# Patient Record
Sex: FEMALE | Race: WHITE | NOT HISPANIC OR LATINO | Employment: OTHER | URBAN - METROPOLITAN AREA
[De-identification: names, ages, dates, MRNs, and addresses within clinical notes are randomized per-mention and may not be internally consistent; named-entity substitution may affect disease eponyms.]

---

## 2017-02-23 ENCOUNTER — APPOINTMENT (OUTPATIENT)
Dept: RADIOLOGY | Facility: MEDICAL CENTER | Age: 52
End: 2017-02-23
Attending: EMERGENCY MEDICINE
Payer: MEDICAID

## 2017-02-23 ENCOUNTER — HOSPITAL ENCOUNTER (EMERGENCY)
Facility: MEDICAL CENTER | Age: 52
End: 2017-02-23
Attending: EMERGENCY MEDICINE
Payer: MEDICAID

## 2017-02-23 VITALS
WEIGHT: 144.4 LBS | HEIGHT: 62 IN | OXYGEN SATURATION: 95 % | BODY MASS INDEX: 26.57 KG/M2 | SYSTOLIC BLOOD PRESSURE: 153 MMHG | TEMPERATURE: 97.4 F | HEART RATE: 94 BPM | RESPIRATION RATE: 18 BRPM | DIASTOLIC BLOOD PRESSURE: 100 MMHG

## 2017-02-23 DIAGNOSIS — S39.012A LUMBAR SPINE STRAIN, INITIAL ENCOUNTER: ICD-10-CM

## 2017-02-23 DIAGNOSIS — S09.90XA CHI (CLOSED HEAD INJURY), INITIAL ENCOUNTER: ICD-10-CM

## 2017-02-23 PROCEDURE — 99284 EMERGENCY DEPT VISIT MOD MDM: CPT

## 2017-02-23 PROCEDURE — 700111 HCHG RX REV CODE 636 W/ 250 OVERRIDE (IP): Performed by: EMERGENCY MEDICINE

## 2017-02-23 PROCEDURE — 72100 X-RAY EXAM L-S SPINE 2/3 VWS: CPT

## 2017-02-23 PROCEDURE — 96372 THER/PROPH/DIAG INJ SC/IM: CPT

## 2017-02-23 RX ORDER — KETOROLAC TROMETHAMINE 30 MG/ML
30 INJECTION, SOLUTION INTRAMUSCULAR; INTRAVENOUS ONCE
Status: DISCONTINUED | OUTPATIENT
Start: 2017-02-23 | End: 2017-02-23 | Stop reason: CLARIF

## 2017-02-23 RX ORDER — KETOROLAC TROMETHAMINE 30 MG/ML
30 INJECTION, SOLUTION INTRAMUSCULAR; INTRAVENOUS ONCE
Status: COMPLETED | OUTPATIENT
Start: 2017-02-23 | End: 2017-02-23

## 2017-02-23 RX ORDER — TRAMADOL HYDROCHLORIDE 50 MG/1
50 TABLET ORAL EVERY 4 HOURS PRN
Qty: 30 TAB | Refills: 0 | Status: ON HOLD | OUTPATIENT
Start: 2017-02-23 | End: 2017-06-16

## 2017-02-23 RX ADMIN — KETOROLAC TROMETHAMINE 30 MG: 30 INJECTION, SOLUTION INTRAMUSCULAR; INTRAVENOUS at 14:14

## 2017-02-23 ASSESSMENT — LIFESTYLE VARIABLES: DO YOU DRINK ALCOHOL: NO

## 2017-02-23 NOTE — ED NOTES
Discharge instructions given, pt verbalized understanding.  Prescription instructions given, pt verbalized understanding.  A&ox4.  VSS.  Ambulates out of ER.

## 2017-02-23 NOTE — ED AVS SNAPSHOT
2/23/2017          Isabelle Coyle  [address]  [facility] [address]    Dear Isabelle:    [facility] wants to ensure your discharge home is safe and you or your loved ones have had all your questions answered regarding your care after you leave the hospital.    You may receive a telephone call within two days of your discharge.  This call is to make certain you understand your discharge instructions as well as ensure we provided you with the best care possible during your stay with us.     The call will only last approximately 3-5 minutes and will be done by a nurse.    Once again, we want to ensure your discharge home is safe and that you have a clear understanding of any next steps in your care.  If you have any questions or concerns, please do not hesitate to contact us, we are here for you.  Thank you for choosing [facility] for your healthcare needs.    Sincerely,    Giovani Valenzuela    [facility]

## 2017-02-23 NOTE — ED AVS SNAPSHOT
Home Care Instructions                                                                                                                Isabelle Coyle   MRN: [id number]    Department:  [facility], Emergency Dept   Date of Visit:  2/23/2017            [facility], Emergency Dept    [address]    [place] [address]    Phone:  [phone]      You were seen by     Haider Shirley M.D.      Your Diagnosis Was     [facility]I (closed head injury), initial encounter     S09.90XA       These are the medications you received during your hospitalization from 02/23/2017 1321 to 02/23/2017 1511     Date/Time Order Dose Route Action    02/23/2017 1414 ketorolac (TORADOL) injection 30 mg 30 mg Intramuscular Given      Medication Information     Review all of your home medications and newly ordered medications with your primary doctor and/or pharmacist as soon as possible. Follow medication instructions as directed by your doctor and/or pharmacist.     Please keep your complete medication list with you and share with your physician. Update the information when medications are discontinued, doses are changed, or new medications (including over-the-counter products) are added; and carry medication information at all times in the event of emergency situations.               Medication List      START taking these medications        Instructions    tramadol 50 MG Tabs   Commonly known as:  ULTRAM    Take 1 Tab by mouth every four hours as needed.   Dose:  50 mg         ASK your doctor about these medications        Instructions    B COMPLEX 1 PO    Take  by mouth.       estradiol 1 MG Tabs   Commonly known as:  ESTRACE    Take 1 Tab by mouth every day.   Dose:  1 mg       fluoxetine 20 MG Caps   Commonly known as:  PROZAC    Take 1 Cap by mouth every day.   Dose:  20 mg       folic acid 1 MG Tabs   Commonly known as:  FOLVITE    Take 1 Tab by mouth every day.   Dose:  1 mg       hydrocodone-acetaminophen 5-325 MG Tabs per tablet   Commonly known as:  NORCO    Take 1-2 Tabs by mouth every four hours as needed.   Dose:  1-2 Tab       ibuprofen 800 MG Tabs   Commonly known as:  MOTRIN    Take 1 Tab by mouth every 8 hours as needed.   Dose:  800 mg       lisinopril 10 MG Tabs   Commonly known as:  PRINIVIL    Take 10 mg by mouth every day.   Dose:  10 mg       medroxyPROGESTERone 2.5 MG Tabs   Commonly known as:  PROVERA    Take 1 Tab by mouth every day.   Dose:  2.5 mg       oxycodone-acetaminophen 5-325 MG Tabs   Commonly known as:  PERCOCET    Take 1-2 Tabs by mouth every four hours as needed.   Dose:  1-2 Tab       Verapamil HCl  MG Cp24    Take 240 mg by mouth every day.   Dose:  240 mg               Procedures and tests performed during your visit     DX-LUMBAR SPINE-2 OR 3 VIEWS        Discharge Instructions       Lumbosacral Strain  Lumbosacral strain is a strain of any of the parts that make up your lumbosacral vertebrae. Your lumbosacral vertebrae are the bones that make up the lower third of your backbone. Your lumbosacral vertebrae are held together by muscles and tough, fibrous tissue (ligaments).   CAUSES   A sudden blow to your back can cause lumbosacral strain. Also, anything that causes an excessive stretch of the muscles in the low back can cause this strain. This is typically seen when people exert themselves strenuously, fall, lift heavy objects, bend, or crouch repeatedly.  RISK FACTORS  · Physically demanding work.  · Participation in pushing or pulling sports or sports that require a sudden twist of the back (tennis, golf, baseball).  · Weight lifting.  · Excessive lower back curvature.  · Forward-tilted pelvis.  · Weak back or abdominal muscles or both.  · Tight hamstrings.  SIGNS AND SYMPTOMS   Lumbosacral strain may cause pain in the area of your injury or pain that moves (radiates) down your leg.   DIAGNOSIS  Your health care provider can often diagnose  lumbosacral strain through a physical exam. In some cases, you may need tests such as X-ray exams.   TREATMENT   Treatment for your lower back injury depends on many factors that your clinician will have to evaluate. However, most treatment will include the use of anti-inflammatory medicines.  HOME CARE INSTRUCTIONS   · Avoid hard physical activities (tennis, racquetball, waterskiing) if you are not in proper physical condition for it. This may aggravate or create problems.  · If you have a back problem, avoid sports requiring sudden body movements. Swimming and walking are generally safer activities.  · Maintain good posture.  · Maintain a healthy weight.  · For acute conditions, you may put ice on the injured area.  · Put ice in a plastic bag.  · Place a towel between your skin and the bag.  · Leave the ice on for 20 minutes, 2-3 times a day.  · When the low back starts healing, stretching and strengthening exercises may be recommended.  SEEK MEDICAL CARE IF:  · Your back pain is getting worse.  · You experience severe back pain not relieved with medicines.  SEEK IMMEDIATE MEDICAL CARE IF:   · You have numbness, tingling, weakness, or problems with the use of your arms or legs.  · There is a change in bowel or bladder control.  · You have increasing pain in any area of the body, including your belly (abdomen).  · You notice shortness of breath, dizziness, or feel faint.  · You feel sick to your stomach (nauseous), are throwing up (vomiting), or become sweaty.  · You notice discoloration of your toes or legs, or your feet get very cold.  MAKE SURE YOU:   · Understand these instructions.  · Will watch your condition.  · Will get help right away if you are not doing well or get worse.     This information is not intended to replace advice given to you by your health care provider. Make sure you discuss any questions you have with your health care provider.     Document Released: 09/27/2006 Document Revised:  01/08/2016 Document Reviewed: 08/06/2014  [employer] Interactive Patient Education ©2016 [employer]    Head Injury, Adult  You have a head injury. Headaches and throwing up (vomiting) are common after a head injury. It should be easy to wake up from sleeping. Sometimes you must stay in the hospital. Most problems happen within the first 24 hours. Side effects may occur up to 7-10 days after the injury.   WHAT ARE THE TYPES OF HEAD INJURIES?  Head injuries can be as minor as a bump. Some head injuries can be more severe. More severe head injuries include:  · A jarring injury to the brain (concussion).  · A bruise of the brain (contusion). This mean there is bleeding in the brain that can cause swelling.  · A cracked skull (skull fracture).  · Bleeding in the brain that collects, clots, and forms a bump (hematoma).  WHEN SHOULD I GET HELP RIGHT AWAY?   · You are confused or sleepy.  · You cannot be woken up.  · You feel sick to your stomach (nauseous) or keep throwing up (vomiting).  · Your dizziness or unsteadiness is getting worse.  · You have very bad, lasting headaches that are not helped by medicine. Take medicines only as told by your doctor.  · You cannot use your arms or legs like normal.  · You cannot walk.  · You notice changes in the black spots in the center of the colored part of your eye (pupil).  · You have clear or bloody fluid coming from your nose or ears.  · You have trouble seeing.  During the next 24 hours after the injury, you must stay with someone who can watch you. This person should get help right away (call 911 in the [place] if you start to shake and are not able to control it (have seizures), you pass out, or you are unable to wake up.  HOW CAN I PREVENT A HEAD INJURY IN THE FUTURE?  · Wear seat belts.  · Wear a helmet while bike riding and playing sports like football.  · Stay away from dangerous activities around the house.  WHEN CAN I RETURN TO NORMAL ACTIVITIES AND ATHLETICS?  See your  doctor before doing these activities. You should not do normal activities or play contact sports until 1 week after the following symptoms have stopped:  · Headache that does not go away.  · Dizziness.  · Poor attention.  · Confusion.  · Memory problems.  · Sickness to your stomach or throwing up.  · Tiredness.  · Fussiness.  · Bothered by bright lights or loud noises.  · Anxiousness or depression.  · Restless sleep.  MAKE SURE YOU:   · Understand these instructions.  · Will watch your condition.  · Will get help right away if you are not doing well or get worse.     This information is not intended to replace advice given to you by your health care provider. Make sure you discuss any questions you have with your health care provider.     Document Released: 11/30/2009 Document Revised: 01/08/2016 Document Reviewed: 08/25/2014  [employer] Interactive Patient Education ©2016 [employer]            Patient Information     Patient Information    Following emergency treatment: all patient requiring follow-up care must return either to a private physician or a clinic if your condition worsens before you are able to obtain further medical attention, please return to the emergency room.     Billing Information    At [facility], we work to make the billing process streamlined for our patients.  Our Representatives are here to answer any questions you may have regarding your hospital bill.  If you have insurance coverage and have supplied your insurance information to us, we will submit a claim to your insurer on your behalf.  Should you have any questions regarding your bill, we can be reached online or by phone as follows:  Online: You are able pay your bills online or live chat with our representatives about any billing questions you may have. We are here to help Monday - Friday from 8:00am to 7:30pm and 9:00am - 12:00pm on Saturdays.  Please visit [email or web address]  for more  information.   Phone:  [phone] or [phone]    Please note that your emergency physician, surgeon, pathologist, radiologist, anesthesiologist, and other specialists are not employed by [facility] and will therefore bill separately for their services.  Please contact them directly for any questions concerning their bills at the numbers below:     Emergency Physician Services:  [phone]  [facility]:  [phone]  [facility]:  [phone]  [facility]:  [phone]    1. Your final bill may vary from the amount quoted upon discharge if all procedures are not complete at that time, or if your doctor has additional procedures of which we are not aware. You will receive an additional bill if you return to the Emergency Department at [facility] for suture removal regardless of the facility of which the sutures were placed.     2. Please arrange for settlement of this account at the emergency registration.    3. All self-pay accounts are due in full at the time of treatment.  If you are unable to meet this obligation then payment is expected within 4-5 days.     4. If you have had radiology studies (CT, X-ray, Ultrasound, MRI), you have received a preliminary result during your emergency department visit. Please contact the radiology department [phone] to receive a copy of your final result. Please discuss the Final result with your primary physician or with the follow up physician provided.     Crisis Hotline:  [place] Crisis Hotline:  9-539-HPDXXGE or 1-931.780.8012  [place] Crisis Hotline:    1-456.734.6964 or 418-951-5163         ED Discharge Follow Up Questions    1. In order to provide you with very good care, we would like to follow up with a phone call in the next few days.  May we have your permission to contact you?     YES /  NO    2. What is the best phone number to call you? (       )_____-__________    3. What is the best time to call  you?      Morning  /  Afternoon  /  Evening                   Patient Signature:  ____________________________________________________________    Date:  ____________________________________________________________

## 2017-02-23 NOTE — DISCHARGE INSTRUCTIONS
Lumbosacral Strain  Lumbosacral strain is a strain of any of the parts that make up your lumbosacral vertebrae. Your lumbosacral vertebrae are the bones that make up the lower third of your backbone. Your lumbosacral vertebrae are held together by muscles and tough, fibrous tissue (ligaments).   CAUSES   A sudden blow to your back can cause lumbosacral strain. Also, anything that causes an excessive stretch of the muscles in the low back can cause this strain. This is typically seen when people exert themselves strenuously, fall, lift heavy objects, bend, or crouch repeatedly.  RISK FACTORS  · Physically demanding work.  · Participation in pushing or pulling sports or sports that require a sudden twist of the back (tennis, golf, baseball).  · Weight lifting.  · Excessive lower back curvature.  · Forward-tilted pelvis.  · Weak back or abdominal muscles or both.  · Tight hamstrings.  SIGNS AND SYMPTOMS   Lumbosacral strain may cause pain in the area of your injury or pain that moves (radiates) down your leg.   DIAGNOSIS  Your health care provider can often diagnose lumbosacral strain through a physical exam. In some cases, you may need tests such as X-ray exams.   TREATMENT   Treatment for your lower back injury depends on many factors that your clinician will have to evaluate. However, most treatment will include the use of anti-inflammatory medicines.  HOME CARE INSTRUCTIONS   · Avoid hard physical activities (tennis, racquetball, waterskiing) if you are not in proper physical condition for it. This may aggravate or create problems.  · If you have a back problem, avoid sports requiring sudden body movements. Swimming and walking are generally safer activities.  · Maintain good posture.  · Maintain a healthy weight.  · For acute conditions, you may put ice on the injured area.  · Put ice in a plastic bag.  · Place a towel between your skin and the bag.  · Leave the ice on for 20 minutes, 2-3 times a day.  · When the  low back starts healing, stretching and strengthening exercises may be recommended.  SEEK MEDICAL CARE IF:  · Your back pain is getting worse.  · You experience severe back pain not relieved with medicines.  SEEK IMMEDIATE MEDICAL CARE IF:   · You have numbness, tingling, weakness, or problems with the use of your arms or legs.  · There is a change in bowel or bladder control.  · You have increasing pain in any area of the body, including your belly (abdomen).  · You notice shortness of breath, dizziness, or feel faint.  · You feel sick to your stomach (nauseous), are throwing up (vomiting), or become sweaty.  · You notice discoloration of your toes or legs, or your feet get very cold.  MAKE SURE YOU:   · Understand these instructions.  · Will watch your condition.  · Will get help right away if you are not doing well or get worse.     This information is not intended to replace advice given to you by your health care provider. Make sure you discuss any questions you have with your health care provider.     Document Released: 09/27/2006 Document Revised: 01/08/2016 Document Reviewed: 08/06/2014  [employer] Interactive Patient Education ©2016 [employer]    Head Injury, Adult  You have a head injury. Headaches and throwing up (vomiting) are common after a head injury. It should be easy to wake up from sleeping. Sometimes you must stay in the hospital. Most problems happen within the first 24 hours. Side effects may occur up to 7-10 days after the injury.   WHAT ARE THE TYPES OF HEAD INJURIES?  Head injuries can be as minor as a bump. Some head injuries can be more severe. More severe head injuries include:  · A jarring injury to the brain (concussion).  · A bruise of the brain (contusion). This mean there is bleeding in the brain that can cause swelling.  · A cracked skull (skull fracture).  · Bleeding in the brain that collects, clots, and forms a bump (hematoma).  WHEN SHOULD I GET HELP RIGHT AWAY?   · You are  confused or sleepy.  · You cannot be woken up.  · You feel sick to your stomach (nauseous) or keep throwing up (vomiting).  · Your dizziness or unsteadiness is getting worse.  · You have very bad, lasting headaches that are not helped by medicine. Take medicines only as told by your doctor.  · You cannot use your arms or legs like normal.  · You cannot walk.  · You notice changes in the black spots in the center of the colored part of your eye (pupil).  · You have clear or bloody fluid coming from your nose or ears.  · You have trouble seeing.  During the next 24 hours after the injury, you must stay with someone who can watch you. This person should get help right away (call 911 in the [place] if you start to shake and are not able to control it (have seizures), you pass out, or you are unable to wake up.  HOW CAN I PREVENT A HEAD INJURY IN THE FUTURE?  · Wear seat belts.  · Wear a helmet while bike riding and playing sports like football.  · Stay away from dangerous activities around the house.  WHEN CAN I RETURN TO NORMAL ACTIVITIES AND ATHLETICS?  See your doctor before doing these activities. You should not do normal activities or play contact sports until 1 week after the following symptoms have stopped:  · Headache that does not go away.  · Dizziness.  · Poor attention.  · Confusion.  · Memory problems.  · Sickness to your stomach or throwing up.  · Tiredness.  · Fussiness.  · Bothered by bright lights or loud noises.  · Anxiousness or depression.  · Restless sleep.  MAKE SURE YOU:   · Understand these instructions.  · Will watch your condition.  · Will get help right away if you are not doing well or get worse.     This information is not intended to replace advice given to you by your health care provider. Make sure you discuss any questions you have with your health care provider.     Document Released: 11/30/2009 Document Revised: 01/08/2016 Document Reviewed: 08/25/2014  [employer] Interactive Patient  Education ©2016 [employer]

## 2017-02-23 NOTE — ED PROVIDER NOTES
ED Provider Note    Scribed for Haider Shirley M.D. by Adán Benjamin. 2/23/2017  2:02 PM    Primary care provider: Radha Stinson M.D.  Means of arrival: Walk in   History obtained from: Patient  History limited by: None    CHIEF COMPLAINT  Chief Complaint   Patient presents with   • T-5000 GLF       HPI  Isabelle Coyle is a 51 y.o. female who presents to the Emergency Department for T-5000 ground level fall. The patient reports last night, she slipped and fell backwards onto her butt. She says she did hit her head and no LOC. She developed a bump to her posterior head and low back pain so she decided to come to the ED. Currently, she has moderate low back pain exacerbated with movement and a bump to her posterior head with head pain to the area. No bleeding. No vomiting. She denies being on any blood thinners. She has past medical history of hypertension.     REVIEW OF SYSTEMS  Pertinent negatives include no LOC, vomiting. As above, all other systems reviewed and are negative.   See HPI for further details.     PAST MEDICAL HISTORY   has a past medical history of Dysrhythmia; HTN (hypertension) (11/5/2009); Glomerulonephritis, chronic (11/5/2009); Anemia associated with chronic renal failure (11/5/2009); and SVT (supraventricular tachycardia) ([facility]).    SURGICAL HISTORY   has past surgical history that includes amputation, below the knee; capitation payment (insurance); femur orif (10/9/08); iliac bone graft (10/9/08); other; appendectomy laparoscopic (5/4/2009); and enlarge breast with implant.    SOCIAL HISTORY  Social History   Substance Use Topics   • Smoking status: Current Every Day Smoker     Last Attempt to Quit: 09/22/2012   • Smokeless tobacco: Never Used      Comment: 5 cigs/day   • Alcohol Use: Yes      Comment: pitcher of beer on saturdays      History   Drug Use No       FAMILY HISTORY  No family history noted    CURRENT MEDICATIONS  No current facility-administered medications for this  "encounter.    Current outpatient prescriptions:   •  estradiol (ESTRACE) 1 MG Tab, Take 1 Tab by mouth every day., Disp: 30 Tab, Rfl: 0  •  fluoxetine (PROZAC) 20 MG Cap, Take 1 Cap by mouth every day., Disp: 30 Cap, Rfl: 0  •  medroxyPROGESTERone (PROVERA) 2.5 MG Tab, Take 1 Tab by mouth every day., Disp: 30 Tab, Rfl: 0  •  oxycodone-acetaminophen (PERCOCET) 5-325 MG Tab, Take 1-2 Tabs by mouth every four hours as needed., Disp: 12 Tab, Rfl: 0  •  hydrocodone-acetaminophen (NORCO) 5-325 MG Tab per tablet, Take 1-2 Tabs by mouth every four hours as needed., Disp: 21 Tab, Rfl: 0  •  B Complex Vitamins (B COMPLEX 1 PO), Take  by mouth., Disp: , Rfl:   •  ibuprofen (MOTRIN) 800 MG TABS, Take 1 Tab by mouth every 8 hours as needed., Disp: 15 Tab, Rfl: 3  •  folic acid (FOLVITE) 1 MG TABS, Take 1 Tab by mouth every day., Disp: 100 Tab, Rfl: 0  •  Verapamil HCl  MG CP24, Take 240 mg by mouth every day., Disp: , Rfl:   •  lisinopril (PRINIVIL) 10 MG TABS, Take 10 mg by mouth every day., Disp: , Rfl:     ALLERGIES  Allergies   Allergen Reactions   • Sulfa Drugs Hives       PHYSICAL EXAM  VITAL SIGNS: /102 mmHg  Pulse 118  Temp(Src) 36.3 °C (97.4 °F)  Resp 18  Ht 1.575 m (5' 2.01\")  Wt 65.5 kg (144 lb 6.4 oz)  BMI 26.40 kg/m2  SpO2 100%  LMP 06/25/2007    Constitutional: Well developed, Well nourished, No acute distress, Non-toxic appearance.   HENT: Normocephalic, Atraumatic, Bilateral external ears normal, Oropharynx is clear mucous membranes are moist. No oral exudates or nasal discharge.   Eyes: Pupils are equal round and reactive, EOMI, Conjunctiva normal, No discharge.   Neck: Normal range of motion, No tenderness, Supple, No stridor. No meningismus.  Lymphatic: No lymphadenopathy noted.   Cardiovascular: Regular rate and rhythm without murmur rub or gallop.  Thorax & Lungs: Clear breath sounds bilaterally without wheezes, rhonchi or rales. There is no chest wall tenderness.   Abdomen: Soft " non-tender non-distended. There is no rebound or guarding. No organomegaly is appreciated. Bowel sounds are normal.  Skin: Normal without rash.   Back: No CVA or spinal tenderness.   Extremities: Intact distal pulses, No edema, No tenderness, No cyanosis, No clubbing. Capillary refill is less than 2 seconds. 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   Musculoskeletal: Good range of motion in all major joints. No tenderness to palpation or major deformities noted.   Neurologic: Alert & oriented x 3, Normal motor function, Normal sensory function, No focal deficits noted. Reflexes are normal.  Psychiatric: Affect normal, Judgment normal, Mood normal. There is no suicidal ideation or patient reported hallucinations.     DIAGNOSTIC STUDIES / PROCEDURES    RADIOLOGY  DX-LUMBAR SPINE-2 OR 3 VIEWS   Final Result      1.  Curvilinear deformity of the lower sacrum is likely related to remote trauma. Acute fracture is not excluded. Consider CT.   2.  Multilevel multifactorial degenerative changes   3.  Cholelithiasis        The radiologist's interpretation of all radiological studies have been reviewed by me.    COURSE & MEDICAL DECISION MAKING  Nursing notes, VS, PMSFHx reviewed in chart.    2:02 PM Patient seen and examined at bedside. Ordered for DX-lumbar to evaluate. Patient was treated with TORADOL 30mg IM for her symptoms.      3:10 PM - Reviewed patient's x-ray results which are noted above. Patient's symptoms are most likely consistent with lumbar spine strain. Appropriate discharge instructions will be give to patient. She will be prescribed ULTRAM for the patient and advised to follow up.     I have signed into and reviewed the patient's prescription monitoring program data prior to prescribing a scheduled drug. The patient will not drink alcohol nor drive with prescribed medications. The patient will return for new or worsening symptoms and is stable at the time of discharge.  Reviewed the patient's prescription history on [place]  Prescription Monitoring Program which showed a narcotic score of 60. .       DISPOSITION:  Patient will be discharged home in stable condition.    FINAL IMPRESSION  1. CHI (closed head injury), initial encounter    2. Lumbar spine strain, initial encounter          IAdán (Scribe), am scribing for, and in the presence of, Haider Shirley M.D..    Electronically signed by: Adán Benjamin (Scribe), 2/23/2017    IHaider M.D. personally performed the services described in this documentation, as scribed by Adán Benjamin in my presence, and it is both accurate and complete.    The note accurately reflects work and decisions made by me.  Haider Shirley  2/23/2017  3:08 PM

## 2017-02-23 NOTE — ED AVS SNAPSHOT
ClassLink Access Code: Activation code not generated  Current ClassLink Status: Active    [employer]  A secure, online tool to manage your health information     [employer]’s ClassLink® is a secure, online tool that connects you to your personalized health information from the privacy of your home -- day or night - making it very easy for you to manage your healthcare. Once the activation process is completed, you can even access your medical information using the ClassLink nehemiah, which is available for free in the Apple Nehemiah store or Google Play store.     ClassLink provides the following levels of access (as shown below):   My Chart Features   [facility] Primary Care Doctor [facility]  Specialists [facility]  Urgent  Care Non-[facility]  Primary Care  Doctor   Email your healthcare team securely and privately 24/7 X X X X   Manage appointments: schedule your next appointment; view details of past/upcoming appointments X      Request prescription refills. X      View recent personal medical records, including lab and immunizations X X X X   View health record, including health history, allergies, medications X X X X   Read reports about your outpatient visits, procedures, consult and ER notes X X X X   See your discharge summary, which is a recap of your hospital and/or ER visit that includes your diagnosis, lab results, and care plan. X X       How to register for ClassLink:  1. Go to  [email or web address].  2. Click on the Sign Up Now box, which takes you to the New Member Sign Up page. You will need to provide the following information:  a. Enter your ClassLink Access Code exactly as it appears at the top of this page. (You will not need to use this code after you’ve completed the sign-up process. If you do not sign up before the expiration date, you must request a new code.)   b. Enter your date of birth.   c. Enter your home email address.   d. Click Submit, and follow the next screen’s instructions.  3. Create a ClassLink ID. This will  be your [employer] login ID and cannot be changed, so think of one that is secure and easy to remember.  4. Create a [employer] Things password. You can change your password at any time.  5. Enter your Password Reset Question and Answer. This can be used at a later time if you forget your password.   6. Enter your e-mail address. This allows you to receive e-mail notifications when new information is available in [employer].  7. Click Sign Up. You can now view your health information.    For assistance activating your [employer] account, call [phone]

## 2017-02-23 NOTE — ED NOTES
Pt comes in complaining of slipping on the ice last night. Pt now complaining of lower back. Pt reporting she fell onto her butt. Pt also stating she hit the back of her head. Pt stating positive LOC. Pt denies blood thinners.

## 2017-06-15 ENCOUNTER — HOSPITAL ENCOUNTER (INPATIENT)
Facility: MEDICAL CENTER | Age: 52
LOS: 7 days | DRG: 673 | End: 2017-06-23
Attending: EMERGENCY MEDICINE | Admitting: HOSPITALIST
Payer: MEDICAID

## 2017-06-15 ENCOUNTER — APPOINTMENT (OUTPATIENT)
Dept: RADIOLOGY | Facility: MEDICAL CENTER | Age: 52
DRG: 673 | End: 2017-06-15
Attending: EMERGENCY MEDICINE
Payer: MEDICAID

## 2017-06-15 ENCOUNTER — RESOLUTE PROFESSIONAL BILLING HOSPITAL PROF FEE (OUTPATIENT)
Dept: HOSPITALIST | Facility: MEDICAL CENTER | Age: 52
End: 2017-06-15
Payer: MEDICAID

## 2017-06-15 DIAGNOSIS — I50.20 SYSTOLIC CONGESTIVE HEART FAILURE, UNSPECIFIED CONGESTIVE HEART FAILURE CHRONICITY: ICD-10-CM

## 2017-06-15 DIAGNOSIS — N19 RENAL FAILURE: ICD-10-CM

## 2017-06-15 LAB
ALBUMIN SERPL BCP-MCNC: 3.9 G/DL (ref 3.2–4.9)
ALBUMIN/GLOB SERPL: 1.1 G/DL
ALP SERPL-CCNC: 58 U/L (ref 30–99)
ALT SERPL-CCNC: 6 U/L (ref 2–50)
ANION GAP SERPL CALC-SCNC: 21 MMOL/L (ref 0–11.9)
AST SERPL-CCNC: 8 U/L (ref 12–45)
BASOPHILS # BLD AUTO: 0.5 % (ref 0–1.8)
BASOPHILS # BLD: 0.03 K/UL (ref 0–0.12)
BILIRUB SERPL-MCNC: 0.4 MG/DL (ref 0.1–1.5)
BNP SERPL-MCNC: 1721 PG/ML (ref 0–100)
BUN SERPL-MCNC: 125 MG/DL (ref 8–22)
CALCIUM SERPL-MCNC: 8.6 MG/DL (ref 8.5–10.5)
CHLORIDE SERPL-SCNC: 106 MMOL/L (ref 96–112)
CO2 SERPL-SCNC: 14 MMOL/L (ref 20–33)
CREAT SERPL-MCNC: 16.89 MG/DL (ref 0.5–1.4)
EKG IMPRESSION: NORMAL
EOSINOPHIL # BLD AUTO: 0.15 K/UL (ref 0–0.51)
EOSINOPHIL NFR BLD: 2.3 % (ref 0–6.9)
ERYTHROCYTE [DISTWIDTH] IN BLOOD BY AUTOMATED COUNT: 41.3 FL (ref 35.9–50)
GFR SERPL CREATININE-BSD FRML MDRD: 2 ML/MIN/1.73 M 2
GLOBULIN SER CALC-MCNC: 3.5 G/DL (ref 1.9–3.5)
GLUCOSE SERPL-MCNC: 91 MG/DL (ref 65–99)
HCT VFR BLD AUTO: 21.4 % (ref 37–47)
HGB BLD-MCNC: 6.9 G/DL (ref 12–16)
IMM GRANULOCYTES # BLD AUTO: 0.02 K/UL (ref 0–0.11)
IMM GRANULOCYTES NFR BLD AUTO: 0.3 % (ref 0–0.9)
LYMPHOCYTES # BLD AUTO: 1.36 K/UL (ref 1–4.8)
LYMPHOCYTES NFR BLD: 21.1 % (ref 22–41)
MCH RBC QN AUTO: 28.2 PG (ref 27–33)
MCHC RBC AUTO-ENTMCNC: 32.2 G/DL (ref 33.6–35)
MCV RBC AUTO: 87.3 FL (ref 81.4–97.8)
MONOCYTES # BLD AUTO: 0.36 K/UL (ref 0–0.85)
MONOCYTES NFR BLD AUTO: 5.6 % (ref 0–13.4)
NEUTROPHILS # BLD AUTO: 4.53 K/UL (ref 2–7.15)
NEUTROPHILS NFR BLD: 70.2 % (ref 44–72)
NRBC # BLD AUTO: 0 K/UL
NRBC BLD AUTO-RTO: 0 /100 WBC
PLATELET # BLD AUTO: 229 K/UL (ref 164–446)
PMV BLD AUTO: 9.4 FL (ref 9–12.9)
POTASSIUM SERPL-SCNC: 4 MMOL/L (ref 3.6–5.5)
PROT SERPL-MCNC: 7.4 G/DL (ref 6–8.2)
RBC # BLD AUTO: 2.45 M/UL (ref 4.2–5.4)
SODIUM SERPL-SCNC: 141 MMOL/L (ref 135–145)
TROPONIN I SERPL-MCNC: 0.12 NG/ML (ref 0–0.04)
WBC # BLD AUTO: 6.5 K/UL (ref 4.8–10.8)

## 2017-06-15 PROCEDURE — 85025 COMPLETE CBC W/AUTO DIFF WBC: CPT

## 2017-06-15 PROCEDURE — 80053 COMPREHEN METABOLIC PANEL: CPT

## 2017-06-15 PROCEDURE — 36415 COLL VENOUS BLD VENIPUNCTURE: CPT

## 2017-06-15 PROCEDURE — 83880 ASSAY OF NATRIURETIC PEPTIDE: CPT

## 2017-06-15 PROCEDURE — 99285 EMERGENCY DEPT VISIT HI MDM: CPT

## 2017-06-15 PROCEDURE — 87040 BLOOD CULTURE FOR BACTERIA: CPT

## 2017-06-15 PROCEDURE — 93005 ELECTROCARDIOGRAM TRACING: CPT

## 2017-06-15 PROCEDURE — 94760 N-INVAS EAR/PLS OXIMETRY 1: CPT

## 2017-06-15 PROCEDURE — 71010 DX-CHEST-LIMITED (1 VIEW): CPT

## 2017-06-15 PROCEDURE — 82728 ASSAY OF FERRITIN: CPT

## 2017-06-15 PROCEDURE — 84484 ASSAY OF TROPONIN QUANT: CPT

## 2017-06-15 RX ORDER — HYDRALAZINE HYDROCHLORIDE 20 MG/ML
10 INJECTION INTRAMUSCULAR; INTRAVENOUS ONCE
Status: DISCONTINUED | OUTPATIENT
Start: 2017-06-16 | End: 2017-06-16

## 2017-06-15 ASSESSMENT — ENCOUNTER SYMPTOMS
SHORTNESS OF BREATH: 1
CHILLS: 0
FEVER: 0
SPUTUM PRODUCTION: 1
VOMITING: 0
NAUSEA: 0
DIARRHEA: 0
COUGH: 1

## 2017-06-15 ASSESSMENT — PAIN SCALES - GENERAL: PAINLEVEL_OUTOF10: 0

## 2017-06-15 ASSESSMENT — LIFESTYLE VARIABLES: DO YOU DRINK ALCOHOL: NO

## 2017-06-15 NOTE — IP AVS SNAPSHOT
" Home Care Instructions                                                                                                                  Name:Isabelle Coyle  Medical Record Number:[id number]  CSN: [id number]    YOB: 1965   Age: 52 y.o.  Sex: female  HT:1.575 m (5' 2\") WT: 68.6 kg (151 lb 3.8 oz)          Admit Date: 6/15/2017     Discharge Date:   Today's Date: 6/23/2017  Attending Doctor:  FLAKITA Spain M.D.                  Allergies:  Sulfa drugs            Discharge Instructions       Discharge Instructions    Discharged to home by car with friend. Discharged via wheelchair, hospital escort: Yes.  Special equipment needed: Wheelchair    Be sure to schedule a follow-up appointment with your primary care doctor or any specialists as instructed.     Discharge Plan:   Diet Plan: Discussed (renal)  Activity Level: Discussed (as tolerated)  Smoking Cessation Offered: Patient Refused  Confirmed Follow up Appointment: Patient to Call and Schedule Appointment  Confirmed Symptoms Management: Discussed  Medication Reconciliation Updated: Yes  Influenza Vaccine Indication: Patient Refuses    I understand that a diet low in cholesterol, fat, and sodium is recommended for good health. Unless I have been given specific instructions below for another diet, I accept this instruction as my diet prescription.   Other diet: renal    Special Instructions: None    · Is patient discharged on Warfarin / Coumadin?   No     · Is patient Post Blood Transfusion?  No    Depression / Suicide Risk    As you are discharged from this [facility] facility, it is important to learn how to keep safe from harming yourself.    Recognize the warning signs:  · Abrupt changes in personality, positive or negative- including increase in energy   · Giving away possessions  · Change in eating patterns- significant weight changes-  positive or negative  · Change in sleeping patterns- unable to sleep or sleeping all the " time   · Unwillingness or inability to communicate  · Depression  · Unusual sadness, discouragement and loneliness  · Talk of wanting to die  · Neglect of personal appearance   · Rebelliousness- reckless behavior  · Withdrawal from people/activities they love  · Confusion- inability to concentrate     If you or a loved one observes any of these behaviors or has concerns about self-harm, here's what you can do:  · Talk about it- your feelings and reasons for harming yourself  · Remove any means that you might use to hurt yourself (examples: pills, rope, extension cords, firearm)  · Get professional help from the community (Mental Health, Substance Abuse, psychological counseling)  · Do not be alone:Call your Safe Contact- someone whom you trust who will be there for you.  · Call your local CRISIS HOTLINE 059-3621 or 059-421-2775  · Call your local Children's Mobile Crisis Response Team [place] (716) 029-9856 or www.[employer])  · Call the toll free National Suicide Prevention Hotlines   · National Suicide Prevention Lifeline 725-614-OUBT (3142)  · National Hope Line Network 800-SUICIDE (147-6353)    Dialysis Diet  Dialysis is a treatment that you may undergo if you have significant damage to your kidneys. Dialysis replaces some of the work that the kidneys do. One of the jobs that it takes over is removing wastes, salt, and extra water from your blood. This helps to keep the amount of potassium and other nutrients in your blood at healthy levels.  When you need dialysis, it is important to pay careful attention to your diet. Between dialysis sessions, certain nutrients can build up in your blood and cause you to get sick. Vitamins and minerals are an important part of a healthy diet and should not be avoided entirely. However, it is commonly recommended that you limit your intake of potassium, phosphorus, and sodium. It may also be necessary to restrict other nutrients, such as carbohydrates or fat, if you  have other health conditions. Your health care provider or dietitian can help you to determine the amount of these nutrients that is right for you.  WHAT IS MY PLAN?  Your dietitian will help you to design a meal plan that is specific to your needs. Generally, meal plans include:  · Grains, 6-11 servings per day. One serving is equal to 1 slice of bread or ½ cup of cooked rice or pasta.  · Low-potassium vegetables, 2-3 servings per day. One serving is equal to ½ cup.  · Low-potassium fruits, 2-3 servings per day. One serving is equal to ½ cup.  · Meats and other protein sources, 8-11 oz per day.  · Dairy, ½ cup per day.  Your dietitian will provide you with specific instructions about the amount of fluids you can have each day.  WHAT DO I NEED TO KNOW ABOUT A DIALYSIS DIET?  · Limit your intake of potassium. Potassium is found in milk, fruits, and vegetables.  · Limit your intake of phosphorus. Phosphorus is found in milk, cheese, beans, nuts, and carbonated beverages. Avoid whole-grain and high-fiber foods because they contain high amounts of phosphorus.  · Limit your intake of sodium. Foods that are high in sodium include processed and cured meats, ready-made frozen meals, canned vegetables, and salty snack foods. Do not use salt substitutes because they contain potassium.  · If you were instructed to restrict your fluid intake, follow your health care provider's specific instructions. You may be told to:  · Write down what you drink and any foods you eat that are made mostly from water, such as gelatin and soups.  · Drink from small cups to help control how much you drink.  · Ask your health care provider if you should regularly take an over-the-counter medicine that binds phosphorus, such as antacid products that contain calcium carbonate.  · Take vitamin and mineral supplements only as directed by your health care provider.  · Eat high-quality proteins, such as meat, poultry, fish, and eggs. Limit low-quality  plant-based proteins, such as nuts and beans.  · Cut potatoes into small pieces and boil them in unsalted water before you eat them. This can help to remove some potassium from the potato.  · Drain all fluid from cooked vegetables and canned fruits before eating them.  WHAT FOODS CAN I EAT?  Grains  White bread. White rice. Cooked cereal. Unsalted popcorn. Tortillas. Pasta.  Vegetables  Fresh or frozen broccoli, carrots, and green beans. Cabbage. Cauliflower. Celery. Cucumbers. Eggplant. Radishes. Zucchini.  Fruits  Apples. Fresh or frozen berries. Fresh or canned pears, peaches, and pineapple. Grapes. Plums.  Meats and Other Protein Sources  Fresh or frozen beef, pork, chicken, and fish. Eggs. Low-sodium canned tuna or salmon.  Dairy  Cream cheese. Heavy cream. Ricotta cheese.  Beverages  Apple cider. Cranberry juice. Grape juice. Lemonade. Black coffee.  Condiments  Herbs. Spices. Jam and jelly. Honey.  Sweets and Desserts  Sherbet. Cakes. Cookies.  Fats and Oils  Olive oil, canola oil, and safflower oil.  Other  Non-dairy creamer. Non-dairy whipped topping. Homemade broth without salt.  The items listed above may not be a complete list of recommended foods or beverages. Contact your dietitian for more options.   WHAT FOODS ARE NOT RECOMMENDED?  Grains  Whole-grain bread. Whole-grain pasta. High-fiber cereal.  Vegetables  Potatoes. Beets. Tomatoes. Winter squash and pumpkin. Asparagus. Spinach. Parsnips.  Fruits  Star fruit. Bananas. Oranges. Kiwi. Nectarines. Prunes. Melon. Dried fruit. Avocado.  Meats and Other Protein Sources  Canned, smoked, and cured meats. Packaged luncheon meat. Sardines. Nuts and seeds. Peanut butter. Beans and legumes.  Dairy  Milk. Buttermilk. Yogurt. Cheese and cottage cheese. Processed cheese spreads.  Beverages  Orange juice. Prune juice. Carbonated soft drinks.  Condiments  Salt. Salt substitutes. Soy sauce.  Sweets and Desserts  Ice cream. Chocolate. Candied nuts.  Fats and  Oils  Butter. Margarine.  Other  Ready-made frozen meals. Canned soups.  The items listed above may not be a complete list of foods and beverages to avoid. Contact your dietitian for more information.     This information is not intended to replace advice given to you by your health care provider. Make sure you discuss any questions you have with your health care provider.     Document Released: 09/14/2005 Document Revised: 01/08/2016 Document Reviewed: 07/21/2015  [employer] Interactive Patient Education ©2016 Elsevier Inc.    Dialysis  Dialysis is a procedure that replaces some of the work healthy kidneys do. It is done when you lose about 85-90% of your kidney function. It may also be done earlier if your symptoms may be improved by dialysis. During dialysis, wastes, salt, and extra water are removed from the blood, and the levels of certain chemicals in the blood (such as potassium) are maintained. Dialysis is done in sessions. Dialysis sessions are continued until the kidneys get better. If the kidneys cannot get better, such as in end-stage kidney disease, dialysis is continued for life or until you receive a new kidney (kidney transplant). There are two types of dialysis: hemodialysis and peritoneal dialysis.  WHAT IS HEMODIALYSIS?   Hemodialysis is a type of dialysis in which a machine called a dialyzer is used to filter the blood. Before beginning hemodialysis, you will have surgery to create a site where blood can be removed from the body and returned to the body (vascular access). There are three types of vascular accesses:  · Arteriovenous fistula. To create this type of access, an artery is connected to a vein (usually in the arm). A fistula takes 1-6 months to develop after surgery. If it develops properly, it usually lasts longer than the other types of vascular accesses. It is also less likely to become infected and cause blood clots.  · Arteriovenous graft. To create this type of access, an artery and  a vein in the arm are connected with a tube. A graft may be used within 2-3 weeks of surgery.  · A venous catheter. To create this type of access, a thin, flexible tube (catheter) is placed in a large vein in your neck, chest, or groin. A catheter may be used right away. It is usually used as a temporary access when dialysis needs to begin immediately.  During hemodialysis, blood leaves the body through your access. It travels through a tube to the dialyzer, where it is filtered. The blood then returns to your body through another tube.  Hemodialysis is usually performed by a health care provider at a hospital or dialysis center three times a week. Visits last about 3-4 hours. It may also be performed with the help of another person at home with training.   WHAT IS PERITONEAL DIALYSIS?  Peritoneal dialysis is a type of dialysis in which the thin lining of the abdomen (peritoneum) is used as a filter. Before beginning peritoneal dialysis, you will have surgery to place a catheter in your abdomen. The catheter will be used to transfer a fluid called dialysate to and from your abdomen. At the start of a session, your abdomen is filled with dialysate. During the session, wastes, salt, and extra water in the blood pass through the peritoneum and into the dialysate. The dialysate is drained from the body at the end of the session. The process of filling and draining the dialysate is called an exchange. Exchanges are repeated until you have used up all the dialysate for the day.  Peritoneal dialysis may be performed by you at home or at almost any other location. It is done every day. You may need up to five exchanges a day. The amount of time the dialysate is in your body between exchanges is called a dwell. The dwell depends on the number of exchanges needed and the characteristics of the peritoneum. It usually varies from 1.5-3 hours. You may go about your day normally between exchanges. Alternately, the exchanges may  be done at night while you sleep, using a machine called a cycler.  WHICH TYPE OF DIALYSIS SHOULD I CHOOSE?   Both hemodialysis and peritoneal dialysis have advantages and disadvantages. Talk to your health care provider about which type of dialysis would be best for you. Your lifestyle and preferences should be considered along with your medical condition. In some cases, only one type of dialysis may be an option.   Advantages of hemodialysis  · It is done less often than peritoneal dialysis.  · Someone else can do the dialysis for you.  · If you go to a dialysis center, your health care provider will be able to recognize any problems right away.  · If you go to a dialysis center, you can interact with others who are having dialysis. This can provide you with emotional support.  Disadvantages of hemodialysis  · Hemodialysis may cause cramps and low blood pressure. It may leave you feeling tired on the days you have the treatment.  · If you go to a dialysis center, you will need to make weekly appointments and work around the center's schedule.  · You will need to take extra care when traveling. If you go to a dialysis center, you will need to make special arrangements to visit a dialysis center near your destination. If you are having treatments at home, you will need to take the dialyzer with you to your destination.  · You will need to avoid more foods than you would need to avoid on peritoneal dialysis.  Advantages of peritoneal dialysis  · It is less likely than hemodialysis to cause cramps and low blood pressure.  · You may do exchanges on your own wherever you are, including when you travel.  · You do not need to avoid as many foods as you do on hemodialysis.  Disadvantages of peritoneal dialysis  · It is done more often than hemodialysis.  · Performing peritoneal dialysis requires you to have dexterity of the hands. You must also be able to lift bags.  · You will have to learn sterilization techniques. You  will need to practice them every day to reduce the risk of infection.   WHAT CHANGES WILL I NEED TO MAKE TO MY DIET DURING DIALYSIS?  Both hemodialysis and peritoneal dialysis require you to make some changes to your diet. For example, you will need to limit your intake of foods high in the minerals phosphorus and potassium. You will also need to limit your fluid intake. Your dietitian can help you plan meals. A good meal plan can improve your dialysis and your health.   WHAT SHOULD I EXPECT WHEN BEGINNING DIALYSIS?  Adjusting to the dialysis treatment, schedule, and diet can take some time. You may need to stop working and may not be able to do some of the things you normally do. You may feel anxious or depressed when beginning dialysis. Eventually, many people feel better overall because of dialysis. Some people are able to return to work after making some changes, such as reducing work intensity.  WHERE CAN I FIND MORE INFORMATION?   · National Kidney Foundation: www.kidney.org  · American Association of Kidney Patients: www.aakp.org  · American Kidney Fund: www.kidneyfund.org     This information is not intended to replace advice given to you by your health care provider. Make sure you discuss any questions you have with your health care provider.     Document Released: 03/09/2004 Document Revised: 01/08/2016 Document Reviewed: 02/11/2014  [employer] Interactive Patient Education ©2016 Elsevier Inc.          Your appointments     Jul 18, 2017  9:45 AM   New Patient with Juan Moreno M.D.   [facility] / [facility] - Internal Medicine (--)    [address] [place] [id number]   [phone]           Please bring Photo ID, Insurance Cards, All Medication Bottles and copies of any legal documents (such as Living Will, Power of ) If speaking a language besides English please bring an adult . Please arrive 30 minutes prior for check in and registration. You will be  receiving a confirmation call a few days before your appointment from our automated call confirmation system.                 Discharge Medication Instructions:    Below are the medications your physician expects you to take upon discharge:    Review all your home medications and newly ordered medications with your doctor and/or pharmacist. Follow medication instructions as directed by your doctor and/or pharmacist.    Please keep your medication list with you and share with your physician.               Medication List      START taking these medications        Instructions    Morning Afternoon Evening Bedtime    carvedilol 6.25 MG Tabs   Last time this was given:  6.25 mg on 6/23/2017  8:35 AM   Commonly known as:  COREG   Next Dose Due:  tonight        Take 2 Tabs by mouth 2 times a day, with meals.   Dose:  12.5 mg                        famotidine 20 MG Tabs   Last time this was given:  20 mg on 6/23/2017 11:38 AM   Commonly known as:  PEPCID   Next Dose Due:  Tonight          Take 1 Tab by mouth 2 Times a Day.   Dose:  20 mg                        terazosin 2 MG Caps   Start taking on:  6/24/2017   Commonly known as:  HYTRIN   Next Dose Due:  Tonight          Take 1 Cap by mouth every bedtime.   Dose:  2 mg                        verapamil 80 MG Tabs   Last time this was given:  80 mg on 6/23/2017  6:42 AM   Commonly known as:  ISOPTIN   Replaces:  Verapamil HCl  MG Cp24        Take 1 Tab by mouth every 8 hours.   Dose:  80 mg                        vitamin D (Ergocalciferol) 81655 UNITS Caps capsule   Last time this was given:  50,000 Units on 6/17/2017  8:45 AM   Commonly known as:  DRISDOL   Next Dose Due:  Tomorrow          Take 1 Cap by mouth every 7 days.   Dose:  95663 Units                          STOP taking these medications     omeprazole 20 MG delayed-release capsule   Commonly known as:  PRILOSEC               Verapamil HCl  MG Cp24   Replaced by:  verapamil 80 MG Tabs                     Where to Get Your Medications      These medications were sent to [id number] - [place], [place] - [address]  [address] [facility] [place] [address]     Phone:  [phone]    - carvedilol 6.25 MG Tabs  - famotidine 20 MG Tabs  - terazosin 2 MG Caps  - verapamil 80 MG Tabs  - vitamin D (Ergocalciferol) 25784 UNITS Caps capsule            Instructions           Diet / Nutrition:    Follow any diet instructions given to you by your doctor or the dietician, including how much salt (sodium) you are allowed each day.    If you are overweight, talk to your doctor about a weight reduction plan.    Activity:    Remain physically active following your doctor's instructions about exercise and activity.    Rest often.     Any time you become even a little tired or short of breath, SIT DOWN and rest.    Worsening Symptoms:    Report any of the following signs and symptoms to the doctor's office immediately:    *Pain of jaw, arm, or neck  *Chest pain not relieved by medication                               *Dizziness or loss of consciousness  *Difficulty breathing even when at rest   *More tired than usual                                       *Bleeding drainage or swelling of surgical site  *Swelling of feet, ankles, legs or stomach                 *Fever (>100ºF)  *Pink or blood tinged sputum  *Weight gain (3lbs/day or 5lbs /week)           *Shock from internal defibrillator (if applicable)  *Palpitations or irregular heartbeats                *Cool and/or numb extremities    Stroke Awareness    Common Risk Factors for Stroke include:    Age  Atrial Fibrillation  Carotid Artery Stenosis  Diabetes Mellitus  Excessive alcohol consumption  High blood pressure  Overweight   Physical inactivity  Smoking    Warning signs and symptoms of a stroke include:    *Sudden numbness or weakness of the face, arm or leg (especially on one side of the body).  *Sudden confusion, trouble speaking or understanding.  *Sudden  trouble seeing in one or both eyes.  *Sudden trouble walking, dizziness, loss of balance or coordination.Sudden severe headache with no known cause.    It is very important to get treatment quickly when a stroke occurs. If you experience any of the above warning signs, call 911 immediately.                   Disclaimer         Quit Smoking / Tobacco Use:    I understand the use of any tobacco products increases my chance of suffering from future heart disease or stroke and could cause other illnesses which may shorten my life. Quitting the use of tobacco products is the single most important thing I can do to improve my health. For further information on smoking / tobacco cessation call a Toll Free Quit Line at 1-129.458.2219 (*National Cancer [place]) or 1-757.713.8447 (American Lung Association) or you can access the web based program at www.[facility].org.    [place] Tobacco Users Help Line:  (168) 699-5924       Toll Free: 1-601.965.8107  Quit Tobacco Program [facility] (149)750-3388    Crisis Hotline:    [place] Crisis Hotline:  2-013-IWGVJVE or 1-358.870.9873    [place] Crisis Hotline:    1-414.243.6450 or 905-762-1965    Discharge Survey:   Thank you for choosing [facility]. We hope we did everything we could to make your hospital stay a pleasant one. You may be receiving a phone survey and we would appreciate your time and participation in answering the questions. Your input is very valuable to us in our efforts to improve our service to our patients and their families.        My signature on this form indicates that:    1. I have reviewed and understand the above information.  2. My questions regarding this information have been answered to my satisfaction.  3. I have formulated a plan with my discharge nurse to obtain my prescribed medications for home.                  Disclaimer         __________________________________                     __________       ________                        Patient Signature                                                 Date                    Time

## 2017-06-15 NOTE — IP AVS SNAPSHOT
6/23/2017    Isabelle Coyle  [address] [place] [address]  [place] [address]    Dear Isabelle:    [facility] wants to ensure your discharge home is safe and you or your loved ones have had all of your questions answered regarding your care after you leave the hospital.    Below is a list of resources and contact information should you have any questions regarding your hospital stay, follow-up instructions, or active medical symptoms.    Questions or Concerns Regarding… Contact   Medical Questions Related to Your Discharge  (7 days a week, 8am-5pm) Contact a Nurse Care Coordinator   [phone]   Medical Questions Not Related to Your Discharge  (24 hours a day / 7 days a week)  Contact the Nurse Health Line   [phone]    Medications or Discharge Instructions Refer to your discharge packet   or contact your [facility] Primary Care Provider   [phone]   Follow-up Appointment(s) Schedule your appointment via [employer]   or contact Scheduling [phone]   Billing Review your statement via [employer]  or contact Billing [phone]   Medical Records Review your records via [employer]   or contact Medical Records [phone]     You may receive a telephone call within two days of discharge. This call is to make certain you understand your discharge instructions and have the opportunity to have any questions answered. You can also easily access your medical information, test results and upcoming appointments via the [employer] free online health management tool. You can learn more and sign up at [employer]. For assistance setting up your [employer] account, please call [phone].    Once again, we want to ensure your discharge home is safe and that you have a clear understanding of any next steps in your care. If you have any questions or concerns, please do not hesitate to contact us, we are here for you. Thank you for choosing [facility] for your healthcare needs.    Sincerely,    Your [facility] Healthcare Team

## 2017-06-15 NOTE — IP AVS SNAPSHOT
[employer] Access Code: Activation code not generated  Current [employer] Status: Active    [employer] 2[employer]  A secure, online tool to manage your health information     [employer]’s [employer]® is a secure, online tool that connects you to your personalized health information from the privacy of your home -- day or night - making it very easy for you to manage your healthcare. Once the activation process is completed, you can even access your medical information using the [employer] nehemiah, which is available for free in the Apple Nehemiah store or Google Play store.     [employer] provides the following levels of access (as shown below):   My Chart Features   [facility] Primary Care Doctor [facility]  Specialists [facility]  Urgent  Care Non-[facility]  Primary Care  Doctor   Email your healthcare team securely and privately 24/7 X X X X   Manage appointments: schedule your next appointment; view details of past/upcoming appointments X      Request prescription refills. X      View recent personal medical records, including lab and immunizations X X X X   View health record, including health history, allergies, medications X X X X   Read reports about your outpatient visits, procedures, consult and ER notes X X X X   See your discharge summary, which is a recap of your hospital and/or ER visit that includes your diagnosis, lab results, and care plan. X X       How to register for [employer]:  1. Go to  [email or web address].  2. Click on the Sign Up Now box, which takes you to the New Member Sign Up page. You will need to provide the following information:  a. Enter your [employer] Access Code exactly as it appears at the top of this page. (You will not need to use this code after you’ve completed the sign-up process. If you do not sign up before the expiration date, you must request a new code.)   b. Enter your date of birth.   c. Enter your home email address.   d. Click Submit, and follow the next screen’s instructions.  3. Create a [employer] ID. This will  be your [employer] login ID and cannot be changed, so think of one that is secure and easy to remember.  4. Create a [employer] password. You can change your password at any time.  5. Enter your Password Reset Question and Answer. This can be used at a later time if you forget your password.   6. Enter your e-mail address. This allows you to receive e-mail notifications when new information is available in [employer].  7. Click Sign Up. You can now view your health information.    For assistance activating your [employer] account, call [phone]

## 2017-06-15 NOTE — IP AVS SNAPSHOT
" <p align=\"LEFT\"><IMG SRC=\"//[email or web address]\" alt=\"Image\" WIDTH=\"50%\" HEIGHT=\"200\" BORDER=\"\"></p>                   Name:Isabelle Coyle  Medical Record Number:[id number]  CSN: [id number]    YOB: 1965   Age: 52 y.o.  Sex: female  HT:1.575 m (5' 2\") WT: 68.6 kg (151 lb 3.8 oz)          Admit Date: 6/15/2017     Discharge Date:   Today's Date: 6/23/2017  Attending Doctor:  FLAKITA Spain M.D.                  Allergies:  Sulfa drugs          Your appointments     Jul 18, 2017  9:45 AM   New Patient with Juan Moreno M.D.   [facility] / [facility] Med - Internal Medicine (--)    [facility] [address] [place] [id number] [phone]           Please bring Photo ID, Insurance Cards, All Medication Bottles and copies of any legal documents (such as Living Will, Power of ) If speaking a language besides English please bring an adult . Please arrive 30 minutes prior for check in and registration. You will be receiving a confirmation call a few days before your appointment from our automated call confirmation system.                 Medication List      Take these Medications        Instructions    carvedilol 6.25 MG Tabs   Commonly known as:  COREG    Take 2 Tabs by mouth 2 times a day, with meals.   Dose:  12.5 mg       famotidine 20 MG Tabs   Commonly known as:  PEPCID    Take 1 Tab by mouth 2 Times a Day.   Dose:  20 mg       terazosin 2 MG Caps   Start taking on:  6/24/2017   Commonly known as:  HYTRIN    Take 1 Cap by mouth every bedtime.   Dose:  2 mg       verapamil 80 MG Tabs   Commonly known as:  ISOPTIN    Take 1 Tab by mouth every 8 hours.   Dose:  80 mg       vitamin D (Ergocalciferol) 67622 UNITS Caps capsule   Commonly known as:  DRISDOL    Take 1 Cap by mouth every 7 days.   Dose:  95389 Units         "

## 2017-06-16 ENCOUNTER — APPOINTMENT (OUTPATIENT)
Dept: RADIOLOGY | Facility: MEDICAL CENTER | Age: 52
DRG: 673 | End: 2017-06-16
Attending: INTERNAL MEDICINE
Payer: MEDICAID

## 2017-06-16 PROBLEM — N17.9 ACUTE KIDNEY INJURY SUPERIMPOSED ON CKD (HCC): Status: ACTIVE | Noted: 2017-06-16

## 2017-06-16 PROBLEM — N18.9 ACUTE KIDNEY INJURY SUPERIMPOSED ON CKD (HCC): Status: ACTIVE | Noted: 2017-06-16

## 2017-06-16 LAB
25(OH)D3 SERPL-MCNC: 10 NG/ML (ref 30–100)
ABO GROUP BLD: NORMAL
ALBUMIN SERPL BCP-MCNC: 3.4 G/DL (ref 3.2–4.9)
ALBUMIN/GLOB SERPL: 1.2 G/DL
ALP SERPL-CCNC: 49 U/L (ref 30–99)
ALT SERPL-CCNC: 6 U/L (ref 2–50)
ANION GAP SERPL CALC-SCNC: 21 MMOL/L (ref 0–11.9)
APPEARANCE UR: CLEAR
APTT PPP: 28 SEC (ref 24.7–36)
AST SERPL-CCNC: 8 U/L (ref 12–45)
BACTERIA #/AREA URNS HPF: ABNORMAL /HPF
BARCODED ABORH UBTYP: 9500
BARCODED ABORH UBTYP: 9500
BARCODED PRD CODE UBPRD: NORMAL
BARCODED PRD CODE UBPRD: NORMAL
BARCODED UNIT NUM UBUNT: NORMAL
BARCODED UNIT NUM UBUNT: NORMAL
BASE EXCESS BLDA CALC-SCNC: -10 MMOL/L (ref -4–3)
BASOPHILS # BLD AUTO: 0.4 % (ref 0–1.8)
BASOPHILS # BLD: 0.03 K/UL (ref 0–0.12)
BILIRUB SERPL-MCNC: 0.3 MG/DL (ref 0.1–1.5)
BILIRUB UR QL STRIP.AUTO: NEGATIVE
BLD GP AB INVEST PLASRBC-IMP: NORMAL
BLD GP AB SCN SERPL QL: NORMAL
BODY TEMPERATURE: ABNORMAL CENTIGRADE
BUN SERPL-MCNC: 128 MG/DL (ref 8–22)
CA-I SERPL-SCNC: 1 MMOL/L (ref 1.1–1.3)
CALCIUM SERPL-MCNC: 8 MG/DL (ref 8.5–10.5)
CHLORIDE SERPL-SCNC: 108 MMOL/L (ref 96–112)
CHLORIDE UR-SCNC: 87 MMOL/L
CO2 SERPL-SCNC: 14 MMOL/L (ref 20–33)
COLOR UR: COLORLESS
COMPONENT R 8504R: NORMAL
COMPONENT R 8504R: NORMAL
CREAT SERPL-MCNC: 17.6 MG/DL (ref 0.5–1.4)
CREAT UR-MCNC: 53.4 MG/DL
EKG IMPRESSION: NORMAL
EOSINOPHIL # BLD AUTO: 0.19 K/UL (ref 0–0.51)
EOSINOPHIL NFR BLD: 2.7 % (ref 0–6.9)
EPI CELLS #/AREA URNS HPF: ABNORMAL /HPF
ERYTHROCYTE [DISTWIDTH] IN BLOOD BY AUTOMATED COUNT: 41.8 FL (ref 35.9–50)
FERRITIN SERPL-MCNC: 57.8 NG/ML (ref 10–291)
GFR SERPL CREATININE-BSD FRML MDRD: 2 ML/MIN/1.73 M 2
GLOBULIN SER CALC-MCNC: 2.8 G/DL (ref 1.9–3.5)
GLUCOSE SERPL-MCNC: 114 MG/DL (ref 65–99)
GLUCOSE UR STRIP.AUTO-MCNC: NEGATIVE MG/DL
HBV SURFACE AB SERPL IA-ACNC: 8.49 MIU/ML (ref 0–10)
HBV SURFACE AG SER QL: NEGATIVE
HCO3 BLDA-SCNC: 14 MMOL/L (ref 17–25)
HCT VFR BLD AUTO: 22.8 % (ref 37–47)
HGB BLD-MCNC: 7.5 G/DL (ref 12–16)
HGB RETIC QN AUTO: 31.1 PG/CELL (ref 29–35)
HIV 1+2 AB+HIV1 P24 AG SERPL QL IA: NON REACTIVE
IMM GRANULOCYTES # BLD AUTO: 0.03 K/UL (ref 0–0.11)
IMM GRANULOCYTES NFR BLD AUTO: 0.4 % (ref 0–0.9)
IMM RETICS NFR: 16.6 % (ref 9.3–17.4)
INR PPP: 1.08 (ref 0.87–1.13)
IRON SATN MFR SERPL: 19 % (ref 15–55)
IRON SATN MFR SERPL: 89 % (ref 15–55)
IRON SERPL-MCNC: 228 UG/DL (ref 40–170)
IRON SERPL-MCNC: 49 UG/DL (ref 40–170)
KETONES UR STRIP.AUTO-MCNC: NEGATIVE MG/DL
LEUKOCYTE ESTERASE UR QL STRIP.AUTO: ABNORMAL
LYMPHOCYTES # BLD AUTO: 1.39 K/UL (ref 1–4.8)
LYMPHOCYTES NFR BLD: 20 % (ref 22–41)
MAGNESIUM SERPL-MCNC: 2 MG/DL (ref 1.5–2.5)
MCH RBC QN AUTO: 29.1 PG (ref 27–33)
MCHC RBC AUTO-ENTMCNC: 32.9 G/DL (ref 33.6–35)
MCV RBC AUTO: 88.4 FL (ref 81.4–97.8)
MICRO URNS: ABNORMAL
MONOCYTES # BLD AUTO: 0.64 K/UL (ref 0–0.85)
MONOCYTES NFR BLD AUTO: 9.2 % (ref 0–13.4)
NEUTROPHILS # BLD AUTO: 4.68 K/UL (ref 2–7.15)
NEUTROPHILS NFR BLD: 67.3 % (ref 44–72)
NITRITE UR QL STRIP.AUTO: NEGATIVE
NRBC # BLD AUTO: 0 K/UL
NRBC BLD AUTO-RTO: 0 /100 WBC
PCO2 BLDA: 23.1 MMHG (ref 26–37)
PH BLDA: 7.4 [PH] (ref 7.4–7.5)
PH UR STRIP.AUTO: 6.5 [PH]
PHOSPHATE SERPL-MCNC: 6.2 MG/DL (ref 2.5–4.5)
PLATELET # BLD AUTO: 204 K/UL (ref 164–446)
PMV BLD AUTO: 9.7 FL (ref 9–12.9)
PO2 BLDA: 132 MMHG (ref 64–87)
POTASSIUM SERPL-SCNC: 3.9 MMOL/L (ref 3.6–5.5)
POTASSIUM UR-SCNC: 14.9 MMOL/L
PRODUCT TYPE UPROD: NORMAL
PRODUCT TYPE UPROD: NORMAL
PROT SERPL-MCNC: 6.2 G/DL (ref 6–8.2)
PROT UR QL STRIP: 200 MG/DL
PROT UR-MCNC: 275.6 MG/DL (ref 0–15)
PROTHROMBIN TIME: 14.3 SEC (ref 12–14.6)
PTH-INTACT SERPL-MCNC: 364.5 PG/ML (ref 14–72)
RBC # BLD AUTO: 2.58 M/UL (ref 4.2–5.4)
RBC # URNS HPF: ABNORMAL /HPF
RBC UR QL AUTO: NEGATIVE
RETICS # AUTO: 0.05 M/UL (ref 0.04–0.06)
RETICS/RBC NFR: 2 % (ref 0.8–2.1)
RH BLD: NORMAL
SAO2 % BLDA: 97.8 % (ref 93–99)
SODIUM SERPL-SCNC: 143 MMOL/L (ref 135–145)
SODIUM UR-SCNC: 96 MMOL/L
SP GR UR STRIP.AUTO: 1.01
TIBC SERPL-MCNC: 256 UG/DL (ref 250–450)
TIBC SERPL-MCNC: 262 UG/DL (ref 250–450)
TROPONIN I SERPL-MCNC: 0.11 NG/ML (ref 0–0.04)
UNIT STATUS USTAT: NORMAL
UNIT STATUS USTAT: NORMAL
WBC # BLD AUTO: 7 K/UL (ref 4.8–10.8)
WBC #/AREA URNS HPF: ABNORMAL /HPF

## 2017-06-16 PROCEDURE — 93005 ELECTROCARDIOGRAM TRACING: CPT | Performed by: STUDENT IN AN ORGANIZED HEALTH CARE EDUCATION/TRAINING PROGRAM

## 2017-06-16 PROCEDURE — 85025 COMPLETE CBC W/AUTO DIFF WBC: CPT

## 2017-06-16 PROCEDURE — 700101 HCHG RX REV CODE 250

## 2017-06-16 PROCEDURE — 96374 THER/PROPH/DIAG INJ IV PUSH: CPT

## 2017-06-16 PROCEDURE — 85046 RETICYTE/HGB CONCENTRATE: CPT

## 2017-06-16 PROCEDURE — 700111 HCHG RX REV CODE 636 W/ 250 OVERRIDE (IP): Performed by: INTERNAL MEDICINE

## 2017-06-16 PROCEDURE — 99153 MOD SED SAME PHYS/QHP EA: CPT

## 2017-06-16 PROCEDURE — A9270 NON-COVERED ITEM OR SERVICE: HCPCS | Performed by: HOSPITALIST

## 2017-06-16 PROCEDURE — 770020 HCHG ROOM/CARE - TELE (206)

## 2017-06-16 PROCEDURE — 86901 BLOOD TYPING SEROLOGIC RH(D): CPT

## 2017-06-16 PROCEDURE — 02H633Z INSERTION OF INFUSION DEVICE INTO RIGHT ATRIUM, PERCUTANEOUS APPROACH: ICD-10-PCS | Performed by: RADIOLOGY

## 2017-06-16 PROCEDURE — 99223 1ST HOSP IP/OBS HIGH 75: CPT | Mod: GC | Performed by: HOSPITALIST

## 2017-06-16 PROCEDURE — 90935 HEMODIALYSIS ONE EVALUATION: CPT

## 2017-06-16 PROCEDURE — 82803 BLOOD GASES ANY COMBINATION: CPT

## 2017-06-16 PROCEDURE — A9270 NON-COVERED ITEM OR SERVICE: HCPCS | Performed by: STUDENT IN AN ORGANIZED HEALTH CARE EDUCATION/TRAINING PROGRAM

## 2017-06-16 PROCEDURE — 84156 ASSAY OF PROTEIN URINE: CPT

## 2017-06-16 PROCEDURE — 85730 THROMBOPLASTIN TIME PARTIAL: CPT

## 2017-06-16 PROCEDURE — 84300 ASSAY OF URINE SODIUM: CPT

## 2017-06-16 PROCEDURE — B2141ZZ FLUOROSCOPY OF RIGHT HEART USING LOW OSMOLAR CONTRAST: ICD-10-PCS | Performed by: RADIOLOGY

## 2017-06-16 PROCEDURE — 5A1D60Z PERFORMANCE OF URINARY FILTRATION, MULTIPLE: ICD-10-PCS | Performed by: RADIOLOGY

## 2017-06-16 PROCEDURE — 77001 FLUOROGUIDE FOR VEIN DEVICE: CPT

## 2017-06-16 PROCEDURE — 86850 RBC ANTIBODY SCREEN: CPT

## 2017-06-16 PROCEDURE — 83735 ASSAY OF MAGNESIUM: CPT

## 2017-06-16 PROCEDURE — 700111 HCHG RX REV CODE 636 W/ 250 OVERRIDE (IP): Performed by: HOSPITALIST

## 2017-06-16 PROCEDURE — 82436 ASSAY OF URINE CHLORIDE: CPT

## 2017-06-16 PROCEDURE — 700111 HCHG RX REV CODE 636 W/ 250 OVERRIDE (IP): Performed by: RADIOLOGY

## 2017-06-16 PROCEDURE — P9016 RBC LEUKOCYTES REDUCED: HCPCS

## 2017-06-16 PROCEDURE — 83970 ASSAY OF PARATHORMONE: CPT

## 2017-06-16 PROCEDURE — 700101 HCHG RX REV CODE 250: Performed by: STUDENT IN AN ORGANIZED HEALTH CARE EDUCATION/TRAINING PROGRAM

## 2017-06-16 PROCEDURE — 700111 HCHG RX REV CODE 636 W/ 250 OVERRIDE (IP)

## 2017-06-16 PROCEDURE — 76775 US EXAM ABDO BACK WALL LIM: CPT

## 2017-06-16 PROCEDURE — 36415 COLL VENOUS BLD VENIPUNCTURE: CPT

## 2017-06-16 PROCEDURE — 87389 HIV-1 AG W/HIV-1&-2 AB AG IA: CPT

## 2017-06-16 PROCEDURE — 83550 IRON BINDING TEST: CPT | Mod: 91

## 2017-06-16 PROCEDURE — 36430 TRANSFUSION BLD/BLD COMPNT: CPT

## 2017-06-16 PROCEDURE — 700102 HCHG RX REV CODE 250 W/ 637 OVERRIDE(OP): Performed by: STUDENT IN AN ORGANIZED HEALTH CARE EDUCATION/TRAINING PROGRAM

## 2017-06-16 PROCEDURE — 84100 ASSAY OF PHOSPHORUS: CPT

## 2017-06-16 PROCEDURE — 86870 RBC ANTIBODY IDENTIFICATION: CPT

## 2017-06-16 PROCEDURE — 700111 HCHG RX REV CODE 636 W/ 250 OVERRIDE (IP): Performed by: EMERGENCY MEDICINE

## 2017-06-16 PROCEDURE — 86922 COMPATIBILITY TEST ANTIGLOB: CPT

## 2017-06-16 PROCEDURE — 76937 US GUIDE VASCULAR ACCESS: CPT

## 2017-06-16 PROCEDURE — 83540 ASSAY OF IRON: CPT

## 2017-06-16 PROCEDURE — 84133 ASSAY OF URINE POTASSIUM: CPT

## 2017-06-16 PROCEDURE — 86480 TB TEST CELL IMMUN MEASURE: CPT

## 2017-06-16 PROCEDURE — 700102 HCHG RX REV CODE 250 W/ 637 OVERRIDE(OP): Performed by: HOSPITALIST

## 2017-06-16 PROCEDURE — 82306 VITAMIN D 25 HYDROXY: CPT

## 2017-06-16 PROCEDURE — 86902 BLOOD TYPE ANTIGEN DONOR EA: CPT | Mod: 91

## 2017-06-16 PROCEDURE — B244ZZZ ULTRASONOGRAPHY OF RIGHT HEART: ICD-10-PCS | Performed by: RADIOLOGY

## 2017-06-16 PROCEDURE — 82330 ASSAY OF CALCIUM: CPT

## 2017-06-16 PROCEDURE — 36561 INSERT TUNNELED CV CATH: CPT

## 2017-06-16 PROCEDURE — 86900 BLOOD TYPING SEROLOGIC ABO: CPT

## 2017-06-16 PROCEDURE — 85610 PROTHROMBIN TIME: CPT

## 2017-06-16 PROCEDURE — 700105 HCHG RX REV CODE 258

## 2017-06-16 PROCEDURE — 82570 ASSAY OF URINE CREATININE: CPT

## 2017-06-16 PROCEDURE — 93010 ELECTROCARDIOGRAM REPORT: CPT | Performed by: INTERNAL MEDICINE

## 2017-06-16 PROCEDURE — 87340 HEPATITIS B SURFACE AG IA: CPT

## 2017-06-16 PROCEDURE — 84484 ASSAY OF TROPONIN QUANT: CPT

## 2017-06-16 PROCEDURE — 81001 URINALYSIS AUTO W/SCOPE: CPT

## 2017-06-16 PROCEDURE — 86706 HEP B SURFACE ANTIBODY: CPT

## 2017-06-16 PROCEDURE — 80053 COMPREHEN METABOLIC PANEL: CPT

## 2017-06-16 RX ORDER — LABETALOL HYDROCHLORIDE 5 MG/ML
10 INJECTION, SOLUTION INTRAVENOUS EVERY 4 HOURS PRN
Status: DISCONTINUED | OUTPATIENT
Start: 2017-06-16 | End: 2017-06-16

## 2017-06-16 RX ORDER — POLYETHYLENE GLYCOL 3350 17 G/17G
1 POWDER, FOR SOLUTION ORAL
Status: DISCONTINUED | OUTPATIENT
Start: 2017-06-16 | End: 2017-06-23 | Stop reason: HOSPADM

## 2017-06-16 RX ORDER — ONDANSETRON 2 MG/ML
INJECTION INTRAMUSCULAR; INTRAVENOUS
Status: COMPLETED
Start: 2017-06-16 | End: 2017-06-16

## 2017-06-16 RX ORDER — HYDRALAZINE HYDROCHLORIDE 25 MG/1
25 TABLET, FILM COATED ORAL EVERY 8 HOURS
Status: DISCONTINUED | OUTPATIENT
Start: 2017-06-16 | End: 2017-06-17

## 2017-06-16 RX ORDER — SODIUM BICARBONATE 325 MG/1
325 TABLET ORAL 2 TIMES DAILY
Status: DISCONTINUED | OUTPATIENT
Start: 2017-06-16 | End: 2017-06-17

## 2017-06-16 RX ORDER — FAMOTIDINE 20 MG/1
20 TABLET, FILM COATED ORAL 2 TIMES DAILY
Status: DISCONTINUED | OUTPATIENT
Start: 2017-06-16 | End: 2017-06-23 | Stop reason: HOSPADM

## 2017-06-16 RX ORDER — HEPARIN SODIUM 1000 [USP'U]/ML
3700 INJECTION, SOLUTION INTRAVENOUS; SUBCUTANEOUS
Status: DISCONTINUED | OUTPATIENT
Start: 2017-06-16 | End: 2017-06-23 | Stop reason: HOSPADM

## 2017-06-16 RX ORDER — AMOXICILLIN 250 MG
2 CAPSULE ORAL 2 TIMES DAILY
Status: DISCONTINUED | OUTPATIENT
Start: 2017-06-16 | End: 2017-06-23 | Stop reason: HOSPADM

## 2017-06-16 RX ORDER — ONDANSETRON 2 MG/ML
4 INJECTION INTRAMUSCULAR; INTRAVENOUS PRN
Status: ACTIVE | OUTPATIENT
Start: 2017-06-16 | End: 2017-06-16

## 2017-06-16 RX ORDER — PROCHLORPERAZINE MALEATE 5 MG/1
5 TABLET ORAL EVERY 6 HOURS PRN
Status: DISCONTINUED | OUTPATIENT
Start: 2017-06-16 | End: 2017-06-23 | Stop reason: HOSPADM

## 2017-06-16 RX ORDER — ACETAMINOPHEN 325 MG/1
650 TABLET ORAL EVERY 6 HOURS PRN
Status: DISCONTINUED | OUTPATIENT
Start: 2017-06-16 | End: 2017-06-23 | Stop reason: HOSPADM

## 2017-06-16 RX ORDER — LABETALOL HYDROCHLORIDE 5 MG/ML
20 INJECTION, SOLUTION INTRAVENOUS EVERY 4 HOURS PRN
Status: DISCONTINUED | OUTPATIENT
Start: 2017-06-16 | End: 2017-06-17

## 2017-06-16 RX ORDER — LIDOCAINE HYDROCHLORIDE AND EPINEPHRINE BITARTRATE 20; .01 MG/ML; MG/ML
INJECTION, SOLUTION SUBCUTANEOUS
Status: COMPLETED
Start: 2017-06-16 | End: 2017-06-16

## 2017-06-16 RX ORDER — HYDRALAZINE HYDROCHLORIDE 10 MG/1
10 TABLET, FILM COATED ORAL EVERY 8 HOURS
Status: DISCONTINUED | OUTPATIENT
Start: 2017-06-16 | End: 2017-06-16

## 2017-06-16 RX ORDER — HYDRALAZINE HYDROCHLORIDE 20 MG/ML
20 INJECTION INTRAMUSCULAR; INTRAVENOUS EVERY 4 HOURS PRN
Status: DISCONTINUED | OUTPATIENT
Start: 2017-06-16 | End: 2017-06-16

## 2017-06-16 RX ORDER — OMEPRAZOLE 20 MG/1
20 CAPSULE, DELAYED RELEASE ORAL DAILY
Status: ON HOLD | COMMUNITY
End: 2017-06-23

## 2017-06-16 RX ORDER — BISACODYL 10 MG
10 SUPPOSITORY, RECTAL RECTAL
Status: DISCONTINUED | OUTPATIENT
Start: 2017-06-16 | End: 2017-06-23 | Stop reason: HOSPADM

## 2017-06-16 RX ORDER — CEFAZOLIN SODIUM 1 G/3ML
INJECTION, POWDER, FOR SOLUTION INTRAMUSCULAR; INTRAVENOUS
Status: COMPLETED
Start: 2017-06-16 | End: 2017-06-16

## 2017-06-16 RX ORDER — AMLODIPINE BESYLATE 5 MG/1
5 TABLET ORAL
Status: DISCONTINUED | OUTPATIENT
Start: 2017-06-16 | End: 2017-06-16

## 2017-06-16 RX ORDER — MIDAZOLAM HYDROCHLORIDE 1 MG/ML
.5-2 INJECTION INTRAMUSCULAR; INTRAVENOUS PRN
Status: ACTIVE | OUTPATIENT
Start: 2017-06-16 | End: 2017-06-16

## 2017-06-16 RX ORDER — HYDRALAZINE HYDROCHLORIDE 20 MG/ML
10 INJECTION INTRAMUSCULAR; INTRAVENOUS EVERY 4 HOURS PRN
Status: DISCONTINUED | OUTPATIENT
Start: 2017-06-16 | End: 2017-06-16

## 2017-06-16 RX ORDER — SODIUM CHLORIDE 9 MG/ML
500 INJECTION, SOLUTION INTRAVENOUS
Status: ACTIVE | OUTPATIENT
Start: 2017-06-16 | End: 2017-06-16

## 2017-06-16 RX ORDER — VERAPAMIL HYDROCHLORIDE 240 MG/1
240 TABLET, FILM COATED, EXTENDED RELEASE ORAL DAILY
Status: DISCONTINUED | OUTPATIENT
Start: 2017-06-16 | End: 2017-06-16

## 2017-06-16 RX ORDER — HYDRALAZINE HYDROCHLORIDE 25 MG/1
25 TABLET, FILM COATED ORAL EVERY 8 HOURS
Status: DISCONTINUED | OUTPATIENT
Start: 2017-06-16 | End: 2017-06-16

## 2017-06-16 RX ORDER — AMLODIPINE BESYLATE 5 MG/1
5 TABLET ORAL
Status: DISCONTINUED | OUTPATIENT
Start: 2017-06-16 | End: 2017-06-17

## 2017-06-16 RX ORDER — HYDRALAZINE HYDROCHLORIDE 20 MG/ML
10 INJECTION INTRAMUSCULAR; INTRAVENOUS EVERY 6 HOURS PRN
Status: DISCONTINUED | OUTPATIENT
Start: 2017-06-16 | End: 2017-06-16

## 2017-06-16 RX ORDER — HEPARIN SODIUM 1000 [USP'U]/ML
INJECTION, SOLUTION INTRAVENOUS; SUBCUTANEOUS
Status: DISPENSED
Start: 2017-06-16 | End: 2017-06-17

## 2017-06-16 RX ORDER — SODIUM CHLORIDE 9 MG/ML
INJECTION, SOLUTION INTRAVENOUS
Status: COMPLETED
Start: 2017-06-16 | End: 2017-06-16

## 2017-06-16 RX ADMIN — ONDANSETRON 4 MG: 2 INJECTION INTRAMUSCULAR; INTRAVENOUS at 11:14

## 2017-06-16 RX ADMIN — LABETALOL HYDROCHLORIDE 10 MG: 5 INJECTION, SOLUTION INTRAVENOUS at 03:56

## 2017-06-16 RX ADMIN — VERAPAMIL HYDROCHLORIDE 240 MG: 240 TABLET, FILM COATED, EXTENDED RELEASE ORAL at 08:43

## 2017-06-16 RX ADMIN — FAMOTIDINE 20 MG: 20 TABLET, FILM COATED ORAL at 20:26

## 2017-06-16 RX ADMIN — ACETAMINOPHEN 650 MG: 325 TABLET, FILM COATED ORAL at 20:26

## 2017-06-16 RX ADMIN — METOPROLOL TARTRATE 12.5 MG: 25 TABLET, FILM COATED ORAL at 13:02

## 2017-06-16 RX ADMIN — FAMOTIDINE 20 MG: 20 TABLET, FILM COATED ORAL at 13:02

## 2017-06-16 RX ADMIN — HYDRALAZINE HYDROCHLORIDE 10 MG: 10 TABLET, FILM COATED ORAL at 02:35

## 2017-06-16 RX ADMIN — CEFAZOLIN 1000 MG: 1 INJECTION, POWDER, FOR SOLUTION INTRAVENOUS at 10:41

## 2017-06-16 RX ADMIN — HEPARIN: 100 SYRINGE at 10:51

## 2017-06-16 RX ADMIN — PROCHLORPERAZINE EDISYLATE 10 MG: 5 INJECTION INTRAMUSCULAR; INTRAVENOUS at 14:11

## 2017-06-16 RX ADMIN — FENTANYL CITRATE 50 MCG: 50 INJECTION, SOLUTION INTRAMUSCULAR; INTRAVENOUS at 10:47

## 2017-06-16 RX ADMIN — FENTANYL CITRATE 50 MCG: 50 INJECTION, SOLUTION INTRAMUSCULAR; INTRAVENOUS at 10:38

## 2017-06-16 RX ADMIN — HEPARIN SODIUM 3700 UNITS: 1000 INJECTION, SOLUTION INTRAVENOUS; SUBCUTANEOUS at 19:10

## 2017-06-16 RX ADMIN — ACETAMINOPHEN 650 MG: 325 TABLET, FILM COATED ORAL at 14:16

## 2017-06-16 RX ADMIN — HYDRALAZINE HYDROCHLORIDE 10 MG: 10 TABLET, FILM COATED ORAL at 04:52

## 2017-06-16 RX ADMIN — SODIUM CHLORIDE 500 ML: 9 INJECTION, SOLUTION INTRAVENOUS at 04:06

## 2017-06-16 RX ADMIN — ACETAMINOPHEN 650 MG: 325 TABLET, FILM COATED ORAL at 04:24

## 2017-06-16 RX ADMIN — HYDRALAZINE HYDROCHLORIDE 25 MG: 25 TABLET ORAL at 20:26

## 2017-06-16 RX ADMIN — METOPROLOL TARTRATE 12.5 MG: 25 TABLET, FILM COATED ORAL at 20:26

## 2017-06-16 RX ADMIN — AMLODIPINE BESYLATE 5 MG: 5 TABLET ORAL at 13:02

## 2017-06-16 RX ADMIN — LABETALOL HYDROCHLORIDE 20 MG: 5 INJECTION, SOLUTION INTRAVENOUS at 17:43

## 2017-06-16 RX ADMIN — HYDRALAZINE HYDROCHLORIDE 10 MG: 20 INJECTION INTRAMUSCULAR; INTRAVENOUS at 00:47

## 2017-06-16 RX ADMIN — LIDOCAINE HYDROCHLORIDE AND EPINEPHRINE: 20; 10 INJECTION, SOLUTION INFILTRATION; PERINEURAL at 10:40

## 2017-06-16 RX ADMIN — SODIUM BICARBONATE TAB 325 MG 325 MG: 325 TAB at 14:06

## 2017-06-16 RX ADMIN — HYDRALAZINE HYDROCHLORIDE 25 MG: 25 TABLET ORAL at 14:06

## 2017-06-16 ASSESSMENT — ENCOUNTER SYMPTOMS
LOSS OF CONSCIOUSNESS: 0
SPUTUM PRODUCTION: 0
CHILLS: 0
SPEECH CHANGE: 0
PHOTOPHOBIA: 0
ORTHOPNEA: 0
FEVER: 0
DEPRESSION: 0
ABDOMINAL PAIN: 1
PND: 0
MUSCULOSKELETAL NEGATIVE: 1
BACK PAIN: 0
DIZZINESS: 0
PSYCHIATRIC NEGATIVE: 1
HEARTBURN: 0
CLAUDICATION: 0
SHORTNESS OF BREATH: 1
DOUBLE VISION: 0
SENSORY CHANGE: 0
BLOOD IN STOOL: 0
COUGH: 0
NECK PAIN: 0
HEADACHES: 0
HEMOPTYSIS: 0
MYALGIAS: 0
TREMORS: 0
SPUTUM PRODUCTION: 1
BLURRED VISION: 0
WEIGHT LOSS: 0
DIARRHEA: 0
ABDOMINAL PAIN: 0
ORTHOPNEA: 1
WHEEZING: 0
WEAKNESS: 1
DIAPHORESIS: 0
NAUSEA: 0
CONSTIPATION: 0
TINGLING: 0
VOMITING: 0
EYES NEGATIVE: 1
PALPITATIONS: 0
SEIZURES: 0
FOCAL WEAKNESS: 0

## 2017-06-16 ASSESSMENT — COGNITIVE AND FUNCTIONAL STATUS - GENERAL
SUGGESTED CMS G CODE MODIFIER MOBILITY: CH
DAILY ACTIVITIY SCORE: 24
SUGGESTED CMS G CODE MODIFIER DAILY ACTIVITY: CH
MOBILITY SCORE: 24

## 2017-06-16 ASSESSMENT — PAIN SCALES - GENERAL
PAINLEVEL_OUTOF10: 2
PAINLEVEL_OUTOF10: 0
PAINLEVEL_OUTOF10: 6
PAINLEVEL_OUTOF10: 0
PAINLEVEL_OUTOF10: 7
PAINLEVEL_OUTOF10: 0

## 2017-06-16 ASSESSMENT — LIFESTYLE VARIABLES
SUBSTANCE_ABUSE: 0
EVER_SMOKED: YES

## 2017-06-16 ASSESSMENT — COPD QUESTIONNAIRES
DO YOU EVER COUGH UP ANY MUCUS OR PHLEGM?: NO/ONLY WITH OCCASIONAL COLDS OR INFECTIONS
HAVE YOU SMOKED AT LEAST 100 CIGARETTES IN YOUR ENTIRE LIFE: YES
DURING THE PAST 4 WEEKS HOW MUCH DID YOU FEEL SHORT OF BREATH: SOME OF THE TIME
COPD SCREENING SCORE: 5

## 2017-06-16 NOTE — OR SURGEON
Immediate Post- Operative Note    PostOp Diagnosis: RENAL FAILURE      Procedure(s): RIGHT INTERNAL JUGULAR 14.5 [place] HEMOSPLIT TUNNELED HEMODIALYSIS CATHETER PLACEMENT WITH U/S AND FLUOROSCOPIC GUIDANCE      Estimated Blood Loss: <20CC (FLUSHES)        Complications: NONE          6/16/2017     12:10 PM     Renny Arroyo

## 2017-06-16 NOTE — PROGRESS NOTES
Report received from TOM Kilpatrick. Assumed care of patient. Updated patient on plan of care. Fall precautions in place, call light within reach.

## 2017-06-16 NOTE — PROGRESS NOTES
Telephone report and 12-hour chart check completed per policy and procedure. Pt arrived to floor at this time via gurney. Pt ambulated independently to bed while demonstrating proper safety precautions. Assuming care of pt at this time.

## 2017-06-16 NOTE — ED NOTES
Pt ambulates to triage  Chief Complaint   Patient presents with   • Sent from Urgent Care   • Shortness of Breath   • Leg Swelling     productive clear   • Cough   Pt sent for EKG  Pt asked to wait in lobby, pt updated on triage process and pt asked to inform RN of any changes.

## 2017-06-16 NOTE — DIETARY
Nutrition  Services - Poor PO PTA     Admit 6/15    51 YO F admitted r/t acute kidney injury superimposed on CKD    Past Medical History   Diagnosis Date   • Dysrhythmia    • HTN (hypertension) 11/5/2009   • Glomerulonephritis, chronic 11/5/2009   • Anemia associated with chronic renal failure 11/5/2009   • SVT (supraventricular tachycardia)      Per MD notes, ESRD requiring HD     Pertinent Labs: CO2 14, anion gap, glucose 114, , Cr 17.6, GFR 2, phosphorous 6.2    Pertinent Meds: norvasc, pepcid, hydralazine, lopressor, compazine, pericolace, sodium bicarbonate     Hemodialysis once daily     GI: last BM 6/15   WT: 64kg  R-BKA w/ trace edema, BMI not accurate   IBW w/ R-BKA 47.05kg   SKIN: no pressure areas documented at this time   Volume overload per MD notes (r/t ESRD)   No nephrology notes at this time     Diet: renal  PO intake: not yet documented, but patient states that her appetite has come back     PLAN/RECOMMEND     Encourage PO intake. Nutrition Rep to see patient daily for meal preferences. Please document PO intake as percentage of meals consumed.     RD following

## 2017-06-16 NOTE — PROGRESS NOTES
[facility] [place]    Name Isabelle Coyle     1965   Age/Sex 52 y.o. female   MRN [id number]   Code Status FULL     After 5PM or if no immediate response to page, please call for cross-coverage  Attending/Team: Dr Spain Call [phone] to page   1st Call - Day Intern (R1):   Dr Dias 2nd Call - Day Sr. Resident (R2/R3):   Dr Jones         Chief complaint/ reason for interval visit (Primary Diagnosis)   52 year old female with pmhx of HTN, CKD, R BKA, SVT admitted for worsening Renal failure, needing HD    Interval Problem Daily Status Update    Active Problems:    HTN (hypertension) POA: Yes    Anemia associated with chronic renal failure POA: Yes    Supraventricular tachycardia POA: Yes    Acute kidney injury superimposed on CKD POA: Unknown  Resolved Problems:    * No resolved hospital problems. *      Review of Systems   Constitutional: Positive for malaise/fatigue. Negative for fever, chills and diaphoresis.   HENT: Negative.  Negative for hearing loss.    Eyes: Negative.  Negative for blurred vision.   Respiratory: Positive for shortness of breath. Negative for cough, hemoptysis, sputum production and wheezing.    Cardiovascular: Positive for orthopnea and leg swelling. Negative for chest pain and palpitations.   Gastrointestinal: Negative for heartburn, nausea, abdominal pain, diarrhea and constipation.   Genitourinary: Negative.  Negative for dysuria and urgency.   Musculoskeletal: Negative.    Skin: Negative.  Negative for itching and rash.   Neurological: Positive for weakness. Negative for dizziness, tingling, focal weakness, seizures and headaches.   Psychiatric/Behavioral: Negative.        Consultants/Specialty  Nephrology  Vascular    Disposition  Inpatient    Quality Measures  EKG reviewed, Labs reviewed, Medications reviewed and Radiology images reviewed  Murdock catheter: No Murdock      DVT Prophylaxis: Contraindicated - Anemia requiring  blood transfusion  DVT prophylaxis - mechanical: SCDs                Physical Exam       Filed Vitals:    06/16/17 1050 06/16/17 1055 06/16/17 1100 06/16/17 1205   BP:    183/118   Pulse:    90   Temp:    36.1 °C (97 °F)   Resp: 16 16 16 16   Height:       Weight:       SpO2: 97% 93% 94% 92%     Body mass index is 25.8 kg/(m^2). Weight: 64 kg (141 lb 1.5 oz)  Oxygen Therapy:  Pulse Oximetry: 92 %, O2 (LPM): 0, O2 Delivery: None (Room Air)    Physical Exam   Constitutional: She is oriented to person, place, and time and well-developed, well-nourished, and in no distress. No distress.   HENT:   Head: Normocephalic and atraumatic.   Eyes: Pupils are equal, round, and reactive to light. Right eye exhibits no discharge. Left eye exhibits no discharge.   Neck: Normal range of motion. Neck supple.   Cardiovascular: Normal rate and regular rhythm.    Pulmonary/Chest: Effort normal. No respiratory distress. She has no wheezes.   Abdominal: Soft. Bowel sounds are normal. She exhibits no distension. There is no tenderness.   Musculoskeletal: She exhibits no edema.   R BKA  Trace Edema   Neurological: She is alert and oriented to person, place, and time.   Skin: She is not diaphoretic.         Lab Data Review:      6/16/2017  12:37 PM    Recent Labs      06/15/17   2001  06/16/17   0437   SODIUM  141  143   POTASSIUM  4.0  3.9   CHLORIDE  106  108   CO2  14*  14*   BUN  125*  128*   CREATININE  16.89*  17.60*   MAGNESIUM   --   2.0   PHOSPHORUS   --   6.2*   CALCIUM  8.6  8.0*       Recent Labs      06/15/17   2001  06/16/17   0437   ALTSGPT  6  6   ASTSGOT  8*  8*   ALKPHOSPHAT  58  49   TBILIRUBIN  0.4  0.3   GLUCOSE  91  114*       Recent Labs      06/15/17   2001  06/16/17   0437  06/16/17   0759  06/16/17   0942   RBC  2.45*   --   2.58*   --    HEMOGLOBIN  6.9*   --   7.5*   --    HEMATOCRIT  21.4*   --   22.8*   --    PLATELETCT  229   --   204   --    PROTHROMBTM   --    --    --   14.3   APTT   --    --    --   28.0    INR   --    --    --   1.08   IRON   --   49   --    --    FERRITIN  57.8   --    --    --    TOTIRONBC   --   262   --    --        Recent Labs      06/15/17   2001  06/16/17   0437 06/16/17   0759   WBC  6.5   --   7.0   NEUTSPOLYS  70.20   --   67.30   LYMPHOCYTES  21.10*   --   20.00*   MONOCYTES  5.60   --   9.20   EOSINOPHILS  2.30   --   2.70   BASOPHILS  0.50   --   0.40   ASTSGOT  8*  8*   --    ALTSGPT  6  6   --    ALKPHOSPHAT  58  49   --    TBILIRUBIN  0.4  0.3   --            Assessment/Plan     # Acute on chronic Kidney Disease  # ESRD, now requiring HD  - pt with history of CKD, previous baseline Cr 4, was seen by Nephrology in the past, was anticipated to start HD/have fistula placed, but was lost to follow up. Now admitted with Cr 16. S/Sx of Volume overload, Uremia  - Nephrology and Vascular surgery consulted  - IR guided HD cath placed 6/16  - HD per nephrology recs  - Vascular surgery consulted for fistula placement  - U/S renal: small b/k atrophic kidneys consistent with medical renal disease  - Renally dose medication.  - f/u consult recs    # Uncontrolled Hypertension  - consistently 190s systolic  - started on Hydralazine, Amlodipine and metoprolol  - adjust medications a needed.     # Anemia related to ESRD  - no s/sx of gross GI bleeding, query uremic gastropathy.   - empiric po pepcid.  - monitor H/H, transfuse prn.   - EPO per nephro recs.   - Iron studies ordered    # Hx of SVT  - is on verapamil at home  - will change to metoprolol since this will address SVT and allow us to add amlodipine which is a better Anti-Hypertensive medications  - monitor on Tele for any arrhyhtmia.  - will order Echo for further evaluation    # Rt BKA  - related to remote trauma    # Elevated troponin  -  0.12-> 0.11  - EKG w/o acute ST/T wave changes  - Likely related to ESRD  - Will order repeat and follow up accordingly

## 2017-06-16 NOTE — PROGRESS NOTES
2 RN skin check completed: pt shows no signs of pressure related skin sores/breakdown. Skin intact. Generalized bruising.

## 2017-06-16 NOTE — SENIOR ADMIT NOTE
Ms. Coyle is a 52 y.o. female with PMHx of CKD(baseline Cr 2 in 2015), HTN, SVT, anemia, Right BKA admitted for VERO on CKD  subjective fever, chills, SOB, fatigue, nausea, vomiting, epigastric abdominal discomfort for 3 weeks  She pees very little, tablespoon during each urination. No chest pain, PND, orthopnea, dysuria.   Hypertensive, systolic murmur, Clear lungs, abdomen benign, 1+ LE edema.     Hb 6.9, AG 14, bicarb 14, GFR 2, trop 0.12, BNP 1721  CXR:  1.  Hazy bibasilar densities, could represent subtle infiltrate.  2.  Trace bilateral pleural effusions.    Assessment and Plan:    VERO on CKD, chronic glomerulonephritis  - renal US, bladder scan, consider levy if indicated  - urine lytes  - monitor kidney function  - Avoid nephrotoxin. Renally adjust meds.  - I&O  - [facility]rology on board ([facility])  - Per nephro, possible hemodialysis on 6/16/17    Elevated troponin, elevated BNP, SOB: trend EKG and troponin  Normocytic Anemia: likely related to CKD. Hb 6.9, will transfuse 1 unit PRBC. Transfuse if Hb<7. Anemia workup. Type and screen  HTN: verapamil, start hydralazine. Hold lisinopril due to VERO  SOB: supportive. RT/O2 protocol. Monitor sign and symptoms of infection  GERD: hold omeprazole due to VERO, consider H2 blocker   Prolonged QTc: telemetry. Avoid QTc prolongation agent.  SVT: Verapamil. Stable. telemetry  Hx right BKA: stable    Full code  SCD for DVT ppx    For further details, please refer to Dr. Guillen's H&P.

## 2017-06-16 NOTE — DISCHARGE PLANNING
Care Transition Team Assessment    Information Source  Orientation : Oriented x 4  Information Given By: Patient  Informant's Name: Isabelle  Who is responsible for making decisions for patient? : Patient    Elopement Risk  Legal Hold: No  Ambulatory or Self Mobile in Wheelchair: No-Not an Elopement Risk  Elopement Risk: Not at Risk for Elopement    Interdisciplinary Discharge Planning  Does Admitting Nurse Feel This Could be a Complex Discharge?: No  Primary Care Physician: Pt stated NONE - Interested in New PCP prior to discharge  Lives with - Patient's Self Care Capacity: Unrelated Adult (Roomates)  Patient or legal guardian wants to designate a caregiver (see row info): No  Support Systems: Spouse / Significant Other  Housing / Facility: Mobile Home  Do You Take your Prescribed Medications Regularly: Yes  Able to Return to Previous ADL's: Yes  Mobility Issues: No  Prior Services: None  Patient Expects to be Discharged to:: Home  Assistance Needed: No  Durable Medical Equipment: Other - Specify (R prosthesis)    Discharge Preparedness  What is your plan after discharge?: Home with help  What are your discharge supports?: Partner  Prior Functional Level: Ambulatory, Drives Self, Independent with Activities of Daily Living, Independent with Medication Management  Difficulity with ADLs: None  Difficulity with IADLs: None    Functional Assesment  Prior Functional Level: Ambulatory, Drives Self, Independent with Activities of Daily Living, Independent with Medication Management    Finances  Financial Barriers to Discharge: No  Prescription Coverage: Yes ([employer] [address])    Vision / Hearing Impairment  Vision Impairment : Yes  Right Eye Vision: Impaired, Wears Glasses  Left Eye Vision: Impaired, Wears Glasses  Hearing Impairment : No    Values / Beliefs / Concerns  Values / Beliefs Concerns : No    Domestic Abuse  Have you ever been the victim of abuse or violence?: No  Physical Abuse or Sexual Abuse: No  Verbal Abuse or  Emotional Abuse: No  Possible Abuse Reported to:: Not Applicable    Psychological Assessment  History of Substance Abuse: None  History of Psychiatric Problems: No    Anticipated Discharge Information  Anticipated discharge disposition: Home  Discharge Address: [address] [place] [id number]  Discharge Contact Phone Number: [phone]

## 2017-06-16 NOTE — ED NOTES
First set of blood cultures and full rainbow sent down to lab per protocol. Pt informed and educated on triage process and wait times. Pt verbalizes understanding to inform either triage tech or RN to alert staff for any changes in condition. Pt placed back in lobby.

## 2017-06-16 NOTE — ED PROVIDER NOTES
"ED Provider Note    Scribed for Wong Lau D.O. by Juancho Maria. 6/15/2017  11:01 PM    Primary care provider: Radha Stinson M.D.  Means of arrival: walk-in  History obtained from: patient  History limited by: none    CHIEF COMPLAINT  Chief Complaint   Patient presents with   • Sent from Urgent Care   • Shortness of Breath   • Leg Swelling     productive clear   • Cough       HPI  Isabelle Coyle is a 52 y.o. female who presents to the Emergency Department for evaluation of shortness of breath. Per patient, her shortness of breath has been constant for a few days. She explains she just feels \"uneasy\" when she is breathing. The patient was seen by Urgent care today, who prompted her to come to the ED secondary to her symptoms. She is experiencing associated cough, bilateral leg swelling, and sputum production. Patient explains that she has only been coughing a small amount of sputum, and it is clear. The patient confirms a history of hypertension. Negative nausea, vomiting, diarrhea, chills, chest pain.    REVIEW OF SYSTEMS  Review of Systems   Constitutional: Negative for fever and chills.   Respiratory: Positive for cough, sputum production and shortness of breath.    Cardiovascular: Positive for leg swelling. Negative for chest pain.   Gastrointestinal: Negative for nausea, vomiting and diarrhea.   All other systems reviewed and are negative.      C.    PAST MEDICAL HISTORY   has a past medical history of Dysrhythmia; HTN (hypertension) (11/5/2009); Glomerulonephritis, chronic (11/5/2009); Anemia associated with chronic renal failure (11/5/2009); and SVT (supraventricular tachycardia) (CMS-[facility]).    SURGICAL HISTORY   has past surgical history that includes amputation, below the knee; capitation payment (insurance); femur orif (10/9/08); iliac bone graft (10/9/08); other; appendectomy laparoscopic (5/4/2009); and enlarge breast with implant.    SOCIAL HISTORY  Social History   Substance Use Topics " "  • Smoking status: Current Every Day Smoker     Last Attempt to Quit: 09/22/2012   • Smokeless tobacco: Never Used      Comment: 5 cigs/day   • Alcohol Use: Yes      Comment: pitcher of beer on saturdays      History   Drug Use No       FAMILY HISTORY  History reviewed. No pertinent family history.    CURRENT MEDICATIONS  No current facility-administered medications on file prior to encounter.     Current Outpatient Prescriptions on File Prior to Encounter   Medication Sig Dispense Refill   • tramadol (ULTRAM) 50 MG Tab Take 1 Tab by mouth every four hours as needed. 30 Tab 0   • estradiol (ESTRACE) 1 MG Tab Take 1 Tab by mouth every day. 30 Tab 0   • fluoxetine (PROZAC) 20 MG Cap Take 1 Cap by mouth every day. 30 Cap 0   • medroxyPROGESTERone (PROVERA) 2.5 MG Tab Take 1 Tab by mouth every day. 30 Tab 0   • oxycodone-acetaminophen (PERCOCET) 5-325 MG Tab Take 1-2 Tabs by mouth every four hours as needed. 12 Tab 0   • hydrocodone-acetaminophen (NORCO) 5-325 MG Tab per tablet Take 1-2 Tabs by mouth every four hours as needed. 21 Tab 0   • B Complex Vitamins (B COMPLEX 1 PO) Take  by mouth.     • ibuprofen (MOTRIN) 800 MG TABS Take 1 Tab by mouth every 8 hours as needed. 15 Tab 3   • folic acid (FOLVITE) 1 MG TABS Take 1 Tab by mouth every day. 100 Tab 0   • Verapamil HCl  MG CP24 Take 240 mg by mouth every day.     • lisinopril (PRINIVIL) 10 MG TABS Take 10 mg by mouth every day.        ALLERGIES  Allergies   Allergen Reactions   • Sulfa Drugs Hives       PHYSICAL EXAM  VITAL SIGNS: /95 mmHg  Pulse 96  Temp(Src) 37 °C (98.6 °F)  Resp 18  Ht 1.575 m (5' 2\")  Wt 64 kg (141 lb 1.5 oz)  BMI 25.80 kg/m2  SpO2 95%  LMP 06/25/2007    Constitutional: Well developed, well nourished. No acute distress.  HEENT: Normocephalic, atraumatic. Posterior pharynx clear and moist.  Eyes:  EOMI. Normal sclera.  Neck: Supple, Full range of motion, nontender.  Chest/Pulmonary: Diminished breath sounds. Symmetrical " expansion.   Cardio: Regular rate and rhythm with no murmur.   Abdomen: Soft, nontender. No peritoneal signs. No guarding. No palpable masses.  Back: No CVA tenderness, nontender midline, no step offs.  Musculoskeletal: No deformity, no edema, neurovascular intact.   Neuro: Clear speech, appropriate, cooperative, cranial nerves II-XII grossly intact.  Psych: Normal mood and affect    DIAGNOSTIC STUDIES / PROCEDURES    LABS  Results for orders placed or performed during the hospital encounter of 06/15/17   BTYPE NATRIURETIC PEPTIDE   Result Value Ref Range    B Natriuretic Peptide 1721 (H) 0 - 100 pg/mL   CBC WITH DIFFERENTIAL   Result Value Ref Range    WBC 6.5 4.8 - 10.8 K/uL    RBC 2.45 (L) 4.20 - 5.40 M/uL    Hemoglobin 6.9 (L) 12.0 - 16.0 g/dL    Hematocrit 21.4 (L) 37.0 - 47.0 %    MCV 87.3 81.4 - 97.8 fL    MCH 28.2 27.0 - 33.0 pg    MCHC 32.2 (L) 33.6 - 35.0 g/dL    RDW 41.3 35.9 - 50.0 fL    Platelet Count 229 164 - 446 K/uL    MPV 9.4 9.0 - 12.9 fL    Neutrophils-Polys 70.20 44.00 - 72.00 %    Lymphocytes 21.10 (L) 22.00 - 41.00 %    Monocytes 5.60 0.00 - 13.40 %    Eosinophils 2.30 0.00 - 6.90 %    Basophils 0.50 0.00 - 1.80 %    Immature Granulocytes 0.30 0.00 - 0.90 %    Nucleated RBC 0.00 /100 WBC    Neutrophils (Absolute) 4.53 2.00 - 7.15 K/uL    Lymphs (Absolute) 1.36 1.00 - 4.80 K/uL    Monos (Absolute) 0.36 0.00 - 0.85 K/uL    Eos (Absolute) 0.15 0.00 - 0.51 K/uL    Baso (Absolute) 0.03 0.00 - 0.12 K/uL    Immature Granulocytes (abs) 0.02 0.00 - 0.11 K/uL    NRBC (Absolute) 0.00 K/uL   COMP METABOLIC PANEL   Result Value Ref Range    Sodium 141 135 - 145 mmol/L    Potassium 4.0 3.6 - 5.5 mmol/L    Chloride 106 96 - 112 mmol/L    Co2 14 (L) 20 - 33 mmol/L    Anion Gap 21.0 (H) 0.0 - 11.9    Glucose 91 65 - 99 mg/dL    Bun 125 (HH) 8 - 22 mg/dL    Creatinine 16.89 (HH) 0.50 - 1.40 mg/dL    Calcium 8.6 8.5 - 10.5 mg/dL    AST(SGOT) 8 (L) 12 - 45 U/L    ALT(SGPT) 6 2 - 50 U/L    Alkaline Phosphatase 58  30 - 99 U/L    Total Bilirubin 0.4 0.1 - 1.5 mg/dL    Albumin 3.9 3.2 - 4.9 g/dL    Total Protein 7.4 6.0 - 8.2 g/dL    Globulin 3.5 1.9 - 3.5 g/dL    A-G Ratio 1.1 g/dL   ESTIMATED GFR   Result Value Ref Range    GFR If  3 (A) >60 mL/min/1.73 m 2    GFR If Non African American 2 (A) >60 mL/min/1.73 m 2   TROPONIN   Result Value Ref Range    Troponin I 0.12 (H) 0.00 - 0.04 ng/mL   URINALYSIS   Result Value Ref Range    Micro Urine Req Microscopic     Color Colorless     Character Clear     Specific Gravity 1.007 <1.035    Ph 6.5 5.0-8.0    Glucose Negative Negative mg/dL    Ketones Negative Negative mg/dL    Protein 200 (A) Negative mg/dL    Bilirubin Negative Negative    Nitrite Negative Negative    Leukocyte Esterase Trace (A) Negative    Occult Blood Negative Negative   URINE SODIUM RANDOM   Result Value Ref Range    Sodium, Urine -per volume 96 mmol/L   URINE POTASSIUM RANDOM   Result Value Ref Range    Potassium 14.9 mmol/L   URINE CHLORIDE RANDOM   Result Value Ref Range    Chloride, Urine-per volume 87 mmol/L   URINE CREATININE RANDOM   Result Value Ref Range    Creatinine, Random Urine 53.40 mg/dL   URINE PROTEIN RANDOM   Result Value Ref Range    Total Protein, Urine 275.6 (H) 0.0 - 15.0 mg/dL   COD (ADULT)   Result Value Ref Range    ABO Grouping Only O     Rh Grouping Only POS     Antibody Screen-Cod POS     Component R       RC                  Red Blood Cells     [id number]   issued       06/16/17   03:37      Product Type Red Blood Cells LR     Dispense Status Issued     Unit Number (Barcoded) [id number]     Product Code (Barcoded) K4941E34     Blood Type (Barcoded) 9500     Component R       R7                  Red Blood Cells7    [id number]   selected     06/16/17   02:37      Product Type Red Blood Cells LR Pheresis     Dispense Status Selected     Unit Number (Barcoded) [id number]     Product Code (Barcoded) X0398H93     Blood Type (Barcoded) 9500    URINE MICROSCOPIC  (W/UA)   Result Value Ref Range    WBC 5-10 (A) /hpf    RBC 2-5 (A) /hpf    Bacteria Few (A) None /hpf    Epithelial Cells Few /hpf   ANTIBODY IDENTIFICATION   Result Value Ref Range    Antibody Id POS, anti-K    EKG (ER)   Result Value Ref Range    Report       [facility] Emergency Dept.    Test Date:  2017-06-15  Pt Name:    MELISSA BARDALES                 Department: ER  MRN:        [id number]                      Room:  Gender:     F                            Technician: [id number]  :        [id number]                   Requested By:ER TRIAGE PROTOCOL  Order #:    [id number]                    Reading MD:    Measurements  Intervals                                Axis  Rate:       89                           P:          44  FL:         148                          QRS:        -9  QRSD:       90                           T:          24  QT:         432  QTc:        526    Interpretive Statements  SINUS RHYTHM  LEFT ATRIAL ABNORMALITY  LEFT VENTRICULAR HYPERTROPHY  PROLONGED QT INTERVAL  Compared to ECG 2015 13:33:48  Left ventricular hypertrophy now present  Prolonged QT interval now present        All labs reviewed by me.    EKG  Normal sinus rhythm at a rate of 89. No ST elevations or depressions. No ectopy noted. QTc of 526. Compared to EKG from 3/8/2015 which shows similar morphology. As interpreted by me.     RADIOLOGY  DX-CHEST-LIMITED (1 VIEW)    (Results Pending)     The radiologist's interpretation of all radiological studies have been reviewed by me.    COURSE & MEDICAL DECISION MAKING  Pertinent Labs & Imaging studies reviewed. (See chart for details)    11:01 PM - Patient seen and examined at bedside. Ordered chest x-ray, EKG,  estimated GFR, BNP, Blood culture x2, CBC with differential, CMP to evaluate her symptoms. I discussed the treatment plan as above with the patient. She understood and verbalized agreement. The differential diagnoses include but are not limited to: CHF,  pneumonia, COPD, PE.     11:28 PM - Paged R Internal Medicine.     DISPOSITION:  Patient will be admitted to [facility] in guarded condition.     FINAL IMPRESSION  1. Renal failure    2. Systolic congestive heart failure, unspecified congestive heart failure chronicity (CMS-HCC)          Juancho LANE (Scribe), am scribing for, and in the presence of, Wong Lau D.O..    Electronically signed by: Juancho Maria (Scribe), 6/15/2017    IWong D.O. personally performed the services described in this documentation, as scribed by Juancho Maria in my presence, and it is both accurate and complete.    The note accurately reflects work and decisions made by me.  Wong Lau  6/15/2017  11:30 PM

## 2017-06-16 NOTE — PROGRESS NOTES
[facility] Interval Note     Name Isabelle Coyle     1965   Age/Sex 52 y.o. female   MRN [id number]   Code Status Full Code     After 5PM or if no immediate response to page, please call for cross-coverage  Attending/Team: Dr. Spain/Alejandro Call [phone] to page   1st Call - Day Intern (R1):   Dr. Dias 2nd Call - Day Sr. Resident (R2/R3):   Dr. Jones     Hospital Summary  53 yo female presented to the ED for HTN in the 190s systolic with PMHx significant for HTN, CKD not on dialysis, R BKA, and remote history of SVT. Renal function was markedly decreased and pt was started on dialysis.     Interval History  -Dialysis this a.m. In setting of CKD creatinine 16 BUN 120s with anion gap acidosis   -started bicarb PO for non-anion gap acidosis  -started B-blocker and amlodipine for BP control, d/c verapimil   -will need AV fistula creation for long term dialysis during this admission and CV surgery on board  -daily dialysis PRN for fluid overload, uremia, and electrolyte abnormalities, nephro on board  -given 1U PRBCs for Hgb 6.9 likely chronic anemia (asymptomatic) in setting of kidney failure  -Iron studies WNL and MCV 88.4 not consistent with iron deficiency anemia  -BNP elevated, can consider ECHO in future though will be elevated in CKD and no other signs of CHF    Active Hospital Problems    Diagnosis   • Acute kidney injury superimposed on CKD [N17.9, N18.9]   • Supraventricular tachycardia [I47.1]   • HTN (hypertension) [I10]   • Anemia associated with chronic renal failure [D63.1]       Review of Systems  ROS  Constitutional: Positive for malaise/fatigue. Negative for fever, chills and diaphoresis.   HENT: Negative.  Negative for hearing loss.    Eyes: Negative. Positive for blurred vision this a.m. L eye.   Respiratory: Positive for shortness of breath. Negative for cough, hemoptysis, sputum production and wheezing.    Cardiovascular: Positive for orthopnea. Negative for chest pain  and palpitations.   Gastrointestinal: Negative for heartburn, nausea, abdominal pain, diarrhea and constipation.   Genitourinary: Negative.  Negative for dysuria and urgency.   Musculoskeletal: Negative.    Skin: Negative.  Negative for itching and rash.   Neurological: Positive for weakness. Negative for dizziness, tingling, focal weakness, seizures and headaches.   Psychiatric/Behavioral: Negative.      Quality Measures  Core Measures  EKG reviewed, Labs reviewed, Medications reviewed and Radiology images reviewed  Murdock catheter: No Murdock  DVT Prophylaxis: Contraindicated - Anemia requiring blood transfusion  DVT prophylaxis - mechanical: SCDs    Physical Exam  Filed Vitals:    06/16/17 1050 06/16/17 1055 06/16/17 1100 06/16/17 1205   BP:    183/118   Pulse:    90   Temp:    36.1 °C (97 °F)   Resp: 16 16 16 16   Height:       Weight:       SpO2: 97% 93% 94% 92%     Body mass index is 25.8 kg/(m^2).  Oxygen Therapy:  Pulse Oximetry: 92 %, O2 (LPM): 0, O2 Delivery: None (Room Air)  Physical Exam  Constitutional: No acute distress. AandO x4  HENT:    Head: Normocephalic and atraumatic.   Eyes: Pupils are equal, round, and reactive to light. Right eye exhibits no discharge. Left eye exhibits no discharge.   Neck:  Neck supple. No JVD.   Cardiovascular: Normal rate and regular rhythm.    Pulmonary/Chest: Effort normal. No respiratory distress. She has no wheezes.   Abdominal: Soft. Bowel sounds are normal. She exhibits no distension. There is no tenderness.   Musculoskeletal:  R BKA  Trace Edema   Neurological: She is alert and oriented to person, place, and time.   Skin: She is not diaphoretic.   Lab Data Review  Recent Results (from the past 24 hour(s))   EKG (ER)    Collection Time: 06/15/17  6:37 PM   Result Value Ref Range    Report       [facility] Emergency Dept.    Test Date:  2017-06-15  Pt Name:    MELISSA BARDALES                 Department: ER  MRN:        [id number]                       Room:  Gender:     F                            Technician: [id number]  :        1965                   Requested By:ER TRIAGE PROTOCOL  Order #:    [id number]                    Reading MD:    Measurements  Intervals                                Axis  Rate:       89                           P:          44  SC:         148                          QRS:        -9  QRSD:       90                           T:          24  QT:         432  QTc:        526    Interpretive Statements  SINUS RHYTHM  LEFT ATRIAL ABNORMALITY  LEFT VENTRICULAR HYPERTROPHY  PROLONGED QT INTERVAL  Compared to ECG 2015 13:33:48  Left ventricular hypertrophy now present  Prolonged QT interval now present     BTYPE NATRIURETIC PEPTIDE    Collection Time: 06/15/17  8:01 PM   Result Value Ref Range    B Natriuretic Peptide 1721 (H) 0 - 100 pg/mL   BLOOD CULTURE    Collection Time: 06/15/17  8:01 PM   Result Value Ref Range    Significant Indicator NEG     Source BLD     Site PERIPHERAL     Blood Culture       No Growth    Note: Blood cultures are incubated for 5 days and  are monitored continuously.Positive blood cultures  are called to the RN and reported as soon as  they are identified.     CBC WITH DIFFERENTIAL    Collection Time: 06/15/17  8:01 PM   Result Value Ref Range    WBC 6.5 4.8 - 10.8 K/uL    RBC 2.45 (L) 4.20 - 5.40 M/uL    Hemoglobin 6.9 (L) 12.0 - 16.0 g/dL    Hematocrit 21.4 (L) 37.0 - 47.0 %    MCV 87.3 81.4 - 97.8 fL    MCH 28.2 27.0 - 33.0 pg    MCHC 32.2 (L) 33.6 - 35.0 g/dL    RDW 41.3 35.9 - 50.0 fL    Platelet Count 229 164 - 446 K/uL    MPV 9.4 9.0 - 12.9 fL    Neutrophils-Polys 70.20 44.00 - 72.00 %    Lymphocytes 21.10 (L) 22.00 - 41.00 %    Monocytes 5.60 0.00 - 13.40 %    Eosinophils 2.30 0.00 - 6.90 %    Basophils 0.50 0.00 - 1.80 %    Immature Granulocytes 0.30 0.00 - 0.90 %    Nucleated RBC 0.00 /100 WBC    Neutrophils (Absolute) 4.53 2.00 - 7.15 K/uL    Lymphs (Absolute) 1.36 1.00 - 4.80 K/uL    Monos  (Absolute) 0.36 0.00 - 0.85 K/uL    Eos (Absolute) 0.15 0.00 - 0.51 K/uL    Baso (Absolute) 0.03 0.00 - 0.12 K/uL    Immature Granulocytes (abs) 0.02 0.00 - 0.11 K/uL    NRBC (Absolute) 0.00 K/uL   COMP METABOLIC PANEL    Collection Time: 06/15/17  8:01 PM   Result Value Ref Range    Sodium 141 135 - 145 mmol/L    Potassium 4.0 3.6 - 5.5 mmol/L    Chloride 106 96 - 112 mmol/L    Co2 14 (L) 20 - 33 mmol/L    Anion Gap 21.0 (H) 0.0 - 11.9    Glucose 91 65 - 99 mg/dL    Bun 125 (HH) 8 - 22 mg/dL    Creatinine 16.89 (HH) 0.50 - 1.40 mg/dL    Calcium 8.6 8.5 - 10.5 mg/dL    AST(SGOT) 8 (L) 12 - 45 U/L    ALT(SGPT) 6 2 - 50 U/L    Alkaline Phosphatase 58 30 - 99 U/L    Total Bilirubin 0.4 0.1 - 1.5 mg/dL    Albumin 3.9 3.2 - 4.9 g/dL    Total Protein 7.4 6.0 - 8.2 g/dL    Globulin 3.5 1.9 - 3.5 g/dL    A-G Ratio 1.1 g/dL   ESTIMATED GFR    Collection Time: 06/15/17  8:01 PM   Result Value Ref Range    GFR If  3 (A) >60 mL/min/1.73 m 2    GFR If Non African American 2 (A) >60 mL/min/1.73 m 2   TROPONIN    Collection Time: 06/15/17  8:01 PM   Result Value Ref Range    Troponin I 0.12 (H) 0.00 - 0.04 ng/mL   COD (ADULT)    Collection Time: 06/15/17  8:01 PM   Result Value Ref Range    ABO Grouping Only O     Rh Grouping Only POS     Antibody Screen-Cod POS     Component R       RC                  Red Blood Cells     [id number]   issued       06/16/17   03:37      Product Type Red Blood Cells LR     Dispense Status Issued     Unit Number (Barcoded) [id number]     Product Code (Barcoded) H6119Q35     Blood Type (Barcoded) 9500     Component R       R7                  Red Blood Cells7    [id number]   selected     06/16/17   02:37      Product Type Red Blood Cells LR Pheresis     Dispense Status Selected     Unit Number (Barcoded) [id number]     Product Code (Barcoded) C6198A32     Blood Type (Barcoded) 9500    FERRITIN    Collection Time: 06/15/17  8:01 PM   Result Value Ref Range    Ferritin 57.8  10.0 - 291.0 ng/mL   ANTIBODY IDENTIFICATION    Collection Time: 06/15/17  8:01 PM   Result Value Ref Range    Antibody Id POS, anti-K    URINALYSIS    Collection Time: 06/16/17  1:30 AM   Result Value Ref Range    Micro Urine Req Microscopic     Color Colorless     Character Clear     Specific Gravity 1.007 <1.035    Ph 6.5 5.0-8.0    Glucose Negative Negative mg/dL    Ketones Negative Negative mg/dL    Protein 200 (A) Negative mg/dL    Bilirubin Negative Negative    Nitrite Negative Negative    Leukocyte Esterase Trace (A) Negative    Occult Blood Negative Negative   URINE SODIUM RANDOM    Collection Time: 06/16/17  1:30 AM   Result Value Ref Range    Sodium, Urine -per volume 96 mmol/L   URINE POTASSIUM RANDOM    Collection Time: 06/16/17  1:30 AM   Result Value Ref Range    Potassium 14.9 mmol/L   URINE CHLORIDE RANDOM    Collection Time: 06/16/17  1:30 AM   Result Value Ref Range    Chloride, Urine-per volume 87 mmol/L   URINE CREATININE RANDOM    Collection Time: 06/16/17  1:30 AM   Result Value Ref Range    Creatinine, Random Urine 53.40 mg/dL   URINE PROTEIN RANDOM    Collection Time: 06/16/17  1:30 AM   Result Value Ref Range    Total Protein, Urine 275.6 (H) 0.0 - 15.0 mg/dL   URINE MICROSCOPIC (W/UA)    Collection Time: 06/16/17  1:30 AM   Result Value Ref Range    WBC 5-10 (A) /hpf    RBC 2-5 (A) /hpf    Bacteria Few (A) None /hpf    Epithelial Cells Few /hpf   IRON/TOTAL IRON BIND    Collection Time: 06/16/17  4:37 AM   Result Value Ref Range    Iron 49 40 - 170 ug/dL    Total Iron Binding 262 250 - 450 ug/dL    % Saturation 19 15 - 55 %   MAGNESIUM    Collection Time: 06/16/17  4:37 AM   Result Value Ref Range    Magnesium 2.0 1.5 - 2.5 mg/dL   PHOSPHORUS    Collection Time: 06/16/17  4:37 AM   Result Value Ref Range    Phosphorus 6.2 (H) 2.5 - 4.5 mg/dL   COMP METABOLIC PANEL    Collection Time: 06/16/17  4:37 AM   Result Value Ref Range    Sodium 143 135 - 145 mmol/L    Potassium 3.9 3.6 - 5.5 mmol/L     Chloride 108 96 - 112 mmol/L    Co2 14 (L) 20 - 33 mmol/L    Anion Gap 21.0 (H) 0.0 - 11.9    Glucose 114 (H) 65 - 99 mg/dL    Bun 128 (HH) 8 - 22 mg/dL    Creatinine 17.60 (HH) 0.50 - 1.40 mg/dL    Calcium 8.0 (L) 8.5 - 10.5 mg/dL    AST(SGOT) 8 (L) 12 - 45 U/L    ALT(SGPT) 6 2 - 50 U/L    Alkaline Phosphatase 49 30 - 99 U/L    Total Bilirubin 0.3 0.1 - 1.5 mg/dL    Albumin 3.4 3.2 - 4.9 g/dL    Total Protein 6.2 6.0 - 8.2 g/dL    Globulin 2.8 1.9 - 3.5 g/dL    A-G Ratio 1.2 g/dL   TROPONIN    Collection Time: 17  4:37 AM   Result Value Ref Range    Troponin I 0.11 (H) 0.00 - 0.04 ng/mL   ESTIMATED GFR    Collection Time: 17  4:37 AM   Result Value Ref Range    GFR If  3 (A) >60 mL/min/1.73 m 2    GFR If Non African American 2 (A) >60 mL/min/1.73 m 2   EKG    Collection Time: 17  6:36 AM   Result Value Ref Range    Report       [facility]    Test Date:  2017  Pt Name:    MELISSA BARDALES                 Department: ER  MRN:        [id number]                      Room:       [facility]  Gender:     F                            Technician: LING  :        1965                   Requested By:GEREMIAS GILLIS  Order #:    [id number]                    Reading MD: Zachery Tobin MD    Measurements  Intervals                                Axis  Rate:       90                           P:          72  IN:         164                          QRS:        67  QRSD:       98                           T:          59  QT:         440  QTc:        539    Interpretive Statements  SINUS RHYTHM  CONSIDER RIGHT ATRIAL ABNORMALITY  BORDERLINE R WAVE PROGRESSION, ANTERIOR LEADS  PROLONGED QT INTERVAL  Compared to ECG 06/15/2017 18:37:34  Left ventricular hypertrophy no longer present    Electronically Signed On 2017 8:11:35 PDT by Zachery Tobin MD     CBC with Differential    Collection Time: 17  7:59 AM   Result Value Ref Range    WBC 7.0 4.8 - 10.8 K/uL    RBC  2.58 (L) 4.20 - 5.40 M/uL    Hemoglobin 7.5 (L) 12.0 - 16.0 g/dL    Hematocrit 22.8 (L) 37.0 - 47.0 %    MCV 88.4 81.4 - 97.8 fL    MCH 29.1 27.0 - 33.0 pg    MCHC 32.9 (L) 33.6 - 35.0 g/dL    RDW 41.8 35.9 - 50.0 fL    Platelet Count 204 164 - 446 K/uL    MPV 9.7 9.0 - 12.9 fL    Neutrophils-Polys 67.30 44.00 - 72.00 %    Lymphocytes 20.00 (L) 22.00 - 41.00 %    Monocytes 9.20 0.00 - 13.40 %    Eosinophils 2.70 0.00 - 6.90 %    Basophils 0.40 0.00 - 1.80 %    Immature Granulocytes 0.40 0.00 - 0.90 %    Nucleated RBC 0.00 /100 WBC    Neutrophils (Absolute) 4.68 2.00 - 7.15 K/uL    Lymphs (Absolute) 1.39 1.00 - 4.80 K/uL    Monos (Absolute) 0.64 0.00 - 0.85 K/uL    Eos (Absolute) 0.19 0.00 - 0.51 K/uL    Baso (Absolute) 0.03 0.00 - 0.12 K/uL    Immature Granulocytes (abs) 0.03 0.00 - 0.11 K/uL    NRBC (Absolute) 0.00 K/uL   ABG - LAB    Collection Time: 06/16/17  7:59 AM   Result Value Ref Range    Ph 7.40 7.40 - 7.50    Pco2 23.1 (L) 26.0 - 37.0 mmHg    Po2 132.0 (H) 64.0 - 87.0 mmHg    O2 Saturation 97.8 93.0 - 99.0 %    Hco3 14 (L) 17 - 25 mmol/L    Base Excess -10 (L) -4 - 3 mmol/L    Body Temp see below Centigrade   RETICULOCYTES COUNT    Collection Time: 06/16/17  7:59 AM   Result Value Ref Range    Reticulocyte Count 2.0 0.8 - 2.1 %    Retic, Absolute 0.05 0.04 - 0.06 M/uL    Imm. Reticulocyte Fraction 16.6 9.3 - 17.4 %    Retic Hgb Equivalent 31.1 29.0 - 35.0 pg/cell   PROTHROMBIN TIME    Collection Time: 06/16/17  9:42 AM   Result Value Ref Range    PT 14.3 12.0 - 14.6 sec    INR 1.08 0.87 - 1.13   APTT    Collection Time: 06/16/17  9:42 AM   Result Value Ref Range    APTT 28.0 24.7 - 36.0 sec       Assessment/Plan  # Acute on chronic Kidney Disease  # ESRD, now requiring HD  - pt with history of CKD, previous baseline Cr 4, was seen by Nephrology in the past, was anticipated to start HD/have fistula placed, but was lost to follow up. Now admitted with Cr 16. S/Sx of Volume overload, Uremia  - Nephrology and  Vascular surgery consulted  - IR guided HD cath placed 6/16  - HD per nephrology recs  - Vascular surgery consulted for fistula placement  - U/S renal: small b/k atrophic kidneys consistent with medical renal disease  - Renally dose medication. PT REPORTS SULFA DRUG ALLERGRY  - f/u consult recs    # Uncontrolled Hypertension  - consistently 190s systolic, will likely improve with fluid removal in HD  - started on Hydralazine, Amlodipine and metoprolol  - d/c verapamil    # Anemia related to ESRD  - no s/sx of gross GI bleeding, query uremic gastropathy.    - empiric po pepcid.  - monitor H/H, transfuse prn.    - EPO per nephro recs.    - Iron studies WNL, anemia of CKD due to low EPO production? MCV 88.4    # Hx of SVT  - d/c verapamil home medication  - will change to metoprolol since this will address SVT and allow us to add amlodipine which is a better Anti-Hypertensive medications  - monitor on Tele for any arrhyhtmia.  - will order Echo for further evaluation    # Rt BKA  - related to remote trauma    # Elevated troponin  -  0.12-> 0.11  - EKG w/o acute ST/T wave changes  - Likely related to ESRD    Consultants/Specialty  Nephrology  Vascular    Disposition  Inpatient

## 2017-06-16 NOTE — PROGRESS NOTES
Pt underwent a tunneled hemodialysis catheter placement performed by Dr Arroyo. Pt remained hemodynamically stable throughout the procedure. No adverse reaction noted. Catheter sutured in place and procedure site sutured; bandage placed to procedure site; cdi . Report to TOM Kitchen. Pt taken back to room in stable condition.     HemoSplit Long-Term Hemodialysis Catheter - 14.5F x 23 cm, ref # [id number], lot # [id number]

## 2017-06-16 NOTE — CARE PLAN
Problem: Safety  Goal: Will remain free from falls  Outcome: PROGRESSING AS EXPECTED  Discussed with patient the importance to use call light when assistance is needed. Patient verbalized understanding. Patient has bed alarm on, call light within reach, treaded socks on, and hourly rounding in place.     Problem: Knowledge Deficit  Goal: Knowledge of disease process/condition, treatment plan, diagnostic tests, and medications will improve  Outcome: PROGRESSING AS EXPECTED  Patient educated regarding activity, diet, meds and plan of care. Patient verbalized understanding.
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
## 2017-06-17 PROBLEM — R79.89 ELEVATED TROPONIN: Status: ACTIVE | Noted: 2017-06-17

## 2017-06-17 PROBLEM — E55.9 VITAMIN D DEFICIENCY: Status: ACTIVE | Noted: 2017-06-17

## 2017-06-17 PROBLEM — E87.29 HIGH ANION GAP METABOLIC ACIDOSIS: Status: ACTIVE | Noted: 2017-06-17

## 2017-06-17 PROBLEM — N18.6 END-STAGE RENAL DISEASE (ESRD) (HCC): Status: ACTIVE | Noted: 2017-06-16

## 2017-06-17 PROBLEM — E83.39 HYPERPHOSPHATEMIA: Status: ACTIVE | Noted: 2017-06-17

## 2017-06-17 PROBLEM — E87.29 HIGH ANION GAP METABOLIC ACIDOSIS: Status: RESOLVED | Noted: 2017-06-17 | Resolved: 2017-06-17

## 2017-06-17 PROBLEM — E83.51 HYPOCALCEMIA: Status: ACTIVE | Noted: 2017-06-17

## 2017-06-17 PROBLEM — N25.81 SECONDARY HYPERPARATHYROIDISM OF RENAL ORIGIN (HCC): Status: ACTIVE | Noted: 2017-06-17

## 2017-06-17 LAB
ANION GAP SERPL CALC-SCNC: 11 MMOL/L (ref 0–11.9)
BUN SERPL-MCNC: 41 MG/DL (ref 8–22)
CALCIUM SERPL-MCNC: 8.4 MG/DL (ref 8.5–10.5)
CHLORIDE SERPL-SCNC: 102 MMOL/L (ref 96–112)
CO2 SERPL-SCNC: 27 MMOL/L (ref 20–33)
CREAT SERPL-MCNC: 7.64 MG/DL (ref 0.5–1.4)
EKG IMPRESSION: NORMAL
EKG IMPRESSION: NORMAL
ERYTHROCYTE [DISTWIDTH] IN BLOOD BY AUTOMATED COUNT: 39.7 FL (ref 35.9–50)
GFR SERPL CREATININE-BSD FRML MDRD: 6 ML/MIN/1.73 M 2
GLUCOSE SERPL-MCNC: 146 MG/DL (ref 65–99)
HCT VFR BLD AUTO: 25.5 % (ref 37–47)
HGB BLD-MCNC: 8.5 G/DL (ref 12–16)
LV EJECT FRACT  99904: 55
MAGNESIUM SERPL-MCNC: 1.8 MG/DL (ref 1.5–2.5)
MCH RBC QN AUTO: 28.4 PG (ref 27–33)
MCHC RBC AUTO-ENTMCNC: 33.3 G/DL (ref 33.6–35)
MCV RBC AUTO: 85.3 FL (ref 81.4–97.8)
PHOSPHATE SERPL-MCNC: 3.9 MG/DL (ref 2.5–4.5)
PLATELET # BLD AUTO: 225 K/UL (ref 164–446)
PMV BLD AUTO: 9.5 FL (ref 9–12.9)
POTASSIUM SERPL-SCNC: 4.1 MMOL/L (ref 3.6–5.5)
RBC # BLD AUTO: 2.99 M/UL (ref 4.2–5.4)
SODIUM SERPL-SCNC: 140 MMOL/L (ref 135–145)
WBC # BLD AUTO: 5.6 K/UL (ref 4.8–10.8)

## 2017-06-17 PROCEDURE — 700105 HCHG RX REV CODE 258

## 2017-06-17 PROCEDURE — A9270 NON-COVERED ITEM OR SERVICE: HCPCS | Performed by: HOSPITALIST

## 2017-06-17 PROCEDURE — 80048 BASIC METABOLIC PNL TOTAL CA: CPT

## 2017-06-17 PROCEDURE — 83735 ASSAY OF MAGNESIUM: CPT

## 2017-06-17 PROCEDURE — 93010 ELECTROCARDIOGRAM REPORT: CPT | Mod: 76 | Performed by: INTERNAL MEDICINE

## 2017-06-17 PROCEDURE — A9270 NON-COVERED ITEM OR SERVICE: HCPCS | Performed by: STUDENT IN AN ORGANIZED HEALTH CARE EDUCATION/TRAINING PROGRAM

## 2017-06-17 PROCEDURE — 90935 HEMODIALYSIS ONE EVALUATION: CPT

## 2017-06-17 PROCEDURE — 93005 ELECTROCARDIOGRAM TRACING: CPT | Performed by: HOSPITALIST

## 2017-06-17 PROCEDURE — 700102 HCHG RX REV CODE 250 W/ 637 OVERRIDE(OP): Performed by: HOSPITALIST

## 2017-06-17 PROCEDURE — 700102 HCHG RX REV CODE 250 W/ 637 OVERRIDE(OP): Performed by: STUDENT IN AN ORGANIZED HEALTH CARE EDUCATION/TRAINING PROGRAM

## 2017-06-17 PROCEDURE — 93306 TTE W/DOPPLER COMPLETE: CPT

## 2017-06-17 PROCEDURE — 85027 COMPLETE CBC AUTOMATED: CPT

## 2017-06-17 PROCEDURE — 770020 HCHG ROOM/CARE - TELE (206)

## 2017-06-17 PROCEDURE — 700111 HCHG RX REV CODE 636 W/ 250 OVERRIDE (IP): Performed by: STUDENT IN AN ORGANIZED HEALTH CARE EDUCATION/TRAINING PROGRAM

## 2017-06-17 PROCEDURE — 36415 COLL VENOUS BLD VENIPUNCTURE: CPT

## 2017-06-17 PROCEDURE — 93306 TTE W/DOPPLER COMPLETE: CPT | Mod: 26 | Performed by: INTERNAL MEDICINE

## 2017-06-17 PROCEDURE — 84100 ASSAY OF PHOSPHORUS: CPT

## 2017-06-17 PROCEDURE — 99233 SBSQ HOSP IP/OBS HIGH 50: CPT | Mod: GC | Performed by: HOSPITALIST

## 2017-06-17 PROCEDURE — 700111 HCHG RX REV CODE 636 W/ 250 OVERRIDE (IP)

## 2017-06-17 RX ORDER — ERGOCALCIFEROL 1.25 MG/1
50000 CAPSULE ORAL
Status: DISCONTINUED | OUTPATIENT
Start: 2017-06-17 | End: 2017-06-23 | Stop reason: HOSPADM

## 2017-06-17 RX ORDER — VERAPAMIL HYDROCHLORIDE 80 MG/1
80 TABLET ORAL EVERY 8 HOURS
Status: DISCONTINUED | OUTPATIENT
Start: 2017-06-17 | End: 2017-06-23 | Stop reason: HOSPADM

## 2017-06-17 RX ORDER — MAGNESIUM SULFATE HEPTAHYDRATE 40 MG/ML
2 INJECTION, SOLUTION INTRAVENOUS ONCE
Status: COMPLETED | OUTPATIENT
Start: 2017-06-17 | End: 2017-06-17

## 2017-06-17 RX ORDER — LABETALOL HYDROCHLORIDE 5 MG/ML
20 INJECTION, SOLUTION INTRAVENOUS EVERY 4 HOURS PRN
Status: DISCONTINUED | OUTPATIENT
Start: 2017-06-17 | End: 2017-06-22

## 2017-06-17 RX ORDER — VERAPAMIL HYDROCHLORIDE 240 MG/1
240 TABLET, FILM COATED, EXTENDED RELEASE ORAL
Status: DISCONTINUED | OUTPATIENT
Start: 2017-06-17 | End: 2017-06-17

## 2017-06-17 RX ORDER — AMLODIPINE BESYLATE 10 MG/1
10 TABLET ORAL
Status: DISCONTINUED | OUTPATIENT
Start: 2017-06-17 | End: 2017-06-17

## 2017-06-17 RX ORDER — LABETALOL HYDROCHLORIDE 5 MG/ML
20 INJECTION, SOLUTION INTRAVENOUS EVERY 4 HOURS PRN
Status: DISCONTINUED | OUTPATIENT
Start: 2017-06-17 | End: 2017-06-17

## 2017-06-17 RX ORDER — LABETALOL 100 MG/1
100 TABLET, FILM COATED ORAL EVERY 8 HOURS
Status: DISCONTINUED | OUTPATIENT
Start: 2017-06-17 | End: 2017-06-22

## 2017-06-17 RX ORDER — VERAPAMIL HYDROCHLORIDE 2.5 MG/ML
5 INJECTION, SOLUTION INTRAVENOUS
Status: DISCONTINUED | OUTPATIENT
Start: 2017-06-17 | End: 2017-06-23 | Stop reason: HOSPADM

## 2017-06-17 RX ORDER — SODIUM CHLORIDE 9 MG/ML
INJECTION, SOLUTION INTRAVENOUS
Status: COMPLETED
Start: 2017-06-17 | End: 2017-06-17

## 2017-06-17 RX ORDER — VERAPAMIL HYDROCHLORIDE 80 MG/1
80 TABLET ORAL EVERY 8 HOURS
Status: DISCONTINUED | OUTPATIENT
Start: 2017-06-17 | End: 2017-06-17

## 2017-06-17 RX ORDER — LISINOPRIL 20 MG/1
20 TABLET ORAL
Status: DISCONTINUED | OUTPATIENT
Start: 2017-06-17 | End: 2017-06-17

## 2017-06-17 RX ORDER — HEPARIN SODIUM 1000 [USP'U]/ML
INJECTION, SOLUTION INTRAVENOUS; SUBCUTANEOUS
Status: COMPLETED
Start: 2017-06-17 | End: 2017-06-17

## 2017-06-17 RX ORDER — DILTIAZEM HYDROCHLORIDE 5 MG/ML
10 INJECTION INTRAVENOUS ONCE
Status: COMPLETED | OUTPATIENT
Start: 2017-06-17 | End: 2017-06-17

## 2017-06-17 RX ADMIN — DILTIAZEM HYDROCHLORIDE 10 MG: 5 INJECTION INTRAVENOUS at 07:35

## 2017-06-17 RX ADMIN — VERAPAMIL HYDROCHLORIDE 80 MG: 80 TABLET, FILM COATED ORAL at 15:46

## 2017-06-17 RX ADMIN — ERGOCALCIFEROL 50000 UNITS: 1.25 CAPSULE ORAL at 08:45

## 2017-06-17 RX ADMIN — LABETALOL HYDROCHLORIDE 100 MG: 100 TABLET, FILM COATED ORAL at 15:45

## 2017-06-17 RX ADMIN — LABETALOL HYDROCHLORIDE 100 MG: 100 TABLET, FILM COATED ORAL at 10:22

## 2017-06-17 RX ADMIN — FAMOTIDINE 20 MG: 20 TABLET, FILM COATED ORAL at 21:17

## 2017-06-17 RX ADMIN — SODIUM CHLORIDE 500 ML: 9 INJECTION, SOLUTION INTRAVENOUS at 15:47

## 2017-06-17 RX ADMIN — LABETALOL HYDROCHLORIDE 100 MG: 100 TABLET, FILM COATED ORAL at 21:17

## 2017-06-17 RX ADMIN — MAGNESIUM SULFATE IN WATER 2 G: 40 INJECTION, SOLUTION INTRAVENOUS at 15:46

## 2017-06-17 RX ADMIN — HEPARIN SODIUM 3700 UNITS: 1000 INJECTION, SOLUTION INTRAVENOUS; SUBCUTANEOUS at 14:20

## 2017-06-17 RX ADMIN — SODIUM BICARBONATE TAB 325 MG 325 MG: 325 TAB at 01:38

## 2017-06-17 RX ADMIN — HYDRALAZINE HYDROCHLORIDE 25 MG: 25 TABLET ORAL at 05:14

## 2017-06-17 RX ADMIN — ACETAMINOPHEN 650 MG: 325 TABLET, FILM COATED ORAL at 08:44

## 2017-06-17 RX ADMIN — VERAPAMIL HYDROCHLORIDE 80 MG: 80 TABLET, FILM COATED ORAL at 21:17

## 2017-06-17 RX ADMIN — FAMOTIDINE 20 MG: 20 TABLET, FILM COATED ORAL at 08:45

## 2017-06-17 RX ADMIN — ERYTHROPOIETIN 10000 UNITS: 10000 INJECTION, SOLUTION INTRAVENOUS; SUBCUTANEOUS at 13:17

## 2017-06-17 RX ADMIN — VERAPAMIL HYDROCHLORIDE 80 MG: 80 TABLET, FILM COATED ORAL at 08:45

## 2017-06-17 ASSESSMENT — ENCOUNTER SYMPTOMS
CARDIOVASCULAR NEGATIVE: 1
COUGH: 0
SHORTNESS OF BREATH: 0
DIAPHORESIS: 0
BRUISES/BLEEDS EASILY: 0
BLURRED VISION: 0
SEIZURES: 0
FOCAL WEAKNESS: 0
NAUSEA: 0
HEADACHES: 0
RESPIRATORY NEGATIVE: 1
DIZZINESS: 0
PSYCHIATRIC NEGATIVE: 1
HEARTBURN: 0
CONSTIPATION: 0
EYES NEGATIVE: 1
MUSCULOSKELETAL NEGATIVE: 1
FEVER: 0
PALPITATIONS: 0
DIARRHEA: 0
TINGLING: 0
WEAKNESS: 1
ABDOMINAL PAIN: 0
NAUSEA: 1
LOSS OF CONSCIOUSNESS: 0
CHILLS: 0
VOMITING: 0

## 2017-06-17 ASSESSMENT — PAIN SCALES - GENERAL
PAINLEVEL_OUTOF10: 0
PAINLEVEL_OUTOF10: 4
PAINLEVEL_OUTOF10: 0

## 2017-06-17 NOTE — CARE PLAN
Problem: Safety  Goal: Will remain free from injury  Outcome: PROGRESSING AS EXPECTED  Discussed with patient the importance of calling for assistance prior to getting out of bed if the patient does not feel well enough to get out of bed on her own in order to help prevent falls. Patient very accepting of the information. All questions answered at this time.     Problem: Infection  Goal: Will remain free from infection  Outcome: PROGRESSING AS EXPECTED  Discussed with patient the importance of washing hands before and after eating or using the restroom in order to help prevent infection. Patient very accepting of the information. All questions answered at this time.
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
## 2017-06-17 NOTE — PROGRESS NOTES
[facility] Nephrology Progress Note               Author: Fanny Rodriguez M.D. Date & Time created: 6/17/2017  12:31 PM     Interval History:  The patient is a 52-year-old  woman with a known history of CKD.  Note, etiology of said CKD is unknown.  She has had chronic elevation of her creatinine for years.  She has been followed in our office by Dr. Martinez and Dr. Junior.  She does not recall the cause of her kidney failure, but reviewing her laboratory shows a creatinine greater than 2 for more than 10 years.  She has not been under the care of a physician though for approximately 2 years.  She presented to the emergency room complaining of nausea, vomiting, malaise, fatigue.  Laboratories done at that time showed a creatinine of 15, BUN of 125, potassium of 4, hemoglobin of 7.  Given these findings, she was admitted to the telemetry cook for initiation of hemodialysis.     As noted, the patient does not know or recall why she has been told she has kidney failure.  She has had long history of hypertension and as I noted above she has had a creatinine greater than 2 for more than 10 years.  She does admit to taking ibuprofen daily for her arthritis despite being told in the past not to.  She is also supposed to take lisinopril for her hypertension, but she has not taken it for more than a year.  She has had no recent contrast problems.  No history of renal stones.  There is no family history of renal disease.    DAILY NEPHROLOGY SUMMARY  6/17/17--Rapid response called overnight for SVT and this resolved with IV diltiazem push and PO verapamil resumed, tolerated HD yesterday without issues, BP stable this am, tolerated PO last night, seen on dialysis today and HR stable    Review of Systems:  Review of Systems   Constitutional: Positive for malaise/fatigue. Negative for fever and chills.   HENT: Negative.    Eyes: Negative.    Respiratory: Negative.    Cardiovascular: Negative.    Gastrointestinal:  Positive for nausea. Negative for vomiting, abdominal pain, diarrhea and constipation.   Genitourinary: Negative.    Musculoskeletal: Negative.    Skin: Negative.    Neurological: Positive for weakness. Negative for dizziness, focal weakness and loss of consciousness.   Endo/Heme/Allergies: Negative.    Psychiatric/Behavioral: Negative.        Physical Exam:  Physical Exam   Constitutional: She is oriented to person, place, and time. She appears well-developed and well-nourished. No distress.   HENT:   Head: Normocephalic and atraumatic.   Mouth/Throat: No oropharyngeal exudate.   Eyes: Conjunctivae and EOM are normal. Pupils are equal, round, and reactive to light. No scleral icterus.   Neck: Normal range of motion. Neck supple. No thyromegaly present.   Cardiovascular: Normal rate and regular rhythm.    No murmur heard.  Pulmonary/Chest: Effort normal and breath sounds normal. No respiratory distress. She has no wheezes.   +R Chest PC   Abdominal: Soft. Bowel sounds are normal. She exhibits no distension. There is no tenderness.   Musculoskeletal: Normal range of motion. She exhibits no edema or tenderness.   +R BKA   Neurological: She is alert and oriented to person, place, and time. She exhibits normal muscle tone.   Skin: Skin is warm and dry. No erythema.   Psychiatric: She has a normal mood and affect. Her behavior is normal.   Nursing note and vitals reviewed.      Labs:  Recent Labs      06/16/17   0759   DBXFR65S  7.40   [id number]  23.1*   [id number]  132.0*   [id number]  97.8   ARTHCO3  14*   ARTBE  -10*     Recent Labs      06/15/17   2001  06/16/17   0437   TROPONINI  0.12*  0.11*   BNPBTYPENAT  1721*   --      Recent Labs      06/15/17   2001  06/16/17   0437  06/17/17   0306   SODIUM  141  143  140   POTASSIUM  4.0  3.9  4.1   CHLORIDE  106  108  102   CO2  14*  14*  27   BUN  125*  128*  41*   CREATININE  16.89*  17.60*  7.64*   MAGNESIUM   --   2.0  1.8   PHOSPHORUS   --   6.2*  3.9   CALCIUM  8.6   8.0*  8.4*     Recent Labs      06/15/17   2001  06/16/17   0437  06/17/17   0306   ALTSGPT  6  6   --    ASTSGOT  8*  8*   --    ALKPHOSPHAT  58  49   --    TBILIRUBIN  0.4  0.3   --    GLUCOSE  91  114*  146*     Recent Labs      06/15/17   2001  06/16/17   0437  06/16/17   0759  06/16/17   0942  17   1408  17   0306   RBC  2.45*   --   2.58*   --    --   2.99*   HEMOGLOBIN  6.9*   --   7.5*   --    --   8.5*   HEMATOCRIT  21.4*   --   22.8*   --    --   25.5*   PLATELETCT  229   --   204   --    --   225   PROTHROMBTM   --    --    --   14.3   --    --    APTT   --    --    --   28.0   --    --    INR   --    --    --   1.08   --    --    IRON   --   49   --    --   228*   --    FERRITIN  57.8   --    --    --    --    --    TOTIRONBC   --   262   --    --   256   --      Recent Labs      06/15/17   2001  06/16/17   0437  06/16/17   0759  06/17/17   0306   WBC  6.5   --   7.0  5.6   NEUTSPOLYS  70.20   --   67.30   --    LYMPHOCYTES  21.10*   --   20.00*   --    MONOCYTES  5.60   --   9.20   --    EOSINOPHILS  2.30   --   2.70   --    BASOPHILS  0.50   --   0.40   --    ASTSGOT  8*  8*   --    --    ALTSGPT  6  6   --    --    ALKPHOSPHAT  58  49   --    --    TBILIRUBIN  0.4  0.3   --    --            Hemodynamics:  Temp (24hrs), Av.5 °C (97.7 °F), Min:36.1 °C (97 °F), Max:37.2 °C (99 °F)  Temperature: 37.2 °C (99 °F)  Pulse  Av.3  Min: 75  Max: 178   Blood Pressure: 117/69 mmHg     Respiratory:    Respiration: 18, Pulse Oximetry: 98 %        RUL Breath Sounds: Clear, RML Breath Sounds: Clear, RLL Breath Sounds: Diminished, PHILIP Breath Sounds: Clear, LLL Breath Sounds: Diminished  Fluids:    Intake/Output Summary (Last 24 hours) at 17 1231  Last data filed at 17 1902   Gross per 24 hour   Intake    500 ml   Output   1000 ml   Net   -500 ml     Weight: 65.9 kg (145 lb 4.5 oz)  GI/Nutrition:  Orders Placed This Encounter   Procedures   • Diet Order     Standing Status: Standing       Number of Occurrences: 1      Standing Expiration Date:      Order Specific Question:  Diet:     Answer:  Renal [8]   • DIET NPO     Standing Status: Standing      Number of Occurrences: 8      Standing Expiration Date:      Order Specific Question:  Restrict to:     Answer:  Sips with Medications [3]     Medical Decision Making, by Problem:  Active Hospital Problems    Diagnosis   • Vitamin D deficiency [E55.9]   • Secondary hyperparathyroidism of renal origin (CMS-HCC) [N25.81]   • Hyperphosphatemia [E83.39]   • End-stage renal disease (ESRD) (CMS-HCC) [N18.6]   • Supraventricular tachycardia [I47.1]   • HTN (hypertension) [I10]   • Anemia associated with chronic renal failure [D63.1]     Assessment/Plan:  1.  CKD Stage V--new ESRD, renal ultrasound confirms small essentially nonfunctional kidneys, she will need permanent dialysis  --Permcath placed in IR 6/16/17 and dialysis initiated  --HD again today  --to go to OR tomorrow for AVF creation  --Outpatient dialysis arrangements pending  --Dose adjust all meds for decreased GFR  2. HTN--BP better this am  --Meds changed back to verapamil for control of SVT  --Monitor  3. Anemia--secondary to CKD  --Epogen with HD  --Transfuse PRN  4. Renal Osteodystrophy--phos improved  --Monitor  5. Secondary Hyperparathyroidism--goal iPTH 150-300  --Monitor for now  6. Vitamin D Deficiency--continue ergocalciferol  7. Metabolic Acidosis--resolved with HD  8. H/O SVT--on verapamil at home  --Monitor  9. Mild Protein-Calorie Malnutrition  --No dietary protein restrictions      Medications reviewed, Labs reviewed and Radiology images reviewed

## 2017-06-17 NOTE — CARE PLAN
Problem: Knowledge Deficit  Goal: Knowledge of disease process/condition, treatment plan, diagnostic tests, and medications will improve  Outcome: PROGRESSING AS EXPECTED  Discussed plan of care for today and for surgery tomorrow w/ Dr. Davenport, he is A+O, he verbalizes understanding of his plan of care, no questions. Emotional support given. Will monitor for educational needs.         Problem: Pain Management  Goal: Pain level will decrease to patient’s comfort goal  Outcome: PROGRESSING AS EXPECTED  No c/o this afternoon, received tylenol po prn this am for HA. Assessing every four hrs for pain per protocol; see doc flowsheets

## 2017-06-17 NOTE — PROGRESS NOTES
Hemodialysis ordered by Dr. Rodriguez. Treatment started at 1146 and ended at 1416. Pt stable, vss, no c/o post tx. See flow sheets for details. Net UF 1.0 liter. Report to STARR Carrillo RN

## 2017-06-17 NOTE — CONSULTS
Vascular Surgery Consult Note  -------------------------------------------------------------------------------------------------  Date: 6/17/2017    Consulting Physician: Jimbo Davenport M.D. [facility]  -------------------------------------------------------------------------------------------------    Reason for consultation: AVF creation    HPI: This is a 52 y.o. female who is on HD in need of long-term HD access. No previous upper extremity operations, she has a right IJ permacath placed yesterday.  No current complaints. Right-handed    Past Medical History   Diagnosis Date   • Dysrhythmia    • HTN (hypertension) 11/5/2009   • Glomerulonephritis, chronic 11/5/2009   • Anemia associated with chronic renal failure 11/5/2009   • SVT (supraventricular tachycardia)        Past Surgical History   Procedure Laterality Date   • Amputation, below the knee     • Capitation payment (insurance)     • Femur orif  10/9/08     Performed by STELLA GATES at [facility]ERY [facility]   • Iliac bone graft  10/9/08     Performed by STELLA GATES at SUR[facility]ERY [facility]   • Other       BELOW KNEE AMPUTATION RIGHT   • Appendectomy laparoscopic  5/4/2009     Performed by BANDAR TIMMONS at SURGERY [facility]   • Pr enlarge breast with implant         Current Facility-Administered Medications   Medication Dose Route Frequency Provider Last Rate Last Dose   • vitamin D (Ergocalciferol) (DRISDOL) capsule 50,000 Units  50,000 Units Oral Q7 DAYS Htwe JASON Dias M.D.   50,000 Units at 06/17/17 0845   • verapamil (ISOPTIN) injection 5 mg  5 mg Intravenous Q5 MIN PRN Htwe JASON Dias M.D.       • labetalol (NORMODYNE,TRANDATE) injection 20 mg  20 mg Intravenous Q4HRS PRN FLAKITA Spain M.D.       • labetalol (NORMODYNE) tablet 100 mg  100 mg Oral Q8HRS FLAKITA Spain M.D.   100 mg at 06/17/17 1022   • verapamil (ISOPTIN) tablet 80 mg  80 mg Oral Q8HRS FLAKITA Spain M.D.       • senna-docusate (PERICOLACE or  SENOKOT S) 8.6-50 MG per tablet 2 Tab  2 Tab Oral BID Baldev Guillen M.D.   2 Tab at 06/16/17 2028    And   • polyethylene glycol/lytes (MIRALAX) PACKET 1 Packet  1 Packet Oral QDAY PRN Baldev Guillen M.D.        And   • magnesium hydroxide (MILK OF MAGNESIA) suspension 30 mL  30 mL Oral QDAY PRN Baldev Guillen M.D.        And   • bisacodyl (DULCOLAX) suppository 10 mg  10 mg Rectal QDAY PRN Baldev Guillen M.D.       • Respiratory Care per Protocol   Nebulization Continuous RT Baldev Guillen M.D.       • acetaminophen (TYLENOL) tablet 650 mg  650 mg Oral Q6HRS PRN Baldev Guillen M.D.   650 mg at 06/17/17 0844   • famotidine (PEPCID) tablet 20 mg  20 mg Oral BID Woodrow Jones M.D.   20 mg at 06/17/17 0845   • prochlorperazine (COMPAZINE) tablet 5 mg  5 mg Oral Q6HRS PRN Woodrow Jones M.D.       • heparin 1000 units/mL injection 3,700 Units  3,700 Units Intravenous DIALYSIS PRN Kvng Mcelroy M.D.   3,700 Units at 06/16/17 1910       Social History     Social History   • Marital Status:      Spouse Name: N/A   • Number of Children: N/A   • Years of Education: N/A     Occupational History   • Not on file.     Social History Main Topics   • Smoking status: Current Every Day Smoker -- 0.25 packs/day     Last Attempt to Quit: 09/22/2012   • Smokeless tobacco: Never Used      Comment: 5 cigs/day   • Alcohol Use: No   • Drug Use: No   • Sexual Activity: Not on file     Other Topics Concern   • Not on file     Social History Narrative       History reviewed. No pertinent family history.    Allergies:  Sulfa drugs    Review of Systems:  Constitutional: Negative for fever, chills, weight loss,   HENT:   Negative for hearing loss or tinnitus    Eyes:    Negative for blurred vision, double vision, or loss of vision  Respiratory:  Negative for cough, hemoptysis, or wheezing    Cardiac:  Negative for chest pain or palpitations or orthopnea  Vascular:  Negative for claudication or rest pain  "  Gastrointestinal: Negative for nausea, vomiting, or abdominal pain     Negative for hematochezia or melena   Genitourinary: Negative for dysuria, frequency, or hematuria   Musculoskeletal: Negative for myalgias, back pain, or joint pain  Skin:   Negative for itching or rash  Neurological:  Negative for dizziness, headaches, or tremors     Negative for speech disturbance     Negative for extremity weakness or paresthesias  Endo/Heme:  Negative for easy bruising or bleeding  Psychiatric:  Negative for depression, suicidal ideas, or hallucinations    Physical Exam:  Blood pressure 134/83, pulse 87, temperature 36.7 °C (98 °F), resp. rate 18, height 1.575 m (5' 2\"), weight 65.9 kg (145 lb 4.5 oz), last menstrual period 06/25/2007, SpO2 98 %, not currently breastfeeding.    Constitutional: Alert, oriented, no acute distress  HEENT:  Normocephalic and atraumatic, EOMI  Neck:   Supple, no JVD,   Cardiovascular: Regular rate and rhythm,   Pulmonary:  Good air entry bilaterally,    Abdominal:  Soft, non-tender, non-distended     Aortic impulse not widened  Musculoskeletal: No edema, no tenderness  Neurological:  CN II-XII grossly intact, no focal deficits  Skin:   Skin is warm and dry. No rash noted.  Psychiatric:  Normal mood and affect.  Vascular:  Palpable radial and ulnar pulses bilaterally     No visible superficial veins.    Labs:  Recent Labs      06/15/17   2001  06/16/17   0759  06/17/17   0306   WBC  6.5  7.0  5.6   RBC  2.45*  2.58*  2.99*   HEMOGLOBIN  6.9*  7.5*  8.5*   HEMATOCRIT  21.4*  22.8*  25.5*   MCV  87.3  88.4  85.3   MCH  28.2  29.1  28.4   MCHC  32.2*  32.9*  33.3*   RDW  41.3  41.8  39.7   PLATELETCT  229  204  225   MPV  9.4  9.7  9.5     Recent Labs      06/15/17   2001  06/16/17   0437  06/17/17   0306   SODIUM  141  143  140   POTASSIUM  4.0  3.9  4.1   CHLORIDE  106  108  102   CO2  14*  14*  27   GLUCOSE  91  114*  146*   BUN  125*  128*  41*   CREATININE  16.89*  17.60*  7.64*   CALCIUM  8.6 "  8.0*  8.4*     Recent Labs      06/16/17   0942   APTT  28.0   INR  1.08     Recent Labs      06/15/17   2001  06/16/17   0437  06/16/17   0942   ASTSGOT  8*  8*   --    ALTSGPT  6  6   --    TBILIRUBIN  0.4  0.3   --    ALKPHOSPHAT  58  49   --    GLOBULIN  3.5  2.8   --    INR   --    --   1.08       Radiology:  No vein map available    Assessment: This is a 52 y.o. Female in need of long-term HD access.    Plan:   OR tomorrow for AVF creation, possible graft    I explained the details of the operation, alternatives, and potential risks, including but not limited to bleeding, infection, injury to vessels or nerves, possible multiple incisions, use of xray and contrast exposure, and risks of anesthesia.  All questions were answered. Patient understands and agrees to proceed.      Thank you very much for this consultation.    Jimbo Davenport M.D.  [facility]  [phone]  Cell: [phone]

## 2017-06-17 NOTE — PROGRESS NOTES
[facility] [place] Internal Medicine Interval Note    Name Isabelle Coyle     1965   Age/Sex 52 y.o. female   MRN [id number]   Code Status FULL     After 5PM or if no immediate response to page, please call for cross-coverage  Attending/Team: Dr Spain Call [phone] to page   1st Call - Day Intern (R1):   Dr Dias 2nd Call - Day Sr. Resident (R2/R3):   Dr Jones       Chief complaint/ reason for interval visit (Primary Diagnosis)   52 year old female with pmhx of HTN, CKD, R BKA, SVT admitted for worsening Renal failure, needing HD.     Interval Problem Daily Status Update    - patient went into SVT with 's early in the morning - IV dilt 10 mg given once, SVT resolved, back to sinus rhythm.  - po meto, amlodipine, IV hydralazine - replaced with po verapamil, labetalol.     Active Problems:    HTN (hypertension) POA: Yes    Anemia associated with chronic renal failure POA: Yes    Supraventricular tachycardia POA: Yes    Hx of right BKA (CMS-HCC) POA: Yes    End-stage renal disease (ESRD) (CMS-HCC)    Vitamin D deficiency POA: Unknown    Secondary hyperparathyroidism of renal origin (CMS-HCC) POA: Unknown    Hyperphosphatemia POA: Unknown    Elevated troponin POA: Unknown    Hypocalcemia POA: Unknown  Resolved Problems:    High anion gap metabolic acidosis POA: Unknown      Review of Systems   Constitutional: Positive for malaise/fatigue. Negative for fever, chills and diaphoresis.   HENT: Negative for hearing loss.    Eyes: Negative.  Negative for blurred vision.   Respiratory: Negative for cough and shortness of breath.    Cardiovascular: Negative for chest pain and palpitations.   Gastrointestinal: Negative for heartburn, nausea, abdominal pain, diarrhea and constipation.   Genitourinary: Negative.  Negative for dysuria and urgency.   Musculoskeletal: Negative.    Skin: Negative for itching and rash.   Neurological: Negative for dizziness, tingling, focal weakness, seizures  and headaches.   Endo/Heme/Allergies: Does not bruise/bleed easily.   Psychiatric/Behavioral: Negative.        Consultants/Specialty  Nephrology  Vascular    Disposition  Inpatient    Quality Measures  EKG reviewed, Labs reviewed and Medications reviewed  Murdock catheter: No Murdock      DVT Prophylaxis: Contraindicated - Anemia requiring blood transfusion  DVT prophylaxis - mechanical: SCDs      Assessed for rehab: Patient returned to prior level of function, rehabilitation not indicated at this time          Physical Exam       Filed Vitals:    06/17/17 0729 06/17/17 0744 06/17/17 1020 06/17/17 1139   BP: 137/99 158/94 134/83 117/69   Pulse: 178 89 87 75   Temp: 36.7 °C (98 °F)   37.2 °C (99 °F)   Resp: 18   18   Height:       Weight:       SpO2: 98%   98%     Body mass index is 26.57 kg/(m^2). Weight: 65.9 kg (145 lb 4.5 oz)  Oxygen Therapy:  Pulse Oximetry: 98 %, O2 (LPM): 0, O2 Delivery: None (Room Air)    Physical Exam   Constitutional: She is oriented to person, place, and time and well-developed, well-nourished, and in no distress. No distress.   HENT:   Head: Normocephalic and atraumatic.   Eyes: Pupils are equal, round, and reactive to light. Right eye exhibits no discharge. Left eye exhibits no discharge.   Neck: Neck supple.   Cardiovascular: Regular rhythm.  Exam reveals no gallop and no friction rub.    No murmur heard.  Tachy 's.    Pulmonary/Chest: Effort normal and breath sounds normal. No respiratory distress. She has no wheezes.   Abdominal: Soft. Bowel sounds are normal. She exhibits no distension. There is no tenderness.   Musculoskeletal: She exhibits no edema.   R BKA   Neurological: She is alert and oriented to person, place, and time.   Skin: She is not diaphoretic.   Nursing note and vitals reviewed.        Lab Data Review:      Recent Labs      06/15/17   2001  06/16/17   0437  06/17/17   0306   SODIUM  141  143  140   POTASSIUM  4.0  3.9  4.1   CHLORIDE  106  108  102   CO2  14*  14*   27   BUN  125*  128*  41*   CREATININE  16.89*  17.60*  7.64*   MAGNESIUM   --   2.0  1.8   PHOSPHORUS   --   6.2*  3.9   CALCIUM  8.6  8.0*  8.4*       Recent Labs      06/15/17   2001  06/16/17   0437  06/17/17   0306   ALTSGPT  6  6   --    ASTSGOT  8*  8*   --    ALKPHOSPHAT  58  49   --    TBILIRUBIN  0.4  0.3   --    GLUCOSE  91  114*  146*       Recent Labs      06/15/17   2001  06/16/17   0437  06/16/17   0759  06/16/17   0942  06/16/17   1408  06/17/17   0306   RBC  2.45*   --   2.58*   --    --   2.99*   HEMOGLOBIN  6.9*   --   7.5*   --    --   8.5*   HEMATOCRIT  21.4*   --   22.8*   --    --   25.5*   PLATELETCT  229   --   204   --    --   225   PROTHROMBTM   --    --    --   14.3   --    --    APTT   --    --    --   28.0   --    --    INR   --    --    --   1.08   --    --    IRON   --   49   --    --   228*   --    FERRITIN  57.8   --    --    --    --    --    TOTIRONBC   --   262   --    --   256   --        Recent Labs      06/15/17   2001  06/16/17   0437  06/16/17   0759  06/17/17   0306   WBC  6.5   --   7.0  5.6   NEUTSPOLYS  70.20   --   67.30   --    LYMPHOCYTES  21.10*   --   20.00*   --    MONOCYTES  5.60   --   9.20   --    EOSINOPHILS  2.30   --   2.70   --    BASOPHILS  0.50   --   0.40   --    ASTSGOT  8*  8*   --    --    ALTSGPT  6  6   --    --    ALKPHOSPHAT  58  49   --    --    TBILIRUBIN  0.4  0.3   --    --            Assessment/Plan       # Supraventricular tachycardia   - episode of SVT with 's this morning, IV dilt 10 mg was given once, HR back to sinus rhythm.   - Mg 1.8, K 4.1.   - restart home meds po verapamil.  - po verapamil 80 mg tid, po labetalol 100 mg tid.  - prn IV verapamil for HR > 135 q 5 min prn, not to administer more than 3 times at once.   - will give IV magnesium sulfate 2 G.   - keep Mg > 2 and K > 4.   - pending echo.       # Newly diagnosed ESRD   - unclear etiology, likely chronic HTN + chronic NSAID intake.   - USG renal: small b/l atrophic  kidney consistent with medical renal disease.   - nephro on board.  - underwent HD yesterday.  - surgery consulted for AVF placement.   - continue HD as per nephro.  - avoid nephrotoxins.   - will need op dialysis set up - hep B neg, pending quantiferon test.       # Anemia related to ESRD  - (6/16/17) Fe 228 (H), TIBC 256, % sat 89 (H).  - likely AOCD secondary to ESRD.  - Hb 7.5 (6/16/17) -> 1 U PRBCs -> Hb 8.5.  - no active bleeding from any source noted.   - will check EPO level.   - EPO per nephro recs.   - CTM and transfuse if Hb < 7.       # Secondary hyperparathyroidism  - (6/16/17) .5 (H), likely secondary to ESRD.   - treat ESRD as mentioned above.       # Vitamin D deficiency   - (6/16/17) 25 OH vit D 10(L), likely secondary to renal disease.  - will start vit D 50,000 IU q weekly.       # Hypocalcemia   - (6/16/17):  Total corrected calcium 8.48, ionized calcium 1 (slightly low, nl is 1.1 - 1.3).   - likely secondary to vit D deficiency + chronic renal failure.   - slightly low ionized calcium, pt asymptomatic -  no need for oral calcium supplements at this point.   - start on vit D supplements as above.       # Hyperphosphatemia   - likely secondary to ESRD.  - s/p HD.  - PO4 6.2 -> 3.9.       # Hypertension  - 170's/110's the whole yesterday.  - changes made to anti-HTN this morning, BP currently in control.  - po verapamil 80 mg tid, po labetalol 100 mg tid.   - prn metoprolol for SBP > 190 and / or DBP > 110.       # Elevated troponin  - Pt asymptomatic - no chest pain.   - EKG w/o acute ST/T wave changes.  - elevated trop likely related to ESRD.  - trop trending down 0.12 -> 0.11.  - YAQUELIN score 1, HEART score 3 - low risk for MACE.   - CTM for now.       # High anion gap metabolic acidosis -- resolve  - likely due to acute on chronic CKD / ESRD.   - s/p HD.  - HCO3 14 -> 27.  - d/c oral sodium bicarbonate.       # Hx of Rt BKA  - related to remote accident / trauma.

## 2017-06-17 NOTE — PROGRESS NOTES
[facility] [place] [facility] Interval [place]     Name Isabelle Coyle     1965   Age/Sex 52 y.o. female   MRN [id number]   Code Status Full Code     After 5PM or if no immediate response to page, please call for cross-coverage  Attending/Team: Dr. Spain/Alejandro Call [phone] to page   1st Call - Day Intern (R1):   Dr. Dias 2nd Call - Day Sr. Resident (R2/R3):   Dr. Jones     Hospital Summary  53 yo female presented to the ED for HTN in the 190s systolic with PMHx significant for HTN, CKD not on dialysis, R BKA, and remote history of SVT. Renal function was markedly decreased and pt was started on dialysis. Pt had run of SVT sustained roughly 10 minutes on hospital day 2, resolved with 10 mg IV push diltiazem and concurrent valsalva.     Interval History  -SVT rapid response called this a.m.  -EKG at time showed SVT, BP remained stable 130s/80s during episode, and patient remained stable during entirety of rapid response  -SVT lasted roughly 10 minutes, resolved with 10 mg IV push diltiazem and concurrent valsava upon which pt returns to regular sinus rhythm.   -Switched amlodipine back to home medication of verapimil following SVT this a.m.  -repeating Mg2+ and phos today following dialysis yesterday  -Iron increased this a.m. 2/2 dialysis, along with improvement in H/H, decrease in uremia, and improvement of creatinine  -anion gap acidosis resolved, will reassess need for PO bicarb following dialysis this a.m.  -CV surgery will take patient tomorrow to create fistula for long term dialysis  -started labetalol 100 mg TID for BP control which will also likely improve with dialysis removing overload  -replete Mg2+ as needed and consider phosphate binder following reval post-dialysis      Active Hospital Problems    Diagnosis   • Vitamin D deficiency [E55.9]   • Secondary hyperparathyroidism of renal origin (CMS-[facility]) [N25.81]   • Hyperphosphatemia [E83.39]   • End-stage renal disease (ESRD) (CMS-[facility]) [N18.6]    • Supraventricular tachycardia [I47.1]   • HTN (hypertension) [I10]   • Anemia associated with chronic renal failure [D63.1]       Review of Systems  ROS  Constitutional: Positive for malaise/fatigue. Negative for fever, chills and diaphoresis.    HENT: Negative.  Negative for hearing loss.     Eyes: Negative. Positive for blurred vision this a.m. L eye much improved since yesterday    Respiratory: Positive for shortness of breath now improved. Negative for cough, hemoptysis, sputum production and wheezing.     Cardiovascular: Positive for orthopnea. Negative for chest pain and palpitations.    Gastrointestinal: Negative for heartburn, nausea, abdominal pain, diarrhea and constipation.    Genitourinary: Negative.  Negative for dysuria and urgency.    Musculoskeletal: Negative.     Skin: Negative.  Negative for itching and rash.    Neurological: Positive for weakness. Negative for dizziness, tingling, focal weakness, seizures and headaches.    Psychiatric/Behavioral: Negative.        Quality Measures  Core Measures  EKG reviewed, Labs reviewed, Medications reviewed and Radiology images reviewed  Murdock catheter: No Murdock  DVT Prophylaxis: Contraindicated - Anemia requiring blood transfusion  DVT prophylaxis - mechanical: SCDs  Physical Exam  Filed Vitals:    06/17/17 0729 06/17/17 0744 06/17/17 1020 06/17/17 1139   BP: 137/99 158/94 134/83 117/69   Pulse: 178 89 87 75   Temp: 36.7 °C (98 °F)   37.2 °C (99 °F)   Resp: 18   18   Height:       Weight:       SpO2: 98%   98%     Body mass index is 26.57 kg/(m^2).  Oxygen Therapy:  Pulse Oximetry: 98 %, O2 (LPM): 0, O2 Delivery: None (Room Air)  Physical Exam  Constitutional: No acute distress. AandO x4  HENT:    Head: Normocephalic and atraumatic.    Eyes: Pupils are equal, round, and reactive to light. Right eye exhibits no discharge. Left eye exhibits no discharge.    Neck:  Neck supple. No JVD.    Cardiovascular: Normal rate and regular rhythm.     Pulmonary/Chest:  Effort normal. No respiratory distress. She has no wheezes.    Abdominal: Soft. Bowel sounds are normal. She exhibits no distension. There is no tenderness.   Musculoskeletal:  R BKA  Trace Edema   Neurological: She is alert and oriented to person, place, and time.    Skin: She is not diaphoretic.   Lab Data Review  Recent Results (from the past 24 hour(s))   HEP B SURFACE ANTIGEN    Collection Time: 06/16/17  2:08 PM   Result Value Ref Range    Hepatitis B Surface Antigen Negative Negative   HEP B SURFACE AB    Collection Time: 06/16/17  2:08 PM   Result Value Ref Range    Hep B Surface Antibody Quant 8.49 0.00 - 10.00 mIU/mL   HIV ANTIBODIES    Collection Time: 06/16/17  2:08 PM   Result Value Ref Range    HIV Ag/Ab Combo Assay Non Reactive Non Reactive   IRON/TOTAL IRON BIND    Collection Time: 06/16/17  2:08 PM   Result Value Ref Range    Iron 228 (H) 40 - 170 ug/dL    Total Iron Binding 256 250 - 450 ug/dL    % Saturation 89 (H) 15 - 55 %   PTH WITH IONIZED CALCIUM    Collection Time: 06/16/17  2:08 PM   Result Value Ref Range    Ionized Calcium 1.0 (L) 1.1 - 1.3 mmol/L    Pth, Intact 364.5 (H) 14.0 - 72.0 pg/mL   VITAMIN D,25 HYDROXY    Collection Time: 06/16/17  2:08 PM   Result Value Ref Range    25-Hydroxy   Vitamin D 25 10 (L) 30 - 100 ng/mL   CBC WITHOUT DIFFERENTIAL    Collection Time: 06/17/17  3:06 AM   Result Value Ref Range    WBC 5.6 4.8 - 10.8 K/uL    RBC 2.99 (L) 4.20 - 5.40 M/uL    Hemoglobin 8.5 (L) 12.0 - 16.0 g/dL    Hematocrit 25.5 (L) 37.0 - 47.0 %    MCV 85.3 81.4 - 97.8 fL    MCH 28.4 27.0 - 33.0 pg    MCHC 33.3 (L) 33.6 - 35.0 g/dL    RDW 39.7 35.9 - 50.0 fL    Platelet Count 225 164 - 446 K/uL    MPV 9.5 9.0 - 12.9 fL   BASIC METABOLIC PANEL    Collection Time: 06/17/17  3:06 AM   Result Value Ref Range    Sodium 140 135 - 145 mmol/L    Potassium 4.1 3.6 - 5.5 mmol/L    Chloride 102 96 - 112 mmol/L    Co2 27 20 - 33 mmol/L    Glucose 146 (H) 65 - 99 mg/dL    Bun 41 (H) 8 - 22 mg/dL     Creatinine 7.64 (HH) 0.50 - 1.40 mg/dL    Calcium 8.4 (L) 8.5 - 10.5 mg/dL    Anion Gap 11.0 0.0 - 11.9   ESTIMATED GFR    Collection Time: 17  3:06 AM   Result Value Ref Range    GFR If  7 (A) >60 mL/min/1.73 m 2    GFR If Non African American 6 (A) >60 mL/min/1.73 m 2   MAGNESIUM    Collection Time: 17  3:06 AM   Result Value Ref Range    Magnesium 1.8 1.5 - 2.5 mg/dL   PHOSPHORUS    Collection Time: 17  3:06 AM   Result Value Ref Range    Phosphorus 3.9 2.5 - 4.5 mg/dL   EKG    Collection Time: 17  7:32 AM   Result Value Ref Range    Report       [facility]    Test Date:  2017  Pt Name:    MELISSA BARDALES                 Department: 183  MRN:        [id number]                      Room:       36  Gender:     F                            Technician: [facility]  :        1965                   Requested By:FLAKITA HOLDEN  Order #:    [id number]                    Reading MD: Pilo Lynn MD    Measurements  Intervals                                Axis  Rate:       165                          P:          0  UT:                                      QRS:        16  QRSD:       82                           T:          152  QT:         312  QTc:        517    Interpretive Statements  SUPRAVENTRICULAR TACHYCARDIA  ST SEGMENT DEPRESSION, LATERAL LEADS, CONSIDER ISCHEMIA  Electronically Signed On 2017 11:24:22 PDT by Pilo Lynn MD     EKG    Collection Time: 17  7:48 AM   Result Value Ref Range    Report       [facility]    Test Date:  2017  Pt Name:    MELISSA BALDERRAMATY                 Department: 183  MRN:        [id number]                      Room:       36  Gender:     F                            Technician: ALBERT  :        1965                   Requested By:FLAKITA HOLDEN  Order #:    [id number]                    Reading MD: Pilo Lynn MD    Measurements  Intervals                                Axis  Rate:        89                           P:          150  MO:         160                          QRS:        73  QRSD:       82                           T:          126  QT:         420  QTc:        512    Interpretive Statements  SINUS OR ECTOPIC ATRIAL RHYTHM  LOW VOLTAGE IN FRONTAL LEADS  NONSPECIFIC ST-T WAVE ABNORMALITIES, LATERAL LEADS  PROLONGED QT INTERVAL    Electronically Signed On 6- 11:25:28 PDT by Pilo Lynn MD         Assessment/Plan  #  SVT  -run stable SVT lasted roughly 10 minutes, converted as above diltiazem and valsalva  -known hx with breakthroughs at home every few months for which patient uses valsalva to convert  - restart verapamil home medication 240 mg/day  - will change to labetalol for BP control which should improve with dialysis and fluid offload  - monitor on Tele for any arrhyhtmia.  - will order Echo for further evaluation (pending)    # Acute on chronic Kidney Disease  # ESRD, now requiring HD  - pt with history of CKD, previous baseline Cr 4, was seen by Nephrology in the past, was anticipated to start HD/have fistula placed, but was lost to follow up. Now admitted with Cr 16. S/Sx of Volume overload, Uremia  - Nephrology and Vascular surgery consulted  - IR guided HD cath placed 6/16  - HD per nephrology recs  - Vascular surgery consulted for fistula placement will take back tomorrow per note  - U/S renal: small b/k atrophic kidneys consistent with medical renal disease  - Renally dose medication. PT REPORTS SULFA DRUG ALLERGRY  - f/u consult recs    # Uncontrolled Hypertension  - consistently 190s systolic, will likely improve with fluid removal in HD  - d/c hydralazine, and restart verapamil and labetalol      # Anemia related to ESRD  - no s/sx of gross GI bleeding, query uremic gastropathy.   -improved post dialysis and 1 U PRBCs yesterday  - empiric po pepcid.  - monitor H/H, transfuse prn.    - EPO per nephro recs.    - Iron studies WNL, anemia of CKD due to low EPO  production, nephro started EPOgen    # Rt BKA  - related to remote trauma    # Elevated troponin  -  0.12-> 0.11  - EKG w/o acute ST/T wave changes  - Likely related to ESRD    Consultants/Specialty  Nephrology  Vascular    Disposition  Inpatient

## 2017-06-17 NOTE — PROGRESS NOTES
Hemodialysis ordered by Dr. Mcelroy. Treatment started at 1602 and ended at 1902. Pt stable, no c/o post tx, hypertensive t/o tx. RN notified and aware. Medication given during treatment, still in 170's. Report to DERREK Powell RN. Net  ml.

## 2017-06-18 LAB
ANION GAP SERPL CALC-SCNC: 8 MMOL/L (ref 0–11.9)
BASOPHILS # BLD AUTO: 0.6 % (ref 0–1.8)
BASOPHILS # BLD: 0.03 K/UL (ref 0–0.12)
BUN SERPL-MCNC: 26 MG/DL (ref 8–22)
CALCIUM SERPL-MCNC: 8 MG/DL (ref 8.5–10.5)
CHLORIDE SERPL-SCNC: 103 MMOL/L (ref 96–112)
CO2 SERPL-SCNC: 25 MMOL/L (ref 20–33)
CREAT SERPL-MCNC: 5.55 MG/DL (ref 0.5–1.4)
EKG IMPRESSION: NORMAL
EOSINOPHIL # BLD AUTO: 0.07 K/UL (ref 0–0.51)
EOSINOPHIL NFR BLD: 1.3 % (ref 0–6.9)
ERYTHROCYTE [DISTWIDTH] IN BLOOD BY AUTOMATED COUNT: 43.5 FL (ref 35.9–50)
GFR SERPL CREATININE-BSD FRML MDRD: 8 ML/MIN/1.73 M 2
GLUCOSE SERPL-MCNC: 105 MG/DL (ref 65–99)
HCT VFR BLD AUTO: 21.4 % (ref 37–47)
HCT VFR BLD AUTO: 26.5 % (ref 37–47)
HGB BLD-MCNC: 6.9 G/DL (ref 12–16)
HGB BLD-MCNC: 8.7 G/DL (ref 12–16)
IMM GRANULOCYTES # BLD AUTO: 0.03 K/UL (ref 0–0.11)
IMM GRANULOCYTES NFR BLD AUTO: 0.6 % (ref 0–0.9)
LYMPHOCYTES # BLD AUTO: 1.17 K/UL (ref 1–4.8)
LYMPHOCYTES NFR BLD: 21.8 % (ref 22–41)
M TB TUBERC IFN-G BLD QL: NEGATIVE
M TB TUBERC IFN-G/MITOGEN IGNF BLD: 0
M TB TUBERC IGNF/MITOGEN IGNF CONTROL: 47.95 [IU]/ML
MCH RBC QN AUTO: 28.6 PG (ref 27–33)
MCHC RBC AUTO-ENTMCNC: 32.2 G/DL (ref 33.6–35)
MCV RBC AUTO: 88.8 FL (ref 81.4–97.8)
MITOGEN IGNF BCKGRD COR BLD-ACNC: 0.02 [IU]/ML
MONOCYTES # BLD AUTO: 0.93 K/UL (ref 0–0.85)
MONOCYTES NFR BLD AUTO: 17.4 % (ref 0–13.4)
NEUTROPHILS # BLD AUTO: 3.13 K/UL (ref 2–7.15)
NEUTROPHILS NFR BLD: 58.3 % (ref 44–72)
NRBC # BLD AUTO: 0 K/UL
NRBC BLD AUTO-RTO: 0 /100 WBC
PLATELET # BLD AUTO: 180 K/UL (ref 164–446)
PMV BLD AUTO: 9.9 FL (ref 9–12.9)
POTASSIUM SERPL-SCNC: 4.2 MMOL/L (ref 3.6–5.5)
RBC # BLD AUTO: 2.41 M/UL (ref 4.2–5.4)
SODIUM SERPL-SCNC: 136 MMOL/L (ref 135–145)
WBC # BLD AUTO: 5.4 K/UL (ref 4.8–10.8)

## 2017-06-18 PROCEDURE — A9270 NON-COVERED ITEM OR SERVICE: HCPCS | Performed by: STUDENT IN AN ORGANIZED HEALTH CARE EDUCATION/TRAINING PROGRAM

## 2017-06-18 PROCEDURE — 700102 HCHG RX REV CODE 250 W/ 637 OVERRIDE(OP): Performed by: HOSPITALIST

## 2017-06-18 PROCEDURE — P9016 RBC LEUKOCYTES REDUCED: HCPCS

## 2017-06-18 PROCEDURE — 99233 SBSQ HOSP IP/OBS HIGH 50: CPT | Mod: GC | Performed by: HOSPITALIST

## 2017-06-18 PROCEDURE — 770020 HCHG ROOM/CARE - TELE (206)

## 2017-06-18 PROCEDURE — 93010 ELECTROCARDIOGRAM REPORT: CPT | Performed by: INTERNAL MEDICINE

## 2017-06-18 PROCEDURE — 700102 HCHG RX REV CODE 250 W/ 637 OVERRIDE(OP): Performed by: STUDENT IN AN ORGANIZED HEALTH CARE EDUCATION/TRAINING PROGRAM

## 2017-06-18 PROCEDURE — 93005 ELECTROCARDIOGRAM TRACING: CPT | Performed by: STUDENT IN AN ORGANIZED HEALTH CARE EDUCATION/TRAINING PROGRAM

## 2017-06-18 PROCEDURE — A9270 NON-COVERED ITEM OR SERVICE: HCPCS | Performed by: HOSPITALIST

## 2017-06-18 PROCEDURE — 80048 BASIC METABOLIC PNL TOTAL CA: CPT

## 2017-06-18 PROCEDURE — 700105 HCHG RX REV CODE 258

## 2017-06-18 PROCEDURE — 85018 HEMOGLOBIN: CPT

## 2017-06-18 PROCEDURE — 36430 TRANSFUSION BLD/BLD COMPNT: CPT

## 2017-06-18 PROCEDURE — 85014 HEMATOCRIT: CPT

## 2017-06-18 PROCEDURE — 36415 COLL VENOUS BLD VENIPUNCTURE: CPT

## 2017-06-18 PROCEDURE — 82270 OCCULT BLOOD FECES: CPT

## 2017-06-18 PROCEDURE — 82668 ASSAY OF ERYTHROPOIETIN: CPT

## 2017-06-18 PROCEDURE — 85025 COMPLETE CBC W/AUTO DIFF WBC: CPT

## 2017-06-18 RX ORDER — SODIUM CHLORIDE 9 MG/ML
INJECTION, SOLUTION INTRAVENOUS
Status: COMPLETED
Start: 2017-06-18 | End: 2017-06-18

## 2017-06-18 RX ORDER — ZOLPIDEM TARTRATE 5 MG/1
5 TABLET ORAL ONCE
Status: COMPLETED | OUTPATIENT
Start: 2017-06-18 | End: 2017-06-18

## 2017-06-18 RX ORDER — ZOLPIDEM TARTRATE 5 MG/1
5 TABLET ORAL
Status: COMPLETED | OUTPATIENT
Start: 2017-06-18 | End: 2017-06-18

## 2017-06-18 RX ADMIN — LABETALOL HYDROCHLORIDE 100 MG: 100 TABLET, FILM COATED ORAL at 15:08

## 2017-06-18 RX ADMIN — VERAPAMIL HYDROCHLORIDE 80 MG: 80 TABLET, FILM COATED ORAL at 15:12

## 2017-06-18 RX ADMIN — ZOLPIDEM TARTRATE 5 MG: 5 TABLET, FILM COATED ORAL at 23:13

## 2017-06-18 RX ADMIN — VERAPAMIL HYDROCHLORIDE 80 MG: 80 TABLET, FILM COATED ORAL at 20:33

## 2017-06-18 RX ADMIN — SODIUM CHLORIDE: 9 INJECTION, SOLUTION INTRAVENOUS at 05:14

## 2017-06-18 RX ADMIN — SENNOSIDES AND DOCUSATE SODIUM 2 TABLET: 8.6; 5 TABLET ORAL at 20:33

## 2017-06-18 RX ADMIN — ZOLPIDEM TARTRATE 5 MG: 5 TABLET, FILM COATED ORAL at 00:23

## 2017-06-18 RX ADMIN — FAMOTIDINE 20 MG: 20 TABLET, FILM COATED ORAL at 08:27

## 2017-06-18 RX ADMIN — LABETALOL HYDROCHLORIDE 100 MG: 100 TABLET, FILM COATED ORAL at 05:33

## 2017-06-18 RX ADMIN — VERAPAMIL HYDROCHLORIDE 80 MG: 80 TABLET, FILM COATED ORAL at 05:33

## 2017-06-18 RX ADMIN — LABETALOL HYDROCHLORIDE 100 MG: 100 TABLET, FILM COATED ORAL at 20:33

## 2017-06-18 RX ADMIN — FAMOTIDINE 20 MG: 20 TABLET, FILM COATED ORAL at 20:33

## 2017-06-18 ASSESSMENT — ENCOUNTER SYMPTOMS
NAUSEA: 0
EYES NEGATIVE: 1
BRUISES/BLEEDS EASILY: 0
TINGLING: 0
COUGH: 0
LOSS OF CONSCIOUSNESS: 0
VOMITING: 0
SEIZURES: 0
WEAKNESS: 1
CHILLS: 0
FOCAL WEAKNESS: 0
RESPIRATORY NEGATIVE: 1
CONSTIPATION: 0
DIAPHORESIS: 0
HEADACHES: 0
NAUSEA: 1
DIZZINESS: 0
PSYCHIATRIC NEGATIVE: 1
CARDIOVASCULAR NEGATIVE: 1
HEARTBURN: 0
ABDOMINAL PAIN: 0
SHORTNESS OF BREATH: 0
PALPITATIONS: 0
BLURRED VISION: 0
DIARRHEA: 0
FEVER: 0
MUSCULOSKELETAL NEGATIVE: 1

## 2017-06-18 ASSESSMENT — PAIN SCALES - GENERAL
PAINLEVEL_OUTOF10: 0

## 2017-06-18 NOTE — PROGRESS NOTES
[facility] [place] [facility] Interval Note    Name Isabelle Coyle     1965   Age/Sex 52 y.o. female   MRN [id number]   Code Status FULL     After 5PM or if no immediate response to page, please call for cross-coverage  Attending/Team: Dr Spain Call [phone] to page   1st Call - Day Intern (R1):   Dr Dias 2nd Call - Day Sr. Resident (R2/R3):   Dr Jones       Chief complaint/ reason for interval visit (Primary Diagnosis)   52 year old female with pmhx of HTN, CKD, R BKA, SVT admitted for worsening Renal failure, needing HD.     Interval Problem Daily Status Update    - VSS, no acute event overnight.   - Hb this am 6.9 -> 1 U PRBCs given, stool occult blood ordered, no apparent bleeding noted clinically.   - AVF placement by vascular surgery today.         Active Problems:    HTN (hypertension) POA: Yes    Anemia associated with chronic renal failure POA: Yes    Supraventricular tachycardia POA: Yes    Hx of right BKA (CMS-H[facility]) POA: Yes    End-stage renal disease (ESRD) (CMS-HCC)    Vitamin D deficiency POA: Unknown    Secondary hyperparathyroidism of renal origin (CMS-HCC) POA: Unknown    Hyperphosphatemia POA: Unknown    Elevated troponin POA: Unknown    Hypocalcemia POA: Unknown  Resolved Problems:    High anion gap metabolic acidosis POA: Unknown      Review of Systems   Constitutional: Positive for malaise/fatigue. Negative for fever, chills and diaphoresis.   HENT: Negative for hearing loss.    Eyes: Negative.  Negative for blurred vision.   Respiratory: Negative for cough and shortness of breath.    Cardiovascular: Negative for chest pain and palpitations.   Gastrointestinal: Negative for heartburn, nausea, abdominal pain, diarrhea and constipation.   Genitourinary: Negative.  Negative for dysuria and urgency.   Musculoskeletal: Negative.    Skin: Negative for itching and rash.   Neurological: Negative for dizziness, tingling, focal weakness, seizures and headaches.    Endo/Heme/Allergies: Does not bruise/bleed easily.   Psychiatric/Behavioral: Negative.        Consultants/Specialty  Nephrology  Vascular    Disposition  Inpatient    Quality Measures  EKG reviewed, Labs reviewed and Medications reviewed  Murdock catheter: No Murdock      DVT Prophylaxis: Contraindicated - Anemia requiring blood transfusion  DVT prophylaxis - mechanical: SCDs      Assessed for rehab: Patient returned to prior level of function, rehabilitation not indicated at this time          Physical Exam       Filed Vitals:    06/18/17 0525 06/18/17 0542 06/18/17 0700 06/18/17 0839   BP: 154/92 147/95 147/95 161/89   Pulse: 83 87 83 70   Temp: 36.6 °C (97.8 °F) 36.2 °C (97.1 °F) 36.2 °C (97.1 °F) 36.8 °C (98.2 °F)   Resp: 16 16 16 16   Height:       Weight:       SpO2: 95% 93% 94% 95%     Body mass index is 26.53 kg/(m^2). Weight: 65.8 kg (145 lb 1 oz)  Oxygen Therapy:  Pulse Oximetry: 95 %, O2 (LPM): 0, O2 Delivery: None (Room Air)    Physical Exam   Constitutional: She is oriented to person, place, and time and well-developed, well-nourished, and in no distress. No distress.   HENT:   Head: Normocephalic and atraumatic.   Eyes: Pupils are equal, round, and reactive to light. Right eye exhibits no discharge. Left eye exhibits no discharge.   Neck: Neck supple.   Cardiovascular: Regular rhythm.  Exam reveals no gallop and no friction rub.    No murmur heard.  Tachy 's.    Pulmonary/Chest: Effort normal and breath sounds normal. No respiratory distress. She has no wheezes.   Abdominal: Soft. Bowel sounds are normal. She exhibits no distension. There is no tenderness.   Musculoskeletal: She exhibits no edema.   R BKA   Neurological: She is alert and oriented to person, place, and time.   Skin: She is not diaphoretic.   Nursing note and vitals reviewed.        Lab Data Review:      Recent Labs      06/16/17   0437  06/17/17   0306  06/18/17   0143   SODIUM  143  140  136   POTASSIUM  3.9  4.1  4.2   CHLORIDE   108  102  103   CO2  14*  27  25   BUN  128*  41*  26*   CREATININE  17.60*  7.64*  5.55*   MAGNESIUM  2.0  1.8   --    PHOSPHORUS  6.2*  3.9   --    CALCIUM  8.0*  8.4*  8.0*       Recent Labs      06/15/17   2001  06/16/17   0437  06/17/17   0306  06/18/17   0143   ALTSGPT  6  6   --    --    ASTSGOT  8*  8*   --    --    ALKPHOSPHAT  58  49   --    --    TBILIRUBIN  0.4  0.3   --    --    GLUCOSE  91  114*  146*  105*       Recent Labs      06/15/17   2001  06/16/17   0437  06/16/17   0759  06/16/17   0942  06/16/17   1408  06/17/17 0306 06/18/17 0143 06/18/17   0951   RBC  2.45*   --   2.58*   --    --   2.99*  2.41*   --    HEMOGLOBIN  6.9*   --   7.5*   --    --   8.5*  6.9*  8.7*   HEMATOCRIT  21.4*   --   22.8*   --    --   25.5*  21.4*  26.5*   PLATELETCT  229   --   204   --    --   225  180   --    PROTHROMBTM   --    --    --   14.3   --    --    --    --    APTT   --    --    --   28.0   --    --    --    --    INR   --    --    --   1.08   --    --    --    --    IRON   --   49   --    --   228*   --    --    --    FERRITIN  57.8   --    --    --    --    --    --    --    TOTIRONBC   --   262   --    --   256   --    --    --        Recent Labs      06/15/17   2001  06/16/17   0437  06/16/17   0759  06/17/17   0306  06/18/17   0143   WBC  6.5   --   7.0  5.6  5.4   NEUTSPOLYS  70.20   --   67.30   --   58.30   LYMPHOCYTES  21.10*   --   20.00*   --   21.80*   MONOCYTES  5.60   --   9.20   --   17.40*   EOSINOPHILS  2.30   --   2.70   --   1.30   BASOPHILS  0.50   --   0.40   --   0.60   ASTSGOT  8*  8*   --    --    --    ALTSGPT  6  6   --    --    --    ALKPHOSPHAT  58  49   --    --    --    TBILIRUBIN  0.4  0.3   --    --    --            Assessment/Plan       # Supraventricular tachycardia -- resolve   # History of SVT   - 6/17/17 : an episode of SVT with 's this morning, IV dilt 10 mg was given once, HR back to sinus rhythm.   - echo (6/17/17): EF 55%, mildly dilated LA, mild aortic  insufficiency, mild MR, mitral annular calcification, mild TR, RVSP 35 mmHg.   - continue po verapamil 80 mg tid.   - keep Mg > 2 and K > 4.       # Newly diagnosed ESRD   - unclear etiology, likely chronic HTN + chronic NSAID intake.   - USG renal: small b/l atrophic kidney consistent with medical renal disease.   - nephro on board.  - AVF placement by vascular surgery today.   - continue HD as per nephro.  - avoid nephrotoxins.   - will need op dialysis set up - hep B neg, pending quantiferon test.       # Anemia related to ESRD  - (6/16/17) Fe 228 (H), TIBC 256, % sat 89 (H).  - likely AOCD secondary to ESRD.  - Hb 6.9 (6/15/17) -> 1 U PRBCs -> Hb 7.5 -> 8.5  -> 6.9 (6/18/17) -> 1 U PRBC given -> 8.7.   - no active bleeding from any source noted.   - will check stool occult blood.   - pending EPO level.   - continue EPO per nephro recs and prn transfuse if Hb<7.       # Secondary hyperparathyroidism  - (6/16/17) .5 (H), likely secondary to ESRD.   - treat ESRD as mentioned above.       # Vitamin D deficiency   - (6/16/17) 25 OH vit D 10(L), likely secondary to renal disease.  - will start vit D 50,000 IU q weekly.       # Hypocalcemia   - (6/16/17):  Total corrected calcium 8.48, ionized calcium 1 (slightly low, nl is 1.1 - 1.3).   - likely secondary to vit D deficiency + chronic renal failure.   - slightly low ionized calcium, pt asymptomatic -  no need for oral calcium supplements at this point.   - start on vit D supplements as above.       # Hyperphosphatemia -- resolve   - likely secondary to ESRD.  - s/p HD.  - PO4 6.2 -> 3.9.       # Hypertension  - BP currently controlled.   - po verapamil 80 mg tid, po labetalol 100 mg tid.   - prn metoprolol for SBP > 190 and / or DBP > 110.       # Elevated troponin  - Pt asymptomatic - no chest pain.   - EKG w/o acute ST/T wave changes.  - elevated trop likely related to ESRD.  - trop trending down 0.12 -> 0.11.  - YAQUELIN score 1, HEART score 3 - low risk for  MACE.   - CTM for now.       # High anion gap metabolic acidosis -- resolve  - likely due to acute on chronic CKD / ESRD.   - s/p HD.  - d/c oral sodium bicarbonate.       # Hx of Rt BKA  - related to remote accident / trauma.

## 2017-06-18 NOTE — PROGRESS NOTES
Report received. Pt care assumed. Assessment performed. Pt AOx4. Pt laying supine in bed. Pt denies pain and no signs of distress. Bed in low, locked position. Pt up self, steady. Call light within reach. Treaded socks on pt.  Hourly rounding in place.

## 2017-06-18 NOTE — PROGRESS NOTES
Spoke with Elizabeth in pre-op. Per Dr. Davenport, her AV fistula placement is cancelled for today due to her current Hgb.

## 2017-06-18 NOTE — CARE PLAN
Problem: Safety  Goal: Will remain free from injury  Outcome: PROGRESSING AS EXPECTED  Ambulating independently. Call-light within reach. Instructed to call for assistance PRN.    Problem: Urinary Elimination:  Goal: Ability to achieve and maintain adequate renal perfusion and functioning will improve  Outcome: PROGRESSING AS EXPECTED  Patient voiding without difficulty

## 2017-06-18 NOTE — PROGRESS NOTES
Received report from night RN. Assumed care of patient. Patient is SR on the monitor. Patient declines any needs at this time. Bed locked and in the lowest position with call light within reach.

## 2017-06-18 NOTE — PROGRESS NOTES
[facility] Nephrology Progress Note               Author: Fanny Rodriguez M.D. Date & Time created: 6/18/2017  12:04 PM     Interval History:  The patient is a 52-year-old  woman with a known history of CKD.  Note, etiology of said CKD is unknown.  She has had chronic elevation of her creatinine for years.  She has been followed in our office by Dr. Martinez and Dr. Junior.  She does not recall the cause of her kidney failure, but reviewing her laboratory shows a creatinine greater than 2 for more than 10 years.  She has not been under the care of a physician though for approximately 2 years.  She presented to the emergency room complaining of nausea, vomiting, malaise, fatigue.  Laboratories done at that time showed a creatinine of 15, BUN of 125, potassium of 4, hemoglobin of 7.  Given these findings, she was admitted to the telemetry cook for initiation of hemodialysis.     As noted, the patient does not know or recall why she has been told she has kidney failure.  She has had long history of hypertension and as I noted above she has had a creatinine greater than 2 for more than 10 years.  She does admit to taking ibuprofen daily for her arthritis despite being told in the past not to.  She is also supposed to take lisinopril for her hypertension, but she has not taken it for more than a year.  She has had no recent contrast problems.  No history of renal stones.  There is no family history of renal disease.    DAILY NEPHROLOGY SUMMARY  6/17/17--Rapid response called overnight for SVT and this resolved with IV diltiazem push and PO verapamil resumed, tolerated HD yesterday without issues, BP stable this am, tolerated PO last night, seen on dialysis today and HR stable  6/18/17--No events, tolerated HD yesterday with 1L UF, Hgb dropped from 8.5 yesterday to 6.9 today, no obvious source of blood loss, AVF surgery cancelled, BP elevated this am but stable overnight, feels ok    Review of  Systems:  Review of Systems   Constitutional: Positive for malaise/fatigue. Negative for fever and chills.   HENT: Negative.    Eyes: Negative.    Respiratory: Negative.    Cardiovascular: Negative.    Gastrointestinal: Positive for nausea. Negative for vomiting, abdominal pain, diarrhea and constipation.   Genitourinary: Negative.    Musculoskeletal: Negative.    Skin: Negative.    Neurological: Positive for weakness. Negative for dizziness, focal weakness and loss of consciousness.   Endo/Heme/Allergies: Negative.    Psychiatric/Behavioral: Negative.        Physical Exam:  Physical Exam   Constitutional: She is oriented to person, place, and time. She appears well-developed and well-nourished. No distress.   HENT:   Head: Normocephalic and atraumatic.   Mouth/Throat: No oropharyngeal exudate.   Eyes: Conjunctivae and EOM are normal. Pupils are equal, round, and reactive to light. No scleral icterus.   Neck: Normal range of motion. Neck supple. No thyromegaly present.   Cardiovascular: Normal rate and regular rhythm.    No murmur heard.  Pulmonary/Chest: Effort normal and breath sounds normal. No respiratory distress. She has no wheezes.   +R Chest PC   Abdominal: Soft. Bowel sounds are normal. She exhibits no distension. There is no tenderness.   Musculoskeletal: Normal range of motion. She exhibits no edema or tenderness.   +R BKA   Neurological: She is alert and oriented to person, place, and time. She exhibits normal muscle tone.   Skin: Skin is warm and dry. No erythema.   Psychiatric: She has a normal mood and affect. Her behavior is normal.   Nursing note and vitals reviewed.      Labs:  Recent Labs      06/16/17   0759   [id number]  7.40   [id number]  23.1*   [id number]  132.0*   MOFH3LMF  97.8   ARTHCO3  14*   ARTBE  -10*     Recent Labs      06/15/17   2001  06/16/17   0437   TROPONINI  0.12*  0.11*   BNPBTYPENAT  1721*   --      Recent Labs      06/16/17   0437  06/17/17   0306  06/18/17   0143   SODIUM   143  140  136   POTASSIUM  3.9  4.1  4.2   CHLORIDE  108  102  103   CO2  14*  27  25   BUN  128*  41*  26*   CREATININE  17.60*  7.64*  5.55*   MAGNESIUM  2.0  1.8   --    PHOSPHORUS  6.2*  3.9   --    CALCIUM  8.0*  8.4*  8.0*     Recent Labs      06/15/17   2001  06/16/17   0437  06/17/17   0306  06/18/17   0143   ALTSGPT  6  6   --    --    ASTSGOT  8*  8*   --    --    ALKPHOSPHAT  58  49   --    --    TBILIRUBIN  0.4  0.3   --    --    GLUCOSE  91  114*  146*  105*     Recent Labs      06/15/17   2001  06/16/17   0437  06/16/17   0759  06/16/17   0942  06/16/17   1408  06/17/17   0306  06/18/17   0143  06/18/17   0951   RBC  2.45*   --   2.58*   --    --   2.99*  2.41*   --    HEMOGLOBIN  6.9*   --   7.5*   --    --   8.5*  6.9*  8.7*   HEMATOCRIT  21.4*   --   22.8*   --    --   25.5*  21.4*  26.5*   PLATELETCT  229   --   204   --    --   225  180   --    PROTHROMBTM   --    --    --   14.3   --    --    --    --    APTT   --    --    --   28.0   --    --    --    --    INR   --    --    --   1.08   --    --    --    --    IRON   --   49   --    --   228*   --    --    --    FERRITIN  57.8   --    --    --    --    --    --    --    TOTIRONBC   --   262   --    --   256   --    --    --      Recent Labs      06/15/17   2001  06/16/17   0437  06/16/17   0759  06/17/17   0306  06/18/17   0143   WBC  6.5   --   7.0  5.6  5.4   NEUTSPOLYS  70.20   --   67.30   --   58.30   LYMPHOCYTES  21.10*   --   20.00*   --   21.80*   MONOCYTES  5.60   --   9.20   --   17.40*   EOSINOPHILS  2.30   --   2.70   --   1.30   BASOPHILS  0.50   --   0.40   --   0.60   ASTSGOT  8*  8*   --    --    --    ALTSGPT  6  6   --    --    --    ALKPHOSPHAT  58  49   --    --    --    TBILIRUBIN  0.4  0.3   --    --    --            Hemodynamics:  Temp (24hrs), Av.4 °C (97.5 °F), Min:36.2 °C (97.1 °F), Max:36.9 °C (98.5 °F)  Temperature: 36.8 °C (98.2 °F)  Pulse  Av.1  Min: 70  Max: 178   Blood Pressure: (!) 161/89 mmHg      Respiratory:    Respiration: 16, Pulse Oximetry: 95 %        RUL Breath Sounds: Clear, RML Breath Sounds: Clear, RLL Breath Sounds: Diminished, PHILIP Breath Sounds: Clear, LLL Breath Sounds: Diminished  Fluids:    Intake/Output Summary (Last 24 hours) at 06/18/17 1204  Last data filed at 06/17/17 1800   Gross per 24 hour   Intake   1100 ml   Output   1500 ml   Net   -400 ml     Weight: 65.8 kg (145 lb 1 oz)  GI/Nutrition:  Orders Placed This Encounter   Procedures   • DIET ORDER     Standing Status: Standing      Number of Occurrences: 1      Standing Expiration Date:      Order Specific Question:  Diet:     Answer:  Renal [8]     Medical Decision Making, by Problem:  Active Hospital Problems    Diagnosis   • Vitamin D deficiency [E55.9]   • Secondary hyperparathyroidism of renal origin (CMS-HCC) [N25.81]   • Hyperphosphatemia [E83.39]   • End-stage renal disease (ESRD) (CMS-[facility]) [N18.6]   • Supraventricular tachycardia [I47.1]   • HTN (hypertension) [I10]   • Anemia associated with chronic renal failure [D63.1]     Assessment/Plan:  1.  CKD Stage V--new ESRD, renal ultrasound confirms small essentially nonfunctional kidneys, she will need permanent dialysis  --Permcath placed in IR 6/16/17 and dialysis initiated  --No HD today, will plan for HD tomorrow  --AVF creation surgery cancelled today due to low hgb, follow up with vascular surgery  --Outpatient dialysis arrangements pending  --Dose adjust all meds for decreased GFR  2. HTN--BP high this am but stable overnight  --Meds changed back to verapamil for control of SVT  --If BP remains high recommend starting either amlodipine or ACE-I/ARB  3. Anemia--secondary to CKD, acute drop in last 24hrs and no obvious source of bleeding  --Epogen with HD  --Transfused 1 unit PRBC's today  4. Renal Osteodystrophy--phos improved  --Monitor  5. Secondary Hyperparathyroidism--goal iPTH 150-300  --Monitor for now  6. Vitamin D Deficiency--continue ergocalciferol  7. Metabolic  Acidosis--resolved with HD  8. H/O SVT--on verapamil at home  --Monitor  9. Mild Protein-Calorie Malnutrition  --No dietary protein restrictions      Medications reviewed, Labs reviewed and Radiology images reviewed

## 2017-06-18 NOTE — PROGRESS NOTES
Report received at bedside and assumed care at this time. Patient AxOx4. Ambulated in hallway with spouse independently. Right BKA prothesis utilized. Denies pain/SOB. Right wrist IV infiltrated on initial assessment. New IV inserted Right FA, dressing CDI, +patent/blood return. IV magnesium reinitiated. Heart monitor intact, Sinus Rhythm. Right IJ permacath dressing CDI. Verified call-light within reach and wheels locked. Instructed patient to call for assistance PRN. Hourly rounding performed and will continue to monitor.

## 2017-06-18 NOTE — PROGRESS NOTES
Cross Coverage:  ----------------------------  Pt is for Fistula surgery this AM  However her Hb is  Dropping 6.9  Pt denies any chest pain, shortness of breath, Vitals stable    Filed Vitals:    06/17/17 1447 06/17/17 2039 06/17/17 2340 06/18/17 0429   BP: 117/81 137/86 125/80 135/85   Pulse: 86 87 82 86   Temp: 36.2 °C (97.1 °F) 36.2 °C (97.2 °F) 36.2 °C (97.2 °F) 36.3 °C (97.4 °F)   Resp: 18 18 16 18   Height:       Weight:  65.8 kg (145 lb 1 oz)     SpO2: 95% 97% 96% 93%     Adv: 1 Unit PRBC transfusion   H/H to monitor

## 2017-06-18 NOTE — PROGRESS NOTES
Received care of pt from NOC RN this am. Pt is A+O. SVT this am, HR up to to 170's today. 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 Diltiazem IV given. SVT resolves. Pt w/o c/o. Discussed pt's care with Residents.

## 2017-06-19 LAB
ABO GROUP BLD: NORMAL
ANION GAP SERPL CALC-SCNC: 9 MMOL/L (ref 0–11.9)
BARCODED ABORH UBTYP: 9500
BARCODED PRD CODE UBPRD: NORMAL
BARCODED UNIT NUM UBUNT: NORMAL
BASOPHILS # BLD AUTO: 0.5 % (ref 0–1.8)
BASOPHILS # BLD AUTO: 0.7 % (ref 0–1.8)
BASOPHILS # BLD: 0.03 K/UL (ref 0–0.12)
BASOPHILS # BLD: 0.04 K/UL (ref 0–0.12)
BLD GP AB INVEST PLASRBC-IMP: NORMAL
BLD GP AB SCN SERPL QL: NORMAL
BUN SERPL-MCNC: 35 MG/DL (ref 8–22)
CALCIUM SERPL-MCNC: 8.3 MG/DL (ref 8.5–10.5)
CHLORIDE SERPL-SCNC: 103 MMOL/L (ref 96–112)
CO2 SERPL-SCNC: 25 MMOL/L (ref 20–33)
COMPONENT R 8504R: NORMAL
CREAT SERPL-MCNC: 7.7 MG/DL (ref 0.5–1.4)
EOSINOPHIL # BLD AUTO: 0.13 K/UL (ref 0–0.51)
EOSINOPHIL # BLD AUTO: 0.16 K/UL (ref 0–0.51)
EOSINOPHIL NFR BLD: 2.3 % (ref 0–6.9)
EOSINOPHIL NFR BLD: 2.8 % (ref 0–6.9)
ERYTHROCYTE [DISTWIDTH] IN BLOOD BY AUTOMATED COUNT: 43.3 FL (ref 35.9–50)
ERYTHROCYTE [DISTWIDTH] IN BLOOD BY AUTOMATED COUNT: 47 FL (ref 35.9–50)
GFR SERPL CREATININE-BSD FRML MDRD: 5 ML/MIN/1.73 M 2
GLUCOSE SERPL-MCNC: 100 MG/DL (ref 65–99)
HAV IGM SERPL QL IA: NEGATIVE
HBV CORE IGM SER QL: NEGATIVE
HBV SURFACE AG SER QL: NEGATIVE
HCT VFR BLD AUTO: 25.9 % (ref 37–47)
HCT VFR BLD AUTO: 30.1 % (ref 37–47)
HCV AB SER QL: NEGATIVE
HEMOCCULT SP1 STL QL: NEGATIVE
HGB BLD-MCNC: 10.2 G/DL (ref 12–16)
HGB BLD-MCNC: 8 G/DL (ref 12–16)
IMM GRANULOCYTES # BLD AUTO: 0.03 K/UL (ref 0–0.11)
IMM GRANULOCYTES # BLD AUTO: 0.04 K/UL (ref 0–0.11)
IMM GRANULOCYTES NFR BLD AUTO: 0.5 % (ref 0–0.9)
IMM GRANULOCYTES NFR BLD AUTO: 0.7 % (ref 0–0.9)
IRON SATN MFR SERPL: 30 % (ref 15–55)
IRON SERPL-MCNC: 86 UG/DL (ref 40–170)
LYMPHOCYTES # BLD AUTO: 1.62 K/UL (ref 1–4.8)
LYMPHOCYTES # BLD AUTO: 1.71 K/UL (ref 1–4.8)
LYMPHOCYTES NFR BLD: 28.1 % (ref 22–41)
LYMPHOCYTES NFR BLD: 30.5 % (ref 22–41)
MCH RBC QN AUTO: 28.9 PG (ref 27–33)
MCH RBC QN AUTO: 29 PG (ref 27–33)
MCHC RBC AUTO-ENTMCNC: 30.9 G/DL (ref 33.6–35)
MCHC RBC AUTO-ENTMCNC: 33.9 G/DL (ref 33.6–35)
MCV RBC AUTO: 85.5 FL (ref 81.4–97.8)
MCV RBC AUTO: 93.5 FL (ref 81.4–97.8)
MONOCYTES # BLD AUTO: 0.62 K/UL (ref 0–0.85)
MONOCYTES # BLD AUTO: 0.65 K/UL (ref 0–0.85)
MONOCYTES NFR BLD AUTO: 11.1 % (ref 0–13.4)
MONOCYTES NFR BLD AUTO: 11.3 % (ref 0–13.4)
NEUTROPHILS # BLD AUTO: 3.07 K/UL (ref 2–7.15)
NEUTROPHILS # BLD AUTO: 3.27 K/UL (ref 2–7.15)
NEUTROPHILS NFR BLD: 54.9 % (ref 44–72)
NEUTROPHILS NFR BLD: 56.6 % (ref 44–72)
NRBC # BLD AUTO: 0 K/UL
NRBC # BLD AUTO: 0 K/UL
NRBC BLD AUTO-RTO: 0 /100 WBC
NRBC BLD AUTO-RTO: 0 /100 WBC
PLATELET # BLD AUTO: 148 K/UL (ref 164–446)
PLATELET # BLD AUTO: 172 K/UL (ref 164–446)
PMV BLD AUTO: 10.1 FL (ref 9–12.9)
PMV BLD AUTO: 10.3 FL (ref 9–12.9)
POTASSIUM SERPL-SCNC: 4.6 MMOL/L (ref 3.6–5.5)
PRODUCT TYPE UPROD: NORMAL
RBC # BLD AUTO: 2.77 M/UL (ref 4.2–5.4)
RBC # BLD AUTO: 3.52 M/UL (ref 4.2–5.4)
RH BLD: NORMAL
SODIUM SERPL-SCNC: 137 MMOL/L (ref 135–145)
TIBC SERPL-MCNC: 288 UG/DL (ref 250–450)
UNIT STATUS USTAT: NORMAL
VIT B12 SERPL-MCNC: 636 PG/ML (ref 211–911)
WBC # BLD AUTO: 5.6 K/UL (ref 4.8–10.8)
WBC # BLD AUTO: 5.8 K/UL (ref 4.8–10.8)

## 2017-06-19 PROCEDURE — A9270 NON-COVERED ITEM OR SERVICE: HCPCS | Performed by: HOSPITALIST

## 2017-06-19 PROCEDURE — 83540 ASSAY OF IRON: CPT

## 2017-06-19 PROCEDURE — 86922 COMPATIBILITY TEST ANTIGLOB: CPT | Mod: 91

## 2017-06-19 PROCEDURE — 85025 COMPLETE CBC W/AUTO DIFF WBC: CPT

## 2017-06-19 PROCEDURE — 80048 BASIC METABOLIC PNL TOTAL CA: CPT

## 2017-06-19 PROCEDURE — 36430 TRANSFUSION BLD/BLD COMPNT: CPT

## 2017-06-19 PROCEDURE — 770020 HCHG ROOM/CARE - TELE (206)

## 2017-06-19 PROCEDURE — 80074 ACUTE HEPATITIS PANEL: CPT

## 2017-06-19 PROCEDURE — 86900 BLOOD TYPING SEROLOGIC ABO: CPT

## 2017-06-19 PROCEDURE — 700111 HCHG RX REV CODE 636 W/ 250 OVERRIDE (IP)

## 2017-06-19 PROCEDURE — 86902 BLOOD TYPE ANTIGEN DONOR EA: CPT

## 2017-06-19 PROCEDURE — P9016 RBC LEUKOCYTES REDUCED: HCPCS

## 2017-06-19 PROCEDURE — 82607 VITAMIN B-12: CPT

## 2017-06-19 PROCEDURE — A9270 NON-COVERED ITEM OR SERVICE: HCPCS | Performed by: STUDENT IN AN ORGANIZED HEALTH CARE EDUCATION/TRAINING PROGRAM

## 2017-06-19 PROCEDURE — 86870 RBC ANTIBODY IDENTIFICATION: CPT

## 2017-06-19 PROCEDURE — 99233 SBSQ HOSP IP/OBS HIGH 50: CPT | Mod: GC | Performed by: HOSPITALIST

## 2017-06-19 PROCEDURE — 36415 COLL VENOUS BLD VENIPUNCTURE: CPT

## 2017-06-19 PROCEDURE — 700111 HCHG RX REV CODE 636 W/ 250 OVERRIDE (IP): Performed by: INTERNAL MEDICINE

## 2017-06-19 PROCEDURE — 90935 HEMODIALYSIS ONE EVALUATION: CPT

## 2017-06-19 PROCEDURE — 700102 HCHG RX REV CODE 250 W/ 637 OVERRIDE(OP): Performed by: STUDENT IN AN ORGANIZED HEALTH CARE EDUCATION/TRAINING PROGRAM

## 2017-06-19 PROCEDURE — 700102 HCHG RX REV CODE 250 W/ 637 OVERRIDE(OP): Performed by: HOSPITALIST

## 2017-06-19 PROCEDURE — 86850 RBC ANTIBODY SCREEN: CPT

## 2017-06-19 PROCEDURE — 83550 IRON BINDING TEST: CPT

## 2017-06-19 PROCEDURE — 86901 BLOOD TYPING SEROLOGIC RH(D): CPT

## 2017-06-19 RX ORDER — HEPARIN SODIUM 1000 [USP'U]/ML
2000 INJECTION, SOLUTION INTRAVENOUS; SUBCUTANEOUS
Status: DISCONTINUED | OUTPATIENT
Start: 2017-06-19 | End: 2017-06-23 | Stop reason: HOSPADM

## 2017-06-19 RX ORDER — HEPARIN SODIUM 1000 [USP'U]/ML
INJECTION, SOLUTION INTRAVENOUS; SUBCUTANEOUS
Status: COMPLETED
Start: 2017-06-19 | End: 2017-06-19

## 2017-06-19 RX ADMIN — SENNOSIDES AND DOCUSATE SODIUM 2 TABLET: 8.6; 5 TABLET ORAL at 20:53

## 2017-06-19 RX ADMIN — ERYTHROPOIETIN 10000 UNITS: 10000 INJECTION, SOLUTION INTRAVENOUS; SUBCUTANEOUS at 10:15

## 2017-06-19 RX ADMIN — FAMOTIDINE 20 MG: 20 TABLET, FILM COATED ORAL at 08:09

## 2017-06-19 RX ADMIN — LABETALOL HYDROCHLORIDE 100 MG: 100 TABLET, FILM COATED ORAL at 16:26

## 2017-06-19 RX ADMIN — LABETALOL HYDROCHLORIDE 100 MG: 100 TABLET, FILM COATED ORAL at 05:23

## 2017-06-19 RX ADMIN — VERAPAMIL HYDROCHLORIDE 80 MG: 80 TABLET, FILM COATED ORAL at 05:23

## 2017-06-19 RX ADMIN — HEPARIN SODIUM 3700 UNITS: 1000 INJECTION, SOLUTION INTRAVENOUS; SUBCUTANEOUS at 15:10

## 2017-06-19 RX ADMIN — VERAPAMIL HYDROCHLORIDE 80 MG: 80 TABLET, FILM COATED ORAL at 20:53

## 2017-06-19 RX ADMIN — SENNOSIDES AND DOCUSATE SODIUM 2 TABLET: 8.6; 5 TABLET ORAL at 08:09

## 2017-06-19 RX ADMIN — LABETALOL HYDROCHLORIDE 100 MG: 100 TABLET, FILM COATED ORAL at 20:53

## 2017-06-19 RX ADMIN — VERAPAMIL HYDROCHLORIDE 80 MG: 80 TABLET, FILM COATED ORAL at 16:26

## 2017-06-19 RX ADMIN — FAMOTIDINE 20 MG: 20 TABLET, FILM COATED ORAL at 20:53

## 2017-06-19 RX ADMIN — HEPARIN SODIUM 2000 UNITS: 1000 INJECTION, SOLUTION INTRAVENOUS; SUBCUTANEOUS at 12:00

## 2017-06-19 RX ADMIN — LABETALOL HYDROCHLORIDE 20 MG: 5 INJECTION, SOLUTION INTRAVENOUS at 14:43

## 2017-06-19 ASSESSMENT — ENCOUNTER SYMPTOMS
DIARRHEA: 0
PSYCHIATRIC NEGATIVE: 1
FOCAL WEAKNESS: 0
LOSS OF CONSCIOUSNESS: 0
CONSTIPATION: 0
PALPITATIONS: 0
BACK PAIN: 0
DEPRESSION: 0
SHORTNESS OF BREATH: 0
NAUSEA: 1
HEMOPTYSIS: 0
COUGH: 1
CARDIOVASCULAR NEGATIVE: 1
FEVER: 0
MUSCULOSKELETAL NEGATIVE: 1
EYES NEGATIVE: 1
CHILLS: 0
WEAKNESS: 1
ABDOMINAL PAIN: 0
RESPIRATORY NEGATIVE: 1
SPUTUM PRODUCTION: 0
VOMITING: 0
DIZZINESS: 0
NAUSEA: 0
BLURRED VISION: 0
HEADACHES: 0

## 2017-06-19 ASSESSMENT — PAIN SCALES - GENERAL
PAINLEVEL_OUTOF10: 0

## 2017-06-19 NOTE — PROGRESS NOTES
[facility] Nephrology Progress Note               Author: Kvng Mcelroy M.D. Date & Time created: 6/19/2017  9:20 AM     Interval History:  The patient is a 52-year-old  woman with a known history of CKD.  Note, etiology of said CKD is unknown.  She has had chronic elevation of her creatinine for years.  She has been followed in our office by Dr. Martinez and Dr. Junior.  She does not recall the cause of her kidney failure, but reviewing her laboratory shows a creatinine greater than 2 for more than 10 years.  She has not been under the care of a physician though for approximately 2 years.  She presented to the emergency room complaining of nausea, vomiting, malaise, fatigue.  Laboratories done at that time showed a creatinine of 15, BUN of 125, potassium of 4, hemoglobin of 7.  Given these findings, she was admitted to the telemetry cook for initiation of hemodialysis.     As noted, the patient does not know or recall why she has been told she has kidney failure.  She has had long history of hypertension and as I noted above she has had a creatinine greater than 2 for more than 10 years.  She does admit to taking ibuprofen daily for her arthritis despite being told in the past not to.  She is also supposed to take lisinopril for her hypertension, but she has not taken it for more than a year.  She has had no recent contrast problems.  No history of renal stones.  There is no family history of renal disease.    DAILY NEPHROLOGY SUMMARY  6/17/17--Rapid response called overnight for SVT and this resolved with IV diltiazem push and PO verapamil resumed, tolerated HD yesterday without issues, BP stable this am, tolerated PO last night, seen on dialysis today and HR stable  6/18/17--No events, tolerated HD yesterday with 1L UF, Hgb dropped from 8.5 yesterday to 6.9 today, no obvious source of blood loss, AVF surgery cancelled, BP elevated this am but stable overnight, feels ok  6/19/17 - HD today ordered.   Giving another unit PRBC's      Review of Systems   Constitutional: Positive for malaise/fatigue. Negative for fever and chills.   HENT: Negative.    Eyes: Negative.    Respiratory: Negative.    Cardiovascular: Negative.    Gastrointestinal: Positive for nausea. Negative for vomiting, abdominal pain, diarrhea and constipation.   Genitourinary: Negative.    Musculoskeletal: Negative.    Skin: Negative.    Neurological: Positive for weakness. Negative for dizziness, focal weakness and loss of consciousness.   Endo/Heme/Allergies: Negative.    Psychiatric/Behavioral: Negative.          Physical Exam   Constitutional: She is oriented to person, place, and time. She appears well-developed and well-nourished. No distress.   HENT:   Head: Normocephalic and atraumatic.   Mouth/Throat: No oropharyngeal exudate.   Eyes: Conjunctivae and EOM are normal. Pupils are equal, round, and reactive to light. No scleral icterus.   Neck: Normal range of motion. Neck supple. No thyromegaly present.   Cardiovascular: Normal rate and regular rhythm.    No murmur heard.  Pulmonary/Chest: Effort normal and breath sounds normal. No respiratory distress. She has no wheezes.   +R Chest PC   Abdominal: Soft. Bowel sounds are normal. She exhibits no distension. There is no tenderness.   Musculoskeletal: Normal range of motion. She exhibits no edema or tenderness.   +R BKA   Neurological: She is alert and oriented to person, place, and time. She exhibits normal muscle tone.   Skin: Skin is warm and dry. No erythema.   Psychiatric: She has a normal mood and affect. Her behavior is normal.   Nursing note and vitals reviewed.      Labs:        Invalid input(s): [id number]      Recent Labs      06/17/17   0306  06/18/17   0143  06/19/17   0419   SODIUM  140  136  137   POTASSIUM  4.1  4.2  4.6   CHLORIDE  102  103  103   CO2  27  25  25   BUN  41*  26*  35*   CREATININE  7.64*  5.55*  7.70*   MAGNESIUM  1.8   --    --    PHOSPHORUS  3.9   --    --     CALCIUM  8.4*  8.0*  8.3*     Recent Labs      17   0306  17   0143  17   0419   GLUCOSE  146*  105*  100*     Recent Labs      17   0942  17   1408   17   0306  17   0143  17   0951  17   0419   RBC   --    --    --   2.99*  2.41*   --   2.77*   HEMOGLOBIN   --    --    < >  8.5*  6.9*  8.7*  8.0*   HEMATOCRIT   --    --    < >  25.5*  21.4*  26.5*  25.9*   PLATELETCT   --    --    --   225  180   --   148*   PROTHROMBTM  14.3   --    --    --    --    --    --    APTT  28.0   --    --    --    --    --    --    INR  1.08   --    --    --    --    --    --    IRON   --   228*   --    --    --    --    --    TOTIRONBC   --   256   --    --    --    --    --     < > = values in this interval not displayed.     Recent Labs      17   0306  17   0143  17   0419   WBC  5.6  5.4  5.8   NEUTSPOLYS   --   58.30  56.60   LYMPHOCYTES   --   21.80*  28.10   MONOCYTES   --   17.40*  11.30   EOSINOPHILS   --   1.30  2.80   BASOPHILS   --   0.60  0.70           Hemodynamics:  Temp (24hrs), Av.4 °C (97.5 °F), Min:36.1 °C (96.9 °F), Max:36.7 °C (98 °F)  Temperature: 36.6 °C (97.8 °F)  Pulse  Av.3  Min: 70  Max: 178   Blood Pressure: 137/80 mmHg     Respiratory:    Respiration: 18, Pulse Oximetry: 92 %        RUL Breath Sounds: Clear, RML Breath Sounds: Clear, RLL Breath Sounds: Diminished, PHILIP Breath Sounds: Clear, LLL Breath Sounds: Diminished  Fluids:    Intake/Output Summary (Last 24 hours) at 17 0920  Last data filed at 06/18/17 1200   Gross per 24 hour   Intake    240 ml   Output      0 ml   Net    240 ml     Weight: 66.9 kg (147 lb 7.8 oz)  GI/Nutrition:  Orders Placed This Encounter   Procedures   • DIET ORDER     Standing Status: Standing      Number of Occurrences: 1      Standing Expiration Date:      Order Specific Question:  Diet:     Answer:  Renal [8]     Medical Decision Making, by Problem:  Active Hospital Problems    Diagnosis    • Vitamin D deficiency [E55.9]   • Secondary hyperparathyroidism of renal origin (CMS-HCC) [N25.81]   • Hyperphosphatemia [E83.39]   • End-stage renal disease (ESRD) (CMS-[facility]) [N18.6]   • Supraventricular tachycardia [I47.1]   • HTN (hypertension) [I10]   • Anemia associated with chronic renal failure [D63.1]     Assessment/Plan:  1.  CKD Stage V  --new ESRD, renal ultrasound confirms small essentially nonfunctional kidneys, she will need permanent dialysis  --Permcath placed in IR 6/16/17 and dialysis initiated  --HD today  --AVF creation surgery - planned for tomorrow  --Outpatient dialysis arrangements pending  --Dose adjust all meds for decreased GFR    2. HTN  --BP high this am but stable overnight  --Meds changed back to verapamil for control of SVT  --If BP remains high recommend starting either amlodipine or ACE-I/ARB    3. Anemia  --secondary to CKD, acute drop in last 24hrs and no obvious source of bleeding  --Epogen with HD  --Transfused 1 unit PRBC's today    4. Renal Osteodystrophy--phos improved  --Monitor    5. Secondary Hyperparathyroidism  --goal iPTH 150-300  --Monitor for now    6. Vitamin D Deficiency  --continue ergocalciferol    7. Metabolic Acidosis  --resolved with HD    8. H/O SVT  --on verapamil at home  --Monitor    9. Mild Protein-Calorie Malnutrition  --No dietary protein restrictions      Medications reviewed, Labs reviewed and Radiology images reviewed

## 2017-06-19 NOTE — PROGRESS NOTES
Pt dialyzed for 3 hrs today from 1205 to 1505. Hypertensive throughout tx ,primary nurse Melissa came down to medicate pt w/ Labetolol.  Remained hypertensive through end of tx.  Pt received  1 U red blood cells during tx  W/out probs..  See dialysis flow sheet for details. Report called to Melissa Fontaine RN.

## 2017-06-19 NOTE — PROGRESS NOTES
[facility] Interval Note     Name Isabelle Coyle     1965   Age/Sex 52 y.o. female   MRN [id number]   Code Status Full Code     After 5PM or if no immediate response to page, please call for cross-coverage  Attending/Team: Dr. Spain/Ben Call [phone] to page   1st Call - Day Intern (R1):   Dr. Giles 2nd Call - Day Sr. Resident (R2/R3):   Dr. Jones     Hospital Summary  51 yo female presented to the ED for HTN in the 190s systolic with PMHx significant for HTN, CKD not on dialysis, R BKA, and remote history of SVT. Renal function was markedly decreased and pt was started on dialysis. Pt had run of SVT sustained roughly 10 minutes on hospital day 2, resolved with 10 mg IV push diltiazem and concurrent valsalva.     Interval History  -AV fistula tomorrow per CV surgery if H/H stable  -required 1U PRBC yesterday for Hgb 6.9 and promptly yue to 8.7 then 8.0 this a.m.  -continue to monitor H/H q12 and transfuse PRN  -repeat Iron study  -ECHO showing EF of 55% and mild tricuspid and mitral regurg  -continue verapimil but switch to long acting and labetalol with improvement in BPs and no further runs of SVT   -dialysis PRN per nephro, lung exam much improved since admission with fluid offload  -HIT panel if platelets continue to drop with heparin injections for dialysis  -replete Mg2+ PRN, phos has normalized    Active Hospital Problems    Diagnosis   • Vitamin D deficiency [E55.9]   • Secondary hyperparathyroidism of renal origin (CMS-HCC) [N25.81]   • Hyperphosphatemia [E83.39]   • Elevated troponin [R74.8]   • Hypocalcemia [E83.51]   • End-stage renal disease (ESRD) (CMS-HCC) [N18.6]   • Supraventricular tachycardia, paroxysmal (CMS-HCC) [I47.1]   • Hx of right BKA (CMS-HCC) [Z89.511]   • HTN (hypertension) [I10]   • Anemia associated with chronic renal failure [D63.1]       Review of Systems  ROS  Constitutional: Positive for malaise/fatigue. Negative for fever, chills and  diaphoresis.    HENT: Negative.  Negative for hearing loss.     Eyes: Negative for blurred vision now  Respiratory: Positive for shortness of breath now improved. Negative for cough, hemoptysis, sputum production and wheezing.     Cardiovascular: Positive for orthopnea. Negative for chest pain and palpitations.    Gastrointestinal: Negative for heartburn, nausea, abdominal pain, diarrhea and constipation.    Genitourinary: Negative.  Negative for dysuria and urgency.    Musculoskeletal: Negative.     Skin: Negative.  Negative for itching and rash.    Neurological: Positive for weakness. Negative for dizziness, tingling, focal weakness, seizures and headaches.    Psychiatric/Behavioral: Negative.      Quality Measures  Core Measures  EKG reviewed, Labs reviewed, Medications reviewed and Radiology images reviewed  Murdock catheter: No Murdock  DVT Prophylaxis: Contraindicated - Anemia requiring blood transfusion  DVT prophylaxis - mechanical: SCDs  Physical Exam  Filed Vitals:    06/18/17 2014 06/19/17 0014 06/19/17 0422 06/19/17 0910   BP: 140/92 120/83 137/80 150/90   Pulse: 80 80 86 81   Temp: 36.2 °C (97.1 °F) 36.1 °C (96.9 °F) 36.6 °C (97.8 °F) 36.3 °C (97.4 °F)   Resp: 18 18 18 16   Height:       Weight: 66.9 kg (147 lb 7.8 oz)      SpO2: 94% 93% 92% 93%     Body mass index is 26.97 kg/(m^2).  Oxygen Therapy:  Pulse Oximetry: 93 %, O2 (LPM): 0, O2 Delivery: None (Room Air)  Physical Exam  Constitutional: No acute distress. AandO x4  HENT:    Head: Normocephalic and atraumatic.    Eyes: Pupils are equal, round, and reactive to light. Right eye exhibits no discharge. Left eye exhibits no discharge.    Neck:  Neck supple. No JVD.    Cardiovascular: Normal rate and regular rhythm. Systolic murmur II/VI noted has decreased since fluid offload   Pulmonary/Chest: Effort normal. No respiratory distress. She has no wheezes.    Abdominal: Soft. Bowel sounds are normal. She exhibits no distension. There is no tenderness.    Musculoskeletal:  R BKA  Trace Edema   Neurological: She is alert and oriented to person, place, and time.    Skin: She is not diaphoretic.   Lab Data Review  Recent Results (from the past 24 hour(s))   CBC WITH DIFFERENTIAL    Collection Time: 06/19/17  4:19 AM   Result Value Ref Range    WBC 5.8 4.8 - 10.8 K/uL    RBC 2.77 (L) 4.20 - 5.40 M/uL    Hemoglobin 8.0 (L) 12.0 - 16.0 g/dL    Hematocrit 25.9 (L) 37.0 - 47.0 %    MCV 93.5 81.4 - 97.8 fL    MCH 28.9 27.0 - 33.0 pg    MCHC 30.9 (L) 33.6 - 35.0 g/dL    RDW 47.0 35.9 - 50.0 fL    Platelet Count 148 (L) 164 - 446 K/uL    MPV 10.3 9.0 - 12.9 fL    Neutrophils-Polys 56.60 44.00 - 72.00 %    Lymphocytes 28.10 22.00 - 41.00 %    Monocytes 11.30 0.00 - 13.40 %    Eosinophils 2.80 0.00 - 6.90 %    Basophils 0.70 0.00 - 1.80 %    Immature Granulocytes 0.50 0.00 - 0.90 %    Nucleated RBC 0.00 /100 WBC    Neutrophils (Absolute) 3.27 2.00 - 7.15 K/uL    Lymphs (Absolute) 1.62 1.00 - 4.80 K/uL    Monos (Absolute) 0.65 0.00 - 0.85 K/uL    Eos (Absolute) 0.16 0.00 - 0.51 K/uL    Baso (Absolute) 0.04 0.00 - 0.12 K/uL    Immature Granulocytes (abs) 0.03 0.00 - 0.11 K/uL    NRBC (Absolute) 0.00 K/uL   BASIC METABOLIC PANEL    Collection Time: 06/19/17  4:19 AM   Result Value Ref Range    Sodium 137 135 - 145 mmol/L    Potassium 4.6 3.6 - 5.5 mmol/L    Chloride 103 96 - 112 mmol/L    Co2 25 20 - 33 mmol/L    Glucose 100 (H) 65 - 99 mg/dL    Bun 35 (H) 8 - 22 mg/dL    Creatinine 7.70 (HH) 0.50 - 1.40 mg/dL    Calcium 8.3 (L) 8.5 - 10.5 mg/dL    Anion Gap 9.0 0.0 - 11.9   ESTIMATED GFR    Collection Time: 06/19/17  4:19 AM   Result Value Ref Range    GFR If  7 (A) >60 mL/min/1.73 m 2    GFR If Non African American 5 (A) >60 mL/min/1.73 m 2   VITAMIN B12    Collection Time: 06/19/17  7:27 AM   Result Value Ref Range    Vitamin B12 -True Cobalamin 636 211 - 911 pg/mL   COD (ADULT)    Collection Time: 06/19/17 10:30 AM   Result Value Ref Range    ABO Grouping Only  O     Antibody Screen-Cod POS     Rh Grouping Only POS        Assessment/Plan  #  SVT  -known hx with breakthroughs at home every few months for which patient uses valsalva to convert  - continue verapamil home medication 240 mg/day and switch to long acting  - continue labetalol 100mg BID  - monitor on Tele for any arrhyhtmia.  - ECHO showing mild mitral and tricuspid regurg, EF 55%    # Acute on chronic Kidney Disease  # ESRD, now requiring HD  - pt with history of CKD, previous baseline Cr 4, was seen by Nephrology in the past, was anticipated to start HD/have fistula placed, but was lost to follow up. Now admitted with Cr 16. S/Sx of Volume overload, Uremia  - Nephrology and Vascular surgery consulted pending AV fistula  - IR guided HD cath placed 6/16  - HD per nephrology recs  - Vascular surgery consulted for fistula placement will take back tomorrow per note  - U/S renal: small b/k atrophic kidneys consistent with medical renal disease  - Renally dose medication. PT REPORTS SULFA DRUG ALLERGRY  - f/u consult recs    # Uncontrolled Hypertension  - improved to max 130s systolic overnight  -verapamil and labetalol continue      # Anemia related to ESRD  - no s/sx of gross GI bleeding, query uremic gastropathy.    -improved post dialysis and 1 U PRBCs yesterday  - empiric po pepcid.  - monitor H/H, transfuse prn.    - EPO per nephro recs.    - Iron studies WNL, anemia of CKD due to low EPO production, nephro started EPOgen  -repeat Iron studies as inconsistent between two prior     # Rt BKA  - related to remote trauma    # Elevated troponin  -  0.12-> 0.11  - EKG w/o acute ST/T wave changes  - Likely related to ESRD    Consultants/Specialty  Nephrology  Vascular    Disposition  Inpatient

## 2017-06-19 NOTE — CARE PLAN
Problem: Safety  Goal: Will remain free from injury  Outcome: PROGRESSING AS EXPECTED  Fall precautions in place. Bed in lowest position. Non-skid socks in place. Personal possessions within reach. Mobility sign on door. Call light within reach. Pt educated regarding fall prevention and states understanding.     Problem: Knowledge Deficit  Goal: Knowledge of disease process/condition, treatment plan, diagnostic tests, and medications will improve  Outcome: PROGRESSING AS EXPECTED  Pt educated regarding plan of care and medications. All questions answered.

## 2017-06-19 NOTE — PROGRESS NOTES
Vascular    Hgb 8.7 after transfusion, 8.0 today, no obvious source of bleeding.  Will tentatively schedule AVF creation for tomorrow (Tuesday 6/20) but if Hgb drops again overnight then will need to postpone again.    NPO except Meds after midnight.    Jimbo Davenport MD  General and Vascular Surgery  [facility]  Cell: [phone]    __________      Addendum 6/19/2017 4:02 PM:  Tentatively scheduled for surgery at 1400 tomorrow (Tuesday).  NPO after midnight    Jimbo Davenport MD  General and Vascular Surgery  [facility]  Cell: [phone]

## 2017-06-19 NOTE — CARE PLAN
Problem: Safety  Goal: Will remain free from injury  Outcome: PROGRESSING AS EXPECTED  Bed locked and in lowest position, call light in reach, r bka-prosthesis in place ambulating with steady gait    Problem: Knowledge Deficit  Goal: Knowledge of disease process/condition, treatment plan, diagnostic tests, and medications will improve  Outcome: PROGRESSING AS EXPECTED  Updated with plan of care, dialysis with blood transfusion today, npo mid for AVF creation

## 2017-06-19 NOTE — DISCHARGE PLANNING
Notified per nephrology that pt will require OP hemodialysis placement.  Will meet with pt to obtain clinic choice, provide dialysis education, and to answer any questions.  Pt cannot DC until dialysis chair has been confirmed.

## 2017-06-19 NOTE — PROGRESS NOTES
bp elevated upon arrival, pt asymptomatic.  Scheduled bp meds given and rechecked, see doc flowsheet.  Pt a&o x4, denies pain and no distress noted.  Will cont to monitor bp closely.

## 2017-06-19 NOTE — PROGRESS NOTES
[facility] [place]    Name Isabelle Coyle     1965   Age/Sex 52 y.o. female   MRN [id number]   Code Status full       After 5PM or if no immediate response to page, please call for cross-coverage  Attending/Team: Dr Spain  Call [phone] to page    1st Call - Day Intern (R1):    Dr Moreno 2nd Call - Day Sr. Resident (R2/R3):   Dr Jones        Chief complaint/ reason for interval visit (Primary Diagnosis)    52 year old female with pmhx of HTN, CKD, R BKA, SVT admitted for worsening Renal failure, needing HD.     Interval Problem Daily Status Update     No new complaints, HB 8.7--> 8.0. Denies GI bleeding. Pending AVF creation tomorrow morning.         Active Problems:    HTN (hypertension) POA: Yes    Anemia associated with chronic renal failure POA: Yes    Supraventricular tachycardia, paroxysmal (CMS-HCC) POA: Yes    Hx of right BKA (CMS-[facility]) POA: Yes    End-stage renal disease (ESRD) (CMS-[facility])    Vitamin D deficiency POA: Unknown    Secondary hyperparathyroidism of renal origin (CMS-[facility]) POA: Unknown    Hyperphosphatemia POA: Unknown    Elevated troponin POA: Unknown    Hypocalcemia POA: Unknown  Resolved Problems:    High anion gap metabolic acidosis POA: Unknown      Review of Systems   Constitutional: Negative for fever and chills.   Eyes: Negative for blurred vision.   Respiratory: Positive for cough. Negative for hemoptysis, sputum production and shortness of breath.    Cardiovascular: Negative for chest pain, palpitations and leg swelling.   Gastrointestinal: Negative for nausea, vomiting and abdominal pain.   Genitourinary: Negative for dysuria.   Musculoskeletal: Negative for back pain.   Neurological: Negative for dizziness and headaches.   Psychiatric/Behavioral: Negative for depression.       Consultants/Specialty  Nephrology -   Vascular    Disposition  inpatient    Quality Measures  EKG reviewed  Murdock catheter: No  Mathieu      DVT Prophylaxis: Heparin and Contraindicated - Anemia requiring blood transfusion    Ulcer prophylaxis: No              Physical Exam       Filed Vitals:    06/18/17 1600 06/18/17 2014 06/19/17 0014 06/19/17 0422   BP: 144/82 140/92 120/83 137/80   Pulse: 81 80 80 86   Temp: 36.7 °C (98 °F) 36.2 °C (97.1 °F) 36.1 °C (96.9 °F) 36.6 °C (97.8 °F)   Resp: 18 18 18 18   Height:       Weight:  66.9 kg (147 lb 7.8 oz)     SpO2: 95% 94% 93% 92%     Body mass index is 26.97 kg/(m^2). Weight: 66.9 kg (147 lb 7.8 oz)  Oxygen Therapy:  Pulse Oximetry: 92 %, O2 (LPM): 0, O2 Delivery: None (Room Air)    Physical Exam  Constitutional: She is oriented to person, place, and time and well-developed, well-nourished, and in no distress. No distress.   HENT:    Head: Normocephalic and atraumatic.   Eyes: Pupils are equal, round, and reactive to light. Right eye exhibits no discharge. Left eye exhibits no discharge.   Neck: Neck supple.   Cardiovascular: Regular rhythm.  Exam reveals no gallop and no friction rub.     No murmur heard.  Pulmonary/Chest: Effort normal and breath sounds normal. No respiratory distress. She has no wheezes.   Abdominal: Soft. Bowel sounds are normal. She exhibits no distension. There is no tenderness.   Musculoskeletal: She exhibits no edema.   R BKA, prosthesis in place.   Neurological: She is alert and oriented to person, place, and time.   Skin: She is not diaphoretic.       Lab Data Review:      6/19/2017  7:51 AM    Recent Labs      06/17/17   0306  06/18/17   0143  06/19/17   0419   SODIUM  140  136  137   POTASSIUM  4.1  4.2  4.6   CHLORIDE  102  103  103   CO2  27  25  25   BUN  41*  26*  35*   CREATININE  7.64*  5.55*  7.70*   MAGNESIUM  1.8   --    --    PHOSPHORUS  3.9   --    --    CALCIUM  8.4*  8.0*  8.3*       Recent Labs      06/17/17   0306  06/18/17   0143  06/19/17   0419   GLUCOSE  146*  105*  100*       Recent Labs      06/16/17   0942  06/16/17   1408  06/17/17   0306   06/18/17   0143  06/18/17   0951  06/19/17   0419   RBC   --    --   2.99*  2.41*   --   2.77*   HEMOGLOBIN   --    --   8.5*  6.9*  8.7*  8.0*   HEMATOCRIT   --    --   25.5*  21.4*  26.5*  25.9*   PLATELETCT   --    --   225  180   --   148*   PROTHROMBTM  14.3   --    --    --    --    --    APTT  28.0   --    --    --    --    --    INR  1.08   --    --    --    --    --    IRON   --   228*   --    --    --    --    TOTIRONBC   --   256   --    --    --    --        Recent Labs      06/16/17   0759  06/17/17   0306  06/18/17 0143 06/19/17   0419   WBC  7.0  5.6  5.4  5.8   NEUTSPOLYS  67.30   --   58.30  56.60   LYMPHOCYTES  20.00*   --   21.80*  28.10   MONOCYTES  9.20   --   17.40*  11.30   EOSINOPHILS  2.70   --   1.30  2.80   BASOPHILS  0.40   --   0.60  0.70           Assessment/Plan       # Newly diagnosed ESRD    likely secondary to chronic HTN + chronic NSAID intake.     USG renal: small b/l atrophic kidney consistent with medical renal disease.     nephro on board, on HD   AVF placement by vascular surgery tomorrow, NPO at midnight  avoid nephrotoxins.     will need outpatient dialysis set up - hep B, quantiferon test- negative      # Anemia related to ESRD   (6/16/17) Fe 228 (H), TIBC 256, % sat 89 (H), Fe 49 on the same day, likely lab error, will repeat Serum Iron and percentage saturation  Presented with Hb 6.9 received total of 2 PRBC, most recent Hb 8.0     no active bleeding from any source noted.    stool occult blood - pending   pending EPO level.     continue EPO per nephro recs and prn transfuse if Hb<7.    #Thrombocytopenia  Platelets 148  HIT score - 1, low probability at this time  Will continue to monitor      # Secondary hyperparathyroidism   (6/16/17) .5 (H), likely secondary to ESRD.     treat ESRD as mentioned above.       # Vitamin D deficiency     (6/16/17) 25 OH vit D 10(L), likely secondary to renal disease.  will start vit D 50,000 IU q weekly.       # Hypocalcemia    (6/16/17):  Total corrected calcium 8.48, ionized calcium 1 (slightly low, nl is 1.1 - 1.3).     likely secondary to vit D deficiency + chronic renal failure.     slightly low ionized calcium, pt asymptomatic -  no need for oral calcium supplements at this point.     start on vit D supplements    # Hyperphosphatemia -- resolved  - likely secondary to ESRD.  - s/p HD.  - PO4 6.2 -> 3.9.       # Supraventricular tachycardia - Resolved   6/17/17 : an episode of SVT with 's this morning, IV dilt 10 mg was given once, HR back to sinus rhythm.     echo (6/17/17): EF 55%, mildly dilated LA, mild aortic insufficiency, mild MR, mitral annular calcification, mild TR, RVSP 35 mmHg.     continue po verapamil 80 mg tid.     keep Mg > 2 and K > 4.         # Hypertension  - BP currently controlled.    - po verapamil 80 mg tid, po labetalol 100 mg tid.    - prn metoprolol for SBP > 190 and / or DBP > 110.       # Elevated troponin  Pt asymptomatic - no chest pain.     EKG w/o acute ST/T wave changes.   elevated trop likely related to ESRD.   trop trending down 0.12 -> 0.11.   YAQUELIN score 1, HEART score 3 - low risk for MACE.        # High anion gap metabolic acidosis -- resolved   likely due to acute on chronic CKD / ESRD.      # Hx of Rt BKA  related to remote accident / trauma.
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
## 2017-06-19 NOTE — PROGRESS NOTES
Pt up ambulating with steady gait, denies pain and no distress noted. Updated with plan of care, pt for dialysis today and blood transfusion with dialysis.  Pt also npo at mid for AVF creation tomorrow.

## 2017-06-20 LAB
ANION GAP SERPL CALC-SCNC: 10 MMOL/L (ref 0–11.9)
BACTERIA BLD CULT: NORMAL
BASOPHILS # BLD AUTO: 0.5 % (ref 0–1.8)
BASOPHILS # BLD: 0.03 K/UL (ref 0–0.12)
BILIRUB CONJ SERPL-MCNC: <0.1 MG/DL (ref 0.1–0.5)
BUN SERPL-MCNC: 26 MG/DL (ref 8–22)
CALCIUM SERPL-MCNC: 8 MG/DL (ref 8.5–10.5)
CHLORIDE SERPL-SCNC: 101 MMOL/L (ref 96–112)
CO2 SERPL-SCNC: 25 MMOL/L (ref 20–33)
CREAT SERPL-MCNC: 5.39 MG/DL (ref 0.5–1.4)
DAT C3D-SP REAG RBC QL: NORMAL
DAT IGG-SP REAG RBC QL: NORMAL
EOSINOPHIL # BLD AUTO: 0.11 K/UL (ref 0–0.51)
EOSINOPHIL NFR BLD: 1.7 % (ref 0–6.9)
EPO SERPL-ACNC: 813 MU/ML (ref 4–27)
ERYTHROCYTE [DISTWIDTH] IN BLOOD BY AUTOMATED COUNT: 46.2 FL (ref 35.9–50)
GFR SERPL CREATININE-BSD FRML MDRD: 8 ML/MIN/1.73 M 2
GLUCOSE SERPL-MCNC: 102 MG/DL (ref 65–99)
HCT VFR BLD AUTO: 29.2 % (ref 37–47)
HGB BLD-MCNC: 9.5 G/DL (ref 12–16)
IMM GRANULOCYTES # BLD AUTO: 0.02 K/UL (ref 0–0.11)
IMM GRANULOCYTES NFR BLD AUTO: 0.3 % (ref 0–0.9)
LDH SERPL-CCNC: 181 U/L (ref 107–266)
LYMPHOCYTES # BLD AUTO: 1.29 K/UL (ref 1–4.8)
LYMPHOCYTES NFR BLD: 19.6 % (ref 22–41)
MCH RBC QN AUTO: 28.5 PG (ref 27–33)
MCHC RBC AUTO-ENTMCNC: 32.5 G/DL (ref 33.6–35)
MCV RBC AUTO: 87.7 FL (ref 81.4–97.8)
MONOCYTES # BLD AUTO: 0.77 K/UL (ref 0–0.85)
MONOCYTES NFR BLD AUTO: 11.7 % (ref 0–13.4)
NEUTROPHILS # BLD AUTO: 4.35 K/UL (ref 2–7.15)
NEUTROPHILS NFR BLD: 66.2 % (ref 44–72)
NRBC # BLD AUTO: 0 K/UL
NRBC BLD AUTO-RTO: 0 /100 WBC
PLATELET # BLD AUTO: 157 K/UL (ref 164–446)
PMV BLD AUTO: 10.1 FL (ref 9–12.9)
POTASSIUM SERPL-SCNC: 4.2 MMOL/L (ref 3.6–5.5)
RBC # BLD AUTO: 3.33 M/UL (ref 4.2–5.4)
SIGNIFICANT IND 70042: NORMAL
SITE SITE: NORMAL
SODIUM SERPL-SCNC: 136 MMOL/L (ref 135–145)
SOURCE SOURCE: NORMAL
WBC # BLD AUTO: 6.6 K/UL (ref 4.8–10.8)

## 2017-06-20 PROCEDURE — 700102 HCHG RX REV CODE 250 W/ 637 OVERRIDE(OP): Performed by: HOSPITALIST

## 2017-06-20 PROCEDURE — 80048 BASIC METABOLIC PNL TOTAL CA: CPT

## 2017-06-20 PROCEDURE — 700111 HCHG RX REV CODE 636 W/ 250 OVERRIDE (IP)

## 2017-06-20 PROCEDURE — 501837 HCHG SUTURE CV: Performed by: SURGERY

## 2017-06-20 PROCEDURE — 160029 HCHG SURGERY MINUTES - 1ST 30 MINS LEVEL 4: Performed by: SURGERY

## 2017-06-20 PROCEDURE — 160036 HCHG PACU - EA ADDL 30 MINS PHASE I: Performed by: SURGERY

## 2017-06-20 PROCEDURE — 700102 HCHG RX REV CODE 250 W/ 637 OVERRIDE(OP)

## 2017-06-20 PROCEDURE — 502240 HCHG MISC OR SUPPLY RC 0272: Performed by: SURGERY

## 2017-06-20 PROCEDURE — 86880 COOMBS TEST DIRECT: CPT

## 2017-06-20 PROCEDURE — 501838 HCHG SUTURE GENERAL: Performed by: SURGERY

## 2017-06-20 PROCEDURE — A9270 NON-COVERED ITEM OR SERVICE: HCPCS

## 2017-06-20 PROCEDURE — 031A0AF BYPASS LEFT ULNAR ARTERY TO LOWER ARM VEIN WITH AUTOLOGOUS ARTERIAL TISSUE, OPEN APPROACH: ICD-10-PCS | Performed by: SURGERY

## 2017-06-20 PROCEDURE — 500043 HCHG BAG-A-JET: Performed by: SURGERY

## 2017-06-20 PROCEDURE — 160002 HCHG RECOVERY MINUTES (STAT): Performed by: SURGERY

## 2017-06-20 PROCEDURE — 82248 BILIRUBIN DIRECT: CPT

## 2017-06-20 PROCEDURE — 160035 HCHG PACU - 1ST 60 MINS PHASE I: Performed by: SURGERY

## 2017-06-20 PROCEDURE — 85025 COMPLETE CBC W/AUTO DIFF WBC: CPT

## 2017-06-20 PROCEDURE — A9270 NON-COVERED ITEM OR SERVICE: HCPCS | Performed by: HOSPITALIST

## 2017-06-20 PROCEDURE — 83615 LACTATE (LD) (LDH) ENZYME: CPT

## 2017-06-20 PROCEDURE — 160041 HCHG SURGERY MINUTES - EA ADDL 1 MIN LEVEL 4: Performed by: SURGERY

## 2017-06-20 PROCEDURE — 160009 HCHG ANES TIME/MIN: Performed by: SURGERY

## 2017-06-20 PROCEDURE — 501499 HCHG SURG-I-LOOP, MINI (BLUE): Performed by: SURGERY

## 2017-06-20 PROCEDURE — 160048 HCHG OR STATISTICAL LEVEL 1-5: Performed by: SURGERY

## 2017-06-20 PROCEDURE — 700101 HCHG RX REV CODE 250

## 2017-06-20 PROCEDURE — 500700 HCHG HEMOCLIP, SMALL (RED): Performed by: SURGERY

## 2017-06-20 PROCEDURE — 30233N1 TRANSFUSION OF NONAUTOLOGOUS RED BLOOD CELLS INTO PERIPHERAL VEIN, PERCUTANEOUS APPROACH: ICD-10-PCS | Performed by: RADIOLOGY

## 2017-06-20 PROCEDURE — 700102 HCHG RX REV CODE 250 W/ 637 OVERRIDE(OP): Performed by: STUDENT IN AN ORGANIZED HEALTH CARE EDUCATION/TRAINING PROGRAM

## 2017-06-20 PROCEDURE — 99233 SBSQ HOSP IP/OBS HIGH 50: CPT | Mod: GC | Performed by: HOSPITALIST

## 2017-06-20 PROCEDURE — 500698 HCHG HEMOCLIP, MEDIUM: Performed by: SURGERY

## 2017-06-20 PROCEDURE — A9270 NON-COVERED ITEM OR SERVICE: HCPCS | Performed by: STUDENT IN AN ORGANIZED HEALTH CARE EDUCATION/TRAINING PROGRAM

## 2017-06-20 PROCEDURE — 770020 HCHG ROOM/CARE - TELE (206)

## 2017-06-20 PROCEDURE — 36415 COLL VENOUS BLD VENIPUNCTURE: CPT

## 2017-06-20 PROCEDURE — 502626 HCHG SURGIFLO HEMOSTATIC MATRIX 6ML: Performed by: SURGERY

## 2017-06-20 PROCEDURE — 83010 ASSAY OF HAPTOGLOBIN QUANT: CPT

## 2017-06-20 RX ORDER — ALPRAZOLAM 0.25 MG/1
0.25 TABLET ORAL 2 TIMES DAILY PRN
Status: DISCONTINUED | OUTPATIENT
Start: 2017-06-20 | End: 2017-06-23 | Stop reason: HOSPADM

## 2017-06-20 RX ORDER — HEPARIN SODIUM,PORCINE 1000/ML
VIAL (ML) INJECTION
Status: DISCONTINUED | OUTPATIENT
Start: 2017-06-20 | End: 2017-06-20 | Stop reason: HOSPADM

## 2017-06-20 RX ORDER — MAGNESIUM HYDROXIDE 1200 MG/15ML
LIQUID ORAL
Status: DISCONTINUED | OUTPATIENT
Start: 2017-06-20 | End: 2017-06-20 | Stop reason: HOSPADM

## 2017-06-20 RX ORDER — BUPIVACAINE HYDROCHLORIDE 5 MG/ML
INJECTION, SOLUTION PERINEURAL
Status: DISCONTINUED | OUTPATIENT
Start: 2017-06-20 | End: 2017-06-20 | Stop reason: HOSPADM

## 2017-06-20 RX ADMIN — ALPRAZOLAM 0.25 MG: 0.25 TABLET ORAL at 23:22

## 2017-06-20 RX ADMIN — LABETALOL HYDROCHLORIDE 20 MG: 5 INJECTION, SOLUTION INTRAVENOUS at 04:39

## 2017-06-20 RX ADMIN — VERAPAMIL HYDROCHLORIDE 80 MG: 80 TABLET, FILM COATED ORAL at 20:15

## 2017-06-20 RX ADMIN — VERAPAMIL HYDROCHLORIDE 80 MG: 80 TABLET, FILM COATED ORAL at 05:55

## 2017-06-20 RX ADMIN — FENTANYL CITRATE 25 MCG: 50 INJECTION, SOLUTION INTRAMUSCULAR; INTRAVENOUS at 16:34

## 2017-06-20 RX ADMIN — FENTANYL CITRATE 25 MCG: 50 INJECTION, SOLUTION INTRAMUSCULAR; INTRAVENOUS at 16:15

## 2017-06-20 RX ADMIN — LABETALOL HYDROCHLORIDE 100 MG: 100 TABLET, FILM COATED ORAL at 05:55

## 2017-06-20 RX ADMIN — LABETALOL HYDROCHLORIDE 100 MG: 100 TABLET, FILM COATED ORAL at 20:15

## 2017-06-20 RX ADMIN — FAMOTIDINE 20 MG: 20 TABLET, FILM COATED ORAL at 08:24

## 2017-06-20 RX ADMIN — FAMOTIDINE 20 MG: 20 TABLET, FILM COATED ORAL at 20:15

## 2017-06-20 RX ADMIN — HYDROCODONE BITARTRATE AND ACETAMINOPHEN 15 ML: 2.5; 108 SOLUTION ORAL at 16:30

## 2017-06-20 RX ADMIN — LABETALOL HYDROCHLORIDE 20 MG: 5 INJECTION, SOLUTION INTRAVENOUS at 12:40

## 2017-06-20 RX ADMIN — ACETAMINOPHEN 650 MG: 325 TABLET, FILM COATED ORAL at 23:22

## 2017-06-20 ASSESSMENT — PAIN SCALES - GENERAL
PAINLEVEL_OUTOF10: 6
PAINLEVEL_OUTOF10: 0
PAINLEVEL_OUTOF10: 5
PAINLEVEL_OUTOF10: 6
PAINLEVEL_OUTOF10: 5
PAINLEVEL_OUTOF10: 5
PAINLEVEL_OUTOF10: 4
PAINLEVEL_OUTOF10: 4
PAINLEVEL_OUTOF10: 5
PAINLEVEL_OUTOF10: 0
PAINLEVEL_OUTOF10: 7

## 2017-06-20 ASSESSMENT — ENCOUNTER SYMPTOMS
DEPRESSION: 0
DIARRHEA: 0
WEAKNESS: 1
MUSCULOSKELETAL NEGATIVE: 1
EYES NEGATIVE: 1
RESPIRATORY NEGATIVE: 1
LOSS OF CONSCIOUSNESS: 0
NAUSEA: 1
HEMOPTYSIS: 0
PALPITATIONS: 0
BLURRED VISION: 0
HEADACHES: 0
DIZZINESS: 0
FOCAL WEAKNESS: 0
CHILLS: 0
COUGH: 1
NAUSEA: 0
SHORTNESS OF BREATH: 0
ABDOMINAL PAIN: 0
BACK PAIN: 0
SPUTUM PRODUCTION: 0
CARDIOVASCULAR NEGATIVE: 1
PSYCHIATRIC NEGATIVE: 1
CONSTIPATION: 0
FEVER: 0
VOMITING: 0

## 2017-06-20 NOTE — OR NURSING
Unable to feel a thrill or auscilltate a bruit in the left A-V fistula creation.Dr. Davenport notified, and he states not to worry.

## 2017-06-20 NOTE — CARE PLAN
Problem: Nutritional:  Goal: Achieve adequate nutritional intake  Patient will consume 50% of meals   Outcome: NOT MET  Pt NPO status at this time

## 2017-06-20 NOTE — PROGRESS NOTES
Pt a&ox4, denies pain and no distress noted. Updated pt with plan of care, npo at this time for AVF creation this afternoon.  Call light in reach and encourage to call for assistance.

## 2017-06-20 NOTE — PROGRESS NOTES
Vascular     OR today for AV fistula creation    Jimbo Davenport MD  General and Vascular Surgery  [facility]  Cell: [phone]
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
## 2017-06-20 NOTE — PROGRESS NOTES
[facility] Nephrology Progress Note               Author: Kvng Mcelroy M.D. Date & Time created: 6/20/2017  10:54 AM     Interval History:  The patient is a 52-year-old  woman with a known history of CKD.  Note, etiology of said CKD is unknown.  She has had chronic elevation of her creatinine for years.  She has been followed in our office by Dr. Martinez and Dr. Junior.  She does not recall the cause of her kidney failure, but reviewing her laboratory shows a creatinine greater than 2 for more than 10 years.  She has not been under the care of a physician though for approximately 2 years.  She presented to the emergency room complaining of nausea, vomiting, malaise, fatigue.  Laboratories done at that time showed a creatinine of 15, BUN of 125, potassium of 4, hemoglobin of 7.  Given these findings, she was admitted to the telemetry cook for initiation of hemodialysis.     As noted, the patient does not know or recall why she has been told she has kidney failure.  She has had long history of hypertension and as I noted above she has had a creatinine greater than 2 for more than 10 years.  She does admit to taking ibuprofen daily for her arthritis despite being told in the past not to.  She is also supposed to take lisinopril for her hypertension, but she has not taken it for more than a year.  She has had no recent contrast problems.  No history of renal stones.  There is no family history of renal disease.    DAILY NEPHROLOGY SUMMARY  6/17/17--Rapid response called overnight for SVT and this resolved with IV diltiazem push and PO verapamil resumed, tolerated HD yesterday without issues, BP stable this am, tolerated PO last night, seen on dialysis today and HR stable  6/18/17--No events, tolerated HD yesterday with 1L UF, Hgb dropped from 8.5 yesterday to 6.9 today, no obvious source of blood loss, AVF surgery cancelled, BP elevated this am but stable overnight, feels ok  6/19/17 - HD today ordered.   Giving another unit PRBC's  6/20/17 - s/p HD w blood yesterday.  AVF surgery.  Next HD tomorrow      Review of Systems   Constitutional: Positive for malaise/fatigue. Negative for fever and chills.   HENT: Negative.    Eyes: Negative.    Respiratory: Negative.    Cardiovascular: Negative.    Gastrointestinal: Positive for nausea. Negative for vomiting, abdominal pain, diarrhea and constipation.   Genitourinary: Negative.    Musculoskeletal: Negative.    Skin: Negative.    Neurological: Positive for weakness. Negative for dizziness, focal weakness and loss of consciousness.   Endo/Heme/Allergies: Negative.    Psychiatric/Behavioral: Negative.          Physical Exam   Constitutional: She is oriented to person, place, and time. She appears well-developed and well-nourished. No distress.   HENT:   Head: Normocephalic and atraumatic.   Mouth/Throat: No oropharyngeal exudate.   Eyes: Conjunctivae and EOM are normal. Pupils are equal, round, and reactive to light. No scleral icterus.   Neck: Normal range of motion. Neck supple. No thyromegaly present.   Cardiovascular: Normal rate and regular rhythm.    No murmur heard.  Pulmonary/Chest: Effort normal and breath sounds normal. No respiratory distress. She has no wheezes.   +R Chest PC   Abdominal: Soft. Bowel sounds are normal. She exhibits no distension. There is no tenderness.   Musculoskeletal: Normal range of motion. She exhibits no edema or tenderness.   +R BKA   Neurological: She is alert and oriented to person, place, and time. She exhibits normal muscle tone.   Skin: Skin is warm and dry. No erythema.   Psychiatric: She has a normal mood and affect. Her behavior is normal.   Nursing note and vitals reviewed.      Labs:        Invalid input(s): [id number]      Recent Labs      06/18/17   0143  06/19/17   0419  06/20/17   0356   SODIUM  136  137  136   POTASSIUM  4.2  4.6  4.2   CHLORIDE  103  103  101   CO2  25  25  25   BUN  26*  35*  26*   CREATININE  5.55*   7.70*  5.39*   CALCIUM  8.0*  8.3*  8.0*     Recent Labs      17   0143  17   0419  17   0356   GLUCOSE  105*  100*  102*     Recent Labs      17   0419  17   1622  17   0356   RBC  2.77*  3.52*  3.33*   HEMOGLOBIN  8.0*  10.2*  9.5*   HEMATOCRIT  25.9*  30.1*  29.2*   PLATELETCT  148*  172  157*   IRON   --   86   --    TOTIRONBC   --   288   --      Recent Labs      17   04117   16217   0356   WBC  5.8  5.6  6.6   NEUTSPOLYS  56.60  54.90  66.20   LYMPHOCYTES  28.10  30.50  19.60*   MONOCYTES  11.30  11.10  11.70   EOSINOPHILS  2.80  2.30  1.70   BASOPHILS  0.70  0.50  0.50           Hemodynamics:  Temp (24hrs), Av.7 °C (98 °F), Min:36.1 °C (96.9 °F), Max:37.4 °C (99.4 °F)  Temperature: 36.2 °C (97.2 °F)  Pulse  Av.6  Min: 70  Max: 178   Blood Pressure: 144/98 mmHg     Respiratory:    Respiration: 20, Pulse Oximetry: 92 %        RUL Breath Sounds: Clear, RML Breath Sounds: Clear, RLL Breath Sounds: Diminished, PHILIP Breath Sounds: Clear, LLL Breath Sounds: Diminished  Fluids:    Intake/Output Summary (Last 24 hours) at 17 1054  Last data filed at 17 1505   Gross per 24 hour   Intake    750 ml   Output   2500 ml   Net  -1750 ml     Weight: 65.9 kg (145 lb 4.5 oz)  GI/Nutrition:  Orders Placed This Encounter   Procedures   • DIET NPO     Standing Status: Standing      Number of Occurrences: 8      Standing Expiration Date:      Order Specific Question:  Restrict to:     Answer:  Sips with Medications [3]     Medical Decision Making, by Problem:  Active Hospital Problems    Diagnosis   • Vitamin D deficiency [E55.9]   • Secondary hyperparathyroidism of renal origin (CMS-HCC) [N25.81]   • Hyperphosphatemia [E83.39]   • End-stage renal disease (ESRD) (CMS-HCC) [N18.6]   • Supraventricular tachycardia [I47.1]   • HTN (hypertension) [I10]   • Anemia associated with chronic renal failure [D63.1]     Assessment/Plan:  1.  CKD Stage V  --new  ESRD, renal ultrasound confirms small essentially nonfunctional kidneys, she will need permanent dialysis  --Permcath placed in IR 6/16/17 and dialysis initiated  --HD tomorrow  --AVF creation surgery   --Outpatient dialysis arrangements pending  --Dose adjust all meds for decreased GFR    2. HTN  --BP high this am but stable overnight  --Meds changed back to verapamil for control of SVT  --If BP remains high recommend starting either amlodipine or ACE-I/ARB    3. Anemia  --secondary to CKD, acute drop in last 24hrs and no obvious source of bleeding  --Epogen with HD  --Transfused 1 unit PRBC's today    4. Renal Osteodystrophy--phos improved  --Monitor    5. Secondary Hyperparathyroidism  --goal iPTH 150-300  --Monitor for now    6. Vitamin D Deficiency  --continue ergocalciferol    7. Metabolic Acidosis  --resolved with HD    8. H/O SVT  --on verapamil at home  --Monitor    9. Mild Protein-Calorie Malnutrition  --No dietary protein restrictions      Medications reviewed, Labs reviewed and Radiology images reviewed

## 2017-06-20 NOTE — CARE PLAN
Problem: Safety  Goal: Will remain free from injury  Outcome: PROGRESSING AS EXPECTED  Bed locked and in lowest position, prosthesis in place, steady gait, call light in reach    Problem: Knowledge Deficit  Goal: Knowledge of disease process/condition, treatment plan, diagnostic tests, and medications will improve  Outcome: PROGRESSING AS EXPECTED  Pt npo for AVF creation today

## 2017-06-20 NOTE — PROGRESS NOTES
[facility] Internal Medicine Interval Note     Name Isabelle Coyle     1965   Age/Sex 52 y.o. female   MRN [id number]   Code Status Full Code     After 5PM or if no immediate response to page, please call for cross-coverage  Attending/Team: /Alejandro Call [phone] to page   1st Call - Day Intern (R1):   BERTA Allen 2nd Call - Day Sr. Resident (R2/R3):   Dr. Jones     Hospital Summary  51 yo female presented to the ED for HTN in the 190s systolic with PMHx significant for HTN, CKD not on dialysis, R BKA, and remote history of SVT. Renal function was markedly decreased and pt was started on dialysis. Pt had run of SVT sustained roughly 10 minutes on hospital day 2, resolved with 10 mg IV push diltiazem and concurrent valsalva.     Interval History  -transfused 1U PRBC yesterday with Hgb increasing to 10.2 then dropping to 9.5 overnight  -pending hemolysis labs including LDH, haptoglobin  -CTM Hgb with q12 H/H  -CV surgery creating AV fistula today for long term dialysis  -Received dialysis this a.m. Per nephro  -Platelets stable now in 150s  -continue labetalol, verapimil  -repeat iron studies WNL and increase in accordance with expected 3U PRBC  -start xanax PRN prior to dialysis given anxiety     Active Hospital Problems    Diagnosis   • Vitamin D deficiency [E55.9]   • Secondary hyperparathyroidism of renal origin (CMS-[facility]) [N25.81]   • Hyperphosphatemia [E83.39]   • Elevated troponin [R74.8]   • Hypocalcemia [E83.51]   • End-stage renal disease (ESRD) (CMS-[facility]) [N18.6]   • Supraventricular tachycardia, paroxysmal (CMS-[facility]) [I47.1]   • Hx of right BKA (CMS-[facility]) [Z89.511]   • HTN (hypertension) [I10]   • Anemia associated with chronic renal failure [D63.1]       Review of Systems  ROS  Constitutional: Positive for malaise/fatigue. Negative for fever, chills and diaphoresis.    HENT: Negative.  Negative for hearing loss.     Eyes: Negative for blurred vision now  Respiratory: Positive for shortness  of breath now improved. Negative for cough, hemoptysis, sputum production and wheezing.     Cardiovascular: Positive for orthopnea though improving. Negative for chest pain and palpitations.    Gastrointestinal: Negative for heartburn, nausea, abdominal pain, diarrhea and constipation.    Genitourinary: Negative.  Negative for dysuria and urgency.    Musculoskeletal: Negative.     Skin: Negative.  Negative for itching and rash.    Neurological: Positive for weakness. Negative for dizziness, tingling, focal weakness, seizures and headaches.    Psychiatric/Behavioral: Negative.      Quality Measures  Core Measures  EKG reviewed, Labs reviewed, Medications reviewed and Radiology images reviewed  Murdock catheter: No Murdock  DVT Prophylaxis: Contraindicated - Anemia requiring blood transfusion  DVT prophylaxis - mechanical: SCDs  Physical Exam  Filed Vitals:    06/20/17 0018 06/20/17 0425 06/20/17 0543 06/20/17 0752   BP: 155/99 194/106 181/105 144/98   Pulse: 82 80 80 75   Temp: 37.1 °C (98.7 °F) 37.1 °C (98.7 °F)  36.2 °C (97.2 °F)   Resp: 18 18  20   Height:       Weight:       SpO2: 97% 93%  92%     Body mass index is 26.57 kg/(m^2).  Oxygen Therapy:  Pulse Oximetry: 92 %, O2 (LPM): 0, O2 Delivery: None (Room Air)  Physical Exam  Constitutional: No acute distress. AandO x4  HENT:    Head: Normocephalic and atraumatic.    Eyes: Pupils are equal, round, and reactive to light. Right eye exhibits no discharge. Left eye exhibits no discharge.    Neck:  Neck supple. No JVD.    Cardiovascular: Normal rate and regular rhythm. Systolic murmur II/VI noted has decreased since fluid offload   Pulmonary/Chest: Effort normal. No respiratory distress. She has no wheezes.    Abdominal: Soft. Bowel sounds are normal. She exhibits no distension. There is no tenderness.   Musculoskeletal:  R BKA  Trace Edema   Neurological: She is alert and oriented to person, place, and time.    Skin: She is not diaphoretic.   Lab Data Review  Recent  Results (from the past 24 hour(s))   CBC WITH DIFFERENTIAL    Collection Time: 06/19/17  4:22 PM   Result Value Ref Range    WBC 5.6 4.8 - 10.8 K/uL    RBC 3.52 (L) 4.20 - 5.40 M/uL    Hemoglobin 10.2 (L) 12.0 - 16.0 g/dL    Hematocrit 30.1 (L) 37.0 - 47.0 %    MCV 85.5 81.4 - 97.8 fL    MCH 29.0 27.0 - 33.0 pg    MCHC 33.9 33.6 - 35.0 g/dL    RDW 43.3 35.9 - 50.0 fL    Platelet Count 172 164 - 446 K/uL    MPV 10.1 9.0 - 12.9 fL    Neutrophils-Polys 54.90 44.00 - 72.00 %    Lymphocytes 30.50 22.00 - 41.00 %    Monocytes 11.10 0.00 - 13.40 %    Eosinophils 2.30 0.00 - 6.90 %    Basophils 0.50 0.00 - 1.80 %    Immature Granulocytes 0.70 0.00 - 0.90 %    Nucleated RBC 0.00 /100 WBC    Neutrophils (Absolute) 3.07 2.00 - 7.15 K/uL    Lymphs (Absolute) 1.71 1.00 - 4.80 K/uL    Monos (Absolute) 0.62 0.00 - 0.85 K/uL    Eos (Absolute) 0.13 0.00 - 0.51 K/uL    Baso (Absolute) 0.03 0.00 - 0.12 K/uL    Immature Granulocytes (abs) 0.04 0.00 - 0.11 K/uL    NRBC (Absolute) 0.00 K/uL   IRON/TOTAL IRON BIND    Collection Time: 06/19/17  4:22 PM   Result Value Ref Range    Iron 86 40 - 170 ug/dL    Total Iron Binding 288 250 - 450 ug/dL    % Saturation 30 15 - 55 %   HEPATITIS PANEL ACUTE(4 COMPONENTS)    Collection Time: 06/19/17  5:35 PM   Result Value Ref Range    Hepatitis B Surface Antigen Negative Negative    Hepatitis C Antibody Negative Negative    Hepatitis B Cors Ab,IgM Negative Negative    Hepatitis A Virus Ab, IgM Negative Negative   CBC WITH DIFFERENTIAL    Collection Time: 06/20/17  3:56 AM   Result Value Ref Range    WBC 6.6 4.8 - 10.8 K/uL    RBC 3.33 (L) 4.20 - 5.40 M/uL    Hemoglobin 9.5 (L) 12.0 - 16.0 g/dL    Hematocrit 29.2 (L) 37.0 - 47.0 %    MCV 87.7 81.4 - 97.8 fL    MCH 28.5 27.0 - 33.0 pg    MCHC 32.5 (L) 33.6 - 35.0 g/dL    RDW 46.2 35.9 - 50.0 fL    Platelet Count 157 (L) 164 - 446 K/uL    MPV 10.1 9.0 - 12.9 fL    Neutrophils-Polys 66.20 44.00 - 72.00 %    Lymphocytes 19.60 (L) 22.00 - 41.00 %     Monocytes 11.70 0.00 - 13.40 %    Eosinophils 1.70 0.00 - 6.90 %    Basophils 0.50 0.00 - 1.80 %    Immature Granulocytes 0.30 0.00 - 0.90 %    Nucleated RBC 0.00 /100 WBC    Neutrophils (Absolute) 4.35 2.00 - 7.15 K/uL    Lymphs (Absolute) 1.29 1.00 - 4.80 K/uL    Monos (Absolute) 0.77 0.00 - 0.85 K/uL    Eos (Absolute) 0.11 0.00 - 0.51 K/uL    Baso (Absolute) 0.03 0.00 - 0.12 K/uL    Immature Granulocytes (abs) 0.02 0.00 - 0.11 K/uL    NRBC (Absolute) 0.00 K/uL   BASIC METABOLIC PANEL    Collection Time: 06/20/17  3:56 AM   Result Value Ref Range    Sodium 136 135 - 145 mmol/L    Potassium 4.2 3.6 - 5.5 mmol/L    Chloride 101 96 - 112 mmol/L    Co2 25 20 - 33 mmol/L    Glucose 102 (H) 65 - 99 mg/dL    Bun 26 (H) 8 - 22 mg/dL    Creatinine 5.39 (HH) 0.50 - 1.40 mg/dL    Calcium 8.0 (L) 8.5 - 10.5 mg/dL    Anion Gap 10.0 0.0 - 11.9   ESTIMATED GFR    Collection Time: 06/20/17  3:56 AM   Result Value Ref Range    GFR If African American 10 (A) >60 mL/min/1.73 m 2    GFR If Non  8 (A) >60 mL/min/1.73 m 2       Assessment/Plan  #  SVT  -known hx with breakthroughs at home every few months for which patient uses valsalva to convert  - continue verapamil home medication 240 mg/day and switch to long acting  - continue labetalol 100mg BID  - monitor on Tele for any arrhyhtmia.  - ECHO showing mild mitral and tricuspid regurg, EF 55%    # Acute on chronic Kidney Disease  # ESRD, now requiring HD  - pt with history of CKD, previous baseline Cr 4, was seen by Nephrology in the past, was anticipated to start HD/have fistula placed, but was lost to follow up. Now admitted with Cr 16. S/Sx of Volume overload, Uremia  - Nephrology and Vascular surgery consulted pending AV fistula  - IR guided HD cath placed 6/16  - HD per nephrology recs  - Vascular surgery consulted for fistula placement will take back today 1400h  - U/S renal: small b/k atrophic kidneys consistent with medical renal disease  - Renally dose  medication. PT REPORTS SULFA DRUG ALLERGRY  - f/u consult recs    # Uncontrolled Hypertension  - improved to max 130s systolic overnight  -verapamil and labetalol continue      # Anemia related to ESRD  - no s/sx of gross GI bleeding, query uremic gastropathy.    -improved post dialysis and 1 U PRBCs yesterday  - empiric po pepcid.  - monitor H/H, transfuse prn.    - EPO per nephro recs.    - Iron studies WNL, anemia of CKD due to low EPO production, nephro started EPOgen  -repeat Iron studies as inconsistent between two prior     # Rt BKA  - related to remote trauma    # Elevated troponin  -  0.12-> 0.11  - EKG w/o acute ST/T wave changes  - Likely related to ESRD    Consultants/Specialty  Nephrology  Vascular    Disposition  Inpatient

## 2017-06-21 LAB
ALBUMIN SERPL BCP-MCNC: 3.3 G/DL (ref 3.2–4.9)
ALBUMIN/GLOB SERPL: 1.2 G/DL
ALP SERPL-CCNC: 57 U/L (ref 30–99)
ALT SERPL-CCNC: <5 U/L (ref 2–50)
ANION GAP SERPL CALC-SCNC: 11 MMOL/L (ref 0–11.9)
AST SERPL-CCNC: 9 U/L (ref 12–45)
BASOPHILS # BLD AUTO: 0.1 % (ref 0–1.8)
BASOPHILS # BLD: 0.01 K/UL (ref 0–0.12)
BILIRUB SERPL-MCNC: 0.3 MG/DL (ref 0.1–1.5)
BUN SERPL-MCNC: 37 MG/DL (ref 8–22)
CALCIUM SERPL-MCNC: 8.1 MG/DL (ref 8.5–10.5)
CHLORIDE SERPL-SCNC: 100 MMOL/L (ref 96–112)
CO2 SERPL-SCNC: 24 MMOL/L (ref 20–33)
CREAT SERPL-MCNC: 7.84 MG/DL (ref 0.5–1.4)
EOSINOPHIL # BLD AUTO: 0.01 K/UL (ref 0–0.51)
EOSINOPHIL NFR BLD: 0.1 % (ref 0–6.9)
ERYTHROCYTE [DISTWIDTH] IN BLOOD BY AUTOMATED COUNT: 46.2 FL (ref 35.9–50)
GFR SERPL CREATININE-BSD FRML MDRD: 5 ML/MIN/1.73 M 2
GLOBULIN SER CALC-MCNC: 2.8 G/DL (ref 1.9–3.5)
GLUCOSE SERPL-MCNC: 185 MG/DL (ref 65–99)
HAPTOGLOB SERPL-MCNC: 171 MG/DL (ref 30–200)
HCT VFR BLD AUTO: 30.1 % (ref 37–47)
HGB BLD-MCNC: 9.7 G/DL (ref 12–16)
IMM GRANULOCYTES # BLD AUTO: 0.03 K/UL (ref 0–0.11)
IMM GRANULOCYTES NFR BLD AUTO: 0.4 % (ref 0–0.9)
LYMPHOCYTES # BLD AUTO: 0.3 K/UL (ref 1–4.8)
LYMPHOCYTES NFR BLD: 4.5 % (ref 22–41)
MCH RBC QN AUTO: 29.2 PG (ref 27–33)
MCHC RBC AUTO-ENTMCNC: 32.2 G/DL (ref 33.6–35)
MCV RBC AUTO: 90.7 FL (ref 81.4–97.8)
MONOCYTES # BLD AUTO: 0.14 K/UL (ref 0–0.85)
MONOCYTES NFR BLD AUTO: 2.1 % (ref 0–13.4)
NEUTROPHILS # BLD AUTO: 6.25 K/UL (ref 2–7.15)
NEUTROPHILS NFR BLD: 92.8 % (ref 44–72)
NRBC # BLD AUTO: 0 K/UL
NRBC BLD AUTO-RTO: 0 /100 WBC
PLATELET # BLD AUTO: 183 K/UL (ref 164–446)
PMV BLD AUTO: 10.1 FL (ref 9–12.9)
POTASSIUM SERPL-SCNC: 4.6 MMOL/L (ref 3.6–5.5)
PROT SERPL-MCNC: 6.1 G/DL (ref 6–8.2)
RBC # BLD AUTO: 3.32 M/UL (ref 4.2–5.4)
SODIUM SERPL-SCNC: 135 MMOL/L (ref 135–145)
WBC # BLD AUTO: 6.7 K/UL (ref 4.8–10.8)

## 2017-06-21 PROCEDURE — A9270 NON-COVERED ITEM OR SERVICE: HCPCS | Performed by: HOSPITALIST

## 2017-06-21 PROCEDURE — 80053 COMPREHEN METABOLIC PANEL: CPT

## 2017-06-21 PROCEDURE — 700102 HCHG RX REV CODE 250 W/ 637 OVERRIDE(OP): Performed by: STUDENT IN AN ORGANIZED HEALTH CARE EDUCATION/TRAINING PROGRAM

## 2017-06-21 PROCEDURE — A9270 NON-COVERED ITEM OR SERVICE: HCPCS | Performed by: STUDENT IN AN ORGANIZED HEALTH CARE EDUCATION/TRAINING PROGRAM

## 2017-06-21 PROCEDURE — 700102 HCHG RX REV CODE 250 W/ 637 OVERRIDE(OP): Performed by: HOSPITALIST

## 2017-06-21 PROCEDURE — 700111 HCHG RX REV CODE 636 W/ 250 OVERRIDE (IP): Performed by: INTERNAL MEDICINE

## 2017-06-21 PROCEDURE — 85025 COMPLETE CBC W/AUTO DIFF WBC: CPT

## 2017-06-21 PROCEDURE — 99232 SBSQ HOSP IP/OBS MODERATE 35: CPT | Mod: GC | Performed by: HOSPITALIST

## 2017-06-21 PROCEDURE — 90935 HEMODIALYSIS ONE EVALUATION: CPT

## 2017-06-21 PROCEDURE — 770020 HCHG ROOM/CARE - TELE (206)

## 2017-06-21 PROCEDURE — 700111 HCHG RX REV CODE 636 W/ 250 OVERRIDE (IP)

## 2017-06-21 PROCEDURE — 36415 COLL VENOUS BLD VENIPUNCTURE: CPT

## 2017-06-21 RX ORDER — HEPARIN SODIUM 1000 [USP'U]/ML
INJECTION, SOLUTION INTRAVENOUS; SUBCUTANEOUS
Status: COMPLETED
Start: 2017-06-21 | End: 2017-06-21

## 2017-06-21 RX ADMIN — HEPARIN SODIUM 2000 UNITS: 1000 INJECTION, SOLUTION INTRAVENOUS; SUBCUTANEOUS at 08:15

## 2017-06-21 RX ADMIN — VERAPAMIL HYDROCHLORIDE 80 MG: 80 TABLET, FILM COATED ORAL at 20:17

## 2017-06-21 RX ADMIN — LABETALOL HYDROCHLORIDE 100 MG: 100 TABLET, FILM COATED ORAL at 05:54

## 2017-06-21 RX ADMIN — LABETALOL HYDROCHLORIDE 100 MG: 100 TABLET, FILM COATED ORAL at 20:17

## 2017-06-21 RX ADMIN — ACETAMINOPHEN 650 MG: 325 TABLET, FILM COATED ORAL at 20:20

## 2017-06-21 RX ADMIN — LABETALOL HYDROCHLORIDE 100 MG: 100 TABLET, FILM COATED ORAL at 14:59

## 2017-06-21 RX ADMIN — VERAPAMIL HYDROCHLORIDE 80 MG: 80 TABLET, FILM COATED ORAL at 05:54

## 2017-06-21 RX ADMIN — FAMOTIDINE 20 MG: 20 TABLET, FILM COATED ORAL at 20:17

## 2017-06-21 RX ADMIN — ERYTHROPOIETIN 10000 UNITS: 10000 INJECTION, SOLUTION INTRAVENOUS; SUBCUTANEOUS at 10:00

## 2017-06-21 RX ADMIN — SENNOSIDES AND DOCUSATE SODIUM 2 TABLET: 8.6; 5 TABLET ORAL at 20:17

## 2017-06-21 RX ADMIN — HEPARIN SODIUM 3700 UNITS: 1000 INJECTION, SOLUTION INTRAVENOUS; SUBCUTANEOUS at 11:57

## 2017-06-21 RX ADMIN — ACETAMINOPHEN 650 MG: 325 TABLET, FILM COATED ORAL at 06:29

## 2017-06-21 RX ADMIN — VERAPAMIL HYDROCHLORIDE 80 MG: 80 TABLET, FILM COATED ORAL at 14:59

## 2017-06-21 ASSESSMENT — ENCOUNTER SYMPTOMS
NAUSEA: 1
SPUTUM PRODUCTION: 0
CARDIOVASCULAR NEGATIVE: 1
DIARRHEA: 0
NAUSEA: 0
EYES NEGATIVE: 1
COUGH: 1
PALPITATIONS: 0
BACK PAIN: 0
HEMOPTYSIS: 0
DIZZINESS: 0
CHILLS: 0
DEPRESSION: 0
RESPIRATORY NEGATIVE: 1
MUSCULOSKELETAL NEGATIVE: 1
VOMITING: 0
ABDOMINAL PAIN: 0
FOCAL WEAKNESS: 0
WEAKNESS: 1
LOSS OF CONSCIOUSNESS: 0
CONSTIPATION: 0
BLURRED VISION: 0
HEADACHES: 0
SHORTNESS OF BREATH: 0
PSYCHIATRIC NEGATIVE: 1
FEVER: 0

## 2017-06-21 ASSESSMENT — PAIN SCALES - GENERAL
PAINLEVEL_OUTOF10: 0
PAINLEVEL_OUTOF10: 0
PAINLEVEL_OUTOF10: 7
PAINLEVEL_OUTOF10: 2
PAINLEVEL_OUTOF10: 0
PAINLEVEL_OUTOF10: 2
PAINLEVEL_OUTOF10: 4
PAINLEVEL_OUTOF10: 6

## 2017-06-21 NOTE — PROGRESS NOTES
[facility] Internal Medicine Interval Note     Name Isabelle Coyle     1965   Age/Sex 52 y.o. female   MRN [id number]   Code Status Full Code     After 5PM or if no immediate response to page, please call for cross-coverage  Attending/Team: Dr. Spain/Alejandro Call [phone] to page   1st Call - Day Intern (R1):   Dr. Moreno 2nd Call - Day Sr. Resident (R2/R3):   Dr. Jones     Hospital Summary  53 yo female presented to the ED for HTN in the 190s systolic with PMHx significant for HTN, CKD not on dialysis, R BKA, and remote history of SVT. Renal function was markedly decreased and pt was started on dialysis. Pt had run of SVT sustained roughly 10 minutes on hospital day 2, resolved with 10 mg IV push diltiazem and concurrent valsalva. Has since had AV fistula for long term dialysis created in [facility] by CV surgery.    Interval History  -AV fistula created yesterday by CV surgery  -incision is CDI this a.m.  -received dialysis again this a.m. By nephro  -can likely d/c to home tomorrow if remains stable following surgery and dialysis chair is set up by social work  -Plts and Hgb have both stabilized in 150s and 9.7 respectively  -Hemolysis panel negative so far, haptoglobin still pending      Active Hospital Problems    Diagnosis   • End-stage renal disease (ESRD) (CMS-[facility]) [N18.6]   • Supraventricular tachycardia, paroxysmal (CMS-HCC) [I47.1]   • HTN (hypertension) [I10]   • Anemia associated with chronic renal failure [D63.1]   • Vitamin D deficiency [E55.9]   • Hyperphosphatemia [E83.39]   • Hx of right BKA (CMS-HCC) [Z89.511]   • Secondary hyperparathyroidism of renal origin (CMS-[facility]) [N25.81]   • Elevated troponin [R74.8]   • Hypocalcemia [E83.51]       Review of Systems  ROS  Constitutional: Positive for malaise/fatigue. Negative for fever, chills and diaphoresis.    HENT: Negative.  Negative for hearing loss.     Eyes: Negative for blurred vision now  Respiratory: Positive for shortness of breath  now improved. Negative for cough, hemoptysis, sputum production and wheezing.     Cardiovascular: Positive for orthopnea now improved. Negative for chest pain and palpitations.    Gastrointestinal: Negative for heartburn, nausea, abdominal pain, diarrhea and constipation.    Genitourinary: Negative.  Negative for dysuria and urgency.    Musculoskeletal: Negative.     Skin: Negative.  Negative for itching and rash.    Neurological: Positive for weakness. Negative for dizziness, tingling, focal weakness, seizures and headaches.    Psychiatric/Behavioral: Negative.    Quality Measures  Core Measures  EKG reviewed, Labs reviewed, Medications reviewed and Radiology images reviewed  Murdock catheter: No Murdock  DVT Prophylaxis: Contraindicated - Anemia requiring blood transfusion  DVT prophylaxis - mechanical: SCDs  Physical Exam  Filed Vitals:    06/20/17 2200 06/21/17 0006 06/21/17 0430 06/21/17 1201   BP:  144/91 161/94 155/88   Pulse:  78 80 82   Temp:  36.9 °C (98.5 °F) 36.8 °C (98.3 °F) 36.9 °C (98.5 °F)   Resp:  18 16 16   Height:       Weight: 66.8 kg (147 lb 4.3 oz)      SpO2:  96% 95% 97%     Body mass index is 26.93 kg/(m^2).  Oxygen Therapy:  Pulse Oximetry: 97 %, O2 (LPM): 0, O2 Delivery: None (Room Air)  Physical Exam  Oxygen Therapy:  Pulse Oximetry: 92 %, O2 (LPM): 0, O2 Delivery: None (Room Air)  Physical Exam  Constitutional: No acute distress. AandO x4  HENT:    Head: Normocephalic and atraumatic.    Eyes: Pupils are equal, round, and reactive to light. Right eye exhibits no discharge. Left eye exhibits no discharge.    Neck:  Neck supple. No JVD.    Cardiovascular: Normal rate and regular rhythm. Systolic murmur II/VI noted has decreased since fluid offload   Pulmonary/Chest: Effort normal. No respiratory distress. She has no wheezes. Much improved since admission  Abdominal: Soft. Bowel sounds are normal. She exhibits no distension. There is no tenderness.   Musculoskeletal:  R BKA  Trace Edema    Neurological: She is alert and oriented to person, place, and time.    Skin: She is not diaphoretic    Lab Data Review  Recent Results (from the past 24 hour(s))   JAVON (DIRECT VIANCA)    Collection Time: 06/20/17  1:18 PM   Result Value Ref Range    Javon With Anti-IgG Reagent NEG     Javon With Anti-C3D Reagent NEG    LDH    Collection Time: 06/20/17  1:19 PM   Result Value Ref Range    LDH Total 181 107 - 266 U/L   HAPTOGLOBIN    Collection Time: 06/20/17  1:19 PM   Result Value Ref Range    Haptoglobin 171 30 - 200 mg/dL   BILIRUBIN DIRECT    Collection Time: 06/20/17  1:19 PM   Result Value Ref Range    Direct Bilirubin <0.1 0.1 - 0.5 mg/dL   CBC WITH DIFFERENTIAL    Collection Time: 06/21/17  4:51 AM   Result Value Ref Range    WBC 6.7 4.8 - 10.8 K/uL    RBC 3.32 (L) 4.20 - 5.40 M/uL    Hemoglobin 9.7 (L) 12.0 - 16.0 g/dL    Hematocrit 30.1 (L) 37.0 - 47.0 %    MCV 90.7 81.4 - 97.8 fL    MCH 29.2 27.0 - 33.0 pg    MCHC 32.2 (L) 33.6 - 35.0 g/dL    RDW 46.2 35.9 - 50.0 fL    Platelet Count 183 164 - 446 K/uL    MPV 10.1 9.0 - 12.9 fL    Neutrophils-Polys 92.80 (H) 44.00 - 72.00 %    Lymphocytes 4.50 (L) 22.00 - 41.00 %    Monocytes 2.10 0.00 - 13.40 %    Eosinophils 0.10 0.00 - 6.90 %    Basophils 0.10 0.00 - 1.80 %    Immature Granulocytes 0.40 0.00 - 0.90 %    Nucleated RBC 0.00 /100 WBC    Neutrophils (Absolute) 6.25 2.00 - 7.15 K/uL    Lymphs (Absolute) 0.30 (L) 1.00 - 4.80 K/uL    Monos (Absolute) 0.14 0.00 - 0.85 K/uL    Eos (Absolute) 0.01 0.00 - 0.51 K/uL    Baso (Absolute) 0.01 0.00 - 0.12 K/uL    Immature Granulocytes (abs) 0.03 0.00 - 0.11 K/uL    NRBC (Absolute) 0.00 K/uL   COMP METABOLIC PANEL    Collection Time: 06/21/17  4:51 AM   Result Value Ref Range    Sodium 135 135 - 145 mmol/L    Potassium 4.6 3.6 - 5.5 mmol/L    Chloride 100 96 - 112 mmol/L    Co2 24 20 - 33 mmol/L    Anion Gap 11.0 0.0 - 11.9    Glucose 185 (H) 65 - 99 mg/dL    Bun 37 (H) 8 - 22 mg/dL    Creatinine 7.84 (HH) 0.50 - 1.40  mg/dL    Calcium 8.1 (L) 8.5 - 10.5 mg/dL    AST(SGOT) 9 (L) 12 - 45 U/L    ALT(SGPT) <5 2 - 50 U/L    Alkaline Phosphatase 57 30 - 99 U/L    Total Bilirubin 0.3 0.1 - 1.5 mg/dL    Albumin 3.3 3.2 - 4.9 g/dL    Total Protein 6.1 6.0 - 8.2 g/dL    Globulin 2.8 1.9 - 3.5 g/dL    A-G Ratio 1.2 g/dL   ESTIMATED GFR    Collection Time: 06/21/17  4:51 AM   Result Value Ref Range    GFR If  6 (A) >60 mL/min/1.73 m 2    GFR If Non African American 5 (A) >60 mL/min/1.73 m 2       Assessment/Plan  #  SVT  -known hx with breakthroughs at home every few months for which patient uses valsalva to convert  - continue verapamil home medication 240 mg/day and switch to long acting  - continue labetalol 100mg BID  - monitor on Tele for any arrhyhtmia.  - ECHO showing mild mitral and tricuspid regurg, EF 55%    # Acute on chronic Kidney Disease  # ESRD, now requiring HD  - pt with history of CKD, previous baseline Cr 4, was seen by Nephrology in the past, was anticipated to start HD/have fistula placed, but was lost to follow up. Now admitted with Cr 16. S/Sx of Volume overload, Uremia  - Nephrology and Vascular surgery consulted AV fistula now created  - IR guided HD cath placed 6/16  - HD per nephrology recs  - U/S renal: small b/k atrophic kidneys consistent with medical renal disease  - Renally dose medication. PT REPORTS SULFA DRUG ALLERGRY  - f/u consult recs    # Uncontrolled Hypertension  -verapamil and labetalol continue  -xanax prior to dialysis treatments    # Anemia related to ESRD  - no s/sx of gross GI bleeding, query uremic gastropathy.    -improved and Hgb now stable at 9.7  - empiric po pepcid.  - monitor H/H, transfuse prn.    - EPO per nephro recs.    - Iron studies WNL, anemia of CKD due to low EPO production, nephro started EPOgen      # Rt BKA  - related to remote trauma    # Elevated troponin  -  0.12-> 0.11  - EKG w/o acute ST/T wave changes  - Likely related to  ESRD    Consultants/Specialty  Nephrology  Vascular    Disposition  Can likely d/c tomorrow to home

## 2017-06-21 NOTE — PROGRESS NOTES
[facility] Nephrology Progress Note               Author: Kvng Mcelroy M.D. Date & Time created: 6/21/2017  11:25 AM     Interval History:  The patient is a 52-year-old  woman with a known history of CKD.  Note, etiology of said CKD is unknown.  She has had chronic elevation of her creatinine for years.  She has been followed in our office by Dr. Martinez and Dr. Junior.  She does not recall the cause of her kidney failure, but reviewing her laboratory shows a creatinine greater than 2 for more than 10 years.  She has not been under the care of a physician though for approximately 2 years.  She presented to the emergency room complaining of nausea, vomiting, malaise, fatigue.  Laboratories done at that time showed a creatinine of 15, BUN of 125, potassium of 4, hemoglobin of 7.  Given these findings, she was admitted to the telemetry cook for initiation of hemodialysis.     As noted, the patient does not know or recall why she has been told she has kidney failure.  She has had long history of hypertension and as I noted above she has had a creatinine greater than 2 for more than 10 years.  She does admit to taking ibuprofen daily for her arthritis despite being told in the past not to.  She is also supposed to take lisinopril for her hypertension, but she has not taken it for more than a year.  She has had no recent contrast problems.  No history of renal stones.  There is no family history of renal disease.    DAILY NEPHROLOGY SUMMARY  6/17/17--Rapid response called overnight for SVT and this resolved with IV diltiazem push and PO verapamil resumed, tolerated HD yesterday without issues, BP stable this am, tolerated PO last night, seen on dialysis today and HR stable  6/18/17--No events, tolerated HD yesterday with 1L UF, Hgb dropped from 8.5 yesterday to 6.9 today, no obvious source of blood loss, AVF surgery cancelled, BP elevated this am but stable overnight, feels ok  6/19/17 - HD today ordered.   Giving another unit PRBC's  6/20/17 - s/p HD w blood yesterday.  AVF surgery.  Next HD tomorrow  6/21/17 - HD  Today - s/p AVF but I cant feel thrill.        Review of Systems   Constitutional: Positive for malaise/fatigue. Negative for fever and chills.   HENT: Negative.    Eyes: Negative.    Respiratory: Negative.    Cardiovascular: Negative.    Gastrointestinal: Positive for nausea. Negative for vomiting, abdominal pain, diarrhea and constipation.   Genitourinary: Negative.    Musculoskeletal: Negative.    Skin: Negative.    Neurological: Positive for weakness. Negative for dizziness, focal weakness and loss of consciousness.   Endo/Heme/Allergies: Negative.    Psychiatric/Behavioral: Negative.          Physical Exam   Constitutional: She is oriented to person, place, and time. She appears well-developed and well-nourished. No distress.   HENT:   Head: Normocephalic and atraumatic.   Mouth/Throat: No oropharyngeal exudate.   Eyes: Conjunctivae and EOM are normal. Pupils are equal, round, and reactive to light. No scleral icterus.   Neck: Normal range of motion. Neck supple. No thyromegaly present.   Cardiovascular: Normal rate and regular rhythm.    No murmur heard.  Pulmonary/Chest: Effort normal and breath sounds normal. No respiratory distress. She has no wheezes.   +R Chest PC   Abdominal: Soft. Bowel sounds are normal. She exhibits no distension. There is no tenderness.   Musculoskeletal: Normal range of motion. She exhibits no edema or tenderness.   +R BKA   Neurological: She is alert and oriented to person, place, and time. She exhibits normal muscle tone.   Skin: Skin is warm and dry. No erythema.   Psychiatric: She has a normal mood and affect. Her behavior is normal.   Nursing note and vitals reviewed.      Labs:        Invalid input(s): [id number]      Recent Labs      06/19/17   0419  06/20/17   0356  06/21/17   0451   SODIUM  137  136  135   POTASSIUM  4.6  4.2  4.6   CHLORIDE  103  101  100    CO2  25  25  24   BUN  35*  26*  37*   CREATININE  7.70*  5.39*  7.84*   CALCIUM  8.3*  8.0*  8.1*     Recent Labs      17   0419  17   0356  17   1319  17   0451   ALTSGPT   --    --    --   <5   ASTSGOT   --    --    --   9*   ALKPHOSPHAT   --    --    --   57   TBILIRUBIN   --    --    --   0.3   DBILIRUBIN   --    --   <0.1   --    GLUCOSE  100*  102*   --   185*     Recent Labs      17   1622  17   0356  17   0451   RBC  3.52*  3.33*  3.32*   HEMOGLOBIN  10.2*  9.5*  9.7*   HEMATOCRIT  30.1*  29.2*  30.1*   PLATELETCT  172  157*  183   IRON  86   --    --    TOTIRONBC  288   --    --      Recent Labs      17   1622  17   0356  17   0451   WBC  5.6  6.6  6.7   NEUTSPOLYS  54.90  66.20  92.80*   LYMPHOCYTES  30.50  19.60*  4.50*   MONOCYTES  11.10  11.70  2.10   EOSINOPHILS  2.30  1.70  0.10   BASOPHILS  0.50  0.50  0.10   ASTSGOT   --    --   9*   ALTSGPT   --    --   <5   ALKPHOSPHAT   --    --   57   TBILIRUBIN   --    --   0.3           Hemodynamics:  Temp (24hrs), Av.8 °C (98.2 °F), Min:36.4 °C (97.6 °F), Max:37 °C (98.6 °F)  Temperature:  (pt at dialysis)  Pulse  Av.8  Min: 70  Max: 178Heart Rate (Monitored): 79  Blood Pressure:  (pt at dialysis), NIBP: 153/85 mmHg     Respiratory:    Respiration:  (pt at dialysis), Pulse Oximetry:  (pt at dialysis)        RUL Breath Sounds: Clear, RML Breath Sounds: Clear, RLL Breath Sounds: Diminished, PHILIP Breath Sounds: Clear, LLL Breath Sounds: Diminished  Fluids:    Intake/Output Summary (Last 24 hours) at 17 1125  Last data filed at 17 2300   Gross per 24 hour   Intake    475 ml   Output     10 ml   Net    465 ml     Weight: 66.8 kg (147 lb 4.3 oz)  GI/Nutrition:  Orders Placed This Encounter   Procedures   • DIET ORDER     Standing Status: Standing      Number of Occurrences: 1      Standing Expiration Date:      Order Specific Question:  Diet:     Answer:  Renal [8]     Medical  Decision Making, by Problem:  Active Hospital Problems    Diagnosis   • Vitamin D deficiency [E55.9]   • Secondary hyperparathyroidism of renal origin (CMS-[facility]) [N25.81]   • Hyperphosphatemia [E83.39]   • End-stage renal disease (ESRD) (CMS-[facility]) [N18.6]   • Supraventricular tachycardia [I47.1]   • HTN (hypertension) [I10]   • Anemia associated with chronic renal failure [D63.1]     Assessment/Plan:  1.  CKD Stage V  --new ESRD, renal ultrasound confirms small essentially nonfunctional kidneys, she will need permanent dialysis  --Permcath placed in IR 6/16/17 and dialysis initiated  --HD today  --AVF creation surgery - ? Thrill  --Outpatient dialysis arrangements pending  --Dose adjust all meds for decreased GFR    2. HTN  --BP high this am but stable overnight  --Meds changed back to verapamil for control of SVT  --If BP remains high recommend starting either amlodipine or ACE-I/ARB    3. Anemia  --secondary to CKD, acute drop in last 24hrs and no obvious source of bleeding  --Epogen with HD  --Transfused 1 unit PRBC's today    4. Renal Osteodystrophy--phos improved  --Monitor    5. Secondary Hyperparathyroidism  --goal iPTH 150-300  --Monitor for now    6. Vitamin D Deficiency  --continue ergocalciferol    7. Metabolic Acidosis  --resolved with HD    8. H/O SVT  --on verapamil at home  --Monitor    9. Mild Protein-Calorie Malnutrition  --No dietary protein restrictions      Medications reviewed, Labs reviewed and Radiology images reviewed

## 2017-06-21 NOTE — PROGRESS NOTES
[facility] [place] [facility] [place]    Name Isabelle Coyle     1965   Age/Sex 52 y.o. female   MRN [id number]   Code Status full       After 5PM or if no immediate response to page, please call for cross-coverage  Attending/Team: Dr Spain  Call [phone] to page    1st Call - Day Intern (R1):    Dr Moreno 2nd Call - Day Sr. Resident (R2/R3):   Dr Jones        Chief complaint/ reason for interval visit (Primary Diagnosis)    52 year old female with pmhx of HTN, CKD, R BKA, SVT admitted for worsening Renal failure, needing HD.     Interval Problem Daily Status Update     No new complains, Underwent AV fistula creation yesterday, thrill is not palpable. Dr Davenport assessed the AVF with doppler after surgery. Spoke with SW, outpatient dialysis set up is still pending. Hb stable, work up negative for hemolysis.. Blood pressure somewhat controlled.         Review of Systems   Constitutional: Negative for fever and chills.   Eyes: Negative for blurred vision.   Respiratory: Positive for cough. Negative for hemoptysis, sputum production and shortness of breath.    Cardiovascular: Negative for chest pain, palpitations and leg swelling.   Gastrointestinal: Negative for nausea, vomiting and abdominal pain.   Genitourinary: Negative for dysuria.   Musculoskeletal: Negative for back pain.   Skin: Negative for itching.   Neurological: Negative for dizziness and headaches.   Psychiatric/Behavioral: Negative for depression.       Consultants/Specialty  Nephrology - Dr Ballard   Vascular surgery - Dr Davenport    Disposition  Inpatient -likely discharge tomorrow once outpatient dialysis established    Quality Measures  EKG reviewed  Murdock catheter: No Murdock      DVT Prophylaxis: Heparin and Contraindicated - Anemia requiring blood transfusion    Ulcer prophylaxis: No              Physical Exam       Filed Vitals:    17 2200 17 0006 17 0430 17 1201   BP:   144/91 161/94 155/88   Pulse:  78 80 82   Temp:  36.9 °C (98.5 °F) 36.8 °C (98.3 °F) 36.9 °C (98.5 °F)   Resp:  18 16 16   Height:       Weight: 66.8 kg (147 lb 4.3 oz)      SpO2:  96% 95% 97%     Body mass index is 26.93 kg/(m^2). Weight: 66.8 kg (147 lb 4.3 oz)  Oxygen Therapy:  Pulse Oximetry: 97 %, O2 (LPM): 0, O2 Delivery: None (Room Air)    Physical Exam  Constitutional: She is oriented to person, place, and time and well-developed, well-nourished, and in no distress. No distress.   HENT:    Head: Normocephalic and atraumatic.   Eyes: Pupils are equal, round, and reactive to light. Right eye exhibits no discharge. Left eye exhibits no discharge.   Neck: Neck supple.   Cardiovascular: Regular rhythm.  Exam reveals no gallop and no friction rub.     No murmur heard.  Pulmonary/Chest: Effort normal and breath sounds normal. No respiratory distress. She has no wheezes.   Abdominal: Soft. Bowel sounds are normal. She exhibits no distension. There is no tenderness.   Musculoskeletal: She exhibits no edema.   R BKA, prosthesis in place. Left forearm surgical wound, no active bleeding, tenderness, unable to palpate thrill, no bruit heard.   Neurological: She is alert and oriented to person, place, and time.   Skin: She is not diaphoretic.       Lab Data Review:      6/19/2017  7:51 AM    Recent Labs      06/19/17 0419 06/20/17   0356  06/21/17   0451   SODIUM  137  136  135   POTASSIUM  4.6  4.2  4.6   CHLORIDE  103  101  100   CO2  25  25  24   BUN  35*  26*  37*   CREATININE  7.70*  5.39*  7.84*   CALCIUM  8.3*  8.0*  8.1*       Recent Labs      06/19/17 0419 06/20/17   0356  06/20/17   1319  06/21/17   0451   ALTSGPT   --    --    --   <5   ASTSGOT   --    --    --   9*   ALKPHOSPHAT   --    --    --   57   TBILIRUBIN   --    --    --   0.3   DBILIRUBIN   --    --   <0.1   --    GLUCOSE  100*  102*   --   185*       Recent Labs      06/19/17   1622  06/20/17   0356  06/21/17   0451   RBC  3.52*  3.33*  3.32*    HEMOGLOBIN  10.2*  9.5*  9.7*   HEMATOCRIT  30.1*  29.2*  30.1*   PLATELETCT  172  157*  183   IRON  86   --    --    [facility]  288   --    --        Recent Labs      06/19/17   1622  06/20/17   0356  06/21/17   0451   WBC  5.6  6.6  6.7   NEUTSPOLYS  54.90  66.20  92.80*   LYMPHOCYTES  30.50  19.60*  4.50*   MONOCYTES  11.10  11.70  2.10   EOSINOPHILS  2.30  1.70  0.10   BASOPHILS  0.50  0.50  0.10   ASTSGOT   --    --   9*   ALTSGPT   --    --   <5   ALKPHOSPHAT   --    --   57   TBILIRUBIN   --    --   0.3           Assessment/Plan       # Newly diagnosed ESRD    likely secondary to chronic HTN + chronic NSAID intake.     USG renal: small b/l atrophic kidney consistent with medical renal disease.     nephro on board, on HD   AVF placement by vascular surgery - yesterday    Plan   HD per Nephrology recommendations while inpatient  Pending outpatient dialysis set up     # Anemia related to ESRD, vs ?Hemolysis  (6/16/17) Fe 228 (H), TIBC 256, % sat 89 (H), Fe 49 on the same day, likely lab error,  repeat Serum Iron and percentage saturation - most consistent with AOCD  Presented with Hb 6.9 received total of 2 PRBC, most recent Hb 9.7    no active bleeding from any source noted.    stool occult blood negative  No laboratory evidence of Hemolysis    Plan   continue EPO per nephro recs and prn transfuse if Hb<7.    # Thrombocytopenia - resolved   HIT score - 1, low probability at this time  Platelets 183      # Secondary hyperparathyroidism   (6/16/17) .5 (H), likely secondary to ESRD.     treat ESRD as mentioned above.       # Vitamin D deficiency     (6/16/17) 25 OH vit D 10(L), likely secondary to renal disease.  continue vit D 50,000 IU q weekly.       # Hypocalcemia   (6/16/17):  Total corrected calcium 8.48, ionized calcium 1 (slightly low, nl is 1.1 - 1.3).     likely secondary to vit D deficiency + chronic renal failure.     slightly low ionized calcium, pt asymptomatic -  no need for oral calcium  supplements at this point.     continue vit D supplements    # Hyperphosphatemia -- resolved   likely secondary to ESRD.   s/p HD.   PO4 6.2 -> 3.9.       # Supraventricular tachycardia - Resolved   6/17/17 : an episode of SVT with 's this morning, IV dilt 10 mg was given once, HR back to sinus rhythm.     echo (6/17/17): EF 55%, mildly dilated LA, mild aortic insufficiency, mild MR, mitral annular calcification, mild TR, RVSP 35 mmHg.     continue po verapamil 80 mg tid.     keep Mg > 2 and K > 4.       # Hypertension   BP currently controlled.     po verapamil 80 mg tid, po labetalol 100 mg tid.     prn metoprolol for SBP > 190 and / or DBP > 110.       # Elevated troponin  Pt asymptomatic - no chest pain.     EKG w/o acute ST/T wave changes.   elevated trop likely related to ESRD.   trop trending down 0.12 -> 0.11.   YAQUELIN score 1, HEART score 3 - low risk for MACE.        # High anion gap metabolic acidosis -- resolved   likely due to acute on chronic CKD / ESRD.      # Hx of Rt BKA  related to remote accident / trauma.

## 2017-06-21 NOTE — CARE PLAN
Problem: Knowledge Deficit  Goal: Knowledge of disease process/condition, treatment plan, diagnostic tests, and medications will improve  Outcome: PROGRESSING AS EXPECTED  Educated patient regarding plan of care at bedside, patient verbalized understanding.

## 2017-06-21 NOTE — CARE PLAN
Problem: Safety  Goal: Will remain free from injury  Outcome: PROGRESSING AS EXPECTED  Educated patient regarding fall preventions. Bed is locked and in lowest position,  call light within reach, encouraged to call for assistance.

## 2017-06-21 NOTE — DISCHARGE PLANNING
Per Audrey parada [place] DELICIA, records are pending review and should have a chair time confirmed tomorrow.  Per VIRGEN Serrato, pt likely to DC tomorrow if medically cleared.  Pt may need assistance from  with transportation; per VIRGEN Serrato, she will look into pt's MTM benefits.

## 2017-06-21 NOTE — PROGRESS NOTES
3hr HD started @ 0820 and completed @ 1144,dialyzer blood leak noted @ the start of tx,blood not returned,tx restarted with new set and completed w/o further problem.Net UF =1100ml,RIJ CVC dressing due for change today,primary RN notified.No bruit/thrill appreciated on LFAAVF,Dr Mcelroy notified,MD also notified of blood leak incident.VSS,tx well tolerated,report given to Brigitte Guy RN.

## 2017-06-21 NOTE — PROGRESS NOTES
Received report from day shift at bedside. Patient is A&O X 4 in no distress, bed is locked and at lowest position, call light within reach encouraged to call for assistance.

## 2017-06-22 LAB
ANION GAP SERPL CALC-SCNC: 13 MMOL/L (ref 0–11.9)
BASOPHILS # BLD AUTO: 0.4 % (ref 0–1.8)
BASOPHILS # BLD: 0.04 K/UL (ref 0–0.12)
BUN SERPL-MCNC: 27 MG/DL (ref 8–22)
CALCIUM SERPL-MCNC: 8.4 MG/DL (ref 8.5–10.5)
CHLORIDE SERPL-SCNC: 101 MMOL/L (ref 96–112)
CO2 SERPL-SCNC: 26 MMOL/L (ref 20–33)
CREAT SERPL-MCNC: 5.17 MG/DL (ref 0.5–1.4)
EOSINOPHIL # BLD AUTO: 0.06 K/UL (ref 0–0.51)
EOSINOPHIL NFR BLD: 0.7 % (ref 0–6.9)
ERYTHROCYTE [DISTWIDTH] IN BLOOD BY AUTOMATED COUNT: 47.4 FL (ref 35.9–50)
GFR SERPL CREATININE-BSD FRML MDRD: 9 ML/MIN/1.73 M 2
GLUCOSE SERPL-MCNC: 95 MG/DL (ref 65–99)
HCT VFR BLD AUTO: 29.9 % (ref 37–47)
HGB BLD-MCNC: 9.4 G/DL (ref 12–16)
IMM GRANULOCYTES # BLD AUTO: 0.03 K/UL (ref 0–0.11)
IMM GRANULOCYTES NFR BLD AUTO: 0.3 % (ref 0–0.9)
LYMPHOCYTES # BLD AUTO: 1.71 K/UL (ref 1–4.8)
LYMPHOCYTES NFR BLD: 18.7 % (ref 22–41)
MCH RBC QN AUTO: 28.7 PG (ref 27–33)
MCHC RBC AUTO-ENTMCNC: 31.4 G/DL (ref 33.6–35)
MCV RBC AUTO: 91.4 FL (ref 81.4–97.8)
MONOCYTES # BLD AUTO: 0.84 K/UL (ref 0–0.85)
MONOCYTES NFR BLD AUTO: 9.2 % (ref 0–13.4)
NEUTROPHILS # BLD AUTO: 6.46 K/UL (ref 2–7.15)
NEUTROPHILS NFR BLD: 70.7 % (ref 44–72)
NRBC # BLD AUTO: 0 K/UL
NRBC BLD AUTO-RTO: 0 /100 WBC
PLATELET # BLD AUTO: 190 K/UL (ref 164–446)
PMV BLD AUTO: 10.4 FL (ref 9–12.9)
POTASSIUM SERPL-SCNC: 4.3 MMOL/L (ref 3.6–5.5)
RBC # BLD AUTO: 3.27 M/UL (ref 4.2–5.4)
SODIUM SERPL-SCNC: 140 MMOL/L (ref 135–145)
WBC # BLD AUTO: 9.1 K/UL (ref 4.8–10.8)

## 2017-06-22 PROCEDURE — 36415 COLL VENOUS BLD VENIPUNCTURE: CPT

## 2017-06-22 PROCEDURE — 770020 HCHG ROOM/CARE - TELE (206)

## 2017-06-22 PROCEDURE — 99232 SBSQ HOSP IP/OBS MODERATE 35: CPT | Mod: GC | Performed by: HOSPITALIST

## 2017-06-22 PROCEDURE — 700102 HCHG RX REV CODE 250 W/ 637 OVERRIDE(OP): Performed by: HOSPITALIST

## 2017-06-22 PROCEDURE — A9270 NON-COVERED ITEM OR SERVICE: HCPCS | Performed by: HOSPITALIST

## 2017-06-22 PROCEDURE — A9270 NON-COVERED ITEM OR SERVICE: HCPCS | Performed by: STUDENT IN AN ORGANIZED HEALTH CARE EDUCATION/TRAINING PROGRAM

## 2017-06-22 PROCEDURE — 85025 COMPLETE CBC W/AUTO DIFF WBC: CPT

## 2017-06-22 PROCEDURE — 700102 HCHG RX REV CODE 250 W/ 637 OVERRIDE(OP): Performed by: INTERNAL MEDICINE

## 2017-06-22 PROCEDURE — 80048 BASIC METABOLIC PNL TOTAL CA: CPT

## 2017-06-22 PROCEDURE — 700101 HCHG RX REV CODE 250: Performed by: HOSPITALIST

## 2017-06-22 PROCEDURE — A9270 NON-COVERED ITEM OR SERVICE: HCPCS | Performed by: INTERNAL MEDICINE

## 2017-06-22 PROCEDURE — 700102 HCHG RX REV CODE 250 W/ 637 OVERRIDE(OP): Performed by: STUDENT IN AN ORGANIZED HEALTH CARE EDUCATION/TRAINING PROGRAM

## 2017-06-22 RX ORDER — CARVEDILOL 6.25 MG/1
6.25 TABLET ORAL 2 TIMES DAILY WITH MEALS
Status: DISCONTINUED | OUTPATIENT
Start: 2017-06-22 | End: 2017-06-23 | Stop reason: HOSPADM

## 2017-06-22 RX ORDER — CARVEDILOL 3.12 MG/1
3.12 TABLET ORAL ONCE
Status: COMPLETED | OUTPATIENT
Start: 2017-06-22 | End: 2017-06-22

## 2017-06-22 RX ORDER — HYDRALAZINE HYDROCHLORIDE 20 MG/ML
20 INJECTION INTRAMUSCULAR; INTRAVENOUS EVERY 4 HOURS PRN
Status: DISCONTINUED | OUTPATIENT
Start: 2017-06-22 | End: 2017-06-23 | Stop reason: HOSPADM

## 2017-06-22 RX ADMIN — LABETALOL HYDROCHLORIDE 100 MG: 100 TABLET, FILM COATED ORAL at 05:15

## 2017-06-22 RX ADMIN — FAMOTIDINE 20 MG: 20 TABLET, FILM COATED ORAL at 08:59

## 2017-06-22 RX ADMIN — CARVEDILOL 3.12 MG: 3.12 TABLET, FILM COATED ORAL at 15:16

## 2017-06-22 RX ADMIN — VERAPAMIL HYDROCHLORIDE 80 MG: 80 TABLET, FILM COATED ORAL at 21:07

## 2017-06-22 RX ADMIN — LABETALOL HYDROCHLORIDE 20 MG: 5 INJECTION, SOLUTION INTRAVENOUS at 04:09

## 2017-06-22 RX ADMIN — CARVEDILOL 6.25 MG: 6.25 TABLET, FILM COATED ORAL at 18:07

## 2017-06-22 RX ADMIN — VERAPAMIL HYDROCHLORIDE 80 MG: 80 TABLET, FILM COATED ORAL at 15:16

## 2017-06-22 RX ADMIN — VERAPAMIL HYDROCHLORIDE 80 MG: 80 TABLET, FILM COATED ORAL at 05:15

## 2017-06-22 RX ADMIN — FAMOTIDINE 20 MG: 20 TABLET, FILM COATED ORAL at 21:07

## 2017-06-22 RX ADMIN — SENNOSIDES AND DOCUSATE SODIUM 2 TABLET: 8.6; 5 TABLET ORAL at 08:59

## 2017-06-22 RX ADMIN — ALPRAZOLAM 0.25 MG: 0.25 TABLET ORAL at 02:45

## 2017-06-22 ASSESSMENT — ENCOUNTER SYMPTOMS
RESPIRATORY NEGATIVE: 1
SHORTNESS OF BREATH: 0
DEPRESSION: 0
HEADACHES: 0
DIZZINESS: 0
EYES NEGATIVE: 1
NAUSEA: 1
BLURRED VISION: 0
BACK PAIN: 0
LOSS OF CONSCIOUSNESS: 0
HEMOPTYSIS: 0
SPUTUM PRODUCTION: 0
ABDOMINAL PAIN: 0
WEAKNESS: 1
PSYCHIATRIC NEGATIVE: 1
PALPITATIONS: 0
CONSTIPATION: 0
FOCAL WEAKNESS: 0
VOMITING: 0
DIARRHEA: 0
NAUSEA: 0
FEVER: 0
COUGH: 1
CHILLS: 0
MUSCULOSKELETAL NEGATIVE: 1
CARDIOVASCULAR NEGATIVE: 1

## 2017-06-22 ASSESSMENT — PAIN SCALES - GENERAL
PAINLEVEL_OUTOF10: 0

## 2017-06-22 ASSESSMENT — LIFESTYLE VARIABLES
DO YOU DRINK ALCOHOL: NO
DO YOU DRINK ALCOHOL: NO

## 2017-06-22 NOTE — DISCHARGE PLANNING
TC to Neetu NESBITT to request update on OP dialysis referral.  Per Toby, records currently being reviewed.   is out of the office, but Bisi will be able to provide confirmation of chair time.  Await call back from Bisi.

## 2017-06-22 NOTE — CARE PLAN
Problem: Safety  Goal: Will remain free from injury  Outcome: PROGRESSING AS EXPECTED  Educated patient regarding fall preventions. Bed is locked and in lowest position, call light within reach, non-slip foot wear in place, encouraged patient to call for assistance.

## 2017-06-22 NOTE — ASSESSMENT & PLAN NOTE
(6/16/17) Fe 228 (H), TIBC 256, % sat 89 (H), Fe 49 on the same day, likely lab error,  repeat Serum Iron and percentage saturation - most consistent with AOCD  Presented with Hb 6.9 received total of 2 PRBC, most recent Hb 9.7     no active bleeding from any source noted.    stool occult blood negative  No laboratory evidence of Hemolysis    Plan    continue EPO per nephro recs and prn transfuse if Hb<7.

## 2017-06-22 NOTE — ASSESSMENT & PLAN NOTE
BP elevated, she also has anxiety, exaggerated with HD contributing to HTN     po verapamil 80 mg tid,    Labetalol PO switched to Carvedilol due to low cost 6.25 mg BID  Will monitor response and consider adding another agent  Xanax PRN BID for anxiety

## 2017-06-22 NOTE — CARE PLAN
Problem: Infection  Goal: Will remain free from infection  Outcome: PROGRESSING AS EXPECTED  Patient's dressing over her dialysis cathter had a gauze dressing. Dressing was changed with sterile field and Biopatch applied. Insertion site clean with chloroprep swab in kit, and patient did not tolerate well. She stated that it felt like it was burning and made her eyes water. Site was completely dry before applying the Tegaderm.

## 2017-06-22 NOTE — PROGRESS NOTES
Patient was sensitive to the chloroprep wand in the central line dressing kit. She stated that it was burring very bad.

## 2017-06-22 NOTE — PROGRESS NOTES
Received report from day shift nurse at bedside. Patient is A&O X 4, bed is locked and at lowest position, call light within reach.

## 2017-06-22 NOTE — CARE PLAN
Problem: Nutritional:  Goal: Achieve adequate nutritional intake  Patient will consume 50% of meals   Outcome: PROGRESSING AS EXPECTED  Intervention: Monitor PO intake, weights, and laboratory values  Pt was sleeping heavily upon visit. Pt consumed % of a snack, no other meals have been documented. Please document PO intake as a percentage of meals consumed.

## 2017-06-22 NOTE — PROGRESS NOTES
[facility] Internal Medicine Interval Note    Name Isabelle Coyle     1965   Age/Sex 52 y.o. female   MRN [id number]   Code Status full       After 5PM or if no immediate response to page, please call for cross-coverage  Attending/Team: Dr Spain  Call [phone] to page    1st Call - Day Intern (R1):    Dr Moreno 2nd Call - Day Sr. Resident (R2/R3):   Dr Jones        Chief complaint/ reason for interval visit (Primary Diagnosis)    52 year old female with pmhx of HTN, CKD, R BKA, SVT admitted for worsening Renal failure, needing HD.     Interval Problem Daily Status Update     No new complains. Blood pressure elevated early in the morning. Switched to carvedilol instead of lebetalol due to low cost.will monitor blood pressure. Outpatient dialysis arrangement still pending.        Review of Systems   Constitutional: Negative for fever and chills.   Eyes: Negative for blurred vision.   Respiratory: Positive for cough. Negative for hemoptysis, sputum production and shortness of breath.    Cardiovascular: Negative for chest pain, palpitations and leg swelling.   Gastrointestinal: Negative for nausea, vomiting and abdominal pain.   Genitourinary: Negative for dysuria.   Musculoskeletal: Negative for back pain.   Skin: Negative for itching.   Neurological: Negative for dizziness and headaches.   Psychiatric/Behavioral: Negative for depression.       Consultants/Specialty  Nephrology - Dr Ballard   Vascular surgery - Dr Davenport    Disposition  Inpatient -likely discharge tomorrow once outpatient dialysis established    Quality Measures  EKG reviewed  Murdock catheter: No Murdock      DVT Prophylaxis: Heparin and Contraindicated - Anemia requiring blood transfusion    Ulcer prophylaxis: No              Physical Exam       Filed Vitals:    17 0400 17 0500 17 0620 17 0821   BP: 172/96 188/90 136/76 164/80   Pulse: 82 80 80 73   Temp: 36.6 °C (97.9 °F)   36.3  °C (97.4 °F)   Resp: 18   18   Height:       Weight:       SpO2: 98%   96%     Body mass index is 27.41 kg/(m^2). Weight: 68 kg (149 lb 14.6 oz)  Oxygen Therapy:  Pulse Oximetry: 96 %, O2 (LPM): 0, O2 Delivery: None (Room Air)    Physical Exam  Constitutional: She is oriented to person, place, and time and well-developed, well-nourished, and in no distress. No distress.   HENT:    Head: Normocephalic and atraumatic.   Eyes: Pupils are equal, round, and reactive to light. Right eye exhibits no discharge. Left eye exhibits no discharge.   Neck: Neck supple.   Cardiovascular: Regular rhythm.  Exam reveals no gallop and no friction rub.     No murmur heard.  Pulmonary/Chest: Effort normal and breath sounds normal. No respiratory distress. She has no wheezes.   Abdominal: Soft. Bowel sounds are normal. She exhibits no distension. There is no tenderness.   Musculoskeletal: She exhibits no edema.   R BKA, prosthesis in place. Left forearm surgical wound, no active bleeding, tenderness, unable to palpate thrill, no bruit heard.   Neurological: She is alert and oriented to person, place, and time.   Skin: She is not diaphoretic.       Lab Data Review:      6/19/2017  7:51 AM    Recent Labs      06/20/17 0356 06/21/17 0451 06/22/17   0325   SODIUM  136  135  140   POTASSIUM  4.2  4.6  4.3   CHLORIDE  101  100  101   CO2  25  24  26   BUN  26*  37*  27*   CREATININE  5.39*  7.84*  5.17*   CALCIUM  8.0*  8.1*  8.4*       Recent Labs      06/20/17   0356  06/20/17   1319  06/21/17   0451 06/22/17   0325   ALTSGPT   --    --   <5   --    ASTSGOT   --    --   9*   --    ALKPHOSPHAT   --    --   57   --    TBILIRUBIN   --    --   0.3   --    DBILIRUBIN   --   <0.1   --    --    GLUCOSE  102*   --   185*  95       Recent Labs      06/19/17   1622  06/20/17   0356  06/21/17   0451  06/22/17   0325   RBC  3.52*  3.33*  3.32*  3.27*   HEMOGLOBIN  10.2*  9.5*  9.7*  9.4*   HEMATOCRIT  30.1*  29.2*  30.1*  29.9*   PLATELETCT  172   157*  183  190   IRON  86   --    --    --    TOTIRONBC  288   --    --    --        Recent Labs      06/20/17   0356  06/21/17   0451  06/22/17   0325   WBC  6.6  6.7  9.1   NEUTSPOLYS  66.20  92.80*  70.70   LYMPHOCYTES  19.60*  4.50*  18.70*   MONOCYTES  11.70  2.10  9.20   EOSINOPHILS  1.70  0.10  0.70   BASOPHILS  0.50  0.10  0.40   ASTSGOT   --   9*   --    ALTSGPT   --   <5   --    ALKPHOSPHAT   --   57   --    TBILIRUBIN   --   0.3   --            Assessment/Plan       # Newly diagnosed ESRD    likely secondary to chronic HTN + chronic NSAID intake.     USG renal: small b/l atrophic kidney consistent with medical renal disease.     nephro on board, on HD   AVF placement by vascular surgery - yesterday    Plan   HD per Nephrology recommendations while inpatient  Pending outpatient dialysis set up       # Hypertension   BP elevated, she also has anxiety, exaggerated with HD contributing to HTN    po verapamil 80 mg tid,   Labetalol PO switched to Carvedilol due to low cost 6.25 mg BID  Will monitor response and consider adding another agent  Xanax PRN BID for anxiety    # Anemia related to ESRD   (6/16/17) Fe 228 (H), TIBC 256, % sat 89 (H), Fe 49 on the same day, likely lab error,  repeat Serum Iron and percentage saturation - most consistent with AOCD  Presented with Hb 6.9 received total of 2 PRBC, most recent Hb 9.7    no active bleeding from any source noted.    stool occult blood negative  No laboratory evidence of Hemolysis    Plan   continue EPO per nephro recs and prn transfuse if Hb<7.    # Thrombocytopenia - resolved   HIT score - 1, low probability at this time  Platelets 183      # Secondary hyperparathyroidism   (6/16/17) .5 (H), likely secondary to ESRD.     treat ESRD as mentioned above.       # Vitamin D deficiency     (6/16/17) 25 OH vit D 10(L), likely secondary to renal disease.  continue vit D 50,000 IU q weekly.       # Hypocalcemia   (6/16/17):  Total corrected calcium 8.48,  ionized calcium 1 (slightly low, nl is 1.1 - 1.3).     likely secondary to vit D deficiency + chronic renal failure.     slightly low ionized calcium, pt asymptomatic -  no need for oral calcium supplements at this point.     continue vit D supplements    # Hyperphosphatemia -- resolved   likely secondary to ESRD.   s/p HD.   PO4 6.2 -> 3.9.       # Supraventricular tachycardia - Resolved   6/17/17 : an episode of SVT with 's this morning, IV dilt 10 mg was given once, HR back to sinus rhythm.     echo (6/17/17): EF 55%, mildly dilated LA, mild aortic insufficiency, mild MR, mitral annular calcification, mild TR, RVSP 35 mmHg.     continue po verapamil 80 mg tid.     keep Mg > 2 and K > 4.           # Elevated troponin  Pt asymptomatic - no chest pain.     EKG w/o acute ST/T wave changes.   elevated trop likely related to ESRD.   trop trending down 0.12 -> 0.11.    YAQUELIN score 1, HEART score 3       # High anion gap metabolic acidosis -- resolved   likely due to acute on chronic CKD / ESRD.      # Hx of Rt BKA  related to remote accident / trauma.

## 2017-06-22 NOTE — ASSESSMENT & PLAN NOTE
likely secondary to chronic HTN + chronic NSAID intake.     USG renal: small b/l atrophic kidney consistent with medical renal disease.     nephro on board, on HD   AVF placement by vascular surgery - yesterday    Plan    HD per Nephrology recommendations while inpatient  Pending outpatient dialysis set up

## 2017-06-22 NOTE — CARE PLAN
Problem: Knowledge Deficit  Goal: Knowledge of disease process/condition, treatment plan, diagnostic tests, and medications will improve  Outcome: PROGRESSING AS EXPECTED  Patient educated on the lifestyle changes that may have to be made in order to qualify for future organ transplant. Also, informed on her POC. Patient states that she wants to go home. POC discussed, patient agreeable with her POC.

## 2017-06-22 NOTE — ASSESSMENT & PLAN NOTE
6/17/17 : an episode of SVT with 's this morning, IV dilt 10 mg was given once, HR back to sinus rhythm.     echo (6/17/17): EF 55%, mildly dilated LA, mild aortic insufficiency, mild MR, mitral annular calcification, mild TR, RVSP 35 mmHg.     continue po verapamil 80 mg tid.     keep Mg > 2 and K > 4.

## 2017-06-22 NOTE — ASSESSMENT & PLAN NOTE
(6/16/17):  Total corrected calcium 8.48, ionized calcium 1 (slightly low, nl is 1.1 - 1.3).     likely secondary to vit D deficiency + chronic renal failure.     slightly low ionized calcium, pt asymptomatic -  no need for oral calcium supplements at this point.     continue vit D supplements

## 2017-06-22 NOTE — PROGRESS NOTES
[facility] Nephrology Progress Note               Author: Kvng Mcelroy M.D. Date & Time created: 6/22/2017  10:21 AM     Interval History:  The patient is a 52-year-old  woman with a known history of CKD.  Note, etiology of said CKD is unknown.  She has had chronic elevation of her creatinine for years.  She has been followed in our office by Dr. Martinez and Dr. Junior.  She does not recall the cause of her kidney failure, but reviewing her laboratory shows a creatinine greater than 2 for more than 10 years.  She has not been under the care of a physician though for approximately 2 years.  She presented to the emergency room complaining of nausea, vomiting, malaise, fatigue.  Laboratories done at that time showed a creatinine of 15, BUN of 125, potassium of 4, hemoglobin of 7.  Given these findings, she was admitted to the telemetry cook for initiation of hemodialysis.     As noted, the patient does not know or recall why she has been told she has kidney failure.  She has had long history of hypertension and as I noted above she has had a creatinine greater than 2 for more than 10 years.  She does admit to taking ibuprofen daily for her arthritis despite being told in the past not to.  She is also supposed to take lisinopril for her hypertension, but she has not taken it for more than a year.  She has had no recent contrast problems.  No history of renal stones.  There is no family history of renal disease.    DAILY NEPHROLOGY SUMMARY  6/17/17--Rapid response called overnight for SVT and this resolved with IV diltiazem push and PO verapamil resumed, tolerated HD yesterday without issues, BP stable this am, tolerated PO last night, seen on dialysis today and HR stable  6/18/17--No events, tolerated HD yesterday with 1L UF, Hgb dropped from 8.5 yesterday to 6.9 today, no obvious source of blood loss, AVF surgery cancelled, BP elevated this am but stable overnight, feels ok  6/19/17 - HD today ordered.   Giving another unit PRBC's  6/20/17 - s/p HD w blood yesterday.  AVF surgery.  Next HD tomorrow  6/21/17 - HD  Today - s/p AVF but I cant feel thrill.    6/22/17 - No new renal issues.  Awaiting outpatient dialysis chair.  Not CCPD candidate currently based on last 2 years non compliance.      Review of Systems   Constitutional: Positive for malaise/fatigue. Negative for fever and chills.   HENT: Negative.    Eyes: Negative.    Respiratory: Negative.    Cardiovascular: Negative.    Gastrointestinal: Positive for nausea. Negative for vomiting, abdominal pain, diarrhea and constipation.   Genitourinary: Negative.    Musculoskeletal: Negative.    Skin: Negative.    Neurological: Positive for weakness. Negative for dizziness, focal weakness and loss of consciousness.   Endo/Heme/Allergies: Negative.    Psychiatric/Behavioral: Negative.          Physical Exam   Constitutional: She is oriented to person, place, and time. She appears well-developed and well-nourished. No distress.   HENT:   Head: Normocephalic and atraumatic.   Mouth/Throat: No oropharyngeal exudate.   Eyes: Conjunctivae and EOM are normal. Pupils are equal, round, and reactive to light. No scleral icterus.   Neck: Normal range of motion. Neck supple. No thyromegaly present.   Cardiovascular: Normal rate and regular rhythm.    No murmur heard.  Pulmonary/Chest: Effort normal and breath sounds normal. No respiratory distress. She has no wheezes.   +R Chest PC   Abdominal: Soft. Bowel sounds are normal. She exhibits no distension. There is no tenderness.   Musculoskeletal: Normal range of motion. She exhibits no edema or tenderness.   +R BKA   Neurological: She is alert and oriented to person, place, and time. She exhibits normal muscle tone.   Skin: Skin is warm and dry. No erythema.   Psychiatric: She has a normal mood and affect. Her behavior is normal.   Nursing note and vitals reviewed.      Labs:        Invalid input(s): [id number]      Recent Labs       17   0356  17   0325   SODIUM  136  135  140   POTASSIUM  4.2  4.6  4.3   CHLORIDE  101  100  101   CO2  25  24  26   BUN  26*  37*  27*   CREATININE  5.39*  7.84*  5.17*   CALCIUM  8.0*  8.1*  8.4*     Recent Labs      17   0356  17   1319  17   0325   ALTSGPT   --    --   <5   --    ASTSGOT   --    --   9*   --    ALKPHOSPHAT   --    --   57   --    TBILIRUBIN   --    --   0.3   --    DBILIRUBIN   --   <0.1   --    --    GLUCOSE  102*   --   185*  95     Recent Labs      17   1622  17   RBC  3.52*  3.33*  3.32*  3.27*   HEMOGLOBIN  10.2*  9.5*  9.7*  9.4*   HEMATOCRIT  30.1*  29.2*  30.1*  29.9*   PLATELETCT  172  157*  183  190   IRON  86   --    --    --    TOTIRONBC  288   --    --    --      Recent Labs      17   WBC  6.6  6.7  9.1   NEUTSPOLYS  66.20  92.80*  70.70   LYMPHOCYTES  19.60*  4.50*  18.70*   MONOCYTES  11.70  2.10  9.20   EOSINOPHILS  1.70  0.10  0.70   BASOPHILS  0.50  0.10  0.40   ASTSGOT   --   9*   --    ALTSGPT   --   <5   --    ALKPHOSPHAT   --   57   --    TBILIRUBIN   --   0.3   --            Hemodynamics:  Temp (24hrs), Av.6 °C (97.9 °F), Min:36.1 °C (97 °F), Max:37 °C (98.6 °F)  Temperature: 36.3 °C (97.4 °F)  Pulse  Av.3  Min: 70  Max: 178   Blood Pressure: (!) 164/80 mmHg (RN notified)     Respiratory:    Respiration: 18, Pulse Oximetry: 96 %        RUL Breath Sounds: Clear, RML Breath Sounds: Clear, RLL Breath Sounds: Clear, PHILIP Breath Sounds: Clear, LLL Breath Sounds: Clear  Fluids:    Intake/Output Summary (Last 24 hours) at 17 1021  Last data filed at 17 0500   Gross per 24 hour   Intake    800 ml   Output   1600 ml   Net   -800 ml     Weight: 68 kg (149 lb 14.6 oz)  GI/Nutrition:  Orders Placed This Encounter   Procedures   • DIET ORDER     Standing Status: Standing      Number of Occurrences:  1      Standing Expiration Date:      Order Specific Question:  Diet:     Answer:  Renal [8]     Medical Decision Making, by Problem:  Active Hospital Problems    Diagnosis   • Vitamin D deficiency [E55.9]   • Secondary hyperparathyroidism of renal origin (CMS-HCC) [N25.81]   • Hyperphosphatemia [E83.39]   • End-stage renal disease (ESRD) (CMS-[facility]) [N18.6]   • Supraventricular tachycardia [I47.1]   • HTN (hypertension) [I10]   • Anemia associated with chronic renal failure [D63.1]     Assessment/Plan:  1.  CKD Stage V  --new ESRD, renal ultrasound confirms small essentially nonfunctional kidneys, she will need permanent dialysis  --Permcath placed in IR 6/16/17 and dialysis initiated  --HD tomorrow  --s/p AVF creation surgery - ? Thrill  --Outpatient dialysis arrangements pending  --Dose adjust all meds for decreased GFR    2. HTN  --BP high this am   --Meds changed back to verapamil for control of SVT  --If BP remains high recommend starting either amlodipine or ACE-I/ARB    3. Anemia  - stable  --secondary to CKD  --Epogen with HD    4. Renal Osteodystrophy  --phos improved w dialysis  --Monitor    5. Secondary Hyperparathyroidism  --goal iPTH 150-300  --Monitor for now    6. Vitamin D Deficiency  --continue ergocalciferol    7. Metabolic Acidosis  --resolved with HD    8. H/O SVT  --on verapamil at home  --Monitor    9. Mild Protein-Calorie Malnutrition  --No dietary protein restrictions      Medications reviewed, Labs reviewed and Radiology images reviewed

## 2017-06-23 VITALS
HEART RATE: 94 BPM | RESPIRATION RATE: 16 BRPM | TEMPERATURE: 98.2 F | OXYGEN SATURATION: 95 % | WEIGHT: 151.24 LBS | DIASTOLIC BLOOD PRESSURE: 89 MMHG | SYSTOLIC BLOOD PRESSURE: 168 MMHG | BODY MASS INDEX: 27.83 KG/M2 | HEIGHT: 62 IN

## 2017-06-23 LAB
ANION GAP SERPL CALC-SCNC: 12 MMOL/L (ref 0–11.9)
BUN SERPL-MCNC: 46 MG/DL (ref 8–22)
CALCIUM SERPL-MCNC: 7.9 MG/DL (ref 8.5–10.5)
CHLORIDE SERPL-SCNC: 102 MMOL/L (ref 96–112)
CO2 SERPL-SCNC: 24 MMOL/L (ref 20–33)
CREAT SERPL-MCNC: 7.2 MG/DL (ref 0.5–1.4)
GFR SERPL CREATININE-BSD FRML MDRD: 6 ML/MIN/1.73 M 2
GLUCOSE SERPL-MCNC: 92 MG/DL (ref 65–99)
POTASSIUM SERPL-SCNC: 4.4 MMOL/L (ref 3.6–5.5)
SODIUM SERPL-SCNC: 138 MMOL/L (ref 135–145)

## 2017-06-23 PROCEDURE — 700102 HCHG RX REV CODE 250 W/ 637 OVERRIDE(OP): Performed by: HOSPITALIST

## 2017-06-23 PROCEDURE — 36415 COLL VENOUS BLD VENIPUNCTURE: CPT

## 2017-06-23 PROCEDURE — 700111 HCHG RX REV CODE 636 W/ 250 OVERRIDE (IP)

## 2017-06-23 PROCEDURE — 700102 HCHG RX REV CODE 250 W/ 637 OVERRIDE(OP): Performed by: INTERNAL MEDICINE

## 2017-06-23 PROCEDURE — 80048 BASIC METABOLIC PNL TOTAL CA: CPT

## 2017-06-23 PROCEDURE — 700102 HCHG RX REV CODE 250 W/ 637 OVERRIDE(OP): Performed by: STUDENT IN AN ORGANIZED HEALTH CARE EDUCATION/TRAINING PROGRAM

## 2017-06-23 PROCEDURE — A9270 NON-COVERED ITEM OR SERVICE: HCPCS | Performed by: INTERNAL MEDICINE

## 2017-06-23 PROCEDURE — 90935 HEMODIALYSIS ONE EVALUATION: CPT

## 2017-06-23 PROCEDURE — 99239 HOSP IP/OBS DSCHRG MGMT >30: CPT | Mod: GC | Performed by: HOSPITALIST

## 2017-06-23 PROCEDURE — 700111 HCHG RX REV CODE 636 W/ 250 OVERRIDE (IP): Performed by: INTERNAL MEDICINE

## 2017-06-23 PROCEDURE — A9270 NON-COVERED ITEM OR SERVICE: HCPCS | Performed by: HOSPITALIST

## 2017-06-23 PROCEDURE — 700111 HCHG RX REV CODE 636 W/ 250 OVERRIDE (IP): Performed by: HOSPITALIST

## 2017-06-23 PROCEDURE — A9270 NON-COVERED ITEM OR SERVICE: HCPCS | Performed by: STUDENT IN AN ORGANIZED HEALTH CARE EDUCATION/TRAINING PROGRAM

## 2017-06-23 RX ORDER — FAMOTIDINE 20 MG/1
20 TABLET, FILM COATED ORAL 2 TIMES DAILY
Qty: 60 TAB | Refills: 0 | Status: SHIPPED | OUTPATIENT
Start: 2017-06-23 | End: 2017-07-27 | Stop reason: SDUPTHER

## 2017-06-23 RX ORDER — TERAZOSIN 2 MG/1
2 CAPSULE ORAL
Qty: 30 CAP | Refills: 0 | Status: SHIPPED | OUTPATIENT
Start: 2017-06-24 | End: 2017-07-27 | Stop reason: SDUPTHER

## 2017-06-23 RX ORDER — CARVEDILOL 6.25 MG/1
12.5 TABLET ORAL 2 TIMES DAILY WITH MEALS
Qty: 60 TAB | Refills: 2 | Status: SHIPPED | OUTPATIENT
Start: 2017-06-23 | End: 2017-12-27

## 2017-06-23 RX ORDER — HEPARIN SODIUM 1000 [USP'U]/ML
INJECTION, SOLUTION INTRAVENOUS; SUBCUTANEOUS
Status: COMPLETED
Start: 2017-06-23 | End: 2017-06-23

## 2017-06-23 RX ORDER — TERAZOSIN 2 MG/1
2 CAPSULE ORAL ONCE
Status: COMPLETED | OUTPATIENT
Start: 2017-06-23 | End: 2017-06-23

## 2017-06-23 RX ORDER — ERGOCALCIFEROL 1.25 MG/1
50000 CAPSULE ORAL
Qty: 8 CAP | Refills: 0 | Status: SHIPPED | OUTPATIENT
Start: 2017-06-23 | End: 2017-08-24

## 2017-06-23 RX ORDER — VERAPAMIL HYDROCHLORIDE 80 MG/1
80 TABLET ORAL EVERY 8 HOURS
Qty: 90 TAB | Refills: 3 | Status: SHIPPED | OUTPATIENT
Start: 2017-06-23 | End: 2017-07-18

## 2017-06-23 RX ADMIN — HEPARIN SODIUM 2000 UNITS: 1000 INJECTION, SOLUTION INTRAVENOUS; SUBCUTANEOUS at 07:39

## 2017-06-23 RX ADMIN — HYDRALAZINE HYDROCHLORIDE 20 MG: 20 INJECTION INTRAMUSCULAR; INTRAVENOUS at 12:54

## 2017-06-23 RX ADMIN — VERAPAMIL HYDROCHLORIDE 80 MG: 80 TABLET, FILM COATED ORAL at 15:51

## 2017-06-23 RX ADMIN — ACETAMINOPHEN 650 MG: 325 TABLET, FILM COATED ORAL at 08:40

## 2017-06-23 RX ADMIN — CARVEDILOL 6.25 MG: 6.25 TABLET, FILM COATED ORAL at 08:35

## 2017-06-23 RX ADMIN — HYDRALAZINE HYDROCHLORIDE 20 MG: 20 INJECTION INTRAMUSCULAR; INTRAVENOUS at 04:01

## 2017-06-23 RX ADMIN — VERAPAMIL HYDROCHLORIDE 80 MG: 80 TABLET, FILM COATED ORAL at 06:42

## 2017-06-23 RX ADMIN — TERAZOSIN HYDROCHLORIDE ANHYDROUS 2 MG: 2 CAPSULE ORAL at 15:48

## 2017-06-23 RX ADMIN — HEPARIN SODIUM 3700 UNITS: 1000 INJECTION, SOLUTION INTRAVENOUS; SUBCUTANEOUS at 10:52

## 2017-06-23 RX ADMIN — CARVEDILOL 6.25 MG: 6.25 TABLET, FILM COATED ORAL at 15:48

## 2017-06-23 RX ADMIN — ERYTHROPOIETIN 10000 UNITS: 10000 INJECTION, SOLUTION INTRAVENOUS; SUBCUTANEOUS at 10:12

## 2017-06-23 RX ADMIN — ALPRAZOLAM 0.25 MG: 0.25 TABLET ORAL at 04:28

## 2017-06-23 RX ADMIN — FAMOTIDINE 20 MG: 20 TABLET, FILM COATED ORAL at 11:38

## 2017-06-23 ASSESSMENT — ENCOUNTER SYMPTOMS
NAUSEA: 1
LOSS OF CONSCIOUSNESS: 0
ABDOMINAL PAIN: 0
CHILLS: 0
DIZZINESS: 0
CARDIOVASCULAR NEGATIVE: 1
CONSTIPATION: 0
PSYCHIATRIC NEGATIVE: 1
FOCAL WEAKNESS: 0
MUSCULOSKELETAL NEGATIVE: 1
RESPIRATORY NEGATIVE: 1
WEAKNESS: 1
EYES NEGATIVE: 1
DIARRHEA: 0
FEVER: 0
VOMITING: 0

## 2017-06-23 ASSESSMENT — PAIN SCALES - GENERAL
PAINLEVEL_OUTOF10: 8
PAINLEVEL_OUTOF10: 0

## 2017-06-23 ASSESSMENT — COPD QUESTIONNAIRES
COPD SCREENING SCORE: 5
DURING THE PAST 4 WEEKS HOW MUCH DID YOU FEEL SHORT OF BREATH: SOME OF THE TIME
DO YOU EVER COUGH UP ANY MUCUS OR PHLEGM?: NO/ONLY WITH OCCASIONAL COLDS OR INFECTIONS
HAVE YOU SMOKED AT LEAST 100 CIGARETTES IN YOUR ENTIRE LIFE: YES

## 2017-06-23 ASSESSMENT — LIFESTYLE VARIABLES: DO YOU DRINK ALCOHOL: NO

## 2017-06-23 NOTE — CARE PLAN
Problem: Safety  Goal: Will remain free from falls  Outcome: PROGRESSING AS EXPECTED  Intervention: Implement fall precautions    06/19/17 2000 06/22/17 0900 06/22/17 2000   OTHER   Environmental Precautions --  --  Treaded Slipper Socks on Patient   IV Pole on Same Side of Bed as Bathroom (SL) --  --    Bedrails --  --  Bedrails Closest to Bathroom Down   Chair/Bed Strip Alarm --  --  Patient Educated Regarding Fall Risk and Need for Bed Alarm, Understands and Continues to Refuse   Bed Alarm (Built in - for ICU ONLY) --  Patient Educated Regarding Fall Risk and Need for Bed Alarm, Understands and Continues to Refuse --

## 2017-06-23 NOTE — DISCHARGE PLANNING
Per [facility] [place], [facility] has received financial clearance and chair time will be MWF 1600.  First OP tx will be Monday, 6/26/17 with a check-in time of 1530.  Met with pt to provide written dialysis schedule, clinic information, and all questions are answered at this time.  Spoke to SW to notify.

## 2017-06-23 NOTE — PROGRESS NOTES
[facility] Nephrology Progress Note               Author: Kvng Mcelroy M.D. Date & Time created: 6/23/2017  10:57 AM     Interval History:  The patient is a 52-year-old  woman with a known history of CKD.  Note, etiology of said CKD is unknown.  She has had chronic elevation of her creatinine for years.  She has been followed in our office by Dr. Martinez and Dr. Junior.  She does not recall the cause of her kidney failure, but reviewing her laboratory shows a creatinine greater than 2 for more than 10 years.  She has not been under the care of a physician though for approximately 2 years.  She presented to the emergency room complaining of nausea, vomiting, malaise, fatigue.  Laboratories done at that time showed a creatinine of 15, BUN of 125, potassium of 4, hemoglobin of 7.  Given these findings, she was admitted to the telemetry cook for initiation of hemodialysis.     As noted, the patient does not know or recall why she has been told she has kidney failure.  She has had long history of hypertension and as I noted above she has had a creatinine greater than 2 for more than 10 years.  She does admit to taking ibuprofen daily for her arthritis despite being told in the past not to.  She is also supposed to take lisinopril for her hypertension, but she has not taken it for more than a year.  She has had no recent contrast problems.  No history of renal stones.  There is no family history of renal disease.    DAILY NEPHROLOGY SUMMARY  6/17/17--Rapid response called overnight for SVT and this resolved with IV diltiazem push and PO verapamil resumed, tolerated HD yesterday without issues, BP stable this am, tolerated PO last night, seen on dialysis today and HR stable  6/18/17--No events, tolerated HD yesterday with 1L UF, Hgb dropped from 8.5 yesterday to 6.9 today, no obvious source of blood loss, AVF surgery cancelled, BP elevated this am but stable overnight, feels ok  6/19/17 - HD today ordered.   Giving another unit PRBC's  6/20/17 - s/p HD w blood yesterday.  AVF surgery.  Next HD tomorrow  6/21/17 - HD  Today - s/p AVF but I cant feel thrill.    6/22/17 - No new renal issues.  Awaiting outpatient dialysis chair.  Not CCPD candidate currently based on last 2 years non compliance.  6/23/17 - HD - running today.  Can be discharged any time after dialysis chair arranged.      Review of Systems   Constitutional: Positive for malaise/fatigue. Negative for fever and chills.   HENT: Negative.    Eyes: Negative.    Respiratory: Negative.    Cardiovascular: Negative.    Gastrointestinal: Positive for nausea. Negative for vomiting, abdominal pain, diarrhea and constipation.   Genitourinary: Negative.    Musculoskeletal: Negative.    Skin: Negative.    Neurological: Positive for weakness. Negative for dizziness, focal weakness and loss of consciousness.   Endo/Heme/Allergies: Negative.    Psychiatric/Behavioral: Negative.          Physical Exam   Constitutional: She is oriented to person, place, and time. She appears well-developed and well-nourished. No distress.   HENT:   Head: Normocephalic and atraumatic.   Mouth/Throat: No oropharyngeal exudate.   Eyes: Conjunctivae and EOM are normal. Pupils are equal, round, and reactive to light. No scleral icterus.   Neck: Normal range of motion. Neck supple. No thyromegaly present.   Cardiovascular: Normal rate and regular rhythm.    No murmur heard.  Pulmonary/Chest: Effort normal and breath sounds normal. No respiratory distress. She has no wheezes.   +R Chest PC   Abdominal: Soft. Bowel sounds are normal. She exhibits no distension. There is no tenderness.   Musculoskeletal: Normal range of motion. She exhibits no edema or tenderness.   +R BKA   Neurological: She is alert and oriented to person, place, and time. She exhibits normal muscle tone.   Skin: Skin is warm and dry. No erythema.   Psychiatric: She has a normal mood and affect. Her behavior is normal.   Nursing  note and vitals reviewed.      Labs:        Invalid input(s): [id number]      Recent Labs      17   [id number]  17   0308   SODIUM  135  140  138   POTASSIUM  4.6  4.3  4.4   CHLORIDE  100  101  102   CO2  24  26  24   BUN  37*  27*  46*   CREATININE  7.84*  5.17*  7.20*   CALCIUM  8.1*  8.4*  7.9*     Recent Labs      17   1319  17   0325  17   0308   ALTSGPT   --   <5   --    --    ASTSGOT   --   9*   --    --    ALKPHOSPHAT   --   57   --    --    TBILIRUBIN   --   0.3   --    --    DBILIRUBIN  <0.1   --    --    --    GLUCOSE   --   185*  95  92     Recent Labs      17   RBC  3.32*  3.27*   HEMOGLOBIN  9.7*  9.4*   HEMATOCRIT  30.1*  29.9*   PLATELETCT  183  190     Recent Labs      17   WBC  6.7  9.1   NEUTSPOLYS  92.80*  70.70   LYMPHOCYTES  4.50*  18.70*   MONOCYTES  2.10  9.20   EOSINOPHILS  0.10  0.70   BASOPHILS  0.10  0.40   ASTSGOT  9*   --    ALTSGPT  <5   --    ALKPHOSPHAT  57   --    TBILIRUBIN  0.3   --            Hemodynamics:  Temp (24hrs), Av.8 °C (98.2 °F), Min:36.3 °C (97.4 °F), Max:37.4 °C (99.4 °F)  Temperature: 37.2 °C (98.9 °F)  Pulse  Av.2  Min: 70  Max: 178   Blood Pressure: 107/69 mmHg     Respiratory:    Respiration: 17, Pulse Oximetry: 95 %        RUL Breath Sounds: Clear, RML Breath Sounds: Clear, RLL Breath Sounds: Clear, PHILIP Breath Sounds: Clear, LLL Breath Sounds: Clear  Fluids:    Intake/Output Summary (Last 24 hours) at 17 1057  Last data filed at 17   Gross per 24 hour   Intake    200 ml   Output      0 ml   Net    200 ml     Weight: 68.6 kg (151 lb 3.8 oz)  GI/Nutrition:  Orders Placed This Encounter   Procedures   • DIET ORDER     Standing Status: Standing      Number of Occurrences: 1      Standing Expiration Date:      Order Specific Question:  Diet:     Answer:  Renal [8]     Medical Decision Making, by Problem:  Active Hospital  Problems    Diagnosis   • Vitamin D deficiency [E55.9]   • Secondary hyperparathyroidism of renal origin (CMS-HCC) [N25.81]   • Hyperphosphatemia [E83.39]   • End-stage renal disease (ESRD) (CMS-[facility]) [N18.6]   • Supraventricular tachycardia [I47.1]   • HTN (hypertension) [I10]   • Anemia associated with chronic renal failure [D63.1]     Assessment/Plan:  1.  CKD Stage V  --new ESRD, renal ultrasound confirms small essentially nonfunctional kidneys, she will need permanent dialysis  --Permcath placed in IR 6/16/17 and dialysis initiated  --HD today  --s/p AVF creation surgery w thrill  --Outpatient dialysis arrangements - when complete, she can be DCd with outpatient follow up on dialysis  --Dose adjust all meds for decreased GFR    2. HTN  --Meds changed back to verapamil for control of SVT  --If BP remains high recommend starting either amlodipine or ACE-I/ARB    3. Anemia  - stable  --secondary to CKD  --Epogen with HD    4. Renal Osteodystrophy  --phos improved w dialysis  --Monitor    5. Secondary Hyperparathyroidism  --goal iPTH 150-300  --Monitor for now    6. Vitamin D Deficiency  --continue ergocalciferol    7. Metabolic Acidosis  --resolved with HD    8. H/O SVT  --on verapamil at home  --Monitor    9. Mild Protein-Calorie Malnutrition  --No dietary protein restrictions      Medications reviewed, Labs reviewed and Radiology images reviewed

## 2017-06-23 NOTE — PROGRESS NOTES
3hr HD started @ 0744 and completed @ 1046,30minutes into tx bp went up to 178/92 and c/o headache 8/10,charge nurse came and medicated patient with relief,no further complaints noted,VSS post HD. Net UF = 1000ml.RIJ CVC dressing CDI,report given to Brigitte uGy RN.

## 2017-06-23 NOTE — CARE PLAN
Problem: Communication  Goal: The ability to communicate needs accurately and effectively will improve  Intervention: [place] patient and significant other/support system to call light to alert staff of needs    06/23/17 0112   OTHER   Oriented to: All of the Following : Location of Bathroom, Visiting Policy, Unit Routine, Call Light and Bedside Controls, Bedside Rail Policy, Smoking Policy, Rights and Responsibilities, Bedside Report, and Patient Education Notebook

## 2017-06-23 NOTE — DISCHARGE PLANNING
TC to [facility] at [facility] to follow up on dialysis chair time; Per [facility] Ad[facility], they are awaiting financial clearance and should have that by 1400 MST.  Once financial clearance is obtained, [facility] will be able to provide confirmed chair time.  Await call back from [facility].

## 2017-06-23 NOTE — PROGRESS NOTES
Patient discharged with all belongings and prescriptions. RN reviewed discharge summary with pt and discussed medications and hospital stay, all questions answered. Patient's PIV removed. Patient left with Friend, via personal vehical.

## 2017-06-23 NOTE — DISCHARGE INSTRUCTIONS
Discharge Instructions    Discharged to home by car with friend. Discharged via wheelchair, hospital escort: Yes.  Special equipment needed: Wheelchair    Be sure to schedule a follow-up appointment with your primary care doctor or any specialists as instructed.     Discharge Plan:   Diet Plan: Discussed (renal)  Activity Level: Discussed (as tolerated)  Smoking Cessation Offered: Patient Refused  Confirmed Follow up Appointment: Patient to Call and Schedule Appointment  Confirmed Symptoms Management: Discussed  Medication Reconciliation Updated: Yes  Influenza Vaccine Indication: Patient Refuses    I understand that a diet low in cholesterol, fat, and sodium is recommended for good health. Unless I have been given specific instructions below for another diet, I accept this instruction as my diet prescription.   Other diet: renal    Special Instructions: None    · Is patient discharged on Warfarin / Coumadin?   No     · Is patient Post Blood Transfusion?  No    Depression / Suicide Risk    As you are discharged from this [facility] facility, it is important to learn how to keep safe from harming yourself.    Recognize the warning signs:  · Abrupt changes in personality, positive or negative- including increase in energy   · Giving away possessions  · Change in eating patterns- significant weight changes-  positive or negative  · Change in sleeping patterns- unable to sleep or sleeping all the time   · Unwillingness or inability to communicate  · Depression  · Unusual sadness, discouragement and loneliness  · Talk of wanting to die  · Neglect of personal appearance   · Rebelliousness- reckless behavior  · Withdrawal from people/activities they love  · Confusion- inability to concentrate     If you or a loved one observes any of these behaviors or has concerns about self-harm, here's what you can do:  · Talk about it- your feelings and reasons for harming yourself  · Remove any means that you might use to hurt  yourself (examples: pills, rope, extension cords, firearm)  · Get professional help from the community (Mental Health, Substance Abuse, psychological counseling)  · Do not be alone:Call your Safe Contact- someone whom you trust who will be there for you.  · Call your local CRISIS HOTLINE 643-2464 or 805-085-9850  · Call your local Children's Mobile Crisis Response Team [place] (091) 767-6098 or www.FABPulous  · Call the toll free National Suicide Prevention Hotlines   · National Suicide Prevention Lifeline 200-271-UFXA (1756)  · National Hope Line Network 800-SUICIDE (763-3919)    Dialysis Diet  Dialysis is a treatment that you may undergo if you have significant damage to your kidneys. Dialysis replaces some of the work that the kidneys do. One of the jobs that it takes over is removing wastes, salt, and extra water from your blood. This helps to keep the amount of potassium and other nutrients in your blood at healthy levels.  When you need dialysis, it is important to pay careful attention to your diet. Between dialysis sessions, certain nutrients can build up in your blood and cause you to get sick. Vitamins and minerals are an important part of a healthy diet and should not be avoided entirely. However, it is commonly recommended that you limit your intake of potassium, phosphorus, and sodium. It may also be necessary to restrict other nutrients, such as carbohydrates or fat, if you have other health conditions. Your health care provider or dietitian can help you to determine the amount of these nutrients that is right for you.  WHAT IS MY PLAN?  Your dietitian will help you to design a meal plan that is specific to your needs. Generally, meal plans include:  · Grains, 6-11 servings per day. One serving is equal to 1 slice of bread or ½ cup of cooked rice or pasta.  · Low-potassium vegetables, 2-3 servings per day. One serving is equal to ½ cup.  · Low-potassium fruits, 2-3 servings per day. One  serving is equal to ½ cup.  · Meats and other protein sources, 8-11 oz per day.  · Dairy, ½ cup per day.  Your dietitian will provide you with specific instructions about the amount of fluids you can have each day.  WHAT DO I NEED TO KNOW ABOUT A DIALYSIS DIET?  · Limit your intake of potassium. Potassium is found in milk, fruits, and vegetables.  · Limit your intake of phosphorus. Phosphorus is found in milk, cheese, beans, nuts, and carbonated beverages. Avoid whole-grain and high-fiber foods because they contain high amounts of phosphorus.  · Limit your intake of sodium. Foods that are high in sodium include processed and cured meats, ready-made frozen meals, canned vegetables, and salty snack foods. Do not use salt substitutes because they contain potassium.  · If you were instructed to restrict your fluid intake, follow your health care provider's specific instructions. You may be told to:  · Write down what you drink and any foods you eat that are made mostly from water, such as gelatin and soups.  · Drink from small cups to help control how much you drink.  · Ask your health care provider if you should regularly take an over-the-counter medicine that binds phosphorus, such as antacid products that contain calcium carbonate.  · Take vitamin and mineral supplements only as directed by your health care provider.  · Eat high-quality proteins, such as meat, poultry, fish, and eggs. Limit low-quality plant-based proteins, such as nuts and beans.  · Cut potatoes into small pieces and boil them in unsalted water before you eat them. This can help to remove some potassium from the potato.  · Drain all fluid from cooked vegetables and canned fruits before eating them.  WHAT FOODS CAN I EAT?  Grains  White bread. White rice. Cooked cereal. Unsalted popcorn. Tortillas. Pasta.  Vegetables  Fresh or frozen broccoli, carrots, and green beans. Cabbage. Cauliflower. Celery. Cucumbers. Eggplant. Radishes.  Zucchini.  Fruits  Apples. Fresh or frozen berries. Fresh or canned pears, peaches, and pineapple. Grapes. Plums.  Meats and Other Protein Sources  Fresh or frozen beef, pork, chicken, and fish. Eggs. Low-sodium canned tuna or salmon.  Dairy  Cream cheese. Heavy cream. Ricotta cheese.  Beverages  Apple cider. Cranberry juice. Grape juice. Lemonade. Black coffee.  Condiments  Herbs. Spices. Jam and jelly. Honey.  Sweets and Desserts  Sherbet. Cakes. Cookies.  Fats and Oils  Olive oil, canola oil, and safflower oil.  Other  Non-dairy creamer. Non-dairy whipped topping. Homemade broth without salt.  The items listed above may not be a complete list of recommended foods or beverages. Contact your dietitian for more options.   WHAT FOODS ARE NOT RECOMMENDED?  Grains  Whole-grain bread. Whole-grain pasta. High-fiber cereal.  Vegetables  Potatoes. Beets. Tomatoes. Winter squash and pumpkin. Asparagus. Spinach. Parsnips.  Fruits  Star fruit. Bananas. Oranges. Kiwi. Nectarines. Prunes. Melon. Dried fruit. Avocado.  Meats and Other Protein Sources  Canned, smoked, and cured meats. Packaged luncheon meat. Sardines. Nuts and seeds. Peanut butter. Beans and legumes.  Dairy  Milk. Buttermilk. Yogurt. Cheese and cottage cheese. Processed cheese spreads.  Beverages  Orange juice. Prune juice. Carbonated soft drinks.  Condiments  Salt. Salt substitutes. Soy sauce.  Sweets and Desserts  Ice cream. Chocolate. Candied nuts.  Fats and Oils  Butter. Margarine.  Other  Ready-made frozen meals. Canned soups.  The items listed above may not be a complete list of foods and beverages to avoid. Contact your dietitian for more information.     This information is not intended to replace advice given to you by your health care provider. Make sure you discuss any questions you have with your health care provider.     Document Released: 09/14/2005 Document Revised: 01/08/2016 Document Reviewed: 07/21/2015  [employer] Patient Education  ©2016 Elsevier Inc.    Dialysis  Dialysis is a procedure that replaces some of the work healthy kidneys do. It is done when you lose about 85-90% of your kidney function. It may also be done earlier if your symptoms may be improved by dialysis. During dialysis, wastes, salt, and extra water are removed from the blood, and the levels of certain chemicals in the blood (such as potassium) are maintained. Dialysis is done in sessions. Dialysis sessions are continued until the kidneys get better. If the kidneys cannot get better, such as in end-stage kidney disease, dialysis is continued for life or until you receive a new kidney (kidney transplant). There are two types of dialysis: hemodialysis and peritoneal dialysis.  WHAT IS HEMODIALYSIS?   Hemodialysis is a type of dialysis in which a machine called a dialyzer is used to filter the blood. Before beginning hemodialysis, you will have surgery to create a site where blood can be removed from the body and returned to the body (vascular access). There are three types of vascular accesses:  · Arteriovenous fistula. To create this type of access, an artery is connected to a vein (usually in the arm). A fistula takes 1-6 months to develop after surgery. If it develops properly, it usually lasts longer than the other types of vascular accesses. It is also less likely to become infected and cause blood clots.  · Arteriovenous graft. To create this type of access, an artery and a vein in the arm are connected with a tube. A graft may be used within 2-3 weeks of surgery.  · A venous catheter. To create this type of access, a thin, flexible tube (catheter) is placed in a large vein in your neck, chest, or groin. A catheter may be used right away. It is usually used as a temporary access when dialysis needs to begin immediately.  During hemodialysis, blood leaves the body through your access. It travels through a tube to the dialyzer, where it is filtered. The blood then returns  to your body through another tube.  Hemodialysis is usually performed by a health care provider at a hospital or dialysis center three times a week. Visits last about 3-4 hours. It may also be performed with the help of another person at home with training.   WHAT IS PERITONEAL DIALYSIS?  Peritoneal dialysis is a type of dialysis in which the thin lining of the abdomen (peritoneum) is used as a filter. Before beginning peritoneal dialysis, you will have surgery to place a catheter in your abdomen. The catheter will be used to transfer a fluid called dialysate to and from your abdomen. At the start of a session, your abdomen is filled with dialysate. During the session, wastes, salt, and extra water in the blood pass through the peritoneum and into the dialysate. The dialysate is drained from the body at the end of the session. The process of filling and draining the dialysate is called an exchange. Exchanges are repeated until you have used up all the dialysate for the day.  Peritoneal dialysis may be performed by you at home or at almost any other location. It is done every day. You may need up to five exchanges a day. The amount of time the dialysate is in your body between exchanges is called a dwell. The dwell depends on the number of exchanges needed and the characteristics of the peritoneum. It usually varies from 1.5-3 hours. You may go about your day normally between exchanges. Alternately, the exchanges may be done at night while you sleep, using a machine called a cycler.  WHICH TYPE OF DIALYSIS SHOULD I CHOOSE?   Both hemodialysis and peritoneal dialysis have advantages and disadvantages. Talk to your health care provider about which type of dialysis would be best for you. Your lifestyle and preferences should be considered along with your medical condition. In some cases, only one type of dialysis may be an option.   Advantages of hemodialysis  · It is done less often than peritoneal dialysis.  · Someone  else can do the dialysis for you.  · If you go to a dialysis center, your health care provider will be able to recognize any problems right away.  · If you go to a dialysis center, you can interact with others who are having dialysis. This can provide you with emotional support.  Disadvantages of hemodialysis  · Hemodialysis may cause cramps and low blood pressure. It may leave you feeling tired on the days you have the treatment.  · If you go to a dialysis center, you will need to make weekly appointments and work around the center's schedule.  · You will need to take extra care when traveling. If you go to a dialysis center, you will need to make special arrangements to visit a dialysis center near your destination. If you are having treatments at home, you will need to take the dialyzer with you to your destination.  · You will need to avoid more foods than you would need to avoid on peritoneal dialysis.  Advantages of peritoneal dialysis  · It is less likely than hemodialysis to cause cramps and low blood pressure.  · You may do exchanges on your own wherever you are, including when you travel.  · You do not need to avoid as many foods as you do on hemodialysis.  Disadvantages of peritoneal dialysis  · It is done more often than hemodialysis.  · Performing peritoneal dialysis requires you to have dexterity of the hands. You must also be able to lift bags.  · You will have to learn sterilization techniques. You will need to practice them every day to reduce the risk of infection.   WHAT CHANGES WILL I NEED TO MAKE TO MY DIET DURING DIALYSIS?  Both hemodialysis and peritoneal dialysis require you to make some changes to your diet. For example, you will need to limit your intake of foods high in the minerals phosphorus and potassium. You will also need to limit your fluid intake. Your dietitian can help you plan meals. A good meal plan can improve your dialysis and your health.   WHAT SHOULD I EXPECT WHEN BEGINNING  DIALYSIS?  Adjusting to the dialysis treatment, schedule, and diet can take some time. You may need to stop working and may not be able to do some of the things you normally do. You may feel anxious or depressed when beginning dialysis. Eventually, many people feel better overall because of dialysis. Some people are able to return to work after making some changes, such as reducing work intensity.  WHERE CAN I FIND MORE INFORMATION?   · National Kidney Foundation: www.kidney.org  · American Association of Kidney Patients: www.aakp.org  · American Kidney Fund: www.kidneyfund.org     This information is not intended to replace advice given to you by your health care provider. Make sure you discuss any questions you have with your health care provider.     Document Released: 03/09/2004 Document Revised: 01/08/2016 Document Reviewed: 02/11/2014  Elsevier Interactive Patient Education ©2016 Elsevier Inc.

## 2017-06-23 NOTE — PROGRESS NOTES
Vascular    Patient doing well after surgery  AVF patent    Can be discharged anytime from my standpoint.  Asked patient to follow-up with me 1-2 weeks after discharge and provided my clinic information.    Jimbo Davenport MD  General and Vascular Surgery  [facility]  Cell: [phone]

## 2017-06-28 NOTE — OP REPORT
DATE OF SERVICE:  6/20/2017    PREOPERATIVE DIAGNOSES:  - ESRD on dialysis  - HTN    POSTOPERATIVE DIAGNOSES:  Same    PROCEDURES PERFORMED:  Creation of a left wrist ulnar-brachial Shaneka AV   fistula.    SURGEON:  Jimbo Davenport MD    ASSISTANT:  None.    ANESTHESIA:  General.    BLOOD LOSS:  5 mL.    DISPOSITION:  Patient was extubated and sent to recovery in stable condition.    DESCRIPTION OF PROCEDURE:  Following informed consent, patient was placed   supine on the operating table and general anesthesia was administered.    Patient's left arm was prepped and draped in a sterile using ChloraPrep.    Surgical time-out was called to identify the correct patient, procedure and   equipment; everyone was in agreement.  Patient received preoperative   prophylactic antibiotics.    I injected local anesthetic in the skin overlying the distal ulnar artery and the distal brachial vein.  I then made a transverse incision overlying the vein and artery. The vein was dissected out circumferentially for a length of approximately 3 cm proximally and distally.  I then dissected further to identify the ulnar artery, this   was dissected out for a length of approximately 2 cm and it was encircled with   vessel loops proximally and distally.  The artery was found to be patent with brisk antegrade flow and this was determined by Doppler assessment of the ulnar artery while the radial artery was compressed and occluded.  The patient was then systemically heparinized.  The vein was transected.  A longitudinal arteriotomy was made in the ulnar artery.  The artery was antegrade flushed and then backbled and it was found to have brisk bleeding in both directions indicating adequate perfusion of the hand by the radial artery.  The vein itself was dilated with vessel dilators and a 3 mm vessel dilator was found to pass easily up through the vein all the way to the antecubital fossa.  At this point, a dela cruz was created on the vein and  it was anastomosed to the artery using a running 6-0 Prolene suture line.  The artery was backbled and forward flushed prior to completing the anastomosis and the vein was also backbled as well.  The lumen was irrigated with heparinized saline.  The anastomosis was completed and the vein was allowed to perfuse and was found to have a good thrill.  The distal artery was then opened and it was found to have a good Doppler signal.  Topical hemostatic was applied and the patient's heparin was reversed with protamine.    At this point, hemostasis was achieved.  The subcutaneous tissues were reapproximated with interrupted 3-0 Vicryl sutures.  The skin was reapproximated with a running subcuticular 4-0 Stratafix suture.  Skin glue was then applied to complete the closure.  Patient tolerated the procedure well.  All counts were correct.  Patient was extubated and sent to recovery in stable condition.    Jibmo Davenport MD  General and Vascular Surgery  [facility]  [phone]

## 2017-07-18 ENCOUNTER — OFFICE VISIT (OUTPATIENT)
Dept: INTERNAL MEDICINE | Facility: MEDICAL CENTER | Age: 52
End: 2017-07-18
Payer: MEDICAID

## 2017-07-18 VITALS
DIASTOLIC BLOOD PRESSURE: 82 MMHG | TEMPERATURE: 97.4 F | HEIGHT: 62 IN | WEIGHT: 137.2 LBS | HEART RATE: 76 BPM | OXYGEN SATURATION: 98 % | BODY MASS INDEX: 25.25 KG/M2 | SYSTOLIC BLOOD PRESSURE: 138 MMHG

## 2017-07-18 DIAGNOSIS — Z12.11 ENCOUNTER FOR SCREENING COLONOSCOPY: ICD-10-CM

## 2017-07-18 DIAGNOSIS — N18.6 END-STAGE RENAL DISEASE (ESRD) (HCC): ICD-10-CM

## 2017-07-18 DIAGNOSIS — D63.1 ANEMIA ASSOCIATED WITH CHRONIC RENAL FAILURE: ICD-10-CM

## 2017-07-18 DIAGNOSIS — E55.9 VITAMIN D DEFICIENCY: ICD-10-CM

## 2017-07-18 DIAGNOSIS — I47.10 SUPRAVENTRICULAR TACHYCARDIA, PAROXYSMAL: ICD-10-CM

## 2017-07-18 DIAGNOSIS — I10 ESSENTIAL HYPERTENSION: ICD-10-CM

## 2017-07-18 DIAGNOSIS — Z00.00 HEALTH CARE MAINTENANCE: ICD-10-CM

## 2017-07-18 DIAGNOSIS — N18.9 ANEMIA ASSOCIATED WITH CHRONIC RENAL FAILURE: ICD-10-CM

## 2017-07-18 DIAGNOSIS — Z72.0 TOBACCO USE: ICD-10-CM

## 2017-07-18 PROCEDURE — 99204 OFFICE O/P NEW MOD 45 MIN: CPT | Mod: GC | Performed by: INTERNAL MEDICINE

## 2017-07-18 RX ORDER — LISINOPRIL 20 MG/1
40 TABLET ORAL 2 TIMES DAILY
COMMUNITY
End: 2017-08-25

## 2017-07-18 RX ORDER — VERAPAMIL HYDROCHLORIDE 240 MG/1
240 TABLET, FILM COATED, EXTENDED RELEASE ORAL 3 TIMES DAILY
COMMUNITY
Start: 2017-06-10 | End: 2017-08-24

## 2017-07-18 ASSESSMENT — PATIENT HEALTH QUESTIONNAIRE - PHQ9
CLINICAL INTERPRETATION OF PHQ2 SCORE: 1
SUM OF ALL RESPONSES TO PHQ QUESTIONS 1-9: 6
5. POOR APPETITE OR OVEREATING: 0 - NOT AT ALL

## 2017-07-18 NOTE — PROGRESS NOTES
New Patient to Establish    Reason to establish: Hospital follow-up    CC: Follow up after hospital discharge, HTN     HPI: This is a 51 yo pleasant female known to me during the hospital admission presents to day for follow up and establish as a new patient. She has a PMH of SVT and HTN not compliant with her medication and not seen a physician for past few years admitted to [facility] in 06/2017 found to have End stage renal disease needing dialysis. She underwent AV fistula creation, perm cath placement during the hospital stay and discharged with outpatient hemodialysis on Monday, Wednesday and Friday. She has been feeling fine, no new complaints. She is compliant with her medications, started taking lisinopril 20 mg once daily per nephrology recommendations.   She lives with 2 roommates, no family around.  She continues to smoke 5-6 cigarrates a day, she started smoking when she was 14, episodes of quitting for few years, but altogether she has smoked for about 30 years, she is willing to quit. Occasional alcohol use, no drug use.     Patient Active Problem List    Diagnosis Date Noted   • End-stage renal disease (ESRD) (CMS-HCC) 06/16/2017     Priority: High   • Supraventricular tachycardia, paroxysmal (CMS-HCC) 01/25/2015     Priority: High   • HTN (hypertension) 11/05/2009     Priority: Medium   • Anemia associated with chronic renal failure 11/05/2009     Priority: Medium   • Vitamin D deficiency 06/17/2017     Priority: Low   • Hyperphosphatemia 06/17/2017     Priority: Low   • Hx of right BKA (CMS-HCC) 01/25/2015     Priority: Low   • Health care maintenance 07/18/2017   • Encounter for screening colonoscopy 07/18/2017   • Tobacco use 07/18/2017   • Secondary hyperparathyroidism of renal origin (CMS-HCC) 06/17/2017   • Elevated troponin 06/17/2017   • Hypocalcemia 06/17/2017   • CKD3 01/25/2015   • Alcohol abuse 01/25/2015   • Chest pain 01/24/2015   • Menopausal syndrome 02/08/2011   • Hyperlipidemia  09/10/2010   • Chronic kidney disease 11/05/2009   • Glomerulonephritis, chronic 11/05/2009       Past Medical History   Diagnosis Date   • Dysrhythmia    • HTN (hypertension) 11/5/2009   • SVT (supraventricular tachycardia)    • Glomerulonephritis, chronic 11/5/2009   • Anemia associated with chronic renal failure 11/5/2009       Current Outpatient Prescriptions   Medication Sig Dispense Refill   • terazosin (HYTRIN) 2 MG Cap Take 1 Cap by mouth every bedtime. 30 Cap 0   • carvedilol (COREG) 6.25 MG Tab Take 2 Tabs by mouth 2 times a day, with meals. 60 Tab 2   • famotidine (PEPCID) 20 MG Tab Take 1 Tab by mouth 2 Times a Day. 60 Tab 0   • vitamin D, Ergocalciferol, (DRISDOL) 63948 UNITS Cap capsule Take 1 Cap by mouth every 7 days. 8 Cap 0   • verapamil ER (CALAN-SR) 240 MG Tab CR Take 240 mg by mouth every day.     • lisinopril (PRINIVIL) 20 MG Tab Take 20 mg by mouth every day.       No current facility-administered medications for this visit.       Allergies as of 07/18/2017 - Elliot as Reviewed 07/18/2017   Allergen Reaction Noted   • Sulfa drugs Hives 07/13/2007       Social History     Social History   • Marital Status:      Spouse Name: N/A   • Number of Children: N/A   • Years of Education: N/A     Occupational History   • Not on file.     Social History Main Topics   • Smoking status: Current Every Day Smoker -- 0.25 packs/day   • Smokeless tobacco: Never Used      Comment: 5 cigs/day   • Alcohol Use: No   • Drug Use: No   • Sexual Activity: Not on file     Other Topics Concern   • Not on file     Social History Narrative       Family History   Problem Relation Age of Onset   • Heart Disease         Past Surgical History   Procedure Laterality Date   • Amputation, below the knee     • Capitation payment (insurance)     • Femur orif  10/9/08     Performed by STELLA GATES at SURGERY [facility]   • Iliac bone graft  10/9/08     Performed by STELLA GATES at SUR[facility]ERY [facility]   • Other    "    BELOW KNEE AMPUTATION RIGHT   • Appendectomy laparoscopic  5/4/2009     Performed by BANDAR TIMMONS at SURGERY [facility]   • Pr enlarge breast with implant     • Av fistula creation Left 6/20/2017     Procedure: AV FISTULA CREATION- LEFT;  Surgeon: Jimbo Davenport M.D.;  Location: SURGERY [facility];  Service:        ROS: As per HPI. Additional pertinent symptoms as noted below.        /82 mmHg  Pulse 76  Temp(Src) 36.3 °C (97.4 °F)  Ht 1.575 m (5' 2\")  Wt 62.234 kg (137 lb 3.2 oz)  BMI 25.09 kg/m2  SpO2 98%  LMP 06/25/2007    Physical Exam  General:  Alert and oriented, No apparent distress.    Eyes: Pupils equal and reactive. No scleral icterus.    Throat: Clear no erythema or exudates noted.    Neck: Supple. No lymphadenopathy noted. Thyroid not enlarged.    Lungs: Clear to auscultation and percussion bilaterally.    Cardiovascular: Regular rate and rhythm. No murmurs, rubs or gallops.    Abdomen:  Benign. No rebound or guarding noted.    Extremities: No clubbing, cyanosis, edema. Prosthetic leg on right side.     Skin: Clear. No rash or suspicious skin lesions noted.  AV fistula on left hand, no thrill palpated, no bruit heard          Assessment and Plan    1. End-stage renal disease (ESRD) (CMS-formerly [facility])  Continue dialysis Monday, Wednesday Friday  Receiving erythropoietin during dialysis  AV fistula seems to be not functioning, she has an appointment for mapping of fistula next week   Compliant with dietary restrictions    2. Essential hypertension  BP today 138/82 mmhg  Taking lisinopril 20 mg QD, terazosin 2 mg QD     3. Supraventricular tachycardia, paroxysmal (CMS-HCC)  On verapamil 240 mg SR QD, carvedilol 6.25 mg BID     4. Anemia associated with chronic renal failure  Hb 9.4 in 06/2017 received blood transfusion in the hospital   Receiving EPO during dialysis     5. Vitamin D deficiency  On vitamin D 50,000 QD.   Will repeat vit D levels in 12 weeks (around 09/2017 ) " and de-escalate if appropriate     6. Health care maintenance  Preventive care    TDaP - 2015   Pneumococcal - 2015  Prevnar - No   Colonoscopy - referral sent today   Zostavax-during next visit     Women only  Pap - during next visit   Mammogram - 2016, will repeat in 2 years   Dexa - NA    7. Encounter for screening colonoscopy  Never had a  Colonoscopy before   Referral placed today     8. Tobacco use  She is determined to quit in one month before next visit  Will use nicotine gums  Educational material provided on tips on quitting smoking      Followup: Return in about 5 weeks (around 8/22/2017).    Signed by: Juan Moreno M.D.     Please note that this dictation was created using voice recognition software. I have made every reasonable attempt to correct obvious errors, but I expect that there are errors of grammar and possibly content that I did not discover before finalizing the note.

## 2017-07-18 NOTE — MR AVS SNAPSHOT
"        Isabelle Coyle   2017 9:45 AM   Office Visit   MRN: [id number]    Department:  [facility]   Dept Phone:  [phone]    Description:  Female : 1965   Provider:  Juan Moreno M.D.           Reason for Visit     New Patient Hosp f/u renal failure       Allergies as of 2017     Allergen Noted Reactions    Sulfa Drugs 2007   Hives      You were diagnosed with     End-stage renal disease (ESRD) (CMS-HCC)   [092216]       Essential hypertension   [8843118]       Supraventricular tachycardia, paroxysmal (CMS-HCC)   [040939]       Anemia associated with chronic renal failure   [663878]       Vitamin D deficiency   [4812679]       Health care maintenance   [118699]       Encounter for screening colonoscopy   [053319]       Tobacco use   [334592]         Vital Signs     Blood Pressure Pulse Temperature Height Weight Body Mass Index    138/82 mmHg 76 36.3 °C (97.4 °F) 1.575 m (5' 2\") 62.234 kg (137 lb 3.2 oz) 25.09 kg/m2    Oxygen Saturation Last Menstrual Period Smoking Status             98% 2007 Current Every Day Smoker         Basic Information     Date Of Birth Sex Race Ethnicity Preferred Language    1965 Female White Non- English      Your appointments     Aug 24, 2017  2:15 PM   Established Patient with Juan Moreno M.D.   [facility] - Internal Medicine (--)    [address] [place] [address] [id number] [phone]           You will be receiving a confirmation call a few days before your appointment from our automated call confirmation system.              Problem List              ICD-10-CM Priority Class Noted - Resolved    Chronic kidney disease N18.9   2009 - Present    HTN (hypertension) I10 Medium  2009 - Present    Glomerulonephritis, chronic N03.9   2009 - Present    Anemia associated with chronic renal failure D63.1 Medium  2009 - Present    Hyperlipidemia E78.5   9/10/2010 - " Present    Menopausal syndrome N95.1   2/8/2011 - Present    Chest pain R07.9   1/24/2015 - Present    Supraventricular tachycardia, paroxysmal (CMS-HCC) I47.1 High  1/25/2015 - Present    CKD3 N18.3   1/25/2015 - Present    Hx of right BKA (CMS-HCC) Z89.511 Low  1/25/2015 - Present    Alcohol abuse F10.10   1/25/2015 - Present    End-stage renal disease (ESRD) (CMS-HCC) N18.6 High  6/16/2017 - Present    Vitamin D deficiency E55.9 Low  6/17/2017 - Present    Secondary hyperparathyroidism of renal origin (CMS-HCC) N25.81   6/17/2017 - Present    Hyperphosphatemia E83.39 Low  6/17/2017 - Present    Elevated troponin R74.8   6/17/2017 - Present    Hypocalcemia E83.51   6/17/2017 - Present    Health care maintenance Z00.00   7/18/2017 - Present    Encounter for screening colonoscopy Z12.11   7/18/2017 - Present    Tobacco use Z72.0   7/18/2017 - Present      Health Maintenance        Date Due Completion Dates    PAP SMEAR 5/28/1984 ---    COLONOSCOPY 5/28/2013 ---    IMM PNEUMOCOCCAL 19-64 (ADULT) HIGHEST RISK SERIES (2 of 3 - PCV13) 1/25/2016 1/25/2015    MAMMOGRAM 4/30/2016 4/30/2015, 12/17/2013, 9/20/2010    IMM INFLUENZA (1) 9/1/2017 1/25/2015    IMM DTaP/Tdap/Td Vaccine (2 - Td) 3/7/2025 3/7/2015            Current Immunizations     Influenza Vaccine Quad Inj (Pf) 1/25/2015  1:10 AM    Pneumococcal polysaccharide vaccine (PPSV-23) 1/25/2015  1:14 AM    Tdap Vaccine 3/7/2015 11:17 PM      Below and/or attached are the medications your provider expects you to take. Review all of your home medications and newly ordered medications with your provider and/or pharmacist. Follow medication instructions as directed by your provider and/or pharmacist. Please keep your medication list with you and share with your provider. Update the information when medications are discontinued, doses are changed, or new medications (including over-the-counter products) are added; and carry medication information at all times in the event  of emergency situations     Allergies:  SULFA DRUGS - Hives               Medications  Valid as of: July 18, 2017 - 10:24 AM    Generic Name Brand Name Tablet Size Instructions for use    Carvedilol (Tab) COREG 6.25 MG Take 2 Tabs by mouth 2 times a day, with meals.        Ergocalciferol (Cap) DRISDOL 09561 UNITS Take 1 Cap by mouth every 7 days.        Famotidine (Tab) PEPCID 20 MG Take 1 Tab by mouth 2 Times a Day.        Lisinopril (Tab) PRINIVIL 20 MG Take 20 mg by mouth every day.        Terazosin HCl (Cap) HYTRIN 2 MG Take 1 Cap by mouth every bedtime.        Verapamil HCl (Tab CR) CALAN- MG Take 240 mg by mouth every day.        .                 Medicines prescribed today were sent to:     [facility] [address] [place] [address] [place] - [address] [place] [address]    Phone: [phone] Fax: [phone]    Open 24 Hours?: No      Medication refill instructions:       If your prescription bottle indicates you have medication refills left, it is not necessary to call your provider’s office. Please contact your pharmacy and they will refill your medication.    If your prescription bottle indicates you do not have any refills left, you may request refills at any time through one of the following ways: The online [employer] system (except Urgent Care), by calling your provider’s office, or by asking your pharmacy to contact your provider’s office with a refill request. Medication refills are processed only during regular business hours and may not be available until the next business day. Your provider may request additional information or to have a follow-up visit with you prior to refilling your medication.   *Please Note: Medication refills are assigned a new Rx number when refilled electronically. Your pharmacy may indicate that no refills were authorized even though a new prescription for the same medication is available at the pharmacy. Please request the medicine by name with  "the pharmacy before contacting your provider for a refill.        Referral     A referral request has been sent to our patient care coordination department. Please allow 3-5 business days for us to process this request and contact you either by phone or mail. If you do not hear from us by the 5th business day, please call us at [phone].        Instructions    Smoking Cessation, Tips for Success  If you are ready to quit smoking, congratulations! You have chosen to help yourself be healthier. Cigarettes bring nicotine, tar, carbon monoxide, and other irritants into your body. Your lungs, heart, and blood vessels will be able to work better without these poisons. There are many different ways to quit smoking. Nicotine gum, nicotine patches, a nicotine inhaler, or nicotine nasal spray can help with physical craving. Hypnosis, support groups, and medicines help break the habit of smoking.  WHAT THINGS CAN I DO TO MAKE QUITTING EASIER?   Here are some tips to help you quit for good:  · Pick a date when you will quit smoking completely. Tell all of your friends and family about your plan to quit on that date.  · Do not try to slowly cut down on the number of cigarettes you are smoking. Pick a quit date and quit smoking completely starting on that day.  · Throw away all cigarettes.    · Clean and remove all ashtrays from your home, work, and car.  · On a card, write down your reasons for quitting. Carry the card with you and read it when you get the urge to smoke.  · Cleanse your body of nicotine. Drink enough water and fluids to keep your urine clear or pale yellow. Do this after quitting to flush the nicotine from your body.  · Learn to predict your moods. Do not let a bad situation be your excuse to have a cigarette. Some situations in your life might tempt you into wanting a cigarette.  · Never have \"just one\" cigarette. It leads to wanting another and another. Remind yourself of your decision to quit.  · Change " "habits associated with smoking. If you smoked while driving or when feeling stressed, try other activities to replace smoking. Stand up when drinking your coffee. Brush your teeth after eating. Sit in a different chair when you read the paper. Avoid alcohol while trying to quit, and try to drink fewer caffeinated beverages. Alcohol and caffeine may urge you to smoke.  · Avoid foods and drinks that can trigger a desire to smoke, such as sugary or spicy foods and alcohol.  · Ask people who smoke not to smoke around you.  · Have something planned to do right after eating or having a cup of coffee. For example, plan to take a walk or exercise.  · Try a relaxation exercise to calm you down and decrease your stress. Remember, you may be tense and nervous for the first 2 weeks after you quit, but this will pass.  · Find new activities to keep your hands busy. Play with a pen, coin, or rubber band. Doodle or draw things on paper.  · Brush your teeth right after eating. This will help cut down on the craving for the taste of tobacco after meals. You can also try mouthwash.    · Use oral substitutes in place of cigarettes. Try using lemon drops, carrots, cinnamon sticks, or chewing gum. Keep them handy so they are available when you have the urge to smoke.  · When you have the urge to smoke, try deep breathing.  · Designate your home as a nonsmoking area.  · If you are a heavy smoker, ask your health care provider about a prescription for nicotine chewing gum. It can ease your withdrawal from nicotine.  · Reward yourself. Set aside the cigarette money you save and buy yourself something nice.  · Look for support from others. Join a support group or smoking cessation program. Ask someone at home or at work to help you with your plan to quit smoking.  · Always ask yourself, \"Do I need this cigarette or is this just a reflex?\" Tell yourself, \"Today, I choose not to smoke,\" or \"I do not want to smoke.\" You are reminding yourself " of your decision to quit.  · Do not replace cigarette smoking with electronic cigarettes (commonly called e-cigarettes). The safety of e-cigarettes is unknown, and some may contain harmful chemicals.  · If you relapse, do not give up! Plan ahead and think about what you will do the next time you get the urge to smoke.  HOW WILL I FEEL WHEN I QUIT SMOKING?  You may have symptoms of withdrawal because your body is used to nicotine (the addictive substance in cigarettes). You may crave cigarettes, be irritable, feel very hungry, cough often, get headaches, or have difficulty concentrating. The withdrawal symptoms are only temporary. They are strongest when you first quit but will go away within 10-14 days. When withdrawal symptoms occur, stay in control. Think about your reasons for quitting. Remind yourself that these are signs that your body is healing and getting used to being without cigarettes. Remember that withdrawal symptoms are easier to treat than the major diseases that smoking can cause.   Even after the withdrawal is over, expect periodic urges to smoke. However, these cravings are generally short lived and will go away whether you smoke or not. Do not smoke!  WHAT RESOURCES ARE AVAILABLE TO HELP ME QUIT SMOKING?  Your health care provider can direct you to community resources or hospitals for support, which may include:  · Group support.  · Education.  · Hypnosis.  · Therapy.     This information is not intended to replace advice given to you by your health care provider. Make sure you discuss any questions you have with your health care provider.     Document Released: 09/15/2005 Document Revised: 01/08/2016 Document Reviewed: 06/05/2014  [employer] Interactive Patient Education ©2016 [employer]      Follow up in 6 weeks   Please keep a log of blood pressure and bring it to next visit  Will do pap smear during next visit          Opez Access Code: Activation code not generated  Current Opez  Status: Active          Quit Tobacco Information     Do you want to quit using tobacco?    Quitting tobacco decreases risks of cancer, heart and lung disease, increases life expectancy, improves sense of taste and smell, and increases spending money, among other benefits.    If you are thinking about quitting, we can help.  • [facility] Quit Tobacco Program: 463.169.1166  o Program occurs weekly for four weeks and includes pharmacist consultation on products to support quitting smoking or chewing tobacco. A provider referral is needed for pharmacist consultation.  • Tobacco Users Help Hotline: 2-874-QUIT-NOW (089-6278) or https://[place].quitlogix.org/  o Free, confidential telephone and online coaching for [place] residents. Sessions are designed on a schedule that is convenient for you. Eligible clients receive free nicotine replacement therapy.  • Nationally: www.smokefree.gov  o Information and professional assistance to support both immediate and long-term needs as you become, and remain, a non-smoker. Smokefree.gov allows you to choose the help that best fits your needs.

## 2017-07-18 NOTE — PATIENT INSTRUCTIONS
"Smoking Cessation, Tips for Success  If you are ready to quit smoking, congratulations! You have chosen to help yourself be healthier. Cigarettes bring nicotine, tar, carbon monoxide, and other irritants into your body. Your lungs, heart, and blood vessels will be able to work better without these poisons. There are many different ways to quit smoking. Nicotine gum, nicotine patches, a nicotine inhaler, or nicotine nasal spray can help with physical craving. Hypnosis, support groups, and medicines help break the habit of smoking.  WHAT THINGS CAN I DO TO MAKE QUITTING EASIER?   Here are some tips to help you quit for good:  · Pick a date when you will quit smoking completely. Tell all of your friends and family about your plan to quit on that date.  · Do not try to slowly cut down on the number of cigarettes you are smoking. Pick a quit date and quit smoking completely starting on that day.  · Throw away all cigarettes.    · Clean and remove all ashtrays from your home, work, and car.  · On a card, write down your reasons for quitting. Carry the card with you and read it when you get the urge to smoke.  · Cleanse your body of nicotine. Drink enough water and fluids to keep your urine clear or pale yellow. Do this after quitting to flush the nicotine from your body.  · Learn to predict your moods. Do not let a bad situation be your excuse to have a cigarette. Some situations in your life might tempt you into wanting a cigarette.  · Never have \"just one\" cigarette. It leads to wanting another and another. Remind yourself of your decision to quit.  · Change habits associated with smoking. If you smoked while driving or when feeling stressed, try other activities to replace smoking. Stand up when drinking your coffee. Brush your teeth after eating. Sit in a different chair when you read the paper. Avoid alcohol while trying to quit, and try to drink fewer caffeinated beverages. Alcohol and caffeine may urge you to " "smoke.  · Avoid foods and drinks that can trigger a desire to smoke, such as sugary or spicy foods and alcohol.  · Ask people who smoke not to smoke around you.  · Have something planned to do right after eating or having a cup of coffee. For example, plan to take a walk or exercise.  · Try a relaxation exercise to calm you down and decrease your stress. Remember, you may be tense and nervous for the first 2 weeks after you quit, but this will pass.  · Find new activities to keep your hands busy. Play with a pen, coin, or rubber band. Doodle or draw things on paper.  · Brush your teeth right after eating. This will help cut down on the craving for the taste of tobacco after meals. You can also try mouthwash.    · Use oral substitutes in place of cigarettes. Try using lemon drops, carrots, cinnamon sticks, or chewing gum. Keep them handy so they are available when you have the urge to smoke.  · When you have the urge to smoke, try deep breathing.  · Designate your home as a nonsmoking area.  · If you are a heavy smoker, ask your health care provider about a prescription for nicotine chewing gum. It can ease your withdrawal from nicotine.  · Reward yourself. Set aside the cigarette money you save and buy yourself something nice.  · Look for support from others. Join a support group or smoking cessation program. Ask someone at home or at work to help you with your plan to quit smoking.  · Always ask yourself, \"Do I need this cigarette or is this just a reflex?\" Tell yourself, \"Today, I choose not to smoke,\" or \"I do not want to smoke.\" You are reminding yourself of your decision to quit.  · Do not replace cigarette smoking with electronic cigarettes (commonly called e-cigarettes). The safety of e-cigarettes is unknown, and some may contain harmful chemicals.  · If you relapse, do not give up! Plan ahead and think about what you will do the next time you get the urge to smoke.  HOW WILL I FEEL WHEN I QUIT SMOKING?  You " may have symptoms of withdrawal because your body is used to nicotine (the addictive substance in cigarettes). You may crave cigarettes, be irritable, feel very hungry, cough often, get headaches, or have difficulty concentrating. The withdrawal symptoms are only temporary. They are strongest when you first quit but will go away within 10-14 days. When withdrawal symptoms occur, stay in control. Think about your reasons for quitting. Remind yourself that these are signs that your body is healing and getting used to being without cigarettes. Remember that withdrawal symptoms are easier to treat than the major diseases that smoking can cause.   Even after the withdrawal is over, expect periodic urges to smoke. However, these cravings are generally short lived and will go away whether you smoke or not. Do not smoke!  WHAT RESOURCES ARE AVAILABLE TO HELP ME QUIT SMOKING?  Your health care provider can direct you to community resources or hospitals for support, which may include:  · Group support.  · Education.  · Hypnosis.  · Therapy.     This information is not intended to replace advice given to you by your health care provider. Make sure you discuss any questions you have with your health care provider.     Document Released: 09/15/2005 Document Revised: 01/08/2016 Document Reviewed: 06/05/2014  [employer] Interactive Patient Education ©2016 [employer]      Follow up in 6 weeks   Please keep a log of blood pressure and bring it to next visit  Will do pap smear during next visit

## 2017-07-27 NOTE — TELEPHONE ENCOUNTER
Was the patient seen in the last year in this department? Yes Last seen 7/18/17 Dr Moreno next appt 8/24/17 Dr Moreno.    Does patient have an active prescription for medications requested? No     Received Request Via: Pharmacy

## 2017-07-28 RX ORDER — FAMOTIDINE 20 MG/1
TABLET, FILM COATED ORAL
Qty: 60 TAB | Refills: 0 | Status: SHIPPED | OUTPATIENT
Start: 2017-07-28 | End: 2017-10-03 | Stop reason: SDUPTHER

## 2017-07-28 RX ORDER — TERAZOSIN 2 MG/1
CAPSULE ORAL
Qty: 30 CAP | Refills: 0 | Status: ON HOLD | OUTPATIENT
Start: 2017-07-28 | End: 2017-10-09

## 2017-08-24 ENCOUNTER — OFFICE VISIT (OUTPATIENT)
Dept: INTERNAL MEDICINE | Facility: MEDICAL CENTER | Age: 52
End: 2017-08-24
Payer: MEDICAID

## 2017-08-24 VITALS
DIASTOLIC BLOOD PRESSURE: 96 MMHG | BODY MASS INDEX: 25.17 KG/M2 | TEMPERATURE: 98.6 F | OXYGEN SATURATION: 98 % | HEART RATE: 70 BPM | HEIGHT: 62 IN | WEIGHT: 136.8 LBS | SYSTOLIC BLOOD PRESSURE: 144 MMHG

## 2017-08-24 DIAGNOSIS — Z12.11 ENCOUNTER FOR SCREENING COLONOSCOPY: ICD-10-CM

## 2017-08-24 DIAGNOSIS — N18.6 END-STAGE RENAL DISEASE (ESRD) (HCC): ICD-10-CM

## 2017-08-24 DIAGNOSIS — I10 ESSENTIAL HYPERTENSION: ICD-10-CM

## 2017-08-24 DIAGNOSIS — E55.9 VITAMIN D DEFICIENCY: ICD-10-CM

## 2017-08-24 PROBLEM — R79.89 ELEVATED TROPONIN: Status: RESOLVED | Noted: 2017-06-17 | Resolved: 2017-08-24

## 2017-08-24 PROCEDURE — 99214 OFFICE O/P EST MOD 30 MIN: CPT | Mod: GC | Performed by: INTERNAL MEDICINE

## 2017-08-24 RX ORDER — VERAPAMIL HYDROCHLORIDE 80 MG/1
80 TABLET ORAL 3 TIMES DAILY
COMMUNITY
End: 2017-08-25

## 2017-08-24 RX ORDER — CHOLECALCIFEROL (VITAMIN D3) 125 MCG
1 CAPSULE ORAL DAILY
Qty: 30 TAB | Refills: 3 | Status: SHIPPED | OUTPATIENT
Start: 2017-08-24 | End: 2017-12-27

## 2017-08-24 NOTE — MR AVS SNAPSHOT
"        Isabelle Coyle   2017 2:15 PM   Office Visit   MRN: [id number]    Department:  [facility] Med - Internal Med   Dept Phone:  [phone]    Description:  Female : 1965   Provider:  Juan Moreno M.D.           Reason for Visit     Chronic Kidney Disease F/u       Allergies as of 2017     Allergen Noted Reactions    Sulfa Drugs 2007   Hives      You were diagnosed with     End-stage renal disease (ESRD) (CMS-HCC)   [408695]       Essential hypertension   [7627693]       Vitamin D deficiency   [9973881]       Encounter for screening colonoscopy   [735285]         Vital Signs     Blood Pressure Pulse Temperature Height Weight Body Mass Index    144/96 mmHg 70 37 °C (98.6 °F) 1.575 m (5' 2\") 62.052 kg (136 lb 12.8 oz) 25.01 kg/m2    Oxygen Saturation Last Menstrual Period Smoking Status             98% 2007 Current Every Day Smoker         Basic Information     Date Of Birth Sex Race Ethnicity Preferred Language    1965 Female White Non- English      Your appointments     Aug 25, 2017  1:45 PM   Scheduled Pre Admission with PREAD[facility] 2   [facility] [place] [place]    [address] [facility] [id number] [phone]            Sep 18, 2017  3:00 PM   Established Patient with Ary Magana M.D.   [facility] - Internal Medicine (--)    [address] [place] [address]  [address]  [facility] [address] [phone]           You will be receiving a confirmation call a few days before your appointment from our automated call confirmation system.            Dec 07, 2017  1:15 PM   Established Patient with Juan Moreno M.D.   [facility] - Internal Medicine (--)    [address]  [address]  [facility] [address] [phone]           You will be receiving a confirmation call a few days before your appointment from our automated call confirmation system.              Problem List              ICD-10-CM Priority Class Noted - Resolved "    HTN (hypertension) I10 Medium  11/5/2009 - Present    Glomerulonephritis, chronic N03.9   11/5/2009 - Present    Anemia associated with chronic renal failure D63.1 Medium  11/5/2009 - Present    Hyperlipidemia E78.5   9/10/2010 - Present    Menopausal syndrome N95.1   2/8/2011 - Present    Chest pain R07.9   1/24/2015 - Present    Supraventricular tachycardia, paroxysmal (CMS-HCC) I47.1 High  1/25/2015 - Present    Hx of right BKA (CMS-HCC) Z89.511 Low  1/25/2015 - Present    Alcohol abuse F10.10   1/25/2015 - Present    End-stage renal disease (ESRD) (CMS-HCC) N18.6 High  6/16/2017 - Present    Vitamin D deficiency E55.9 Low  6/17/2017 - Present    Secondary hyperparathyroidism of renal origin (CMS-HCC) N25.81   6/17/2017 - Present    Hyperphosphatemia E83.39 Low  6/17/2017 - Present    Hypocalcemia E83.51   6/17/2017 - Present    Health care maintenance Z00.00   7/18/2017 - Present    Tobacco use Z72.0   7/18/2017 - Present      Health Maintenance        Date Due Completion Dates    PAP SMEAR 5/28/1984 ---    COLONOSCOPY 5/28/2013 ---    IMM PNEUMOCOCCAL 19-64 (ADULT) HIGHEST RISK SERIES (2 of 3 - PCV13) 1/25/2016 1/25/2015    MAMMOGRAM 4/30/2016 4/30/2015, 12/17/2013, 9/20/2010    IMM INFLUENZA (1) 9/1/2017 1/25/2015    IMM DTaP/Tdap/Td Vaccine (2 - Td) 3/7/2025 3/7/2015            Current Immunizations     Influenza Vaccine Quad Inj (Pf) 1/25/2015  1:10 AM    Pneumococcal polysaccharide vaccine (PPSV-23) 1/25/2015  1:14 AM    Tdap Vaccine 3/7/2015 11:17 PM      Below and/or attached are the medications your provider expects you to take. Review all of your home medications and newly ordered medications with your provider and/or pharmacist. Follow medication instructions as directed by your provider and/or pharmacist. Please keep your medication list with you and share with your provider. Update the information when medications are discontinued, doses are changed, or new medications (including over-the-counter  products) are added; and carry medication information at all times in the event of emergency situations     Allergies:  SULFA DRUGS - Hives               Medications  Valid as of: August 24, 2017 -  2:56 PM    Generic Name Brand Name Tablet Size Instructions for use    Carvedilol (Tab) COREG 6.25 MG Take 2 Tabs by mouth 2 times a day, with meals.        Cholecalciferol (Tab) Vitamin D3 2000 units Take 1 Tab by mouth every day.        Famotidine (Tab) PEPCID 20 MG TAKE ONE TABLET BY MOUTH TWICE DAILY        Lisinopril (Tab) PRINIVIL 20 MG Take 40 mg by mouth 2 times a day. 2 tablets bid        Nicotine (PATCH 24 HR) NICODERM 7 MG/24HR Apply 1 Patch to skin as directed every 24 hours.        Terazosin HCl (Cap) HYTRIN 2 MG TAKE ONE CAPSULE BY MOUTH ONCE DAILY AT BEDTIME        Verapamil HCl (Tab) ISOPTIN 80 MG Take 80 mg by mouth 3 times a day.        .                 Medicines prescribed today were sent to:     [facility] [address] [place] [address] - [address] [place] [address]    Phone: [phone] Fax: [phone]    Open 24 Hours?: No      Medication refill instructions:       If your prescription bottle indicates you have medication refills left, it is not necessary to call your provider’s office. Please contact your pharmacy and they will refill your medication.    If your prescription bottle indicates you do not have any refills left, you may request refills at any time through one of the following ways: The online [employer] system (except Urgent Care), by calling your provider’s office, or by asking your pharmacy to contact your provider’s office with a refill request. Medication refills are processed only during regular business hours and may not be available until the next business day. Your provider may request additional information or to have a follow-up visit with you prior to refilling your medication.   *Please Note: Medication refills are assigned a new Rx number when  refilled electronically. Your pharmacy may indicate that no refills were authorized even though a new prescription for the same medication is available at the pharmacy. Please request the medicine by name with the pharmacy before contacting your provider for a refill.        Your To Do List     Future Labs/Procedures Complete By Expires    VITAMIN D,25 HYDROXY  As directed 8/24/2018      Referral     A referral request has been sent to our patient care coordination department. Please allow 3-5 business days for us to process this request and contact you either by phone or mail. If you do not hear from us by the 5th business day, please call us at [phone].        Instructions    Please get the vitamin D levels done and start taking vitamin D3.   Please schedule pap smear on Monday afternoon [facility]            News [employer] Access Code: Activation code not generated  Current News [employer] Status: Active          Quit Tobacco Information     Do you want to quit using tobacco?    Quitting tobacco decreases risks of cancer, heart and lung disease, increases life expectancy, improves sense of taste and smell, and increases spending money, among other benefits.    If you are thinking about quitting, we can help.  • [facility] Quit Tobacco Program: [phone]  o Program occurs weekly for four weeks and includes pharmacist consultation on products to support quitting smoking or chewing tobacco. A provider referral is needed for pharmacist consultation.  • Tobacco Users Help Hotline: 5-714-QUIT-NOW (526-8095) or https://[place].quitlogix.org/  o Free, confidential telephone and online coaching for [place] residents. Sessions are designed on a schedule that is convenient for you. Eligible clients receive free nicotine replacement therapy.  • Nationally: www.smokefree.gov  o Information and professional assistance to support both immediate and long-term needs as you become, and remain, a non-smoker. Smokefree.gov allows you  to choose the help that best fits your needs.

## 2017-08-24 NOTE — PATIENT INSTRUCTIONS
Please get the vitamin D levels done and start taking vitamin D3.   Please schedule [facility]

## 2017-08-24 NOTE — PROGRESS NOTES
Established Patient    Isabelle presents today with the following:    CC: follow up     HPI: Ms Coyle is a 51 yo female presents today for follow up. No active complaints.   ESRD - she is on dialysis M,W,F. Previous AV fistula is not functioning, awaiting repeat AVF creation next week. Compliant with medications. Her blood pressure has been high, medications changed by her nephrologist.   Still continues to smoke 3-4 cigarrates a day. Not able to quit completely.     Patient Active Problem List    Diagnosis Date Noted   • End-stage renal disease (ESRD) (CMS-HCC) 06/16/2017     Priority: High   • Supraventricular tachycardia, paroxysmal (CMS-HCC) 01/25/2015     Priority: High   • HTN (hypertension) 11/05/2009     Priority: Medium   • Anemia associated with chronic renal failure 11/05/2009     Priority: Medium   • Vitamin D deficiency 06/17/2017     Priority: Low   • Hyperphosphatemia 06/17/2017     Priority: Low   • Hx of right BKA (CMS-HCC) 01/25/2015     Priority: Low   • Health care maintenance 07/18/2017   • Tobacco use 07/18/2017   • Secondary hyperparathyroidism of renal origin (CMS-HCC) 06/17/2017   • Hypocalcemia 06/17/2017   • Alcohol abuse 01/25/2015   • Chest pain 01/24/2015   • Menopausal syndrome 02/08/2011   • Hyperlipidemia 09/10/2010   • Glomerulonephritis, chronic 11/05/2009       Current Outpatient Prescriptions   Medication Sig Dispense Refill   • verapamil (ISOPTIN) 80 MG Tab Take 80 mg by mouth 3 times a day.     • Cholecalciferol (VITAMIN D3) 2000 units Tab Take 1 Tab by mouth every day. 30 Tab 3   • nicotine (NICODERM) 7 MG/24HR PATCH 24 HR Apply 1 Patch to skin as directed every 24 hours. 30 Patch 0   • famotidine (PEPCID) 20 MG Tab TAKE ONE TABLET BY MOUTH TWICE DAILY 60 Tab 0   • terazosin (HYTRIN) 2 MG Cap TAKE ONE CAPSULE BY MOUTH ONCE DAILY AT BEDTIME 30 Cap 0   • lisinopril (PRINIVIL) 20 MG Tab Take 40 mg by mouth 2 times a day. 2 tablets bid     • carvedilol (COREG) 6.25 MG Tab Take  "2 Tabs by mouth 2 times a day, with meals. 60 Tab 2     No current facility-administered medications for this visit.       ROS: As per HPI. Additional pertinent symptoms as noted below.        /96 mmHg  Pulse 70  Temp(Src) 37 °C (98.6 °F)  Ht 1.575 m (5' 2\")  Wt 62.052 kg (136 lb 12.8 oz)  BMI 25.01 kg/m2  SpO2 98%  LMP 06/25/2007    Physical Exam   Constitutional:  oriented to person, place, and time. No distress.   Eyes: Pupils are equal, round, and reactive to light. No scleral icterus.  Neck: Neck supple. No thyromegaly present.   Cardiovascular: Normal rate, regular rhythm and normal heart sounds.  Exam reveals no gallop and no friction rub.  No murmur heard.  Pulmonary/Chest: Breath sounds normal. Chest wall is not dull to percussion. HD line in place.   Musculoskeletal:   no edema.   Lymphadenopathy: no cervical adenopathy  Neurological: alert and oriented to person, place, and time.   Skin: No cyanosis. Nails show no clubbing, multiple tattoos  Right leg prosthesis         Note: I have reviewed all pertinent labs and diagnostic tests associated with this visit with specific comments listed under the assessment and plan below    Assessment and Plan    1. End-stage renal disease (ESRD) (CMS-HCC)  Stable. Continue HD    2. Essential hypertension  Reviewed records from nephrologist office, no information on medication. Requested medication list.   Per patient she is taking lisinopril 40 mg BID.   Will defer management of antihypertensive to the nephrologist.     3. Vitamin D deficiency  Reviewed labs done by nephrology on 08/09/2017, vit D  Levels not in the list.    Repeat vitamin D.   Reduce the vit D dose to 2000 U QD    4. Encounter for screening colonoscopy  [facility], they no longer take her insurance  Referral placed again to different provider.    5. Health care maintenance  Advised to schedule a pap smear visit    6. Tobacco use  Counseled regarding benefits " of quitting.   Patient is willing to try nicotine patches  Ordered nicotine patches    Followup: Return in about 3 months (around 11/24/2017) for Long.      Signed by: Juan Moreno M.D.

## 2017-08-25 DIAGNOSIS — Z01.812 PRE-PROCEDURAL LABORATORY EXAMINATION: ICD-10-CM

## 2017-08-25 DIAGNOSIS — Z12.12 ENCOUNTER FOR COLORECTAL CANCER SCREENING: ICD-10-CM

## 2017-08-25 DIAGNOSIS — Z12.11 ENCOUNTER FOR COLORECTAL CANCER SCREENING: ICD-10-CM

## 2017-08-25 LAB
ANION GAP SERPL CALC-SCNC: 14 MMOL/L (ref 0–11.9)
BUN SERPL-MCNC: 39 MG/DL (ref 8–22)
CALCIUM SERPL-MCNC: 9.2 MG/DL (ref 8.5–10.5)
CHLORIDE SERPL-SCNC: 100 MMOL/L (ref 96–112)
CO2 SERPL-SCNC: 23 MMOL/L (ref 20–33)
CREAT SERPL-MCNC: 7.28 MG/DL (ref 0.5–1.4)
ERYTHROCYTE [DISTWIDTH] IN BLOOD BY AUTOMATED COUNT: 52.6 FL (ref 35.9–50)
GFR SERPL CREATININE-BSD FRML MDRD: 6 ML/MIN/1.73 M 2
GLUCOSE SERPL-MCNC: 85 MG/DL (ref 65–99)
HCT VFR BLD AUTO: 30.1 % (ref 37–47)
HGB BLD-MCNC: 9.9 G/DL (ref 12–16)
MCH RBC QN AUTO: 29.9 PG (ref 27–33)
MCHC RBC AUTO-ENTMCNC: 32.9 G/DL (ref 33.6–35)
MCV RBC AUTO: 90.9 FL (ref 81.4–97.8)
PLATELET # BLD AUTO: 208 K/UL (ref 164–446)
PMV BLD AUTO: 9.3 FL (ref 9–12.9)
POTASSIUM SERPL-SCNC: 4 MMOL/L (ref 3.6–5.5)
RBC # BLD AUTO: 3.31 M/UL (ref 4.2–5.4)
SODIUM SERPL-SCNC: 137 MMOL/L (ref 135–145)
WBC # BLD AUTO: 5 K/UL (ref 4.8–10.8)

## 2017-08-25 PROCEDURE — 85027 COMPLETE CBC AUTOMATED: CPT

## 2017-08-25 PROCEDURE — 36415 COLL VENOUS BLD VENIPUNCTURE: CPT

## 2017-08-25 PROCEDURE — 80048 BASIC METABOLIC PNL TOTAL CA: CPT

## 2017-08-25 RX ORDER — LISINOPRIL 40 MG/1
80 TABLET ORAL 2 TIMES DAILY
Status: ON HOLD | COMMUNITY
End: 2017-10-09

## 2017-08-25 RX ORDER — VERAPAMIL HYDROCHLORIDE 80 MG/1
80 TABLET ORAL DAILY
Status: ON HOLD | COMMUNITY
End: 2017-12-30

## 2017-08-28 ENCOUNTER — HOSPITAL ENCOUNTER (OUTPATIENT)
Facility: MEDICAL CENTER | Age: 52
End: 2017-08-28
Attending: SURGERY | Admitting: SURGERY
Payer: MEDICAID

## 2017-08-28 VITALS
HEIGHT: 62 IN | OXYGEN SATURATION: 99 % | RESPIRATION RATE: 16 BRPM | TEMPERATURE: 97.7 F | WEIGHT: 136.69 LBS | BODY MASS INDEX: 25.15 KG/M2 | HEART RATE: 63 BPM

## 2017-08-28 PROBLEM — N18.6 END STAGE RENAL DISEASE (HCC): Status: ACTIVE | Noted: 2017-08-28

## 2017-08-28 LAB — POTASSIUM BLD-SCNC: 4.7 MMOL/L (ref 3.6–5.5)

## 2017-08-28 PROCEDURE — 160002 HCHG RECOVERY MINUTES (STAT): Performed by: SURGERY

## 2017-08-28 PROCEDURE — A6402 STERILE GAUZE <= 16 SQ IN: HCPCS | Performed by: SURGERY

## 2017-08-28 PROCEDURE — 160046 HCHG PACU - 1ST 60 MINS PHASE II: Performed by: SURGERY

## 2017-08-28 PROCEDURE — 700101 HCHG RX REV CODE 250

## 2017-08-28 PROCEDURE — 500700 HCHG HEMOCLIP, SMALL (RED): Performed by: SURGERY

## 2017-08-28 PROCEDURE — 501838 HCHG SUTURE GENERAL: Performed by: SURGERY

## 2017-08-28 PROCEDURE — 160047 HCHG PACU  - EA ADDL 30 MINS PHASE II: Performed by: SURGERY

## 2017-08-28 PROCEDURE — 500043 HCHG BAG-A-JET: Performed by: SURGERY

## 2017-08-28 PROCEDURE — 160029 HCHG SURGERY MINUTES - 1ST 30 MINS LEVEL 4: Performed by: SURGERY

## 2017-08-28 PROCEDURE — 160009 HCHG ANES TIME/MIN: Performed by: SURGERY

## 2017-08-28 PROCEDURE — 84132 ASSAY OF SERUM POTASSIUM: CPT

## 2017-08-28 PROCEDURE — 500698 HCHG HEMOCLIP, MEDIUM: Performed by: SURGERY

## 2017-08-28 PROCEDURE — 700111 HCHG RX REV CODE 636 W/ 250 OVERRIDE (IP)

## 2017-08-28 PROCEDURE — 501499 HCHG SURG-I-LOOP, MINI (BLUE): Performed by: SURGERY

## 2017-08-28 PROCEDURE — A9270 NON-COVERED ITEM OR SERVICE: HCPCS

## 2017-08-28 PROCEDURE — 501837 HCHG SUTURE CV: Performed by: SURGERY

## 2017-08-28 PROCEDURE — 160048 HCHG OR STATISTICAL LEVEL 1-5: Performed by: SURGERY

## 2017-08-28 PROCEDURE — 160041 HCHG SURGERY MINUTES - EA ADDL 1 MIN LEVEL 4: Performed by: SURGERY

## 2017-08-28 PROCEDURE — 160025 RECOVERY II MINUTES (STATS): Performed by: SURGERY

## 2017-08-28 PROCEDURE — 700102 HCHG RX REV CODE 250 W/ 637 OVERRIDE(OP)

## 2017-08-28 PROCEDURE — 160035 HCHG PACU - 1ST 60 MINS PHASE I: Performed by: SURGERY

## 2017-08-28 PROCEDURE — 500053 HCHG BANDAGE, ELASTIC 4: Performed by: SURGERY

## 2017-08-28 RX ORDER — HYDROCODONE BITARTRATE AND ACETAMINOPHEN 5; 325 MG/1; MG/1
1-2 TABLET ORAL EVERY 4 HOURS PRN
Qty: 30 TAB | Refills: 0 | Status: SHIPPED | OUTPATIENT
Start: 2017-08-28 | End: 2017-10-06

## 2017-08-28 RX ORDER — OXYCODONE HCL 5 MG/5 ML
SOLUTION, ORAL ORAL
Status: COMPLETED
Start: 2017-08-28 | End: 2017-08-28

## 2017-08-28 RX ORDER — LIDOCAINE HYDROCHLORIDE 10 MG/ML
INJECTION, SOLUTION INFILTRATION; PERINEURAL
Status: COMPLETED
Start: 2017-08-28 | End: 2017-08-28

## 2017-08-28 RX ORDER — HEPARIN SODIUM,PORCINE 1000/ML
VIAL (ML) INJECTION
Status: DISCONTINUED | OUTPATIENT
Start: 2017-08-28 | End: 2017-08-28 | Stop reason: HOSPADM

## 2017-08-28 RX ORDER — SODIUM CHLORIDE 9 MG/ML
INJECTION, SOLUTION INTRAVENOUS CONTINUOUS
Status: DISCONTINUED | OUTPATIENT
Start: 2017-08-28 | End: 2017-08-28 | Stop reason: HOSPADM

## 2017-08-28 RX ORDER — LIDOCAINE HYDROCHLORIDE 10 MG/ML
0.5 INJECTION, SOLUTION INFILTRATION; PERINEURAL
Status: COMPLETED | OUTPATIENT
Start: 2017-08-28 | End: 2017-08-28

## 2017-08-28 RX ORDER — LIDOCAINE AND PRILOCAINE 25; 25 MG/G; MG/G
1 CREAM TOPICAL
Status: COMPLETED | OUTPATIENT
Start: 2017-08-28 | End: 2017-08-28

## 2017-08-28 RX ADMIN — FENTANYL CITRATE 25 MCG: 50 INJECTION, SOLUTION INTRAMUSCULAR; INTRAVENOUS at 12:05

## 2017-08-28 RX ADMIN — SODIUM CHLORIDE: 9 INJECTION, SOLUTION INTRAVENOUS at 09:55

## 2017-08-28 RX ADMIN — LIDOCAINE HYDROCHLORIDE 0.5 ML: 10 INJECTION, SOLUTION INFILTRATION; PERINEURAL at 09:55

## 2017-08-28 RX ADMIN — OXYCODONE HYDROCHLORIDE 10 MG: 5 SOLUTION ORAL at 11:45

## 2017-08-28 RX ADMIN — FENTANYL CITRATE 50 MCG: 50 INJECTION, SOLUTION INTRAMUSCULAR; INTRAVENOUS at 11:40

## 2017-08-28 RX ADMIN — FENTANYL CITRATE 50 MCG: 50 INJECTION, SOLUTION INTRAMUSCULAR; INTRAVENOUS at 11:50

## 2017-08-28 ASSESSMENT — PAIN SCALES - GENERAL
PAINLEVEL_OUTOF10: 8
PAINLEVEL_OUTOF10: 5
PAINLEVEL_OUTOF10: 8
PAINLEVEL_OUTOF10: 7
PAINLEVEL_OUTOF10: 6
PAINLEVEL_OUTOF10: 0
PAINLEVEL_OUTOF10: 5

## 2017-08-28 NOTE — DISCHARGE INSTRUCTIONS
ACTIVITY: Rest and take it easy for the first 24 hours.  A responsible adult is recommended to remain with you during that time.  It is normal to feel sleepy.  We encourage you to not do anything that requires balance, judgment or coordination.    MILD FLU-LIKE SYMPTOMS ARE NORMAL. YOU MAY EXPERIENCE GENERALIZED MUSCLE ACHES, THROAT IRRITATION, HEADACHE AND/OR SOME NAUSEA.    FOR 24 HOURS DO NOT:  Drive, operate machinery or run household appliances.  Drink beer or alcoholic beverages.   Make important decisions or sign legal documents.    SPECIAL INSTRUCTIONS: AV Fistula D/C instructions:     * Remove ace wrap 2 hours post-procedure if present.     1. DIET: Upon discharge from the hospital you may resume your normal preoperative diet. Depending on how you are feeling and whether you have nausea or not, you may wish to stay with a bland diet for the first few days. However, you can advance this as quickly as you feel ready.     2. ACTIVITIES: After discharge from the hospital, you may resume full routine activities. However, there should be no heavy lifting (greater than 15 pounds) and no strenuous activities until after your follow-up visit. Otherwise, routine activities of daily living are acceptable.     3. DRIVING: You may drive whenever you are off pain medications and are able to perform the activities needed to drive, i.e. turning, bending, twisting, etc.     4. BATHING: You may get the wound wet at any time after leaving the hospital. You may shower, but do not submerge in a bath for at least a week. Dressings may come off after 48 hours. Leave steri strips on until they fall off.  It may be necessary to trim the edges.     5. BOWEL FUNCTION: Constipation is common after an operation, especially with pain medications. The combination of pain medication and decreased activity level can cause constipation in otherwise normal patients. If you feel this is occurring, take a laxative (Milk of Magnesia, Ex-Lax,  "Senokot, etc.) until the problem has resolved.     6. PAIN MEDICATION: You will be given a prescription for pain medication at discharge. Please take these as directed. It is important to remember not to take medications on an empty stomach as this may cause nausea.     7.CALL IF YOU HAVE: (1) Fevers to more than 101.0 F, (2) Unusual chest or leg pain, (3) Drainage or fluid from incision that may be foul smelling, increased tenderness or soreness at the wound or the wound edges are no longer together, redness or swelling at the incision site. Please do not hesitate to call with any other questions.     8. APPOINTMENT: Follow up [facility] as instructed.      AV Fistula, Care After  Refer to this sheet in the next few weeks. These instructions provide you with information on caring for yourself after your procedure. Your caregiver may also give you more specific instructions. Your treatment has been planned according to current medical practices, but problems sometimes occur. Call your caregiver if you have any problems or questions after your procedure.  HOME CARE INSTRUCTIONS   · Do not drive a car or take public transportation alone.  · Do not drink alcohol.  · Only take medicine that has been prescribed by your caregiver.  · Do not sign important papers or make important decisions.  · Have a responsible person with you.  · Ask your caregiver to show you how to check your access at home for a vibration (called a \"thrill\") or for a sound (called a \"bruit\" pronounced brew-ee).  · Your vein will need time to enlarge and mature so needles can be inserted for dialysis. Follow your caregiver's instructions about what you need to do to make this happen.  · Keep dressings clean and dry.  · Keep the arm elevated above your heart. Use a pillow.  · Rest.  · Use the arm as usual for all activities.  · Have the stitches or tape closures removed in 10 to 14 days, or as directed by your caregiver.  · Do not " sleep or lie on the area of the fistula or that arm. This may decrease or stop the blood flow through your fistula.  · Do not allow blood pressures to be taken on this arm.  · Do not allow blood drawing to be done from the graft.  · Do not wear tight clothing around the access site or on the arm.  · Avoid lifting heavy objects with the arm that has the fistula.  · Do not use creams or lotions over the access site.  SEEK MEDICAL CARE IF:   · You have a fever.  · You have swelling around the fistula that gets worse, or you have new pain.  · You have unusual bleeding at the fistula site or from any other area.  · You have pus or other drainage at the fistula site.  · You have skin redness or red streaking on the skin around, above, or below the fistula site.  · Your access site feels warm.  · You have any flu-like symptoms.  SEEK IMMEDIATE MEDICAL CARE IF:   · You have pain, numbness, or an unusual pale skin on the hand or on the side of your fistula.  · You have dizziness or weakness that you have not had before.  · You have shortness of breath.  · You have chest pain.  · Your fistula disconnects or breaks, and there is bleeding that cannot be easily controlled.  Call for local emergency medical help. Do not try to drive yourself to the hospital.  MAKE SURE YOU  · Understand these instructions.  · Will watch your condition.  · Will get help right away if you are not doing well or get worse.     This information is not intended to replace advice given to you by your health care provider. Make sure you discuss any questions you have with your health care provider.      DIET: To avoid nausea, slowly advance diet as tolerated, avoiding spicy or greasy foods for the first day.  Add more substantial food to your diet according to your physician's instructions. INCREASE FLUIDS AND FIBER TO AVOID CONSTIPATION.    SURGICAL DRESSING/BATHING: see above    FOLLOW-UP APPOINTMENT:  A follow-up appointment should be arranged with  your doctor; call to schedule.    You should CALL YOUR PHYSICIAN if you develop:  Fever greater than 101 degrees F.  Pain not relieved by medication, or persistent nausea or vomiting.  Excessive bleeding (blood soaking through dressing) or unexpected drainage from the wound.  Extreme redness or swelling around the incision site, drainage of pus or foul smelling drainage.  Inability to urinate or empty your bladder within 8 hours.  Problems with breathing or chest pain.    You should call 911 if you develop problems with breathing or chest pain.  If you are unable to contact your doctor or surgical center, you should go to the nearest emergency room or urgent care center.  Physician's telephone #: Dr. Rodriguez [phone]    If any questions arise, call your doctor.  If your doctor is not available, please feel free to call the Surgical Center at [phone].  The Center is open Monday through Friday from 7AM to 7PM.  You can also call the [employer] HOTLINE open 24 hours/day, 7 days/week and speak to a nurse at [phone], or toll free at [phone].    A registered nurse may call you a few days after your surgery to see how you are doing after your procedure.    MEDICATIONS: Resume taking daily medication.  Take prescribed pain medication with food.  If no medication is prescribed, you may take non-aspirin pain medication if needed.  PAIN MEDICATION CAN BE VERY CONSTIPATING.  Take a stool softener or laxative such as senokot, pericolace, or milk of magnesia if needed.    Prescription given for Norco.  Last pain medication given at 11:45AM.    If your physician has prescribed pain medication that includes Acetaminophen (Tylenol), do not take additional Acetaminophen (Tylenol) while taking the prescribed medication.    Depression / Suicide Risk    As you are discharged from this [facility] facility, it is important to learn how to keep safe from harming yourself.    Recognize the warning signs:  · Abrupt  changes in personality, positive or negative- including increase in energy   · Giving away possessions  · Change in eating patterns- significant weight changes-  positive or negative  · Change in sleeping patterns- unable to sleep or sleeping all the time   · Unwillingness or inability to communicate  · Depression  · Unusual sadness, discouragement and loneliness  · Talk of wanting to die  · Neglect of personal appearance   · Rebelliousness- reckless behavior  · Withdrawal from people/activities they love  · Confusion- inability to concentrate     If you or a loved one observes any of these behaviors or has concerns about self-harm, here's what you can do:  · Talk about it- your feelings and reasons for harming yourself  · Remove any means that you might use to hurt yourself (examples: pills, rope, extension cords, firearm)  · Get professional help from the community (Mental Health, Substance Abuse, psychological counseling)  · Do not be alone:Call your Safe Contact- someone whom you trust who will be there for you.  · Call your local CRISIS HOTLINE 128-5901 or 618-530-3412  · Call your local Children's Mobile Crisis Response Team [place] (095) 764-6415 or www.[employer]  · Call the toll free National Suicide Prevention Hotlines   · National Suicide Prevention Lifeline 644-084-LJWL (3671)  · National Hope Line Network 800-SUICIDE (907-6593)

## 2017-08-28 NOTE — OP REPORT
08/28/17      PRE-OPERATIVE DIAGNOSIS - renal failure    POST-OPERATIVE DIAGNOSIS -same    PROCEDURE PERFORMED -left upper extremity brachiobasilic AV fistula creation    SURGEON - Glen Rodriguez M.D.    ASSISTANT - KATIE Murrell    TYPE OF ANESTHESIA - General     ANESTHESIOLOGIST  -     DRAINS - None    INDICATIONS - 52 y.o.      PROCEDURE IN DETAIL -the patient was taken to the operating suite placed in the supine position and    The patient's left arm was circumferentially prepped and draped in sterile fashion. Incision was made in the medial aspect of the left arm. Incision was carried down through subcutaneous tissue and the basilic vein was localized from the surrounding tissue and isolated. The brachial artery at this location was also isolated from the surrounding tissue. 5000 units of heparin was administered and after appropriate period of time a longitudinal arteriotomy was made in the brachial artery. Basilic vein was transected distally probably spatulated. End-to-side anastomosis was completed between the basilic vein and the brachial artery using a 6-0 Prolene suture in running fashion. Just prior to completing the anastomosis all vessels worse for fresh and back flushed and the anastomosis was complete. Flow was established to the actual fistula good flow. Hemostasis was assured and protamine was administered. Vicryl subcutaneous sutures were placed followed by 40 Stratus 6. Benzoin Steri-Strips and sterile dressings were applied.      Glen Rodriguez M.D.

## 2017-08-29 ENCOUNTER — TELEPHONE (OUTPATIENT)
Dept: INTERNAL MEDICINE | Facility: MEDICAL CENTER | Age: 52
End: 2017-08-29

## 2017-08-29 NOTE — TELEPHONE ENCOUNTER
Called pharmacy pt picked up vitamin d yesterday and was told nicoderm should be available this afternoon

## 2017-08-29 NOTE — TELEPHONE ENCOUNTER
----- Message from Radha Ortiz sent at 8/28/2017  4:04 PM PDT -----  Regarding: Dr Pena/rx refill  Contact: [phone]  Patient states pharmacy never received her refill for Vitamin D and Nicoderm. Please call patient if you have any questions.

## 2017-08-31 ENCOUNTER — TELEPHONE (OUTPATIENT)
Dept: INTERNAL MEDICINE | Facility: MEDICAL CENTER | Age: 52
End: 2017-08-31

## 2017-08-31 NOTE — TELEPHONE ENCOUNTER
----- Message from Juan Moreno M.D. sent at 8/30/2017  3:14 PM PDT -----  Regarding: Antihypertensives  Thais Johnson,  I declined this patient's carvedilol refill as she is getting some other antihypertensives from her nephrologist and I prefer to let them decide antihypertensives. Can you please update her nephrologists office and request them to refill it.   Thanks

## 2017-09-14 NOTE — H&P
Date of Service:  8-28-17    PCP: Juan Moreno M.D.    CC:  Renal Failuire    HPI: This is a 52 y.o. female with renal failure who needs an AV fistula     ROS: As above. The remainder of a complete review of systems is negative in all systems except as noted.    PMHx:  Active Ambulatory Problems     Diagnosis Date Noted   • HTN (hypertension) 11/05/2009   • Glomerulonephritis, chronic 11/05/2009   • Anemia associated with chronic renal failure 11/05/2009   • Hyperlipidemia 09/10/2010   • Menopausal syndrome 02/08/2011   • Chest pain 01/24/2015   • Supraventricular tachycardia, paroxysmal (CMS-HCC) 01/25/2015   • Hx of right BKA (CMS-HCC) 01/25/2015   • Alcohol abuse 01/25/2015   • End-stage renal disease (ESRD) (CMS-HCC) 06/16/2017   • Vitamin D deficiency 06/17/2017   • Secondary hyperparathyroidism of renal origin (CMS-HCC) 06/17/2017   • Hyperphosphatemia 06/17/2017   • Hypocalcemia 06/17/2017   • Health care maintenance 07/18/2017   • Tobacco use 07/18/2017     Resolved Ambulatory Problems     Diagnosis Date Noted   • Elevated troponin 01/25/2015   • CKD3 01/25/2015   • N&V (nausea and vomiting) 01/25/2015   • Alcohol withdrawal (CMS-HCC) 01/25/2015   • Elevated troponin 06/17/2017   • High anion gap metabolic acidosis 06/17/2017   • Encounter for screening colonoscopy 07/18/2017     Past Medical History:   Diagnosis Date   • Anemia    • Anemia associated with chronic renal failure 11/5/2009   • Dental disorder 8-25-17   • Dialysis patient (CMS-HCC) 8-25-17   • Dysrhythmia    • ESRD (end stage renal disease) (CMS-HCC) 8-25-17   • Glomerulonephritis, chronic 11/5/2009   • Heart burn    • HTN (hypertension) 11/5/2009   • Port catheter in place 8-25-17   • SVT (supraventricular tachycardia)    • SVT (supraventricular tachycardia)        SHx:  Social History     Social History   • Marital status:      Spouse name: N/A   • Number of children: N/A   • Years of education: N/A     Occupational History    • Not on file.     Social History Main Topics   • Smoking status: Current Every Day Smoker     Packs/day: 0.25     Types: Cigarettes   • Smokeless tobacco: Never Used      Comment: 5 cigs/day   • Alcohol use No   • Drug use: No   • Sexual activity: Not on file     Other Topics Concern   • Not on file     Social History Narrative   • No narrative on file       FHx:  family history is not on file.    Allergies:  Allergies   Allergen Reactions   • Sulfa Drugs Hives     [id number]       Medications:  No current facility-administered medications on file prior to encounter.      Current Outpatient Prescriptions on File Prior to Encounter   Medication Sig Dispense Refill   • famotidine (PEPCID) 20 MG Tab TAKE ONE TABLET BY MOUTH TWICE DAILY 60 Tab 0   • terazosin (HYTRIN) 2 MG Cap TAKE ONE CAPSULE BY MOUTH ONCE DAILY AT BEDTIME 30 Cap 0   • carvedilol (COREG) 6.25 MG Tab Take 2 Tabs by mouth 2 times a day, with meals. 60 Tab 2       Objective Exam:  Vitals:    08/28/17 1200 08/28/17 1215 08/28/17 1230 08/28/17 1241   Pulse: 66 67 63    Resp: 19 16 (!) 10 16   Temp:       SpO2: 99% 93% 98% 99%   Weight:       Height:           General: awake   HEENT: normal  Chest: clear  CV: RRR  Abd: soft  Ext:   Neuro: intact  Skin: normal    Laboratory--reviewed personally and are as follows:  Lab Results   Component Value Date/Time    WBC 5.0 08/25/2017 02:38 PM    RBC 3.31 (L) 08/25/2017 02:38 PM    HEMOGLOBIN 9.9 (L) 08/25/2017 02:38 PM    HEMATOCRIT 30.1 (L) 08/25/2017 02:38 PM    MCV 90.9 08/25/2017 02:38 PM    MCH 29.9 08/25/2017 02:38 PM    MCHC 32.9 (L) 08/25/2017 02:38 PM    MPV 9.3 08/25/2017 02:38 PM    NEUTSPOLYS 70.70 06/22/2017 03:25 AM    LYMPHOCYTES 18.70 (L) 06/22/2017 03:25 AM    MONOCYTES 9.20 06/22/2017 03:25 AM    EOSINOPHILS 0.70 06/22/2017 03:25 AM    BASOPHILS 0.40 06/22/2017 03:25 AM    HYPOCHROMIA 1+ 10/04/2013 03:24 PM    ANISOCYTOSIS 3+ 07/14/2007 06:15 AM      Lab Results   Component Value Date/Time     SODIUM 137 08/25/2017 02:38 PM    POTASSIUM 4.0 08/25/2017 02:38 PM    CHLORIDE 100 08/25/2017 02:38 PM    CO2 23 08/25/2017 02:38 PM    GLUCOSE 85 08/25/2017 02:38 PM    BUN 39 (H) 08/25/2017 02:38 PM    CREATININE 7.28 (HH) 08/25/2017 02:38 PM    CREATININE 2.2 (H) 05/06/2009 03:10 AM      Lab Results   Component Value Date/Time    PROTHROMBTM 14.3 06/16/2017 09:42 AM    INR 1.08 06/16/2017 09:42 AM        Radiology      MDM:  52 y.o. female here with ESRD  For an AV fistula creation    Assessment/Plan:  Active Problems:    End stage renal disease (CMS-HCC) POA: Unknown  Resolved Problems:    * No resolved hospital problems. *

## 2017-10-06 DIAGNOSIS — Z01.812 PRE-OPERATIVE LABORATORY EXAMINATION: ICD-10-CM

## 2017-10-06 LAB
ANION GAP SERPL CALC-SCNC: 13 MMOL/L (ref 0–11.9)
BASOPHILS # BLD AUTO: 0.9 % (ref 0–1.8)
BASOPHILS # BLD: 0.05 K/UL (ref 0–0.12)
BUN SERPL-MCNC: 45 MG/DL (ref 8–22)
CALCIUM SERPL-MCNC: 10 MG/DL (ref 8.5–10.5)
CHLORIDE SERPL-SCNC: 96 MMOL/L (ref 96–112)
CO2 SERPL-SCNC: 25 MMOL/L (ref 20–33)
CREAT SERPL-MCNC: 7.76 MG/DL (ref 0.5–1.4)
EOSINOPHIL # BLD AUTO: 0.13 K/UL (ref 0–0.51)
EOSINOPHIL NFR BLD: 2.3 % (ref 0–6.9)
ERYTHROCYTE [DISTWIDTH] IN BLOOD BY AUTOMATED COUNT: 57.8 FL (ref 35.9–50)
GFR SERPL CREATININE-BSD FRML MDRD: 5 ML/MIN/1.73 M 2
GLUCOSE SERPL-MCNC: 85 MG/DL (ref 65–99)
HCT VFR BLD AUTO: 35.1 % (ref 37–47)
HGB BLD-MCNC: 11.2 G/DL (ref 12–16)
IMM GRANULOCYTES # BLD AUTO: 0.01 K/UL (ref 0–0.11)
IMM GRANULOCYTES NFR BLD AUTO: 0.2 % (ref 0–0.9)
LYMPHOCYTES # BLD AUTO: 2.12 K/UL (ref 1–4.8)
LYMPHOCYTES NFR BLD: 38.3 % (ref 22–41)
MCH RBC QN AUTO: 31 PG (ref 27–33)
MCHC RBC AUTO-ENTMCNC: 31.9 G/DL (ref 33.6–35)
MCV RBC AUTO: 97.2 FL (ref 81.4–97.8)
MONOCYTES # BLD AUTO: 0.49 K/UL (ref 0–0.85)
MONOCYTES NFR BLD AUTO: 8.8 % (ref 0–13.4)
NEUTROPHILS # BLD AUTO: 2.74 K/UL (ref 2–7.15)
NEUTROPHILS NFR BLD: 49.5 % (ref 44–72)
NRBC # BLD AUTO: 0 K/UL
NRBC BLD AUTO-RTO: 0 /100 WBC
PLATELET # BLD AUTO: 184 K/UL (ref 164–446)
PMV BLD AUTO: 9.6 FL (ref 9–12.9)
POTASSIUM SERPL-SCNC: 5.9 MMOL/L (ref 3.6–5.5)
RBC # BLD AUTO: 3.61 M/UL (ref 4.2–5.4)
SODIUM SERPL-SCNC: 134 MMOL/L (ref 135–145)
WBC # BLD AUTO: 5.5 K/UL (ref 4.8–10.8)

## 2017-10-06 PROCEDURE — 36415 COLL VENOUS BLD VENIPUNCTURE: CPT

## 2017-10-06 PROCEDURE — 80048 BASIC METABOLIC PNL TOTAL CA: CPT

## 2017-10-06 PROCEDURE — 85025 COMPLETE CBC W/AUTO DIFF WBC: CPT

## 2017-10-09 ENCOUNTER — HOSPITAL ENCOUNTER (OUTPATIENT)
Facility: MEDICAL CENTER | Age: 52
End: 2017-10-09
Attending: SURGERY | Admitting: SURGERY
Payer: MEDICAID

## 2017-10-09 VITALS
OXYGEN SATURATION: 100 % | HEIGHT: 62 IN | TEMPERATURE: 97.4 F | HEART RATE: 71 BPM | RESPIRATION RATE: 18 BRPM | SYSTOLIC BLOOD PRESSURE: 174 MMHG | DIASTOLIC BLOOD PRESSURE: 89 MMHG | BODY MASS INDEX: 24.91 KG/M2 | WEIGHT: 135.36 LBS

## 2017-10-09 LAB — POTASSIUM BLD-SCNC: 6.8 MMOL/L (ref 3.6–5.5)

## 2017-10-09 PROCEDURE — 84132 ASSAY OF SERUM POTASSIUM: CPT

## 2017-10-09 PROCEDURE — 700111 HCHG RX REV CODE 636 W/ 250 OVERRIDE (IP)

## 2017-10-09 RX ORDER — LISINOPRIL 20 MG/1
40 TABLET ORAL 2 TIMES DAILY PRN
COMMUNITY
End: 2017-12-27

## 2017-10-09 RX ORDER — TERAZOSIN 2 MG/1
2 CAPSULE ORAL NIGHTLY
COMMUNITY
End: 2018-06-15

## 2017-10-09 RX ORDER — HYDROCODONE BITARTRATE AND ACETAMINOPHEN 5; 325 MG/1; MG/1
1-2 TABLET ORAL EVERY 4 HOURS PRN
Qty: 30 TAB | Refills: 0 | Status: SHIPPED | OUTPATIENT
Start: 2017-10-09 | End: 2017-12-27

## 2017-10-09 RX ORDER — SODIUM CHLORIDE 9 MG/ML
INJECTION, SOLUTION INTRAVENOUS CONTINUOUS
Status: DISCONTINUED | OUTPATIENT
Start: 2017-10-09 | End: 2017-10-09 | Stop reason: HOSPADM

## 2017-10-09 RX ADMIN — SODIUM CHLORIDE: 9 INJECTION, SOLUTION INTRAVENOUS at 12:45

## 2017-10-09 ASSESSMENT — PAIN SCALES - GENERAL: PAINLEVEL_OUTOF10: 0

## 2017-10-09 NOTE — OR NURSING
istat potassium 6.8, lab draw called with potassium result of 7. Anesthesia notified. Received orders to cancel case for the day and reschedule.

## 2017-10-09 NOTE — H&P
Date of Service:  10/9/2017      Dialysis Access History and Physical     PCP: Juan Moreno M.D.    CC:  Need for transposition of left AVF.    HPI: This is a 52 y.o. female with chronic kidney disease who needs transposition of her left avf.    ROS: As above. The remainder of a complete review of systems is negative in all systems except as noted.    PMHx:  Active Ambulatory Problems     Diagnosis Date Noted   • HTN (hypertension) 11/05/2009   • Glomerulonephritis, chronic 11/05/2009   • Anemia associated with chronic renal failure 11/05/2009   • Hyperlipidemia 09/10/2010   • Menopausal syndrome 02/08/2011   • Chest pain 01/24/2015   • Supraventricular tachycardia, paroxysmal (CMS-HCC) 01/25/2015   • Hx of right BKA (CMS-HCC) 01/25/2015   • Alcohol abuse 01/25/2015   • End-stage renal disease (ESRD) (CMS-HCC) 06/16/2017   • Vitamin D deficiency 06/17/2017   • Secondary hyperparathyroidism of renal origin (CMS-HCC) 06/17/2017   • Hyperphosphatemia 06/17/2017   • Hypocalcemia 06/17/2017   • Health care maintenance 07/18/2017   • Tobacco use 07/18/2017   • End stage renal disease (CMS-HCC) 08/28/2017     Resolved Ambulatory Problems     Diagnosis Date Noted   • Elevated troponin 01/25/2015   • CKD3 01/25/2015   • N&V (nausea and vomiting) 01/25/2015   • Alcohol withdrawal (CMS-HCC) 01/25/2015   • Elevated troponin 06/17/2017   • High anion gap metabolic acidosis 06/17/2017   • Encounter for screening colonoscopy 07/18/2017     Past Medical History:   Diagnosis Date   • Anemia    • Anemia associated with chronic renal failure 11/5/2009   • Dental disorder 8-25-17   • Dialysis patient (CMS-HCC) 8-25-17   • Dysrhythmia    • ESRD (end stage renal disease) (CMS-HCC) 8-25-17   • Glomerulonephritis, chronic 11/5/2009   • Heart burn    • High cholesterol    • HTN (hypertension) 11/5/2009   • Port catheter in place 8-25-17   • SVT (supraventricular tachycardia) (CMS-HCC)    • SVT (supraventricular tachycardia)  "(CMS-HCC)        SHx:  Social History     Social History   • Marital status:      Spouse name: N/A   • Number of children: N/A   • Years of education: N/A     Occupational History   • Not on file.     Social History Main Topics   • Smoking status: Former Smoker     Packs/day: 0.25     Types: Cigarettes     Quit date: 7/6/2017   • Smokeless tobacco: Never Used      Comment: 5 cigs/day   • Alcohol use No      Comment: pt vapes   • Drug use: No   • Sexual activity: Not on file     Other Topics Concern   • Not on file     Social History Narrative   • No narrative on file       FHx:  family history is not on file.    Allergies:  Allergies   Allergen Reactions   • Sulfa Drugs Hives     [id number] rash swelling itching       Medications:  No current facility-administered medications on file prior to encounter.      Current Outpatient Prescriptions on File Prior to Encounter   Medication Sig Dispense Refill   • famotidine (PEPCID) 20 MG Tab TAKE ONE TABLET BY MOUTH TWICE DAILY 180 Tab 0   • verapamil (ISOPTIN) 80 MG Tab Take 80 mg by mouth 3 times a day.     • Cholecalciferol (VITAMIN D3) 2000 units Tab Take 1 Tab by mouth every day. 30 Tab 3   • nicotine (NICODERM) 7 MG/24HR PATCH 24 HR Apply 1 Patch to skin as directed every 24 hours. 30 Patch 0   • carvedilol (COREG) 6.25 MG Tab Take 2 Tabs by mouth 2 times a day, with meals. 60 Tab 2       Objective Exam:  Vitals:    10/06/17 1421 10/09/17 1157 10/09/17 1219   BP:   (!) 174/89   Pulse:   71   Resp:   18   Temp:   36.3 °C (97.4 °F)   SpO2:   100%   Weight: 61.2 kg (134 lb 14.7 oz) 61.4 kg (135 lb 5.8 oz)    Height: 1.575 m (5' 2\") 1.575 m (5' 2\")          General: Awake, alert  HEENT: Normocephalic, atraumatic  Chest: Clear  CV: RRR  Abd: Soft  Neuro: Intact  Skin: Intact  Ext:  Left AVF      Laboratory--reviewed personally and are as follows:  Lab Results   Component Value Date/Time    WBC 5.5 10/06/2017 02:47 PM    RBC 3.61 (L) 10/06/2017 02:47 PM    HEMOGLOBIN " 11.2 (L) 10/06/2017 02:47 PM    HEMATOCRIT 35.1 (L) 10/06/2017 02:47 PM    MCV 97.2 10/06/2017 02:47 PM    MCH 31.0 10/06/2017 02:47 PM    MCHC 31.9 (L) 10/06/2017 02:47 PM    MPV 9.6 10/06/2017 02:47 PM    NEUTSPOLYS 49.50 10/06/2017 02:47 PM    LYMPHOCYTES 38.30 10/06/2017 02:47 PM    MONOCYTES 8.80 10/06/2017 02:47 PM    EOSINOPHILS 2.30 10/06/2017 02:47 PM    BASOPHILS 0.90 10/06/2017 02:47 PM    HYPOCHROMIA 1+ 10/04/2013 03:24 PM    ANISOCYTOSIS 3+ 07/14/2007 06:15 AM      Lab Results   Component Value Date/Time    SODIUM 134 (L) 10/06/2017 02:47 PM    POTASSIUM 5.9 (H) 10/06/2017 02:47 PM    CHLORIDE 96 10/06/2017 02:47 PM    CO2 25 10/06/2017 02:47 PM    GLUCOSE 85 10/06/2017 02:47 PM    BUN 45 (H) 10/06/2017 02:47 PM    CREATININE 7.76 (HH) 10/06/2017 02:47 PM    CREATININE 2.2 (H) 05/06/2009 03:10 AM      Lab Results   Component Value Date/Time    PROTHROMBTM 14.3 06/16/2017 09:42 AM    INR 1.08 06/16/2017 09:42 AM            MDM:  52 y.o. female here for transposition of left brachiocephalic AVF    Assessment/Plan:  Active Problems:    End stage renal disease (CMS-HCC) POA: Unknown  Resolved Problems:    * No resolved hospital problems. *      Planned Procedure :  Revision/ transposition left brachiocephalic AVF.

## 2017-12-27 ENCOUNTER — APPOINTMENT (OUTPATIENT)
Dept: RADIOLOGY | Facility: MEDICAL CENTER | Age: 52
DRG: 308 | End: 2017-12-27
Attending: EMERGENCY MEDICINE
Payer: MEDICAID

## 2017-12-27 ENCOUNTER — HOSPITAL ENCOUNTER (INPATIENT)
Facility: MEDICAL CENTER | Age: 52
LOS: 3 days | DRG: 308 | End: 2017-12-30
Attending: EMERGENCY MEDICINE | Admitting: HOSPITALIST
Payer: MEDICAID

## 2017-12-27 ENCOUNTER — RESOLUTE PROFESSIONAL BILLING HOSPITAL PROF FEE (OUTPATIENT)
Dept: HOSPITALIST | Facility: MEDICAL CENTER | Age: 52
End: 2017-12-27
Payer: MEDICAID

## 2017-12-27 DIAGNOSIS — I47.10 SVT (SUPRAVENTRICULAR TACHYCARDIA): ICD-10-CM

## 2017-12-27 PROBLEM — I49.9 SUPRAVENTRICULAR ARRHYTHMIA: Status: ACTIVE | Noted: 2017-12-27

## 2017-12-27 LAB
ALBUMIN SERPL BCP-MCNC: 3.4 G/DL (ref 3.2–4.9)
ALBUMIN/GLOB SERPL: 1.4 G/DL
ALP SERPL-CCNC: 45 U/L (ref 30–99)
ALT SERPL-CCNC: <5 U/L (ref 2–50)
AMPHET UR QL SCN: NEGATIVE
ANION GAP SERPL CALC-SCNC: 13 MMOL/L (ref 0–11.9)
AST SERPL-CCNC: 17 U/L (ref 12–45)
BARBITURATES UR QL SCN: NEGATIVE
BASOPHILS # BLD AUTO: 0.5 % (ref 0–1.8)
BASOPHILS # BLD: 0.03 K/UL (ref 0–0.12)
BENZODIAZ UR QL SCN: NEGATIVE
BILIRUB SERPL-MCNC: 0.8 MG/DL (ref 0.1–1.5)
BUN SERPL-MCNC: 24 MG/DL (ref 8–22)
BZE UR QL SCN: NEGATIVE
CALCIUM SERPL-MCNC: 8.2 MG/DL (ref 8.5–10.5)
CANNABINOIDS UR QL SCN: NEGATIVE
CHLORIDE SERPL-SCNC: 101 MMOL/L (ref 96–112)
CO2 SERPL-SCNC: 24 MMOL/L (ref 20–33)
CREAT SERPL-MCNC: 5.23 MG/DL (ref 0.5–1.4)
EKG IMPRESSION: NORMAL
EKG IMPRESSION: NORMAL
EOSINOPHIL # BLD AUTO: 0.12 K/UL (ref 0–0.51)
EOSINOPHIL NFR BLD: 1.8 % (ref 0–6.9)
ERYTHROCYTE [DISTWIDTH] IN BLOOD BY AUTOMATED COUNT: 54.9 FL (ref 35.9–50)
GFR SERPL CREATININE-BSD FRML MDRD: 9 ML/MIN/1.73 M 2
GLOBULIN SER CALC-MCNC: 2.4 G/DL (ref 1.9–3.5)
GLUCOSE SERPL-MCNC: 73 MG/DL (ref 65–99)
HCT VFR BLD AUTO: 31.5 % (ref 37–47)
HGB BLD-MCNC: 9.8 G/DL (ref 12–16)
IMM GRANULOCYTES # BLD AUTO: 0.04 K/UL (ref 0–0.11)
IMM GRANULOCYTES NFR BLD AUTO: 0.6 % (ref 0–0.9)
LYMPHOCYTES # BLD AUTO: 1.3 K/UL (ref 1–4.8)
LYMPHOCYTES NFR BLD: 19.8 % (ref 22–41)
MAGNESIUM SERPL-MCNC: 1.7 MG/DL (ref 1.5–2.5)
MCH RBC QN AUTO: 31.7 PG (ref 27–33)
MCHC RBC AUTO-ENTMCNC: 31.1 G/DL (ref 33.6–35)
MCV RBC AUTO: 101.9 FL (ref 81.4–97.8)
METHADONE UR QL SCN: NEGATIVE
MONOCYTES # BLD AUTO: 0.37 K/UL (ref 0–0.85)
MONOCYTES NFR BLD AUTO: 5.6 % (ref 0–13.4)
NEUTROPHILS # BLD AUTO: 4.69 K/UL (ref 2–7.15)
NEUTROPHILS NFR BLD: 71.7 % (ref 44–72)
NRBC # BLD AUTO: 0 K/UL
NRBC BLD-RTO: 0 /100 WBC
OPIATES UR QL SCN: NEGATIVE
OXYCODONE UR QL SCN: NEGATIVE
PCP UR QL SCN: NEGATIVE
PHOSPHATE SERPL-MCNC: 1.6 MG/DL (ref 2.5–4.5)
PLATELET # BLD AUTO: 140 K/UL (ref 164–446)
PMV BLD AUTO: 10.3 FL (ref 9–12.9)
POTASSIUM SERPL-SCNC: 4.1 MMOL/L (ref 3.6–5.5)
PROPOXYPH UR QL SCN: NEGATIVE
PROT SERPL-MCNC: 5.8 G/DL (ref 6–8.2)
RBC # BLD AUTO: 3.09 M/UL (ref 4.2–5.4)
SODIUM SERPL-SCNC: 138 MMOL/L (ref 135–145)
TROPONIN I SERPL-MCNC: <0.01 NG/ML (ref 0–0.04)
TSH SERPL DL<=0.005 MIU/L-ACNC: 1.46 UIU/ML (ref 0.38–5.33)
WBC # BLD AUTO: 6.6 K/UL (ref 4.8–10.8)

## 2017-12-27 PROCEDURE — 92960 CARDIOVERSION ELECTRIC EXT: CPT

## 2017-12-27 PROCEDURE — 99285 EMERGENCY DEPT VISIT HI MDM: CPT

## 2017-12-27 PROCEDURE — 99152 MOD SED SAME PHYS/QHP 5/>YRS: CPT

## 2017-12-27 PROCEDURE — A9270 NON-COVERED ITEM OR SERVICE: HCPCS | Performed by: INTERNAL MEDICINE

## 2017-12-27 PROCEDURE — 84484 ASSAY OF TROPONIN QUANT: CPT

## 2017-12-27 PROCEDURE — 96374 THER/PROPH/DIAG INJ IV PUSH: CPT

## 2017-12-27 PROCEDURE — 84443 ASSAY THYROID STIM HORMONE: CPT

## 2017-12-27 PROCEDURE — 700111 HCHG RX REV CODE 636 W/ 250 OVERRIDE (IP)

## 2017-12-27 PROCEDURE — 96375 TX/PRO/DX INJ NEW DRUG ADDON: CPT

## 2017-12-27 PROCEDURE — 80053 COMPREHEN METABOLIC PANEL: CPT

## 2017-12-27 PROCEDURE — 84100 ASSAY OF PHOSPHORUS: CPT

## 2017-12-27 PROCEDURE — 700102 HCHG RX REV CODE 250 W/ 637 OVERRIDE(OP): Performed by: INTERNAL MEDICINE

## 2017-12-27 PROCEDURE — 770020 HCHG ROOM/CARE - TELE (206)

## 2017-12-27 PROCEDURE — 71020 DX-CHEST-2 VIEWS: CPT

## 2017-12-27 PROCEDURE — 85025 COMPLETE CBC W/AUTO DIFF WBC: CPT

## 2017-12-27 PROCEDURE — 700111 HCHG RX REV CODE 636 W/ 250 OVERRIDE (IP): Performed by: EMERGENCY MEDICINE

## 2017-12-27 PROCEDURE — 93005 ELECTROCARDIOGRAM TRACING: CPT | Performed by: EMERGENCY MEDICINE

## 2017-12-27 PROCEDURE — 36415 COLL VENOUS BLD VENIPUNCTURE: CPT

## 2017-12-27 PROCEDURE — 80307 DRUG TEST PRSMV CHEM ANLYZR: CPT

## 2017-12-27 PROCEDURE — 83735 ASSAY OF MAGNESIUM: CPT

## 2017-12-27 RX ORDER — ACETAMINOPHEN 325 MG/1
650 TABLET ORAL EVERY 6 HOURS PRN
Status: DISCONTINUED | OUTPATIENT
Start: 2017-12-27 | End: 2017-12-30 | Stop reason: HOSPADM

## 2017-12-27 RX ORDER — CARVEDILOL 6.25 MG/1
6.25 TABLET ORAL DAILY
Status: ON HOLD | COMMUNITY
End: 2017-12-30

## 2017-12-27 RX ORDER — DILTIAZEM HYDROCHLORIDE 5 MG/ML
INJECTION INTRAVENOUS
Status: COMPLETED
Start: 2017-12-27 | End: 2017-12-27

## 2017-12-27 RX ORDER — ADENOSINE 3 MG/ML
12 INJECTION, SOLUTION INTRAVENOUS ONCE
Status: COMPLETED | OUTPATIENT
Start: 2017-12-27 | End: 2017-12-27

## 2017-12-27 RX ORDER — VERAPAMIL HYDROCHLORIDE 80 MG/1
80 TABLET ORAL EVERY 8 HOURS
Status: DISCONTINUED | OUTPATIENT
Start: 2017-12-27 | End: 2017-12-30 | Stop reason: HOSPADM

## 2017-12-27 RX ORDER — ADENOSINE 3 MG/ML
6 INJECTION, SOLUTION INTRAVENOUS ONCE
Status: COMPLETED | OUTPATIENT
Start: 2017-12-27 | End: 2017-12-27

## 2017-12-27 RX ORDER — BISACODYL 10 MG
10 SUPPOSITORY, RECTAL RECTAL
Status: DISCONTINUED | OUTPATIENT
Start: 2017-12-27 | End: 2017-12-27

## 2017-12-27 RX ORDER — AMOXICILLIN 250 MG
2 CAPSULE ORAL 2 TIMES DAILY
Status: DISCONTINUED | OUTPATIENT
Start: 2017-12-27 | End: 2017-12-27

## 2017-12-27 RX ORDER — LISINOPRIL 20 MG/1
20 TABLET ORAL DAILY
Status: ON HOLD | COMMUNITY
End: 2018-01-09

## 2017-12-27 RX ORDER — POLYETHYLENE GLYCOL 3350 17 G/17G
1 POWDER, FOR SOLUTION ORAL
Status: DISCONTINUED | OUTPATIENT
Start: 2017-12-27 | End: 2017-12-27

## 2017-12-27 RX ADMIN — DILTIAZEM HYDROCHLORIDE 20 MG: 5 INJECTION INTRAVENOUS at 20:21

## 2017-12-27 RX ADMIN — METOPROLOL TARTRATE 25 MG: 25 TABLET, FILM COATED ORAL at 22:30

## 2017-12-27 RX ADMIN — ADENOSINE 12 MG: 3 INJECTION, SOLUTION INTRAVENOUS at 20:01

## 2017-12-27 RX ADMIN — PROPOFOL 80 MG: 10 INJECTION, EMULSION INTRAVENOUS at 20:10

## 2017-12-27 RX ADMIN — ADENOSINE 6 MG: 3 INJECTION, SOLUTION INTRAVENOUS at 20:00

## 2017-12-27 ASSESSMENT — COPD QUESTIONNAIRES
COPD SCREENING SCORE: 3
DURING THE PAST 4 WEEKS HOW MUCH DID YOU FEEL SHORT OF BREATH: NONE/LITTLE OF THE TIME
DO YOU EVER COUGH UP ANY MUCUS OR PHLEGM?: NO/ONLY WITH OCCASIONAL COLDS OR INFECTIONS
HAVE YOU SMOKED AT LEAST 100 CIGARETTES IN YOUR ENTIRE LIFE: YES

## 2017-12-27 ASSESSMENT — LIFESTYLE VARIABLES: EVER_SMOKED: YES

## 2017-12-28 PROBLEM — E87.5 HYPERKALEMIA: Status: ACTIVE | Noted: 2017-12-28

## 2017-12-28 PROBLEM — R73.09 LOW GLUCOSE LEVEL: Status: ACTIVE | Noted: 2017-12-28

## 2017-12-28 LAB
ALBUMIN SERPL BCP-MCNC: 3.9 G/DL (ref 3.2–4.9)
ALBUMIN/GLOB SERPL: 1.5 G/DL
ALP SERPL-CCNC: 47 U/L (ref 30–99)
ALT SERPL-CCNC: <5 U/L (ref 2–50)
ANION GAP SERPL CALC-SCNC: 8 MMOL/L (ref 0–11.9)
ANION GAP SERPL CALC-SCNC: 9 MMOL/L (ref 0–11.9)
ANION GAP SERPL CALC-SCNC: 9 MMOL/L (ref 0–11.9)
AST SERPL-CCNC: 10 U/L (ref 12–45)
BASOPHILS # BLD AUTO: 0.5 % (ref 0–1.8)
BASOPHILS # BLD: 0.03 K/UL (ref 0–0.12)
BILIRUB SERPL-MCNC: 0.9 MG/DL (ref 0.1–1.5)
BUN SERPL-MCNC: 33 MG/DL (ref 8–22)
BUN SERPL-MCNC: 37 MG/DL (ref 8–22)
BUN SERPL-MCNC: 40 MG/DL (ref 8–22)
CALCIUM SERPL-MCNC: 9 MG/DL (ref 8.5–10.5)
CALCIUM SERPL-MCNC: 9.2 MG/DL (ref 8.5–10.5)
CALCIUM SERPL-MCNC: 9.3 MG/DL (ref 8.5–10.5)
CHLORIDE SERPL-SCNC: 101 MMOL/L (ref 96–112)
CHLORIDE SERPL-SCNC: 99 MMOL/L (ref 96–112)
CHLORIDE SERPL-SCNC: 99 MMOL/L (ref 96–112)
CO2 SERPL-SCNC: 27 MMOL/L (ref 20–33)
CO2 SERPL-SCNC: 28 MMOL/L (ref 20–33)
CO2 SERPL-SCNC: 29 MMOL/L (ref 20–33)
CREAT SERPL-MCNC: 6.56 MG/DL (ref 0.5–1.4)
CREAT SERPL-MCNC: 7.56 MG/DL (ref 0.5–1.4)
CREAT SERPL-MCNC: 7.88 MG/DL (ref 0.5–1.4)
EOSINOPHIL # BLD AUTO: 0.07 K/UL (ref 0–0.51)
EOSINOPHIL NFR BLD: 1.1 % (ref 0–6.9)
ERYTHROCYTE [DISTWIDTH] IN BLOOD BY AUTOMATED COUNT: 55.6 FL (ref 35.9–50)
GFR SERPL CREATININE-BSD FRML MDRD: 5 ML/MIN/1.73 M 2
GFR SERPL CREATININE-BSD FRML MDRD: 6 ML/MIN/1.73 M 2
GFR SERPL CREATININE-BSD FRML MDRD: 7 ML/MIN/1.73 M 2
GLOBULIN SER CALC-MCNC: 2.6 G/DL (ref 1.9–3.5)
GLUCOSE BLD-MCNC: 99 MG/DL (ref 65–99)
GLUCOSE SERPL-MCNC: 52 MG/DL (ref 65–99)
GLUCOSE SERPL-MCNC: 90 MG/DL (ref 65–99)
GLUCOSE SERPL-MCNC: 94 MG/DL (ref 65–99)
HCT VFR BLD AUTO: 34.2 % (ref 37–47)
HGB BLD-MCNC: 10.7 G/DL (ref 12–16)
IMM GRANULOCYTES # BLD AUTO: 0.02 K/UL (ref 0–0.11)
IMM GRANULOCYTES NFR BLD AUTO: 0.3 % (ref 0–0.9)
LYMPHOCYTES # BLD AUTO: 1.26 K/UL (ref 1–4.8)
LYMPHOCYTES NFR BLD: 19.6 % (ref 22–41)
MAGNESIUM SERPL-MCNC: 2.1 MG/DL (ref 1.5–2.5)
MCH RBC QN AUTO: 31.8 PG (ref 27–33)
MCHC RBC AUTO-ENTMCNC: 31.3 G/DL (ref 33.6–35)
MCV RBC AUTO: 101.8 FL (ref 81.4–97.8)
MONOCYTES # BLD AUTO: 0.42 K/UL (ref 0–0.85)
MONOCYTES NFR BLD AUTO: 6.5 % (ref 0–13.4)
NEUTROPHILS # BLD AUTO: 4.62 K/UL (ref 2–7.15)
NEUTROPHILS NFR BLD: 72 % (ref 44–72)
NRBC # BLD AUTO: 0 K/UL
NRBC BLD-RTO: 0 /100 WBC
PLATELET # BLD AUTO: 172 K/UL (ref 164–446)
PMV BLD AUTO: 10 FL (ref 9–12.9)
POTASSIUM SERPL-SCNC: 5 MMOL/L (ref 3.6–5.5)
POTASSIUM SERPL-SCNC: 6.7 MMOL/L (ref 3.6–5.5)
POTASSIUM SERPL-SCNC: 6.7 MMOL/L (ref 3.6–5.5)
PROT SERPL-MCNC: 6.5 G/DL (ref 6–8.2)
RBC # BLD AUTO: 3.36 M/UL (ref 4.2–5.4)
SODIUM SERPL-SCNC: 136 MMOL/L (ref 135–145)
SODIUM SERPL-SCNC: 136 MMOL/L (ref 135–145)
SODIUM SERPL-SCNC: 137 MMOL/L (ref 135–145)
WBC # BLD AUTO: 6.4 K/UL (ref 4.8–10.8)

## 2017-12-28 PROCEDURE — 83735 ASSAY OF MAGNESIUM: CPT

## 2017-12-28 PROCEDURE — 82962 GLUCOSE BLOOD TEST: CPT

## 2017-12-28 PROCEDURE — 94760 N-INVAS EAR/PLS OXIMETRY 1: CPT

## 2017-12-28 PROCEDURE — 700102 HCHG RX REV CODE 250 W/ 637 OVERRIDE(OP): Performed by: STUDENT IN AN ORGANIZED HEALTH CARE EDUCATION/TRAINING PROGRAM

## 2017-12-28 PROCEDURE — 36415 COLL VENOUS BLD VENIPUNCTURE: CPT

## 2017-12-28 PROCEDURE — 700101 HCHG RX REV CODE 250: Performed by: STUDENT IN AN ORGANIZED HEALTH CARE EDUCATION/TRAINING PROGRAM

## 2017-12-28 PROCEDURE — 80048 BASIC METABOLIC PNL TOTAL CA: CPT

## 2017-12-28 PROCEDURE — 700102 HCHG RX REV CODE 250 W/ 637 OVERRIDE(OP): Performed by: INTERNAL MEDICINE

## 2017-12-28 PROCEDURE — 99223 1ST HOSP IP/OBS HIGH 75: CPT | Mod: GC | Performed by: HOSPITALIST

## 2017-12-28 PROCEDURE — A9270 NON-COVERED ITEM OR SERVICE: HCPCS | Performed by: INTERNAL MEDICINE

## 2017-12-28 PROCEDURE — A9270 NON-COVERED ITEM OR SERVICE: HCPCS | Performed by: STUDENT IN AN ORGANIZED HEALTH CARE EDUCATION/TRAINING PROGRAM

## 2017-12-28 PROCEDURE — 80053 COMPREHEN METABOLIC PANEL: CPT

## 2017-12-28 PROCEDURE — 94640 AIRWAY INHALATION TREATMENT: CPT

## 2017-12-28 PROCEDURE — 85025 COMPLETE CBC W/AUTO DIFF WBC: CPT

## 2017-12-28 PROCEDURE — 770020 HCHG ROOM/CARE - TELE (206)

## 2017-12-28 RX ORDER — DEXTROSE MONOHYDRATE 50 MG/ML
INJECTION, SOLUTION INTRAVENOUS CONTINUOUS
Status: DISCONTINUED | OUTPATIENT
Start: 2017-12-28 | End: 2017-12-28

## 2017-12-28 RX ORDER — LABETALOL HYDROCHLORIDE 5 MG/ML
10 INJECTION, SOLUTION INTRAVENOUS
Status: DISCONTINUED | OUTPATIENT
Start: 2017-12-28 | End: 2017-12-30 | Stop reason: HOSPADM

## 2017-12-28 RX ORDER — LISINOPRIL 20 MG/1
20 TABLET ORAL DAILY
Status: DISCONTINUED | OUTPATIENT
Start: 2017-12-28 | End: 2017-12-30 | Stop reason: HOSPADM

## 2017-12-28 RX ORDER — DEXTROSE MONOHYDRATE 25 G/50ML
25 INJECTION, SOLUTION INTRAVENOUS
Status: DISCONTINUED | OUTPATIENT
Start: 2017-12-28 | End: 2017-12-30 | Stop reason: HOSPADM

## 2017-12-28 RX ORDER — DEXTROSE MONOHYDRATE 25 G/50ML
50 INJECTION, SOLUTION INTRAVENOUS ONCE
Status: COMPLETED | OUTPATIENT
Start: 2017-12-28 | End: 2017-12-28

## 2017-12-28 RX ORDER — LABETALOL HYDROCHLORIDE 5 MG/ML
10 INJECTION, SOLUTION INTRAVENOUS EVERY 4 HOURS PRN
Status: DISCONTINUED | OUTPATIENT
Start: 2017-12-28 | End: 2017-12-28

## 2017-12-28 RX ADMIN — LISINOPRIL 20 MG: 20 TABLET ORAL at 08:48

## 2017-12-28 RX ADMIN — METOPROLOL TARTRATE 25 MG: 25 TABLET, FILM COATED ORAL at 21:59

## 2017-12-28 RX ADMIN — INSULIN HUMAN 10 UNITS: 100 INJECTION, SOLUTION PARENTERAL at 07:56

## 2017-12-28 RX ADMIN — ALBUTEROL SULFATE 2.5 MG: 2.5 SOLUTION RESPIRATORY (INHALATION) at 07:00

## 2017-12-28 RX ADMIN — METOPROLOL TARTRATE 25 MG: 25 TABLET, FILM COATED ORAL at 06:22

## 2017-12-28 RX ADMIN — VERAPAMIL HYDROCHLORIDE 80 MG: 80 TABLET, FILM COATED ORAL at 21:59

## 2017-12-28 RX ADMIN — LABETALOL HYDROCHLORIDE 10 MG: 5 INJECTION, SOLUTION INTRAVENOUS at 08:48

## 2017-12-28 RX ADMIN — DEXTROSE MONOHYDRATE 50 ML: 25 INJECTION, SOLUTION INTRAVENOUS at 07:56

## 2017-12-28 RX ADMIN — VERAPAMIL HYDROCHLORIDE 80 MG: 80 TABLET, FILM COATED ORAL at 07:55

## 2017-12-28 RX ADMIN — METOPROLOL TARTRATE 25 MG: 25 TABLET, FILM COATED ORAL at 13:20

## 2017-12-28 RX ADMIN — VERAPAMIL HYDROCHLORIDE 80 MG: 80 TABLET, FILM COATED ORAL at 13:20

## 2017-12-28 ASSESSMENT — LIFESTYLE VARIABLES
TOTAL SCORE: 0
HAVE PEOPLE ANNOYED YOU BY CRITICIZING YOUR DRINKING: NO
ON A TYPICAL DAY WHEN YOU DRINK ALCOHOL HOW MANY DRINKS DO YOU HAVE: 4
TOTAL SCORE: 0
AVERAGE NUMBER OF DAYS PER WEEK YOU HAVE A DRINK CONTAINING ALCOHOL: 2
ALCOHOL_USE: YES
TOTAL SCORE: 0
HAVE YOU EVER FELT YOU SHOULD CUT DOWN ON YOUR DRINKING: NO
CONSUMPTION TOTAL: INCOMPLETE
EVER FELT BAD OR GUILTY ABOUT YOUR DRINKING: NO
EVER HAD A DRINK FIRST THING IN THE MORNING TO STEADY YOUR NERVES TO GET RID OF A HANGOVER: NO

## 2017-12-28 ASSESSMENT — ENCOUNTER SYMPTOMS
DOUBLE VISION: 0
HEADACHES: 0
BRUISES/BLEEDS EASILY: 0
FEVER: 0
ABDOMINAL PAIN: 0
CONSTIPATION: 0
BLURRED VISION: 0
BLOOD IN STOOL: 0
DEPRESSION: 0
DIZZINESS: 0
SHORTNESS OF BREATH: 1
SPUTUM PRODUCTION: 0
WEAKNESS: 0
DIARRHEA: 1
VOMITING: 0
SHORTNESS OF BREATH: 0
PALPITATIONS: 0
MYALGIAS: 1
CHILLS: 0
PALPITATIONS: 1
COUGH: 0
MYALGIAS: 0
NAUSEA: 0
DIZZINESS: 1

## 2017-12-28 ASSESSMENT — PAIN SCALES - GENERAL
PAINLEVEL_OUTOF10: 0

## 2017-12-28 ASSESSMENT — PATIENT HEALTH QUESTIONNAIRE - PHQ9
1. LITTLE INTEREST OR PLEASURE IN DOING THINGS: NOT AT ALL
SUM OF ALL RESPONSES TO PHQ9 QUESTIONS 1 AND 2: 0
2. FEELING DOWN, DEPRESSED, IRRITABLE, OR HOPELESS: NOT AT ALL
SUM OF ALL RESPONSES TO PHQ QUESTIONS 1-9: 0

## 2017-12-28 NOTE — ASSESSMENT & PLAN NOTE
- K 12/29/17 --> 6.7  - Treated with insulin and D50W, calcium chloride and HD  - K this am 5.4  - NO insulin and d5w when hyperkalemic as pt gets hypoglycemic  - Can try calcium gluconate if required  - kayexalate one dose given today  - discussed with Dr. Collins regarding OP Kayexalate for discharge in view of K and Lisinopril --> advised no OP kayexalate for now, they'll follow-up on patient during HD sessions

## 2017-12-28 NOTE — ASSESSMENT & PLAN NOTE
- uncontrolled HTN in the past  - On carvedilol and lisinopril at home  - BP was 160-170 yesterday, between 130-150 this am  - asymptomatic  Plan:  - Continue home meds and Verapamil, metoprolol TID instead of Coreg BID  - Nephro will FU while on HD sessions (d'ed with Dr. Collins 12/30/17)

## 2017-12-28 NOTE — H&P
Internal Medicine Admitting History and Physical    Note Author: Sarah Schumacher M.D.       Name Isabelle Coyle     1965   Age/Sex 52 y.o. female   MRN [id number]   Code Status Full code      After 5PM or if no immediate response to page, please call for cross-coverage  Attending/Team: Dr Spain/ stacy  See Patient List for primary contact information  Call [phone] to page    1st Call - Day Intern (R1):   Dr Powers  2nd Call - Day Sr. Resident (R2/R3):   Dr Dias        Chief Complaint:  Heart palpitations     HPI:  Ms Coyle is a 51y/o female patient with history of SVT and ESRD on HD ( MWF) came to ED after developing palpitations at the end of her dialysis today. Patient reported that during dialysis at [facility] she started having palpitations, chest pain, dizziness and SOB. Denies nausea, vomiting or LOC. Patient has history of supraventricular tachycardia. She reported that her previous episodes had resolved spontaneously without any intervention. Last episode was back in October.   Off note patient reported that she was able to finish her dialysis before coming to the ED.     In the ED Chemical conversion was attempted with adenosine without success. Patient was given 2 shocks at 120 and 200 jules without success. 20 mg of diltiazem were given and patient converted. Cardiology (Dr Carrasco) evaluated patient and recommended continue verapamil and change carvedilol to metoprolol. Also EP consultation tomorrow for likely ablation.     Review of Systems   Constitutional: Negative for chills and fever.   HENT: Negative for congestion.    Eyes: Negative for blurred vision and double vision.   Respiratory: Positive for shortness of breath. Negative for cough and sputum production.    Cardiovascular: Positive for chest pain and palpitations. Negative for leg swelling.   Gastrointestinal: Positive for diarrhea. Negative for abdominal pain, blood in stool, nausea and vomiting.  "  Genitourinary: Negative for dysuria, frequency and urgency.   Musculoskeletal: Negative for myalgias.   Skin: Negative for rash.   Neurological: Positive for dizziness. Negative for weakness.   Psychiatric/Behavioral: Negative for depression.             Past Medical History:   Past Medical History:   Diagnosis Date   • Anemia    • Anemia associated with chronic renal failure 11/5/2009   • Dental disorder 8-25-17    \"Full Upper & Partial Lower Dentures\"   [place] [place]    [facility] M,W,F   • Dysrhythmia     \"SVT\"   • ESRD (end stage renal disease) (CMS-HCC) 8-25-17    Dialysis MWF@ [facility]   • Glomerulonephritis, chronic 11/5/2009   • Heart burn    • High cholesterol    • HTN (hypertension) 11/5/2009    2017 states well controlled   • Port catheter in place 8-25-17    For Dialysis   • SVT (supraventricular tachycardia) (CMS-HCC)    • SVT (supraventricular tachycardia) (CMS-HCC)        Past Surgical History:  Past Surgical History:   Procedure Laterality Date   • AV FISTULA CREATION Left 8/28/2017    Procedure: AV FISTULA CREATION  UPPER EXTREMITY -BRACHIAL BASALIC;  Surgeon: tSella Rodriguez M.D.;  Location: [facility];  Service: General   • AV FISTULA CREATION Left 6/20/2017    Procedure: AV FISTULA CREATION- LEFT;  Surgeon: Jimbo Davenport M.D.;  Location: [facility];  Service:    • APPENDECTOMY LAPAROSCOPIC  5/4/2009    Performed by BANDAR TIMMONS at [facility]   • FEMUR ORIF  10/9/08    Performed by STELLA GATES at [facility]   • ILIAC BONE GRAFT  10/9/08    Performed by STELLA GATES at [facility]   • AMPUTATION, BELOW THE KNEE     • CAPITATION PAYMENT (INSURANCE)     • OTHER      BELOW KNEE AMPUTATION RIGHT   • PB ENLARGE BREAST WITH IMPLANT         Current Outpatient Medications:  Home Medications     Reviewed by Rona Fulton (Pharmacy Tech) on 12/27/17 at 2153  Med List Status: Complete " "  Medication Last Dose Status   carvedilol (COREG) 6.25 MG Tab 2017 Active   famotidine (PEPCID) 20 MG Tab 2017 Active   lisinopril (PRINIVIL) 20 MG Tab 2017 Active   terazosin (HYTRIN) 2 MG Cap 2017 Active   verapamil (ISOPTIN) 80 MG Tab 2017 Active                Medication Allergy/Sensitivities:  Allergies   Allergen Reactions   • Sulfa Drugs Hives     [id number] rash swelling itching         Family History:  Family History   Problem Relation Age of Onset   • Heart Disease     Father  of MI   No cancer history     Social History:  Social History     Social History   • Marital status:      Spouse name: N/A   • Number of children: N/A   • Years of education: N/A     Occupational History   • Not on file.     Social History Main Topics   • Smoking status: Current Every Day Smoker     Packs/day: 0.25     Types: Cigarettes   • Smokeless tobacco: Never Used      Comment: 5 cigs/day   • Alcohol use Yes      Comment: occ   • Drug use: No   • Sexual activity: Not on file     Other Topics Concern   • Not on file     Social History Narrative   • No narrative on file     Living situation: Home  PCP : Josh Martinez       Physical Exam     Vitals:    17 2231 17 2300 17 2330 17 0035   BP:    150/92   Pulse: 99 83 80 78   Resp: 18 18 18 18   Temp:    36.6 °C (97.8 °F)   SpO2: 96% 88% 97% 96%   Weight:    62.1 kg (136 lb 14.5 oz)   Height:         Body mass index is 25.04 kg/m².  /92   Pulse 78   Temp 36.6 °C (97.8 °F)   Resp 18   Ht 1.575 m (5' 2\")   Wt 62.1 kg (136 lb 14.5 oz)   LMP 2006   SpO2 96%   Breastfeeding? No   BMI 25.04 kg/m²   O2 therapy: Pulse Oximetry: 96 %, O2 (LPM): 3, O2 Delivery: Silicone Nasal Cannula    Physical Exam   Constitutional: She is oriented to person, place, and time and well-developed, well-nourished, and in no distress.   HENT:   Head: Normocephalic and atraumatic.   Eyes: Conjunctivae and EOM are normal. " Pupils are equal, round, and reactive to light.   Neck: Normal range of motion. Neck supple.   Cardiovascular: Normal rate and regular rhythm.    Murmur heard.  Pulmonary/Chest: Effort normal and breath sounds normal. No respiratory distress. She has no wheezes.   Abdominal: Soft. Bowel sounds are normal. There is no tenderness.   Musculoskeletal: Normal range of motion. She exhibits no edema.   Neurological: She is alert and oriented to person, place, and time. No cranial nerve deficit.   Skin: Skin is warm.   Psychiatric: Affect normal.   Nursing note and vitals reviewed.            Data Review       Old Records Request:   Completed  Current Records review and summary: Completed    Lab Data Review:  Recent Results (from the past 24 hour(s))   EKG (NOW)    Collection Time: 17  7:41 PM   Result Value Ref Range    Report       [facility] Emergency Dept.    Test Date:  2017  Pt Name:    MELISSA BARDALES                 Department: ER  MRN:        [id number]                      Room:       [facility]  Gender:     Female                       Technician: [id number]  :        1965                   Requested By:MEHNAZ FRAGOSO  Order #:    [id number]                    Reading MD:    Measurements  Intervals                                Axis  Rate:       206                          P:          0  VT:                                      QRS:        60  QRSD:       74                           T:          266  QT:         239  QTc:        443    Interpretive Statements  SUPRAVENTRICULAR TACHYCARDIA  LVH WITH SECONDARY REPOLARIZATION ABNORMALITY  ST DEPRESSION, PROBABLY RATE RELATED  Compared to ECG 2017 07:55:14  Left ventricular hypertrophy now present  Early repolarization now present  ST (T wave) deviation now present  Sinus rhythm no longer present     EKG (NOW)    Collection Time: 17  8:21 PM   Result Value Ref Range    Report       [facility] Emergency  Dept.    Test Date:  2017  Pt Name:    MELISSA BARDALES                 Department: ER  MRN:        [id number]                      Room:       RD 09  Gender:     Female                       Technician: [id number]  :        1965                   Requested By:MEHNAZ FRAGOSO  Order #:    [id number]                    Reading MD:    Measurements  Intervals                                Axis  Rate:       107                          P:          50  WI:         132                          QRS:        29  QRSD:       78                           T:          49  QT:         364  QTc:        486    Interpretive Statements  SINUS TACHYCARDIA  ABNRM R PROG, CONSIDER ASMI OR LEAD PLACEMENT  BORDERLINE PROLONGED QT INTERVAL  BASELINE WANDER IN LEAD(S) V2  Compared to ECG 2017 19:41:17  Myocardial infarct finding now present  Supraventricular tachycardia no longer present  Left ventricular hypertrophy no longer present  Early repolarization no jaci nandini present  ST (T wave) deviation no longer present     CBC WITH DIFFERENTIAL    Collection Time: 17  8:31 PM   Result Value Ref Range    WBC 6.6 4.8 - 10.8 K/uL    RBC 3.09 (L) 4.20 - 5.40 M/uL    Hemoglobin 9.8 (L) 12.0 - 16.0 g/dL    Hematocrit 31.5 (L) 37.0 - 47.0 %    .9 (H) 81.4 - 97.8 fL    MCH 31.7 27.0 - 33.0 pg    MCHC 31.1 (L) 33.6 - 35.0 g/dL    RDW 54.9 (H) 35.9 - 50.0 fL    Platelet Count 140 (L) 164 - 446 K/uL    MPV 10.3 9.0 - 12.9 fL    Neutrophils-Polys 71.70 44.00 - 72.00 %    Lymphocytes 19.80 (L) 22.00 - 41.00 %    Monocytes 5.60 0.00 - 13.40 %    Eosinophils 1.80 0.00 - 6.90 %    Basophils 0.50 0.00 - 1.80 %    Immature Granulocytes 0.60 0.00 - 0.90 %    Nucleated RBC 0.00 /100 WBC    Neutrophils (Absolute) 4.69 2.00 - 7.15 K/uL    Lymphs (Absolute) 1.30 1.00 - 4.80 K/uL    Monos (Absolute) 0.37 0.00 - 0.85 K/uL    Eos (Absolute) 0.12 0.00 - 0.51 K/uL    Baso (Absolute) 0.03 0.00 - 0.12 K/uL    Immature Granulocytes (abs) 0.04 0.00 - 0.11  K/uL    NRBC (Absolute) 0.00 K/uL   CMP    Collection Time: 12/27/17  8:31 PM   Result Value Ref Range    Sodium 138 135 - 145 mmol/L    Potassium 4.1 3.6 - 5.5 mmol/L    Chloride 101 96 - 112 mmol/L    Co2 24 20 - 33 mmol/L    Anion Gap 13.0 (H) 0.0 - 11.9    Glucose 73 65 - 99 mg/dL    Bun 24 (H) 8 - 22 mg/dL    Creatinine 5.23 (HH) 0.50 - 1.40 mg/dL    Calcium 8.2 (L) 8.5 - 10.5 mg/dL    AST(SGOT) 17 12 - 45 U/L    ALT(SGPT) <5 2 - 50 U/L    Alkaline Phosphatase 45 30 - 99 U/L    Total Bilirubin 0.8 0.1 - 1.5 mg/dL    Albumin 3.4 3.2 - 4.9 g/dL    Total Protein 5.8 (L) 6.0 - 8.2 g/dL    Globulin 2.4 1.9 - 3.5 g/dL    A-G Ratio 1.4 g/dL   MAGNESIUM    Collection Time: 12/27/17  8:31 PM   Result Value Ref Range    Magnesium 1.7 1.5 - 2.5 mg/dL   PHOSPHORUS    Collection Time: 12/27/17  8:31 PM   Result Value Ref Range    Phosphorus 1.6 (L) 2.5 - 4.5 mg/dL   TROPONIN    Collection Time: 12/27/17  8:31 PM   Result Value Ref Range    Troponin I <0.01 0.00 - 0.04 ng/mL   TSH (Thyroid Stimulating Hormone)    Collection Time: 12/27/17  8:31 PM   Result Value Ref Range    TSH 1.460 0.380 - 5.330 uIU/mL   ESTIMATED GFR    Collection Time: 12/27/17  8:31 PM   Result Value Ref Range    GFR If African American 10 (A) >60 mL/min/1.73 m 2    GFR If Non African American 9 (A) >60 mL/min/1.73 m 2   URINE DRUG SCREEN    Collection Time: 12/27/17 10:44 PM   Result Value Ref Range    Amphetamines Urine Negative Negative    Barbiturates Negative Negative    Benzodiazepines Negative Negative    Cocaine Metabolite Negative Negative    Methadone Negative Negative    Opiates Negative Negative    Oxycodone Negative Negative    Phencyclidine -Pcp Negative Negative    Propoxyphene Negative Negative    Cannabinoid Metab Negative Negative       Imaging/Procedures Review:    ndependant Imaging Review: Completed  DX-CHEST-2 VIEWS   Final Result      1.  Limited exam showing no pneumonia or overt pulmonary edema.   2.  Supportive tubing as  described above.           EKG:   EKG Independant Review: Completed  QTc:486, HR: 107, Sinus tachycardia      Records reviewed and summarized in current documentation             Assessment/Plan     * Supraventricular arrhythmia   Assessment & Plan    Patient has history of SVT in the past, that according to patient resolved spontaneously.   The day of admission patient was having dialysis and developed palpitations. On evaluation was found to be on SVT.   In the ED Chemical conversion was attempted with adenosine without success. Patient was given 2 shocks at 120 and 200 jules without success. 20 mg of diltiazem were given and patient converted. Cardiology (Dr Carrasco) evaluated patient and recommended continue verapamil and change carvedilol to metoprolol. Also EP consultation tomorrow for likely ablation.     Plan   Admitted to telemetry   Continue verapamil and metoprolol   EP consult for possible ablation   NPO at midnight for possible ablation   Appreciate Cardiology recommendations         End-stage renal disease (ESRD) (CMS-HCC)- (present on admission)   Assessment & Plan    Patient with history of ESRD on HD MWF   Patient reported palpitations while taking dialysis. At the time of evaluation patient reported that she was able to finish the dialysis.     Plan   Consult Nephrology in the morning for possible dialysis   Continue to monitor with CMP             Anticipated Hospital stay:  >2 midnights        Quality Measures    Reviewed items::  EKG reviewed, Radiology images reviewed, Labs reviewed and Medications reviewed  Murdock catheter::  No Murdock  DVT prophylaxis - mechanical:  SCDs

## 2017-12-28 NOTE — CARE PLAN
Problem: Safety  Goal: Will remain free from injury  Outcome: PROGRESSING AS EXPECTED  Pt assessed for fall risk. Proper precautions in place. Pt educated to call for assistance, verbalizes understanding. Treaded slipper socks in place, bed alarm on, bed in low position, wheels locked, side rails up x2, call light within reach.     Problem: Infection  Goal: Will remain free from infection  Outcome: PROGRESSING AS EXPECTED  Pt monitored for signs and symptoms of infection. Vitals and labs assessed and monitored for signs of infection. Proper hand hygiene performed prior to entering and exiting pt's room. Pt educated on proper hand hygiene.

## 2017-12-28 NOTE — ASSESSMENT & PLAN NOTE
Lab Results   Component Value Date/Time    CHOLSTRLTOT 206 (H) 02/18/2011 04:43 PM    LDL 84 02/18/2011 04:43 PM    HDL 45 02/18/2011 04:43 PM    TRIGLYCERIDE 384 (H) 02/18/2011 04:43 PM     LDL<100  - ctm
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
## 2017-12-28 NOTE — ED NOTES
Med rec updated and complete.  Allergies reviewed.  Pt stated that she does not take her medications as  Prescribed. Pt indicated that he heart medications make her  Dizzy and light headed so she only takes them once daily    carvedilol  6.25 mg x 2 tabs BID ( pt takes 6.25 mg daily)  Lisinopril 20 mg BID ( pt takes 20 mg daily)  Verapamil 80 mg TID ( pt takes 80 mg daily).     Pt denies antibiotic use in 30 days.  No OTC medications.
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
## 2017-12-28 NOTE — ASSESSMENT & PLAN NOTE
- H/o PSVT, first diagnosed in 2003  - Past episodes usually resolve spontaneously within an hour or with adenosine.  - Has episodes once every couple of months, last episode in October, broke on its own, never cardioverted in the past.  - developed palpitations, chest pain, sob and dizziness while on HD 12/28/17, was brought in by EMS.   - As per EMS notes, enroute -215. On arrival to the ED, EKG showed SVT with a HR of 206  - Chemical conversion with adenosine unsuccessful   - electrical cardioversion attempted at 120 and 200 jules without success.   - 20 mg of diltiazem were given and patient converted  - no further episodes during in-patient stay  - Cardiology (Dr Carrasco) evaluated patient and recommended continue verapamil and changed carvedilol to metoprolol.   - In NSR today, no SVTs since admission to floor  Cardiology review and EP review noted - they will organize EP study with a view to ablation as out-patient, patient advised to hold metoprolol and verapamil 5 days before EP study  Plan   - Continue verapamil and metoprolol as O/P --> hold 5 days before EP study as out-pt  - advise to return to ER for recurrence of symptoms

## 2017-12-28 NOTE — PROGRESS NOTES
Pt transferred up to Tele 7. Ambulated to bed staggering. A&O x 4. Declines pain. Assessment complete. Vitals stable. No other concerns, complains or distress. Tele box on. Chart reviewed. Bed in lowest position, treaded slipper sock on, and call light within reach.

## 2017-12-28 NOTE — PROGRESS NOTES
Paged [facility] regarding pt status of NPO to discover if they still want her to be NPO or if she can eat. Per [facility] resident they will follow up and change orders as needed.

## 2017-12-28 NOTE — ED NOTES
Pt bib remsa for SVT.  Pt was brought in from [facility].  Last dialysis for pt was 12/24.  Pt was -215 per EMS.  Last episode of SVT was in June.  Pt does not know how she was converted.    ERP at bedside.  Chemical conversion attempted with adenosine without success.  Pt given two shocks at 120 and 200 joules without success.  Pt given 20 mg diltiazam and converted.

## 2017-12-28 NOTE — SENIOR ADMIT NOTE
Ms Isabelle delgadillo is a 53 yo female with history of SVT and ESRD on HD who presented with heart palpitations. She was found to have hemodynamically stable SVT with a rate of more than 200. She was treated with adenosine 6mg, 12mg in the ED which was not successful. Synchronized cardioversion was attempted at 120J, then 200J without success. She was subsequently treated with a diltiazem bolus which resolved her SVT. The patient was evaluated by cardiology who recommended the patient be made NPO for EP consultation and possible ablation. Cardiology recommended stopping oscar medications overnight and monitoring on telemetry in the meantime.     Assessment  Chronic Kidney Disease  Macrocytic Anemia    Plan  - Tele  - NPO pending EP eval  - Consider diltiazem for recurrent SVT in the meantime   - B12, folate  - consult nephrology for consideration of HD  - Utox, TSH

## 2017-12-28 NOTE — PROGRESS NOTES
Pt sleeping in room. No signs of distress. No complaints of pain. Even and unlabored respirations noted. Call light within reach, bed in low position, wheels locked, side rails up x2.

## 2017-12-28 NOTE — PROGRESS NOTES
[facility] [place] [facility] [place]    Name Isabelle Coyle     1965   Age/Sex 52 y.o. female   MRN [id number]   Code Status FULL     After 5PM or if no immediate response to page, please call for cross-coverage  Attending/Team: Dr. HOLDEN/EDOUARD Use Tiger Text to page  6AM-5PM  Call [phone] to page afterhours   1st Call - Day Intern (R1):   LEANN GAMEZ 2nd Call - Day Sr. Resident (R2/R3):   SUNG ALVARADO         Chief complaint/reason for interval visit  Palpitations    Interval Problem Update  PSVT refractory to adenosine 6mg, 12mg, synchronized cardioversion 100J, 200J in the ED  Converted back to NSR after IV diltiazem.  Currently in NSR  Pt NPO for possible ablation today    ESRD CKD V on HD - M W F- nephrology on board- appreciate recs  K at 6.7 in the am, gave insulin and D50W and aerosolized albuterol, repeat BMP showed K of 5, will CTM      Review of Systems   Constitutional: Positive for malaise/fatigue. Negative for chills and fever.   HENT: Negative for congestion and hearing loss.    Eyes: Negative for blurred vision and double vision.   Respiratory: Negative for cough and shortness of breath.    Cardiovascular: Negative for chest pain, palpitations and leg swelling.   Gastrointestinal: Negative for abdominal pain and constipation.   Genitourinary: Negative for dysuria.   Musculoskeletal: Positive for myalgias.   Skin: Negative for rash.   Neurological: Negative for dizziness and headaches.   Endo/Heme/Allergies: Does not bruise/bleed easily.       Consultants/Specialty  Cardiology  Nephrology    Disposition  Inpatient for PSVT and ESRD    Quality Measures    Reviewed items::  EKG reviewed, Labs reviewed, Medications reviewed and Radiology images reviewed  Murdock catheter::  No Murdock  DVT prophylaxis - mechanical:  SCDs         Physical Exam   Vitals:    17 0300 17 0800 17 0919 17 1152   BP: (!) 189/90 (!) 201/115  160/90   Pulse: 81 75 74 77   Resp: 20 18 18 18    Temp: 36.7 °C (98 °F) 36.3 °C (97.3 °F)  35.9 °C (96.7 °F)   SpO2: 95% 94% 98% 97%   Weight:       Height:         Body mass index is 25.04 kg/m².  Oxygen Therapy:  Pulse Oximetry: 97 %, O2 (LPM): 0, O2 Delivery: None (Room Air)    Physical Exam   Constitutional: She is oriented to person, place, and time.   Well developed,  not in any acute distress   HENT:   Head: Normocephalic and atraumatic.   Eyes: EOM are normal. Pupils are equal, round, and reactive to light.   Neck: Normal range of motion.   Cardiovascular: Normal rate, regular rhythm and normal heart sounds.    Pulmonary/Chest: Effort normal and breath sounds normal. She has no wheezes. She has no rales.   Abdominal: Soft. Bowel sounds are normal. She exhibits no distension.   Musculoskeletal: Normal range of motion.   Neurological: She is alert and oriented to person, place, and time.   Skin: Skin is warm.   Psychiatric: Affect and judgment normal.         Lab Data Review:  Recent Results (from the past 24 hour(s))   EKG (NOW)    Collection Time: 17  7:41 PM   Result Value Ref Range    Report       [facility] Emergency Dept.    Test Date:  2017  Pt Name:    MELISSA BARDALES                 Department: ER  MRN:        [id number]                      Room:        09  Gender:     Female                       Technician: [id number]  :        1965                   Requested By:MEHNAZ FRAGOSO  Order #:    [id number]                    Reading MD:    Measurements  Intervals                                Axis  Rate:       206                          P:          0  NJ:                                      QRS:        60  QRSD:       74                           T:          266  QT:         239  QTc:        443    Interpretive Statements  SUPRAVENTRICULAR TACHYCARDIA  LVH WITH SECONDARY REPOLARIZATION ABNORMALITY  ST DEPRESSION, PROBABLY RATE RELATED  Compared to ECG 2017 07:55:14  Left ventricular hypertrophy now  present  Early repolarization now present  ST (T wave) deviation now present  Sinus rhythm no longer present     EKG (NOW)    Collection Time: 17  8:21 PM   Result Value Ref Range    Report       [facility] Emergency Dept.    Test Date:  2017  Pt Name:    MELISSA BARDALES                 Department: ER  MRN:        [id number]                      Room:       [facility]  Gender:     Female                       Technician: [id number]  :        1965                   Requested By:MEHNAZ FRAGOSO  Order #:    [id number]                    Reading MD:    Measurements  Intervals                                Axis  Rate:       107                          P:          50  MS:         132                          QRS:        29  QRSD:       78                           T:          49  QT:         364  QTc:        486    Interpretive Statements  SINUS TACHYCARDIA  ABNRM R PROG, CONSIDER ASMI OR LEAD PLACEMENT  BORDERLINE PROLONGED QT INTERVAL  BASELINE WANDER IN LEAD(S) V2  Compared to ECG 2017 19:41:17  Myocardial infarct finding now present  Supraventricular tachycardia no longer present  Left ventricular hypertrophy no longer present  Early repolarization no jaci nandini present  ST (T wave) deviation no longer present     CBC WITH DIFFERENTIAL    Collection Time: 17  8:31 PM   Result Value Ref Range    WBC 6.6 4.8 - 10.8 K/uL    RBC 3.09 (L) 4.20 - 5.40 M/uL    Hemoglobin 9.8 (L) 12.0 - 16.0 g/dL    Hematocrit 31.5 (L) 37.0 - 47.0 %    .9 (H) 81.4 - 97.8 fL    MCH 31.7 27.0 - 33.0 pg    MCHC 31.1 (L) 33.6 - 35.0 g/dL    RDW 54.9 (H) 35.9 - 50.0 fL    Platelet Count 140 (L) 164 - 446 K/uL    MPV 10.3 9.0 - 12.9 fL    Neutrophils-Polys 71.70 44.00 - 72.00 %    Lymphocytes 19.80 (L) 22.00 - 41.00 %    Monocytes 5.60 0.00 - 13.40 %    Eosinophils 1.80 0.00 - 6.90 %    Basophils 0.50 0.00 - 1.80 %    Immature Granulocytes 0.60 0.00 - 0.90 %    Nucleated RBC 0.00 /100 WBC    Neutrophils  (Absolute) 4.69 2.00 - 7.15 K/uL    Lymphs (Absolute) 1.30 1.00 - 4.80 K/uL    Monos (Absolute) 0.37 0.00 - 0.85 K/uL    Eos (Absolute) 0.12 0.00 - 0.51 K/uL    Baso (Absolute) 0.03 0.00 - 0.12 K/uL    Immature Granulocytes (abs) 0.04 0.00 - 0.11 K/uL    NRBC (Absolute) 0.00 K/uL   CMP    Collection Time: 12/27/17  8:31 PM   Result Value Ref Range    Sodium 138 135 - 145 mmol/L    Potassium 4.1 3.6 - 5.5 mmol/L    Chloride 101 96 - 112 mmol/L    Co2 24 20 - 33 mmol/L    Anion Gap 13.0 (H) 0.0 - 11.9    Glucose 73 65 - 99 mg/dL    Bun 24 (H) 8 - 22 mg/dL    Creatinine 5.23 (HH) 0.50 - 1.40 mg/dL    Calcium 8.2 (L) 8.5 - 10.5 mg/dL    AST(SGOT) 17 12 - 45 U/L    ALT(SGPT) <5 2 - 50 U/L    Alkaline Phosphatase 45 30 - 99 U/L    Total Bilirubin 0.8 0.1 - 1.5 mg/dL    Albumin 3.4 3.2 - 4.9 g/dL    Total Protein 5.8 (L) 6.0 - 8.2 g/dL    Globulin 2.4 1.9 - 3.5 g/dL    A-G Ratio 1.4 g/dL   MAGNESIUM    Collection Time: 12/27/17  8:31 PM   Result Value Ref Range    Magnesium 1.7 1.5 - 2.5 mg/dL   PHOSPHORUS    Collection Time: 12/27/17  8:31 PM   Result Value Ref Range    Phosphorus 1.6 (L) 2.5 - 4.5 mg/dL   TROPONIN    Collection Time: 12/27/17  8:31 PM   Result Value Ref Range    Troponin I <0.01 0.00 - 0.04 ng/mL   TSH (Thyroid Stimulating Hormone)    Collection Time: 12/27/17  8:31 PM   Result Value Ref Range    TSH 1.460 0.380 - 5.330 uIU/mL   ESTIMATED GFR    Collection Time: 12/27/17  8:31 PM   Result Value Ref Range    GFR If African American 10 (A) >60 mL/min/1.73 m 2    GFR If Non African American 9 (A) >60 mL/min/1.73 m 2   URINE DRUG SCREEN    Collection Time: 12/27/17 10:44 PM   Result Value Ref Range    Amphetamines Urine Negative Negative    Barbiturates Negative Negative    Benzodiazepines Negative Negative    Cocaine Metabolite Negative Negative    Methadone Negative Negative    Opiates Negative Negative    Oxycodone Negative Negative    Phencyclidine -Pcp Negative Negative    Propoxyphene Negative Negative     Cannabinoid Metab Negative Negative   CBC with Differential    Collection Time: 12/28/17  3:07 AM   Result Value Ref Range    WBC 6.4 4.8 - 10.8 K/uL    RBC 3.36 (L) 4.20 - 5.40 M/uL    Hemoglobin 10.7 (L) 12.0 - 16.0 g/dL    Hematocrit 34.2 (L) 37.0 - 47.0 %    .8 (H) 81.4 - 97.8 fL    MCH 31.8 27.0 - 33.0 pg    MCHC 31.3 (L) 33.6 - 35.0 g/dL    RDW 55.6 (H) 35.9 - 50.0 fL    Platelet Count 172 164 - 446 K/uL    MPV 10.0 9.0 - 12.9 fL    Neutrophils-Polys 72.00 44.00 - 72.00 %    Lymphocytes 19.60 (L) 22.00 - 41.00 %    Monocytes 6.50 0.00 - 13.40 %    Eosinophils 1.10 0.00 - 6.90 %    Basophils 0.50 0.00 - 1.80 %    Immature Granulocytes 0.30 0.00 - 0.90 %    Nucleated RBC 0.00 /100 WBC    Neutrophils (Absolute) 4.62 2.00 - 7.15 K/uL    Lymphs (Absolute) 1.26 1.00 - 4.80 K/uL    Monos (Absolute) 0.42 0.00 - 0.85 K/uL    Eos (Absolute) 0.07 0.00 - 0.51 K/uL    Baso (Absolute) 0.03 0.00 - 0.12 K/uL    Immature Granulocytes (abs) 0.02 0.00 - 0.11 K/uL    NRBC (Absolute) 0.00 K/uL   Comp Metabolic Panel (CMP)    Collection Time: 12/28/17  3:07 AM   Result Value Ref Range    Sodium 136 135 - 145 mmol/L    Potassium 6.7 (HH) 3.6 - 5.5 mmol/L    Chloride 99 96 - 112 mmol/L    Co2 28 20 - 33 mmol/L    Anion Gap 9.0 0.0 - 11.9    Glucose 94 65 - 99 mg/dL    Bun 33 (H) 8 - 22 mg/dL    Creatinine 6.56 (HH) 0.50 - 1.40 mg/dL    Calcium 9.0 8.5 - 10.5 mg/dL    AST(SGOT) 10 (L) 12 - 45 U/L    ALT(SGPT) <5 2 - 50 U/L    Alkaline Phosphatase 47 30 - 99 U/L    Total Bilirubin 0.9 0.1 - 1.5 mg/dL    Albumin 3.9 3.2 - 4.9 g/dL    Total Protein 6.5 6.0 - 8.2 g/dL    Globulin 2.6 1.9 - 3.5 g/dL    A-G Ratio 1.5 g/dL   Magnesium    Collection Time: 12/28/17  3:07 AM   Result Value Ref Range    Magnesium 2.1 1.5 - 2.5 mg/dL   ESTIMATED GFR    Collection Time: 12/28/17  3:07 AM   Result Value Ref Range    GFR If  8 (A) >60 mL/min/1.73 m 2    GFR If Non African American 7 (A) >60 mL/min/1.73 m 2   BASIC METABOLIC  PANEL    Collection Time: 12/28/17  7:46 AM   Result Value Ref Range    Sodium 136 135 - 145 mmol/L    Potassium 6.7 (HH) 3.6 - 5.5 mmol/L    Chloride 99 96 - 112 mmol/L    Co2 29 20 - 33 mmol/L    Glucose 90 65 - 99 mg/dL    Bun 37 (H) 8 - 22 mg/dL    Creatinine 7.56 (HH) 0.50 - 1.40 mg/dL    Calcium 9.2 8.5 - 10.5 mg/dL    Anion Gap 8.0 0.0 - 11.9   ESTIMATED GFR    Collection Time: 12/28/17  7:46 AM   Result Value Ref Range    GFR If  7 (A) >60 mL/min/1.73 m 2    GFR If Non African American 6 (A) >60 mL/min/1.73 m 2   BASIC METABOLIC PANEL    Collection Time: 12/28/17  9:03 AM   Result Value Ref Range    Sodium 137 135 - 145 mmol/L    Potassium 5.0 3.6 - 5.5 mmol/L    Chloride 101 96 - 112 mmol/L    Co2 27 20 - 33 mmol/L    Glucose 52 (L) 65 - 99 mg/dL    Bun 40 (H) 8 - 22 mg/dL    Creatinine 7.88 (HH) 0.50 - 1.40 mg/dL    Calcium 9.3 8.5 - 10.5 mg/dL    Anion Gap 9.0 0.0 - 11.9   ESTIMATED GFR    Collection Time: 12/28/17  9:03 AM   Result Value Ref Range    GFR If  6 (A) >60 mL/min/1.73 m 2    GFR If Non African American 5 (A) >60 mL/min/1.73 m 2   ACCU-CHEK GLUCOSE    Collection Time: 12/28/17  3:14 PM   Result Value Ref Range    Glucose - Accu-Ck 99 65 - 99 mg/dL       12/28/2017  1:35 PM    Recent Labs      12/27/17   2031  12/28/17   0307  12/28/17   0746  12/28/17   0903   SODIUM  138  136  136  137   POTASSIUM  4.1  6.7*  6.7*  5.0   CHLORIDE  101  99  99  101   CO2  24  28  29  27   BUN  24*  33*  37*  40*   CREATININE  5.23*  6.56*  7.56*  7.88*   MAGNESIUM  1.7  2.1   --    --    PHOSPHORUS  1.6*   --    --    --    CALCIUM  8.2*  9.0  9.2  9.3       Recent Labs      12/27/17 2031 12/28/17 0307  12/28/17   0746  12/28/17   0903   ALTSGPT  <5  <5   --    --    ASTSGOT  17  10*   --    --    ALKPHOSPHAT  45  47   --    --    TBILIRUBIN  0.8  0.9   --    --    GLUCOSE  73  94  90  52*       Recent Labs      12/27/17 2031 12/28/17 0307   RBC  3.09*  3.36*    HEMOGLOBIN  9.8*  10.7*   HEMATOCRIT  31.5*  34.2*   PLATELETCT  140*  172       Recent Labs      12/27/17 2031 12/28/17   0307   WBC  6.6  6.4   NEUTSPOLYS  71.70  72.00   LYMPHOCYTES  19.80*  19.60*   MONOCYTES  5.60  6.50   EOSINOPHILS  1.80  1.10   BASOPHILS  0.50  0.50   ASTSGOT  17  10*   ALTSGPT  <5  <5   ALKPHOSPHAT  45  47   TBILIRUBIN  0.8  0.9          Assessment/Plan   * Supraventricular arrhythmia- (present on admission)   Assessment & Plan    H/o PSVT, first diagnosed in 2003  Past episodes usually resolve spontaneously within an hour or with adenosine.  Has episodes once every couple of months  Last episode in October, broke on its own, was never cardioverted in the past.  As per pt, she was on her usual dialysis regimen last night when she developed palpitations, chest pain, sob and dizziness, was brought in by EMS. As per EMS notes, enroute -215  On arrival to the ED, EKG showed SVT with a HR of 206  In the ED, Chemical conversion was attempted with adenosine without success.   Cardioversion attempted at 120 and 200 jules without success. 20 mg of diltiazem were given and patient converted.   Cardiology (Dr Carrasco) evaluated patient and recommended continue verapamil and changed carvedilol to metoprolol.   Today am, pt denies any symptoms.  Pt NPO for possible ablation.  Plan   NPO for possible ablation   Continue verapamil and metoprolol   Cardiology on board- appreciate recs        Severe uncontrolled hypertension- (present on admission)   Assessment & Plan    As per chart review, pt has uncontrolled HTN in the past  On carvedilol and lisinopril at home  BP has been high since admission with one reading at 201/115  Pt remains asymptomatic  Plan:  Continue lisinopril 20mg qd  On labetalol 10mg q2hrs PRN for BP>180/100  Will CTM        Hyperkalemia- (present on admission)   Assessment & Plan    K in am at 6.7  Treated with insulin and D50W  Repeat K at 5  Pt due for her HD tomm  Will ctm         End-stage renal disease (ESRD) (CMS-HCC)- (present on admission)   Assessment & Plan    CKD V likely 2/2 uncontrolled HTN  On dialysis since December 2017 at [facility]  M W F, pt reports compliance with her dialysis  Renal u.s 6/2017 showed small echogenic bilateral kidneys,compatible with atrophic kidneys and medical renal disease.  Nephrology on board- appreciate recs  Plan:  As per nephro recs- Hemodialysis, next session tomorrow.  No blood pressures, IVs or labs should be drawn on right arm.  Renal diet with no protein restrictions.  Resumption of phosphorus binders.  Renally dose all meds        Anemia of chronic disease- (present on admission)   Assessment & Plan    Patient with hemoglobin of 9.8-->10.7  Secondary to ESRD   Will CTM        Hyperlipidemia- (present on admission)   Assessment & Plan    Lab Results   Component Value Date/Time    CHOLSTRLTOT 206 (H) 02/18/2011 04:43 PM    LDL 84 02/18/2011 04:43 PM    HDL 45 02/18/2011 04:43 PM    TRIGLYCERIDE 384 (H) 02/18/2011 04:43 PM     LDL<100  Will ctm

## 2017-12-28 NOTE — ASSESSMENT & PLAN NOTE
- CKD likely 2/2 uncontrolled HTN  - On dialysis since December 2017 at [facility]  - M W F, pt reports compliance with her dialysis  - Renal US  6/2017 - small echogenic kidneys B/L, compatible with atrophic kidneys and medical renal disease.  - HD 12/29/17, tolerated well  - High K due to CKD --> was treated with Insulin-Dextrose and Calcium chloride 12/29/17, K today 5.4, given a dose of kayexalate prior to discharge, discussed with Dr. Collins --> no further kayexalate needed as OP , thye'll follow her during HD sessions  - For HD on 12/31/2017 instead of 1/1/12018  Plan:  - As per nephro recs - Hemodialysis MWF, no further kayexalate for discharge  - secure right arm in view of Rt AV Fistula  - Renal diet with no protein restrictions.  - Resume phosphorus binders.  - Renally dose all meds

## 2017-12-28 NOTE — PROGRESS NOTES
"Date of Service: 12/28/2017  Chief Complaint:  Chief Complaint   Patient presents with   • Supraventricular Tachycardia (SVT)   51 y/o female with recurrent SVT    Interval History:  BP better controlled  EPS consulted and will have their input  All recent medication, labs, imaging studies and procedures reviewed    Physical Exam   Blood pressure (!) 201/115, pulse 74, temperature 36.3 °C (97.3 °F), resp. rate 18, height 1.575 m (5' 2\"), weight 62.1 kg (136 lb 14.5 oz), last menstrual period 12/28/2006, SpO2 98 %, not currently breastfeeding.    Constitutional:  SHe appears well-developed.   HENT: Normocephalic and atraumatic. No scleral icterus.   Neck: No JVD present.   Cardiovascular: Normal rate. RRR; Exam reveals no gallop and no friction rub. No murmur heard.   Pulmonary/Chest: CTAB coarse   Abdominal: S/NT/ND BS+   Musculoskeletal: He exhibits no edema. Pulses present.  Skin: Skin is warm and dry.       Intake/Output Summary (Last 24 hours) at 12/28/17 1051  Last data filed at 12/28/17 0600   Gross per 24 hour   Intake                8 ml   Output              450 ml   Net             -442 ml       LABS:  Lab Results   Component Value Date/Time    CHOLSTRLTOT 206 (H) 02/18/2011 04:43 PM    LDL 84 02/18/2011 04:43 PM    HDL 45 02/18/2011 04:43 PM    TRIGLYCERIDE 384 (H) 02/18/2011 04:43 PM       Lab Results   Component Value Date/Time    WBC 6.4 12/28/2017 03:07 AM    RBC 3.36 (L) 12/28/2017 03:07 AM    HEMOGLOBIN 10.7 (L) 12/28/2017 03:07 AM    HEMATOCRIT 34.2 (L) 12/28/2017 03:07 AM    .8 (H) 12/28/2017 03:07 AM    NEUTSPOLYS 72.00 12/28/2017 03:07 AM    LYMPHOCYTES 19.60 (L) 12/28/2017 03:07 AM    MONOCYTES 6.50 12/28/2017 03:07 AM    EOSINOPHILS 1.10 12/28/2017 03:07 AM    BASOPHILS 0.50 12/28/2017 03:07 AM    HYPOCHROMIA 1+ 10/04/2013 03:24 PM    ANISOCYTOSIS 3+ 07/14/2007 06:15 AM     Lab Results   Component Value Date/Time    SODIUM 137 12/28/2017 09:03 AM    POTASSIUM 5.0 12/28/2017 09:03 AM    " CHLORIDE 101 12/28/2017 09:03 AM    CO2 27 12/28/2017 09:03 AM    GLUCOSE 52 (L) 12/28/2017 09:03 AM    BUN 40 (H) 12/28/2017 09:03 AM    CREATININE 7.88 (HH) 12/28/2017 09:03 AM    CREATININE 2.2 (H) 05/06/2009 03:10 AM         Lab Results   Component Value Date/Time    ALKPHOSPHAT 47 12/28/2017 03:07 AM    ASTSGOT 10 (L) 12/28/2017 03:07 AM    ALTSGPT <5 12/28/2017 03:07 AM    TBILIRUBIN 0.9 12/28/2017 03:07 AM      Lab Results   Component Value Date/Time    BNPBTYPENAT 1721 (H) 06/15/2017 08:01 PM      No results found for: TSH  Lab Results   Component Value Date/Time    PROTHROMBTM 14.3 06/16/2017 09:42 AM    INR 1.08 06/16/2017 09:42 AM        Medications reviewed    Imaging reviewed    ECHO(6/16/2017):Normal left ventricular chamber size.  Left ventricular ejection fraction is visually estimated to be 55%.  Mildly dilated left atrium.  Aortic sclerosis without stenosis.  Mild aortic insufficiency.  Mitral annular calcification.  Mild mitral regurgitation.  Mild tricuspid regurgitation.  Normal pericardium without effusion.    Impressions:  Recurrent SVT  ESRD on HD  HTN  HLD  Anemia    Recommendations:  EPS consult  Balance lytes  Case discussed with attending/RN  Will follow  Thx

## 2017-12-29 ENCOUNTER — APPOINTMENT (OUTPATIENT)
Dept: RADIOLOGY | Facility: MEDICAL CENTER | Age: 52
DRG: 308 | End: 2017-12-29
Attending: STUDENT IN AN ORGANIZED HEALTH CARE EDUCATION/TRAINING PROGRAM
Payer: MEDICAID

## 2017-12-29 PROBLEM — E16.2 HYPOGLYCEMIA: Status: ACTIVE | Noted: 2017-12-29

## 2017-12-29 LAB
ALBUMIN SERPL BCP-MCNC: 3.8 G/DL (ref 3.2–4.9)
ALBUMIN SERPL BCP-MCNC: 3.9 G/DL (ref 3.2–4.9)
ALBUMIN/GLOB SERPL: 1.4 G/DL
ALBUMIN/GLOB SERPL: 1.5 G/DL
ALP SERPL-CCNC: 51 U/L (ref 30–99)
ALP SERPL-CCNC: 52 U/L (ref 30–99)
ALT SERPL-CCNC: <5 U/L (ref 2–50)
ALT SERPL-CCNC: <5 U/L (ref 2–50)
ANION GAP SERPL CALC-SCNC: 13 MMOL/L (ref 0–11.9)
ANION GAP SERPL CALC-SCNC: 13 MMOL/L (ref 0–11.9)
ANION GAP SERPL CALC-SCNC: 5 MMOL/L (ref 0–11.9)
AST SERPL-CCNC: 11 U/L (ref 12–45)
AST SERPL-CCNC: 13 U/L (ref 12–45)
BASE EXCESS BLDA CALC-SCNC: 2 MMOL/L (ref -4–3)
BASOPHILS # BLD AUTO: 0.4 % (ref 0–1.8)
BASOPHILS # BLD AUTO: 0.7 % (ref 0–1.8)
BASOPHILS # BLD: 0.03 K/UL (ref 0–0.12)
BASOPHILS # BLD: 0.06 K/UL (ref 0–0.12)
BILIRUB SERPL-MCNC: 0.7 MG/DL (ref 0.1–1.5)
BILIRUB SERPL-MCNC: 0.8 MG/DL (ref 0.1–1.5)
BNP SERPL-MCNC: 1378 PG/ML (ref 0–100)
BODY TEMPERATURE: ABNORMAL CENTIGRADE
BUN SERPL-MCNC: 50 MG/DL (ref 8–22)
BUN SERPL-MCNC: 51 MG/DL (ref 8–22)
BUN SERPL-MCNC: 51 MG/DL (ref 8–22)
CALCIUM SERPL-MCNC: 9.2 MG/DL (ref 8.5–10.5)
CALCIUM SERPL-MCNC: 9.5 MG/DL (ref 8.5–10.5)
CALCIUM SERPL-MCNC: 9.8 MG/DL (ref 8.5–10.5)
CHLORIDE SERPL-SCNC: 100 MMOL/L (ref 96–112)
CHLORIDE SERPL-SCNC: 103 MMOL/L (ref 96–112)
CHLORIDE SERPL-SCNC: 99 MMOL/L (ref 96–112)
CO2 SERPL-SCNC: 24 MMOL/L (ref 20–33)
CO2 SERPL-SCNC: 25 MMOL/L (ref 20–33)
CO2 SERPL-SCNC: 31 MMOL/L (ref 20–33)
CREAT SERPL-MCNC: 8.64 MG/DL (ref 0.5–1.4)
CREAT SERPL-MCNC: 9.2 MG/DL (ref 0.5–1.4)
CREAT SERPL-MCNC: 9.46 MG/DL (ref 0.5–1.4)
EKG IMPRESSION: NORMAL
EKG IMPRESSION: NORMAL
EOSINOPHIL # BLD AUTO: 0.15 K/UL (ref 0–0.51)
EOSINOPHIL # BLD AUTO: 0.16 K/UL (ref 0–0.51)
EOSINOPHIL NFR BLD: 1.8 % (ref 0–6.9)
EOSINOPHIL NFR BLD: 2.1 % (ref 0–6.9)
ERYTHROCYTE [DISTWIDTH] IN BLOOD BY AUTOMATED COUNT: 55.4 FL (ref 35.9–50)
ERYTHROCYTE [DISTWIDTH] IN BLOOD BY AUTOMATED COUNT: 55.5 FL (ref 35.9–50)
GFR SERPL CREATININE-BSD FRML MDRD: 4 ML/MIN/1.73 M 2
GFR SERPL CREATININE-BSD FRML MDRD: 4 ML/MIN/1.73 M 2
GFR SERPL CREATININE-BSD FRML MDRD: 5 ML/MIN/1.73 M 2
GLOBULIN SER CALC-MCNC: 2.6 G/DL (ref 1.9–3.5)
GLOBULIN SER CALC-MCNC: 2.7 G/DL (ref 1.9–3.5)
GLUCOSE BLD-MCNC: 101 MG/DL (ref 65–99)
GLUCOSE BLD-MCNC: 130 MG/DL (ref 65–99)
GLUCOSE BLD-MCNC: 143 MG/DL (ref 65–99)
GLUCOSE BLD-MCNC: 207 MG/DL (ref 65–99)
GLUCOSE BLD-MCNC: 280 MG/DL (ref 65–99)
GLUCOSE BLD-MCNC: 32 MG/DL (ref 65–99)
GLUCOSE BLD-MCNC: 51 MG/DL (ref 65–99)
GLUCOSE BLD-MCNC: 60 MG/DL (ref 65–99)
GLUCOSE BLD-MCNC: 89 MG/DL (ref 65–99)
GLUCOSE BLD-MCNC: 95 MG/DL (ref 65–99)
GLUCOSE BLD-MCNC: 98 MG/DL (ref 65–99)
GLUCOSE SERPL-MCNC: 143 MG/DL (ref 65–99)
GLUCOSE SERPL-MCNC: 172 MG/DL (ref 65–99)
GLUCOSE SERPL-MCNC: 89 MG/DL (ref 65–99)
HAV IGM SERPL QL IA: NEGATIVE
HBV CORE IGM SER QL: NEGATIVE
HBV SURFACE AB SERPL IA-ACNC: 3.68 MIU/ML (ref 0–10)
HBV SURFACE AG SER QL: NEGATIVE
HCO3 BLDA-SCNC: 23 MMOL/L (ref 17–25)
HCT VFR BLD AUTO: 32.3 % (ref 37–47)
HCT VFR BLD AUTO: 34.5 % (ref 37–47)
HCV AB SER QL: NEGATIVE
HGB BLD-MCNC: 10.3 G/DL (ref 12–16)
HGB BLD-MCNC: 10.7 G/DL (ref 12–16)
IMM GRANULOCYTES # BLD AUTO: 0.02 K/UL (ref 0–0.11)
IMM GRANULOCYTES # BLD AUTO: 0.04 K/UL (ref 0–0.11)
IMM GRANULOCYTES NFR BLD AUTO: 0.3 % (ref 0–0.9)
IMM GRANULOCYTES NFR BLD AUTO: 0.5 % (ref 0–0.9)
INHALED O2 FLOW RATE: 7 L/MIN (ref 2–10)
LACTATE BLD-SCNC: 1.9 MMOL/L (ref 0.5–2)
LYMPHOCYTES # BLD AUTO: 1.93 K/UL (ref 1–4.8)
LYMPHOCYTES # BLD AUTO: 2.8 K/UL (ref 1–4.8)
LYMPHOCYTES NFR BLD: 23.2 % (ref 22–41)
LYMPHOCYTES NFR BLD: 36.5 % (ref 22–41)
MCH RBC QN AUTO: 31.7 PG (ref 27–33)
MCH RBC QN AUTO: 32.2 PG (ref 27–33)
MCHC RBC AUTO-ENTMCNC: 31 G/DL (ref 33.6–35)
MCHC RBC AUTO-ENTMCNC: 31.9 G/DL (ref 33.6–35)
MCV RBC AUTO: 100.9 FL (ref 81.4–97.8)
MCV RBC AUTO: 102.1 FL (ref 81.4–97.8)
MONOCYTES # BLD AUTO: 0.3 K/UL (ref 0–0.85)
MONOCYTES # BLD AUTO: 0.63 K/UL (ref 0–0.85)
MONOCYTES NFR BLD AUTO: 3.9 % (ref 0–13.4)
MONOCYTES NFR BLD AUTO: 7.6 % (ref 0–13.4)
NEUTROPHILS # BLD AUTO: 4.37 K/UL (ref 2–7.15)
NEUTROPHILS # BLD AUTO: 5.52 K/UL (ref 2–7.15)
NEUTROPHILS NFR BLD: 56.8 % (ref 44–72)
NEUTROPHILS NFR BLD: 66.2 % (ref 44–72)
NRBC # BLD AUTO: 0 K/UL
NRBC # BLD AUTO: 0 K/UL
NRBC BLD-RTO: 0 /100 WBC
NRBC BLD-RTO: 0 /100 WBC
PCO2 BLDA: 25.7 MMHG (ref 26–37)
PH BLDA: 7.57 [PH] (ref 7.4–7.5)
PLATELET # BLD AUTO: 173 K/UL (ref 164–446)
PLATELET # BLD AUTO: 182 K/UL (ref 164–446)
PMV BLD AUTO: 9.6 FL (ref 9–12.9)
PMV BLD AUTO: 9.6 FL (ref 9–12.9)
PO2 BLDA: 134.1 MMHG (ref 64–87)
POTASSIUM SERPL-SCNC: 3.9 MMOL/L (ref 3.6–5.5)
POTASSIUM SERPL-SCNC: 5.1 MMOL/L (ref 3.6–5.5)
POTASSIUM SERPL-SCNC: 6.6 MMOL/L (ref 3.6–5.5)
PROT SERPL-MCNC: 6.4 G/DL (ref 6–8.2)
PROT SERPL-MCNC: 6.6 G/DL (ref 6–8.2)
RBC # BLD AUTO: 3.2 M/UL (ref 4.2–5.4)
RBC # BLD AUTO: 3.38 M/UL (ref 4.2–5.4)
SAO2 % BLDA: 97.7 % (ref 93–99)
SODIUM SERPL-SCNC: 136 MMOL/L (ref 135–145)
SODIUM SERPL-SCNC: 136 MMOL/L (ref 135–145)
SODIUM SERPL-SCNC: 141 MMOL/L (ref 135–145)
TROPONIN I SERPL-MCNC: 0.13 NG/ML (ref 0–0.04)
TROPONIN I SERPL-MCNC: 0.14 NG/ML (ref 0–0.04)
WBC # BLD AUTO: 7.7 K/UL (ref 4.8–10.8)
WBC # BLD AUTO: 8.3 K/UL (ref 4.8–10.8)

## 2017-12-29 PROCEDURE — 86706 HEP B SURFACE ANTIBODY: CPT

## 2017-12-29 PROCEDURE — 83605 ASSAY OF LACTIC ACID: CPT

## 2017-12-29 PROCEDURE — 71010 DX-CHEST-LIMITED (1 VIEW): CPT

## 2017-12-29 PROCEDURE — 93005 ELECTROCARDIOGRAM TRACING: CPT | Performed by: HOSPITALIST

## 2017-12-29 PROCEDURE — 80048 BASIC METABOLIC PNL TOTAL CA: CPT

## 2017-12-29 PROCEDURE — 700111 HCHG RX REV CODE 636 W/ 250 OVERRIDE (IP): Performed by: STUDENT IN AN ORGANIZED HEALTH CARE EDUCATION/TRAINING PROGRAM

## 2017-12-29 PROCEDURE — 82962 GLUCOSE BLOOD TEST: CPT | Mod: 91

## 2017-12-29 PROCEDURE — 700102 HCHG RX REV CODE 250 W/ 637 OVERRIDE(OP): Performed by: STUDENT IN AN ORGANIZED HEALTH CARE EDUCATION/TRAINING PROGRAM

## 2017-12-29 PROCEDURE — 5A1D70Z PERFORMANCE OF URINARY FILTRATION, INTERMITTENT, LESS THAN 6 HOURS PER DAY: ICD-10-PCS | Performed by: INTERNAL MEDICINE

## 2017-12-29 PROCEDURE — 84484 ASSAY OF TROPONIN QUANT: CPT | Mod: 91

## 2017-12-29 PROCEDURE — 83880 ASSAY OF NATRIURETIC PEPTIDE: CPT

## 2017-12-29 PROCEDURE — A9270 NON-COVERED ITEM OR SERVICE: HCPCS | Performed by: INTERNAL MEDICINE

## 2017-12-29 PROCEDURE — 700101 HCHG RX REV CODE 250: Performed by: STUDENT IN AN ORGANIZED HEALTH CARE EDUCATION/TRAINING PROGRAM

## 2017-12-29 PROCEDURE — 770020 HCHG ROOM/CARE - TELE (206)

## 2017-12-29 PROCEDURE — 700111 HCHG RX REV CODE 636 W/ 250 OVERRIDE (IP): Performed by: INTERNAL MEDICINE

## 2017-12-29 PROCEDURE — 80074 ACUTE HEPATITIS PANEL: CPT

## 2017-12-29 PROCEDURE — 85025 COMPLETE CBC W/AUTO DIFF WBC: CPT

## 2017-12-29 PROCEDURE — 700111 HCHG RX REV CODE 636 W/ 250 OVERRIDE (IP)

## 2017-12-29 PROCEDURE — 36415 COLL VENOUS BLD VENIPUNCTURE: CPT

## 2017-12-29 PROCEDURE — 82803 BLOOD GASES ANY COMBINATION: CPT

## 2017-12-29 PROCEDURE — 700102 HCHG RX REV CODE 250 W/ 637 OVERRIDE(OP): Performed by: INTERNAL MEDICINE

## 2017-12-29 PROCEDURE — 700105 HCHG RX REV CODE 258: Performed by: STUDENT IN AN ORGANIZED HEALTH CARE EDUCATION/TRAINING PROGRAM

## 2017-12-29 PROCEDURE — 93005 ELECTROCARDIOGRAM TRACING: CPT | Performed by: STUDENT IN AN ORGANIZED HEALTH CARE EDUCATION/TRAINING PROGRAM

## 2017-12-29 PROCEDURE — 87040 BLOOD CULTURE FOR BACTERIA: CPT | Mod: 91

## 2017-12-29 PROCEDURE — 99232 SBSQ HOSP IP/OBS MODERATE 35: CPT | Mod: GC | Performed by: HOSPITALIST

## 2017-12-29 PROCEDURE — 80053 COMPREHEN METABOLIC PANEL: CPT

## 2017-12-29 PROCEDURE — 90935 HEMODIALYSIS ONE EVALUATION: CPT

## 2017-12-29 PROCEDURE — 93010 ELECTROCARDIOGRAM REPORT: CPT | Mod: 76 | Performed by: INTERNAL MEDICINE

## 2017-12-29 PROCEDURE — A9270 NON-COVERED ITEM OR SERVICE: HCPCS | Performed by: STUDENT IN AN ORGANIZED HEALTH CARE EDUCATION/TRAINING PROGRAM

## 2017-12-29 RX ORDER — HEPARIN SODIUM 1000 [USP'U]/ML
3700 INJECTION, SOLUTION INTRAVENOUS; SUBCUTANEOUS
Status: DISCONTINUED | OUTPATIENT
Start: 2017-12-29 | End: 2017-12-30 | Stop reason: HOSPADM

## 2017-12-29 RX ORDER — SEVELAMER HYDROCHLORIDE 800 MG/1
800 TABLET, FILM COATED ORAL
Status: DISCONTINUED | OUTPATIENT
Start: 2017-12-29 | End: 2017-12-30 | Stop reason: HOSPADM

## 2017-12-29 RX ORDER — DEXTROSE MONOHYDRATE 50 MG/ML
INJECTION, SOLUTION INTRAVENOUS CONTINUOUS
Status: ACTIVE | OUTPATIENT
Start: 2017-12-29 | End: 2017-12-29

## 2017-12-29 RX ORDER — DEXTROSE MONOHYDRATE 25 G/50ML
50 INJECTION, SOLUTION INTRAVENOUS ONCE
Status: COMPLETED | OUTPATIENT
Start: 2017-12-29 | End: 2017-12-29

## 2017-12-29 RX ORDER — HEPARIN SODIUM 1000 [USP'U]/ML
2000 INJECTION, SOLUTION INTRAVENOUS; SUBCUTANEOUS
Status: DISCONTINUED | OUTPATIENT
Start: 2017-12-29 | End: 2017-12-30 | Stop reason: HOSPADM

## 2017-12-29 RX ORDER — SODIUM POLYSTYRENE SULFONATE 15 G/60ML
15 SUSPENSION ORAL; RECTAL ONCE
Status: DISPENSED | OUTPATIENT
Start: 2017-12-29 | End: 2017-12-30

## 2017-12-29 RX ORDER — HEPARIN SODIUM 1000 [USP'U]/ML
INJECTION, SOLUTION INTRAVENOUS; SUBCUTANEOUS
Status: COMPLETED
Start: 2017-12-29 | End: 2017-12-29

## 2017-12-29 RX ORDER — HEPARIN SODIUM 1000 [USP'U]/ML
1500 INJECTION, SOLUTION INTRAVENOUS; SUBCUTANEOUS
Status: DISCONTINUED | OUTPATIENT
Start: 2017-12-29 | End: 2017-12-29

## 2017-12-29 RX ORDER — CALCIUM CHLORIDE 100 MG/ML
1 INJECTION INTRAVENOUS; INTRAVENTRICULAR ONCE
Status: DISPENSED | OUTPATIENT
Start: 2017-12-29 | End: 2017-12-30

## 2017-12-29 RX ADMIN — VERAPAMIL HYDROCHLORIDE 80 MG: 80 TABLET, FILM COATED ORAL at 06:41

## 2017-12-29 RX ADMIN — METOPROLOL TARTRATE 25 MG: 25 TABLET, FILM COATED ORAL at 20:31

## 2017-12-29 RX ADMIN — METOPROLOL TARTRATE 25 MG: 25 TABLET, FILM COATED ORAL at 13:13

## 2017-12-29 RX ADMIN — DEXTROSE MONOHYDRATE 50 ML: 500 INJECTION PARENTERAL at 02:57

## 2017-12-29 RX ADMIN — INSULIN HUMAN 10 UNITS: 100 INJECTION, SOLUTION PARENTERAL at 02:57

## 2017-12-29 RX ADMIN — VERAPAMIL HYDROCHLORIDE 80 MG: 80 TABLET, FILM COATED ORAL at 13:13

## 2017-12-29 RX ADMIN — Medication 16 G: at 07:08

## 2017-12-29 RX ADMIN — DEXTROSE MONOHYDRATE 25 ML: 25 INJECTION, SOLUTION INTRAVENOUS at 05:30

## 2017-12-29 RX ADMIN — HEPARIN SODIUM 3700 UNITS: 1000 INJECTION, SOLUTION INTRAVENOUS; SUBCUTANEOUS at 11:27

## 2017-12-29 RX ADMIN — HEPARIN SODIUM 2000 UNITS: 1000 INJECTION, SOLUTION INTRAVENOUS; SUBCUTANEOUS at 08:20

## 2017-12-29 RX ADMIN — DEXTROSE MONOHYDRATE 25 ML: 25 INJECTION, SOLUTION INTRAVENOUS at 05:15

## 2017-12-29 RX ADMIN — METOPROLOL TARTRATE 25 MG: 25 TABLET, FILM COATED ORAL at 06:39

## 2017-12-29 RX ADMIN — VERAPAMIL HYDROCHLORIDE 80 MG: 80 TABLET, FILM COATED ORAL at 20:31

## 2017-12-29 RX ADMIN — DEXTROSE MONOHYDRATE 25 ML: 25 INJECTION, SOLUTION INTRAVENOUS at 07:12

## 2017-12-29 RX ADMIN — LABETALOL HYDROCHLORIDE 10 MG: 5 INJECTION, SOLUTION INTRAVENOUS at 03:01

## 2017-12-29 ASSESSMENT — ENCOUNTER SYMPTOMS
EYES NEGATIVE: 1
BRUISES/BLEEDS EASILY: 0
WEAKNESS: 1
CONSTIPATION: 0
RESPIRATORY NEGATIVE: 1
MYALGIAS: 1
PALPITATIONS: 0
DIAPHORESIS: 1
BLURRED VISION: 0
COUGH: 0
DIZZINESS: 0
DOUBLE VISION: 0
HEADACHES: 0
CHILLS: 0
FEVER: 0
SHORTNESS OF BREATH: 0
ABDOMINAL PAIN: 0
CARDIOVASCULAR NEGATIVE: 1

## 2017-12-29 ASSESSMENT — PAIN SCALES - GENERAL
PAINLEVEL_OUTOF10: 0

## 2017-12-29 NOTE — PROGRESS NOTES
Bedside report with TOM Leavitt   Patient laying in bed, resting. Plan of care discussed, questions answered. Needs met. Call light and personal belongings within reach. Will continue to monitor.

## 2017-12-29 NOTE — DIETARY
"Nutrition Services:      Unintentional weight loss PTA on Nutrition Admit Screen. Pt is currently on a Regular diet and pt reports that she ate all of her lunch today s/p diet advancement and reports that she has not had any recent weight changes, stating a UBW of 130#. The pt did not express any nutrition related questions or concerns at this time.      Ht: 5' 2\", Wt: 133# , BMI 24.5.  Consult RD as needed. RD will re-screen weekly.      RD available prn     "

## 2017-12-29 NOTE — PROGRESS NOTES
Rapid response for acute AMS  Per nursing staff patient developed confusion and hypertension over the last 40 minutes.  On physical exam eyes are open and she is responding to questions. She is moving all four extremities and smile is symmetrical. Blood pressure is 189/104.  POC glucose is > 80. Repeat potassium 5.1.    Assessment/plan  Hyperkalemia - resolving down to 5.1, nephrology on board  AMS   Hypoglycemia - resolved  Hypertension    Calcium gluconate  Stat CT head  Stat ABG  Blood cultures  CBC  Antihypertensives on board  Stat CXR  ICU attending (Dr Salazar) was updated at the time of the rapid response  Nephrology has been updated at this time as well.    Updated note to follow    Addendum  0700 - Patient mentation improved. DDx includes but is not limited to AMS 2/2 hypoglycemia vs hypertensive encephalopathy.   Original note by Enzo Lynch MD    Attestation:    Suspect AMS is secondary to hypoglycemia induced by insulin given earlier as part of acute rx for hyperkalemia. See orders. Critical care time. 1 hr.    T. Antonio Spain MD  [facility] Attending Physician
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
## 2017-12-29 NOTE — PROGRESS NOTES
Code blue called for altered mental status. ICU team called at 5:20 am.   Evaluated patient at bedside, improving at the time of my arrival. As per RN, was found to be very lethargic and foaming at the mouth, a code blue was called. When the code team arrived, pulse was present. Stat BS at bedside was done- which read very low(reads <40 usually as per floor RNs), she was given 2 aps of D50 and started improving.     At the time of my arrival, the patient had already started improving, however was cold, and sweaty.     We ordered stat CBC, CMP, EKG, trops and ABG. Discussed case with the ICU attending Dr. Salazar, given an additional 1 unit of calcium chloride and as per recs, patient stable for the floor. Labs show improving hyperkalemia, nephrology already on board. Day team to f/u further labs.   Original note by Clinton Tello MD    Attestation:    Suspect AMS is secondary to hypoglycemia induced by insulin given earlier as part of acute rx for hyperkalemia. See orders. Critical care time. 1 hr.    T. Antonio Spain MD  [facility] Attending Physician

## 2017-12-29 NOTE — PROGRESS NOTES
Patient's blood glucose 32. Dr. Powers aware.  Patient alert and oriented but diaphoretic and very tired. D50 administered. Patient's blood glucose up to 207. Patient remains A+OX4. Report given to TOM Leavitt .

## 2017-12-29 NOTE — PROGRESS NOTES
[facility] Services Progress Note    Hemodialysis treatment ordered today per Dr. Mcelroy x 3 hours. Treatment initiated at 0826, ended at 1128.     Patient tolerated tx; see paper flow sheet for details.     Net UF 2000 mL.     Post tx, CVC flushed with saline then locked with heparin 1000 units/mL per designated amount in each wing then clamped and capped. Aspirate heparin prior to next CVC use.    Report given to Primary RN.

## 2017-12-29 NOTE — PROGRESS NOTES
[facility] Nephrology Progress Note               Author: Kvng Mcelroy Date & Time created: 12/29/2017  11:29 AM     Interval History:  52-year-old woman with history of end-stage renal failure, on chronic outpatient hemodialysis for approximately the last 6 months at [facility] Monday, Wednesday, and Friday, who during her dialysis had onset of heart palpitations.  She felt dizzy and short of breath and some chest pain.  EMS was called.  There they identified her in SVT.  She was transferred to [facility] ER for evaluation.     In the ER, she was given adenosine and cardioversion finally with some diltiazem.  She was finally able to break out of SVT.  She was admitted for possible ablation.     From a renal perspective, the patient was diagnosed with end-stage renal failure and started on dialysis in December 2017.  Cause of renal failure was probably chronic hypertension.  She has been compliant with going to dialysis and the dialysis treatment has generally been uncomplicated.       Daily nephrology Summary  12/28/17 - seen in consultation.  HD ordered  12/29/17 - Code blue this am.  K high.  On HD now.            Review of Systems   Constitutional: Positive for malaise/fatigue.   HENT: Negative.    Eyes: Negative.    Respiratory: Negative.    Cardiovascular: Negative.          Physical Exam   Constitutional: No distress.   Eyes: No scleral icterus.   Neck: No JVD present.   Cardiovascular: Normal heart sounds.  Exam reveals no friction rub.    Pulmonary/Chest: Effort normal. No respiratory distress.   Abdominal: Soft. There is no tenderness.   Musculoskeletal: She exhibits no edema.       Labs:  Recent Labs      12/29/17   0528   WQTTR02U  7.57*   [id number]  25.7*   [id number]  134.1*   FVLJ5TJS  97.7   ARTHCO3  23   ARTBE  2     Recent Labs      12/27/17 2031 12/29/17   0526  12/29/17   0623   TROPONINI  <0.01  0.14*  0.13*   BNPBTYPENAT   --   1378*   --      Recent Labs      12/27/17 2031   17   05   SODIUM  138  136   < >  136  136  141   POTASSIUM  4.1  6.7*   < >  6.6*  5.1  3.9   CHLORIDE  101  99   < >  99  100  103   CO2  24  28   < >  24  31  25   BUN  24*  33*   < >  50*  51*  51*   CREATININE  5.23*  6.56*   < >  8.64*  9.46*  9.20*   MAGNESIUM  1.7  2.1   --    --    --    --    PHOSPHORUS  1.6*   --    --    --    --    --    CALCIUM  8.2*  9.0   < >  9.2  9.5  9.8    < > = values in this interval not displayed.     Recent Labs      17   ALTSGPT  <5   --   <5   --   <5   ASTSGOT  10*   --   13   --   11*   ALKPHOSPHAT  47   --   52   --   51   TBILIRUBIN  0.9   --   0.8   --   0.7   GLUCOSE  94   < >  89  143*  172*    < > = values in this interval not displayed.     Recent Labs      17   RBC  3.36*  3.38*  3.20*   HEMOGLOBIN  10.7*  10.7*  10.3*   HEMATOCRIT  34.2*  34.5*  32.3*   PLATELETCT  172  173  182     Recent Labs      17   WBC  6.4  8.3  7.7   NEUTSPOLYS  72.00  66.20  56.80   LYMPHOCYTES  19.60*  23.20  36.50   MONOCYTES  6.50  7.60  3.90   EOSINOPHILS  1.10  1.80  2.10   BASOPHILS  0.50  0.70  0.40   ASTSGOT  10*  13  11*   ALTSGPT  <5  <5  <5   ALKPHOSPHAT  47  52  51   TBILIRUBIN  0.9  0.8  0.7           Hemodynamics:  Temp (24hrs), Av.3 °C (97.3 °F), Min:35.9 °C (96.7 °F), Max:36.7 °C (98.1 °F)  Temperature: 35.9 °C (96.7 °F)  Pulse  Av.3  Min: 63  Max: 215  Blood Pressure: (!) 99/69 (RN notified)     Respiratory:    Respiration: 15, Pulse Oximetry: 98 %     Work Of Breathing / Effort: Mild  RUL Breath Sounds: Clear, RML Breath Sounds: Diminished, RLL Breath Sounds: Diminished, PHILIP Breath Sounds: Clear, LLL Breath Sounds: Diminished  Fluids:    Intake/Output Summary (Last 24 hours) at 17 1129  Last data filed at 17 0300   Gross per 24 hour    Intake              240 ml   Output              200 ml   Net               40 ml     Weight: 60.7 kg (133 lb 13.1 oz)  GI/Nutrition:  Orders Placed This Encounter   Procedures   • DIET ORDER     Standing Status:   Standing     Number of Occurrences:   1     Order Specific Question:   Diet:     Answer:   Regular [1]     Medical Decision Making, by Problem:  Active Hospital Problems    Diagnosis   • *Supraventricular arrhythmia [I49.9]   • Severe uncontrolled hypertension [I10]   • Hyperkalemia [E87.5]   • End-stage renal disease (ESRD) (CMS-HCC) [N18.6]   • Anemia of chronic disease [D63.8]   • Hyperlipidemia [E78.5]     PROBLEMS IDENTIFIED:  1.  End-stage renal failure.  2.  Supraventricular tachycardia.  3.  History of hypertension and anemia.     PLAN:  1.  Hemodialysis today - K already better by HD initiation  2.  No blood pressures, IVs or labs should be drawn on right arm.  3.  Routine monitoring of CBC and chem panel every other day while here.  4.  Renal diet with no protein restrictions.  5.  Resumption of phosphorus binders.  6.  Assume GFR less than 10 when dosing all medications and hemodialysis on a Monday, Wednesday, and Friday basis.          Quality-Core Measures

## 2017-12-29 NOTE — PROGRESS NOTES
Spoke with dr. Balderas. Recommended 2 gm calcium gluconate. Will order. Appreciate reccomendations

## 2017-12-29 NOTE — PROGRESS NOTES
"Date of Service: 12/29/2017  Chief Complaint:  Chief Complaint   Patient presents with   • Supraventricular Tachycardia (SVT)   51 y/o female with recurrent SVT    Interval History:  No c/o palpitation  All recent medication, labs, imaging studies and procedures reviewed    Physical Exam   Blood pressure (!) 99/69, pulse 63, temperature 35.9 °C (96.7 °F), resp. rate 15, height 1.575 m (5' 2\"), weight 60.7 kg (133 lb 13.1 oz), last menstrual period 12/28/2006, SpO2 98 %, not currently breastfeeding.    Constitutional:  SHe appears well-developed.   HENT: Normocephalic and atraumatic. No scleral icterus.   Neck: No JVD present.   Cardiovascular: Normal rate. RRR; Exam reveals no gallop and no friction rub. No murmur heard.   Pulmonary/Chest: CTAB coarse   Abdominal: S/NT/ND BS+   Musculoskeletal: He exhibits no edema. Pulses present.  Skin: Skin is warm and dry      Intake/Output Summary (Last 24 hours) at 12/29/17 0929  Last data filed at 12/29/17 0300   Gross per 24 hour   Intake              240 ml   Output              200 ml   Net               40 ml       LABS:  Lab Results   Component Value Date/Time    CHOLSTRLTOT 206 (H) 02/18/2011 04:43 PM    LDL 84 02/18/2011 04:43 PM    HDL 45 02/18/2011 04:43 PM    TRIGLYCERIDE 384 (H) 02/18/2011 04:43 PM       Lab Results   Component Value Date/Time    WBC 7.7 12/29/2017 05:26 AM    RBC 3.20 (L) 12/29/2017 05:26 AM    HEMOGLOBIN 10.3 (L) 12/29/2017 05:26 AM    HEMATOCRIT 32.3 (L) 12/29/2017 05:26 AM    .9 (H) 12/29/2017 05:26 AM    NEUTSPOLYS 56.80 12/29/2017 05:26 AM    LYMPHOCYTES 36.50 12/29/2017 05:26 AM    MONOCYTES 3.90 12/29/2017 05:26 AM    EOSINOPHILS 2.10 12/29/2017 05:26 AM    BASOPHILS 0.40 12/29/2017 05:26 AM    HYPOCHROMIA 1+ 10/04/2013 03:24 PM    ANISOCYTOSIS 3+ 07/14/2007 06:15 AM     Lab Results   Component Value Date/Time    SODIUM 141 12/29/2017 05:26 AM    POTASSIUM 3.9 12/29/2017 05:26 AM    CHLORIDE 103 12/29/2017 05:26 AM    CO2 25 " 12/29/2017 05:26 AM    GLUCOSE 172 (H) 12/29/2017 05:26 AM    BUN 51 (H) 12/29/2017 05:26 AM    CREATININE 9.20 (HH) 12/29/2017 05:26 AM    CREATININE 2.2 (H) 05/06/2009 03:10 AM         Lab Results   Component Value Date/Time    ALKPHOSPHAT 51 12/29/2017 05:26 AM    ASTSGOT 11 (L) 12/29/2017 05:26 AM    ALTSGPT <5 12/29/2017 05:26 AM    TBILIRUBIN 0.7 12/29/2017 05:26 AM      Lab Results   Component Value Date/Time    BNPBTYPENAT 1378 (H) 12/29/2017 05:26 AM      No results found for: TSH  Lab Results   Component Value Date/Time    PROTHROMBTM 14.3 06/16/2017 09:42 AM    INR 1.08 06/16/2017 09:42 AM        Medications reviewed    Imaging reviewed    ECHO(6/16/2017):Normal left ventricular chamber size.  Left ventricular ejection fraction is visually estimated to be 55%.  Mildly dilated left atrium.  Aortic sclerosis without stenosis.  Mild aortic insufficiency.  Mitral annular calcification.  Mild mitral regurgitation.  Mild tricuspid regurgitation.  Normal pericardium without effusion.     Impressions:  Recurrent SVT  ESRD on HD  HTN  HLD  Anemia     Recommendations:  EPS consult  Balance lytes  Case discussed with attending/RN  Will follow  Thx

## 2017-12-29 NOTE — PROGRESS NOTES
"Administered 10 units of insulin and D given per Dr. Grayson's order for potasium of 6.6. Patient also had high blood pressure 189/109. Administered labetalol 10 mg per PRN order. Checked blood glucose 30 min after administration and it was 98. Patient A+Ox, no complaints. PCNA went in to recheck patient's blood pressure. PCNA called nurse to go to room stat. On RN's way to patient room CNA hit code blue, but patient had a pulse the whole time. Patient was unresponsive, eyes open and pupils sluggish, pt very diaphoretic.RN checked for pulse and radial pulse +2 and SR on tele. Rapid response was called. Checked blood glucose and glucometer showed \"low\". D was administered per rapid response team. Patient's blood sugar was 207. Patient regained consciousness after D50 administration. Patient confused. Rechecked blood sugar back down to 60. Resident in the room. Patient alert and oriented after D50. Blood glucose fluctuating. Per Dr. Gio nicolas to monitor patient's blood glucose frequently, unable to transfer to ICU at this time.   Will continue to monitor closely.   "

## 2017-12-29 NOTE — DISCHARGE PLANNING
Outpatient Dialysis Note    Confirmed patient is active at:    [facility]  [address] [place] [address]       Schedule: Monday, Wednesday, Friday  Time: 3:25 pm    If patient discharges before Sunday, 12/31/17, the Holiday dialysis schedule will be:    Schedule: Sunday, Wednesday, Friday  Time: 1:15 pm, 3:25 pm, 3:25 pm    Spoke with Abby at facility who confirmed.    Forwarded records for review.    Dialysis Coordinator, [facility]  Alexia Ayoub [phone]

## 2017-12-30 VITALS
BODY MASS INDEX: 25.27 KG/M2 | DIASTOLIC BLOOD PRESSURE: 85 MMHG | HEART RATE: 67 BPM | WEIGHT: 137.35 LBS | SYSTOLIC BLOOD PRESSURE: 151 MMHG | RESPIRATION RATE: 18 BRPM | OXYGEN SATURATION: 97 % | HEIGHT: 62 IN | TEMPERATURE: 97.5 F

## 2017-12-30 PROBLEM — I47.10 SUPRAVENTRICULAR TACHYCARDIA (HCC): Status: ACTIVE | Noted: 2017-12-27

## 2017-12-30 PROBLEM — E87.5 HYPERKALEMIA: Status: RESOLVED | Noted: 2017-12-28 | Resolved: 2017-12-30

## 2017-12-30 PROBLEM — E16.2 HYPOGLYCEMIA: Status: RESOLVED | Noted: 2017-12-29 | Resolved: 2017-12-30

## 2017-12-30 LAB
ALBUMIN SERPL BCP-MCNC: 4.1 G/DL (ref 3.2–4.9)
ALBUMIN/GLOB SERPL: 1.5 G/DL
ALP SERPL-CCNC: 51 U/L (ref 30–99)
ALT SERPL-CCNC: 6 U/L (ref 2–50)
ANION GAP SERPL CALC-SCNC: 13 MMOL/L (ref 0–11.9)
AST SERPL-CCNC: 13 U/L (ref 12–45)
BASOPHILS # BLD AUTO: 0.6 % (ref 0–1.8)
BASOPHILS # BLD: 0.05 K/UL (ref 0–0.12)
BILIRUB SERPL-MCNC: 0.7 MG/DL (ref 0.1–1.5)
BUN SERPL-MCNC: 34 MG/DL (ref 8–22)
CALCIUM SERPL-MCNC: 10 MG/DL (ref 8.5–10.5)
CHLORIDE SERPL-SCNC: 101 MMOL/L (ref 96–112)
CO2 SERPL-SCNC: 24 MMOL/L (ref 20–33)
CREAT SERPL-MCNC: 7.03 MG/DL (ref 0.5–1.4)
EOSINOPHIL # BLD AUTO: 0.2 K/UL (ref 0–0.51)
EOSINOPHIL NFR BLD: 2.2 % (ref 0–6.9)
ERYTHROCYTE [DISTWIDTH] IN BLOOD BY AUTOMATED COUNT: 54.2 FL (ref 35.9–50)
GFR SERPL CREATININE-BSD FRML MDRD: 6 ML/MIN/1.73 M 2
GLOBULIN SER CALC-MCNC: 2.8 G/DL (ref 1.9–3.5)
GLUCOSE SERPL-MCNC: 100 MG/DL (ref 65–99)
HCT VFR BLD AUTO: 36.4 % (ref 37–47)
HGB BLD-MCNC: 11.5 G/DL (ref 12–16)
IMM GRANULOCYTES # BLD AUTO: 0.03 K/UL (ref 0–0.11)
IMM GRANULOCYTES NFR BLD AUTO: 0.3 % (ref 0–0.9)
LYMPHOCYTES # BLD AUTO: 2.83 K/UL (ref 1–4.8)
LYMPHOCYTES NFR BLD: 31.3 % (ref 22–41)
MAGNESIUM SERPL-MCNC: 2.1 MG/DL (ref 1.5–2.5)
MCH RBC QN AUTO: 31.4 PG (ref 27–33)
MCHC RBC AUTO-ENTMCNC: 31.6 G/DL (ref 33.6–35)
MCV RBC AUTO: 99.5 FL (ref 81.4–97.8)
MONOCYTES # BLD AUTO: 0.64 K/UL (ref 0–0.85)
MONOCYTES NFR BLD AUTO: 7.1 % (ref 0–13.4)
NEUTROPHILS # BLD AUTO: 5.3 K/UL (ref 2–7.15)
NEUTROPHILS NFR BLD: 58.5 % (ref 44–72)
NRBC # BLD AUTO: 0 K/UL
NRBC BLD-RTO: 0 /100 WBC
PLATELET # BLD AUTO: 225 K/UL (ref 164–446)
PMV BLD AUTO: 9.3 FL (ref 9–12.9)
POTASSIUM SERPL-SCNC: 5.4 MMOL/L (ref 3.6–5.5)
PROT SERPL-MCNC: 6.9 G/DL (ref 6–8.2)
RBC # BLD AUTO: 3.66 M/UL (ref 4.2–5.4)
SODIUM SERPL-SCNC: 138 MMOL/L (ref 135–145)
WBC # BLD AUTO: 9.1 K/UL (ref 4.8–10.8)

## 2017-12-30 PROCEDURE — 700102 HCHG RX REV CODE 250 W/ 637 OVERRIDE(OP): Performed by: STUDENT IN AN ORGANIZED HEALTH CARE EDUCATION/TRAINING PROGRAM

## 2017-12-30 PROCEDURE — A9270 NON-COVERED ITEM OR SERVICE: HCPCS | Performed by: INTERNAL MEDICINE

## 2017-12-30 PROCEDURE — A9270 NON-COVERED ITEM OR SERVICE: HCPCS | Performed by: STUDENT IN AN ORGANIZED HEALTH CARE EDUCATION/TRAINING PROGRAM

## 2017-12-30 PROCEDURE — 99239 HOSP IP/OBS DSCHRG MGMT >30: CPT | Mod: GC | Performed by: HOSPITALIST

## 2017-12-30 PROCEDURE — 83735 ASSAY OF MAGNESIUM: CPT

## 2017-12-30 PROCEDURE — 36415 COLL VENOUS BLD VENIPUNCTURE: CPT

## 2017-12-30 PROCEDURE — 85025 COMPLETE CBC W/AUTO DIFF WBC: CPT

## 2017-12-30 PROCEDURE — 700102 HCHG RX REV CODE 250 W/ 637 OVERRIDE(OP): Performed by: INTERNAL MEDICINE

## 2017-12-30 PROCEDURE — 80053 COMPREHEN METABOLIC PANEL: CPT

## 2017-12-30 RX ORDER — SEVELAMER HYDROCHLORIDE 800 MG/1
800 TABLET, FILM COATED ORAL
Qty: 90 TAB | Refills: 3 | Status: SHIPPED | OUTPATIENT
Start: 2017-12-30 | End: 2018-06-15

## 2017-12-30 RX ORDER — SODIUM POLYSTYRENE SULFONATE 15 G/60ML
30 SUSPENSION ORAL; RECTAL ONCE
Status: COMPLETED | OUTPATIENT
Start: 2017-12-30 | End: 2017-12-30

## 2017-12-30 RX ORDER — VERAPAMIL HYDROCHLORIDE 80 MG/1
80 TABLET ORAL EVERY 8 HOURS
Qty: 90 TAB | Refills: 1 | Status: ON HOLD | OUTPATIENT
Start: 2017-12-30 | End: 2018-01-09

## 2017-12-30 RX ORDER — VERAPAMIL HYDROCHLORIDE 2.5 MG/ML
5 INJECTION, SOLUTION INTRAVENOUS ONCE
Status: DISCONTINUED | OUTPATIENT
Start: 2017-12-30 | End: 2017-12-30 | Stop reason: HOSPADM

## 2017-12-30 RX ORDER — SODIUM POLYSTYRENE SULFONATE 15 G/60ML
15 SUSPENSION ORAL; RECTAL ONCE
Status: DISCONTINUED | OUTPATIENT
Start: 2017-12-30 | End: 2017-12-30

## 2017-12-30 RX ADMIN — LISINOPRIL 20 MG: 20 TABLET ORAL at 09:29

## 2017-12-30 RX ADMIN — SODIUM POLYSTYRENE SULFONATE 30 G: 15 SUSPENSION ORAL; RECTAL at 12:47

## 2017-12-30 RX ADMIN — METOPROLOL TARTRATE 25 MG: 25 TABLET, FILM COATED ORAL at 06:16

## 2017-12-30 RX ADMIN — VERAPAMIL HYDROCHLORIDE 80 MG: 80 TABLET, FILM COATED ORAL at 06:16

## 2017-12-30 RX ADMIN — VERAPAMIL HYDROCHLORIDE 80 MG: 80 TABLET, FILM COATED ORAL at 12:47

## 2017-12-30 RX ADMIN — METOPROLOL TARTRATE 25 MG: 25 TABLET, FILM COATED ORAL at 12:47

## 2017-12-30 ASSESSMENT — PAIN SCALES - GENERAL
PAINLEVEL_OUTOF10: 0

## 2017-12-30 ASSESSMENT — ENCOUNTER SYMPTOMS
EYES NEGATIVE: 1
CARDIOVASCULAR NEGATIVE: 1
RESPIRATORY NEGATIVE: 1

## 2017-12-30 NOTE — CARE PLAN
Problem: Safety  Goal: Will remain free from falls  Fall precautions in place. Bed wheels locked, bed is in the lowest position. Call light in reach.  Non-skid socks provided. Patient provides successful verbalization of fall and safety precautions and demonstration of use of call bell. Upper side rails are up. Hourly rounding in progress.     Problem: Knowledge Deficit  Goal: Knowledge of disease process/condition, treatment plan, diagnostic tests, and medications will improve  POC discussed with the patient and family. All questions and concerns addressed and answered. Patient is involved in POC and verbalizes the understanding of the disease process, medications, tests and treatments. Questions on POC encouraged throughout care.

## 2017-12-30 NOTE — PROGRESS NOTES
[facility] Interval Note    Name Isabelle Coyle     1965   Age/Sex 52 y.o. female   MRN [id number]   Code Status FULL     After 5PM or if no immediate response to page, please call for cross-coverage  Attending/Team: Dr. HOLDEN/EDOUARD Use Tiger Text to page  6AM-5PM  Call [phone] to page afterhours   1st Call - Day Intern (R1):   LEANN GAMEZ 2nd Call - Day Sr. Resident (R2/R3):   SUNG ALVARADO         Chief complaint/reason for interval visit  Palpitations, ESRD on HD    Interval Problem Update  Rapid response on pt this am for low blood sugars  Pt had a high K when she was given insulin and D50 and then had hypoglycemic episodes  Improved with d50, orange juice and glucose tabs    Pt was sent for immediate dialysis, stable after coming back to the floor    PSVT- Currently in NSR, consulted EPS, appreciate recs      Review of Systems   Constitutional: Positive for diaphoresis and malaise/fatigue. Negative for chills and fever.   HENT: Negative for congestion and hearing loss.    Eyes: Negative for blurred vision and double vision.   Respiratory: Negative for cough and shortness of breath.    Cardiovascular: Negative for chest pain, palpitations and leg swelling.   Gastrointestinal: Negative for abdominal pain and constipation.   Genitourinary: Negative for dysuria.   Musculoskeletal: Positive for myalgias.   Skin: Negative for rash.   Neurological: Positive for weakness. Negative for dizziness and headaches.   Endo/Heme/Allergies: Does not bruise/bleed easily.       Consultants/Specialty  Cardiology  Nephrology    Disposition  Inpatient for PSVT and ESRD    Quality Measures    Reviewed items::  EKG reviewed, Labs reviewed, Medications reviewed and Radiology images reviewed  Murdock catheter::  No Murdock  DVT prophylaxis - mechanical:  SCDs         Physical Exam   Vitals:    17 0817 17 1130 17 1200 17 1600   BP: 117/69 154/84 (!) 174/94 137/87   Pulse: 67 65 71 85    Resp: 18 17 18 18   Temp: (!) 35.7 °C (96.2 °F) 35.8 °C (96.5 °F) 36.6 °C (97.9 °F) 37.3 °C (99.2 °F)   SpO2:   100% 93%   Weight:       Height:         Body mass index is 24.48 kg/m².  Oxygen Therapy:  Pulse Oximetry: 93 %, O2 (LPM): 0, O2 Delivery: None (Room Air)    Physical Exam   Constitutional: She is oriented to person, place, and time.   Well developed,  not in any acute distress   HENT:   Head: Normocephalic and atraumatic.   Eyes: EOM are normal. Pupils are equal, round, and reactive to light.   Neck: Normal range of motion.   Cardiovascular: Normal rate, regular rhythm and normal heart sounds.    Pulmonary/Chest: Effort normal and breath sounds normal. She has no wheezes. She has no rales.   Abdominal: Soft. Bowel sounds are normal. She exhibits no distension.   Musculoskeletal: Normal range of motion.   Neurological: She is alert and oriented to person, place, and time.   Skin: Skin is warm.   Psychiatric: Affect and judgment normal.         Lab Data Review:  Recent Results (from the past 24 hour(s))   CBC WITH DIFFERENTIAL    Collection Time: 12/29/17  1:49 AM   Result Value Ref Range    WBC 8.3 4.8 - 10.8 K/uL    RBC 3.38 (L) 4.20 - 5.40 M/uL    Hemoglobin 10.7 (L) 12.0 - 16.0 g/dL    Hematocrit 34.5 (L) 37.0 - 47.0 %    .1 (H) 81.4 - 97.8 fL    MCH 31.7 27.0 - 33.0 pg    MCHC 31.0 (L) 33.6 - 35.0 g/dL    RDW 55.5 (H) 35.9 - 50.0 fL    Platelet Count 173 164 - 446 K/uL    MPV 9.6 9.0 - 12.9 fL    Neutrophils-Polys 66.20 44.00 - 72.00 %    Lymphocytes 23.20 22.00 - 41.00 %    Monocytes 7.60 0.00 - 13.40 %    Eosinophils 1.80 0.00 - 6.90 %    Basophils 0.70 0.00 - 1.80 %    Immature Granulocytes 0.50 0.00 - 0.90 %    Nucleated RBC 0.00 /100 WBC    Neutrophils (Absolute) 5.52 2.00 - 7.15 K/uL    Lymphs (Absolute) 1.93 1.00 - 4.80 K/uL    Monos (Absolute) 0.63 0.00 - 0.85 K/uL    Eos (Absolute) 0.15 0.00 - 0.51 K/uL    Baso (Absolute) 0.06 0.00 - 0.12 K/uL    Immature Granulocytes (abs) 0.04 0.00  - 0.11 K/uL    NRBC (Absolute) 0.00 K/uL   COMP METABOLIC PANEL    Collection Time: 17  1:49 AM   Result Value Ref Range    Sodium 136 135 - 145 mmol/L    Potassium 6.6 (HH) 3.6 - 5.5 mmol/L    Chloride 99 96 - 112 mmol/L    Co2 24 20 - 33 mmol/L    Anion Gap 13.0 (H) 0.0 - 11.9    Glucose 89 65 - 99 mg/dL    Bun 50 (H) 8 - 22 mg/dL    Creatinine 8.64 (HH) 0.50 - 1.40 mg/dL    Calcium 9.2 8.5 - 10.5 mg/dL    AST(SGOT) 13 12 - 45 U/L    ALT(SGPT) <5 2 - 50 U/L    Alkaline Phosphatase 52 30 - 99 U/L    Total Bilirubin 0.8 0.1 - 1.5 mg/dL    Albumin 3.8 3.2 - 4.9 g/dL    Total Protein 6.4 6.0 - 8.2 g/dL    Globulin 2.6 1.9 - 3.5 g/dL    A-G Ratio 1.5 g/dL   ESTIMATED GFR    Collection Time: 17  1:49 AM   Result Value Ref Range    GFR If  6 (A) >60 mL/min/1.73 m 2    GFR If Non African American 5 (A) >60 mL/min/1.73 m 2   EKG    Collection Time: 17  3:07 AM   Result Value Ref Range    Report       [facility]    Test Date:  2017  Pt Name:    MELISSA BARDALES                 Department: 171  MRN:        [id number]                      Room:       T734  Gender:     Female                       Technician: PABLO  :        1965                   Requested By:PHUONG ROSA  Order #:    [id number]                    Reading MD: Moses Melara MD    Measurements  Intervals                                Axis  Rate:       74                           P:          54  NE:         156                          QRS:        35  QRSD:       88                           T:          49  QT:         428  QTc:        475    Interpretive Statements  SINUS RHYTHM  Compared to ECG 2017 20:21:38  Sinus tachycardia no longer present  Myocardial infarct finding no longer present    Electronically Signed On 2017 8:38:10 PST by Moses Melara MD     BASIC METABOLIC PANEL    Collection Time: 17  3:47 AM   Result Value Ref Range    Sodium 136 135 - 145 mmol/L    Potassium 5.1 3.6 -  5.5 mmol/L    Chloride 100 96 - 112 mmol/L    Co2 31 20 - 33 mmol/L    Glucose 143 (H) 65 - 99 mg/dL    Bun 51 (H) 8 - 22 mg/dL    Creatinine 9.46 (HH) 0.50 - 1.40 mg/dL    Calcium 9.5 8.5 - 10.5 mg/dL    Anion Gap 5.0 0.0 - 11.9   ESTIMATED GFR    Collection Time: 12/29/17  3:47 AM   Result Value Ref Range    GFR If African American 5 (A) >60 mL/min/1.73 m 2    GFR If Non African American 4 (A) >60 mL/min/1.73 m 2   ACCU-CHEK GLUCOSE    Collection Time: 12/29/17  3:49 AM   Result Value Ref Range    Glucose - Accu-Ck 98 65 - 99 mg/dL   ACCU-CHEK GLUCOSE    Collection Time: 12/29/17  5:17 AM   Result Value Ref Range    Glucose - Accu-Ck 280 (H) 65 - 99 mg/dL   CBC WITH DIFFERENTIAL    Collection Time: 12/29/17  5:26 AM   Result Value Ref Range    WBC 7.7 4.8 - 10.8 K/uL    RBC 3.20 (L) 4.20 - 5.40 M/uL    Hemoglobin 10.3 (L) 12.0 - 16.0 g/dL    Hematocrit 32.3 (L) 37.0 - 47.0 %    .9 (H) 81.4 - 97.8 fL    MCH 32.2 27.0 - 33.0 pg    MCHC 31.9 (L) 33.6 - 35.0 g/dL    RDW 55.4 (H) 35.9 - 50.0 fL    Platelet Count 182 164 - 446 K/uL    MPV 9.6 9.0 - 12.9 fL    Neutrophils-Polys 56.80 44.00 - 72.00 %    Lymphocytes 36.50 22.00 - 41.00 %    Monocytes 3.90 0.00 - 13.40 %    Eosinophils 2.10 0.00 - 6.90 %    Basophils 0.40 0.00 - 1.80 %    Immature Granulocytes 0.30 0.00 - 0.90 %    Nucleated RBC 0.00 /100 WBC    Neutrophils (Absolute) 4.37 2.00 - 7.15 K/uL    Lymphs (Absolute) 2.80 1.00 - 4.80 K/uL    Monos (Absolute) 0.30 0.00 - 0.85 K/uL    Eos (Absolute) 0.16 0.00 - 0.51 K/uL    Baso (Absolute) 0.03 0.00 - 0.12 K/uL    Immature Granulocytes (abs) 0.02 0.00 - 0.11 K/uL    NRBC (Absolute) 0.00 K/uL   BTYPE NATRIURETIC PEPTIDE    Collection Time: 12/29/17  5:26 AM   Result Value Ref Range    B Natriuretic Peptide 1378 (H) 0 - 100 pg/mL   TROPONIN    Collection Time: 12/29/17  5:26 AM   Result Value Ref Range    Troponin I 0.14 (H) 0.00 - 0.04 ng/mL   LACTIC ACID    Collection Time: 12/29/17  5:26 AM   Result Value  Ref Range    Lactic Acid 1.9 0.5 - 2.0 mmol/L   COMP METABOLIC PANEL    Collection Time: 17  5:26 AM   Result Value Ref Range    Sodium 141 135 - 145 mmol/L    Potassium 3.9 3.6 - 5.5 mmol/L    Chloride 103 96 - 112 mmol/L    Co2 25 20 - 33 mmol/L    Anion Gap 13.0 (H) 0.0 - 11.9    Glucose 172 (H) 65 - 99 mg/dL    Bun 51 (H) 8 - 22 mg/dL    Creatinine 9.20 (HH) 0.50 - 1.40 mg/dL    Calcium 9.8 8.5 - 10.5 mg/dL    AST(SGOT) 11 (L) 12 - 45 U/L    ALT(SGPT) <5 2 - 50 U/L    Alkaline Phosphatase 51 30 - 99 U/L    Total Bilirubin 0.7 0.1 - 1.5 mg/dL    Albumin 3.9 3.2 - 4.9 g/dL    Total Protein 6.6 6.0 - 8.2 g/dL    Globulin 2.7 1.9 - 3.5 g/dL    A-G Ratio 1.4 g/dL   ESTIMATED GFR    Collection Time: 17  5:26 AM   Result Value Ref Range    GFR If African American 5 (A) >60 mL/min/1.73 m 2    GFR If Non African American 4 (A) >60 mL/min/1.73 m 2   HEPATITIS PANEL ACUTE(4 COMPONENTS)    Collection Time: 17  5:26 AM   Result Value Ref Range    Hepatitis B Surface Antigen Negative Negative    Hepatitis C Antibody Negative Negative    Hepatitis B Cors Ab,IgM Negative Negative    Hepatitis A Virus Ab, IgM Negative Negative   HEP B SURFACE AB    Collection Time: 17  5:26 AM   Result Value Ref Range    Hep B Surface Antibody Quant 3.68 0.00 - 10.00 mIU/mL   ABG - LAB    Collection Time: 17  5:28 AM   Result Value Ref Range    Ph 7.57 (H) 7.40 - 7.50    Pco2 25.7 (L) 26.0 - 37.0 mmHg    Po2 134.1 (H) 64.0 - 87.0 mmHg    O2 Saturation 97.7 93.0 - 99.0 %    Hco3 23 17 - 25 mmol/L    Base Excess 2 -4 - 3 mmol/L    Body Temp see below Centigrade    O2 Therapy 7.0 2.0 - 10.0 L/min   EKG    Collection Time: 17  5:35 AM   Result Value Ref Range    Report       [facility]    Test Date:  2017  Pt Name:    MELISSA BARDALES                 Department: 171  MRN:        [id number]                      Room:       [facility]  Gender:     Female                       Technician: PABLO  :        1965                    Requested By:FLAKITA HOLDEN  Order #:    [id number]                    Reading MD: Moses Melara MD    Measurements  Intervals                                Axis  Rate:       80                           P:          48  GA:         144                          QRS:        38  QRSD:       92                           T:          45  QT:         468  QTc:        540    Interpretive Statements  SINUS RHYTHM  PROBABLE LEFT ATRIAL ABNORMALITY  BORDERLINE INFERIOR Q WAVES  BORDERLINE R WAVE PROGRESSION, ANTERIOR LEADS  PROLONGED QT INTERVAL  Compared to ECG 12/29/2017 03:07:01  Prolonged QT interval now present    Electronically Signed On 12- 8:39:31 PST by Moses Melara MD     ACCU-CHEK GLUCOSE    Collection Time: 12/29/17  5:51 AM   Result Value Ref Range    Glucose - Accu-Ck 60 (L) 65 - 99 mg/dL   ACCU-CHEK GLUCOSE    Collection Time: 12/29/17  6:21 AM   Result Value Ref Range    Glucose - Accu-Ck 130 (H) 65 - 99 mg/dL   TROPONIN    Collection Time: 12/29/17  6:23 AM   Result Value Ref Range    Troponin I 0.13 (H) 0.00 - 0.04 ng/mL   ACCU-CHEK GLUCOSE    Collection Time: 12/29/17  6:43 AM   Result Value Ref Range    Glucose - Accu-Ck 51 (L) 65 - 99 mg/dL   ACCU-CHEK GLUCOSE    Collection Time: 12/29/17  9:26 AM   Result Value Ref Range    Glucose - Accu-Ck 89 65 - 99 mg/dL   ACCU-CHEK GLUCOSE    Collection Time: 12/29/17 11:26 AM   Result Value Ref Range    Glucose - Accu-Ck 95 65 - 99 mg/dL       12/28/2017  1:35 PM    Recent Labs      12/27/17   2031  12/28/17   0307   12/29/17   0149  12/29/17   0347  12/29/17   0526   SODIUM  138  136   < >  136  136  141   POTASSIUM  4.1  6.7*   < >  6.6*  5.1  3.9   CHLORIDE  101  99   < >  99  100  103   CO2  24  28   < >  24  31  25   BUN  24*  33*   < >  50*  51*  51*   CREATININE  5.23*  6.56*   < >  8.64*  9.46*  9.20*   MAGNESIUM  1.7  2.1   --    --    --    --    PHOSPHORUS  1.6*   --    --    --    --    --    CALCIUM  8.2*  9.0   < >  9.2   9.5  9.8    < > = values in this interval not displayed.       Recent Labs      12/28/17   0307 12/29/17   0149  12/29/17   0347  12/29/17   0526   ALTSGPT  <5   --   <5   --   <5   ASTSGOT  10*   --   13   --   11*   ALKPHOSPHAT  47   --   52   --   51   TBILIRUBIN  0.9   --   0.8   --   0.7   GLUCOSE  94   < >  89  143*  172*    < > = values in this interval not displayed.       Recent Labs      12/28/17 0307 12/29/17 0149 12/29/17   0526   RBC  3.36*  3.38*  3.20*   HEMOGLOBIN  10.7*  10.7*  10.3*   HEMATOCRIT  34.2*  34.5*  32.3*   PLATELETCT  172  173  182       Recent Labs      12/28/17 0307 12/29/17 0149 12/29/17   0526   WBC  6.4  8.3  7.7   NEUTSPOLYS  72.00  66.20  56.80   LYMPHOCYTES  19.60*  23.20  36.50   MONOCYTES  6.50  7.60  3.90   EOSINOPHILS  1.10  1.80  2.10   BASOPHILS  0.50  0.70  0.40   ASTSGOT  10*  13  11*   ALTSGPT  <5  <5  <5   ALKPHOSPHAT  47  52  51   TBILIRUBIN  0.9  0.8  0.7          Assessment/Plan   * Supraventricular arrhythmia- (present on admission)   Assessment & Plan    H/o PSVT, first diagnosed in 2003  Past episodes usually resolve spontaneously within an hour or with adenosine.  Has episodes once every couple of months  Last episode in October, broke on its own, was never cardioverted in the past.  As per pt, she was on her usual dialysis regimen last night when she developed palpitations, chest pain, sob and dizziness, was brought in by EMS. As per EMS notes, enroute -215  On arrival to the ED, EKG showed SVT with a HR of 206  In the ED, Chemical conversion was attempted with adenosine without success.   Cardioversion attempted at 120 and 200 jules without success. 20 mg of diltiazem were given and patient converted.   Cardiology (Dr Carrasco) evaluated patient and recommended continue verapamil and changed carvedilol to metoprolol.   Today am, pt denies any symptoms.  In NSR today, no overnight SVTs too  Cardiology consulted for possible ablation- appreciate  recs  Plan   Continue verapamil and metoprolol   Cardiology on board- appreciate recs        Hypoglycemia   Assessment & Plan    Pt hypoglycemic after getting insulin for high K  BS at 32  Was symptomatic- sweating and AMS  Rapid response was called  Improved with d50, glucose tabs and orange juice  Went to immediate dialysis  No episodes since  Will ctm        Severe uncontrolled hypertension- (present on admission)   Assessment & Plan    As per chart review, pt has uncontrolled HTN in the past  On carvedilol and lisinopril at home  BP stable  Pt remains asymptomatic  Plan:  Continue lisinopril 20mg qd  On labetalol 10mg q2hrs PRN for BP>180/100  Will CTM        Hyperkalemia- (present on admission)   Assessment & Plan    K in am at 6.7  Treated with insulin and D50W  Repeat K at 5.1  NO insulin and d5w when hyperkalemic as pt gets hypoglycemic  Can try calcium gluconate  Will ctm        End-stage renal disease (ESRD) (CMS-[facility])- (present on admission)   Assessment & Plan    CKD V likely 2/2 uncontrolled HTN  On dialysis since December 2017 at [facility]  M [facility], pt reports compliance with her dialysis  Renal u.s 6/2017 showed small echogenic bilateral kidneys,compatible with atrophic kidneys and medical renal disease.  Pt went for HD today, tolerated well  Nephrology on board- appreciate recs  Plan:  As per nephro recs- Hemodialysis MWF  No blood pressures, IVs or labs should be drawn on right arm.  Renal diet with no protein restrictions.  Resumption of phosphorus binders.  Renally dose all meds        Anemia of chronic disease- (present on admission)   Assessment & Plan    Patient with hemoglobin of 9.8-->10.7-->10.3  Secondary to ESRD   Will CTM        Hyperlipidemia- (present on admission)   Assessment & Plan    Lab Results   Component Value Date/Time    CHOLSTRLTOT 206 (H) 02/18/2011 04:43 PM    LDL 84 02/18/2011 04:43 PM    HDL 45 02/18/2011 04:43 PM    TRIGLYCERIDE 384 (H) 02/18/2011 04:43 PM      LDL<100  Will ctm

## 2017-12-30 NOTE — PROGRESS NOTES
Patient discharged. Discharge instructions, personal belongings and written prescriptions in possession of a patient. PIV and tele monitor removed.  Copy of discharge instructions in the patient chart, signed and reviewed. Patient verbalizes the understanding of the discharge instructions. Questions / concerns addressed prior to leaving the unit. Patient is instructed to follow up with PCP, EP cardiology. Transported via walking, pt refused transport. Patient is discharged to home. Family is present.

## 2017-12-30 NOTE — PROGRESS NOTES
Received bedside report from PM nurse. Assumed patient care. Chart reviewed. Pt was resting in bed. A&O x 4. No concerns, complaints or distress. Patient denies pain. POC updated with pt and on the patient communication board. Bed locked and in the lowest position. Call light within reach. Tele box on. Will continue to monitor.

## 2017-12-30 NOTE — PROGRESS NOTES
[facility] Nephrology Progress Note               Author: Kvng Mcelroy Date & Time created: 12/30/2017  10:04 AM     Interval History:  52-year-old woman with history of end-stage renal failure, on chronic outpatient hemodialysis for approximately the last 6 months at [facility] Monday, Wednesday, and Friday, who during her dialysis had onset of heart palpitations.  She felt dizzy and short of breath and some chest pain.  EMS was called.  There they identified her in SVT.  She was transferred to [facility] ER for evaluation.     In the ER, she was given adenosine and cardioversion finally with some diltiazem.  She was finally able to break out of SVT.  She was admitted for possible ablation.     From a renal perspective, the patient was diagnosed with end-stage renal failure and started on dialysis in December 2017.  Cause of renal failure was probably chronic hypertension.  She has been compliant with going to dialysis and the dialysis treatment has generally been uncomplicated.       Daily nephrology Summary  12/28/17 - seen in consultation.  HD ordered  12/29/17 - Code blue this am.  K high.  On HD now.  12/30/17 - s/p HD without issues yesterday.  Stable renal issues      Review of Systems   Constitutional: Positive for malaise/fatigue.   HENT: Negative.    Eyes: Negative.    Respiratory: Negative.    Cardiovascular: Negative.          Physical Exam   Constitutional: No distress.   Eyes: No scleral icterus.   Neck: No JVD present.   Cardiovascular: Normal heart sounds.  Exam reveals no friction rub.    Pulmonary/Chest: Effort normal. No respiratory distress.   Abdominal: Soft. There is no tenderness.   Musculoskeletal: She exhibits no edema.       Labs:  Recent Labs      12/29/17   0528   [id number]  7.57*   [id number]  25.7*   [id number]  134.1*   [id number]  97.7   ARTHCO3  23   ARTBE  2     Recent Labs      12/27/17   2031  12/29/17   0526  12/29/17   0623   TROPONINI  <0.01  0.14*  0.13*   BNPBTYPENAT   --    1378*   --      Recent Labs      17   20317   03017   0517   030   SODIUM  138  136   < >  136  141  138   POTASSIUM  4.1  6.7*   < >  5.1  3.9  5.4   CHLORIDE  101  99   < >  100  103  101   CO2  24  28   < >  31  25  24   BUN  24*  33*   < >  51*  51*  34*   CREATININE  5.23*  6.56*   < >  9.46*  9.20*  7.03*   MAGNESIUM  1.7  2.1   --    --    --   2.1   PHOSPHORUS  1.6*   --    --    --    --    --    CALCIUM  8.2*  9.0   < >  9.5  9.8  10.0    < > = values in this interval not displayed.     Recent Labs      17   ALTSGPT  <5   --   <5  6   ASTSGOT  13   --   11*  13   ALKPHOSPHAT  52   --   51  51   TBILIRUBIN  0.8   --   0.7  0.7   GLUCOSE  89  143*  172*  100*     Recent Labs      17   RBC  3.38*  3.20*  3.66*   HEMOGLOBIN  10.7*  10.3*  11.5*   HEMATOCRIT  34.5*  32.3*  36.4*   PLATELETCT  173  182  225     Recent Labs      17   0517   WBC  8.3  7.7  9.1   --    NEUTSPOLYS  66.20  56.80  58.50   --    LYMPHOCYTES  23.20  36.50  31.30   --    MONOCYTES  7.60  3.90  7.10   --    EOSINOPHILS  1.80  2.10  2.20   --    BASOPHILS  0.70  0.40  0.60   --    ASTSGOT  13  11*   --   13   ALTSGPT  <5  <5   --   6   ALKPHOSPHAT  52  51   --   51   TBILIRUBIN  0.8  0.7   --   0.7           Hemodynamics:  Temp (24hrs), Av.5 °C (97.7 °F), Min:35.8 °C (96.5 °F), Max:37.3 °C (99.2 °F)  Temperature: 36.4 °C (97.5 °F)  Pulse  Av.9  Min: 63  Max: 215  Blood Pressure: 151/85     Respiratory:    Respiration: 18, Pulse Oximetry: 97 %     Work Of Breathing / Effort: Mild  RUL Breath Sounds: Clear, RML Breath Sounds: Diminished, RLL Breath Sounds: Diminished, PHILIP Breath Sounds: Clear, LLL Breath Sounds: Diminished  Fluids:    Intake/Output Summary (Last 24 hours) at 17 1004  Last data filed at  12/29/17 1900   Gross per 24 hour   Intake             1220 ml   Output             2500 ml   Net            -1280 ml     Weight: 62.3 kg (137 lb 5.6 oz)  GI/Nutrition:  Orders Placed This Encounter   Procedures   • DIET ORDER     Standing Status:   Standing     Number of Occurrences:   1     Order Specific Question:   Diet:     Answer:   Regular [1]     Medical Decision Making, by Problem:  Active Hospital Problems    Diagnosis   • *Supraventricular arrhythmia [I49.9]   • Severe uncontrolled hypertension [I10]   • Hyperkalemia [E87.5]   • End-stage renal disease (ESRD) (CMS-HCC) [N18.6]   • Anemia of chronic disease [D63.8]   • Hyperlipidemia [E78.5]     PROBLEMS IDENTIFIED:  1.  End-stage renal failure.  2.  Supraventricular tachycardia.  3.  History of hypertension and anemia.     PLAN:  1.  Hemodialysis  on Monday   2.  No blood pressures, IVs or labs should be drawn on right arm.  3.  Routine monitoring of CBC and chem panel every other day while here.  4.  Renal diet with no protein restrictions.  5.  Resumption of phosphorus binders.  6.  Assume GFR less than 10 when dosing all medications and hemodialysis on a Monday, Wednesday, and Friday basis.          Quality-Core Measures

## 2017-12-30 NOTE — ASSESSMENT & PLAN NOTE
- resolved now  - was 2/2 insulin-dextrose to treat hyperkalemia on 12/29/17  - BG this am fasting was 100, asymptomatic  - manage high K with kayexalate and calcium chloride/gluconate

## 2017-12-30 NOTE — DISCHARGE SUMMARY
Internal Medicine Discharge Summary  Note Author: Loren Reagan M.D.       Admit Date:  12/27/2017       Discharge Date:   12/30/2017    Service:   R Internal Medicine [place] Team  Attending Physician(s):   Dr. Spain       Senior Resident(s):   Dr. Dias  John Resident(s):   Dr. Powers / Dr. Reagan        Primary Diagnosis:     Supraventricular Tachycardia      Secondary Diagnoses:                Principal Problem:    Supraventricular tachycardia (CMS-HCC) POA: Yes  Active Problems:    Severe uncontrolled hypertension POA: Yes    Anemia of chronic disease POA: Yes    End-stage renal disease (ESRD) (CMS-HCC) POA: Yes    Hyperlipidemia POA: Yes  Resolved Problems:    Hyperkalemia POA: Yes    Hypoglycemia POA: Unknown      Hospital Summary (Brief Narrative):         This 52 year old lady diagnosed with PMHx of paroxysmal SVT since 2003, usually resolved with Adenosine, ESRD possibly secondary to hypertension on HD (M, W, F regimen) presented to ER on 12/27/2017 with palpitations, SOB, dizziness and chest pain while on HD. She was diagnosed with SVT in ER with HR > 200, was given 6 + 12 mg of adenosine which was unsuccessful. She was given electriCal cardioversion (2 shoCks - 120 J, followed by 200 J) without resolution. She eventually converted to sinus rhythm following Diltiazem. She was reviewed by cardiology who recommended switching Carvedilol to Metoprolol, and suggested that she be commenced on regular Verapamil. Cardiologists are planning on performing an EP Study with a view to ablation as an out-patient, and have advised the patient to hold off on Metoprolol and Verapamil for 5 days prior to the procedure. On 12/29/2017, her potassium was elevated at 6.6, and following treatment with Insulin-Dextrose to treat the hyperkalemia, she became hypoglycemic needing dextrose jets. No further insulin was administered, and her hyperkalemia was managed with calcium chloride. She had hemodialysis on  12/29/17 and was given a dose of Kayexalate on 12/30/2017. She was reviewed by nephrology while an in-patient and was commenced on Sevelamer. She remained active and well prior to discharge. She was discharged on 12/30/2017 with a plan to have the next dialysis session tomorrow (12/31/2017, Sunday) instead on 1/1/2018, followed by her regular schedule of W, F, M. She will be followed by Nephrology during her HD sessions, and will be followed by cardiology for EP study.       Patient /Hospital Summary (Details -- Problem Oriented) :          * Supraventricular tachycardia (CMS-HCC)   Assessment & Plan    - H/o PSVT, first diagnosed in 2003  - Past episodes usually resolve spontaneously within an hour or with adenosine.  - Has episodes once every couple of months, last episode in October, broke on its own, never cardioverted in the past.  - developed palpitations, chest pain, sob and dizziness while on HD 12/28/17, was brought in by EMS.   - As per EMS notes, enroute -215. On arrival to the ED, EKG showed SVT with a HR of 206  - Chemical conversion with adenosine unsuccessful   - electrical cardioversion attempted at 120 and 200 jules without success.   - 20 mg of diltiazem were given and patient converted  - no further episodes during in-patient stay  - Cardiology (Dr Carrasco) evaluated patient and recommended continue verapamil and changed carvedilol to metoprolol.   - In NSR today, no SVTs since admission to floor  Cardiology review and EP review noted - they will organize EP study with a view to ablation as out-patient, patient advised to hold metoprolol and verapamil 5 days before EP study  Plan   - Continue verapamil and metoprolol as O/P --> hold 5 days before EP study as out-pt  - advise to return to ER for recurrence of symptoms        End-stage renal disease (ESRD) (CMS-HCC)   Assessment & Plan    - CKD likely 2/2 uncontrolled HTN  - On dialysis since December 2017 at [facility]  - M W F, pt  reports compliance with her dialysis  - Renal US  6/2017 - small echogenic kidneys B/L, compatible with atrophic kidneys and medical renal disease.  - HD 12/29/17, tolerated well  - High K due to CKD --> was treated with Insulin-Dextrose and Calcium chloride 12/29/17, K today 5.4, given a dose of kayexalate prior to discharge, discussed with Dr. Collins --> no further kayexalate needed as OP , thye'll follow her during HD sessions  - For HD on 12/31/2017 instead of 1/1/12018  Plan:  - As per nephro recs - Hemodialysis MWF, no further kayexalate for discharge  - secure right arm in view of Rt AV Fistula  - Renal diet with no protein restrictions.  - Resume phosphorus binders.  - Renally dose all meds        Anemia of chronic disease   Assessment & Plan    - Hb of 9.8-->10.7-->10.3  - Secondary to ESRD   - ctm        Severe uncontrolled hypertension   Assessment & Plan    - uncontrolled HTN in the past  - On carvedilol and lisinopril at home  - BP was 160-170 yesterday, between 130-150 this am  - asymptomatic  Plan:  - Continue home meds and Verapamil, metoprolol TID instead of Coreg BID  - Nephro will FU while on HD sessions (d'ed with Dr. Collins 12/30/17)        Hyperkalemia-resolved as of 12/30/2017   Assessment & Plan    - K 12/29/17 --> 6.7  - Treated with insulin and D50W, calcium chloride and HD  - K this am 5.4  - NO insulin and d5w when hyperkalemic as pt gets hypoglycemic  - Can try calcium gluconate if required  - kayexalate one dose given today  - discussed with Dr. Collins regarding OP Kayexalate for discharge in view of K and Lisinopril --> advised no OP kayexalate for now, they'll follow-up on patient during HD sessions        Hyperlipidemia   Assessment & Plan    Lab Results   Component Value Date/Time    CHOLSTRLTOT 206 (H) 02/18/2011 04:43 PM    LDL 84 02/18/2011 04:43 PM    HDL 45 02/18/2011 04:43 PM    TRIGLYCERIDE 384 (H) 02/18/2011 04:43 PM     LDL<100  - ctm            Hypoglycemia-resolved as of  12/30/2017   Assessment & Plan    - resolved now  - was 2/2 insulin-dextrose to treat hyperkalemia on 12/29/17  - BG this am fasting was 100, asymptomatic  - manage high K with kayexalate and calcium chloride/gluconate                Consultants:     Dr. Kvng Collins - Nephrology  Dr. Pires - Cardiology EP  Dr. Carrasco - cardiology    Procedures:        Hemodialysis    Imaging/ Testing:      DX-CHEST-LIMITED (1 VIEW)   Final Result      1.  Limited exam showing hypoinflation and mild LEFT lung base atelectasis.   2.  Supportive tubing is unchanged.      DX-CHEST-2 VIEWS   Final Result      1.  Limited exam showing no pneumonia or overt pulmonary edema.   2.  Supportive tubing as described above.          Discharge Medications:         Medication Reconciliation: Completed       Medication List      START taking these medications      Instructions   metoprolol 25 MG Tabs  Commonly known as:  LOPRESSOR   Take 1 Tab by mouth every 8 hours.  Dose:  25 mg     sevelamer 800 MG Tabs  Commonly known as:  RENAGEL   Take 1 Tab by mouth 3 times a day, with meals.  Dose:  800 mg        CHANGE how you take these medications      Instructions   verapamil 80 MG Tabs  What changed:  when to take this  Commonly known as:  ISOPTIN   Take 1 Tab by mouth every 8 hours.  Dose:  80 mg        CONTINUE taking these medications      Instructions   famotidine 20 MG Tabs  Commonly known as:  PEPCID   Doctor's comments:  Please consider 90 day supplies to promote better adherence  TAKE ONE TABLET BY MOUTH TWICE DAILY     lisinopril 20 MG Tabs  Commonly known as:  PRINIVIL   Take 20 mg by mouth every day.  Dose:  20 mg     terazosin 2 MG Caps  Commonly known as:  HYTRIN   Take 2 mg by mouth every evening.  Dose:  2 mg        STOP taking these medications    carvedilol 6.25 MG Tabs  Commonly known as:  COREG              Disposition:   Home    Diet:   Renal, low potassium, low salt    Activity:   As tolerated     Instructions:      Patient instructed  to return to ER for recurrence of symptoms  Advised to be complaint with Cardiology and Nephrology appointments  To hold off on Metoprolol and Verapamil 5 days before EP Study  To attend [facility] tomorrow 1/1/18 by 1 pm  To see PCP in 1-2 weeks    The patient was instructed to return to the ER in the event of worsening symptoms. I have counseled the patient on the importance of compliance and the patient has agreed to proceed with all medical recommendations and follow up plan indicated above.   The patient understands that all medications come with benefits and risks. Risks may include permanent injury or death and these risks can be minimized with close reassessment and monitoring.        Primary Care Provider:    DENISE REID  Discharge summary faxed to primary care provider:  Completed  Copy of discharge summary given to the patient: Deferred      Follow up appointment details :      To follow-up with Nephrology and Cardiology  To see PCP in 1-2 weeks    Pending Studies:        EP Study - cardiology will organize and inform patient    Time spent on discharge day patient visit, preparing discharge paperwork and arranging for patient follow up.    Summary of follow up issues:   Monitor BP and K - -> being followed-up with nephrology  To see PCP in 1-2 weeks    Discharge Time (Minutes) :   45 mins  Hospital Course Type:  Inpatient Stay >2 midnights      Condition on Discharge    ______________________________________________________________________    Interval history/exam for day of discharge:    Well overnight, nil acute issues.  Reviewed by Cardiology, EP --> plan for EP Study with view to ablation as out-patient    Vitals:    12/29/17 2045 12/30/17 0000 12/30/17 0418 12/30/17 0800   BP: 151/89 144/90 (!) 168/97 151/85   Pulse: 90 81 81 67   Resp: 17 18 18 18   Temp: 36.4 °C (97.6 °F) 36.7 °C (98 °F) 36.3 °C (97.4 °F) 36.4 °C (97.5 °F)   SpO2: 98% 97% 96% 97%   Weight: 62.3 kg (137 lb 5.6  oz)      Height:         Weight/BMI: Body mass index is 25.12 kg/m².  Pulse Oximetry: 97 %, O2 (LPM): 0, O2 Delivery: None (Room Air)      Constitutional: She is oriented to person, place, and time.   Well developed,  not in any acute distress   HENT:   Head: Normocephalic and atraumatic.   Eyes: EOM are normal. Pupils are equal, round, and reactive to light.   Neck: Normal range of motion.   Cardiovascular: Normal rate, regular rhythm and normal heart sounds.    Pulmonary/Chest: Effort normal and breath sounds normal. She has no wheezes. She has no rales.   Abdominal: Soft. Bowel sounds are normal. She exhibits no distension.   Musculoskeletal: Normal range of motion.   Neurological: She is alert and oriented to person, place, and time.   Skin: Skin is warm.   Psychiatric: Affect and judgment normal.        Most Recent Labs:    Lab Results   Component Value Date/Time    WBC 9.1 12/30/2017 03:07 AM    RBC 3.66 (L) 12/30/2017 03:07 AM    HEMOGLOBIN 11.5 (L) 12/30/2017 03:07 AM    HEMATOCRIT 36.4 (L) 12/30/2017 03:07 AM    MCV 99.5 (H) 12/30/2017 03:07 AM    MCH 31.4 12/30/2017 03:07 AM    MCHC 31.6 (L) 12/30/2017 03:07 AM    MPV 9.3 12/30/2017 03:07 AM    NEUTSPOLYS 58.50 12/30/2017 03:07 AM    LYMPHOCYTES 31.30 12/30/2017 03:07 AM    MONOCYTES 7.10 12/30/2017 03:07 AM    EOSINOPHILS 2.20 12/30/2017 03:07 AM    BASOPHILS 0.60 12/30/2017 03:07 AM    HYPOCHROMIA 1+ 10/04/2013 03:24 PM    ANISOCYTOSIS 3+ 07/14/2007 06:15 AM      Lab Results   Component Value Date/Time    SODIUM 138 12/30/2017 03:08 AM    POTASSIUM 5.4 12/30/2017 03:08 AM    CHLORIDE 101 12/30/2017 03:08 AM    CO2 24 12/30/2017 03:08 AM    GLUCOSE 100 (H) 12/30/2017 03:08 AM    BUN 34 (H) 12/30/2017 03:08 AM    CREATININE 7.03 (HH) 12/30/2017 03:08 AM    CREATININE 2.2 (H) 05/06/2009 03:10 AM      Lab Results   Component Value Date/Time    ALTSGPT 6 12/30/2017 03:08 AM    ASTSGOT 13 12/30/2017 03:08 AM    ALKPHOSPHAT 51 12/30/2017 03:08 AM    TBILIRUBIN  0.7 12/30/2017 03:08 AM    DBILIRUBIN <0.1 06/20/2017 01:19 PM    LIPASE 40 03/07/2015 11:10 PM    ALBUMIN 4.1 12/30/2017 03:08 AM    GLOBULIN 2.8 12/30/2017 03:08 AM    INR 1.08 06/16/2017 09:42 AM     Lab Results   Component Value Date/Time    PROTHROMBTM 14.3 06/16/2017 09:42 AM    INR 1.08 06/16/2017 09:42 AM        Loren Reagan

## 2017-12-30 NOTE — DISCHARGE INSTRUCTIONS
Discharge Instructions    Discharged to home by car with relative. Discharged via walking, hospital escort: Refused.  Special equipment needed: Not Applicable    Be sure to schedule a follow-up appointment with your primary care doctor or any specialists as instructed.     Discharge Plan: To attend dialysis at 1 pm on 12/31/2017 instead on 1/1/18   Follow-up with EP as scheduled - cardiology   Hold medications metoprolol and verapamil 5 days before EP Study as instructed by Dr. Pires    Diet Plan: Discussed  Activity Level: Discussed  Smoking Cessation Offered: Patient Refused  Confirmed Follow up Appointment: Appointment Scheduled  Confirmed Symptoms Management: Discussed  Medication Reconciliation Updated: Yes  Influenza Vaccine Indication: Not indicated: Previously immunized this influenza season and > 8 years of age    I understand that a diet low in cholesterol, fat, and sodium is recommended for good health. Unless I have been given specific instructions below for another diet, I accept this instruction as my diet prescription.   Other diet: Renal    Special Instructions: None    · Is patient discharged on Warfarin / Coumadin?   No     · Is patient Post Blood Transfusion?  No    Depression / Suicide Risk    As you are discharged from this [facility] facility, it is important to learn how to keep safe from harming yourself.    Recognize the warning signs:  · Abrupt changes in personality, positive or negative- including increase in energy   · Giving away possessions  · Change in eating patterns- significant weight changes-  positive or negative  · Change in sleeping patterns- unable to sleep or sleeping all the time   · Unwillingness or inability to communicate  · Depression  · Unusual sadness, discouragement and loneliness  · Talk of wanting to die  · Neglect of personal appearance   · Rebelliousness- reckless behavior  · Withdrawal from people/activities they love  · Confusion- inability to  concentrate     If you or a loved one observes any of these behaviors or has concerns about self-harm, here's what you can do:  · Talk about it- your feelings and reasons for harming yourself  · Remove any means that you might use to hurt yourself (examples: pills, rope, extension cords, firearm)  · Get professional help from the community (Mental Health, Substance Abuse, psychological counseling)  · Do not be alone:Call your Safe Contact- someone whom you trust who will be there for you.  · Call your local CRISIS HOTLINE 333-7531 or 899-366-6855  · Call your local Children's Mobile Crisis Response Team [place] (840) 000-3886 or www.[employer]  · Call the toll free National Suicide Prevention Hotlines   · National Suicide Prevention Lifeline 073-146-BTRV (4595)  · [employer] 800-SUICIDE (965-8327)    End-Stage Kidney Disease  The kidneys are two organs that lie on either side of the spine between the middle of the back and the front of the abdomen. The kidneys:   · Remove wastes and extra water from the blood.    · Produce important hormones. These help keep bones strong, regulate blood pressure, and help create red blood cells.    · Balance the fluids and chemicals in the blood and tissues.  End-stage kidney disease occurs when the kidneys are so damaged that they cannot do their job. When the kidneys cannot do their job, life-threatening problems occur. The body cannot stay clean and strong without the help of the kidneys. In end-stage kidney disease, the kidneys cannot get better. You need a new kidney or treatments to do some of the work healthy kidneys do in order to stay alive.  CAUSES   End-stage kidney disease usually occurs when a long-lasting (chronic) kidney disease gets worse. It may also occur after the kidneys are suddenly damaged (acute kidney injury).   SYMPTOMS   · Swelling (edema) of the legs, ankles, or feet.    · Tiredness (lethargy).    · Nausea or vomiting.     · Confusion.    · Problems with urination, such as:    ¨ Decreased urine production.    ¨ Frequent urination, especially at night.    ¨ Frequent accidents in children who are potty trained.    · Muscle twitches and cramps.    · Persistent itchiness.    · Loss of appetite.    · Headaches.    · Abnormally dark or light skin.    · Numbness in the hands or feet.    · Easy bruising.    · Frequent hiccups.    · Menstruation stops.  DIAGNOSIS   Your health care provider will measure your blood pressure and take some tests. These may include:   · Urine tests.    · Blood tests.    · Imaging tests, such as:    ¨ An ultrasound exam.    ¨ Computed tomography (CT).  · A kidney biopsy.  TREATMENT   There are two treatments for end-stage kidney disease:   · A procedure that removes toxic wastes from the body (dialysis).    · Receiving a new kidney (kidney transplant).  Both of these treatments have serious risks and consequences. Your health care provider will help you determine which treatment is best for you based on your health, age, and other factors. In addition to having dialysis or a kidney transplant, you may need to take medicines to control high blood pressure (hypertension) and cholesterol and to decrease phosphorus levels in your blood.   HOME CARE INSTRUCTIONS  · Follow your prescribed diet.    · Take medicines only as directed by your health care provider.    · Do not take any new medicines (prescription, over-the-counter, or nutritional supplements) unless approved by your health care provider. Many medicines can worsen your kidney damage or need to have the dose adjusted.    · Keep all follow-up visits as directed by your health care provider.  MAKE SURE YOU:  · Understand these instructions.  · Will watch your condition.  · Will get help right away if you are not doing well or get worse.     This information is not intended to replace advice given to you by your health care provider. Make sure you discuss any  questions you have with your health care provider.     Document Released: 03/09/2005 Document Revised: 01/08/2016 Document Reviewed: 08/16/2013  [facility] Interactive Patient Education ©2016 [facility] [employer]    Tunneled Catheter Insertion  Catheters are thin, flexible tubes that are inserted into a vein to provide access to the bloodstream. A tunneled catheter is used when a person's bloodstream needs to be accessed many times over a long period, usually longer than 30 days. The catheter provides a painless method of drawing blood, giving blood products, removing waste products from the blood (hemodialysis), and giving medicines. Tunneled catheters can be placed in different parts of the body depending on how they will be used. These catheters are secure and easy to access. A part of the catheter is tunneled under the skin. This is done to decrease the risk of infection.   There are various types of tunneled catheters. The specific one used will depend on your needs. The catheter can be used right after insertion.  LET YOUR HEALTH CARE PROVIDER KNOW ABOUT:   · Any allergies you have.  · All medicines you are taking, including vitamins, herbs, eyedrops, and over-the-counter medicines and creams.    · Previous problems you or members of your family have had with the use of anesthetics.    · Any blood disorders you have had.  · Possibility of pregnancy, if this applies.    · Other health problems you have. Also, let your health care provider know if you have a pacemaker.  RISKS AND COMPLICATIONS  Generally, tunneled catheter insertion is a safe procedure. However, as with any surgical procedure, complications can occur. Possible complications include:  · Damage to the blood vessel.    · Bruising or bleeding at the site of puncture.    · Introduction of the catheter into an artery instead of a vein.    · Skin infection at the site of catheter insertion.    · Bloodstream infection, especially if your white blood cell count  is low.    · Developing a kink in the catheter so it does not work properly.    · Developing a hole or crack in the catheter.    · Blockage of the catheter.    · Getting air in the catheter.  · Blood clots around the catheter or in the vein near the catheter.  · Disturbance in the normal heart rhythm (rare). This is usually temporary.    · A collapsed lung during insertion (rare).    BEFORE THE PROCEDURE   · You may need to have blood tests done before the day of the procedure.    · Do not eat or drink anything for at least 8 hours before the procedure or as directed by your health care provider.    · Ask your health care provider about changing or stopping your regular medicines.  · Avoid wearing jewelry the day of the procedure.    · Make plans to have someone drive you home after the procedure. You should not drive immediately after the procedure.    PROCEDURE  · You will be asked to lie on your back.  · A regular intravenous (IV) access tube may be put into a vein in your hand or arm. During the procedure, medicine can flow directly into your body through the IV tube.  · Small monitors will be put on your body. They are used to check your heart, blood pressure, and oxygen level.  · The catheter site is usually shaved, cleaned, and covered with a sterile drape.  · You will be given medicine to numb the area where the catheter will be placed (local anesthetic). You may also be given a medicine to help you relax (sedative).  · The health care provider will then make a small incision in the skin, usually in the lower neck. Another small incision is made a little lower, usually on the shoulder or upper chest. Ultrasonography may be used so that the health care provider can see the vein and can properly guide the catheter placement.  · X-ray equipment may also be used to help ensure that the catheter is inserted safely and is placed where it will function most effectively. This equipment allows the health care  provider to watch the catheter on a live display while guiding it into place.  · Generally, a small guidewire is put into the vein first. A tunnel is created under the skin. The health care provider guides the movement of the guidewire into a larger vein closer to the heart.  · The catheter is pulled through the tunnel and then moved into the larger vein. The cuff on the catheter is located in the tunnel part. The cuff helps to anchor the catheter in place over time. Stitches are used to keep the catheter in place when first put in.  · An X-ray may be done to make sure the catheter is in the right place.  AFTER THE PROCEDURE   · You may stay in a recovery area until the sedation has worn off.  · Your heart rate, blood pressure and oxygen level will be monitored.  · You may have some pain and swelling in the neck or shoulder. You will likely be given medicine to control this.  · If this was done as an outpatient procedure, you may be able to go home the same day.     This information is not intended to replace advice given to you by your health care provider. Make sure you discuss any questions you have with your health care provider.     Document Released: 01/06/2009 Document Revised: 01/08/2016 Document Reviewed: 10/30/2013  [employer] Interactive Patient Education ©2016 [employer]    Care After  Refer to this sheet in the next few weeks. These instructions provide you with information on caring for yourself after your procedure. Your caregiver may also give you more specific instructions. Your treatment has been planned according to current medical practices, but problems sometimes occur. Call your caregiver if you have any problems or questions after your procedure.   HOME CARE INSTRUCTIONS  · Rest at home the day of the procedure. You will likely be able to return to normal activities the following day.  · Follow your caregiver's specific instructions for the type of device that you have.  · Only take  over-the-counter or prescription medicines as directed by your caregiver.  · Keep the insertion site of the catheter clean and dry at all times.  ¨ Change the bandages (dressings) over the catheter site as directed by your caregiver.  ¨ Wash the area around the catheter site during each dressing change. Sponge bathe the area using a germ-killing (antiseptic) solution as directed by your caregiver.  ¨ Look for redness or swelling at the insertion site during each dressing change.  · Apply an antibiotic ointment as directed by your caregiver.  · Flush your catheter as directed to keep it from becoming clogged.  · Always wash your hands thoroughly before changing dressings or flushing the catheter.  · Do not let air enter the catheter.  ¨ Never open the cap at the catheter tip.  ¨ Always make sure there is no air in the syringe or in the tubing for infusions.     · Do not lift anything heavy.  · Do not drive until your caregiver approves.  · Do not shower or bathe until your caregiver approves. When you shower or bathe, place a piece of plastic wrap over the catheter site. Do not allow the catheter site or the dressing to get wet. If taking a bath, do not allow the catheter to get submerged in the water.  If the catheter was inserted through an arm vein:   · Avoid wearing tight clothes or jewelry on the arm that has the catheter.    · Do not sleep with your head on the arm that has the catheter.    · Do not allow use of a blood pressure cuff on the arm that has the catheter.    · Do not let anyone draw blood from the arm that has the catheter, except through the catheter itself.  SEEK MEDICAL CARE IF:  · You have bleeding at the insertion site of the catheter.    · You feel weak or nauseous.    · Your catheter is not working properly.    · You have redness, pain, swelling, and warmth at the insertion site.    · You notice fluid draining from the insertion site.    SEEK IMMEDIATE MEDICAL CARE IF:  · Your catheter breaks  or has a hole in it.    · Your catheter comes loose or gets pulled completely out. If this happens, hold firm pressure over the area with your hand or a clean cloth.    · You have a fever.  · You have chills.    · Your catheter becomes totally blocked.    · You have swelling in your arm, shoulder, neck, or face.    · You have bleeding from the insertion site that does not stop.    · You develop chest pain or have trouble breathing.    · You feel dizzy or faint.    MAKE SURE YOU:  · Understand these instructions.  · Will watch your condition.  · Will get help right away if you are not doing well or get worse.     This information is not intended to replace advice given to you by your health care provider. Make sure you discuss any questions you have with your health care provider.     Document Released: 12/04/2013 Document Revised: 08/20/2014 Document Reviewed: 12/04/2013  [employer] Interactive Patient Education ©2016 [employer]      Paroxysmal Supraventricular Tachycardia  Paroxysmal supraventricular tachycardia (PSVT) is a type of abnormal heart rhythm. It causes your heart to beat very quickly and then suddenly stop beating so quickly. A normal heart rate is  beats per minute. During an episode of PSVT, your heart rate may be 150-250 beats per minute. This can make you feel light-headed and short of breath. An episode of PSVT can be frightening. It is usually not dangerous.  The heart has four chambers. All chambers need to work together for the heart to beat effectively. A normal heartbeat usually starts in the right upper chamber of the heart (atrium) when an area (sinoatrial node) puts out an electrical signal that spreads to the other chambers. People with PSVT may have abnormal electrical pathways, or they may have other areas in the upper chambers that send out electrical signals. The result is a very rapid heartbeat.  When your heart beats very quickly, it does not have time to fill completely with  blood. When PSVT happens often or it lasts for long periods, it can lead to heart weakness and failure. Most people with PSVT do not have any other heart disease.  CAUSES  Abnormal electrical activity in the heart causes PSVT. It is not known why some people get PSVT and others do not.  RISK FACTORS  You may be more likely to have PSVT if:  · You are 20-30 years old.  · You are a woman.  Other factors that may increase your chances of an attack include:  · Stress.  · Being tired.  · Smoking.  · Stimulant drugs.  · Alcoholic drinks.  · Caffeine.  · Pregnancy.  SIGNS AND SYMPTOMS  A mild episode of PSVT may cause no symptoms. If you do have signs and symptoms, they may include:  · A pounding heart.  · Feeling of skipped heartbeats (palpitations).  · Weakness.  · Shortness of breath.  · Tightness or pain in your chest.  · Light-headedness.  · Anxiety.  · Dizziness.  · Sweating.  · Nausea.  · A fainting spell.  DIAGNOSIS  Your health care provider may suspect PSVT if you have symptoms that come and go. The health care provider will do a physical exam. If you are having an episode during the exam, the health care provider may be able to diagnose PSVT by listening to your heart and feeling your pulse. Tests may also be done, including:  · An electrical study of your heart (electrocardiogram, or ECG).  · A test in which you wear a portable ECG monitor all day (Holter monitor) or for several days (event monitor).  · A test that involves taking an image of your heart using sound waves (echocardiogram) to rule out other causes of a fast heart rate.  TREATMENT  You may not need treatment if episodes of PSVT do not happen often or if they do not cause symptoms. If PSVT episodes do cause symptoms, your health care provider may first suggest trying a self-treatment called vagus nerve stimulation. The vagus nerve extends down from the brain. It regulates certain body functions. Stimulating this nerve can slow down the heart. Your  health care provider can teach you ways to do this. You may need to try a few ways to find what works best for you. Options include:  · Holding your breath and pushing, as though you are having a bowel movement.  · Massaging an area on one side of your neck below your jaw.  · Bending forward with your head between your legs.  · Bending forward with your head between your legs and coughing.  · Massaging your eyeballs with your eyes closed.  If vagus nerve stimulation does not work, other treatment options include:  · Medicines to prevent an attack.  · Being treated in the hospital with medicine or electric shock to stop an attack (cardioversion). This treatment can include:  ¨ Getting medicine through an IV line.  ¨ Having a small electric shock delivered to your heart. You will be given medicine to make you sleep through this procedure.  · If you have frequent episodes with symptoms, you may need a procedure to get rid of the faulty areas of your heart (radiofrequency ablation) and end the episodes of PSVT. In this procedure:  ¨ A long, thin tube (catheter) is passed through one of your veins into your heart.  ¨ Energy directed through the catheter eliminates the areas of your heart that are causing abnormal electric stimulation.  HOME CARE INSTRUCTIONS  · Take medicines only as directed by your health care provider.  · Do not use caffeine in any form if caffeine triggers episodes of PSVT. Otherwise, consume caffeine in moderation. This means no more than a few cups of coffee or the equivalent each day.  · Do not drink alcohol if alcohol triggers episodes of PSVT. Otherwise, limit alcohol intake to no more than 1 drink per day for nonpregnant women and 2 drinks per day for men. One drink equals 12 ounces of beer, 5 ounces of wine, or 1½ ounces of hard liquor.  · Do not use any tobacco products, including cigarettes, chewing tobacco, or electronic cigarettes. If you need help quitting, ask your health care  provider.  · Try to get at least 7 hours of sleep each night.  · Find healthy ways to manage stress.  · Perform vagus nerve stimulation as directed by your health care provider.  · Maintain a healthy weight.  · Get some exercise on most days. Ask your health care provider to suggest some good activities for you.  SEEK MEDICAL CARE IF:  · You are having episodes of PSVT more often, or they are lasting longer.  · Vagus nerve stimulation is no longer helping.  · You have new symptoms during an episode.  SEEK IMMEDIATE MEDICAL CARE IF:  · You have chest pain or trouble breathing.  · You have an episode of PSVT that has lasted longer than 20 minutes.  · You have passed out from an episode of PSVT.  These symptoms may represent a serious problem that is an emergency. Do not wait to see if the symptoms will go away. Get medical help right away. Call your local emergency services (911 in the [place] Do not drive yourself to the hospital.     This information is not intended to replace advice given to you by your health care provider. Make sure you discuss any questions you have with your health care provider.     Document Released: 12/18/2006 Document Revised: 01/08/2016 Document Reviewed: 05/28/2015  [employer] Interactive Patient Education ©2016 [employer]      Hypoglycemia  Hypoglycemia occurs when the glucose in your blood is too low. Glucose is a type of sugar that is your body's main energy source. Hormones, such as insulin and glucagon, control the level of glucose in the blood. Insulin lowers blood glucose and glucagon increases blood glucose. Having too much insulin in your blood stream, or not eating enough food containing sugar, can result in hypoglycemia. Hypoglycemia can happen to people with or without diabetes. It can develop quickly and can be a medical emergency.   CAUSES   · Missing or delaying meals.  · Not eating enough carbohydrates at meals.  · Taking too much diabetes medicine.  · Not timing your oral  diabetes medicine or insulin doses with meals, snacks, and exercise.  · Nausea and vomiting.  · Certain medicines.  · Severe illnesses, such as hepatitis, kidney disorders, and certain eating disorders.  · Increased activity or exercise without eating something extra or adjusting medicines.  · Drinking too much alcohol.  · A nerve disorder that affects body functions like your heart rate, blood pressure, and digestion (autonomic neuropathy).  · A condition where the stomach muscles do not function properly (gastroparesis). Therefore, medicines and food may not absorb properly.  · Rarely, a tumor of the pancreas can produce too much insulin.  SYMPTOMS   · Hunger.  · Sweating (diaphoresis).  · Change in body temperature.  · Shakiness.  · Headache.  · Anxiety.  · Lightheadedness.  · Irritability.  · Difficulty concentrating.  · Dry mouth.  · Tingling or numbness in the hands or feet.  · Restless sleep or sleep disturbances.  · Altered speech and coordination.  · Change in mental status.  · Seizures or prolonged convulsions.  · Combativeness.  · Drowsiness (lethargic).  · Weakness.  · Increased heart rate or palpitations.  · Confusion.  · Pale, gray skin color.  · Blurred or double vision.  · Fainting.  DIAGNOSIS   A physical exam and medical history will be performed. Your caregiver may make a diagnosis based on your symptoms. Blood tests and other lab tests may be performed to confirm a diagnosis. Once the diagnosis is made, your caregiver will see if your signs and symptoms go away once your blood glucose is raised.   TREATMENT   Usually, you can easily treat your hypoglycemia when you notice symptoms.  · Check your blood glucose. If it is less than 70 mg/dl, take one of the following:    ¨ 3-4 glucose tablets.    ¨ ½ cup juice.    ¨ ½ cup regular soda.    ¨ 1 cup skim milk.    ¨ ½-1 tube of glucose gel.    ¨ 5-6 hard candies.    · Avoid high-fat drinks or food that may delay a rise in blood glucose levels.  · Do not  take more than the recommended amount of sugary foods, drinks, gel, or tablets. Doing so will cause your blood glucose to go too high.    · Wait 10-15 minutes and recheck your blood glucose. If it is still less than 70 mg/dl or below your target range, repeat treatment.    · Eat a snack if it is more than 1 hour until your next meal.    There may be a time when your blood glucose may go so low that you are unable to treat yourself at home when you start to notice symptoms. You may need someone to help you. You may even faint or be unable to swallow. If you cannot treat yourself, someone will need to bring you to the hospital.   HOME CARE INSTRUCTIONS  · If you have diabetes, follow your diabetes management plan by:  ¨ Taking your medicines as directed.  ¨ Following your exercise plan.  ¨ Following your meal plan. Do not skip meals. Eat on time.  ¨ Testing your blood glucose regularly. Check your blood glucose before and after exercise. If you exercise longer or different than usual, be sure to check blood glucose more frequently.  ¨ Wearing your medical alert jewelry that says you have diabetes.  · Identify the cause of your hypoglycemia. Then, develop ways to prevent the recurrence of hypoglycemia.  · Do not take a hot bath or shower right after an insulin shot.  · Always carry treatment with you. Glucose tablets are the easiest to carry.  · If you are going to drink alcohol, drink it only with meals.  · Tell friends or family members ways to keep you safe during a seizure. This may include removing hard or sharp objects from the area or turning you on your side.  · Maintain a healthy weight.  SEEK MEDICAL CARE IF:   · You are having problems keeping your blood glucose in your target range.  · You are having frequent episodes of hypoglycemia.  · You feel you might be having side effects from your medicines.  · You are not sure why your blood glucose is dropping so low.  · You notice a change in vision or a new  "problem with your vision.  SEEK IMMEDIATE MEDICAL CARE IF:   · Confusion develops.  · A change in mental status occurs.  · The inability to swallow develops.  · Fainting occurs.     This information is not intended to replace advice given to you by your health care provider. Make sure you discuss any questions you have with your health care provider.     Document Released: 12/18/2006 Document Revised: 12/23/2014 Document Reviewed: 04/15/2013  [employer] Interactive Patient Education ©2016 [employer]    Hyperglycemia  Hyperglycemia occurs when the glucose (sugar) in your blood is too high. Hyperglycemia can happen for many reasons, but it most often happens to people who do not know they have diabetes or are not managing their diabetes properly.   CAUSES   Whether you have diabetes or not, there are other causes of hyperglycemia. Hyperglycemia can occur when you have diabetes, but it can also occur in other situations that you might not be as aware of, such as:  Diabetes  · If you have diabetes and are having problems controlling your blood glucose, hyperglycemia could occur because of some of the following reasons:  ¨ Not following your meal plan.  ¨ Not taking your diabetes medications or not taking it properly.  ¨ Exercising less or doing less activity than you normally do.  ¨ Being sick.  Pre-diabetes  · This cannot be ignored. Before people develop Type 2 diabetes, they almost always have \"pre-diabetes.\" This is when your blood glucose levels are higher than normal, but not yet high enough to be diagnosed as diabetes. Research has shown that some long-term damage to the body, especially the heart and circulatory system, may already be occurring during pre-diabetes. If you take action to manage your blood glucose when you have pre-diabetes, you may delay or prevent Type 2 diabetes from developing.  Stress  · If you have diabetes, you may be \"diet\" controlled or on oral medications or insulin to control your " "diabetes. However, you may find that your blood glucose is higher than usual in the hospital whether you have diabetes or not. This is often referred to as \"stress hyperglycemia.\" Stress can elevate your blood glucose. This happens because of hormones put out by the body during times of stress. If stress has been the cause of your high blood glucose, it can be followed regularly by your caregiver. That way he/she can make sure your hyperglycemia does not continue to get worse or progress to diabetes.  Steroids  · Steroids are medications that act on the infection fighting system (immune system) to block inflammation or infection. One side effect can be a rise in blood glucose. Most people can produce enough extra insulin to allow for this rise, but for those who cannot, steroids make blood glucose levels go even higher. It is not unusual for steroid treatments to \"uncover\" diabetes that is developing. It is not always possible to determine if the hyperglycemia will go away after the steroids are stopped. A special blood test called an A1c is sometimes done to determine if your blood glucose was elevated before the steroids were started.  SYMPTOMS  · Thirsty.  · Frequent urination.  · Dry mouth.  · Blurred vision.  · Tired or fatigue.  · Weakness.  · Sleepy.  · Tingling in feet or leg.  DIAGNOSIS   Diagnosis is made by monitoring blood glucose in one or all of the following ways:  · A1c test. This is a chemical found in your blood.  · Fingerstick blood glucose monitoring.  · Laboratory results.  TREATMENT   First, knowing the cause of the hyperglycemia is important before the hyperglycemia can be treated. Treatment may include, but is not be limited to:  · Education.  · Change or adjustment in medications.  · Change or adjustment in meal plan.  · Treatment for an illness, infection, etc.  · More frequent blood glucose monitoring.  · Change in exercise plan.  · Decreasing or stopping steroids.  · Lifestyle " changes.  HOME CARE INSTRUCTIONS   · Test your blood glucose as directed.  · Exercise regularly. Your caregiver will give you instructions about exercise. Pre-diabetes or diabetes which comes on with stress is helped by exercising.  · Eat wholesome, balanced meals. Eat often and at regular, fixed times. Your caregiver or nutritionist will give you a meal plan to guide your sugar intake.  · Being at an ideal weight is important. If needed, losing as little as 10 to 15 pounds may help improve blood glucose levels.  SEEK MEDICAL CARE IF:   · You have questions about medicine, activity, or diet.  · You continue to have symptoms (problems such as increased thirst, urination, or weight gain).  SEEK IMMEDIATE MEDICAL CARE IF:   · You are vomiting or have diarrhea.  · Your breath smells fruity.  · You are breathing faster or slower.  · You are very sleepy or incoherent.  · You have numbness, tingling, or pain in your feet or hands.  · You have chest pain.  · Your symptoms get worse even though you have been following your caregiver's orders.  · If you have any other questions or concerns.     This information is not intended to replace advice given to you by your health care provider. Make sure you discuss any questions you have with your health care provider.     Document Released: 06/13/2002 Document Revised: 03/11/2013 Document Reviewed: 04/15/2013  [employer] Interactive Patient Education ©2016 [employer]        Hypertension  Hypertension, commonly called high blood pressure, is when the force of blood pumping through your arteries is too strong. Your arteries are the blood vessels that carry blood from your heart throughout your body. A blood pressure reading consists of a higher number over a lower number, such as 110/72. The higher number (systolic) is the pressure inside your arteries when your heart pumps. The lower number (diastolic) is the pressure inside your arteries when your heart relaxes. Ideally you want  your blood pressure below 120/80.  Hypertension forces your heart to work harder to pump blood. Your arteries may become narrow or stiff. Having untreated or uncontrolled hypertension can cause heart attack, stroke, kidney disease, and other problems.  RISK FACTORS  Some risk factors for high blood pressure are controllable. Others are not.   Risk factors you cannot control include:   · Race. You may be at higher risk if you are .  · Age. Risk increases with age.  · Gender. Men are at higher risk than women before age 45 years. After age 65, women are at higher risk than men.  Risk factors you can control include:  · Not getting enough exercise or physical activity.  · Being overweight.  · Getting too much fat, sugar, calories, or salt in your diet.  · Drinking too much alcohol.  SIGNS AND SYMPTOMS  Hypertension does not usually cause signs or symptoms. Extremely high blood pressure (hypertensive crisis) may cause headache, anxiety, shortness of breath, and nosebleed.  DIAGNOSIS  To check if you have hypertension, your health care provider will measure your blood pressure while you are seated, with your arm held at the level of your heart. It should be measured at least twice using the same arm. Certain conditions can cause a difference in blood pressure between your right and left arms. A blood pressure reading that is higher than normal on one occasion does not mean that you need treatment. If it is not clear whether you have high blood pressure, you may be asked to return on a different day to have your blood pressure checked again. Or, you may be asked to monitor your blood pressure at home for 1 or more weeks.  TREATMENT  Treating high blood pressure includes making lifestyle changes and possibly taking medicine. Living a healthy lifestyle can help lower high blood pressure. You may need to change some of your habits.  Lifestyle changes may include:  · Following the DASH diet. This diet is high  in fruits, vegetables, and whole grains. It is low in salt, red meat, and added sugars.  · Keep your sodium intake below 2,300 mg per day.  · Getting at least 30-45 minutes of aerobic exercise at least 4 times per week.  · Losing weight if necessary.  · Not smoking.  · Limiting alcoholic beverages.  · Learning ways to reduce stress.  Your health care provider may prescribe medicine if lifestyle changes are not enough to get your blood pressure under control, and if one of the following is true:  · You are 18-59 years of age and your systolic blood pressure is above 140.  · You are 60 years of age or older, and your systolic blood pressure is above 150.  · Your diastolic blood pressure is above 90.  · You have diabetes, and your systolic blood pressure is over 140 or your diastolic blood pressure is over 90.  · You have kidney disease and your blood pressure is above 140/90.  · You have heart disease and your blood pressure is above 140/90.  Your personal target blood pressure may vary depending on your medical conditions, your age, and other factors.  HOME CARE INSTRUCTIONS  · Have your blood pressure rechecked as directed by your health care provider.    · Take medicines only as directed by your health care provider. Follow the directions carefully. Blood pressure medicines must be taken as prescribed. The medicine does not work as well when you skip doses. Skipping doses also puts you at risk for problems.  · Do not smoke.    · Monitor your blood pressure at home as directed by your health care provider.   SEEK MEDICAL CARE IF:   · You think you are having a reaction to medicines taken.  · You have recurrent headaches or feel dizzy.  · You have swelling in your ankles.  · You have trouble with your vision.  SEEK IMMEDIATE MEDICAL CARE IF:  · You develop a severe headache or confusion.  · You have unusual weakness, numbness, or feel faint.  · You have severe chest or abdominal pain.  · You vomit repeatedly.  · You  have trouble breathing.  MAKE SURE YOU:   · Understand these instructions.  · Will watch your condition.  · Will get help right away if you are not doing well or get worse.     This information is not intended to replace advice given to you by your health care provider. Make sure you discuss any questions you have with your health care provider.     Document Released: 12/18/2006 Document Revised: 05/03/2016 Document Reviewed: 10/10/2014  [employer] Interactive Patient Education ©2016 [employer]    Anemia, Nonspecific  Anemia is a condition in which the concentration of red blood cells or hemoglobin in the blood is below normal. Hemoglobin is a substance in red blood cells that carries oxygen to the tissues of the body. Anemia results in not enough oxygen reaching these tissues.   CAUSES   Common causes of anemia include:   · Excessive bleeding. Bleeding may be internal or external. This includes excessive bleeding from periods (in women) or from the intestine.    · Poor nutrition.    · Chronic kidney, thyroid, and liver disease.   · Bone marrow disorders that decrease red blood cell production.  · Cancer and treatments for cancer.  · HIV, AIDS, and their treatments.  · Spleen problems that increase red blood cell destruction.  · Blood disorders.  · Excess destruction of red blood cells due to infection, medicines, and autoimmune disorders.  SIGNS AND SYMPTOMS   · Minor weakness.    · Dizziness.    · Headache.  · Palpitations.    · Shortness of breath, especially with exercise.    · Paleness.  · Cold sensitivity.  · Indigestion.  · Nausea.  · Difficulty sleeping.  · Difficulty concentrating.  Symptoms may occur suddenly or they may develop slowly.   DIAGNOSIS   Additional blood tests are often needed. These help your health care provider determine the best treatment. Your health care provider will check your stool for blood and look for other causes of blood loss.   TREATMENT   Treatment varies depending on the  cause of the anemia. Treatment can include:   · Supplements of iron, vitamin B12, or folic acid.    · Hormone medicines.    · A blood transfusion. This may be needed if blood loss is severe.    · Hospitalization. This may be needed if there is significant continual blood loss.    · Dietary changes.  · Spleen removal.  HOME CARE INSTRUCTIONS  Keep all follow-up appointments. It often takes many weeks to correct anemia, and having your health care provider check on your condition and your response to treatment is very important.  SEEK IMMEDIATE MEDICAL CARE IF:   · You develop extreme weakness, shortness of breath, or chest pain.    · You become dizzy or have trouble concentrating.  · You develop heavy vaginal bleeding.    · You develop a rash.    · You have bloody or black, tarry stools.    · You faint.    · You vomit up blood.    · You vomit repeatedly.    · You have abdominal pain.  · You have a fever or persistent symptoms for more than 2-3 days.    · You have a fever and your symptoms suddenly get worse.    · You are dehydrated.    MAKE SURE YOU:  · Understand these instructions.  · Will watch your condition.  · Will get help right away if you are not doing well or get worse.     This information is not intended to replace advice given to you by your health care provider. Make sure you discuss any questions you have with your health care provider.     Document Released: 01/25/2006 Document Revised: 08/20/2014 Document Reviewed: 06/13/2014  [employer] Interactive Patient Education ©2016 [employer]      Food Choices to Lower Your Triglycerides  Triglycerides are a type of fat in your blood. High levels of triglycerides can increase the risk of heart disease and stroke. If your triglyceride levels are high, the foods you eat and your eating habits are very important. Choosing the right foods can help lower your triglycerides.   WHAT GENERAL GUIDELINES DO I NEED TO FOLLOW?  · Lose weight if you are overweight.  "   · Limit or avoid alcohol.    · Fill one half of your plate with vegetables and green salads.    · Limit fruit to two servings a day. Choose fruit instead of juice.    · Make one fourth of your plate whole grains. Look for the word \"whole\" as the first word in the ingredient list.  · Fill one fourth of your plate with lean protein foods.  · Enjoy fatty fish (such as salmon, mackerel, sardines, and tuna) three times a week.    · Choose healthy fats.    · Limit foods high in starch and sugar.  · Eat more home-cooked food and less restaurant, buffet, and fast food.  · Limit fried foods.  · Cook foods using methods other than frying.  · Limit saturated fats.  · Check ingredient lists to avoid foods with partially hydrogenated oils (trans fats) in them.  WHAT FOODS CAN I EAT?   Grains  Whole grains, such as whole wheat or whole grain breads, crackers, cereals, and pasta. Unsweetened oatmeal, bulgur, barley, quinoa, or brown rice. Corn or whole wheat flour tortillas.   Vegetables  Fresh or frozen vegetables (raw, steamed, roasted, or grilled). Green salads.  Fruits  All fresh, canned (in natural juice), or frozen fruits.  Meat and Other Protein Products  Ground beef (85% or leaner), grass-fed beef, or beef trimmed of fat. Skinless chicken or turkey. Ground chicken or turkey. Pork trimmed of fat. All fish and seafood. Eggs. Dried beans, peas, or lentils. Unsalted nuts or seeds. Unsalted canned or dry beans.  Dairy  Low-fat dairy products, such as skim or 1% milk, 2% or reduced-fat cheeses, low-fat ricotta or cottage cheese, or plain low-fat yogurt.  Fats and Oils  Tub margarines without trans fats. Light or reduced-fat mayonnaise and salad dressings. Avocado. Safflower, olive, or canola oils. Natural peanut or almond butter.  The items listed above may not be a complete list of recommended foods or beverages. Contact your dietitian for more options.  WHAT FOODS ARE NOT RECOMMENDED?   Grains  White bread. White pasta. " White rice. Cornbread. Bagels, pastries, and croissants. Crackers that contain trans fat.  Vegetables  White potatoes. Corn. Creamed or fried vegetables. Vegetables in a cheese sauce.  Fruits  Dried fruits. Canned fruit in light or heavy syrup. Fruit juice.  Meat and Other Protein Products  Fatty cuts of meat. Ribs, chicken wings, zayas, sausage, bologna, salami, chitterlings, fatback, hot dogs, bratwurst, and packaged luncheon meats.  Dairy  Whole or 2% milk, cream, half-and-half, and cream cheese. Whole-fat or sweetened yogurt. Full-fat cheeses. Nondairy creamers and whipped toppings. Processed cheese, cheese spreads, or cheese curds.  Sweets and Desserts  Corn syrup, sugars, honey, and molasses. Candy. Jam and jelly. Syrup. Sweetened cereals. Cookies, pies, cakes, donuts, muffins, and ice cream.  Fats and Oils  Butter, stick margarine, lard, shortening, ghee, or zayas fat. Coconut, palm kernel, or palm oils.  Beverages  Alcohol. Sweetened drinks (such as sodas, lemonade, and fruit drinks or punches).  The items listed above may not be a complete list of foods and beverages to avoid. Contact your dietitian for more information.     This information is not intended to replace advice given to you by your health care provider. Make sure you discuss any questions you have with your health care provider.     Document Released: 10/05/2005 Document Revised: 01/08/2016 Document Reviewed: 10/22/2014  [email or web address] Interactive Patient Education ©2016 [email or web address]

## 2017-12-30 NOTE — PROGRESS NOTES
"Date of Service: 12/30/2017  Chief Complaint:  Chief Complaint   Patient presents with   • Supraventricular Tachycardia (SVT)   53 y/o female with recurrent SVT    Interval History:  Diuresed;  Still elevated  BP ; will give extra Verapamil  All recent medication, labs, imaging studies and procedures reviewed    Physical Exam   Blood pressure (!) 168/97, pulse 81, temperature 36.3 °C (97.4 °F), resp. rate 18, height 1.575 m (5' 2\"), weight 62.3 kg (137 lb 5.6 oz), last menstrual period 12/28/2006, SpO2 96 %, not currently breastfeeding.    Constitutional:  SHe appears well-developed.   HENT: Normocephalic and atraumatic. No scleral icterus.   Neck: No JVD present.   Cardiovascular: Normal rate. RRR; Exam reveals no gallop and no friction rub. No murmur heard.   Pulmonary/Chest: CTAB coarse   Abdominal: S/NT/ND BS+   Musculoskeletal: He exhibits no edema. Pulses present.  Skin: Skin is warm and dry    Intake/Output Summary (Last 24 hours) at 12/30/17 0618  Last data filed at 12/29/17 1900   Gross per 24 hour   Intake             1220 ml   Output             2500 ml   Net            -1280 ml       LABS:  Lab Results   Component Value Date/Time    CHOLSTRLTOT 206 (H) 02/18/2011 04:43 PM    LDL 84 02/18/2011 04:43 PM    HDL 45 02/18/2011 04:43 PM    TRIGLYCERIDE 384 (H) 02/18/2011 04:43 PM       Lab Results   Component Value Date/Time    WBC 9.1 12/30/2017 03:07 AM    RBC 3.66 (L) 12/30/2017 03:07 AM    HEMOGLOBIN 11.5 (L) 12/30/2017 03:07 AM    HEMATOCRIT 36.4 (L) 12/30/2017 03:07 AM    MCV 99.5 (H) 12/30/2017 03:07 AM    NEUTSPOLYS 58.50 12/30/2017 03:07 AM    LYMPHOCYTES 31.30 12/30/2017 03:07 AM    MONOCYTES 7.10 12/30/2017 03:07 AM    EOSINOPHILS 2.20 12/30/2017 03:07 AM    BASOPHILS 0.60 12/30/2017 03:07 AM    HYPOCHROMIA 1+ 10/04/2013 03:24 PM    ANISOCYTOSIS 3+ 07/14/2007 06:15 AM     Lab Results   Component Value Date/Time    SODIUM 138 12/30/2017 03:08 AM    POTASSIUM 5.4 12/30/2017 03:08 AM    CHLORIDE 101 " 12/30/2017 03:08 AM    CO2 24 12/30/2017 03:08 AM    GLUCOSE 100 (H) 12/30/2017 03:08 AM    BUN 34 (H) 12/30/2017 03:08 AM    CREATININE 7.03 (HH) 12/30/2017 03:08 AM    CREATININE 2.2 (H) 05/06/2009 03:10 AM         Lab Results   Component Value Date/Time    ALKPHOSPHAT 51 12/30/2017 03:08 AM    ASTSGOT 13 12/30/2017 03:08 AM    ALTSGPT 6 12/30/2017 03:08 AM    TBILIRUBIN 0.7 12/30/2017 03:08 AM      Lab Results   Component Value Date/Time    BNPBTYPENAT 1378 (H) 12/29/2017 05:26 AM      No results found for: TSH  Lab Results   Component Value Date/Time    PROTHROMBTM 14.3 06/16/2017 09:42 AM    INR 1.08 06/16/2017 09:42 AM        Medications reviewed    Imaging reviewed    ECHO(6/16/2017):Normal left ventricular chamber size.  Left ventricular ejection fraction is visually estimated to be 55%.  Mildly dilated left atrium.  Aortic sclerosis without stenosis.  Mild aortic insufficiency.  Mitral annular calcification.  Mild mitral regurgitation.  Mild tricuspid regurgitation.  Normal pericardium without effusion.     Impressions:  Recurrent SVT  ESRD on HD  HTN  HLD  Anemia     Recommendations:  EPS consult, ablation  Balance lytes  Case discussed with attending/RN  Will follow  Thx

## 2017-12-30 NOTE — CONSULTS
"EP Consult Note    DOS: 12/30/2017     Consulting physician: Orlando Bartholomew    Chief complaint/Reason for consult: SVT    HPI:  Patient is a 53 yo F with hsitory of ESRD (dialyzed MWF), HTN, and recurrent SVT. She has these episodes since her teens but now has become more frequent. Happens now at least once a month, usually rates in the 200s, associated with chest discomfort and palpitations. They usually go away with bearing down. This time did not work and she presented to the ED. She has gotten numerous doses of adenosine in the past, which have worked. Apparently 12 mg IV adenosine was not successful this time and she required a DC cardioversion. Her course here complicated by hyperkalemia and subsequent hypoglycemia from insulin given for hyperkalemia.    ROS (+ highlighted in red):  Constitutional: Fevers/chills/fatigue/weightloss  HEENT: Blurry vision/eye pain/sore throat/hearing loss  Respiratory: Shortness of breath/cough  Cardiovascular: Chest pain/palpitations/edema/orthopnea/syncope  GI: Nausea/vomitting/diarrhea  MSK: Arthralgias/myagias/muscle weakness  Skin: Rash/sores  Neurological: Numbness/tremors/vertigo  Endocrine: Excessive thirst/polyuria/cold intolerance/heat intolerance  Psych: Depression/anxiety    Past Medical History:   Diagnosis Date   • Anemia    • Anemia associated with chronic renal failure 11/5/2009   • Dental disorder 8-25-17    \"Full Upper & Partial Lower Dentures\"   • Dialysis patient ([facility]) 8-25-17    [facility] M,W,F   • Dysrhythmia     \"SVT\"   • ESRD (end stage renal disease) (CMS-HCC) 8-25-17    Dialysis MWF@ [facility]   • Glomerulonephritis, chronic 11/5/2009   • Heart burn    • High cholesterol    • HTN (hypertension) 11/5/2009    2017 states well controlled   • Port catheter in place 8-25-17    For Dialysis   • SVT (supraventricular tachycardia) (CMS-HCC)    • SVT (supraventricular tachycardia) (CMS-HCC)        Past Surgical History:   Procedure Laterality Date "   • AV FISTULA CREATION Left 8/28/2017    Procedure: AV FISTULA CREATION  UPPER EXTREMITY -BRACHIAL BASALIC;  Surgeon: Stella Rodriguez M.D.;  Location: SURGERY [facility];  Service: General   • AV FISTULA CREATION Left 6/20/2017    Procedure: AV FISTULA CREATION- LEFT;  Surgeon: Jimbo Davenport M.D.;  Location: SURGERY [facility];  Service:    • APPENDECTOMY LAPAROSCOPIC  5/4/2009    Performed by BANDAR TIMMONS at SURGERY [facility]   • FEMUR ORIF  10/9/08    Performed by STELLA GATES at SURGERY [facility]   • ILIAC BONE GRAFT  10/9/08    Performed by STELLA GATES at SURGERY [facility]   • AMPUTATION, BELOW THE KNEE     • CAPITATION PAYMENT (INSURANCE)     • OTHER      BELOW KNEE AMPUTATION RIGHT   • PB ENLARGE BREAST WITH IMPLANT         Social History     Social History   • Marital status:      Spouse name: N/A   • Number of children: N/A   • Years of education: N/A     Occupational History   • Not on file.     Social History Main Topics   • Smoking status: Current Every Day Smoker     Packs/day: 0.25     Types: Cigarettes   • Smokeless tobacco: Never Used      Comment: 5 cigs/day   • Alcohol use Yes      Comment: occ   • Drug use: No   • Sexual activity: Not on file     Other Topics Concern   • Not on file     Social History Narrative   • No narrative on file       Family History   Problem Relation Age of Onset   • Heart Disease         Allergies   Allergen Reactions   • Sulfa Drugs Hives     [id number] rash swelling itching       Current Facility-Administered Medications   Medication Dose Route Frequency Provider Last Rate Last Dose   • verapamil (ISOPTIN) injection 5 mg  5 mg Intravenous Once Eber Bartholomew MD,[facility]   Stopped at 12/30/17 0830   • heparin injection 2,000 Units  2,000 Units Intravenous DIALYSIS PRN Kvng Mcelroy M.D.   2,000 Units at 12/29/17 0820   • heparin injection 3,700 Units  3,700 Units Intracatheter DIALYSIS PRN Kvng Mcelroy M.D.   3,700  Units at 12/29/17 1127   • sevelamer (RENAGEL) tablet 800 mg  800 mg Oral TID WITH MEALS Bindu Powers M.D.       • lisinopril (PRINIVIL) tablet 20 mg  20 mg Oral DAILY Bindu Powers M.D.   Stopped at 12/29/17 0900   • labetalol (NORMODYNE,TRANDATE) injection 10 mg  10 mg Intravenous Q2HRS PRN DEANN Spain M.D.   10 mg at 12/29/17 0301   • glucose 4 g chewable tablet 16 g  16 g Oral Q15 MIN PRN Bindu Powers M.D.   16 g at 12/29/17 0708    And   • dextrose 50% (D50W) injection 25 mL  25 mL Intravenous Q15 MIN PRN Bindu Powers M.D.   25 mL at 12/29/17 0712   • metoprolol (LOPRESSOR) tablet 25 mg  25 mg Oral Q8HRS Yayo Carrasco M.D.   25 mg at 12/30/17 0616   • verapamil (ISOPTIN) tablet 80 mg  80 mg Oral Q8HRS Yayo Carrasco M.D.   80 mg at 12/30/17 0616   • Respiratory Care per Protocol   Nebulization Continuous RT Enzo Lynch M.D.       • acetaminophen (TYLENOL) tablet 650 mg  650 mg Oral Q6HRS PRN Enzo Lynch M.D.           Physical Exam:  Vitals:    12/29/17 2045 12/30/17 0000 12/30/17 0418 12/30/17 0800   BP: 151/89 144/90 (!) 168/97 151/85   Pulse: 90 81 81 67   Resp: 17 18 18 18   Temp: 36.4 °C (97.6 °F) 36.7 °C (98 °F) 36.3 °C (97.4 °F) 36.4 °C (97.5 °F)   SpO2: 98% 97% 96% 97%   Weight: 62.3 kg (137 lb 5.6 oz)      Height:         General appearance: NAD, conversant   Eyes: anicteric sclerae, moist conjunctivae; no lid-lag; PERRLA  HENT: Atraumatic; oropharynx clear with moist mucous membranes and no mucosal ulcerations; normal hard and soft palate  Neck: Trachea midline; FROM, supple, no thyromegaly or lymphadenopathy  Lungs: CTA, with normal respiratory effort and no intercostal retractions  CV: RRR, no MRGs, no JVD   Abdomen: Soft, non-tender; no masses or HSM  Extremities: No peripheral edema or extremity lymphadenopathy  Skin: Normal temperature, turgor and texture; no rash, ulcers or subcutaneous nodules  Psych: Appropriate affect, alert and oriented to person, place and  time    Data:  Labs reviewed:  K 7.0    Prior echo/stress results reviewed:  LVEF 55%    CXR interpreted by me:  WNL    EKG interpreted by me:   Sinus, no pre-excitation, borderline QT, mild diffuse ST elevation    EKG of SVT difficult to tell where p waves are, in V1 compared to sinus, looks to be hint of pseudo R', suggesting short RP tachycardia    Impression/Plan:  1)Recurrent SVT refractory to medical therapy  2)ESRD  3)HTN  4)Hyperkalemia    -From SVT standpoint she can be discharged home  -I discussed with her EPS + ablation, risks/benefits, and all her questions were answered  -She would like to proceed  -I will schedule this as an outpatient for her  -She can be discharged on her CCB/BB, hold at least 5 days prior to procedure    Win Pires MD

## 2018-01-02 ENCOUNTER — TELEPHONE (OUTPATIENT)
Dept: CARDIOLOGY | Facility: MEDICAL CENTER | Age: 53
End: 2018-01-02

## 2018-01-02 NOTE — TELEPHONE ENCOUNTER
----- Message from Win Pires M.D. sent at 12/30/2017  9:34 AM PST -----  Can we schedule this lady for SVT ablation?    Hold CCB/BB x 1 week prior to ablation

## 2018-01-03 LAB
BACTERIA BLD CULT: NORMAL
BACTERIA BLD CULT: NORMAL
SIGNIFICANT IND 70042: NORMAL
SIGNIFICANT IND 70042: NORMAL
SITE SITE: NORMAL
SITE SITE: NORMAL
SOURCE SOURCE: NORMAL
SOURCE SOURCE: NORMAL

## 2018-01-07 ENCOUNTER — APPOINTMENT (OUTPATIENT)
Dept: RADIOLOGY | Facility: MEDICAL CENTER | Age: 53
DRG: 273 | End: 2018-01-07
Attending: EMERGENCY MEDICINE
Payer: MEDICAID

## 2018-01-07 ENCOUNTER — HOSPITAL ENCOUNTER (INPATIENT)
Facility: MEDICAL CENTER | Age: 53
LOS: 2 days | DRG: 273 | End: 2018-01-09
Attending: EMERGENCY MEDICINE | Admitting: INTERNAL MEDICINE
Payer: MEDICAID

## 2018-01-07 ENCOUNTER — RESOLUTE PROFESSIONAL BILLING HOSPITAL PROF FEE (OUTPATIENT)
Dept: HOSPITALIST | Facility: MEDICAL CENTER | Age: 53
End: 2018-01-07
Payer: MEDICAID

## 2018-01-07 DIAGNOSIS — I47.10 SVT (SUPRAVENTRICULAR TACHYCARDIA): ICD-10-CM

## 2018-01-07 DIAGNOSIS — N18.6 ESRD (END STAGE RENAL DISEASE) ON DIALYSIS (HCC): ICD-10-CM

## 2018-01-07 DIAGNOSIS — E87.5 HYPERKALEMIA: ICD-10-CM

## 2018-01-07 DIAGNOSIS — Z99.2 ESRD (END STAGE RENAL DISEASE) ON DIALYSIS (HCC): ICD-10-CM

## 2018-01-07 LAB
ALBUMIN SERPL BCP-MCNC: 3.4 G/DL (ref 3.2–4.9)
ALBUMIN/GLOB SERPL: 1.3 G/DL
ALP SERPL-CCNC: 49 U/L (ref 30–99)
ALT SERPL-CCNC: <5 U/L (ref 2–50)
ANION GAP SERPL CALC-SCNC: 14 MMOL/L (ref 0–11.9)
APTT PPP: 22.1 SEC (ref 24.7–36)
AST SERPL-CCNC: 21 U/L (ref 12–45)
BASOPHILS # BLD AUTO: 0.7 % (ref 0–1.8)
BASOPHILS # BLD: 0.04 K/UL (ref 0–0.12)
BILIRUB SERPL-MCNC: 0.6 MG/DL (ref 0.1–1.5)
BNP SERPL-MCNC: 1513 PG/ML (ref 0–100)
BUN SERPL-MCNC: 81 MG/DL (ref 8–22)
CALCIUM SERPL-MCNC: 9.2 MG/DL (ref 8.5–10.5)
CHLORIDE SERPL-SCNC: 97 MMOL/L (ref 96–112)
CO2 SERPL-SCNC: 22 MMOL/L (ref 20–33)
CREAT SERPL-MCNC: 12.78 MG/DL (ref 0.5–1.4)
EKG IMPRESSION: NORMAL
EOSINOPHIL # BLD AUTO: 0.09 K/UL (ref 0–0.51)
EOSINOPHIL NFR BLD: 1.6 % (ref 0–6.9)
ERYTHROCYTE [DISTWIDTH] IN BLOOD BY AUTOMATED COUNT: 49.1 FL (ref 35.9–50)
GFR SERPL CREATININE-BSD FRML MDRD: 3 ML/MIN/1.73 M 2
GLOBULIN SER CALC-MCNC: 2.6 G/DL (ref 1.9–3.5)
GLUCOSE SERPL-MCNC: 91 MG/DL (ref 65–99)
HCT VFR BLD AUTO: 29 % (ref 37–47)
HGB BLD-MCNC: 9.6 G/DL (ref 12–16)
IMM GRANULOCYTES # BLD AUTO: 0.03 K/UL (ref 0–0.11)
IMM GRANULOCYTES NFR BLD AUTO: 0.5 % (ref 0–0.9)
INR PPP: 1.48 (ref 0.87–1.13)
LIPASE SERPL-CCNC: 23 U/L (ref 11–82)
LYMPHOCYTES # BLD AUTO: 2.16 K/UL (ref 1–4.8)
LYMPHOCYTES NFR BLD: 38.2 % (ref 22–41)
MCH RBC QN AUTO: 32.4 PG (ref 27–33)
MCHC RBC AUTO-ENTMCNC: 33.1 G/DL (ref 33.6–35)
MCV RBC AUTO: 98 FL (ref 81.4–97.8)
MONOCYTES # BLD AUTO: 0.41 K/UL (ref 0–0.85)
MONOCYTES NFR BLD AUTO: 7.2 % (ref 0–13.4)
NEUTROPHILS # BLD AUTO: 2.93 K/UL (ref 2–7.15)
NEUTROPHILS NFR BLD: 51.8 % (ref 44–72)
NRBC # BLD AUTO: 0 K/UL
NRBC BLD-RTO: 0 /100 WBC
PLATELET # BLD AUTO: 158 K/UL (ref 164–446)
PMV BLD AUTO: 10 FL (ref 9–12.9)
POTASSIUM SERPL-SCNC: 6 MMOL/L (ref 3.6–5.5)
PROT SERPL-MCNC: 6 G/DL (ref 6–8.2)
PROTHROMBIN TIME: 17.6 SEC (ref 12–14.6)
RBC # BLD AUTO: 2.96 M/UL (ref 4.2–5.4)
SODIUM SERPL-SCNC: 133 MMOL/L (ref 135–145)
TROPONIN I SERPL-MCNC: 0.03 NG/ML (ref 0–0.04)
WBC # BLD AUTO: 5.7 K/UL (ref 4.8–10.8)

## 2018-01-07 PROCEDURE — 99223 1ST HOSP IP/OBS HIGH 75: CPT | Performed by: INTERNAL MEDICINE

## 2018-01-07 PROCEDURE — 5A1D70Z PERFORMANCE OF URINARY FILTRATION, INTERMITTENT, LESS THAN 6 HOURS PER DAY: ICD-10-PCS | Performed by: INTERNAL MEDICINE

## 2018-01-07 PROCEDURE — 83690 ASSAY OF LIPASE: CPT

## 2018-01-07 PROCEDURE — A9270 NON-COVERED ITEM OR SERVICE: HCPCS | Performed by: INTERNAL MEDICINE

## 2018-01-07 PROCEDURE — 80053 COMPREHEN METABOLIC PANEL: CPT

## 2018-01-07 PROCEDURE — 700111 HCHG RX REV CODE 636 W/ 250 OVERRIDE (IP)

## 2018-01-07 PROCEDURE — 71045 X-RAY EXAM CHEST 1 VIEW: CPT

## 2018-01-07 PROCEDURE — 93005 ELECTROCARDIOGRAM TRACING: CPT | Performed by: EMERGENCY MEDICINE

## 2018-01-07 PROCEDURE — 85730 THROMBOPLASTIN TIME PARTIAL: CPT

## 2018-01-07 PROCEDURE — 99285 EMERGENCY DEPT VISIT HI MDM: CPT

## 2018-01-07 PROCEDURE — 770020 HCHG ROOM/CARE - TELE (206)

## 2018-01-07 PROCEDURE — 700102 HCHG RX REV CODE 250 W/ 637 OVERRIDE(OP): Performed by: INTERNAL MEDICINE

## 2018-01-07 PROCEDURE — 700111 HCHG RX REV CODE 636 W/ 250 OVERRIDE (IP): Performed by: INTERNAL MEDICINE

## 2018-01-07 PROCEDURE — 85025 COMPLETE CBC W/AUTO DIFF WBC: CPT

## 2018-01-07 PROCEDURE — 85610 PROTHROMBIN TIME: CPT

## 2018-01-07 PROCEDURE — 93010 ELECTROCARDIOGRAM REPORT: CPT | Performed by: INTERNAL MEDICINE

## 2018-01-07 PROCEDURE — 90935 HEMODIALYSIS ONE EVALUATION: CPT

## 2018-01-07 PROCEDURE — 93005 ELECTROCARDIOGRAM TRACING: CPT

## 2018-01-07 PROCEDURE — 84484 ASSAY OF TROPONIN QUANT: CPT

## 2018-01-07 PROCEDURE — 700111 HCHG RX REV CODE 636 W/ 250 OVERRIDE (IP): Performed by: NURSE PRACTITIONER

## 2018-01-07 PROCEDURE — 93005 ELECTROCARDIOGRAM TRACING: CPT | Performed by: NURSE PRACTITIONER

## 2018-01-07 PROCEDURE — 83880 ASSAY OF NATRIURETIC PEPTIDE: CPT

## 2018-01-07 RX ORDER — PROMETHAZINE HYDROCHLORIDE 25 MG/1
12.5-25 SUPPOSITORY RECTAL EVERY 4 HOURS PRN
Status: DISCONTINUED | OUTPATIENT
Start: 2018-01-07 | End: 2018-01-07

## 2018-01-07 RX ORDER — BISACODYL 10 MG
10 SUPPOSITORY, RECTAL RECTAL
Status: DISCONTINUED | OUTPATIENT
Start: 2018-01-07 | End: 2018-01-09 | Stop reason: HOSPADM

## 2018-01-07 RX ORDER — HEPARIN SODIUM 1000 [USP'U]/ML
INJECTION, SOLUTION INTRAVENOUS; SUBCUTANEOUS
Status: COMPLETED
Start: 2018-01-07 | End: 2018-01-07

## 2018-01-07 RX ORDER — MAGNESIUM SULFATE HEPTAHYDRATE 40 MG/ML
2 INJECTION, SOLUTION INTRAVENOUS ONCE
Status: COMPLETED | OUTPATIENT
Start: 2018-01-07 | End: 2018-01-07

## 2018-01-07 RX ORDER — ADENOSINE 3 MG/ML
INJECTION, SOLUTION INTRAVENOUS
Status: ACTIVE
Start: 2018-01-07 | End: 2018-01-08

## 2018-01-07 RX ORDER — ONDANSETRON 2 MG/ML
4 INJECTION INTRAMUSCULAR; INTRAVENOUS EVERY 4 HOURS PRN
Status: DISCONTINUED | OUTPATIENT
Start: 2018-01-07 | End: 2018-01-07

## 2018-01-07 RX ORDER — HEPARIN SODIUM 1000 [USP'U]/ML
3700 INJECTION, SOLUTION INTRAVENOUS; SUBCUTANEOUS
Status: DISCONTINUED | OUTPATIENT
Start: 2018-01-07 | End: 2018-01-09 | Stop reason: HOSPADM

## 2018-01-07 RX ORDER — ADENOSINE 3 MG/ML
6 INJECTION, SOLUTION INTRAVENOUS ONCE
Status: COMPLETED | OUTPATIENT
Start: 2018-01-07 | End: 2018-01-07

## 2018-01-07 RX ORDER — PROMETHAZINE HYDROCHLORIDE 25 MG/1
12.5-25 TABLET ORAL EVERY 4 HOURS PRN
Status: DISCONTINUED | OUTPATIENT
Start: 2018-01-07 | End: 2018-01-07

## 2018-01-07 RX ORDER — ADENOSINE 3 MG/ML
12 INJECTION, SOLUTION INTRAVENOUS
Status: DISCONTINUED | OUTPATIENT
Start: 2018-01-07 | End: 2018-01-08

## 2018-01-07 RX ORDER — METOPROLOL TARTRATE 50 MG/1
50 TABLET, FILM COATED ORAL ONCE
Status: COMPLETED | OUTPATIENT
Start: 2018-01-07 | End: 2018-01-07

## 2018-01-07 RX ORDER — ADENOSINE 3 MG/ML
6 INJECTION, SOLUTION INTRAVENOUS PRN
Status: DISCONTINUED | OUTPATIENT
Start: 2018-01-07 | End: 2018-01-07

## 2018-01-07 RX ORDER — TERAZOSIN 2 MG/1
2 CAPSULE ORAL NIGHTLY
Status: DISCONTINUED | OUTPATIENT
Start: 2018-01-07 | End: 2018-01-09 | Stop reason: HOSPADM

## 2018-01-07 RX ORDER — SEVELAMER HYDROCHLORIDE 800 MG/1
800 TABLET, FILM COATED ORAL
Status: DISCONTINUED | OUTPATIENT
Start: 2018-01-07 | End: 2018-01-09 | Stop reason: HOSPADM

## 2018-01-07 RX ORDER — POLYETHYLENE GLYCOL 3350 17 G/17G
1 POWDER, FOR SOLUTION ORAL
Status: DISCONTINUED | OUTPATIENT
Start: 2018-01-07 | End: 2018-01-09 | Stop reason: HOSPADM

## 2018-01-07 RX ORDER — AMOXICILLIN 250 MG
2 CAPSULE ORAL 2 TIMES DAILY
Status: DISCONTINUED | OUTPATIENT
Start: 2018-01-07 | End: 2018-01-09 | Stop reason: HOSPADM

## 2018-01-07 RX ORDER — FAMOTIDINE 20 MG/1
20 TABLET, FILM COATED ORAL DAILY
Status: DISCONTINUED | OUTPATIENT
Start: 2018-01-07 | End: 2018-01-09 | Stop reason: HOSPADM

## 2018-01-07 RX ORDER — ADENOSINE 3 MG/ML
INJECTION, SOLUTION INTRAVENOUS
Status: COMPLETED
Start: 2018-01-07 | End: 2018-01-07

## 2018-01-07 RX ORDER — ONDANSETRON 4 MG/1
4 TABLET, ORALLY DISINTEGRATING ORAL EVERY 4 HOURS PRN
Status: DISCONTINUED | OUTPATIENT
Start: 2018-01-07 | End: 2018-01-07

## 2018-01-07 RX ORDER — VERAPAMIL HYDROCHLORIDE 80 MG/1
80 TABLET ORAL EVERY 8 HOURS
Status: DISCONTINUED | OUTPATIENT
Start: 2018-01-07 | End: 2018-01-07

## 2018-01-07 RX ORDER — HEPARIN SODIUM 1000 [USP'U]/ML
1500 INJECTION, SOLUTION INTRAVENOUS; SUBCUTANEOUS
Status: DISCONTINUED | OUTPATIENT
Start: 2018-01-07 | End: 2018-01-09

## 2018-01-07 RX ADMIN — MAGNESIUM SULFATE IN WATER 2 G: 40 INJECTION, SOLUTION INTRAVENOUS at 15:41

## 2018-01-07 RX ADMIN — HEPARIN SODIUM 1500 UNITS: 1000 INJECTION, SOLUTION INTRAVENOUS; SUBCUTANEOUS at 10:35

## 2018-01-07 RX ADMIN — ADENOSINE 6 MG: 3 INJECTION, SOLUTION INTRAVENOUS at 08:05

## 2018-01-07 RX ADMIN — FAMOTIDINE 20 MG: 20 TABLET, FILM COATED ORAL at 15:08

## 2018-01-07 RX ADMIN — METOPROLOL TARTRATE 50 MG: 50 TABLET, FILM COATED ORAL at 15:08

## 2018-01-07 RX ADMIN — TERAZOSIN HYDROCHLORIDE ANHYDROUS 2 MG: 2 CAPSULE ORAL at 20:06

## 2018-01-07 RX ADMIN — RENAGEL 800 MG: 800 TABLET ORAL at 16:54

## 2018-01-07 RX ADMIN — HEPARIN SODIUM 3700 UNITS: 1000 INJECTION, SOLUTION INTRAVENOUS; SUBCUTANEOUS at 13:51

## 2018-01-07 RX ADMIN — ADENOSINE 6 MG: 3 INJECTION, SOLUTION INTRAVENOUS at 13:34

## 2018-01-07 ASSESSMENT — ENCOUNTER SYMPTOMS
SPUTUM PRODUCTION: 0
WHEEZING: 0
DIARRHEA: 0
NAUSEA: 0
CONSTIPATION: 0
STRIDOR: 0
NERVOUS/ANXIOUS: 1
SHORTNESS OF BREATH: 1
PALPITATIONS: 1
COUGH: 0
VOMITING: 0
CHILLS: 0
DIAPHORESIS: 0
SORE THROAT: 0
FEVER: 0
BRUISES/BLEEDS EASILY: 0
CLAUDICATION: 0
DIZZINESS: 1

## 2018-01-07 ASSESSMENT — LIFESTYLE VARIABLES
ALCOHOL_USE: NO
EVER_SMOKED: YES
DO YOU DRINK ALCOHOL: NO

## 2018-01-07 ASSESSMENT — COGNITIVE AND FUNCTIONAL STATUS - GENERAL
SUGGESTED CMS G CODE MODIFIER MOBILITY: CH
SUGGESTED CMS G CODE MODIFIER DAILY ACTIVITY: CH
MOBILITY SCORE: 24
DAILY ACTIVITIY SCORE: 24

## 2018-01-07 ASSESSMENT — PATIENT HEALTH QUESTIONNAIRE - PHQ9
2. FEELING DOWN, DEPRESSED, IRRITABLE, OR HOPELESS: NOT AT ALL
1. LITTLE INTEREST OR PLEASURE IN DOING THINGS: NOT AT ALL
SUM OF ALL RESPONSES TO PHQ9 QUESTIONS 1 AND 2: 0
SUM OF ALL RESPONSES TO PHQ QUESTIONS 1-9: 0

## 2018-01-07 ASSESSMENT — PAIN SCALES - GENERAL
PAINLEVEL_OUTOF10: 0

## 2018-01-07 NOTE — H&P
" Hospital Medicine History and Physical    Date of Service  1/7/2018    Chief Complaint  Chief Complaint   Patient presents with   • Supraventricular Tachycardia (SVT)   • Chest Pain       History of Presenting Illness  52 y.o. female who presented 1/7/2018 with chest palpitations and has known history of paroxysmal supraventricular tachycardia noted in 2015. She states the episodes are more frequent and she is short of breath with chest pressure during these events. She was evaluated by paramedic services and shocked into sinus rhythm. While in the emergency department she went into supraventricular tachycardia again and was given 6mg adenosine with return to sinus rhythm. Currently she is more comfortable. She denies any cough, shortness of breath, headache, or missed dialysis.    Primary Care Physician  Juan Moreno M.D.    Code Status  Full    Review of Systems  Review of Systems   Constitutional: Negative for chills, diaphoresis and fever.   HENT: Negative for sore throat.    Respiratory: Positive for shortness of breath. Negative for cough, sputum production, wheezing and stridor.    Cardiovascular: Positive for chest pain and palpitations. Negative for claudication and leg swelling.   Gastrointestinal: Negative for constipation, diarrhea, nausea and vomiting.   Genitourinary: Negative for dysuria.   Skin: Negative for rash.   Endo/Heme/Allergies: Does not bruise/bleed easily.   Psychiatric/Behavioral: The patient is nervous/anxious.           Past Medical History  Past Medical History:   Diagnosis Date   • ESRD (end stage renal disease) (CMS-HCC) 8-25-17    Dialysis MWF@ [facility]   • Port catheter in place 8-25-17    For Dialysis   • Dialysis patient ([facility]) 8-25-17    [facility] M,W,F   • Dental disorder 8-25-17    \"Full Upper & Partial Lower Dentures\"   • Glomerulonephritis, chronic 11/5/2009   • Anemia associated with chronic renal failure 11/5/2009   • HTN (hypertension) 11/5/2009 " "   2017 states well controlled   • Anemia    • Dysrhythmia     \"SVT\"   • Heart burn    • High cholesterol    • SVT (supraventricular tachycardia) (CMS-HCC)    • SVT (supraventricular tachycardia) (CMS-HCC)        Surgical History  Past Surgical History:   Procedure Laterality Date   • AV FISTULA CREATION Left 8/28/2017    Procedure: AV FISTULA CREATION  UPPER EXTREMITY -BRACHIAL BASALIC;  Surgeon: Stella Rodriguez M.D.;  Location: SURGERY [facility];  Service: General   • AV FISTULA CREATION Left 6/20/2017    Procedure: AV FISTULA CREATION- LEFT;  Surgeon: Jimbo Davenport M.D.;  Location: SURGERY [facility];  Service:    • APPENDECTOMY LAPAROSCOPIC  5/4/2009    Performed by BANDAR TIMMONS at SURGERY [facility]   • FEMUR ORIF  10/9/08    Performed by STELLA GATES at [facility]   • ILIAC BONE GRAFT  10/9/08    Performed by STELLA GATES at SURGERY [facility]   • AMPUTATION, BELOW THE KNEE     • CAPITATION PAYMENT (INSURANCE)     • OTHER      BELOW KNEE AMPUTATION RIGHT   • PB ENLARGE BREAST WITH IMPLANT         Medications    Current Facility-Administered Medications:   •  famotidine (PEPCID) tablet 20 mg, 20 mg, Oral, DAILY, Selina Schuster M.D.  •  metoprolol (LOPRESSOR) tablet 25 mg, 25 mg, Oral, Q8HRS, Selina Schuster M.D.  •  sevelamer (RENAGEL) tablet 800 mg, 800 mg, Oral, TID WITH MEALS, Selina Schuster M.D.  •  terazosin (HYTRIN) capsule 2 mg, 2 mg, Oral, Nightly, Selina Schuster M.D.  •  verapamil (ISOPTIN) tablet 80 mg, 80 mg, Oral, Q8HRS, Selina Schuster M.D.  •  senna-docusate (PERICOLACE or SENOKOT S) 8.6-50 MG per tablet 2 Tab, 2 Tab, Oral, BID **AND** polyethylene glycol/lytes (MIRALAX) PACKET 1 Packet, 1 Packet, Oral, QDAY PRN **AND** magnesium hydroxide (MILK OF MAGNESIA) suspension 30 mL, 30 mL, Oral, QDAY PRN **AND** bisacodyl (DULCOLAX) suppository 10 mg, 10 mg, Rectal, QDAY PRN, Selina Schuster M.D.  •  ondansetron (ZOFRAN) syringe/vial injection 4 mg, " 4 mg, Intravenous, Q4HRS PRN, Selina Schuster M.D.  •  ondansetron (ZOFRAN ODT) dispertab 4 mg, 4 mg, Oral, Q4HRS PRN, Selina Schuster M.D.  •  promethazine (PHENERGAN) tablet 12.5-25 mg, 12.5-25 mg, Oral, Q4HRS PRN, Selina Schuster M.D.  •  promethazine (PHENERGAN) suppository 12.5-25 mg, 12.5-25 mg, Rectal, Q4HRS PRN, Selina Schuster M.D.  •  prochlorperazine (COMPAZINE) injection 5-10 mg, 5-10 mg, Intravenous, Q4HRS PRN, Selina Schuster M.D.  •  adenosine (ADENOCARD) injection 6 mg, 6 mg, Intravenous, PRN, Selina Schuster M.D.  •  ADENOSINE 6 MG/2ML IV SOLN, , , ,     Current Outpatient Prescriptions:   •  metoprolol (LOPRESSOR) 25 MG Tab, Take 1 Tab by mouth every 8 hours., Disp: 60 Tab, Rfl: 1  •  verapamil (ISOPTIN) 80 MG Tab, Take 1 Tab by mouth every 8 hours., Disp: 90 Tab, Rfl: 1  •  sevelamer (RENAGEL) 800 MG Tab, Take 1 Tab by mouth 3 times a day, with meals., Disp: 90 Tab, Rfl: 3  •  lisinopril (PRINIVIL) 20 MG Tab, Take 20 mg by mouth every day., Disp: , Rfl:   •  terazosin (HYTRIN) 2 MG Cap, Take 2 mg by mouth every evening., Disp: , Rfl:   •  famotidine (PEPCID) 20 MG Tab, TAKE ONE TABLET BY MOUTH TWICE DAILY, Disp: 180 Tab, Rfl: 0    Family History  Family History   Problem Relation Age of Onset   • Heart Disease         Social History  Social History   Substance Use Topics   • Smoking status: Current Every Day Smoker     Packs/day: 0.25     Types: Cigarettes   • Smokeless tobacco: Never Used      Comment: 5 cigs/day   • Alcohol use Yes      Comment: occ   patient continues to smoke cigarettes    Allergies  Allergies   Allergen Reactions   • Sulfa Drugs Hives     [id number] rash swelling itching        Physical Exam  Laboratory   Hemodynamics  Temp (24hrs), Av.7 °C (98 °F), Min:36.7 °C (98 °F), Max:36.7 °C (98 °F)   Temperature: 36.7 °C (98 °F)  Pulse  Av.5  Min: 89  Max: 90 Heart Rate (Monitored): 90  Blood Pressure: (!) 171/98, NIBP: (!) 168/103      Respiratory      Respiration: (!) 8,  Pulse Oximetry: 97 %             Physical Exam   Constitutional: She is oriented to person, place, and time. She appears distressed.   HENT:   Mouth/Throat: Oropharynx is clear and moist.   Eyes: Conjunctivae are normal. No scleral icterus.   Neck: No JVD present.   Cardiovascular: Regular rhythm.  Tachycardia present.  PMI is displaced.    Murmur heard.  Pulses:       Dorsalis pedis pulses are 2+ on the left side.   Pulmonary/Chest: No stridor. No respiratory distress. She has no wheezes. She has no rales.   Abdominal: Soft. Bowel sounds are normal. She exhibits no distension.   Musculoskeletal: She exhibits no edema.   Right lower extremity amputation with prosthesis in place   Neurological: She is alert and oriented to person, place, and time.   Skin: Skin is warm and dry. She is not diaphoretic. No erythema.   Nursing note and vitals reviewed.      Recent Labs      01/07/18   0740   WBC  5.7   RBC  2.96*   HEMOGLOBIN  9.6*   HEMATOCRIT  29.0*   MCV  98.0*   MCH  32.4   MCHC  33.1*   RDW  49.1   PLATELETCT  158*   MPV  10.0     Recent Labs      01/07/18   0720   SODIUM  133*   POTASSIUM  6.0*   CHLORIDE  97   CO2  22   GLUCOSE  91   BUN  81*   CREATININE  12.78*   CALCIUM  9.2     Recent Labs      01/07/18   0720   ALTSGPT  <5   ASTSGOT  21   ALKPHOSPHAT  49   TBILIRUBIN  0.6   LIPASE  23   GLUCOSE  91     Recent Labs      01/07/18   0720   APTT  22.1*   INR  1.48*               Imaging  None   Assessment/Plan     I anticipate this patient is appropriate for observation status at this time.    Hyperkalemia- (present on admission)   Assessment & Plan    Hold lisinopril  Dr. Garcia consulted and will plan for dialysis today        End-stage renal disease (ESRD) (CMS-[facility])- (present on admission)   Assessment & Plan    Followed by Dr. Junior outpatient  I did consult nephrology for dialysis while she is inpatient  Continue sevelamer        Severe uncontrolled hypertension- (present on admission)   Assessment & Plan     Continue home metoprolol and verapamil  Will restart lisinopril depending on electrolyte results on her labs        SVT (supraventricular tachycardia) (CMS-[facility])   Assessment & Plan    Patient is having two episodes and was shocked by paramedic services  Repeat svt occurred in the emergency department  Cardiology Dr. Crenshaw is consulted and will plan for cardiac ablation  She will be monitored on telemetry and adenosine given as needed   I will conitnue her metoprolol and verapamil acutely            VTE prophylaxis: heparin.

## 2018-01-07 NOTE — ED NOTES
Report received from Audrey SANTOS, assumed care of patient.  White board updated, POC discussed.  Call light and belongings within reach.  Bed in lowest position.

## 2018-01-07 NOTE — ASSESSMENT & PLAN NOTE
Patient had two episodes and was shocked by paramedic services  Repeat svt occurred in the emergency department  Cardiology Dr. Crenshaw is consulted for ablation 1/8  Continue tele  Changed BB to labetalol  Adenosine PRN

## 2018-01-07 NOTE — ED PROVIDER NOTES
ED Provider Note    Scribed for Rosemary De Oliveira M.D. by Farrah Jimenez. 1/7/2018  7:19 AM    Primary care provider: Juan Moreno M.D.  Means of arrival: ambulance   History obtained from: patient   History limited by: none     CHIEF COMPLAINT  Chief Complaint   Patient presents with   • Supraventricular Tachycardia (SVT)   • Chest Pain       HPI  Isabelle Coyle is a 52 y.o. female who presents to the Emergency Department for evaluation of Supraventricular Tachycardia which was noted by EMS with their evaluation of the patient one hour ago after they were called for complaint of palpitations with associated chest pain. These symptoms began suddenly while she was sleeping. EMS cardioverted the patient en route with 100 joules and 2 mg of Versed with relief of her symptoms. Patient states she has no chest pain or shortness of breath currently. Patient also denies leg swelling. She is a dialysis patient and reports having dialysis every Monday, Wednesday and Friday.       REVIEW OF SYSTEMS  CARDIAC: Chest pain and palpitations (now relieved). No leg swelling.   PULMONARY: no shortness of breath.   See history of present illness. All other systems are negative. C.       PAST MEDICAL HISTORY   has a past medical history of Anemia; Anemia associated with chronic renal failure (11/5/2009); Dental disorder (8-25-17); Dialysis patient (CMS-HCC) (8-25-17); Dysrhythmia; ESRD (end stage renal disease) (CMS-HCC) (8-25-17); Glomerulonephritis, chronic (11/5/2009); Heart burn; High cholesterol; HTN (hypertension) (11/5/2009); Port catheter in place (8-25-17); SVT (supraventricular tachycardia) (CMS-HCC); and SVT (supraventricular tachycardia) (CMS-HCC).      SURGICAL HISTORY   has a past surgical history that includes amputation, below the knee; capitation payment (insurance); appendectomy laparoscopic (5/4/2009); enlarge breast with implant; av fistula creation (Left, 6/20/2017); other; femur orif (10/9/08); iliac  "bone graft (10/9/08); and av fistula creation (Left, 8/28/2017).      SOCIAL HISTORY  Social History   Substance Use Topics   • Smoking status: Current Every Day Smoker     Packs/day: 0.25     Types: Cigarettes   • Smokeless tobacco: Never Used      Comment: 5 cigs/day   • Alcohol use Yes      Comment: occ      History   Drug Use No       FAMILY HISTORY  Family History   Problem Relation Age of Onset   • Heart Disease         CURRENT MEDICATIONS  Home Medications     Reviewed by Monique Paiz, Pharmacy Intern (Pharmacy Intern) on 01/07/18 at 0822  Med List Status: Complete   Medication Last Dose Status   famotidine (PEPCID) 20 MG Tab 1/6/2018 Active   lisinopril (PRINIVIL) 20 MG Tab 1/6/2018 Active   metoprolol (LOPRESSOR) 25 MG Tab 1/6/2018 Active   sevelamer (RENAGEL) 800 MG Tab 1/6/2018 Active   terazosin (HYTRIN) 2 MG Cap 1/6/2018 Active   verapamil (ISOPTIN) 80 MG Tab 1/6/2018 Active                ALLERGIES  Allergies   Allergen Reactions   • Sulfa Drugs Hives     [id number] rash swelling itching         PHYSICAL EXAM  VITAL SIGNS: BP (!) 171/98   Pulse 90   Temp 36.7 °C (98 °F)   Resp (!) 8   Ht 1.575 m (5' 2\")   Wt 62.1 kg (137 lb)   LMP 06/25/2007   SpO2 97%   BMI 25.06 kg/m²   Constitutional: Well developed, Well nourished, No acute distress, Non-toxic appearance.   HEENT: Normocephalic, Atraumatic,  external ears normal, pharynx pink,  Mucous  Membranes moist, No rhinorrhea or mucosal edema  Eyes: PERRL, EOMI, Conjunctiva normal, No discharge.   Neck: Normal range of motion, No tenderness, Supple, No stridor.   Lymphatic: No lymphadenopathy    Cardiovascular: Regular Rate and Rhythm, No murmurs,  rubs, or gallops.   Thorax & Lungs: Lungs clear to auscultation bilaterally, No respiratory distress, No wheezes, rhales or rhonchi, No chest wall tenderness.   Abdomen: Bowel sounds normal, Soft, non tender, non distended,  No pulsatile masses., no rebound guarding or peritoneal signs.   Skin: Warm, " Dry, No erythema, No rash,   Back:  No CVA tenderness,  No spinal tenderness, bony crepitance, step offs, or instability.   Neurologic: Alert & oriented x 3, Normal motor function, Normal sensory function, No focal deficits noted. Normal reflexes. Normal Cranial Nerves.  Extremities: Equal, intact distal pulses, No cyanosis, clubbing or edema,  No tenderness.   Musculoskeletal: Good range of motion in all major joints. No tenderness. Right BKA noted.         DIAGNOSTIC STUDIES / PROCEDURES  LABS  Results for orders placed or performed during the hospital encounter of 01/07/18   Troponin   Result Value Ref Range    Troponin I 0.03 0.00 - 0.04 ng/mL   Btype Natriuretic Peptide   Result Value Ref Range    B Natriuretic Peptide 1513 (H) 0 - 100 pg/mL   Complete Metabolic Panel (CMP)   Result Value Ref Range    Sodium 133 (L) 135 - 145 mmol/L    Potassium 6.0 (H) 3.6 - 5.5 mmol/L    Chloride 97 96 - 112 mmol/L    Co2 22 20 - 33 mmol/L    Anion Gap 14.0 (H) 0.0 - 11.9    Glucose 91 65 - 99 mg/dL    Bun 81 (HH) 8 - 22 mg/dL    Creatinine 12.78 (HH) 0.50 - 1.40 mg/dL    Calcium 9.2 8.5 - 10.5 mg/dL    AST(SGOT) 21 12 - 45 U/L    ALT(SGPT) <5 2 - 50 U/L    Alkaline Phosphatase 49 30 - 99 U/L    Total Bilirubin 0.6 0.1 - 1.5 mg/dL    Albumin 3.4 3.2 - 4.9 g/dL    Total Protein 6.0 6.0 - 8.2 g/dL    Globulin 2.6 1.9 - 3.5 g/dL    A-G Ratio 1.3 g/dL   Prothrombin Time   Result Value Ref Range    PT 17.6 (H) 12.0 - 14.6 sec    INR 1.48 (H) 0.87 - 1.13   APTT   Result Value Ref Range    APTT 22.1 (L) 24.7 - 36.0 sec   Lipase   Result Value Ref Range    Lipase 23 11 - 82 U/L   CBC WITH DIFFERENTIAL   Result Value Ref Range    WBC 5.7 4.8 - 10.8 K/uL    RBC 2.96 (L) 4.20 - 5.40 M/uL    Hemoglobin 9.6 (L) 12.0 - 16.0 g/dL    Hematocrit 29.0 (L) 37.0 - 47.0 %    MCV 98.0 (H) 81.4 - 97.8 fL    MCH 32.4 27.0 - 33.0 pg    MCHC 33.1 (L) 33.6 - 35.0 g/dL    RDW 49.1 35.9 - 50.0 fL    Platelet Count 158 (L) 164 - 446 K/uL    MPV 10.0 9.0 -  12.9 fL    Neutrophils-Polys 51.80 44.00 - 72.00 %    Lymphocytes 38.20 22.00 - 41.00 %    Monocytes 7.20 0.00 - 13.40 %    Eosinophils 1.60 0.00 - 6.90 %    Basophils 0.70 0.00 - 1.80 %    Immature Granulocytes 0.50 0.00 - 0.90 %    Nucleated RBC 0.00 /100 WBC    Neutrophils (Absolute) 2.93 2.00 - 7.15 K/uL    Lymphs (Absolute) 2.16 1.00 - 4.80 K/uL    Monos (Absolute) 0.41 0.00 - 0.85 K/uL    Eos (Absolute) 0.09 0.00 - 0.51 K/uL    Baso (Absolute) 0.04 0.00 - 0.12 K/uL    Immature Granulocytes (abs) 0.03 0.00 - 0.11 K/uL    NRBC (Absolute) 0.00 K/uL   ESTIMATED GFR   Result Value Ref Range    GFR If  4 (A) >60 mL/min/1.73 m 2    GFR If Non African American 3 (A) >60 mL/min/1.73 m 2   EKG (ER)   Result Value Ref Range    Report       [facility] Emergency Dept.    Test Date:  2018  Pt Name:    MELISSA BARDALES                 Department: ER  MRN:        [id number]                      Room:       RD 07  Gender:     Female                       Technician: [id number]  :        1965                   Requested By:ER TRIAGE PROTOCOL  Order #:    [id number]                    Reading MD:    Measurements  Intervals                                Axis  Rate:       89                           P:          54  HI:         152                          QRS:        17  QRSD:       82                           T:          32  QT:         400  QTc:        487    Interpretive Statements  SINUS RHYTHM  BORDERLINE PROLONGED QT INTERVAL  Compared to ECG 2017 05:35:01  No significant changes     EKG (ER)   Result Value Ref Range    Report       [facility] Emergency Dept.    Test Date:  2018  Pt Name:    MELISSA BARDALES                 Department: ER  MRN:        [id number]                      Room:       RD 07  Gender:     Female                       Technician: [id number]  :        1965                   Requested By:TRISHA NDIAYE  Order #:    [id number]                     Reading MD:    Measurements  Intervals                                Axis  Rate:       79                           P:          58  NM:         148                          QRS:        35  QRSD:       82                           T:          55  QT:         400  QTc:        459    Interpretive Statements  SINUS RHYTHM  PROBABLE LEFT ATRIAL ABNORMALITY  BASELINE WANDER IN LEAD(S) V3  Compared to ECG 01/07/2018 06:40:02  No significant changes     All labs reviewed by me.      EKG  12 lead EKG; Interpreted by emergency department physician  Rhythm: Normal Sinus Rhythm   Rate: 89  Axis: Normal  R Wave: Normal R wave progression  Normal ST-T segments  ST Segments: No ST segment elevation   T waves: Normal  Q waves: None  Clinical Impression: No acute changes      EKG (Performed at 8:13 AM following Adenosine treatment)  12 lead EKG; Interpreted by emergency department physician  Rhythm: Normal Sinus Rhythm   Rate: 79  Axis: Normal  R Wave: Normal R wave progression  Normal ST-T segments  ST Segments: No ST segment elevation   T waves: Normal  Q waves: None  Clinical Impression: No acute changes          RADIOLOGY  DX-CHEST-LIMITED (1 VIEW)   Final Result      No acute cardiopulmonary abnormality.               INTERPRETING LOCATION: [address] [place] [address], [facility]      The radiologist's interpretation of all radiological studies have been reviewed by me.        COURSE & MEDICAL DECISION MAKING  Nursing notes, VS, PMSFHx reviewed in chart.    7:15 AM Reviewed prehospital EKG indicated SVT at 160.     7:19 AM Consult with cardiology, Dr. Crenshaw, who evaluated the patient and advises she be admitted to the observation unit for ablation tomorrow.     7:19 AM - Patient seen and examined at bedside.  Ordered DX chest, lipase, APTT, PTT, CMP, BNP, troponin and estimated GFR to evaluate her symptoms.     7:43 AM Consult with the hospitalist, Dr. Schuster, who agrees to admit the patient.     8:04 AM Patient noted to be back  in supraventricular tachycardia at 160. Patient instructed to do the Valsalva Maneuver but with no relief.     8:06 AM Patient was cardioverted with Adenosine. She is currently in normal sinus rhythm.     8:08 AM Dr. Schuster will page Dr. Crenshaw.       DISPOSITION:  Patient will be admitted to Dr. Schuster in guarded condition.      FINAL IMPRESSION  1. SVT (supraventricular tachycardia) (CMS-HCC)    2. ESRD (end stage renal disease) on dialysis (CMS-HCC)    3. Hyperkalemia         Farrah LANE (Carly), am scribing for, and in the presence of, Rosemary De Oliveira M.D..  Electronically signed by: Farrah Jimenez (Carly), 1/7/2018  Rosemary LANE M.D. personally performed the services described in this documentation, as scribed by Farrah Jimenez in my presence, and it is both accurate and complete.    The note accurately reflects work and decisions made by me.  Rosemary De Oliveira  1/7/2018  10:33 AM

## 2018-01-07 NOTE — ASSESSMENT & PLAN NOTE
Uncontrolled (BP 160a+),  Home metoprolol changed to labetalol 200 BID  Home verapamil held  Restart lisinopril 20, increase if needed

## 2018-01-07 NOTE — CONSULTS
"DATE OF CONSULTATION: 01/07/18    CONSULT REQUESTED BY: Dr. De Oliveira on behalf of Dr. Schuster (hospitalist admitting)    REASON FOR CONSULTATION: supraventricular tachycardia    HISTORY OF PRESENT ILLNESS:   Isabelle Coyle is a 52 y.o. female with a history of svt,esrd, htn who presents with another episode of svt.  This was simlar to previous.  Cannot terminte with vagal maneuvers.  Uncomfortable.  Last admission just a week ago.  Breaking through on meds.  Hr got to 200.  Has had adenosine and dccv for arrhythmia in past.  Adenosine failed once      ALLERGY:  Allergies   Allergen Reactions   • Sulfa Drugs Hives     [id number] rash swelling itching       MEDICATIONS:    Current Facility-Administered Medications:   •  HEPARIN SODIUM (PORCINE) 1000 UNIT/ML INJ SOLN, , , ,   •  famotidine (PEPCID) tablet 20 mg, 20 mg, Oral, DAILY, Selina Schuster M.D.  •  sevelamer (RENAGEL) tablet 800 mg, 800 mg, Oral, TID WITH MEALS, Selina Schuster M.D.  •  terazosin (HYTRIN) capsule 2 mg, 2 mg, Oral, Nightly, Selina Schuster M.D.  •  senna-docusate (PERICOLACE or SENOKOT S) 8.6-50 MG per tablet 2 Tab, 2 Tab, Oral, BID **AND** polyethylene glycol/lytes (MIRALAX) PACKET 1 Packet, 1 Packet, Oral, QDAY PRN **AND** magnesium hydroxide (MILK OF MAGNESIA) suspension 30 mL, 30 mL, Oral, QDAY PRN **AND** bisacodyl (DULCOLAX) suppository 10 mg, 10 mg, Rectal, QDAY PRN, Selina Schuster M.D.  •  heparin injection 1,500 Units, 1,500 Units, Intravenous, DIALYSIS PRN, Giovany Garcia M.D.    PAST MEDICAL HISTORY:  Past Medical History:   Diagnosis Date   • Anemia    • Anemia associated with chronic renal failure 11/5/2009   • Dental disorder 8-25-17    \"Full Upper & Partial Lower Dentures\"   • Dialysis patient ([facility]) 8-25-17    [facility] M,W,F   • Dysrhythmia     \"SVT\"   • ESRD (end stage renal disease) (CMS-[facility]) 8-25-17    Dialysis MWF@ [facility]   • Glomerulonephritis, chronic 11/5/2009   • Heart burn    • High cholesterol  "   • HTN (hypertension) 11/5/2009    2017 states well controlled   • Port catheter in place 8-25-17    For Dialysis   • SVT (supraventricular tachycardia) (CMS-HCC)    • SVT (supraventricular tachycardia) (CMS-HCC)      Past Surgical History:   Procedure Laterality Date   • AV FISTULA CREATION Left 8/28/2017    Procedure: AV FISTULA CREATION  UPPER EXTREMITY -BRACHIAL BASALIC;  Surgeon: Glen Rodriguez M.D.;  Location: SURGERY [facility];  Service: General   • AV FISTULA CREATION Left 6/20/2017    Procedure: AV FISTULA CREATION- LEFT;  Surgeon: Jimbo Davenport M.D.;  Location: SURGERY [facility];  Service:    • APPENDECTOMY LAPAROSCOPIC  5/4/2009    Performed by BANDAR TIMMONS at SURGERY [facility]   • FEMUR ORIF  10/9/08    Performed by GLEN GATES at SURGERY [facility]   • ILIAC BONE GRAFT  10/9/08    Performed by GLEN GATES at SURGERY [facility]   • AMPUTATION, BELOW THE KNEE     • CAPITATION PAYMENT (INSURANCE)     • OTHER      BELOW KNEE AMPUTATION RIGHT   • PB ENLARGE BREAST WITH IMPLANT           FAMILY HISTORY:  Family History   Problem Relation Age of Onset   • Heart Disease           SOCIAL HISTORY:  History   Smoking Status   • Current Every Day Smoker   • Packs/day: 0.25   • Types: Cigarettes   Smokeless Tobacco   • Never Used     Comment: 5 cigs/day     History   Alcohol Use   • Yes     Comment: occ         REVIEW OF SYSTEMS:  Review of Systems   Respiratory: Positive for shortness of breath.    Cardiovascular: Positive for chest pain and palpitations.   Neurological: Positive for dizziness.   Psychiatric/Behavioral: The patient is nervous/anxious.    All other systems reviewed and are negative.      PHYSICAL EXAMINATION:  Patient Vitals for the past 24 hrs:   BP Temp Pulse Resp SpO2 Height Weight   01/07/18 0937 (!) 212/116 36.2 °C (97.1 °F) 84 18 100 % - -   01/07/18 0905 (!) 177/97 - 84 20 91 % - -   01/07/18 0900 - - - 18 - - -   01/07/18 0822 - - 82 20 98 %  "- -   01/07/18 0810 - - 74 (!) 24 100 % - -   01/07/18 0807 - - 69 20 100 % - -   01/07/18 0806 - - (!) 138 20 99 % - -   01/07/18 0700 - - - 18 - - -   01/07/18 0640 - - 90 (!) 8 97 % - -   01/07/18 0636 (!) 171/98 36.7 °C (98 °F) 89 16 93 % 1.575 m (5' 2\") 62.1 kg (137 lb)       Physical Exam   Constitutional: She is oriented to person, place, and time. She appears well-developed and well-nourished. No distress.   HENT:   Head: Normocephalic and atraumatic.   Right Ear: External ear normal.   Left Ear: External ear normal.   Nose: Nose normal.   Mouth/Throat: Oropharynx is clear and moist.   Eyes: Conjunctivae and EOM are normal. Pupils are equal, round, and reactive to light. Right eye exhibits no discharge. Left eye exhibits no discharge. No scleral icterus.   Neck: Normal range of motion. Neck supple. No JVD present. No tracheal deviation present. No thyromegaly present.   Cardiovascular: Normal rate, regular rhythm, normal heart sounds and intact distal pulses.  Exam reveals no gallop and no friction rub.    No murmur heard.  Pulmonary/Chest: Effort normal and breath sounds normal. No stridor. No respiratory distress. She has no wheezes. She has no rales. She exhibits no tenderness.   Abdominal: Soft. Bowel sounds are normal. She exhibits no distension. There is no tenderness. There is no rebound and no guarding.   Musculoskeletal: Normal range of motion. She exhibits no edema.   Hd site with thrill   Neurological: She is alert and oriented to person, place, and time. No cranial nerve deficit. Coordination normal.   Skin: Skin is warm and dry. No rash noted. She is not diaphoretic. No erythema.   Psychiatric: She has a normal mood and affect. Her behavior is normal. Judgment and thought content normal.   Vitals reviewed.        DATA:  Lab Results   Component Value Date/Time    SODIUM 133 (L) 01/07/2018 07:20 AM    POTASSIUM 6.0 (H) 01/07/2018 07:20 AM    CHLORIDE 97 01/07/2018 07:20 AM    CO2 22 01/07/2018 " 07:20 AM    GLUCOSE 91 01/07/2018 07:20 AM    BUN 81 (HH) 01/07/2018 07:20 AM    CREATININE 12.78 (HH) 01/07/2018 07:20 AM    CREATININE 2.2 (H) 05/06/2009 03:10 AM      Lab Results   Component Value Date/Time    PROTHROMBTM 17.6 (H) 01/07/2018 07:20 AM    INR 1.48 (H) 01/07/2018 07:20 AM      Lab Results   Component Value Date/Time    WBC 5.7 01/07/2018 07:40 AM    RBC 2.96 (L) 01/07/2018 07:40 AM    HEMOGLOBIN 9.6 (L) 01/07/2018 07:40 AM    HEMATOCRIT 29.0 (L) 01/07/2018 07:40 AM    MCV 98.0 (H) 01/07/2018 07:40 AM    MCH 32.4 01/07/2018 07:40 AM    MCHC 33.1 (L) 01/07/2018 07:40 AM    MPV 10.0 01/07/2018 07:40 AM    NEUTSPOLYS 51.80 01/07/2018 07:40 AM    LYMPHOCYTES 38.20 01/07/2018 07:40 AM    MONOCYTES 7.20 01/07/2018 07:40 AM    EOSINOPHILS 1.60 01/07/2018 07:40 AM    BASOPHILS 0.70 01/07/2018 07:40 AM    HYPOCHROMIA 1+ 10/04/2013 03:24 PM    ANISOCYTOSIS 3+ 07/14/2007 06:15 AM        ECG personally evaluated and interpreted by me: narrow regular svt  Sinus without preexcitation  CXR personally evaluated and interpreted by me: nad  ECHO: ef 55%  Other cardiac - nonischemic nuc 3 years ago this month    ASSESSMENT:  Recurrent svt.  Had been scheduled as outpt for ablation as she breaks through on medication    PLAN:   offered eps and ablation this admission and she is eager to proceed now as had recurrences and cannot wait until February

## 2018-01-07 NOTE — ED NOTES
Adenosine 6mg IVP followed by NS 10ml flush administered.  ERP, RN and ED tech at bedside.  SVT change to NSR on monitor.  Pt tolerated well.

## 2018-01-07 NOTE — PROGRESS NOTES
Pt to T811-1. Monitor on, monitor room notified, pt currently in SR 80s.  Pt denies SOB, CP, or palpitations at this time.

## 2018-01-07 NOTE — ASSESSMENT & PLAN NOTE
Followed by Dr. Junior outpatient  I did consult nephrology for dialysis while she is inpatient  Continue sevelamer

## 2018-01-07 NOTE — CODE DOCUMENTATION
Pt in dialysis, monitor check called for , tele RNs responded to dialysis, /80 initially.  MD paged order received for 6mg Adenosine.  Administered prior to RRT being called.  HR return to Aflutter 150s post adenosine. EKG ordered.  RRT called at 1335
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
## 2018-01-07 NOTE — ED NOTES
"Isabelle Coyle    Chief Complaint   Patient presents with   • Supraventricular Tachycardia (SVT)   • Chest Pain       Pt BIBA from home.  Pt woke up around 5 am and had palpitations.  Upon EMS arrival, pt was in SVT .  2 Versed given and 100 J.  Pt now SR 90's.  Denies CP, SOB, dizziness. Pt recently here for similar diagnosis. Pt states she is compliant with medication.    Blood Pressure: (!) 171/98, Pulse: 89, Respiration: 16, Temperature: 36.7 °C (98 °F), Height: 157.5 cm (5' 2\"), Weight: 62.1 kg (137 lb), BMI (Calculated): 25.06, BSA (Calculated): 1.6, Pulse Oximetry: 93 %      "

## 2018-01-07 NOTE — PROGRESS NOTES
3hr HD started @ 1039 and ended 20 minutes early @ 1326 due to HR of 200 with c/o chest pain, bp of 103/64;500NS rinse back given;RRT was called and took over.Patient came in hypertensive @ 240/119 prior to start of HD,Dr Garcia aware and saw patient during tx.She was tolerating tx w/o any complaints despite of elevated bp  until the end when she bacame symptomatic. Net UF = 2100ml,Tx initiated via new LUAAVF with 17 g needlle but switched to RIJ TDC due to increased arterial and venous pressure.Report given to Tri Conte RN.Patient was taken back to her room by RRT.

## 2018-01-07 NOTE — PROGRESS NOTES
RN notified KATIE Perez of pt /116.  Dr. Randolph notified, requesting nephrology be notified.  Dr. Garcia notified and believes with dialysis, scheduled shortly, BP should decrease. Dialysis RN notified of this POC.

## 2018-01-08 PROBLEM — E87.1 HYPONATREMIA: Status: ACTIVE | Noted: 2018-01-08

## 2018-01-08 PROBLEM — R79.89 ELEVATED BRAIN NATRIURETIC PEPTIDE (BNP) LEVEL: Status: ACTIVE | Noted: 2018-01-08

## 2018-01-08 PROBLEM — N18.6 END STAGE RENAL DISEASE (HCC): Status: RESOLVED | Noted: 2017-08-28 | Resolved: 2018-01-08

## 2018-01-08 PROBLEM — I47.10 SUPRAVENTRICULAR TACHYCARDIA (HCC): Status: RESOLVED | Noted: 2017-12-27 | Resolved: 2018-01-08

## 2018-01-08 PROBLEM — R73.09 LOW GLUCOSE LEVEL: Status: RESOLVED | Noted: 2017-12-28 | Resolved: 2018-01-08

## 2018-01-08 LAB
ANION GAP SERPL CALC-SCNC: 13 MMOL/L (ref 0–11.9)
BASOPHILS # BLD AUTO: 0.6 % (ref 0–1.8)
BASOPHILS # BLD: 0.04 K/UL (ref 0–0.12)
BUN SERPL-MCNC: 43 MG/DL (ref 8–22)
CALCIUM SERPL-MCNC: 8.8 MG/DL (ref 8.5–10.5)
CHLORIDE SERPL-SCNC: 101 MMOL/L (ref 96–112)
CHOLEST SERPL-MCNC: 140 MG/DL (ref 100–199)
CO2 SERPL-SCNC: 20 MMOL/L (ref 20–33)
CREAT SERPL-MCNC: 8.89 MG/DL (ref 0.5–1.4)
EKG IMPRESSION: NORMAL
EOSINOPHIL # BLD AUTO: 0.15 K/UL (ref 0–0.51)
EOSINOPHIL NFR BLD: 2.4 % (ref 0–6.9)
ERYTHROCYTE [DISTWIDTH] IN BLOOD BY AUTOMATED COUNT: 48.1 FL (ref 35.9–50)
GFR SERPL CREATININE-BSD FRML MDRD: 5 ML/MIN/1.73 M 2
GLUCOSE SERPL-MCNC: 100 MG/DL (ref 65–99)
HCT VFR BLD AUTO: 31.2 % (ref 37–47)
HDLC SERPL-MCNC: 56 MG/DL
HGB BLD-MCNC: 10.4 G/DL (ref 12–16)
IMM GRANULOCYTES # BLD AUTO: 0.01 K/UL (ref 0–0.11)
IMM GRANULOCYTES NFR BLD AUTO: 0.2 % (ref 0–0.9)
LDLC SERPL CALC-MCNC: 63 MG/DL
LYMPHOCYTES # BLD AUTO: 2.55 K/UL (ref 1–4.8)
LYMPHOCYTES NFR BLD: 41.3 % (ref 22–41)
MCH RBC QN AUTO: 32.4 PG (ref 27–33)
MCHC RBC AUTO-ENTMCNC: 33.3 G/DL (ref 33.6–35)
MCV RBC AUTO: 97.2 FL (ref 81.4–97.8)
MONOCYTES # BLD AUTO: 0.57 K/UL (ref 0–0.85)
MONOCYTES NFR BLD AUTO: 9.2 % (ref 0–13.4)
NEUTROPHILS # BLD AUTO: 2.86 K/UL (ref 2–7.15)
NEUTROPHILS NFR BLD: 46.3 % (ref 44–72)
NRBC # BLD AUTO: 0 K/UL
NRBC BLD-RTO: 0 /100 WBC
PLATELET # BLD AUTO: 188 K/UL (ref 164–446)
PMV BLD AUTO: 10.1 FL (ref 9–12.9)
POTASSIUM SERPL-SCNC: 4.7 MMOL/L (ref 3.6–5.5)
RBC # BLD AUTO: 3.21 M/UL (ref 4.2–5.4)
SODIUM SERPL-SCNC: 134 MMOL/L (ref 135–145)
TRIGL SERPL-MCNC: 106 MG/DL (ref 0–149)
WBC # BLD AUTO: 6.2 K/UL (ref 4.8–10.8)

## 2018-01-08 PROCEDURE — A9270 NON-COVERED ITEM OR SERVICE: HCPCS | Performed by: INTERNAL MEDICINE

## 2018-01-08 PROCEDURE — 304952 HCHG R 2 PADS

## 2018-01-08 PROCEDURE — 99233 SBSQ HOSP IP/OBS HIGH 50: CPT | Performed by: INTERNAL MEDICINE

## 2018-01-08 PROCEDURE — 02583ZZ DESTRUCTION OF CONDUCTION MECHANISM, PERCUTANEOUS APPROACH: ICD-10-PCS | Performed by: INTERNAL MEDICINE

## 2018-01-08 PROCEDURE — 700101 HCHG RX REV CODE 250

## 2018-01-08 PROCEDURE — 93005 ELECTROCARDIOGRAM TRACING: CPT | Performed by: INTERNAL MEDICINE

## 2018-01-08 PROCEDURE — C1894 INTRO/SHEATH, NON-LASER: HCPCS

## 2018-01-08 PROCEDURE — 700111 HCHG RX REV CODE 636 W/ 250 OVERRIDE (IP)

## 2018-01-08 PROCEDURE — 4A0234Z MEASUREMENT OF CARDIAC ELECTRICAL ACTIVITY, PERCUTANEOUS APPROACH: ICD-10-PCS | Performed by: INTERNAL MEDICINE

## 2018-01-08 PROCEDURE — 305383 HCHG CARTO3 REF PATCH

## 2018-01-08 PROCEDURE — 700111 HCHG RX REV CODE 636 W/ 250 OVERRIDE (IP): Performed by: INTERNAL MEDICINE

## 2018-01-08 PROCEDURE — 700102 HCHG RX REV CODE 250 W/ 637 OVERRIDE(OP): Performed by: INTERNAL MEDICINE

## 2018-01-08 PROCEDURE — 99152 MOD SED SAME PHYS/QHP 5/>YRS: CPT

## 2018-01-08 PROCEDURE — C1732 CATH, EP, DIAG/ABL, 3D/VECT: HCPCS

## 2018-01-08 PROCEDURE — 93613 INTRACARDIAC EPHYS 3D MAPG: CPT

## 2018-01-08 PROCEDURE — 93653 COMPRE EP EVAL TX SVT: CPT

## 2018-01-08 PROCEDURE — 93010 ELECTROCARDIOGRAM REPORT: CPT | Performed by: INTERNAL MEDICINE

## 2018-01-08 PROCEDURE — 770020 HCHG ROOM/CARE - TELE (206)

## 2018-01-08 PROCEDURE — 85025 COMPLETE CBC W/AUTO DIFF WBC: CPT

## 2018-01-08 PROCEDURE — 4A023FZ MEASUREMENT OF CARDIAC RHYTHM, PERCUTANEOUS APPROACH: ICD-10-PCS | Performed by: INTERNAL MEDICINE

## 2018-01-08 PROCEDURE — 02K83ZZ MAP CONDUCTION MECHANISM, PERCUTANEOUS APPROACH: ICD-10-PCS | Performed by: INTERNAL MEDICINE

## 2018-01-08 PROCEDURE — 93623 PRGRMD STIMJ&PACG IV RX NFS: CPT

## 2018-01-08 PROCEDURE — 99153 MOD SED SAME PHYS/QHP EA: CPT

## 2018-01-08 PROCEDURE — C1730 CATH, EP, 19 OR FEW ELECT: HCPCS

## 2018-01-08 PROCEDURE — 80048 BASIC METABOLIC PNL TOTAL CA: CPT

## 2018-01-08 PROCEDURE — C1731 CATH, EP, 20 OR MORE ELEC: HCPCS

## 2018-01-08 PROCEDURE — 36415 COLL VENOUS BLD VENIPUNCTURE: CPT

## 2018-01-08 PROCEDURE — 93655 ICAR CATH ABLTJ DSCRT ARRHYT: CPT

## 2018-01-08 PROCEDURE — 80061 LIPID PANEL: CPT

## 2018-01-08 PROCEDURE — 700111 HCHG RX REV CODE 636 W/ 250 OVERRIDE (IP): Performed by: NURSE PRACTITIONER

## 2018-01-08 PROCEDURE — 360980 HCHG SINGLE TRANSDUCER

## 2018-01-08 PROCEDURE — 700101 HCHG RX REV CODE 250: Performed by: INTERNAL MEDICINE

## 2018-01-08 RX ORDER — MIDAZOLAM HYDROCHLORIDE 1 MG/ML
INJECTION INTRAMUSCULAR; INTRAVENOUS
Status: COMPLETED
Start: 2018-01-08 | End: 2018-01-08

## 2018-01-08 RX ORDER — ADENOSINE 3 MG/ML
INJECTION, SOLUTION INTRAVENOUS
Status: ACTIVE
Start: 2018-01-08 | End: 2018-01-08

## 2018-01-08 RX ORDER — ISOPROTERENOL HYDROCHLORIDE 0.2 MG/ML
INJECTION, SOLUTION INTRAVENOUS
Status: COMPLETED
Start: 2018-01-08 | End: 2018-01-08

## 2018-01-08 RX ORDER — LABETALOL 100 MG/1
200 TABLET, FILM COATED ORAL TWICE DAILY
Status: DISCONTINUED | OUTPATIENT
Start: 2018-01-08 | End: 2018-01-09 | Stop reason: HOSPADM

## 2018-01-08 RX ORDER — LISINOPRIL 20 MG/1
20 TABLET ORAL
Status: DISCONTINUED | OUTPATIENT
Start: 2018-01-08 | End: 2018-01-09 | Stop reason: HOSPADM

## 2018-01-08 RX ORDER — LIDOCAINE HYDROCHLORIDE 20 MG/ML
INJECTION, SOLUTION INFILTRATION; PERINEURAL
Status: COMPLETED
Start: 2018-01-08 | End: 2018-01-08

## 2018-01-08 RX ORDER — ENALAPRILAT 1.25 MG/ML
1.25 INJECTION INTRAVENOUS EVERY 6 HOURS PRN
Status: DISCONTINUED | OUTPATIENT
Start: 2018-01-08 | End: 2018-01-08

## 2018-01-08 RX ORDER — LABETALOL HYDROCHLORIDE 5 MG/ML
20 INJECTION, SOLUTION INTRAVENOUS EVERY 6 HOURS PRN
Status: DISCONTINUED | OUTPATIENT
Start: 2018-01-08 | End: 2018-01-09 | Stop reason: HOSPADM

## 2018-01-08 RX ORDER — ADENOSINE 3 MG/ML
12 INJECTION, SOLUTION INTRAVENOUS
Status: DISCONTINUED | OUTPATIENT
Start: 2018-01-08 | End: 2018-01-08

## 2018-01-08 RX ADMIN — ENALAPRILAT 1.25 MG: 1.25 INJECTION INTRAVENOUS at 00:12

## 2018-01-08 RX ADMIN — MIDAZOLAM 2 MG: 1 INJECTION INTRAMUSCULAR; INTRAVENOUS at 17:07

## 2018-01-08 RX ADMIN — MIDAZOLAM 2 MG: 1 INJECTION INTRAMUSCULAR; INTRAVENOUS at 18:19

## 2018-01-08 RX ADMIN — VANCOMYCIN HYDROCHLORIDE 1000 MG: 1 INJECTION, POWDER, LYOPHILIZED, FOR SOLUTION INTRAVENOUS at 16:24

## 2018-01-08 RX ADMIN — TERAZOSIN HYDROCHLORIDE ANHYDROUS 2 MG: 2 CAPSULE ORAL at 22:56

## 2018-01-08 RX ADMIN — LABETALOL HYDROCHLORIDE 200 MG: 100 TABLET, FILM COATED ORAL at 21:14

## 2018-01-08 RX ADMIN — FENTANYL CITRATE 100 MCG: 50 INJECTION, SOLUTION INTRAMUSCULAR; INTRAVENOUS at 18:19

## 2018-01-08 RX ADMIN — FENTANYL CITRATE 100 MCG: 50 INJECTION, SOLUTION INTRAMUSCULAR; INTRAVENOUS at 16:23

## 2018-01-08 RX ADMIN — FENTANYL CITRATE 100 MCG: 50 INJECTION, SOLUTION INTRAMUSCULAR; INTRAVENOUS at 17:07

## 2018-01-08 RX ADMIN — HEPARIN SODIUM 2000 UNITS: 200 INJECTION, SOLUTION INTRAVENOUS at 18:20

## 2018-01-08 RX ADMIN — STANDARDIZED SENNA CONCENTRATE AND DOCUSATE SODIUM 2 TABLET: 8.6; 5 TABLET, FILM COATED ORAL at 21:13

## 2018-01-08 RX ADMIN — ISOPROTERENOL HYDROCHLORIDE 200 MCG: 0.2 INJECTION, SOLUTION INTRACARDIAC; INTRAMUSCULAR; INTRAVENOUS; SUBCUTANEOUS at 18:19

## 2018-01-08 RX ADMIN — LIDOCAINE HYDROCHLORIDE: 20 INJECTION, SOLUTION INFILTRATION; PERINEURAL at 16:22

## 2018-01-08 RX ADMIN — LABETALOL HYDROCHLORIDE 20 MG: 5 INJECTION, SOLUTION INTRAVENOUS at 22:57

## 2018-01-08 RX ADMIN — LISINOPRIL 20 MG: 20 TABLET ORAL at 10:25

## 2018-01-08 RX ADMIN — FAMOTIDINE 20 MG: 20 TABLET, FILM COATED ORAL at 08:26

## 2018-01-08 RX ADMIN — LABETALOL HYDROCHLORIDE 200 MG: 100 TABLET, FILM COATED ORAL at 03:40

## 2018-01-08 RX ADMIN — MIDAZOLAM 4 MG: 1 INJECTION INTRAMUSCULAR; INTRAVENOUS at 16:22

## 2018-01-08 RX ADMIN — HEPARIN SODIUM 2000 UNITS: 200 INJECTION, SOLUTION INTRAVENOUS at 16:22

## 2018-01-08 RX ADMIN — ADENOSINE 12 MG: 3 INJECTION, SOLUTION INTRAVENOUS at 00:32

## 2018-01-08 ASSESSMENT — ENCOUNTER SYMPTOMS
DIARRHEA: 0
NAUSEA: 0
FOCAL WEAKNESS: 0
COUGH: 0
SORE THROAT: 0
ABDOMINAL PAIN: 0
MYALGIAS: 0
BACK PAIN: 0
DEPRESSION: 0
HALLUCINATIONS: 0
SHORTNESS OF BREATH: 0
NERVOUS/ANXIOUS: 1
PALPITATIONS: 1
HEARTBURN: 0
WEAKNESS: 0
FEVER: 0
DIZZINESS: 0
VOMITING: 0
BLOOD IN STOOL: 0
CHILLS: 0
HEADACHES: 0

## 2018-01-08 ASSESSMENT — PAIN SCALES - GENERAL
PAINLEVEL_OUTOF10: 0

## 2018-01-08 NOTE — PROGRESS NOTES
Monitor summary: .16/.10/.38 SR 79-94 with episodes of symptomatic SVT in the 200s.  Adenosine 6mg IVP given.  Pt again went in to SVT shortly after and 12mg IVP adenosine given.

## 2018-01-08 NOTE — PROGRESS NOTES
Monitor check called for patient converting into SVT. STAT page cardiology. Dr. Helton returned page. Telephone order received to give prn adenosine. Medications pushed per MAR.

## 2018-01-08 NOTE — PROGRESS NOTES
Pt scheduled for dialysis MWF.  RN notified Dr. Muro that pt is scheduled for an ablation this afternoon and has been in an out of SVT requiring adenosine multiple times.  MD would like dialysis after ablation.  Dialysis RN notified.

## 2018-01-08 NOTE — PROGRESS NOTES
Monitor check called at 1324. Pt in dialysis. RN and charge RN to dialysis.  RRT initiated.  KATIE Perez notified, and stated that Dr. Randolph was notified as well.  Pt symptomatic in SVT HR 200s, c/o CP, SOB, systolic  (SBP had been above 200 during dialysis).  Pt connected to pads and Zoll, crash cart at bedside.  KATIE Perez ordered 6mg IVP adenosine.  Administered.  Pt converted to SB/SR briefly and converted to Aflutter HR 120s, confirmed by EKG.  Pt states relief from symptoms for several minutes, however pt again converted to SVT HR > 200, 12mg IVP adenosine ordered and administered.  Dr. Randolph and KATIE Perez at bedside in dialysis.  Pt transferred back to [facility]  Monitor room notified.

## 2018-01-08 NOTE — PROGRESS NOTES
Spoke in person w/ Hospitalist on call Dr. Leone re: pt high Bps. Let him know day shift RN reported to me that Mds on day shift were aware of high Bps and were ok with them given pt having an ablation in the Am.  Reviewed current Bps w/ MD - he stated he will put in orders for PRN BP med and parameters

## 2018-01-08 NOTE — PROGRESS NOTES
Called Dr. Helton, Cardiologist on call - double check w/ /97 and HR 83 to go ahead and give labetalol dose?  He confirmed to give now

## 2018-01-08 NOTE — DISCHARGE PLANNING
Outpatient Dialysis Note    Confirmed patient is active at:    [facility]  [address] [id number]       Schedule: Monday, Wednesday, Friday  Time: 3:25 pm    Spoke with Abby at facility who confirmed.    Forwarded records for review.    Dialysis Coordinator, [facility]  Alexia Ayoub [phone]

## 2018-01-08 NOTE — DISCHARGE PLANNING
Care Transition Team Assessment    IHD met with pt at bedside. Pt currently lives at home with two roommates, and states that she will have her SO provide transportation home upon d/c. Pt does not have DME, home O2, or HHC at this time.     Information Source  Orientation : Oriented x 4  Information Given By: Patient  Informant's Name: Isabelle  Who is responsible for making decisions for patient? : Patient         Elopement Risk  Legal Hold: No  Ambulatory or Self Mobile in Wheelchair: Yes  Disoriented: No  Psychiatric Symptoms: None  History of Wandering: No  Elopement this Admit: No  Vocalizing Wanting to Leave: No  Displays Behaviors, Body Language Wanting to Leave: No-Not at Risk for Elopement  Elopement Risk: Not at Risk for Elopement    Interdisciplinary Discharge Planning  Does Admitting Nurse Feel This Could be a Complex Discharge?: No  Primary Care Physician: JEANETTE  Lives with - Patient's Self Care Capacity: Unrelated Adult  Support Systems: Friends / Neighbors, Family Member(s)  Housing / Facility: [address]  Do You Take your Prescribed Medications Regularly: Yes  Able to Return to Previous ADL's: Yes  Mobility Issues: No  Prior Services: None  Patient Expects to be Discharged to:: home  Assistance Needed: No  Durable Medical Equipment: Not Applicable    Discharge Preparedness  What is your plan after discharge?: Home with help  What are your discharge supports?: Other (comment) (roommates;significant other)  Prior Functional Level: Ambulatory, Drives Self, Independent with Activities of Daily Living, Independent with Medication Management  Difficulity with ADLs: None  Difficulity with IADLs: None    Functional Assesment  Prior Functional Level: Ambulatory, Drives Self, Independent with Activities of Daily Living, Independent with Medication Management    Finances  Financial Barriers to Discharge: No  Prescription Coverage: Yes ([employer] on [address])    Vision / Hearing Impairment  Vision  Impairment : Yes  Right Eye Vision: Impaired, Wears Glasses  Left Eye Vision: Impaired, Wears Glasses  Hearing Impairment : No    Values / Beliefs / Concerns  Values / Beliefs Concerns : No         Domestic Abuse  Have you ever been the victim of abuse or violence?: No  Physical Abuse or Sexual Abuse: No  Verbal Abuse or Emotional Abuse: No  Possible Abuse Reported to:: Not Applicable    Psychological Assessment  History of Substance Abuse: None  History of Psychiatric Problems: No  Non-compliant with Treatment: No    Discharge Risks or Barriers  Discharge risks or barriers?: No    Anticipated Discharge Information  Anticipated discharge disposition: Home  Discharge Address: [address] [place] [address]  Discharge Contact Phone Number: [phone]

## 2018-01-08 NOTE — PROGRESS NOTES
Bedside report w/ Antionette Bartlett.  Assumed care of pt, call light w/in reach, and fall prec in place.  Bed locked in lowest position. Pt instructed to call for assistance before getting out of bed.  All questions answered, no needs at this time.  Instructed pt to call w/ any feelings she had earlier today - CP SOB or otherwise.

## 2018-01-08 NOTE — PROGRESS NOTES
Dr. Helton paged - arrived bedside 1250.  MD saw current rhythm on monitor and reviewed Bps.  Discussed that  Enalapril was being given per Hospitalist order and during administration Monitor check was called w/ HR >170.  Md gave orders w/ verbal readback.

## 2018-01-08 NOTE — PROGRESS NOTES
Received report from previous nurse regarding prior 12 hours.  POC reviewed with pt, white board updated, pt verbalized understanding, call light within reach.  Pt encouraged to call before getting up.  Bed in lowest position, treaded slippers on.

## 2018-01-08 NOTE — CARE PLAN
Problem: Safety  Goal: Will remain free from falls  Discussed w/ pt need to call and have standby assist while in the hospital    Problem: Venous Thromboembolism (VTW)/Deep Vein Thrombosis (DVT) Prevention:  Goal: Patient will participate in Venous Thrombosis (VTE)/Deep Vein Thrombosis (DVT)Prevention Measures    Intervention: Encourage ambulation/mobilization at level directed by Physical Therapy in collaboration with Interdisciplinary Team  Encouraged pt to ambulate in hospital to prevent DVTs

## 2018-01-09 VITALS
OXYGEN SATURATION: 96 % | SYSTOLIC BLOOD PRESSURE: 130 MMHG | HEIGHT: 62 IN | TEMPERATURE: 97.1 F | DIASTOLIC BLOOD PRESSURE: 84 MMHG | RESPIRATION RATE: 16 BRPM | WEIGHT: 128.09 LBS | BODY MASS INDEX: 23.57 KG/M2 | HEART RATE: 80 BPM

## 2018-01-09 LAB
ALBUMIN SERPL BCP-MCNC: 3.9 G/DL (ref 3.2–4.9)
BASOPHILS # BLD AUTO: 0.5 % (ref 0–1.8)
BASOPHILS # BLD: 0.03 K/UL (ref 0–0.12)
BUN SERPL-MCNC: 58 MG/DL (ref 8–22)
CALCIUM SERPL-MCNC: 8.8 MG/DL (ref 8.5–10.5)
CHLORIDE SERPL-SCNC: 101 MMOL/L (ref 96–112)
CO2 SERPL-SCNC: 25 MMOL/L (ref 20–33)
CREAT SERPL-MCNC: 11.42 MG/DL (ref 0.5–1.4)
EKG IMPRESSION: NORMAL
EOSINOPHIL # BLD AUTO: 0.14 K/UL (ref 0–0.51)
EOSINOPHIL NFR BLD: 2.6 % (ref 0–6.9)
ERYTHROCYTE [DISTWIDTH] IN BLOOD BY AUTOMATED COUNT: 47.9 FL (ref 35.9–50)
GLUCOSE SERPL-MCNC: 112 MG/DL (ref 65–99)
HCT VFR BLD AUTO: 31 % (ref 37–47)
HGB BLD-MCNC: 10 G/DL (ref 12–16)
IMM GRANULOCYTES # BLD AUTO: 0.01 K/UL (ref 0–0.11)
IMM GRANULOCYTES NFR BLD AUTO: 0.2 % (ref 0–0.9)
LYMPHOCYTES # BLD AUTO: 1.48 K/UL (ref 1–4.8)
LYMPHOCYTES NFR BLD: 27.1 % (ref 22–41)
MCH RBC QN AUTO: 31.3 PG (ref 27–33)
MCHC RBC AUTO-ENTMCNC: 32.3 G/DL (ref 33.6–35)
MCV RBC AUTO: 96.9 FL (ref 81.4–97.8)
MONOCYTES # BLD AUTO: 0.45 K/UL (ref 0–0.85)
MONOCYTES NFR BLD AUTO: 8.2 % (ref 0–13.4)
NEUTROPHILS # BLD AUTO: 3.35 K/UL (ref 2–7.15)
NEUTROPHILS NFR BLD: 61.4 % (ref 44–72)
NRBC # BLD AUTO: 0 K/UL
NRBC BLD-RTO: 0 /100 WBC
PHOSPHATE SERPL-MCNC: 6.3 MG/DL (ref 2.5–4.5)
PLATELET # BLD AUTO: 174 K/UL (ref 164–446)
PMV BLD AUTO: 10.1 FL (ref 9–12.9)
POTASSIUM SERPL-SCNC: 4.8 MMOL/L (ref 3.6–5.5)
RBC # BLD AUTO: 3.2 M/UL (ref 4.2–5.4)
SODIUM SERPL-SCNC: 137 MMOL/L (ref 135–145)
WBC # BLD AUTO: 5.5 K/UL (ref 4.8–10.8)

## 2018-01-09 PROCEDURE — A9270 NON-COVERED ITEM OR SERVICE: HCPCS | Performed by: INTERNAL MEDICINE

## 2018-01-09 PROCEDURE — 90935 HEMODIALYSIS ONE EVALUATION: CPT

## 2018-01-09 PROCEDURE — 85025 COMPLETE CBC W/AUTO DIFF WBC: CPT

## 2018-01-09 PROCEDURE — 700111 HCHG RX REV CODE 636 W/ 250 OVERRIDE (IP): Performed by: INTERNAL MEDICINE

## 2018-01-09 PROCEDURE — 700102 HCHG RX REV CODE 250 W/ 637 OVERRIDE(OP): Performed by: INTERNAL MEDICINE

## 2018-01-09 PROCEDURE — 93010 ELECTROCARDIOGRAM REPORT: CPT | Performed by: INTERNAL MEDICINE

## 2018-01-09 PROCEDURE — A9270 NON-COVERED ITEM OR SERVICE: HCPCS | Performed by: HOSPITALIST

## 2018-01-09 PROCEDURE — 700111 HCHG RX REV CODE 636 W/ 250 OVERRIDE (IP)

## 2018-01-09 PROCEDURE — 36415 COLL VENOUS BLD VENIPUNCTURE: CPT

## 2018-01-09 PROCEDURE — 80069 RENAL FUNCTION PANEL: CPT

## 2018-01-09 PROCEDURE — 700102 HCHG RX REV CODE 250 W/ 637 OVERRIDE(OP): Performed by: HOSPITALIST

## 2018-01-09 PROCEDURE — 99239 HOSP IP/OBS DSCHRG MGMT >30: CPT | Performed by: INTERNAL MEDICINE

## 2018-01-09 PROCEDURE — 93005 ELECTROCARDIOGRAM TRACING: CPT | Performed by: INTERNAL MEDICINE

## 2018-01-09 RX ORDER — LISINOPRIL 40 MG/1
40 TABLET ORAL DAILY
Qty: 30 TAB | Refills: 2 | Status: SHIPPED | OUTPATIENT
Start: 2018-01-09 | End: 2018-06-13 | Stop reason: SDUPTHER

## 2018-01-09 RX ORDER — HEPARIN SODIUM 1000 [USP'U]/ML
2000 INJECTION, SOLUTION INTRAVENOUS; SUBCUTANEOUS
Status: DISCONTINUED | OUTPATIENT
Start: 2018-01-09 | End: 2018-01-09 | Stop reason: HOSPADM

## 2018-01-09 RX ORDER — LABETALOL 200 MG/1
200 TABLET, FILM COATED ORAL 2 TIMES DAILY
Qty: 60 TAB | Refills: 2 | Status: SHIPPED | OUTPATIENT
Start: 2018-01-09 | End: 2018-06-13 | Stop reason: SDUPTHER

## 2018-01-09 RX ORDER — CALCIUM CARBONATE 500 MG/1
500 TABLET, CHEWABLE ORAL ONCE
Status: COMPLETED | OUTPATIENT
Start: 2018-01-09 | End: 2018-01-09

## 2018-01-09 RX ORDER — HEPARIN SODIUM 1000 [USP'U]/ML
INJECTION, SOLUTION INTRAVENOUS; SUBCUTANEOUS
Status: COMPLETED
Start: 2018-01-09 | End: 2018-01-09

## 2018-01-09 RX ADMIN — FAMOTIDINE 20 MG: 20 TABLET, FILM COATED ORAL at 07:38

## 2018-01-09 RX ADMIN — HEPARIN SODIUM 2000 UNITS: 1000 INJECTION, SOLUTION INTRAVENOUS; SUBCUTANEOUS at 07:48

## 2018-01-09 RX ADMIN — LABETALOL HYDROCHLORIDE 200 MG: 100 TABLET, FILM COATED ORAL at 07:38

## 2018-01-09 RX ADMIN — RENAGEL 800 MG: 800 TABLET ORAL at 12:43

## 2018-01-09 RX ADMIN — LISINOPRIL 20 MG: 20 TABLET ORAL at 07:39

## 2018-01-09 RX ADMIN — ANTACID TABLETS 500 MG: 500 TABLET, CHEWABLE ORAL at 04:08

## 2018-01-09 RX ADMIN — HEPARIN SODIUM 3700 UNITS: 1000 INJECTION, SOLUTION INTRAVENOUS; SUBCUTANEOUS at 10:53

## 2018-01-09 RX ADMIN — RENAGEL 800 MG: 800 TABLET ORAL at 07:39

## 2018-01-09 RX ADMIN — EPOETIN ALFA 4000 UNITS: 4000 SOLUTION INTRAVENOUS; SUBCUTANEOUS at 12:42

## 2018-01-09 ASSESSMENT — ENCOUNTER SYMPTOMS
LOSS OF CONSCIOUSNESS: 0
FEVER: 0
NAUSEA: 0
PND: 0
BRUISES/BLEEDS EASILY: 0
SPEECH CHANGE: 0
CLAUDICATION: 0
GASTROINTESTINAL NEGATIVE: 1
CONSTITUTIONAL NEGATIVE: 1
HEARTBURN: 0
MYALGIAS: 0
RESPIRATORY NEGATIVE: 1
CARDIOVASCULAR NEGATIVE: 1
CHILLS: 0
ORTHOPNEA: 0
DOUBLE VISION: 0
WHEEZING: 0
BLOOD IN STOOL: 0
BLURRED VISION: 0
EYES NEGATIVE: 1
HEADACHES: 0
FOCAL WEAKNESS: 0
SHORTNESS OF BREATH: 0
PSYCHIATRIC NEGATIVE: 1
ABDOMINAL PAIN: 0
SORE THROAT: 0
NEUROLOGICAL NEGATIVE: 1
DIZZINESS: 1
COUGH: 0
PALPITATIONS: 0
MUSCULOSKELETAL NEGATIVE: 1
VOMITING: 0

## 2018-01-09 ASSESSMENT — PAIN SCALES - GENERAL
PAINLEVEL_OUTOF10: 0
PAINLEVEL_OUTOF10: 0

## 2018-01-09 NOTE — PROGRESS NOTES
Assumed care at 0700, bedside report received from night shift RN. Patient is AOx4 with no signs of distress. Pt. Is ST on the monitor. Initial assessment completed, orders reviewed, call light within reach, and hourly rounding in place. POC addressed with patient, no additional questions at this time.

## 2018-01-09 NOTE — PROGRESS NOTES
[facility] Nephrology Progress Note, Adult, Complex               Author: Tony Cadena M.D. Date & Time created: 1/9/2018  8:29 AM     Interval History:  52-year-old female with   history of end-stage renal disease, on hemodialysis Monday, Wednesday, and   Friday at [facility], presented with palpitations,   chest pain, was found to be in SVT, status post shocked into sinus rhythm by   paramedics.  In the emergency room, she went into SVT again and was given   adenosine and again returned to sinus rhythm.  She is currently on telemetry   with no complaints of shortness of breath, headache.  Her potassium is 6, and   she has BNP over 1500; hence, nephrology was called to evaluate her for her   ESRD and dialysis management and hyperkalemia.    DAILY NEPHROLOGY PROGRESS:  01/07/18 - Consult done.HD for Hyperkalemia.  01/08/18 - Had HD yesterday.For ablation today.  01/09/18 - Had ablation yesterday.Feels fine.Seen  on dialysis.    Review of Systems:  Review of Systems   Constitutional: Negative.    HENT: Negative.    Eyes: Negative.    Respiratory: Negative.    Cardiovascular: Negative.    Gastrointestinal: Negative.    Genitourinary: Negative.    Musculoskeletal: Negative.    Skin: Negative.    Neurological: Negative.    Endo/Heme/Allergies: Negative.    Psychiatric/Behavioral: Negative.        Physical Exam:  Physical Exam   Constitutional: She is oriented to person, place, and time. She appears well-developed and well-nourished. No distress.   HENT:   Head: Normocephalic and atraumatic.   Eyes: Conjunctivae are normal. Pupils are equal, round, and reactive to light. No scleral icterus.   Neck: Normal range of motion. Neck supple.   RIJ PC   Cardiovascular: Normal rate and regular rhythm.  Exam reveals no friction rub.    Murmur heard.  Pulmonary/Chest: Effort normal and breath sounds normal. No respiratory distress. She has no wheezes. She has no rales.   Abdominal: Soft. Bowel sounds  are normal. She exhibits no distension. There is no tenderness. There is no rebound and no guarding.   Musculoskeletal: She exhibits no edema.   LUE AVF   Lymphadenopathy:     She has no cervical adenopathy.   Neurological: She is alert and oriented to person, place, and time.   Skin: Skin is warm and dry. No rash noted. She is not diaphoretic. No erythema.   Tattoos   Psychiatric: She has a normal mood and affect. Her behavior is normal. Judgment and thought content normal.   Nursing note and vitals reviewed.      Labs:        Invalid input(s): [id number]  Recent Labs      01/07/18 0720   TROPONINI  0.03   BNPBTYPENAT  1513*     Recent Labs      01/07/18 0720 01/08/18 0258 01/09/18 0222   SODIUM  133*  134*  137   POTASSIUM  6.0*  4.7  4.8   CHLORIDE  97  101  101   CO2  22  20  25   BUN  81*  43*  58*   CREATININE  12.78*  8.89*  11.42*   PHOSPHORUS   --    --   6.3*   CALCIUM  9.2  8.8  8.8     Recent Labs      01/07/18 0720 01/08/18 0258 01/09/18 0222   ALTSGPT  <5   --    --    ASTSGOT  21   --    --    ALKPHOSPHAT  49   --    --    TBILIRUBIN  0.6   --    --    LIPASE  23   --    --    GLUCOSE  91  100*  112*     Recent Labs      01/07/18 0720 01/07/18 0740 01/08/18 0258 01/09/18 0222   RBC   --   2.96*  3.21*  3.20*   HEMOGLOBIN   --   9.6*  10.4*  10.0*   HEMATOCRIT   --   29.0*  31.2*  31.0*   PLATELETCT   --   158*  188  174   PROTHROMBTM  17.6*   --    --    --    APTT  22.1*   --    --    --    INR  1.48*   --    --    --      Recent Labs      01/07/18 0720 01/07/18 0740 01/08/18 0258 01/09/18 0222   WBC   --   5.7  6.2  5.5   NEUTSPOLYS   --   51.80  46.30  61.40   LYMPHOCYTES   --   38.20  41.30*  27.10   MONOCYTES   --   7.20  9.20  8.20   EOSINOPHILS   --   1.60  2.40  2.60   BASOPHILS   --   0.70  0.60  0.50   ASTSGOT  21   --    --    --    ALTSGPT  <5   --    --    --    ALKPHOSPHAT  49   --    --    --    TBILIRUBIN  0.6   --    --    --             Hemodynamics:  Temp (24hrs), Av.4 °C (97.5 °F), Min:36.2 °C (97.2 °F), Max:36.7 °C (98.1 °F)  Temperature: 36.4 °C (97.6 °F)  Pulse  Av.5  Min: 69  Max: 199   Blood Pressure: 160/99 (RN Notified)     Respiratory:    Respiration: 17, Pulse Oximetry: 96 %        RUL Breath Sounds: Clear, RML Breath Sounds: Clear, RLL Breath Sounds: Clear, PHILIP Breath Sounds: Clear, LLL Breath Sounds: Clear  Fluids:  No intake or output data in the 24 hours ending 18 0829     GI/Nutrition:  Orders Placed This Encounter   Procedures   • Diet Order     Standing Status:   Standing     Number of Occurrences:   1     Order Specific Question:   Diet:     Answer:   Cardiac [6]     Medical Decision Making, by Problem:  Active Hospital Problems    Diagnosis   • *SVT (supraventricular tachycardia) (CMS-HCC) [I47.1]   • Hyperkalemia [E87.5]   • End-stage renal disease (ESRD) (CMS-[facility]) [N18.6]   • Severe uncontrolled hypertension [I10]   • Anemia of chronic disease [D63.8]   • Hx of right BKA (CMS-[facility]) [Z89.511]   • Hyperlipidemia [E78.5]   • Hyponatremia [E87.1]   • Elevated brain natriuretic peptide (BNP) level [R79.89]   • Tobacco use [Z72.0]     PMH/FH/SH reviewed    Reviewed items::  Labs reviewed, Medications reviewed and Radiology images reviewed  Murdock catheter::  No Murdock    ASSESSMENT:  1.  End-stage renal disease, on hemodialysis Monday, Wednesday, and Friday.  2.  Hyperkalemia.Corected  3.  Anemia of chronic kidney disease.At target  4.  CKD-MBD.  5.  Acidosis.Corrected  6.  Supraventricular tachycardia.S/P ablation 18  7.  Hypertension.Controlled  8.  R BKA  9.  SCAR ERAZO AVF     PLAN:    Dialysis today and MWF  Possible d/c today  Renally dose medications as necessary.  Low sodium,low K diet.No dietary protein restrictions   Epogen  Follow labs

## 2018-01-09 NOTE — PROGRESS NOTES
3hr HD started @ 0752 and completed @ 1053,net UF = 1530 ml limited by episode of hypotension and c/o dizziness. 200NS bolus given with help.VSS post HD no further complaints of dizziness,RIJ TDC dressing CDI,due for change today,report given to Keyanna Hilton RN.

## 2018-01-09 NOTE — DISCHARGE INSTRUCTIONS
Discharge Instructions    Discharged to home by car with relative. Discharged via walking, hospital escort: Refused.  Special equipment needed: Not Applicable    Be sure to schedule a follow-up appointment with your primary care doctor or any specialists as instructed.     Discharge Plan:   Smoking Cessation Offered: Patient Refused  Influenza Vaccine Indication: Not indicated: Previously immunized this influenza season and > 8 years of age    I understand that a diet low in cholesterol, fat, and sodium is recommended for good health. Unless I have been given specific instructions below for another diet, I accept this instruction as my diet prescription.   Other diet: low sodium, low potassium    Special Instructions: None    · Is patient discharged on Warfarin / Coumadin?   No     · Is patient Post Blood Transfusion?  No    Depression / Suicide Risk    As you are discharged from this [facility] facility, it is important to learn how to keep safe from harming yourself.    Recognize the warning signs:  · Abrupt changes in personality, positive or negative- including increase in energy   · Giving away possessions  · Change in eating patterns- significant weight changes-  positive or negative  · Change in sleeping patterns- unable to sleep or sleeping all the time   · Unwillingness or inability to communicate  · Depression  · Unusual sadness, discouragement and loneliness  · Talk of wanting to die  · Neglect of personal appearance   · Rebelliousness- reckless behavior  · Withdrawal from people/activities they love  · Confusion- inability to concentrate     If you or a loved one observes any of these behaviors or has concerns about self-harm, here's what you can do:  · Talk about it- your feelings and reasons for harming yourself  · Remove any means that you might use to hurt yourself (examples: pills, rope, extension cords, firearm)  · Get professional help from the community (Mental Health, Substance Abuse,  psychological counseling)  · Do not be alone:Call your Safe Contact- someone whom you trust who will be there for you.  · Call your local CRISIS HOTLINE 450-0386 or 865-757-0675  · Call your local Children's Mobile Crisis Response Team [place] (061) 634-2262 or www.Perosphere  · Call the toll free National Suicide Prevention Hotlines   · National Suicide Prevention Lifeline 594-403-CFBR (1213)  · [employer] Line Network 800-SUICIDE (987-3487)      Paroxysmal Supraventricular Tachycardia  Paroxysmal supraventricular tachycardia (PSVT) is when your heart beats very quickly and then suddenly stops beating so quickly. You may or may not have any symptoms when this occurs. It is usually not dangerous. It can lead to problems if it happens often or it lasts a long time.  HOME CARE   · Take medicines only as told by your doctor.  · Avoid caffeine or limit how much of it you consume as told by your doctor. Caffeine is found in coffee, tea, soda, and chocolate.  · Avoid alcohol or limit how much of it you drink as told by your doctor.  · Do not smoke.  · Try to get at least 7 hours of sleep each night.  · Find healthy ways to reduce stress.  · Do self-treatments as told by your doctor to slow down your heart (vagus nerve stimulation). Your doctor may tell you to:  ¨ Hold your breath and push, as though you are going to the bathroom.  ¨ Rub an area on one side of your neck.  ¨ Bend forward with your head between your legs.  ¨ Bend forward with your head between your legs, then cough.  ¨ Rub your eyeballs with your eyes closed.  · Maintain a healthy weight.  · Get some exercise on most days. Ask your doctor about some good activities for you.  GET HELP IF:  · You are having episodes of a fast heartbeat more often.  · Your episodes are lasting longer.  · Your self-treatments to slow down your heart are no longer helping.  · You have new symptoms during an episode.  GET HELP RIGHT AWAY IF:  · You have chest  pain.  · You have trouble breathing.  · You have an episode of a fast heartbeat that lasts longer than 20 minutes.  · You pass out (faint).  These symptoms may be an emergency. Do not wait to see if the symptoms will go away. Get medical help right away. Call your local emergency services (911 in the [place] Do not drive yourself to the hospital.     This information is not intended to replace advice given to you by your health care provider. Make sure you discuss any questions you have with your health care provider.     Document Released: 12/18/2006 Document Revised: 01/08/2016 Document Reviewed: 05/28/2015  [employer] Interactive Patient Education ©2016 Elsevier Inc.      Hyperkalemia  Hyperkalemia is when you have too much potassium in your blood. Potassium is normally removed (excreted) from your body by your kidneys. If there is too much potassium in your blood, it can affect how your heart works.  HOME CARE  · Take medicines only as told by your doctor.  · Do not take any supplements, natural products, herbs, or vitamins unless your doctor says it is okay.  · Limit your alcohol intake as told by your doctor.  · Stop illegal drug use. If you need help quitting, ask your doctor.  · Keep all follow-up visits as told by your doctor. This is important.  · If you have kidney disease, you may need to follow a low potassium diet. A  (dietitian) can help you.  GET HELP IF:   · Your heartbeat is not regular or very slow.  · You feel dizzy (light-headed).  · You feel weak.  · You feel sick to your stomach (nauseous).  · You have tingling in your hands or feet.  · You cannot feel your hands or feet.  GET HELP RIGHT AWAY IF:  · You are short of breath.  · You have chest pain.  · You pass out (faint).  · You cannot move your muscles.  MAKE SURE YOU:   · Understand these instructions.  · Will watch your condition.  · Will get help right away if you are not doing well or get worse.     This information is not  intended to replace advice given to you by your health care provider. Make sure you discuss any questions you have with your health care provider.     Document Released: 12/18/2006 Document Revised: 01/08/2016 Document Reviewed: 03/25/2015  [employer] Interactive Patient Education ©2016 [employer]      Cardiac Ablation   Cardiac ablation is a procedure to stop some heart tissue from causing problems. The heart has many electrical connections. Sometimes these connections cause the heart to beat very fast or irregularly. Removing some of the problem areas can improve heart rhythm or make it normal. Ablation is done for people who:   · Have Franklin-Parkinson-White syndrome.  · Have other fast heart rhythms (tachycardia).  · Have taken medicines for an abnormal heart rhythm (arrhythmia) and the medicines had:  ¨ No success.  ¨ Side effects.  · May have a type of heartbeat that could cause death.  BEFORE THE PROCEDURE   · Follow instructions from your doctor about eating and drinking before the procedure.  · Take your medicines as told by your doctor. Take them at regular times with water unless told differently by your doctor.  · If you are taking diabetes medicine, ask your doctor how to take it. Ask if there are any special instructions you should follow. Your doctor may change how much insulin you take the day of the procedure.  PROCEDURE   · A special type of X-ray will be used. The X-ray helps your doctor see images of your heart during the procedure.  · A small cut (incision) will be made in your neck or groin.  · An IV tube will be started before the procedure begins.  · You will be given a numbing medicine (anesthetic) or a medicine to help you relax (sedative).  · The skin on your neck or groin will be numbed.  · A needle will be put into a large vein in your neck or groin.  · A thin, flexible tube (catheter) will be put in to reach your heart.  · A dye will be put in the tube. The dye will show up on X-rays. It  will help your doctor see the area of the heart that needs treatment.  · When the heart tissue that is causing problems is found, the tip of the tube will send an electrical current to it. This will stop it from causing problems.  · The tube will be taken out.  · Pressure will be put on the area where the tube was. This will keep it from bleeding. A bandage will be placed over the area.  AFTER THE PROCEDURE  · You will be taken to a recovery area. Your blood pressure, heart rate, and breathing will be watched. The area where the tube was will also be watched for bleeding.  · You will need to lie still for 4-6 hours. This keeps the area where the tube was from bleeding.     This information is not intended to replace advice given to you by your health care provider. Make sure you discuss any questions you have with your health care provider.     Document Released: 08/20/2014 Document Revised: 01/08/2016 Document Reviewed: 08/20/2014  [employer] Interactive Patient Education ©2016 Elsevier Inc.    Labetalol tablets  What is this medicine?  LABETALOL (la BET a lole) is a beta-blocker. Beta-blockers reduce the workload on the heart and help it to beat more regularly. This medicine is used to treat high blood pressure.  This medicine may be used for other purposes; ask your health care provider or pharmacist if you have questions.  COMMON BRAND NAME(S): Normodyne, Trandate  What should I tell my health care provider before I take this medicine?  They need to know if you have any of these conditions:  -diabetes  -history of heart attack, heart disease or heart failure  -kidney disease  -liver disease  -lung or breathing disease, like asthma or emphysema  -pheochromocytoma  -thyroid disease  -an unusual or allergic reaction to labetalol, other beta-blockers, medicines, foods, dyes, or preservatives  -pregnant or trying to get pregnant  -breast-feeding  How should I use this medicine?  Take this medicine by mouth with a glass  of water. Follow the directions on the prescription label. Take your doses at regular intervals. Do not take your medicine more often than directed. Do not stop taking this medicine suddenly. This could lead to serious heart-related effects.  Talk to your pediatrician regarding the use of this medicine in children. Special care may be needed.  Overdosage: If you think you have taken too much of this medicine contact a poison control center or emergency room at once.  NOTE: This medicine is only for you. Do not share this medicine with others.  What if I miss a dose?  If you miss a dose, take it as soon as you can. If it is almost time for your next dose, take only that dose. Do not take double or extra doses.  What may interact with this medicine?  Do not take this medicine with any of the following medications:  -sotalol  This medicine may also interact with the following medications:  -cimetidine  -diltiazem  -general anesthetics  -medicines for asthma or lung disease like albuterol  -medicines for high blood pressure  -medicines for depression  -nitroglycerin  -verapamil  This list may not describe all possible interactions. Give your health care provider a list of all the medicines, herbs, non-prescription drugs, or dietary supplements you use. Also tell them if you smoke, drink alcohol, or use illegal drugs. Some items may interact with your medicine.  What should I watch for while using this medicine?  Visit your doctor or health care professional for regular check ups. Check your blood pressure and pulse rate regularly. Ask your health care professional what your blood pressure and pulse rate should be, and when you should contact him or her.  You may get drowsy or dizzy. Do not drive, use machinery, or do anything that needs mental alertness until you know how this medicine affects you. Do not stand or sit up quickly. Alcohol may interfere with the effect of this medicine. Avoid alcoholic drinks.  This  medicine can affect blood sugar levels. If you have diabetes, check with your doctor or health care professional before you change your diet or the dose of your diabetic medicine.  Do not treat yourself for coughs, colds, or pain while you are taking this medicine without asking your doctor or health care professional for advice. Some ingredients may increase your blood pressure.  What side effects may I notice from receiving this medicine?  Side effects that you should report to your doctor or health care professional as soon as possible:  -allergic reactions like skin rash, itching or hives, swelling of the face, lips, or tongue  -breathing problems  -cold hands or feet  -dark urine  -depression  -general ill feeling or flu-like symptoms  -irregular heartbeat  -light-colored stools  -loss of appetite, nausea  -pain or trouble passing urine  -right upper belly pain  -slow heart rate (fewer than recommended by your doctor or health care professional)  -swollen legs or ankles  -tingling of the scalp or skin  -unusually weak or tired  -vomiting  -yellowing of the eyes or skin  Side effects that usually do not require medical attention (report to your doctor or health care professional if they continue or are bothersome):  -decreased sexual function or desire  -dry itching skin  -headache  -tiredness  This list may not describe all possible side effects. Call your doctor for medical advice about side effects. You may report side effects to FDA at 2-567-FDA-2183.  Where should I keep my medicine?  Keep out of the reach of children.  Store at room temperature between 15 and 30 degrees C (59 and 86 degrees F). Protect from light. Keep container tightly closed. Throw away any unused medicine after the expiration date.  NOTE: This sheet is a summary. It may not cover all possible information. If you have questions about this medicine, talk to your doctor, pharmacist, or health care provider.  © 2014, Elsevier/Gold Standard.  (6/22/2009 5:33:18 PM)    Epoetin Ike injection  What is this medicine?  EPOETIN IKE (e DANY e tin AL fa) helps your body make more red blood cells. This medicine is used to treat anemia caused by chronic kidney failure, cancer chemotherapy, or HIV-therapy. It may also be used before surgery if you have anemia.  This medicine may be used for other purposes; ask your health care provider or pharmacist if you have questions.  COMMON BRAND NAME(S): Epogen, Procrit  What should I tell my health care provider before I take this medicine?  They need to know if you have any of these conditions:  -blood clotting disorders  -cancer patient not on chemotherapy  -cystic fibrosis  -heart disease, such as angina or heart failure  -hemoglobin level of 12 g/dL or greater  -high blood pressure  -low levels of folate, iron, or vitamin B12  -seizures  -an unusual or allergic reaction to erythropoietin, albumin, benzyl alcohol, hamster proteins, other medicines, foods, dyes, or preservatives  -pregnant or trying to get pregnant  -breast-feeding  How should I use this medicine?  This medicine is for injection into a vein or under the skin. It is usually given by a health care professional in a hospital or clinic setting.  If you get this medicine at home, you will be taught how to prepare and give this medicine. Use exactly as directed. Take your medicine at regular intervals. Do not take your medicine more often than directed.  It is important that you put your used needles and syringes in a special sharps container. Do not put them in a trash can. If you do not have a sharps container, call your pharmacist or healthcare provider to get one.  Talk to your pediatrician regarding the use of this medicine in children. While this drug may be prescribed for selected conditions, precautions do apply.  Overdosage: If you think you have taken too much of this medicine contact a poison control center or emergency room at once.  NOTE: This  medicine is only for you. Do not share this medicine with others.  What if I miss a dose?  If you miss a dose, take it as soon as you can. If it is almost time for your next dose, take only that dose. Do not take double or extra doses.  What may interact with this medicine?  Do not take this medicine with any of the following medications:  -darbepoetin raina  This list may not describe all possible interactions. Give your health care provider a list of all the medicines, herbs, non-prescription drugs, or dietary supplements you use. Also tell them if you smoke, drink alcohol, or use illegal drugs. Some items may interact with your medicine.  What should I watch for while using this medicine?  Visit your prescriber or health care professional for regular checks on your progress and for the needed blood tests and blood pressure measurements. It is especially important for the doctor to make sure your hemoglobin level is in the desired range, to limit the risk of potential side effects and to give you the best benefit. Keep all appointments for any recommended tests. Check your blood pressure as directed. Ask your doctor what your blood pressure should be and when you should contact him or her.  As your body makes more red blood cells, you may need to take iron, folic acid, or vitamin B supplements. Ask your doctor or health care provider which products are right for you. If you have kidney disease continue dietary restrictions, even though this medication can make you feel better. Talk with your doctor or health care professional about the foods you eat and the vitamins that you take.  What side effects may I notice from receiving this medicine?  Side effects that you should report to your doctor or health care professional as soon as possible:  -allergic reactions like skin rash, itching or hives, swelling of the face, lips, or tongue  -breathing problems  -changes in vision  -chest pain  -confusion, trouble speaking  or understanding  -feeling faint or lightheaded, falls  -high blood pressure  -muscle aches or pains  -pain, swelling, warmth in the leg  -rapid weight gain  -severe headaches  -sudden numbness or weakness of the face, arm or leg  -trouble walking, dizziness, loss of balance or coordination  -seizures (convulsions)  -swelling of the ankles, feet, hands  -unusually weak or tired  Side effects that usually do not require medical attention (report to your doctor or health care professional if they continue or are bothersome):  -diarrhea  -fever, chills (flu-like symptoms)  -headaches  -nausea, vomiting  -redness, stinging, or swelling at site where injected  This list may not describe all possible side effects. Call your doctor for medical advice about side effects. You may report side effects to FDA at 3-331-FDA-0747.  Where should I keep my medicine?  Keep out of the reach of children.  Store in a refrigerator between 2 and 8 degrees C (36 and 46 degrees F). Do not freeze or shake. Throw away any unused portion if using a single-dose vial. Multi-dose vials can be kept in the refrigerator for up to 21 days after the initial dose. Throw away unused medicine.  NOTE: This sheet is a summary. It may not cover all possible information. If you have questions about this medicine, talk to your doctor, pharmacist, or health care provider.  © 2014, [employer]vier/[employer] Standard. (12/1/2009 10:25:44 AM)

## 2018-01-09 NOTE — PROGRESS NOTES
Pt. Was discharged to home via car with friend. Discharge paperwork was discussed with the patient. All personal belongs were accounted for and taken with the patient. LDA's were removed and the tele monitor was disconnected. Pt. prescriptions sent to local pharmacy.

## 2018-01-09 NOTE — CARE PLAN
Problem: Communication  Goal: The ability to communicate needs accurately and effectively will improve  Outcome: PROGRESSING AS EXPECTED  Pt. Is able to express comfort needs effectively. Pt. Denies pain or discomfort at this time and is able to vocalize wants and needs.     Problem: Safety  Goal: Will remain free from injury  Outcome: PROGRESSING AS EXPECTED  Patient is upself and steady. Bed alarms are not needed at this time and the patient does call for assistance when needed. She remains free from injury at this time.

## 2018-01-09 NOTE — OR SURGEON
Immediate Post-Operative Note      PreOp Diagnosis: svt    PostOp Diagnosis: avrt and probable avnrt    Procedure(s) :  eps and ablation of 2 foci    Surgeon(s):  Glen Crenshaw M.D.    Type of Anesthesia: Moderate Sedation    Specimen: None    Estimated Blood Loss: 20 cc's    Contrast Media:  0 cc's    Fluoro Time: <3 min        Findings: induced avrt.  Could not do entrainment pacing.  parahisian with oscar response.  During tachycardia, his refractory pvc terminated without getting to the A. Repeatedly.  Induced and mapped (tachycardia during this mapping was Aon V- likely avnrt and not the avrt) and earliest activation at slow pathway.  Again induced and a after v repeatedly terminated with his refractory pvc.  Mapped to os of cs.  Ablated.  noninducible at endo of study.    Complications: none apparent.  abx given with her hd catheter comingling with the ep catheters.        Glen Crenshaw M.D.  1/8/2018 7:14 PM

## 2018-01-09 NOTE — ASSESSMENT & PLAN NOTE
No formal hx of CHF  Echo in 5/2017 showed EF 55%  Repeat echo  Cardioversion, no e/o overload clinically, hold off on lasix for now

## 2018-01-09 NOTE — CARE PLAN
Problem: Communication  Goal: The ability to communicate needs accurately and effectively will improve  Outcome: PROGRESSING AS EXPECTED  POC discussed with pt. at bedside. All questions and concerns have been addressed at this time. Verbal understanding recieved    Problem: Safety  Goal: Will remain free from falls  Outcome: PROGRESSING AS EXPECTED  Appropriate fall precautions/signs in place. Call light within reach. Bed alarm on ,  locked, and in lowest position.

## 2018-01-09 NOTE — PROGRESS NOTES
[facility] Hospitalist Progress Note    Date of Service: 2018    Chief Complaint  52 y.o. female with a history of SVT, peripheral vascular disease complicated by right BKA, hypertension, end-stage renal disease on dialysis, admitted 2018 with palpitations found to have SVT. She required cardioversion ×2, cardiology was consulted and planning ablation    Interval Problem Update  : Waiting for ablation, no recurrence of SVT requiring intervention this morning. Blood pressure elevated, ACE inhibitor resumed.    Consultants/Specialty  Dr. Garcia - nephrology  Dr. Crenshaw - EP cardiology    Disposition  F/U ablation, HR overnight.  Possible DC in AM        Review of Systems   Constitutional: Negative for chills, fever and malaise/fatigue.   HENT: Negative for sore throat.    Respiratory: Negative for cough and shortness of breath.    Cardiovascular: Positive for palpitations (improved). Negative for chest pain.   Gastrointestinal: Negative for abdominal pain, blood in stool, diarrhea, heartburn, nausea and vomiting.   Genitourinary: Negative for dysuria and frequency.   Musculoskeletal: Negative for back pain and myalgias.   Neurological: Negative for dizziness, focal weakness, weakness and headaches.   Psychiatric/Behavioral: Negative for depression and hallucinations. The patient is nervous/anxious.    All other systems reviewed and are negative.     Physical Exam  Laboratory/Imaging   Hemodynamics  Temp (24hrs), Av.5 °C (97.7 °F), Min:36.2 °C (97.2 °F), Max:36.7 °C (98.1 °F)   Temperature: 36.2 °C (97.2 °F)  Pulse  Av.1  Min: 69  Max: 199    Blood Pressure: (!) 185/104 (RN Notified)      Respiratory      Respiration: 16, Pulse Oximetry: 95 %        RUL Breath Sounds: Clear, RML Breath Sounds: Clear, RLL Breath Sounds: Clear, PHILIP Breath Sounds: Clear, LLL Breath Sounds: Clear    Fluids  No intake or output data in the 24 hours ending 18 1713    Nutrition  Orders Placed This Encounter   Procedures    • DIET NPO     Standing Status:   Standing     Number of Occurrences:   8     Order Specific Question:   Restrict to:     Answer:   Sips with Medications [3]     Physical Exam   Constitutional: She is oriented to person, place, and time. She appears well-developed. No distress.   HENT:   Head: Normocephalic and atraumatic.   Mouth/Throat: No oropharyngeal exudate.   Eyes: Pupils are equal, round, and reactive to light. No scleral icterus.   Neck: Normal range of motion. Neck supple. No JVD present.   Cardiovascular: Normal rate and regular rhythm.    No murmur heard.  Pulmonary/Chest: Effort normal and breath sounds normal. No respiratory distress. She exhibits no tenderness.   Abdominal: Soft. Bowel sounds are normal. She exhibits no distension and no mass.   Musculoskeletal: She exhibits no edema or tenderness.   Right BKA   Neurological: She is alert and oriented to person, place, and time. No cranial nerve deficit.   Skin: Skin is warm and dry. No erythema. No pallor.   Psychiatric: She has a normal mood and affect. Her behavior is normal.       Recent Labs      01/07/18   0740  01/08/18   0258   WBC  5.7  6.2   RBC  2.96*  3.21*   HEMOGLOBIN  9.6*  10.4*   HEMATOCRIT  29.0*  31.2*   MCV  98.0*  97.2   MCH  32.4  32.4   MCHC  33.1*  33.3*   RDW  49.1  48.1   PLATELETCT  158*  188   MPV  10.0  10.1     Recent Labs      01/07/18   0720  01/08/18   0258   SODIUM  133*  134*   POTASSIUM  6.0*  4.7   CHLORIDE  97  101   CO2  22  20   GLUCOSE  91  100*   BUN  81*  43*   CREATININE  12.78*  8.89*   CALCIUM  9.2  8.8     Recent Labs      01/07/18   0720   APTT  22.1*   INR  1.48*     Recent Labs      01/07/18   0720   BNPBTYPENAT  1513*     Recent Labs      01/08/18   0258   TRIGLYCERIDE  106   HDL  56   LDL  63          Assessment/Plan     * SVT (supraventricular tachycardia) (CMS-[facility])   Assessment & Plan    Patient had two episodes and was shocked by paramedic services  Repeat svt occurred in the emergency  department  Cardiology Dr. Crenshaw is consulted for ablation 1/8  Continue tele  Changed BB to labetalol  Adenosine PRN        Hyperkalemia- (present on admission)   Assessment & Plan    Dr. Garcia consulted and assisting w HD        End-stage renal disease (ESRD) (CMS-HCC)- (present on admission)   Assessment & Plan    Followed by Dr. Junior outpatient  I did consult nephrology for dialysis while she is inpatient  Continue sevelamer        Anemia of chronic disease- (present on admission)   Assessment & Plan    Has been present in the past, no e/o bleeding.  Likely due to ESRD  Monitor        Severe uncontrolled hypertension- (present on admission)   Assessment & Plan    Uncontrolled (BP 160a+),  Home metoprolol changed to labetalol 200 BID  Home verapamil held  Restart lisinopril 20, increase if needed        Elevated brain natriuretic peptide (BNP) level   Assessment & Plan    No formal hx of CHF  Echo in 5/2017 showed EF 55%  Repeat echo  Cardioversion, no e/o overload clinically, hold off on lasix for now        Hyponatremia   Assessment & Plan    Due to overload (ESRD)  Remove fluid w HD  Echo        Tobacco use- (present on admission)   Assessment & Plan    -counseled for more than 10 minutes on tobacco cessation [id number]           Hx of right BKA ([facility])- (present on admission)   Assessment & Plan    Stable        Hyperlipidemia- (present on admission)   Assessment & Plan    Not on statin, no hx of CAD  FLP was normal, monitor          Quality-Core Measures

## 2018-01-09 NOTE — PROGRESS NOTES
Assumed care of pt. at 1900    Bedside report received from Day Shift RN   POC discussed with pt. At bedside and Pt. verbalizes understanding.  Pt. Is Awake and AOx 4 , just brought up from cath lab at approximately 1900 running SR78 on the monitor  Call light within reach  with appropriate instructions to call for assistance  Bed locked and in lowest position.

## 2018-01-09 NOTE — PROGRESS NOTES
Cardiology Progress Note               Author: Susan Helms Date & Time created: 1/9/2018  9:37 AM     Interval History:  Patient seen on EPS rounds.  CONSULT REQUESTED BY: Dr. De Oliveira on behalf of Dr. Schuster (hospitalist admitting)     REASON FOR CONSULTATION: supraventricular tachycardia     HISTORY OF PRESENT ILLNESS:   Isabelle Coyle is a 52 y.o. female with a history of svt,esrd, htn who presents with another episode of svt.  This was simlar to previous.  Cannot terminte with vagal maneuvers.  Uncomfortable.  Last admission just a week ago.  Breaking through on meds.  Hr got to 200.  Has had adenosine and dccv for arrhythmia in past.  Adenosine failed once  ECG personally evaluated and interpreted by me: narrow regular svt  Sinus without preexcitation  CXR personally evaluated and interpreted by me: nad  ECHO: ef 55%  Other cardiac - nonischemic nuc 3 years ago this month   ASSESSMENT:  Recurrent svt.  Had been scheduled as outpt for ablation as she breaks through on medication  PLAN:   offered eps and ablation this admission and she is eager to proceed now as had recurrences and cannot wait until February  Procedure completed yesterday by Dr Crenshaw:   CONCLUSIONS:  1.  Normal conduction intervals.  2.  Induction of AVRT with a slowly conducting septal accessory pathway   consistent with PJRT.  3.  Induction of AVNRT with A on V tachycardia and then AH jump.  4.  This was clearly 2 distinct tachycardias because there was a jump noted   when the tachycardia initiates, that we ablated in the slow pathway and it was   A on V.  The other tachycardia was clearly AVRT because His refractory PVCs   terminated it, but it was slowly conducting.  It would initiate without an AH   jump interestingly.  5.  Successful ablations of both arrhythmias.  6.  Non-inducible at the end of the study.  7.  Uneventful conscious sedation.     DISCUSSION:  Patient did very well with the procedure.  Given the late ending   time,  even though it is a right-sided case, we will watch the patient   overnight and she can likely be discharged in the morning.         Chief Complaint:  Dizziness during dialysis.  Palpitations gone.    Review of Systems   Constitutional: Negative for chills and fever.   HENT: Negative for sore throat.         No difficulty swallowing   Eyes: Negative for blurred vision and double vision.   Respiratory: Negative for cough, shortness of breath and wheezing.    Cardiovascular: Negative for chest pain, palpitations, orthopnea, claudication, leg swelling and PND.   Gastrointestinal: Negative for abdominal pain, blood in stool, heartburn, nausea and vomiting.   Genitourinary: Negative for dysuria, frequency, hematuria and urgency.   Musculoskeletal: Negative for myalgias.   Skin: Negative.    Neurological: Positive for dizziness (With dialysis presently. Not dizzy prior to dialysis.). Negative for speech change, focal weakness, loss of consciousness and headaches.   Endo/Heme/Allergies: Does not bruise/bleed easily.   Psychiatric/Behavioral: Negative.        Physical Exam   Constitutional: She is oriented to person, place, and time. She appears well-developed and well-nourished.   HENT:   Head: Normocephalic and atraumatic.   Eyes: Pupils are equal, round, and reactive to light.   Neck: Normal range of motion. Neck supple. No thyromegaly present.   Cardiovascular: Normal rate, regular rhythm, normal heart sounds and intact distal pulses.  Exam reveals no gallop and no friction rub.    No murmur heard.  Neck and groin access sites are uncomplicated.  RBKA   Pulmonary/Chest: Effort normal and breath sounds normal. No respiratory distress. She has no wheezes. She has no rales. She exhibits no tenderness.   Abdominal: Soft. Bowel sounds are normal. She exhibits no distension. There is no tenderness. There is no guarding.   Musculoskeletal: Normal range of motion. She exhibits no edema.   Neurological: She is alert and oriented  to person, place, and time.   Skin: Skin is warm and dry.   Psychiatric: She has a normal mood and affect.   Fistula intact.    Hemodynamics:  Temp (24hrs), Av.4 °C (97.5 °F), Min:36.2 °C (97.2 °F), Max:36.7 °C (98.1 °F)  Temperature: 36.4 °C (97.6 °F)  Pulse  Av.5  Min: 69  Max: 199   Blood Pressure: 160/99 (RN Notified)     Respiratory:    Respiration: 17, Pulse Oximetry: 96 %        RUL Breath Sounds: Clear, RML Breath Sounds: Clear, RLL Breath Sounds: Clear, PHILIP Breath Sounds: Clear, LLL Breath Sounds: Clear  Fluids:        GI/Nutrition:  Orders Placed This Encounter   Procedures   • Diet Order     Standing Status:   Standing     Number of Occurrences:   1     Order Specific Question:   Diet:     Answer:   Cardiac [6]     Lab Results:  Recent Labs      18   0740  188  18   WBC  5.7  6.2  5.5   RBC  2.96*  3.21*  3.20*   HEMOGLOBIN  9.6*  10.4*  10.0*   HEMATOCRIT  29.0*  31.2*  31.0*   MCV  98.0*  97.2  96.9   MCH  32.4  32.4  31.3   MCHC  33.1*  33.3*  32.3*   RDW  49.1  48.1  47.9   PLATELETCT  158*  188  174   MPV  10.0  10.1  10.1     Recent Labs      18   0720  18   022   SODIUM  133*  134*  137   POTASSIUM  6.0*  4.7  4.8   CHLORIDE  97  101  101   CO2  22  20  25   GLUCOSE  91  100*  112*   BUN  81*  43*  58*   CREATININE  12.78*  8.89*  11.42*   CALCIUM  9.2  8.8  8.8     Recent Labs      18   APTT  22.1*   INR  1.48*     Recent Labs      18   BNPBTYPENAT  1513*     Recent Labs      18   TROPONINI  0.03   BNPBTYPENAT  1513*     Recent Labs      18   TRIGLYCERIDE  106   HDL  56   LDL  63         Medical Decision Making, by Problem:  Active Hospital Problems    Diagnosis   • *SVT (supraventricular tachycardia) (CMS-[facility]) [I47.1]   • Hyperkalemia [E87.5]   • End-stage renal disease (ESRD) (CMS-[facility]) [N18.6]   • Severe uncontrolled hypertension [I10]   • Anemia of chronic disease [D63.8]    • Hx of right BKA (CMS-HCC) [Z89.511]   • Hyperlipidemia [E78.5]   • Hyponatremia [E87.1]   • Elevated brain natriuretic peptide (BNP) level [R79.89]   • Tobacco use [Z72.0]       Plan:  S/P AVNRT ablation yesterday with Dr. Crenshaw doing well.  Completing dialysis this AM.  Some dizziness during dialysis better now, appears related to removal of fluid.  Access sites uncomplicated.  Will arrange follow up in our office.  EPS will sign off thank you for this consultation.    Quality-Core Measures

## 2018-01-09 NOTE — DISCHARGE SUMMARY
CHIEF COMPLAINT ON ADMISSION  Chief Complaint   Patient presents with   • Supraventricular Tachycardia (SVT)   • Chest Pain       CODE STATUS  Full Code    HPI & HOSPITAL COURSE  This is a 52 y.o. Female With a history of SVT, peripheral vascular disease, (right BKA, hypertension, end-stage renal disease on dialysis here with palpitations found to have SVT. She required cardioversion ×2 to attain normal sinus rhythm. Cardiology was consulted and decided she would benefit from ablation therapy. The patient has had this on multiple occasions in the past and is refractory to outpatient beta blocker and calcium channel blocker therapy. Her beta blocker was changed to labetalol and she underwent an ablation on 1/8/2018. She was monitored overnight and had no evidence of arrhythmia or SVT. Within the lesion, she did have some mild dizziness however, this was thought to be related to fluid removal with dialysis.    She underwent dialysis according to her normal schedule and will continue this as an outpatient with no significant changes. She'll follow up with electrophysiology told in 9 days. She'll also follow-up with her primary care physician within 9 days.    Therefore, she is discharged in good and stable condition with close outpatient follow-up.    SPECIFIC OUTPATIENT FOLLOW-UP  Follow-up with PCP and Susan Helms of electrophysiology in 9 days    DISCHARGE PROBLEM LIST  Principal Problem:    SVT (supraventricular tachycardia) (CMS-[facility]) POA: Unknown  Active Problems:    Severe uncontrolled hypertension POA: Yes    Anemia of chronic disease POA: Yes    End-stage renal disease (ESRD) (CMS-HCC) POA: Yes    Hyperkalemia POA: Yes    Hyperlipidemia POA: Yes    Hx of right BKA (CMS-HCC) POA: Yes    Tobacco use POA: Yes    Hyponatremia POA: Unknown    Elevated brain natriuretic peptide (BNP) level POA: Unknown  Resolved Problems:    * No resolved hospital problems. *      FOLLOW UP  Future Appointments  Date Time Provider  Department Center   1/18/2018 2:15 PM Nichole Salgado M.D. [facility] None   1/18/2018 4:00 PM [facility] None     MEDICATIONS ON DISCHARGE   Isabelle Coyle   Home Medication Instructions [id number]    Printed on:01/09/18 0331   Medication Information                      famotidine (PEPCID) 20 MG Tab  TAKE ONE TABLET BY MOUTH TWICE DAILY             labetalol (NORMODYNE) 200 MG Tab  Take 1 Tab by mouth 2 Times a Day.             lisinopril (PRINIVIL, ZESTRIL) 40 MG tablet  Take 1 Tab by mouth every day.             sevelamer (RENAGEL) 800 MG Tab  Take 1 Tab by mouth 3 times a day, with meals.             terazosin (HYTRIN) 2 MG Cap  Take 2 mg by mouth every evening.                 DIET  Orders Placed This Encounter   Procedures   • Diet Order     Standing Status:   Standing     Number of Occurrences:   1     Order Specific Question:   Diet:     Answer:   Cardiac [6]   • DISCONTINUE DIET TRAY     Standing Status:   Standing     Number of Occurrences:   1       ACTIVITY  As tolerated.  Weight bearing as tolerated w R BKA      CONSULTATIONS  Dr. Crenshaw - Cardiology    PROCEDURES  1/8/2018: Electrophysiology study and ablation of 2 foci as site of SVT    LABORATORY  Lab Results   Component Value Date/Time    SODIUM 137 01/09/2018 02:22 AM    POTASSIUM 4.8 01/09/2018 02:22 AM    CHLORIDE 101 01/09/2018 02:22 AM    CO2 25 01/09/2018 02:22 AM    GLUCOSE 112 (H) 01/09/2018 02:22 AM    BUN 58 (H) 01/09/2018 02:22 AM    CREATININE 11.42 (HH) 01/09/2018 02:22 AM    CREATININE 2.2 (H) 05/06/2009 03:10 AM        Lab Results   Component Value Date/Time    WBC 5.5 01/09/2018 02:22 AM    HEMOGLOBIN 10.0 (L) 01/09/2018 02:22 AM    HEMATOCRIT 31.0 (L) 01/09/2018 02:22 AM    PLATELETCT 174 01/09/2018 02:22 AM        Total time of the discharge process exceeds 40 minutes

## 2018-01-09 NOTE — PROCEDURES
DATE OF SERVICE:  01/08/2018    INDICATION FOR PROCEDURE:  SVT.    PROCEDURAL COMPONENTS:  1.  EP study and SVT ablation.  2.  Ablation of additional mechanism of arrhythmia.  3.  Program stimulation after drug infusion.  4.  Left atrial catheter placement via the coronary sinus.  5.  Three-dimensional mapping system was used.  6.  Conscious sedation for procedure.    PROCEDURE IN DETAIL:  After obtaining informed consent, patient was brought to   cardiac catheterization laboratory in the fasted state.  She was prepped and   draped in the usual sterile fashion.  She received conscious sedation with   midazolam and fentanyl throughout the case.  The sedation started at 3:58 p.m.   and ended at 6:22 p.m.  It was administered by the RN, Katie Silva and   Latasha Balderas.  Once she was prepped and draped in the usual sterile fashion,   I made sure she had adequate sedation with my direct supervision initially and   then I began getting access using ultrasound guidance and modified Seldinger   technique.  I placed an 8-Salvadorean and two 6-Salvadorean sheaths in the right femoral   vein and a 6-Salvadorean sheath in the right internal jugular vein. The 6-Salvadorean   sheath in the right internal jugular vein had a Decapolar coronary sinus   catheter for sensing and pacing of the left atrium and then in the groin, I   initially placed quadripolar atrial His and ventricular catheters.  EP study   was performed.  Patient presented in normal sinus rhythm.  She had an AH of 69   and HV of 46.  Her CT was 146, RR was 829.  She went into her tachycardia   with an RR of 365.  She had gone in spontaneously which also induced with   pretty much any atrial pacing.  We did perform ventricular pacing for VA   Wenckebach occurred at 250.  AV Wenckebach occurred at 300.  Atrial ERP was   260, pacing at 600.  I performed parahisian pacing and there was a oscar   response, a wider complex longer to get back to the atrium.  I would induce   tachycardia and  there was a delay between the V and the A, so it did not   exclude AVRT, thus I tried entrainment to make sure that we could get   ventricular overdrive pacing, but we could not.  Every time we tried to pace   the ventricle, the rhythm terminated, thus I tried single extrastimuli and His   refractory PVCs.  Interestingly, His refractory PVCs would terminate the   tachycardia repeatedly.  Thus, I could tell that this was an AVRT.  It   appeared to have a slowly conducting accessory pathway and I thus decided to   re-induce and ablate at the earliest atrial activation, so we reinduced   tachycardia and ablated at the earliest atrial activation, which happened to   be in the area of the slow pathway.  Interestingly, while ablating, I noticed   that this was a slightly different tachycardia that was A on V.  The cycle   length was still approximately 360 milliseconds, but this was AVNRT because   the A and V simultaneously excluded AVRT and with ablation, I got termination   with AV prolongation.  At this point, we reinduced tachycardia, the atrium was   again after the ventricle, we performed single extrastimuli and again   terminated the rhythm repeatedly with His refractory PVCs and thus I   reinitiated, made sure the VA timing was the same.  Interesting, the HA in   pacing was shorter than the HA in tachycardia.  This again was all consistent   with AVRT and this time when I mapped, the earliest activation was at the CS   os consistent with a posteroseptal accessory pathway.  I ablated in this   region and then patient no longer was inducible.  Retrograde conduction had   always been beaten by the node, but we could no longer get any activation of   this pathway with ways we had induced it on previous inductions.  She remained   non-inducible even on isoproterenol at the end of the case to ablate both the   slow pathway and terminate the AVNRT and the accessory pathway ablating the   AVRT.  We performed 7  radiofrequency lesions, the total time of ablation was   360 seconds.  Patient did very well with procedure and was in sinus rhythm at   the end of the study.  She had no change in her HV.  She had an AH slightly   longer at 80, HV was again 47, ND was 140 at the end of the case.  During the   isoproterenol wash out, the heart rate was still slightly elevated at 57.  On   isoproterenol, she was non-inducible.  At the end of the case, VA Wenckebach   was 290, AV Wenckebach was 270.  Fluoroscopy was used during the case and   total fluoroscopy time was 2-1/2 minutes.    CONCLUSIONS:  1.  Normal conduction intervals.  2.  Induction of AVRT with a slowly conducting septal accessory pathway   consistent with PJRT.  3.  Induction of AVNRT with A on V tachycardia and then AH jump.  4.  This was clearly 2 distinct tachycardias because there was a jump noted   when the tachycardia initiates, that we ablated in the slow pathway and it was   A on V.  The other tachycardia was clearly AVRT because His refractory PVCs   terminated it, but it was slowly conducting.  It would initiate without an AH   jump interestingly.  5.  Successful ablations of both arrhythmias.  6.  Non-inducible at the end of the study.  7.  Uneventful conscious sedation.    DISCUSSION:  Patient did very well with the procedure.  Given the late ending   time, even though it is a right-sided case, we will watch the patient   overnight and she can likely be discharged in the morning.       ____________________________________     MD JAYNE Valencia / ANNA    DD:  01/08/2018 21:26:26  DT:  01/09/2018 01:07:06    D#:  [id number]  Job#:  [id number]

## 2018-02-26 NOTE — PROGRESS NOTES
Paged Dr Guillen regarding patient BP of 173/107. Dr Guillen updated on BP and ordered to give hydralizine dose now that is due at 2200. MD stated to call back if BP is still over 180/110.   -Hgb 7.6 this morning.   -Retic count normal. Ferritin high. Likely 2/2 in setting of decreased EPO production d/t worsening kidney function. Hemolysis less likely. T bili is WNL. Acute blood loss also less likely as no source.

## 2018-04-23 ENCOUNTER — APPOINTMENT (OUTPATIENT)
Dept: RADIOLOGY | Facility: MEDICAL CENTER | Age: 53
End: 2018-04-23
Attending: EMERGENCY MEDICINE
Payer: MEDICAID

## 2018-04-23 ENCOUNTER — HOSPITAL ENCOUNTER (EMERGENCY)
Facility: MEDICAL CENTER | Age: 53
End: 2018-04-23
Attending: EMERGENCY MEDICINE
Payer: MEDICAID

## 2018-04-23 VITALS
BODY MASS INDEX: 27.06 KG/M2 | SYSTOLIC BLOOD PRESSURE: 182 MMHG | HEART RATE: 76 BPM | HEIGHT: 62 IN | RESPIRATION RATE: 16 BRPM | WEIGHT: 147.05 LBS | TEMPERATURE: 97.7 F | OXYGEN SATURATION: 96 % | DIASTOLIC BLOOD PRESSURE: 86 MMHG

## 2018-04-23 DIAGNOSIS — R60.9 PERIPHERAL EDEMA: ICD-10-CM

## 2018-04-23 LAB
ALBUMIN SERPL BCP-MCNC: 4 G/DL (ref 3.2–4.9)
ALBUMIN/GLOB SERPL: 1.6 G/DL
ALP SERPL-CCNC: 46 U/L (ref 30–99)
ALT SERPL-CCNC: 19 U/L (ref 2–50)
ANION GAP SERPL CALC-SCNC: 14 MMOL/L (ref 0–11.9)
AST SERPL-CCNC: 12 U/L (ref 12–45)
BASOPHILS # BLD AUTO: 0.5 % (ref 0–1.8)
BASOPHILS # BLD: 0.03 K/UL (ref 0–0.12)
BILIRUB SERPL-MCNC: 0.6 MG/DL (ref 0.1–1.5)
BUN SERPL-MCNC: 66 MG/DL (ref 8–22)
CALCIUM SERPL-MCNC: 8.9 MG/DL (ref 8.5–10.5)
CHLORIDE SERPL-SCNC: 97 MMOL/L (ref 96–112)
CO2 SERPL-SCNC: 27 MMOL/L (ref 20–33)
CREAT SERPL-MCNC: 10.96 MG/DL (ref 0.5–1.4)
EKG IMPRESSION: NORMAL
EOSINOPHIL # BLD AUTO: 0.15 K/UL (ref 0–0.51)
EOSINOPHIL NFR BLD: 2.7 % (ref 0–6.9)
ERYTHROCYTE [DISTWIDTH] IN BLOOD BY AUTOMATED COUNT: 42.8 FL (ref 35.9–50)
GLOBULIN SER CALC-MCNC: 2.5 G/DL (ref 1.9–3.5)
GLUCOSE SERPL-MCNC: 87 MG/DL (ref 65–99)
HCT VFR BLD AUTO: 25.6 % (ref 37–47)
HGB BLD-MCNC: 8.1 G/DL (ref 12–16)
IMM GRANULOCYTES # BLD AUTO: 0.01 K/UL (ref 0–0.11)
IMM GRANULOCYTES NFR BLD AUTO: 0.2 % (ref 0–0.9)
LYMPHOCYTES # BLD AUTO: 1.52 K/UL (ref 1–4.8)
LYMPHOCYTES NFR BLD: 27 % (ref 22–41)
MCH RBC QN AUTO: 29 PG (ref 27–33)
MCHC RBC AUTO-ENTMCNC: 31.6 G/DL (ref 33.6–35)
MCV RBC AUTO: 91.8 FL (ref 81.4–97.8)
MONOCYTES # BLD AUTO: 0.43 K/UL (ref 0–0.85)
MONOCYTES NFR BLD AUTO: 7.7 % (ref 0–13.4)
NEUTROPHILS # BLD AUTO: 3.48 K/UL (ref 2–7.15)
NEUTROPHILS NFR BLD: 61.9 % (ref 44–72)
NRBC # BLD AUTO: 0 K/UL
NRBC BLD-RTO: 0 /100 WBC
PLATELET # BLD AUTO: 133 K/UL (ref 164–446)
PMV BLD AUTO: 9.8 FL (ref 9–12.9)
POTASSIUM SERPL-SCNC: 5.5 MMOL/L (ref 3.6–5.5)
PROT SERPL-MCNC: 6.5 G/DL (ref 6–8.2)
RBC # BLD AUTO: 2.79 M/UL (ref 4.2–5.4)
SODIUM SERPL-SCNC: 138 MMOL/L (ref 135–145)
WBC # BLD AUTO: 5.6 K/UL (ref 4.8–10.8)

## 2018-04-23 PROCEDURE — 99284 EMERGENCY DEPT VISIT MOD MDM: CPT

## 2018-04-23 PROCEDURE — 80053 COMPREHEN METABOLIC PANEL: CPT

## 2018-04-23 PROCEDURE — 71045 X-RAY EXAM CHEST 1 VIEW: CPT

## 2018-04-23 PROCEDURE — 85025 COMPLETE CBC W/AUTO DIFF WBC: CPT

## 2018-04-23 PROCEDURE — 93005 ELECTROCARDIOGRAM TRACING: CPT | Performed by: EMERGENCY MEDICINE

## 2018-04-23 PROCEDURE — 36415 COLL VENOUS BLD VENIPUNCTURE: CPT

## 2018-04-23 ASSESSMENT — PAIN SCALES - GENERAL: PAINLEVEL_OUTOF10: 0

## 2018-04-23 NOTE — ED PROVIDER NOTES
"ED Provider Note    Scribed for Ty Ahn D.O. by Tnea Sanchez. 4/23/2018  11:11 AM    Primary care provider: Juan Moreno M.D.; Dr. Muro, Nephrology  Means of arrival: Walk in  History obtained from: Patient  History limited by: None    CHIEF COMPLAINT  •  Extremity Swelling    HPI  Isabelle Coyle is a 52 y.o. female who presents to the Emergency Department for extremity swelling with an onset of 2 days.  History of end stage renal disease and is on dialysis Mondays, Wednesdays and Fridays. Last dialysis was on Friday. She is scheduled for dialysis at 3:00 PM today. She began to develop bilateral leg swelling two days ago which is gradually increasing.  She states she drank a lot of water over the weekend and now feels \"full\" with swelling to her abdomen and face. Associated symptoms include dizziness and minimal shortness of breath. Negative for fever, chills, chest pain, palpitations, leg pain, abdominal pain, nausea, vomiting, diarrhea, constipation, melena or hematochezia.  She is making urine and denies dysuria or hematuria.       REVIEW OF SYSTEMS  Pertinent positives include bilateral leg swelling, abdominal distention, facial swelling, shortness of breath and dizziness. Pertinent negatives include no fever, chills, chest pain, palpitations, leg pain, abdominal pain, nausea, vomiting, diarrhea, constipation, melena, hematochezia, dysuria or hematuria.  All other systems reviewed and negative. See HPI for further details. C.      PAST MEDICAL HISTORY  Past Medical History:   Diagnosis Date   • Anemia    • Anemia associated with chronic renal failure 11/5/2009   • Dental disorder 8-25-17    \"Full Upper & Partial Lower Dentures\"   [place] [place]    [facility] M,W,F   • Dysrhythmia     \"SVT\"   • ESRD (end stage renal disease) (CMS-HCC) 8-25-17    Dialysis MWF@ [facility]   • Glomerulonephritis, chronic 11/5/2009   • Heart burn    • High cholesterol "    • HTN (hypertension) 2009    2017 states well controlled   • Port catheter in place 17    For Dialysis   • SVT (supraventricular tachycardia) (CMS-HCC)    • SVT (supraventricular tachycardia) (CMS-HCC)    No prior history of CHF.      SURGICAL HISTORY  Past Surgical History:   Procedure Laterality Date   • AV FISTULA CREATION Left 2017    Procedure: AV FISTULA CREATION  UPPER EXTREMITY -BRACHIAL BASALIC;  Surgeon: Glen Rodriguez M.D.;  Location: SURGERY [facility];  Service: General   • AV FISTULA CREATION Left 2017    Procedure: AV FISTULA CREATION- LEFT;  Surgeon: Jimbo Davenport M.D.;  Location: SURGERY [facility];  Service:    • APPENDECTOMY LAPAROSCOPIC  2009    Performed by BANDAR TIMMONS at SURGERY [facility]   • FEMUR ORIF  10/9/08    Performed by GLEN GATES at SURGERY [facility]   • ILIAC BONE GRAFT  10/9/08    Performed by GLEN GATES at SURGERY [facility]   • AMPUTATION, BELOW THE KNEE     • CAPITATION PAYMENT (INSURANCE)     • OTHER      BELOW KNEE AMPUTATION RIGHT   • PB ENLARGE BREAST WITH IMPLANT          SOCIAL HISTORY  Social History   Substance Use Topics   • Smoking status: Current Every Day Smoker for 25 years approximately     Packs/day: 0.25     Types: Cigarettes   • Smokeless tobacco: Never Used      Comment: 5 cigs/day   • Alcohol use Yes      Comment: occ      History   Drug Use No recent drug use. Previously used marijuana and amphetamines       FAMILY HISTORY  Family History   Problem Relation Age of Onset   • Heart Disease     Father had a cardiac history and  in his 60's. Mother is doing well and is 93.      CURRENT MEDICATIONS  •  famotidine (PEPCID) 20 MG Tab, TAKE ONE TABLET BY MOUTH TWICE DAILY, Disp: 180 Tab, Rfl: 0  •  lisinopril (PRINIVIL, ZESTRIL) 40 MG tablet, Take 1 Tab by mouth every day., Disp: 30 Tab, Rfl: 2  •  labetalol (NORMODYNE) 200 MG Tab, Take 1 Tab by mouth 2 Times a Day., Disp: 60 Tab, Rfl:  "2  •  sevelamer (RENAGEL) 800 MG Tab, Take 1 Tab by mouth 3 times a day, with meals., Disp: 90 Tab, Rfl: 3  •  terazosin (HYTRIN) 2 MG Cap, Take 2 mg by mouth every evening., Disp: , Rfl:        ALLERGIES  Allergies   Allergen Reactions   • Sulfa Drugs Hives     [id number] rash swelling itching       PHYSICAL EXAM  VITAL SIGNS: BP (!) 182/86   Pulse 81   Temp 36.5 °C (97.7 °F)   Resp 16   Ht 1.575 m (5' 2\")   Wt 66.7 kg (147 lb 0.8 oz)   LMP 06/25/2007   SpO2 96%   BMI 26.90 kg/m²     Nursing notes and vitals reviewed.    Constitutional: Well developed, Well nourished, No acute distress, Non-toxic appearance.   Eyes: PERRLA, EOMI, Conjunctiva normal, No discharge.   Cardiovascular: Normal heart rate, Normal rhythm, Holosystolic murmur, No rubs, No gallops.   Thorax & Lungs: No respiratory distress, Crackles to lower bases, No rales, No rhonchi, No wheezing, No chest tenderness.   Abdomen: Bowel sounds normal, Soft, Minimal epigastric and right upper quadrant tenderness, Negative Tay's sign, No guarding, No rebound, No masses, No pulsatile masses.   Skin: Warm, Dry, No erythema, No rash.   Musculoskeletal: Intact distal pulses, 1+ pitting edema to left lower extremities and BKA of the right No cyanosis, No clubbing. Good range of motion in all major joints. No tenderness to palpation or major deformities noted, no CVA tenderness, no midline back tenderness. 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 Positive thrill.  Neurologic: Alert & oriented x 3, Normal motor function, Normal sensory function, No focal deficits noted.  Psychiatric: Affect normal for clinical presentation.      DIAGNOSTIC STUDIES/PROCEDURES    EKG  See labs below. Interpreted by me.      LABS  Results for orders placed or performed during the hospital encounter of 04/23/18   CBC WITH DIFFERENTIAL   Result Value Ref Range    WBC 5.6 4.8 - 10.8 K/uL    RBC 2.79 (L) 4.20 - 5.40 M/uL    Hemoglobin 8.1 (L) 12.0 - 16.0 g/dL    Hematocrit 25.6 (L) 37.0 - 47.0 %    MCV 91.8 " 81.4 - 97.8 fL    MCH 29.0 27.0 - 33.0 pg    MCHC 31.6 (L) 33.6 - 35.0 g/dL    RDW 42.8 35.9 - 50.0 fL    Platelet Count 133 (L) 164 - 446 K/uL    MPV 9.8 9.0 - 12.9 fL    Neutrophils-Polys 61.90 44.00 - 72.00 %    Lymphocytes 27.00 22.00 - 41.00 %    Monocytes 7.70 0.00 - 13.40 %    Eosinophils 2.70 0.00 - 6.90 %    Basophils 0.50 0.00 - 1.80 %    Immature Granulocytes 0.20 0.00 - 0.90 %    Nucleated RBC 0.00 /100 WBC    Neutrophils (Absolute) 3.48 2.00 - 7.15 K/uL    Lymphs (Absolute) 1.52 1.00 - 4.80 K/uL    Monos (Absolute) 0.43 0.00 - 0.85 K/uL    Eos (Absolute) 0.15 0.00 - 0.51 K/uL    Baso (Absolute) 0.03 0.00 - 0.12 K/uL    Immature Granulocytes (abs) 0.01 0.00 - 0.11 K/uL    NRBC (Absolute) 0.00 K/uL   COMP METABOLIC PANEL   Result Value Ref Range    Sodium 138 135 - 145 mmol/L    Potassium 5.5 3.6 - 5.5 mmol/L    Chloride 97 96 - 112 mmol/L    Co2 27 20 - 33 mmol/L    Anion Gap 14.0 (H) 0.0 - 11.9    Glucose 87 65 - 99 mg/dL    Bun 66 (H) 8 - 22 mg/dL    Creatinine 10.96 (HH) 0.50 - 1.40 mg/dL    Calcium 8.9 8.5 - 10.5 mg/dL    AST(SGOT) 12 12 - 45 U/L    ALT(SGPT) 19 2 - 50 U/L    Alkaline Phosphatase 46 30 - 99 U/L    Total Bilirubin 0.6 0.1 - 1.5 mg/dL    Albumin 4.0 3.2 - 4.9 g/dL    Total Protein 6.5 6.0 - 8.2 g/dL    Globulin 2.5 1.9 - 3.5 g/dL    A-G Ratio 1.6 g/dL   ESTIMATED GFR   Result Value Ref Range    GFR If  4 (A) >60 mL/min/1.73 m 2    GFR If Non African American 4 (A) >60 mL/min/1.73 m 2   EKG (ER)   Result Value Ref Range    Report       [facility]    Test Date:  2018  Pt Name:    MELISSA BARDALES                 Department: ER  MRN:        [id number]                      Room:       [facility]  Gender:     Female                       Technician: [id number]  :        1965                   Requested By:ASTON PARRA  Order #:    [id number]                    Reading MD: ASTON PARRA, DO    Measurements  Intervals                                 Axis  Rate:       75                           P:          49  KY:         144                          QRS:        56  QRSD:       88                           T:          67  QT:         444  QTc:        496    Interpretive Statements  SINUS RHYTHM  PROBABLE LEFT VENTRICULAR HYPERTROPHY  BORDERLINE PROLONGED QT INTERVAL  Compared to ECG 01/09/2018 04:19:01  No significant changes    Electronically Signed On 4- 12:55:46 PDT by ASTON PARRA DO        All labs reviewed by me.      RADIOLOGY  DX-CHEST-PORTABLE (1 VIEW)   Final Result         1.  Ill-defined opacifications in each lung have increased compared to the prior radiograph.  This could indicate worsening of pulmonary edema or inflammation.      2.  No consolidations identified.      3.  Minimal blunting of left costo phrenic angle.        The radiologist's interpretation of all radiological studies have been reviewed by me.      COURSE & MEDICAL DECISION MAKING  Pertinent Labs & Imaging studies reviewed. (See chart for details)    11:11 AM Obtained and reviewed patient's electronic medical record which indicates an admission in 01/2018 for SVT requiring cardioversion.    11:14 AM  Patient seen and examined at bedside. Patient presents for extremity swelling.      Initial orders in the Emergency Department included XR chest, EKG and laboratory testing: CBC with differential, CMP and estimated GFR.      1:40 PM On repeat evaluation, lab and XR results were reviewed.     Discharge plan was discussed with the patient and includes following up with Juan Moreno M.D.  Recommended she attend dialysis today as scheduled.    The patient will return for new or persisting symptoms including increased edema, shortness of breath, chest pain or any additional concerns.  The patient verbalizes understanding and will comply.  Patient is stable at the time of discharge.  Vital signs were reviewed: BP (!) 182/86   Pulse 74   Temp  "36.5 °C (97.7 °F)   Resp 16   Ht 1.575 m (5' 2\")   Wt 66.7 kg (147 lb 0.8 oz)   LMP 06/25/2007   SpO2 91%   BMI 26.90 kg/m²        Decision Making:  This is a charming 52 y.o. female that presents with fluid overload. The patient has no evidence of ST elevation myocardial infarction, x-ray does reveal evidence of pulmonary edema suspicious for fluid overload. She has no evidence of infection, she is hemodynamically stable. The patient will be discharged and she'll be following with dialysis and strict return precautions have been given for increasing symptomatology. I do not suspect acute cord syndrome, myocardial infarction, pulmonary embolism, pneumonia. The patient does have an appointment for hemodialysis today at 3 PM and she'll be all the make her appointment.  BP (!) 182/86   Pulse 76   Temp 36.5 °C (97.7 °F)   Resp 16   Ht 1.575 m (5' 2\")   Wt 66.7 kg (147 lb 0.8 oz)   LMP 06/25/2007   SpO2 96%   BMI 26.90 kg/m²     DISPOSITION  Patient will be discharged home in stable condition.      FOLLOW UP  [facility], Emergency Dept  [address] [address] [phone]    If symptoms worsen    Juan Moreno M.D.  [address] [id number] [phone]      The patient is referred to a primary physician for blood pressure management, diabetic screening, and for all other preventative health concerns.      DIAGNOSIS  1. Peripheral edema     2. Renal failure  3. Fluid overload       Tena LANE (Scribe), am scribing for, and in the presence of, Ty Ahn D.O    Electronically signed by: Tena Sanchez (Scribe), 4/23/2018    Ty LANE D.O. personally performed the services described in this documentation, as scribed by Tena Sanchez in my presence, and it is both accurate and complete.    The note accurately reflects work and decisions made by me.  Ty Ahn  4/23/2018  5:16 PM            "

## 2018-04-23 NOTE — DISCHARGE INSTRUCTIONS
Patient has a known history of hypertension. Advised she follow up with her physician for blood pressure management.     Peripheral Edema  Peripheral edema is swelling that is caused by a buildup of fluid. Peripheral edema most often affects the lower legs, ankles, and feet. It can also develop in the arms, hands, and face. The area of the body that has peripheral edema will look swollen. It may also feel heavy or warm. Your clothes may start to feel tight. Pressing on the area may make a temporary dent in your skin. You may not be able to move your arm or leg as much as usual.  There are many causes of peripheral edema. It can be a complication of other diseases, such as congestive heart failure, kidney disease, or a problem with your blood circulation. It also can be a side effect of certain medicines. It often happens to women during pregnancy. Sometimes, the cause is not known. Treating the underlying condition is often the only treatment for peripheral edema.  Follow these instructions at home:  Pay attention to any changes in your symptoms. Take these actions to help with your discomfort:  · Raise (elevate) your legs while you are sitting or lying down.  · Move around often to prevent stiffness and to lessen swelling. Do not sit or stand for long periods of time.  · Wear support stockings as told by your health care provider.  · Follow instructions from your health care provider about limiting salt (sodium) in your diet. Sometimes eating less salt can reduce swelling.  · Take over-the-counter and prescription medicines only as told by your health care provider. Your health care provider may prescribe medicine to help your body get rid of excess water (diuretic).  · Keep all follow-up visits as told by your health care provider. This is important.  Contact a health care provider if:  · You have a fever.  · Your edema starts suddenly or is getting worse, especially if you are pregnant or have a medical  condition.  · You have swelling in only one leg.  · You have increased swelling and pain in your legs.  Get help right away if:  · You develop shortness of breath, especially when you are lying down.  · You have pain in your chest or abdomen.  · You feel weak.  · You faint.  This information is not intended to replace advice given to you by your health care provider. Make sure you discuss any questions you have with your health care provider.  Document Released: 01/25/2006 Document Revised: 05/22/2017 Document Reviewed: 06/29/2016  [employer] Interactive Patient Education © 2017 [employer]    Pulmonary Edema  Pulmonary edema is abnormal fluid buildup in the lungs that can make it hard to breathe.  Follow these instructions at home:  · Talk to your doctor about an exercise program.  · Eat a healthy diet:  ¨ Eat fresh fruits, vegetables, and lean meats.  ¨ Limit high fat and salty foods.  ¨ Avoid processed, canned, or fried foods.  ¨ Avoid fast food.  · Follow your doctor's advice about taking medicine and recording the medicine you take.  · Follow your doctor's advice about keeping a record of your weight.  · Talk to your doctor about keeping track of your blood pressure.  · Do not smoke.  · Do not use nicotine patches or nicotine gum.  · Make a follow-up appointment with your doctor.  · Ask your doctor for a copy of your latest heart tracing (ECG) and keep a copy with you at all times.  Get help right away if:  · You have chest pain. THIS IS AN EMERGENCY. Do not wait to see if the pain will go away. Call for local emergency medical help. Do not drive yourself to the hospital.  · You have sweating, feel sick to your stomach (nauseous), or are experiencing shortness of breath.  · Your weight increases more than your doctor tells you it should.  · You start to have shortness of breath.  · You notice more swelling in your hands, feet, ankles, or belly.  · You have dizziness, blurred vision, headache, or unsteadiness  that does not go away.  · You cough up bloody spit.  · You have a cough that does not go away.  · You are unable to sleep because it is hard to breathe.  · You begin to feel a “jumping” or “fluttering” sensation (palpitations) in the chest that is unusual for you.  This information is not intended to replace advice given to you by your health care provider. Make sure you discuss any questions you have with your health care provider.  Document Released: 12/06/2010 Document Revised: 05/25/2017 Document Reviewed: 08/25/2014  [employer] Interactive Patient Education © 2017 [employer]

## 2018-04-23 NOTE — ED TRIAGE NOTES
53 y/o female ambulatory to triage with c/o sob, bilateral leg swelling and abd bloating/swelling. Pt states her symptoms began on Saturday evening. Pt is a dialysis patient, last dialysis was on Friday.

## 2018-04-26 ENCOUNTER — HOSPITAL ENCOUNTER (INPATIENT)
Facility: MEDICAL CENTER | Age: 53
LOS: 2 days | DRG: 907 | End: 2018-04-29
Attending: EMERGENCY MEDICINE | Admitting: HOSPITALIST
Payer: MEDICAID

## 2018-04-26 DIAGNOSIS — T82.898A: ICD-10-CM

## 2018-04-26 DIAGNOSIS — G89.18 POSTOPERATIVE PAIN: ICD-10-CM

## 2018-04-26 DIAGNOSIS — I72.1 PSEUDOANEURYSM OF BRACHIAL ARTERY (HCC): Primary | ICD-10-CM

## 2018-04-26 PROCEDURE — A9270 NON-COVERED ITEM OR SERVICE: HCPCS | Performed by: EMERGENCY MEDICINE

## 2018-04-26 PROCEDURE — 700102 HCHG RX REV CODE 250 W/ 637 OVERRIDE(OP): Performed by: EMERGENCY MEDICINE

## 2018-04-26 PROCEDURE — 93990 DOPPLER FLOW TESTING: CPT

## 2018-04-26 PROCEDURE — 99285 EMERGENCY DEPT VISIT HI MDM: CPT

## 2018-04-26 RX ORDER — HYDROCODONE BITARTRATE AND ACETAMINOPHEN 5; 325 MG/1; MG/1
1 TABLET ORAL ONCE
Status: COMPLETED | OUTPATIENT
Start: 2018-04-26 | End: 2018-04-26

## 2018-04-26 RX ADMIN — HYDROCODONE BITARTRATE AND ACETAMINOPHEN 1 TABLET: 5; 325 TABLET ORAL at 20:27

## 2018-04-26 ASSESSMENT — LIFESTYLE VARIABLES
EVER FELT BAD OR GUILTY ABOUT YOUR DRINKING: NO
DO YOU DRINK ALCOHOL: YES
EVER HAD A DRINK FIRST THING IN THE MORNING TO STEADY YOUR NERVES TO GET RID OF A HANGOVER: NO
HAVE YOU EVER FELT YOU SHOULD CUT DOWN ON YOUR DRINKING: NO

## 2018-04-27 ENCOUNTER — APPOINTMENT (OUTPATIENT)
Dept: RADIOLOGY | Facility: MEDICAL CENTER | Age: 53
DRG: 907 | End: 2018-04-27
Attending: SURGERY
Payer: MEDICAID

## 2018-04-27 PROBLEM — T82.9XXA COMPLICATION OF AV DIALYSIS FISTULA: Status: ACTIVE | Noted: 2018-04-27

## 2018-04-27 PROBLEM — I10 HYPERTENSION: Status: ACTIVE | Noted: 2018-04-27

## 2018-04-27 PROBLEM — I16.0 HYPERTENSIVE URGENCY: Status: ACTIVE | Noted: 2018-04-27

## 2018-04-27 PROBLEM — T82.510A PSEUDOANEURYSM OF AV HEMODIALYSIS FISTULA, INITIAL ENCOUNTER (HCC): Status: ACTIVE | Noted: 2018-04-27

## 2018-04-27 PROBLEM — S40.022A HEMATOMA OF ARM, LEFT, INITIAL ENCOUNTER: Status: ACTIVE | Noted: 2018-04-27

## 2018-04-27 LAB
ALBUMIN SERPL BCP-MCNC: 4.1 G/DL (ref 3.2–4.9)
ALBUMIN/GLOB SERPL: 1.5 G/DL
ALP SERPL-CCNC: 53 U/L (ref 30–99)
ALT SERPL-CCNC: 23 U/L (ref 2–50)
ANION GAP SERPL CALC-SCNC: 12 MMOL/L (ref 0–11.9)
ANION GAP SERPL CALC-SCNC: 14 MMOL/L (ref 0–11.9)
APTT PPP: 27.5 SEC (ref 24.7–36)
AST SERPL-CCNC: 20 U/L (ref 12–45)
BASOPHILS # BLD AUTO: 0.4 % (ref 0–1.8)
BASOPHILS # BLD: 0.02 K/UL (ref 0–0.12)
BILIRUB SERPL-MCNC: 0.7 MG/DL (ref 0.1–1.5)
BUN SERPL-MCNC: 39 MG/DL (ref 8–22)
BUN SERPL-MCNC: 51 MG/DL (ref 8–22)
CALCIUM SERPL-MCNC: 8.2 MG/DL (ref 8.5–10.5)
CALCIUM SERPL-MCNC: 8.8 MG/DL (ref 8.5–10.5)
CHLORIDE SERPL-SCNC: 88 MMOL/L (ref 96–112)
CHLORIDE SERPL-SCNC: 91 MMOL/L (ref 96–112)
CO2 SERPL-SCNC: 25 MMOL/L (ref 20–33)
CO2 SERPL-SCNC: 29 MMOL/L (ref 20–33)
CREAT SERPL-MCNC: 6.38 MG/DL (ref 0.5–1.4)
CREAT SERPL-MCNC: 9.02 MG/DL (ref 0.5–1.4)
EOSINOPHIL # BLD AUTO: 0.12 K/UL (ref 0–0.51)
EOSINOPHIL NFR BLD: 2.2 % (ref 0–6.9)
ERYTHROCYTE [DISTWIDTH] IN BLOOD BY AUTOMATED COUNT: 43.4 FL (ref 35.9–50)
GLOBULIN SER CALC-MCNC: 2.8 G/DL (ref 1.9–3.5)
GLUCOSE SERPL-MCNC: 83 MG/DL (ref 65–99)
GLUCOSE SERPL-MCNC: 85 MG/DL (ref 65–99)
HCT VFR BLD AUTO: 26.1 % (ref 37–47)
HGB BLD-MCNC: 8.8 G/DL (ref 12–16)
IMM GRANULOCYTES # BLD AUTO: 0.02 K/UL (ref 0–0.11)
IMM GRANULOCYTES NFR BLD AUTO: 0.4 % (ref 0–0.9)
INR PPP: 1.06 (ref 0.87–1.13)
LYMPHOCYTES # BLD AUTO: 1.46 K/UL (ref 1–4.8)
LYMPHOCYTES NFR BLD: 26.7 % (ref 22–41)
MAGNESIUM SERPL-MCNC: 2.2 MG/DL (ref 1.5–2.5)
MAGNESIUM SERPL-MCNC: 2.3 MG/DL (ref 1.5–2.5)
MCH RBC QN AUTO: 29.9 PG (ref 27–33)
MCHC RBC AUTO-ENTMCNC: 33.7 G/DL (ref 33.6–35)
MCV RBC AUTO: 88.8 FL (ref 81.4–97.8)
MONOCYTES # BLD AUTO: 0.47 K/UL (ref 0–0.85)
MONOCYTES NFR BLD AUTO: 8.6 % (ref 0–13.4)
NEUTROPHILS # BLD AUTO: 3.38 K/UL (ref 2–7.15)
NEUTROPHILS NFR BLD: 61.7 % (ref 44–72)
NRBC # BLD AUTO: 0 K/UL
NRBC BLD-RTO: 0 /100 WBC
PHOSPHATE SERPL-MCNC: 5.1 MG/DL (ref 2.5–4.5)
PLATELET # BLD AUTO: 153 K/UL (ref 164–446)
PMV BLD AUTO: 9.4 FL (ref 9–12.9)
POTASSIUM SERPL-SCNC: 4.9 MMOL/L (ref 3.6–5.5)
POTASSIUM SERPL-SCNC: 5.4 MMOL/L (ref 3.6–5.5)
PROT SERPL-MCNC: 6.9 G/DL (ref 6–8.2)
PROTHROMBIN TIME: 13.5 SEC (ref 12–14.6)
RBC # BLD AUTO: 2.94 M/UL (ref 4.2–5.4)
SODIUM SERPL-SCNC: 129 MMOL/L (ref 135–145)
SODIUM SERPL-SCNC: 130 MMOL/L (ref 135–145)
WBC # BLD AUTO: 5.5 K/UL (ref 4.8–10.8)

## 2018-04-27 PROCEDURE — 93005 ELECTROCARDIOGRAM TRACING: CPT | Performed by: STUDENT IN AN ORGANIZED HEALTH CARE EDUCATION/TRAINING PROGRAM

## 2018-04-27 PROCEDURE — 85610 PROTHROMBIN TIME: CPT

## 2018-04-27 PROCEDURE — 93010 ELECTROCARDIOGRAM REPORT: CPT | Performed by: INTERNAL MEDICINE

## 2018-04-27 PROCEDURE — 770020 HCHG ROOM/CARE - TELE (206)

## 2018-04-27 PROCEDURE — 96374 THER/PROPH/DIAG INJ IV PUSH: CPT

## 2018-04-27 PROCEDURE — 700117 HCHG RX CONTRAST REV CODE 255: Performed by: SURGERY

## 2018-04-27 PROCEDURE — 84100 ASSAY OF PHOSPHORUS: CPT

## 2018-04-27 PROCEDURE — 160041 HCHG SURGERY MINUTES - EA ADDL 1 MIN LEVEL 4: Performed by: SURGERY

## 2018-04-27 PROCEDURE — 700111 HCHG RX REV CODE 636 W/ 250 OVERRIDE (IP)

## 2018-04-27 PROCEDURE — 160002 HCHG RECOVERY MINUTES (STAT): Performed by: SURGERY

## 2018-04-27 PROCEDURE — 500700 HCHG HEMOCLIP, SMALL (RED): Performed by: SURGERY

## 2018-04-27 PROCEDURE — 501837 HCHG SUTURE CV: Performed by: SURGERY

## 2018-04-27 PROCEDURE — 501838 HCHG SUTURE GENERAL: Performed by: SURGERY

## 2018-04-27 PROCEDURE — A9270 NON-COVERED ITEM OR SERVICE: HCPCS | Performed by: HOSPITALIST

## 2018-04-27 PROCEDURE — 306588 SLEEVE,VASO CALF MED: Performed by: HOSPITALIST

## 2018-04-27 PROCEDURE — 500698 HCHG HEMOCLIP, MEDIUM: Performed by: SURGERY

## 2018-04-27 PROCEDURE — 700101 HCHG RX REV CODE 250: Performed by: HOSPITALIST

## 2018-04-27 PROCEDURE — 160035 HCHG PACU - 1ST 60 MINS PHASE I: Performed by: SURGERY

## 2018-04-27 PROCEDURE — A6402 STERILE GAUZE <= 16 SQ IN: HCPCS | Performed by: SURGERY

## 2018-04-27 PROCEDURE — 110454 HCHG SHELL REV 250: Performed by: SURGERY

## 2018-04-27 PROCEDURE — 36415 COLL VENOUS BLD VENIPUNCTURE: CPT

## 2018-04-27 PROCEDURE — 73206 CT ANGIO UPR EXTRM W/O&W/DYE: CPT | Mod: LT

## 2018-04-27 PROCEDURE — 85025 COMPLETE CBC W/AUTO DIFF WBC: CPT

## 2018-04-27 PROCEDURE — 160022 HCHG BLOCK: Performed by: SURGERY

## 2018-04-27 PROCEDURE — 99223 1ST HOSP IP/OBS HIGH 75: CPT | Mod: GC | Performed by: HOSPITALIST

## 2018-04-27 PROCEDURE — 85730 THROMBOPLASTIN TIME PARTIAL: CPT

## 2018-04-27 PROCEDURE — 160036 HCHG PACU - EA ADDL 30 MINS PHASE I: Performed by: SURGERY

## 2018-04-27 PROCEDURE — 83735 ASSAY OF MAGNESIUM: CPT

## 2018-04-27 PROCEDURE — 700102 HCHG RX REV CODE 250 W/ 637 OVERRIDE(OP): Performed by: HOSPITALIST

## 2018-04-27 PROCEDURE — 160009 HCHG ANES TIME/MIN: Performed by: SURGERY

## 2018-04-27 PROCEDURE — 96376 TX/PRO/DX INJ SAME DRUG ADON: CPT

## 2018-04-27 PROCEDURE — 700101 HCHG RX REV CODE 250

## 2018-04-27 PROCEDURE — 700111 HCHG RX REV CODE 636 W/ 250 OVERRIDE (IP): Performed by: HOSPITALIST

## 2018-04-27 PROCEDURE — 160029 HCHG SURGERY MINUTES - 1ST 30 MINS LEVEL 4: Performed by: SURGERY

## 2018-04-27 PROCEDURE — 80048 BASIC METABOLIC PNL TOTAL CA: CPT

## 2018-04-27 PROCEDURE — 700111 HCHG RX REV CODE 636 W/ 250 OVERRIDE (IP): Performed by: STUDENT IN AN ORGANIZED HEALTH CARE EDUCATION/TRAINING PROGRAM

## 2018-04-27 PROCEDURE — 501445 HCHG STAPLER, SKIN DISP: Performed by: SURGERY

## 2018-04-27 PROCEDURE — 160048 HCHG OR STATISTICAL LEVEL 1-5: Performed by: SURGERY

## 2018-04-27 PROCEDURE — 03Q80ZZ REPAIR LEFT BRACHIAL ARTERY, OPEN APPROACH: ICD-10-PCS | Performed by: SURGERY

## 2018-04-27 PROCEDURE — 80053 COMPREHEN METABOLIC PANEL: CPT

## 2018-04-27 PROCEDURE — 96375 TX/PRO/DX INJ NEW DRUG ADDON: CPT

## 2018-04-27 RX ORDER — HYDRALAZINE HYDROCHLORIDE 10 MG/1
10 TABLET, FILM COATED ORAL EVERY 12 HOURS PRN
Status: DISCONTINUED | OUTPATIENT
Start: 2018-04-27 | End: 2018-04-27

## 2018-04-27 RX ORDER — BISACODYL 10 MG
10 SUPPOSITORY, RECTAL RECTAL
Status: DISCONTINUED | OUTPATIENT
Start: 2018-04-27 | End: 2018-04-29 | Stop reason: HOSPADM

## 2018-04-27 RX ORDER — HYDRALAZINE HYDROCHLORIDE 20 MG/ML
10 INJECTION INTRAMUSCULAR; INTRAVENOUS EVERY 4 HOURS PRN
Status: DISCONTINUED | OUTPATIENT
Start: 2018-04-27 | End: 2018-04-29 | Stop reason: HOSPADM

## 2018-04-27 RX ORDER — POLYETHYLENE GLYCOL 3350 17 G/17G
1 POWDER, FOR SOLUTION ORAL
Status: DISCONTINUED | OUTPATIENT
Start: 2018-04-27 | End: 2018-04-29 | Stop reason: HOSPADM

## 2018-04-27 RX ORDER — ACETAMINOPHEN 325 MG/1
650 TABLET ORAL EVERY 6 HOURS PRN
Status: DISCONTINUED | OUTPATIENT
Start: 2018-04-27 | End: 2018-04-29 | Stop reason: HOSPADM

## 2018-04-27 RX ORDER — LISINOPRIL 20 MG/1
40 TABLET ORAL DAILY
Status: DISCONTINUED | OUTPATIENT
Start: 2018-04-27 | End: 2018-04-29 | Stop reason: HOSPADM

## 2018-04-27 RX ORDER — MORPHINE SULFATE 4 MG/ML
1-4 INJECTION, SOLUTION INTRAMUSCULAR; INTRAVENOUS EVERY 4 HOURS PRN
Status: DISCONTINUED | OUTPATIENT
Start: 2018-04-27 | End: 2018-04-28

## 2018-04-27 RX ORDER — AMOXICILLIN 250 MG
2 CAPSULE ORAL 2 TIMES DAILY
Status: DISCONTINUED | OUTPATIENT
Start: 2018-04-27 | End: 2018-04-29 | Stop reason: HOSPADM

## 2018-04-27 RX ORDER — CLONIDINE HYDROCHLORIDE 0.1 MG/1
0.1 TABLET ORAL ONCE
Status: DISCONTINUED | OUTPATIENT
Start: 2018-04-27 | End: 2018-04-27

## 2018-04-27 RX ORDER — TERAZOSIN 2 MG/1
2 CAPSULE ORAL NIGHTLY
Status: DISCONTINUED | OUTPATIENT
Start: 2018-04-27 | End: 2018-04-29 | Stop reason: HOSPADM

## 2018-04-27 RX ORDER — SEVELAMER CARBONATE 800 MG/1
800 TABLET, FILM COATED ORAL
Status: DISCONTINUED | OUTPATIENT
Start: 2018-04-27 | End: 2018-04-29 | Stop reason: HOSPADM

## 2018-04-27 RX ORDER — CLONIDINE HYDROCHLORIDE 0.1 MG/1
0.1 TABLET ORAL ONCE
Status: COMPLETED | OUTPATIENT
Start: 2018-04-27 | End: 2018-04-27

## 2018-04-27 RX ORDER — LABETALOL HYDROCHLORIDE 5 MG/ML
10 INJECTION, SOLUTION INTRAVENOUS EVERY 4 HOURS PRN
Status: DISCONTINUED | OUTPATIENT
Start: 2018-04-27 | End: 2018-04-29 | Stop reason: HOSPADM

## 2018-04-27 RX ORDER — MORPHINE SULFATE 4 MG/ML
.5-1 INJECTION, SOLUTION INTRAMUSCULAR; INTRAVENOUS EVERY 4 HOURS PRN
Status: DISCONTINUED | OUTPATIENT
Start: 2018-04-27 | End: 2018-04-27

## 2018-04-27 RX ORDER — FAMOTIDINE 20 MG/1
20 TABLET, FILM COATED ORAL 2 TIMES DAILY
Status: DISCONTINUED | OUTPATIENT
Start: 2018-04-27 | End: 2018-04-29 | Stop reason: HOSPADM

## 2018-04-27 RX ORDER — LABETALOL 100 MG/1
200 TABLET, FILM COATED ORAL 2 TIMES DAILY
Status: DISCONTINUED | OUTPATIENT
Start: 2018-04-27 | End: 2018-04-29 | Stop reason: HOSPADM

## 2018-04-27 RX ORDER — SODIUM CHLORIDE 9 MG/ML
INJECTION, SOLUTION INTRAVENOUS CONTINUOUS
Status: DISCONTINUED | OUTPATIENT
Start: 2018-04-27 | End: 2018-04-27

## 2018-04-27 RX ORDER — BUPIVACAINE HYDROCHLORIDE AND EPINEPHRINE 5; 5 MG/ML; UG/ML
INJECTION, SOLUTION EPIDURAL; INTRACAUDAL; PERINEURAL
Status: DISCONTINUED | OUTPATIENT
Start: 2018-04-27 | End: 2018-04-27 | Stop reason: HOSPADM

## 2018-04-27 RX ADMIN — MORPHINE SULFATE 1 MG: 4 INJECTION INTRAVENOUS at 03:33

## 2018-04-27 RX ADMIN — LABETALOL HYDROCHLORIDE 10 MG: 5 INJECTION, SOLUTION INTRAVENOUS at 02:38

## 2018-04-27 RX ADMIN — MORPHINE SULFATE 4 MG: 4 INJECTION INTRAVENOUS at 08:59

## 2018-04-27 RX ADMIN — FAMOTIDINE 20 MG: 20 TABLET ORAL at 02:39

## 2018-04-27 RX ADMIN — MORPHINE SULFATE 4 MG: 4 INJECTION INTRAVENOUS at 16:04

## 2018-04-27 RX ADMIN — FAMOTIDINE 20 MG: 20 TABLET ORAL at 09:04

## 2018-04-27 RX ADMIN — CLONIDINE HYDROCHLORIDE 0.1 MG: 0.1 TABLET ORAL at 04:02

## 2018-04-27 RX ADMIN — LABETALOL HCL 200 MG: 100 TABLET, FILM COATED ORAL at 09:03

## 2018-04-27 RX ADMIN — LABETALOL HYDROCHLORIDE 10 MG: 5 INJECTION, SOLUTION INTRAVENOUS at 09:08

## 2018-04-27 RX ADMIN — LABETALOL HCL 200 MG: 100 TABLET, FILM COATED ORAL at 22:13

## 2018-04-27 RX ADMIN — TERAZOSIN HYDROCHLORIDE 2 MG: 2 CAPSULE ORAL at 03:19

## 2018-04-27 RX ADMIN — LISINOPRIL 40 MG: 20 TABLET ORAL at 09:01

## 2018-04-27 RX ADMIN — ACETAMINOPHEN 650 MG: 325 TABLET, FILM COATED ORAL at 02:38

## 2018-04-27 RX ADMIN — FAMOTIDINE 20 MG: 20 TABLET ORAL at 22:13

## 2018-04-27 RX ADMIN — IOHEXOL 100 ML: 350 INJECTION, SOLUTION INTRAVENOUS at 05:35

## 2018-04-27 RX ADMIN — HYDRALAZINE HYDROCHLORIDE 10 MG: 20 INJECTION INTRAMUSCULAR; INTRAVENOUS at 13:13

## 2018-04-27 ASSESSMENT — PAIN SCALES - GENERAL
PAINLEVEL_OUTOF10: 0
PAINLEVEL_OUTOF10: 0
PAINLEVEL_OUTOF10: 10
PAINLEVEL_OUTOF10: 0
PAINLEVEL_OUTOF10: 10
PAINLEVEL_OUTOF10: 0

## 2018-04-27 ASSESSMENT — ENCOUNTER SYMPTOMS
SINUS PAIN: 0
BLOOD IN STOOL: 0
FEVER: 0
ORTHOPNEA: 0
VOMITING: 0
SHORTNESS OF BREATH: 0
CONSTIPATION: 0
DOUBLE VISION: 0
NAUSEA: 0
SPEECH CHANGE: 0
DIARRHEA: 0
SENSORY CHANGE: 1
MYALGIAS: 0
DEPRESSION: 0
ABDOMINAL PAIN: 0
TINGLING: 1
BLURRED VISION: 0
EYE PAIN: 0
COUGH: 0
FOCAL WEAKNESS: 0
STRIDOR: 0
WHEEZING: 0
DIAPHORESIS: 0
TREMORS: 0
BRUISES/BLEEDS EASILY: 0
WEAKNESS: 0
PALPITATIONS: 0
DIZZINESS: 0
EYE DISCHARGE: 0
BACK PAIN: 0
NECK PAIN: 0
SORE THROAT: 0
NERVOUS/ANXIOUS: 0
CHILLS: 0
HEADACHES: 0

## 2018-04-27 ASSESSMENT — LIFESTYLE VARIABLES
EVER_SMOKED: YES
EVER HAD A DRINK FIRST THING IN THE MORNING TO STEADY YOUR NERVES TO GET RID OF A HANGOVER: NO
HAVE YOU EVER FELT YOU SHOULD CUT DOWN ON YOUR DRINKING: NO
EVER FELT BAD OR GUILTY ABOUT YOUR DRINKING: NO
SUBSTANCE_ABUSE: 0
ALCOHOL_USE: NO

## 2018-04-27 ASSESSMENT — PATIENT HEALTH QUESTIONNAIRE - PHQ9
2. FEELING DOWN, DEPRESSED, IRRITABLE, OR HOPELESS: NOT AT ALL
1. LITTLE INTEREST OR PLEASURE IN DOING THINGS: NOT AT ALL
SUM OF ALL RESPONSES TO PHQ9 QUESTIONS 1 AND 2: 0

## 2018-04-27 NOTE — SENIOR ADMIT NOTE
"Mrs. Coyle is a 52 y.o. female with PMHx of ESRD HD MWF, presented with c/o acute swelling in her LUE after being seen at the dialysis unit the day prior. States they had a difficult time finding access and that they may have punctured through the dialysis shunt. She has a left upper arm brachial basilic AV shunt. She noticed some edema throughout the day, and in the last 6 hours started experiencing numbness and tingling in her three middle fingers of her L hand, as well as decreased strength. Denies other trauma, HA, cp/palpitations, n/v, f/c, d/c, or any other concerns at this time.  ED: was evaluated by vascular surgery, CTA ordered and plan for possible surgery in am pending findings.    Exam:  BP (!) 188/94   Pulse 90   Temp 36.8 °C (98.2 °F)   Resp 16   Ht 1.575 m (5' 2\")   Wt 64.3 kg (141 lb 12.1 oz)   SpO2 95%   Breastfeeding? No   BMI 25.93 kg/m²     Physical exam:   General: comfortable, in no acute distress, mildly anxious  HEENT: NC/AT. PERRLA, EOMI. moist mucous membranes. No lymphadenopathy.  CVS: RRR, S1S2 WNL, no MRG  RS: CTA, good air entry. No wheezes or ronchi.  Abdomen: Soft, NT/ND, BS +. No organomegaly  Ext: Left UE ecchymosis and small hematoma. Radial pulse 2+, fistula has a thrill. Skin intact without bleeding  Neuro: CN 2-12 wnl. Motor and sensory exam wnl.      Labs:  Recent Labs      04/27/18   0001   SODIUM  129*   POTASSIUM  4.9   CHLORIDE  88*   CO2  29   BUN  39*   CREATININE  6.38*   MAGNESIUM  2.2   PHOSPHORUS  5.1*   CALCIUM  8.8       Recent Labs      04/27/18   0001   ALTSGPT  23   ASTSGOT  20   ALKPHOSPHAT  53   TBILIRUBIN  0.7   GLUCOSE  83       Recent Labs      04/27/18   0001   RBC  2.94*   HEMOGLOBIN  8.8*   HEMATOCRIT  26.1*   PLATELETCT  153*   PROTHROMBTM  13.5   APTT  27.5   INR  1.06       Recent Labs      04/27/18   0001   WBC  5.5   NEUTSPOLYS  61.70   LYMPHOCYTES  26.70   MONOCYTES  8.60   EOSINOPHILS  2.20   BASOPHILS  0.40   ASTSGOT  20   ALTSGPT  23 "   ALKPHOSPHAT  53   TBILIRUBIN  0.7         HEMODIALYSIS ACCESS DUPLX   Final Result      CT-CTA UPPER EXT WITH & W/O-POST PROCESS LEFT    (Results Pending)       Assessment  # Complication of AV dialysis fistula - after difficult access for HD on Wed.  # ESRD HD MWF  # ACD  # HTN    Plan  - Admit to tele obs  - Q1h arm neuro evaluation - low threshold for contacting surgery   - US Duplex L arm  - CTA L arm  - Day team to consult nephrology for follow up and need to continue HD depending on length os stay.  - iron studies  - BP management  - Vasc Surgery Dr. Davenport following plan for surgical evaluation pending CTA findings  - NPO  - SCD's only for DVT prophylaxis    FULL code      For further details , please refer to H&P by Dr. Interiano

## 2018-04-27 NOTE — ASSESSMENT & PLAN NOTE
- Presented with hypertensive urgency SBP >200  - BP now improved with 's  - Will continue home labetalol and lisinopril  - Labetalol and hydralazine prn  - Monitor for symptoms of end organ damage

## 2018-04-27 NOTE — PROGRESS NOTES
Internal Medicine Interval Note  Note Author: Pilo Jordan M.D.     Name Isabelle Coyle     1965   Age/Sex 52 y.o. female   MRN [id number]   Code Status FULL     After 5PM or if no immediate response to page, please call for cross-coverage  Attending/Team: Dr. Spain/Filiberto See Patient List for primary contact information  Call [phone] to page    1st Call - Day Intern (R1):   Dr. Jordan 2nd Call - Day Sr. Resident (R2/R3):   Dr. Mcdowell         Reason for interval visit  (Principal Problem)   Pseudoaneurysm of AV hemodialysis fistula, initial encounter (CMS-[facility])    Interval Problem Daily Status Update  (24 hours)     Patient seen this AM, lying in bed in NAD. She has left arm pain and swelling with palpable thrill. Left arm is neurovascularly intact. Dr. Davenport, vascular surgery reviewed CTA of arm, plan for surgical repair this afternoon. Nephrology consulted for dialysis needs.    Review of Systems   Constitutional: Negative for chills and fever.   HENT: Negative for congestion and sore throat.    Respiratory: Negative for cough and shortness of breath.    Cardiovascular: Negative for chest pain and palpitations.   Gastrointestinal: Negative for abdominal pain, nausea and vomiting.   Genitourinary: Negative for dysuria.   Musculoskeletal: Negative for back pain and neck pain.   Skin: Negative for itching and rash.   Neurological: Positive for tingling and sensory change. Negative for dizziness, focal weakness and headaches.   Psychiatric/Behavioral: Negative for substance abuse. The patient is not nervous/anxious.        Consultants/Specialty  Vascular surgery  Nephrology    Disposition  Inpatient    Quality Measures  Quality-Core Measures   Reviewed items::  Labs reviewed and Medications reviewed  Murdock catheter::  No Murdock  DVT prophylaxis - mechanical:  SCDs          Physical Exam       Vitals:    18 0621 18 0750 18 1048 18 1116   BP: (!) 168/85 (!) 188/98 (!)  178/78 (!) 197/94   Pulse:  81  77   Resp:  20  13   Temp:  36.7 °C (98.1 °F)  36.3 °C (97.4 °F)   TempSrc:       SpO2:       Weight:       Height:         Body mass index is 25.93 kg/m². Weight: 64.3 kg (141 lb 12.1 oz)  Oxygen Therapy:  Pulse Oximetry: 95 %, O2 (LPM): 1.5, O2 Delivery: Nasal Cannula    Physical Exam   Constitutional: She is oriented to person, place, and time and well-developed, well-nourished, and in no distress. No distress.   HENT:   Head: Normocephalic and atraumatic.   Mouth/Throat: No oropharyngeal exudate.   Eyes: Conjunctivae and EOM are normal. Pupils are equal, round, and reactive to light. No scleral icterus.   Neck: Normal range of motion. Neck supple.   Cardiovascular: Normal rate, regular rhythm and normal heart sounds.  Exam reveals no gallop and no friction rub.    No murmur heard.  Pulmonary/Chest: Effort normal and breath sounds normal. No respiratory distress.   Abdominal: Soft. Bowel sounds are normal. She exhibits no distension. There is no tenderness.   Musculoskeletal: She exhibits edema and tenderness.   Left arm swelling and ecchymosis by fistula, radial pulses 2+   Lymphadenopathy:     She has no cervical adenopathy.   Neurological: She is alert and oriented to person, place, and time. No cranial nerve deficit. Coordination normal.   Skin: Skin is warm and dry. She is not diaphoretic. There is erythema.   Psychiatric: Affect and judgment normal.         Lab Data Review:         4/27/2018  2:42 PM    Recent Labs      04/27/18   0001   SODIUM  129*   POTASSIUM  4.9   CHLORIDE  88*   CO2  29   BUN  39*   CREATININE  6.38*   MAGNESIUM  2.2   PHOSPHORUS  5.1*   CALCIUM  8.8       Recent Labs      04/27/18   0001   ALTSGPT  23   ASTSGOT  20   ALKPHOSPHAT  53   TBILIRUBIN  0.7   GLUCOSE  83       Recent Labs      04/27/18   0001   RBC  2.94*   HEMOGLOBIN  8.8*   HEMATOCRIT  26.1*   PLATELETCT  153*   PROTHROMBTM  13.5   APTT  27.5   INR  1.06       Recent Labs      04/27/18    0001   WBC  5.5   NEUTSPOLYS  61.70   LYMPHOCYTES  26.70   MONOCYTES  8.60   EOSINOPHILS  2.20   BASOPHILS  0.40   ASTSGOT  20   ALTSGPT  23   ALKPHOSPHAT  53   TBILIRUBIN  0.7           Assessment/Plan     * Pseudoaneurysm of AV hemodialysis fistula, initial encounter (CMS-HCC)- (present on admission)   Assessment & Plan    - CTA shows pseudoaneurysm adjacent to the mid to distal brachial artery  - Vascular surgery to evaluate AV fistula and repair if needed, plan for surgery today  - Nephrology consulted for dialysis needs        Hypertension- (present on admission)   Assessment & Plan    - Presented with hypertensive urgency SBP >200  - BP now improved with 's  - Will continue home labetalol and lisinopril  - Labetalol and hydralazine prn  - Monitor for symptoms of end organ damage        End-stage renal disease (ESRD) (CMS-HCC)- (present on admission)   Assessment & Plan    - HD on MWF schedule, last dialysis on Wednesday  - Nephrology consulted for dialysis needs        Anemia of chronic disease- (present on admission)   Assessment & Plan    - Likely secondary to chronic kidney disease  - Hgb stable, CTM
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
## 2018-04-27 NOTE — ASSESSMENT & PLAN NOTE
- CTA shows pseudoaneurysm adjacent to the mid to distal brachial artery  - Vascular surgery to evaluate AV fistula and repair if needed, plan for surgery today  - Nephrology consulted for dialysis needs

## 2018-04-27 NOTE — PROGRESS NOTES
A/o, respirations are even and unlabored ,assessment completed, vital signs stable except for elevated /111, medicated per MAR,   Pt complaints of arm pain 10/10, numbness on left hand,  Pt BKA noted, updated communication board,  poc discussed and understood, verbalized understanding, pt aware NPO, all questions answered at this time , fall precautions in place, call button within reach, will continue to monitor

## 2018-04-27 NOTE — ED NOTES
Assumed care of pt, no distress noted. Pt states pain is 10/10 throbbing and stabbing,  Pt offered ice and refused.

## 2018-04-27 NOTE — ASSESSMENT & PLAN NOTE
Increased swelling, bleeding, and pressure in the left upper arm, secondary to needle injury during dialysis yesterday.   Duplex done of the left upper extremity and the fistula appeared patent, but with abnormal truncation between the arterial and venous structures, suggesting the possible presence of a pseudoaneurysm at the AV fistula.    She has been evaluated by surgery/Dr. Davenport.    Pending a CT angiogram, to delineate the anatomy prior to surgery, tomorrow morning.  Patient will be NPO, and no DVTp prophylaxis, due to surgery planned for the morning.
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
## 2018-04-27 NOTE — PROGRESS NOTES
Vascular    Worsening arm pain since dialysis yesterday.  Also swelling and ecchymosis around the mid-fistula    She does not have a large hematoma of the upper arm.    Duplex suggested a communication from the brachial artery to the fistula but a definite pseudoaneurysm was not identified.    I think a CTA would be helpful to determine exactly what is going on with the fistula.    Will anticipate surgery in the morning depending on CT findings.    Formal consult to follow.    Jimbo Davenport MD  General&Vascular Surgery  [facility]  Cell: [phone]  Office: [phone]

## 2018-04-27 NOTE — ASSESSMENT & PLAN NOTE
- HD on MWF schedule, last dialysis on Wednesday  - Dialysis performed yesterday 4/28 without complication

## 2018-04-27 NOTE — PROGRESS NOTES
Pt complaining of arm pain 10/10, given tylenol, per pt does not work for her, on call paged, spoke to Dr. Yang, no new orders received

## 2018-04-27 NOTE — ASSESSMENT & PLAN NOTE
Patient presented with hemoglobin of 8.8, however that is higher than her prior value from a week ago 8.1.  Given her recent injury/likely bleeding, she should have  H&H measured periodically.

## 2018-04-27 NOTE — ED PROVIDER NOTES
"ED Provider Note    Scribed for Gerson Locke D.O. by Taisha Hagan. 4/26/2018  7:00 PM    Primary care provider: Juan Moreno M.D.   History obtained from: Patient  History limited by: None     CHIEF COMPLAINT  Chief Complaint   Patient presents with   • Shunt Problem        HPI    Isabelle Coyle is a 52 y.o. Female with a history of end stage renal disease who presents to the ED for evaluation of left arm pain onset yesterday. She reports associated left arm swelling and left arm numbness. Her pain is exacerbated with palpation. Patient has a dialysis shunt placed in her left arm for dialysis treatments of her end stage renal disease. Her shunt was placed last year. Patient reports that she had dialysis yesterday and states her nurse \"went in too deep\" while at her appointment, which she believes has exacerbated her symptoms.  She has not missed any dialysis appointments this week. Patient was seen in the ED 3 days ago for evaluation of lower extremity swelling. She reports her lower extremities are at baseline today. No complaints of fevers. No other acute complaints or concerns.     Patient reports that her left arm shunt has been in place for \"a long time.\" She also reports that they were able to finish dialysis yesterday after 2nd attempt at accessing her shunt.    REVIEW OF SYSTEMS  Please see HPI for pertinent positives/negatives.  All other systems reviewed and are negative. C    PAST MEDICAL HISTORY  Past Medical History:   Diagnosis Date   • ESRD (end stage renal disease) (CMS-HCC) 8-25-17    Dialysis MWF@ [facility]   • Port catheter in place 8-25-17    For Dialysis   • [place]ialysis patient [place] [place]25-17    [facility] M,W,F   • Dental disorder 8-25-17    \"Full Upper & Partial Lower Dentures\"   • Glomerulonephritis, chronic 11/5/2009   • Anemia associated with chronic renal failure 11/5/2009   • HTN (hypertension) 11/5/2009    2017 states well controlled   • Anemia    • " "Dysrhythmia     \"SVT\"   • Heart burn    • High cholesterol    • SVT (supraventricular tachycardia) (CMS-HCC)    • SVT (supraventricular tachycardia) (CMS-HCC)         SURGICAL HISTORY  Past Surgical History:   Procedure Laterality Date   • AV FISTULA CREATION Left 8/28/2017    Procedure: AV FISTULA CREATION  UPPER EXTREMITY -BRACHIAL BASALIC;  Surgeon: Stella Rodriguez M.D.;  Location: [facility];  Service: General   • AV FISTULA CREATION Left 6/20/2017    Procedure: AV FISTULA CREATION- LEFT;  Surgeon: Jimbo Davenport M.D.;  Location: [facility];  Service:    • APPENDECTOMY LAPAROSCOPIC  5/4/2009    Performed by BANDAR TIMMONS at SURGERY [facility]   • FEMUR ORIF  10/9/08    Performed by STELLA GATES at SURGERY [facility]   • ILIAC BONE GRAFT  10/9/08    Performed by STELLA GATES at SURGERY [facility]   • AMPUTATION, BELOW THE KNEE     • CAPITATION PAYMENT (INSURANCE)     • OTHER      BELOW KNEE AMPUTATION RIGHT   • PB ENLARGE BREAST WITH IMPLANT          SOCIAL HISTORY  Social History     Social History Main Topics   • Smoking status: Current Every Day Smoker     Packs/day: 0.25     Types: Cigarettes   • Smokeless tobacco: Never Used      Comment: 5 cigs/day   • Alcohol use Yes      Comment: occ   • Drug use: No   • Sexual activity: None noted         FAMILY HISTORY  Family History   Problem Relation Age of Onset   • Heart Disease          CURRENT MEDICATIONS  Current medications can be reviewed in the nurse's note.       ALLERGIES  Allergies   Allergen Reactions   • Sulfa Drugs Hives     LHM=3057 rash swelling itching        PHYSICAL EXAM  VITAL SIGNS: BP (!) 248/111   Pulse 90   Temp 36.8 °C (98.2 °F)   Resp 16   Ht 1.575 m (5' 2\")   Wt 64.3 kg (141 lb 12.1 oz)   SpO2 95%   Breastfeeding? No   BMI 25.93 kg/m²  @MICHELLE[[id number]::@     Pulse ox interpretation: 99% I interpret this pulse ox as normal     Constitutional: Well developed, well nourished, alert " in no apparent distress, nontoxic appearance    HENT: No external signs of trauma, normocephalic  Eyes: PERRL, conjunctiva without erythema, no discharge, no icterus    Neck: Trachea midline, no stridor, no JVD, good ROM    Cardiovascular: Strong distal pulses and good perfusion    Thorax & Lungs: No respiratory distress  Extremities: No cyanosis, trace left lower extremity edema, right BKA, no gross deformity, good ROM, left upper extremity with mild diffuse swelling compared to the right and diffuse tenderness to palpation, shunt with mild surrounding swelling and ecchymosis, no warmth/streaking/drainage/crepitus/fluctuance, intact distal pulses with brisk cap refill    Skin: Warm, dry, no pallor/cyanosis, no rash noted except as above  Lymphatic: No lymphadenopathy noted    Neuro: A/O times 3, no focal deficits noted    Psychiatric: Cooperative, slightly anxious         DIAGNOSTIC STUDIES / PROCEDURES        LABS  All labs reviewed by me.     Results for orders placed or performed during the hospital encounter of 04/26/18   CBC WITH DIFFERENTIAL   Result Value Ref Range    WBC 5.5 4.8 - 10.8 K/uL    RBC 2.94 (L) 4.20 - 5.40 M/uL    Hemoglobin 8.8 (L) 12.0 - 16.0 g/dL    Hematocrit 26.1 (L) 37.0 - 47.0 %    MCV 88.8 81.4 - 97.8 fL    MCH 29.9 27.0 - 33.0 pg    MCHC 33.7 33.6 - 35.0 g/dL    RDW 43.4 35.9 - 50.0 fL    Platelet Count 153 (L) 164 - 446 K/uL    MPV 9.4 9.0 - 12.9 fL    Neutrophils-Polys 61.70 44.00 - 72.00 %    Lymphocytes 26.70 22.00 - 41.00 %    Monocytes 8.60 0.00 - 13.40 %    Eosinophils 2.20 0.00 - 6.90 %    Basophils 0.40 0.00 - 1.80 %    Immature Granulocytes 0.40 0.00 - 0.90 %    Nucleated RBC 0.00 /100 WBC    Neutrophils (Absolute) 3.38 2.00 - 7.15 K/uL    Lymphs (Absolute) 1.46 1.00 - 4.80 K/uL    Monos (Absolute) 0.47 0.00 - 0.85 K/uL    Eos (Absolute) 0.12 0.00 - 0.51 K/uL    Baso (Absolute) 0.02 0.00 - 0.12 K/uL    Immature Granulocytes (abs) 0.02 0.00 - 0.11 K/uL    NRBC (Absolute) 0.00  K/uL   COMP METABOLIC PANEL   Result Value Ref Range    Sodium 129 (L) 135 - 145 mmol/L    Potassium 4.9 3.6 - 5.5 mmol/L    Chloride 88 (L) 96 - 112 mmol/L    Co2 29 20 - 33 mmol/L    Anion Gap 12.0 (H) 0.0 - 11.9    Glucose 83 65 - 99 mg/dL    Bun 39 (H) 8 - 22 mg/dL    Creatinine 6.38 (HH) 0.50 - 1.40 mg/dL    Calcium 8.8 8.5 - 10.5 mg/dL    AST(SGOT) 20 12 - 45 U/L    ALT(SGPT) 23 2 - 50 U/L    Alkaline Phosphatase 53 30 - 99 U/L    Total Bilirubin 0.7 0.1 - 1.5 mg/dL    Albumin 4.1 3.2 - 4.9 g/dL    Total Protein 6.9 6.0 - 8.2 g/dL    Globulin 2.8 1.9 - 3.5 g/dL    A-G Ratio 1.5 g/dL   PROTHROMBIN TIME (INR)   Result Value Ref Range    PT 13.5 12.0 - 14.6 sec    INR 1.06 0.87 - 1.13   APTT   Result Value Ref Range    APTT 27.5 24.7 - 36.0 sec   ESTIMATED GFR   Result Value Ref Range    GFR If  8 (A) >60 mL/min/1.73 m 2    GFR If Non African American 7 (A) >60 mL/min/1.73 m 2   PHOSPHORUS   Result Value Ref Range    Phosphorus 5.1 (H) 2.5 - 4.5 mg/dL   Magnesium   Result Value Ref Range    Magnesium 2.2 1.5 - 2.5 mg/dL        RADIOLOGY  The radiologist's interpretation of all radiological studies have been reviewed by me.     HEMODIALYSIS ACCESS DUPLX   Final Result      CT-CTA UPPER EXT WITH & W/O-POST PROCESS LEFT    (Results Pending)          COURSE & MEDICAL DECISION MAKING  Nursing notes, VS, PMSFHx reviewed in chart.     Review of past medical records shows the patient was seen in this ED on April 23, 2018 for lower extremity edema.    Differential diagnoses considered include but are not limited to: Shunt perforation/leak, cellulitis, abscess, hematoma, neurovascular injury, aneurysm, fistula    Arterial evaluation upper extremity, CBC, CMP, PTT, APTT was ordered to evaluate for evaluation of the patient's left arm pain.  Patient was treated with Norco 1 tablet.     9:21 PM- Reviewed the patient's imaging results which are shown above and indicate a new fistula to the affected area.      10:05 PM- Patient was reevaluated at bedside. Discussed lab and radiology results with the patient. Informed her that I will page vascular surgery for a consult. She understands.     10:07 PM- Paged vascular surgery for a consult.     10:32 PM- Spoke with Dr. Davenport (vascular surgery) who will follow the patient.     11:23 PM- Updated by Dr. Davenport (vascular surgery) who informed me he will take the patient to surgery in the morning and that she should be admitted to medicine service.     11:51 PM Paged [facility]  to admit.     12:11 AM- Spoke with R  who accepts the patient for admission.      Patient presents the ED with above complaint. Ultrasound of patient's dialysis shunt with findings of possible fistula formation. Patient was evaluated by Dr. Davenport and he will order CTA for further evaluation. He would like patient admitted to medicine service. Patient was seen by [facility] IM in the ED and will be admitted to their service. Patient's labs showed anemia, thrombocytopenia and chronic renal failure appear stable compared to previous results. Patient updated on her findings and is agreeable to admission.        FINAL IMPRESSION  1. Problem with dialysis shunt, initial encounter (CMS-[facility])           DISPOSITION:  Patient will be admitted to [facility] in guarded condition.         Taisha LANE (Carly), am scribing for, and in the presence of, Gerson Locke D.O..    Electronically signed by: Taisha Hagan (Carly), 4/26/2018    Gerson LANE D.O. personally performed the services described in this documentation, as scribed by Taisha Hagan in my presence, and it is both accurate and complete.      Portions of this record were made with voice recognition software and by scribes.  Despite my review, spelling/grammar/context errors may still remain.  Interpretation of this chart should be taken in this context.

## 2018-04-27 NOTE — PROGRESS NOTES
Pt in bed awake a&ox4,hypertensive,medicated for high blood pressure,pt is a dialysis pt,pt with left upper arm AVF with bruit and thrill,with swelling and increased pain,pt medicated for pain,pt voids,poc explained  tele with SR.

## 2018-04-27 NOTE — DISCHARGE PLANNING
Outpatient Dialysis Note    Confirmed patient is active at:    [facility]  [address] [address]     Schedule: Monday, Wednesday, Friday  Time: 3:25 pm    Spoke with Abby at facility who confirmed. Abby also stated that patient has only been coming to dialysis once a week.    Forwarded records for review.    Dialysis Coordinator, [facility]  Alexia Ayoub [phone]

## 2018-04-27 NOTE — NON-PROVIDER
"       Internal Medicine Medical Student Note  Note Author: Julian Santos, Student    Name Isabelle Coyle     1965   Age/Sex 52 y.o. female   MRN [id number]   Code Status Full     After 5PM or if no immediate response to page, please call for cross-coverage  Attending/Team: Dr. Spain  Purple/Green See Patient List for primary contact information  Call [phone] to page after hours   1st Call - Day Intern (R1):   Dr. Jordan 2nd Call - Day Sr. Resident (R2/R3):   Dr. Mcdowell         Reason for interval visit  (Principal Problem)   Complication of AV dialysis fistula    Interval Problem Daily Status Update  (24 hours)   Patient states that she is doing well, BLANCA overnight. Patient has noticed an increase in size of \"swelling\" of left arm. Patient has no other complaints at this time.    CT Angiogram of left arm showed  \"probable RIGHT basilic vein dialysis fistula and findings consistent with pseudoaneurysm adjacent the mid to distal brachial artery with contrast tracking in the soft tissues which may indicate AV fistula,\" as per radiology. Vascular surgery will be taking patient to OR today.    Review of Systems   Constitutional: Negative for chills and fever.   HENT: Negative for congestion.    Eyes:        Denies acute changes in vision.   Respiratory: Negative for cough and shortness of breath.    Cardiovascular: Negative for chest pain and leg swelling.   Gastrointestinal: Negative for abdominal pain, constipation, diarrhea, nausea and vomiting.   Genitourinary: Negative for dysuria.   Skin:        Rash on left arm.   Neurological: Negative for weakness and headaches.               Physical Exam       Vitals:    18 0621 18 0750 18 1048 18 1116   BP: (!) 168/85 (!) 188/98 (!) 178/78 (!) 197/94   Pulse:  81  77   Resp:  20  13   Temp:  36.7 °C (98.1 °F)  36.3 °C (97.4 °F)   TempSrc:       SpO2:       Weight:       Height:         Body mass index is 25.93 kg/m². Weight: " 64.3 kg (141 lb 12.1 oz)  Oxygen Therapy:  Pulse Oximetry: 95 %, O2 (LPM): 1.5, O2 Delivery: Nasal Cannula    Physical Exam   Constitutional: She is oriented to person, place, and time.   Patient found resting in bed comfortably in NAD.   HENT:   Head: Normocephalic and atraumatic.   Eyes: EOM are normal. Pupils are equal, round, and reactive to light.   Neck: Normal range of motion.   Cardiovascular:   RRR, S1 S2 present, III/VI systolic murmur heard at left sternal border, no rubs or gallops.   Pulmonary/Chest:   Clear to auscultation bilaterally.   Abdominal:   Soft, nontender, nondistended, no tenderness to palpation. BS present and normoactive.   Musculoskeletal: Normal range of motion. She exhibits edema.   Neurological: She is alert and oriented to person, place, and time.   Psychiatric: Mood normal.             Assessment/Plan   # AV fistula pseudoaneurysm  - CTA showed pseudoaneurysm adjacent to the AV fistula.  - This is likely caused from improper access when patient received dialysis on Wednesay.  - Patient will have corrective surgery performed by vascular surgery team today. Will appreciate recs.    # ESRD  - Patient has longstanding hx of ESRD. Patient has been receiving dialysis through AV fistula since July 2017. Patient has no plans at this time to receive a renal transplant.  - Per  note, patient is only going to dialysis once a week.  - Nephrology on board, will appreciate recs:   - No dialysis planned until cleared by surgery.  - Will continue to monitor.     # Anemia of chronic disease  - Patient's Hgb stable at 8.8 today.   - Currently asx, will continue to monitor.

## 2018-04-27 NOTE — ED TRIAGE NOTES
C/O L arm pain from shunt sight distally to fingers after difficulty accessing  Shunt, 2 attempts,  was able to complete dialysis, hand warm palp radial pulse

## 2018-04-27 NOTE — CONSULTS
"Vascular Surgery Consult Note  -------------------------------------------------------------------------------------------------  Date: 4/26/2018    Consulting Physician: Jimbo Davenport M.D. [facility]  -------------------------------------------------------------------------------------------------    Reason for consultation: Evaluate dialysis fistula    HPI: This is a 52 y.o. female who is on hemodialysis Monday Wednesday and Friday. She has a left upper arm brachial basilic AV fistula. This was put in approximately one year ago. Yesterday after dialysis she began to develop some arm pain and came in today for evaluation. She has mild swelling and ecchymosis over the fistula however she does not have a large hematoma. The fistula is still patent and she has a palpable left radial pulse. She does report that they had some difficulty with the access needles yesterday.        Past Medical History:   Diagnosis Date   • Anemia    • Anemia associated with chronic renal failure 11/5/2009   • Dental disorder 8-25-17    \"Full Upper & Partial Lower Dentures\"   • Dialysis patient ([facility] [place] 8-25-17    [facility] M,W,F   • Dysrhythmia     \"SVT\"   • ESRD (end stage renal disease) (CMS-HCC) 8-25-17    Dialysis MWF@ [facility]   • Glomerulonephritis, chronic 11/5/2009   • Heart burn    • High cholesterol    • HTN (hypertension) 11/5/2009    2017 states well controlled   • Port catheter in place 8-25-17    For Dialysis   • SVT (supraventricular tachycardia) (CMS-HCC)    • SVT (supraventricular tachycardia) (CMS-HCC)        Past Surgical History:   Procedure Laterality Date   • AV FISTULA CREATION Left 8/28/2017    Procedure: AV FISTULA CREATION  UPPER EXTREMITY -BRACHIAL BASALIC;  Surgeon: Glen Rodriguez M.D.;  Location: [facility];  Service: General   • AV FISTULA CREATION Left 6/20/2017    Procedure: AV FISTULA CREATION- LEFT;  Surgeon: Jimbo Davenport M.D.;  Location: [facility]" [facility];  Service:    • APPENDECTOMY LAPAROSCOPIC  5/4/2009    Performed by BANDAR TIMMONS at SUR[facility]ERY [facility]S   • FEMUR ORIF  10/9/08    Performed by STELLA GATES at SUR[facility]   • ILIAC BONE GRAFT  10/9/08    Performed by STELLA GATES at SURGERY [facility]   • AMPUTATION, BELOW THE KNEE     • CAPITATION PAYMENT (INSURANCE)     • OTHER      BELOW KNEE AMPUTATION RIGHT   • PB ENLARGE BREAST WITH IMPLANT         No current facility-administered medications for this encounter.      Current Outpatient Prescriptions   Medication Sig Dispense Refill   • famotidine (PEPCID) 20 MG Tab TAKE ONE TABLET BY MOUTH TWICE DAILY 180 Tab 0   • lisinopril (PRINIVIL, ZESTRIL) 40 MG tablet Take 1 Tab by mouth every day. 30 Tab 2   • labetalol (NORMODYNE) 200 MG Tab Take 1 Tab by mouth 2 Times a Day. 60 Tab 2   • sevelamer (RENAGEL) 800 MG Tab Take 1 Tab by mouth 3 times a day, with meals. 90 Tab 3   • terazosin (HYTRIN) 2 MG Cap Take 2 mg by mouth every evening.         Social History     Social History   • Marital status:      Spouse name: N/A   • Number of children: N/A   • Years of education: N/A     Occupational History   • Not on file.     Social History Main Topics   • Smoking status: Current Every Day Smoker     Packs/day: 0.25     Types: Cigarettes   • Smokeless tobacco: Never Used      Comment: 5 cigs/day   • Alcohol use Yes      Comment: occ   • Drug use: No   • Sexual activity: Not on file     Other Topics Concern   • Not on file     Social History Narrative   • No narrative on file       Family History   Problem Relation Age of Onset   • Heart Disease         Allergies:  Sulfa drugs    Review of Systems:  Noncontributory except as per HPI    Physical Exam:  Blood pressure (!) 190/90, pulse 82, temperature 36.3 °C (97.3 °F), temperature source Temporal, resp. rate 14, height 1.575 m (5' 2\"), weight 65 kg (143 lb 4.8 oz), SpO2 99 %.    Constitutional: Alert, oriented, no acute " distress  HEENT:  Normocephalic and atraumatic, EOMI  Neck:   Supple, no JVD, no bruits  Cardiovascular: Regular rate and rhythm, no murmurs  Pulmonary:  Good air entry bilaterally, no wheezing   Abdominal:  Soft, non-tender, non-distended     Aortic impulse not widened  Musculoskeletal: No edema, no tenderness  Neurological:  CN II-XII grossly intact, no focal deficits  Vascular: There is a left upper arm brachiobasilic AV fistula. The overlying skin is intact although appears ecchymotic. There is a small underlying hematoma. There is palpable left radial pulse and there is a thrill in the fistula. There is no bleeding.    Labs:  Recent Labs      04/27/18   0001   WBC  5.5   RBC  2.94*   HEMOGLOBIN  8.8*   HEMATOCRIT  26.1*   MCV  88.8   MCH  29.9   MCHC  33.7   RDW  43.4   PLATELETCT  153*   MPV  9.4                   Radiology:  Duplex: LEFT upper extremity dialysis fistula is patent, however there appears to    be abnormal truncation between venous and arterial structures at the mid to    distal upper arm, likely at the level of the brachial vein with focal    structure showing bidirectional flow.  These findings suggest the presence    of AV fistula and probable pseudoaneurysm.    Assessment: This is a 52 y.o. female with pain after dialysis yesterday. On examination the appearance seems most consistent with a possible extravasation from the fistula itself causing small hematoma in the arm and subsequent nerve compression and pain. There does not appear to be any evidence of flow disturbance as she has a thrill in her fistula and also a palpable left radial pulse. However further examination with duplex suggests that there is a new unintentional communication between the brachial artery and the AV fistula and a discrete pseudoaneurysm was not identified.    Based on my physical exam findings and review of the duplex is difficult for me to tell exactly what needs to be repaired. If there is active extravasation  with the brachial artery then the patient should have a larger pseudoaneurysm. If there is extravasation from the fistula itself and that should be self-limiting. I feel that a CT angiogram will better delineate the anatomy and can be very helpful and surgical planning. It may even potentially show that whatever the problem is is resolving spontaneously. Therefore we will obtain a CT angiogram tonight and then if the patient needs surgery we will schedule that for tomorrow morning.      Jimbo Davenport M.D.  [facility]  [phone]  Cell: [phone]
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
## 2018-04-27 NOTE — ASSESSMENT & PLAN NOTE
Patient has ESRD, and had dialysis one day ago (Wednesday).  At that time, she felt needle good TD and had subsequent complications of the AV fistula site. She will need to have alternative treatments for her dialysis, following surgery.

## 2018-04-27 NOTE — PROGRESS NOTES
Vascular    Ongoing LUE pain  CTA shows brachial pseudoaneurysm    OR today for repair    I explained the details of the operation, alternatives, and potential risks, including but not limited to bleeding, infection, injury to vessels or nerves, possible multiple incisions, use of xray and contrast exposure, and risks of anesthesia.  We also discussed the global risks of surgery such as stroke, heart attack, blood clots, breathing problems, and potential risk of not surviving the operation or the recovery.  All questions were answered. Patient understands and agrees to proceed.    Jimbo Davenport MD  General&Vascular Surgery  [facility]  Cell: [phone]  Office: [phone]

## 2018-04-27 NOTE — PROGRESS NOTES
Consent signed,pt voided.pt with partial denctures upper and lower,lower lip rose,pt cannot take out,instructed pt to take out donald david.

## 2018-04-27 NOTE — PROGRESS NOTES
Spoke to Dr. KULDEEP Rubin, per MD hold hydralazine, will recheck BP after CTA scan, will update MD

## 2018-04-28 PROBLEM — I72.1 PSEUDOANEURYSM OF BRACHIAL ARTERY (HCC): Status: RESOLVED | Noted: 2018-04-27 | Resolved: 2018-04-28

## 2018-04-28 PROBLEM — G89.18 POSTOPERATIVE PAIN: Status: ACTIVE | Noted: 2018-04-28

## 2018-04-28 PROBLEM — I72.1 PSEUDOANEURYSM OF BRACHIAL ARTERY (HCC): Status: ACTIVE | Noted: 2018-04-27

## 2018-04-28 LAB
ANION GAP SERPL CALC-SCNC: 16 MMOL/L (ref 0–11.9)
BASOPHILS # BLD AUTO: 0.4 % (ref 0–1.8)
BASOPHILS # BLD: 0.02 K/UL (ref 0–0.12)
BUN SERPL-MCNC: 56 MG/DL (ref 8–22)
CALCIUM SERPL-MCNC: 8.3 MG/DL (ref 8.5–10.5)
CHLORIDE SERPL-SCNC: 90 MMOL/L (ref 96–112)
CO2 SERPL-SCNC: 25 MMOL/L (ref 20–33)
CREAT SERPL-MCNC: 9.84 MG/DL (ref 0.5–1.4)
EKG IMPRESSION: NORMAL
EOSINOPHIL # BLD AUTO: 0.07 K/UL (ref 0–0.51)
EOSINOPHIL NFR BLD: 1.3 % (ref 0–6.9)
ERYTHROCYTE [DISTWIDTH] IN BLOOD BY AUTOMATED COUNT: 47.1 FL (ref 35.9–50)
GLUCOSE SERPL-MCNC: 128 MG/DL (ref 65–99)
HAV IGM SERPL QL IA: NEGATIVE
HBV CORE IGM SER QL: NEGATIVE
HBV SURFACE AB SERPL IA-ACNC: <3.1 MIU/ML (ref 0–10)
HBV SURFACE AG SER QL: NEGATIVE
HCT VFR BLD AUTO: 25.3 % (ref 37–47)
HCV AB SER QL: NEGATIVE
HGB BLD-MCNC: 8.2 G/DL (ref 12–16)
IMM GRANULOCYTES # BLD AUTO: 0.02 K/UL (ref 0–0.11)
IMM GRANULOCYTES NFR BLD AUTO: 0.4 % (ref 0–0.9)
LYMPHOCYTES # BLD AUTO: 0.84 K/UL (ref 1–4.8)
LYMPHOCYTES NFR BLD: 15 % (ref 22–41)
MAGNESIUM SERPL-MCNC: 2.2 MG/DL (ref 1.5–2.5)
MCH RBC QN AUTO: 29.5 PG (ref 27–33)
MCHC RBC AUTO-ENTMCNC: 32.4 G/DL (ref 33.6–35)
MCV RBC AUTO: 91 FL (ref 81.4–97.8)
MONOCYTES # BLD AUTO: 0.45 K/UL (ref 0–0.85)
MONOCYTES NFR BLD AUTO: 8.1 % (ref 0–13.4)
NEUTROPHILS # BLD AUTO: 4.19 K/UL (ref 2–7.15)
NEUTROPHILS NFR BLD: 74.8 % (ref 44–72)
NRBC # BLD AUTO: 0 K/UL
NRBC BLD-RTO: 0 /100 WBC
PHOSPHATE SERPL-MCNC: 7.3 MG/DL (ref 2.5–4.5)
PLATELET # BLD AUTO: 148 K/UL (ref 164–446)
PMV BLD AUTO: 9.3 FL (ref 9–12.9)
POTASSIUM SERPL-SCNC: 4.8 MMOL/L (ref 3.6–5.5)
RBC # BLD AUTO: 2.78 M/UL (ref 4.2–5.4)
SODIUM SERPL-SCNC: 131 MMOL/L (ref 135–145)
WBC # BLD AUTO: 5.6 K/UL (ref 4.8–10.8)

## 2018-04-28 PROCEDURE — A9270 NON-COVERED ITEM OR SERVICE: HCPCS | Performed by: HOSPITALIST

## 2018-04-28 PROCEDURE — 5A1D70Z PERFORMANCE OF URINARY FILTRATION, INTERMITTENT, LESS THAN 6 HOURS PER DAY: ICD-10-PCS | Performed by: INTERNAL MEDICINE

## 2018-04-28 PROCEDURE — A9270 NON-COVERED ITEM OR SERVICE: HCPCS | Performed by: SURGERY

## 2018-04-28 PROCEDURE — 90935 HEMODIALYSIS ONE EVALUATION: CPT

## 2018-04-28 PROCEDURE — 80074 ACUTE HEPATITIS PANEL: CPT

## 2018-04-28 PROCEDURE — A9270 NON-COVERED ITEM OR SERVICE: HCPCS | Performed by: STUDENT IN AN ORGANIZED HEALTH CARE EDUCATION/TRAINING PROGRAM

## 2018-04-28 PROCEDURE — 85025 COMPLETE CBC W/AUTO DIFF WBC: CPT

## 2018-04-28 PROCEDURE — 700111 HCHG RX REV CODE 636 W/ 250 OVERRIDE (IP): Performed by: SURGERY

## 2018-04-28 PROCEDURE — 770020 HCHG ROOM/CARE - TELE (206)

## 2018-04-28 PROCEDURE — 700101 HCHG RX REV CODE 250: Performed by: SURGERY

## 2018-04-28 PROCEDURE — 700111 HCHG RX REV CODE 636 W/ 250 OVERRIDE (IP): Performed by: HOSPITALIST

## 2018-04-28 PROCEDURE — 700102 HCHG RX REV CODE 250 W/ 637 OVERRIDE(OP): Performed by: STUDENT IN AN ORGANIZED HEALTH CARE EDUCATION/TRAINING PROGRAM

## 2018-04-28 PROCEDURE — 700101 HCHG RX REV CODE 250: Performed by: HOSPITALIST

## 2018-04-28 PROCEDURE — 700102 HCHG RX REV CODE 250 W/ 637 OVERRIDE(OP): Performed by: SURGERY

## 2018-04-28 PROCEDURE — 86706 HEP B SURFACE ANTIBODY: CPT

## 2018-04-28 PROCEDURE — 83735 ASSAY OF MAGNESIUM: CPT

## 2018-04-28 PROCEDURE — 99232 SBSQ HOSP IP/OBS MODERATE 35: CPT | Mod: GC | Performed by: HOSPITALIST

## 2018-04-28 PROCEDURE — 84100 ASSAY OF PHOSPHORUS: CPT

## 2018-04-28 PROCEDURE — 700102 HCHG RX REV CODE 250 W/ 637 OVERRIDE(OP): Performed by: HOSPITALIST

## 2018-04-28 PROCEDURE — 36415 COLL VENOUS BLD VENIPUNCTURE: CPT

## 2018-04-28 PROCEDURE — 80048 BASIC METABOLIC PNL TOTAL CA: CPT

## 2018-04-28 PROCEDURE — 700111 HCHG RX REV CODE 636 W/ 250 OVERRIDE (IP): Performed by: STUDENT IN AN ORGANIZED HEALTH CARE EDUCATION/TRAINING PROGRAM

## 2018-04-28 RX ORDER — OMEPRAZOLE 20 MG/1
20 CAPSULE, DELAYED RELEASE ORAL ONCE
Status: COMPLETED | OUTPATIENT
Start: 2018-04-28 | End: 2018-04-28

## 2018-04-28 RX ORDER — LIDOCAINE AND PRILOCAINE 25; 25 MG/G; MG/G
CREAM TOPICAL
Qty: 30 G | Refills: 11 | Status: SHIPPED | OUTPATIENT
Start: 2018-04-28 | End: 2018-06-15

## 2018-04-28 RX ORDER — OXYCODONE AND ACETAMINOPHEN 10; 325 MG/1; MG/1
1-2 TABLET ORAL EVERY 6 HOURS PRN
Status: DISCONTINUED | OUTPATIENT
Start: 2018-04-28 | End: 2018-04-29 | Stop reason: HOSPADM

## 2018-04-28 RX ORDER — MORPHINE SULFATE 4 MG/ML
2-4 INJECTION, SOLUTION INTRAMUSCULAR; INTRAVENOUS
Status: DISCONTINUED | OUTPATIENT
Start: 2018-04-28 | End: 2018-04-29 | Stop reason: HOSPADM

## 2018-04-28 RX ORDER — OXYCODONE HYDROCHLORIDE AND ACETAMINOPHEN 5; 325 MG/1; MG/1
1-2 TABLET ORAL EVERY 6 HOURS PRN
Qty: 40 TAB | Refills: 0 | Status: SHIPPED | OUTPATIENT
Start: 2018-04-28 | End: 2018-05-03

## 2018-04-28 RX ORDER — LIDOCAINE AND PRILOCAINE 25; 25 MG/G; MG/G
CREAM TOPICAL PRN
Status: DISCONTINUED | OUTPATIENT
Start: 2018-04-28 | End: 2018-04-29 | Stop reason: HOSPADM

## 2018-04-28 RX ADMIN — OXYCODONE HYDROCHLORIDE AND ACETAMINOPHEN 1 TABLET: 10; 325 TABLET ORAL at 12:32

## 2018-04-28 RX ADMIN — TERAZOSIN HYDROCHLORIDE 2 MG: 2 CAPSULE ORAL at 00:06

## 2018-04-28 RX ADMIN — LIDOCAINE AND PRILOCAINE 2.5 %: 25; 25 CREAM TOPICAL at 10:29

## 2018-04-28 RX ADMIN — MORPHINE SULFATE 4 MG: 4 INJECTION INTRAVENOUS at 08:28

## 2018-04-28 RX ADMIN — MORPHINE SULFATE 4 MG: 4 INJECTION INTRAVENOUS at 04:29

## 2018-04-28 RX ADMIN — TERAZOSIN HYDROCHLORIDE 2 MG: 2 CAPSULE ORAL at 21:32

## 2018-04-28 RX ADMIN — LIDOCAINE HYDROCHLORIDE 15 ML: 20 SOLUTION OROPHARYNGEAL at 02:36

## 2018-04-28 RX ADMIN — STANDARDIZED SENNA CONCENTRATE AND DOCUSATE SODIUM 2 TABLET: 8.6; 5 TABLET, FILM COATED ORAL at 17:32

## 2018-04-28 RX ADMIN — MORPHINE SULFATE 4 MG: 4 INJECTION INTRAVENOUS at 17:32

## 2018-04-28 RX ADMIN — LABETALOL HYDROCHLORIDE 10 MG: 5 INJECTION, SOLUTION INTRAVENOUS at 16:54

## 2018-04-28 RX ADMIN — OMEPRAZOLE 20 MG: 20 CAPSULE, DELAYED RELEASE ORAL at 02:25

## 2018-04-28 RX ADMIN — LABETALOL HCL 200 MG: 100 TABLET, FILM COATED ORAL at 17:32

## 2018-04-28 RX ADMIN — FAMOTIDINE 20 MG: 20 TABLET ORAL at 17:32

## 2018-04-28 RX ADMIN — MORPHINE SULFATE 2 MG: 4 INJECTION INTRAVENOUS at 00:29

## 2018-04-28 RX ADMIN — FAMOTIDINE 20 MG: 20 TABLET ORAL at 06:29

## 2018-04-28 RX ADMIN — HYDRALAZINE HYDROCHLORIDE 10 MG: 20 INJECTION INTRAMUSCULAR; INTRAVENOUS at 08:28

## 2018-04-28 RX ADMIN — SEVELAMER CARBONATE 800 MG: 800 TABLET, FILM COATED ORAL at 10:29

## 2018-04-28 RX ADMIN — OXYCODONE HYDROCHLORIDE AND ACETAMINOPHEN 2 TABLET: 10; 325 TABLET ORAL at 20:13

## 2018-04-28 RX ADMIN — SEVELAMER CARBONATE 800 MG: 800 TABLET, FILM COATED ORAL at 17:31

## 2018-04-28 RX ADMIN — LABETALOL HCL 200 MG: 100 TABLET, FILM COATED ORAL at 06:30

## 2018-04-28 RX ADMIN — LISINOPRIL 40 MG: 20 TABLET ORAL at 06:30

## 2018-04-28 RX ADMIN — STANDARDIZED SENNA CONCENTRATE AND DOCUSATE SODIUM 2 TABLET: 8.6; 5 TABLET, FILM COATED ORAL at 06:30

## 2018-04-28 ASSESSMENT — COGNITIVE AND FUNCTIONAL STATUS - GENERAL
DAILY ACTIVITIY SCORE: 23
SUGGESTED CMS G CODE MODIFIER DAILY ACTIVITY: CI
MOBILITY SCORE: 23
TOILETING: A LITTLE
CLIMB 3 TO 5 STEPS WITH RAILING: A LITTLE
SUGGESTED CMS G CODE MODIFIER MOBILITY: CI

## 2018-04-28 ASSESSMENT — ENCOUNTER SYMPTOMS
COUGH: 0
NECK PAIN: 0
TINGLING: 1
RESPIRATORY NEGATIVE: 1
VOMITING: 0
SHORTNESS OF BREATH: 0
SORE THROAT: 0
GASTROINTESTINAL NEGATIVE: 1
FEVER: 0
MUSCULOSKELETAL NEGATIVE: 1
DIZZINESS: 0
CONSTITUTIONAL NEGATIVE: 1
SENSORY CHANGE: 1
CARDIOVASCULAR NEGATIVE: 1
BACK PAIN: 0
PALPITATIONS: 0
FOCAL WEAKNESS: 0
NAUSEA: 0
CHILLS: 0
ABDOMINAL PAIN: 0
HEADACHES: 0
NERVOUS/ANXIOUS: 0
EYES NEGATIVE: 1

## 2018-04-28 ASSESSMENT — PAIN SCALES - GENERAL
PAINLEVEL_OUTOF10: 7
PAINLEVEL_OUTOF10: 5
PAINLEVEL_OUTOF10: 10
PAINLEVEL_OUTOF10: 10
PAINLEVEL_OUTOF10: 6
PAINLEVEL_OUTOF10: 5
PAINLEVEL_OUTOF10: 4
PAINLEVEL_OUTOF10: 2
PAINLEVEL_OUTOF10: 5
PAINLEVEL_OUTOF10: 8

## 2018-04-28 ASSESSMENT — PATIENT HEALTH QUESTIONNAIRE - PHQ9
SUM OF ALL RESPONSES TO PHQ9 QUESTIONS 1 AND 2: 0
1. LITTLE INTEREST OR PLEASURE IN DOING THINGS: NOT AT ALL

## 2018-04-28 ASSESSMENT — LIFESTYLE VARIABLES: SUBSTANCE_ABUSE: 0

## 2018-04-28 NOTE — OR SURGEON
Immediate Post OP Note    Patient: Isabelle Coyle  : 1965  MRN: [id number]  CSN: [id number]  Date: 2018    Date of surgery: 2018    PreOp Diagnosis: left brachial pseudoaneurysm    PostOp Diagnosis: same    Procedure(s):  Left Brachial artery Repair - Wound Class: Clean    Surgeon(s):  Jimbo Davenport M.D.    Anesthesiologist/Type of Anesthesia:  Anesthesiologist: Chapin Dawson M.D./General    Surgical Staff:  Circulator: Tena Gallagher R.N.  Relief Circulator: Josi Jackson R.N.  Scrub Person: Juanita Ken    Specimens removed if any:none    Estimated Blood Loss: 400cc    Findings: small puncture hole in the left brachial artery    Complications: none          2018 12:04 AM Jimbo Davenport M.D.

## 2018-04-28 NOTE — PROGRESS NOTES
Discharge Instructions    Procedure: Revision of AV fistula    1. ACTIVITIES: Upon discharge from the hospital, the day of surgery it is requested that you do no significant physical activity and no driving as you have had sedation. The day after surgery, you may resume activities of daily living, but no strenuous activities or heavy lifting (greater than 15 pounds) for 4 weeks.    2. DRIVING: You may drive whenever you are off pain medications and are able to perform the activities needed to drive, i.e. turning, bending, twisting, etc.     3. WOUND: It is not unusual for patients to experience swelling and even bruising, as well as a small amount of drainage from the incision. This is normal and will resolve over the next few days. The incision is closed with staples/stitches which we will remove in about 2 weeks. Remove your bandage and shower and wash the incision once a day, then dry completely and place a new bandage. Once the drainage from the incision stops then bandages are no longer needed.    4. ICE: please use ice on the wound to decrease the swelling for the first 24 hours and then discontinue. Apply ice for 20 minutes and then remove for 20 minutes, alternating while awake.    5. BATHING: Remove your bandage and shower and wash the incision once a day, then dry completely and place a new bandage. Once the drainage from the incision stops then bandages are no longer needed.  Avoid submerging the incision in water (tub, bath, pool) for at least a week.     6. PAIN MEDICATION: You will be given a prescription for pain medication at discharge. Please take these as directed. It is important to remember not to take medications on an empty stomach as this may cause nausea.     7. BOWEL FUNCTION: After surgery, it is not uncommon for patients to experience constipation. This is due to decreasing activity levels as well as pain medications. You may wish to use a stool softener beginning immediately after  surgery, and you may or may not need to use a laxative (Milk of Magnesia, Ex-lax; Senokot, etc.) as well.     8 .CALL IF YOU HAVE: (1) Fevers to more than 101.5 F, (2) Unusual or excessive pain, (3) Drainage or fluid from incision that may be foul smelling, increased tenderness or soreness at the wound or the wound edges are no longer together, redness or swelling at the incision site.      9. Work-related paperwork: For any work-related paper work needs please contact Lety at my office to let her know you had surgery and what paperwork you need help with.  Her number is [phone].    If you have any additional questions, please do not hesitate to call the office and speak to either myself or the physician on call.     Office address:   [address] [place] [address] [address]  [facility] [address]  [phone]    Jimbo Davenport MD  [facility]  [phone]

## 2018-04-28 NOTE — ASSESSMENT & PLAN NOTE
- Status post repair of left brachial artery puncture, likely from puncture during previous dialysis  - Pain is improving, limb remains neurovascularly intact  - AV fistula intact, no complications from hemodialysis yesterday  - Numbness along L first three fingers, consistent with median nerve compression; should improve as hematoma improves, pt to follow up with PCP  - Continue pain management

## 2018-04-28 NOTE — CARE PLAN
Problem: Urinary Elimination:  Goal: Ability to reestablish a normal urinary elimination pattern will improve  Patient is anuric due to being on dialysis    Problem: Safety  Goal: Will remain free from falls  Patient has prosthetic for Right BKA, pt educated to call for assistance with ambulating due to different environment. Patient verbalized understanding.

## 2018-04-28 NOTE — OR NURSING
Pt fully awake and oriented with baseline speech pattern.  She denies pain and is tolerating sips of water.

## 2018-04-28 NOTE — PROGRESS NOTES
Internal Medicine Interval Note  Note Author: Pilo Jordan M.D.     Name Isabelle Coyle     1965   Age/Sex 52 y.o. female   MRN [id number]   Code Status FULL     After 5PM or if no immediate response to page, please call for cross-coverage  Attending/Team: Dr. Spain/Filiberto See Patient List for primary contact information  Call [phone] to page    1st Call - Day Intern (R1):   Dr. Jordan 2nd Call - Day Sr. Resident (R2/R3):   Dr. Mcdowell         Reason for interval visit  (Principal Problem)   Hematoma of arm, left, initial encounter    Interval Problem Daily Status Update  (24 hours)     Patient had repair of left brachial artery puncture near AV fistula yesterday without complication. Patient reports some left arm swelling and pain which is improving. She c/o left hand numbness by first three fingers. Patient to attempt dialysis today using AV fistula.       Review of Systems   Constitutional: Negative for chills and fever.   HENT: Negative for congestion and sore throat.    Respiratory: Negative for cough and shortness of breath.    Cardiovascular: Negative for chest pain and palpitations.   Gastrointestinal: Negative for abdominal pain, nausea and vomiting.   Genitourinary: Negative for dysuria.   Musculoskeletal: Negative for back pain and neck pain.   Skin: Negative for itching and rash.   Neurological: Positive for tingling and sensory change. Negative for dizziness, focal weakness and headaches.   Psychiatric/Behavioral: Negative for substance abuse. The patient is not nervous/anxious.        Consultants/Specialty  Vascular surgery  Nephrology    Disposition  Inpatient    Quality Measures  Quality-Core Measures   Reviewed items::  Labs reviewed and Medications reviewed  Murdock catheter::  No Murdock  DVT prophylaxis - mechanical:  SCDs          Physical Exam       Vitals:    18 0015 18 0332 18 0730 18 1120   BP: 142/80 159/91 (!) 195/88 158/81   Pulse: 74 78 90 89    Resp: 16 16 18 18   Temp: 36.6 °C (97.9 °F) 36.7 °C (98 °F) 37.2 °C (98.9 °F) 37 °C (98.6 °F)   TempSrc:       SpO2: 92% 95% 95% 92%   Weight:       Height:         Body mass index is 25.93 kg/m².    Oxygen Therapy:  Pulse Oximetry: 92 %, O2 (LPM): 0, O2 Delivery: None (Room Air)    Physical Exam   Constitutional: She is oriented to person, place, and time and well-developed, well-nourished, and in no distress. No distress.   HENT:   Head: Normocephalic and atraumatic.   Mouth/Throat: No oropharyngeal exudate.   Eyes: Conjunctivae and EOM are normal. Pupils are equal, round, and reactive to light. No scleral icterus.   Neck: Normal range of motion. Neck supple.   Cardiovascular: Normal rate, regular rhythm and normal heart sounds.  Exam reveals no gallop and no friction rub.    No murmur heard.  Pulmonary/Chest: Effort normal and breath sounds normal. No respiratory distress.   Abdominal: Soft. Bowel sounds are normal. She exhibits no distension. There is no tenderness.   Musculoskeletal: She exhibits edema and tenderness.   Left arm swelling and ecchymosis by fistula, radial pulses 2+   Lymphadenopathy:     She has no cervical adenopathy.   Neurological: She is alert and oriented to person, place, and time. No cranial nerve deficit. Coordination normal.   Skin: Skin is warm and dry. She is not diaphoretic. There is erythema.   Psychiatric: Affect and judgment normal.         Lab Data Review:         4/27/2018  2:42 PM    Recent Labs      04/27/18   0001  04/27/18 2215 04/28/18   0256   SODIUM  129*  130*  131*   POTASSIUM  4.9  5.4  4.8   CHLORIDE  88*  91*  90*   CO2  29  25  25   BUN  39*  51*  56*   CREATININE  6.38*  9.02*  9.84*   MAGNESIUM  2.2  2.3  2.2   PHOSPHORUS  5.1*   --   7.3*   CALCIUM  8.8  8.2*  8.3*       Recent Labs      04/27/18   0001  04/27/18 2215 04/28/18   0256   ALTSGPT  23   --    --    ASTSGOT  20   --    --    ALKPHOSPHAT  53   --    --    TBILIRUBIN  0.7   --    --    GLUCOSE   83  85  128*       Recent Labs      04/27/18   0001  04/28/18   0256   RBC  2.94*  2.78*   HEMOGLOBIN  8.8*  8.2*   HEMATOCRIT  26.1*  25.3*   PLATELETCT  153*  148*   PROTHROMBTM  13.5   --    APTT  27.5   --    INR  1.06   --        Recent Labs      04/27/18   0001  04/28/18   0256   WBC  5.5  5.6   NEUTSPOLYS  61.70  74.80*   LYMPHOCYTES  26.70  15.00*   MONOCYTES  8.60  8.10   EOSINOPHILS  2.20  1.30   BASOPHILS  0.40  0.40   ASTSGOT  20   --    ALTSGPT  23   --    ALKPHOSPHAT  53   --    TBILIRUBIN  0.7   --            Assessment/Plan     * Hematoma of arm, left, initial encounter- (present on admission)   Assessment & Plan    - Status post repair of left brachial artery puncture, likely from puncture during previous dialysis  - Pain is improving, limb remains neurovascularly intact  - AV fistula intact, will attempt dialysis with fistula today  - Pain management        Hypertensive urgency- (present on admission)   Assessment & Plan    - Presented with hypertensive urgency SBP >200  - BP now improved with 's  - Will continue home labetalol and lisinopril  - Labetalol and hydralazine prn  - Monitor for symptoms of end organ damage        End-stage renal disease (ESRD) (HCC)- (present on admission)   Assessment & Plan    - HD on MWF schedule, last dialysis on Wednesday  - Nephrology to perform dialysis today with AV fistula        Anemia of chronic disease- (present on admission)   Assessment & Plan    - Likely secondary to chronic kidney disease  - Hgb stable, CTM

## 2018-04-28 NOTE — PROGRESS NOTES
Hospital Medicine Progress Note, Adult, Complex               Author: Raphael Martinez Date & Time created: 4/28/2018  8:19 AM     Interval History:  4/28/18 - Left upper arm AV fistula revised yesterday.  She complains of pain in upper arm and numbness first - third fingers.     Review of Systems:  Review of Systems   Constitutional: Negative.    HENT: Negative.    Eyes: Negative.    Respiratory: Negative.    Cardiovascular: Negative.    Gastrointestinal: Negative.    Genitourinary: Negative.    Musculoskeletal: Negative.    Skin: Negative.        Physical Exam:  Physical Exam   Constitutional: She is oriented to person, place, and time. She appears well-developed and well-nourished.   HENT:   Head: Normocephalic and atraumatic.   Right Ear: External ear normal.   Left Ear: External ear normal.   Nose: Nose normal.   Mouth/Throat: Oropharynx is clear and moist.   Eyes: Conjunctivae and EOM are normal.   Neck: Normal range of motion. Neck supple.   Cardiovascular: Normal rate, regular rhythm, normal heart sounds and intact distal pulses.    Pulmonary/Chest: Effort normal and breath sounds normal.   Abdominal: Soft. Bowel sounds are normal.   Musculoskeletal: Normal range of motion.   Neurological: She is alert and oriented to person, place, and time.   Skin: Skin is warm and dry.   Psychiatric: She has a normal mood and affect. Her behavior is normal. Judgment and thought content normal.       Labs:        Invalid input(s): [id number]      Recent Labs      04/27/18   0001  04/27/18 2215 04/28/18   0256   SODIUM  129*  130*  131*   POTASSIUM  4.9  5.4  4.8   CHLORIDE  88*  91*  90*   CO2  29  25  25   BUN  39*  51*  56*   CREATININE  6.38*  9.02*  9.84*   MAGNESIUM  2.2  2.3  2.2   PHOSPHORUS  5.1*   --   7.3*   CALCIUM  8.8  8.2*  8.3*     Recent Labs      04/27/18   0001  04/27/18 2215 04/28/18   0256   ALTSGPT  23   --    --    ASTSGOT  20   --    --    ALKPHOSPHAT  53   --    --    TBILIRUBIN  0.7   --     --    GLUCOSE  83  85  128*     Recent Labs      18   0001  18   0256   RBC  2.94*  2.78*   HEMOGLOBIN  8.8*  8.2*   HEMATOCRIT  26.1*  25.3*   PLATELETCT  153*  148*   PROTHROMBTM  13.5   --    APTT  27.5   --    INR  1.06   --      Recent Labs      18   0001  18   0256   WBC  5.5  5.6   NEUTSPOLYS  61.70  74.80*   LYMPHOCYTES  26.70  15.00*   MONOCYTES  8.60  8.10   EOSINOPHILS  2.20  1.30   BASOPHILS  0.40  0.40   ASTSGOT  20   --    ALTSGPT  23   --    ALKPHOSPHAT  53   --    TBILIRUBIN  0.7   --            Hemodynamics:  Temp (24hrs), Av.7 °C (98 °F), Min:36.3 °C (97.4 °F), Max:37.2 °C (98.9 °F)  Temperature: 37.2 °C (98.9 °F)  Pulse  Av.8  Min: 68  Max: 92Heart Rate (Monitored): (!) 136  Blood Pressure: (!) 195/88 (Rn informed), NIBP: 114/73     Respiratory:    Respiration: 18, Pulse Oximetry: 95 %        RUL Breath Sounds: Clear, RML Breath Sounds: Clear, RLL Breath Sounds: Diminished, PHILIP Breath Sounds: Clear, LLL Breath Sounds: Diminished  Fluids:    Intake/Output Summary (Last 24 hours) at 18 0819  Last data filed at 18 1914   Gross per 24 hour   Intake              150 ml   Output               50 ml   Net              100 ml        GI/Nutrition:  Orders Placed This Encounter   Procedures   • DIET ORDER     Standing Status:   Standing     Number of Occurrences:   1     Order Specific Question:   Diet:     Answer:   Renal [8]     Medical Decision Making, by Problem:  Active Hospital Problems    Diagnosis   • *Pseudoaneurysm of AV hemodialysis fistula, initial encounter ([facility]) [T82.898A]   • Hypertensive urgency [I16.0]   • Hematoma of arm, left, initial encounter [S40.022A]   • End-stage renal disease (ESRD) ([facility]) [N18.6]   • Hyponatremia [E87.1]   • Hx of right BKA (CMS-HCC) [Z89.511]   • Hyperlipidemia [E78.5]   • Anemia of chronic disease [D63.8]       Quality-Core Measures     IMPRESSION:  1.  End-stage renal failure, on chronic maintenance hemodialysis.  2.   Hypertension.  3.  Hematoma adjacent to left upper arm AV fistula - revised.    PLAN:  No dialysis planned until cleared by surgery.

## 2018-04-28 NOTE — PROGRESS NOTES
Patient arrived to the unit at 2145. Patient is drowsy, A&Ox4. Denies any pain or nausea at this time. CDU called to deliver pt belongings. MD paged about pts peaked T-waves and STAT BMP ordered. Call light within reach. All needs met at this time.

## 2018-04-29 VITALS
RESPIRATION RATE: 18 BRPM | DIASTOLIC BLOOD PRESSURE: 77 MMHG | OXYGEN SATURATION: 93 % | SYSTOLIC BLOOD PRESSURE: 149 MMHG | BODY MASS INDEX: 24.99 KG/M2 | WEIGHT: 135.8 LBS | HEIGHT: 62 IN | HEART RATE: 79 BPM | TEMPERATURE: 98 F

## 2018-04-29 LAB
ANION GAP SERPL CALC-SCNC: 11 MMOL/L (ref 0–11.9)
BASOPHILS # BLD AUTO: 0.3 % (ref 0–1.8)
BASOPHILS # BLD: 0.02 K/UL (ref 0–0.12)
BUN SERPL-MCNC: 25 MG/DL (ref 8–22)
CALCIUM SERPL-MCNC: 8.6 MG/DL (ref 8.5–10.5)
CHLORIDE SERPL-SCNC: 95 MMOL/L (ref 96–112)
CO2 SERPL-SCNC: 27 MMOL/L (ref 20–33)
CREAT SERPL-MCNC: 6.36 MG/DL (ref 0.5–1.4)
EOSINOPHIL # BLD AUTO: 0.13 K/UL (ref 0–0.51)
EOSINOPHIL NFR BLD: 2.1 % (ref 0–6.9)
ERYTHROCYTE [DISTWIDTH] IN BLOOD BY AUTOMATED COUNT: 51.9 FL (ref 35.9–50)
GLUCOSE SERPL-MCNC: 106 MG/DL (ref 65–99)
HCT VFR BLD AUTO: 23.5 % (ref 37–47)
HGB BLD-MCNC: 7.6 G/DL (ref 12–16)
IMM GRANULOCYTES # BLD AUTO: 0.01 K/UL (ref 0–0.11)
IMM GRANULOCYTES NFR BLD AUTO: 0.2 % (ref 0–0.9)
LYMPHOCYTES # BLD AUTO: 1.49 K/UL (ref 1–4.8)
LYMPHOCYTES NFR BLD: 23.8 % (ref 22–41)
MCH RBC QN AUTO: 29.8 PG (ref 27–33)
MCHC RBC AUTO-ENTMCNC: 32.3 G/DL (ref 33.6–35)
MCV RBC AUTO: 92.2 FL (ref 81.4–97.8)
MONOCYTES # BLD AUTO: 0.73 K/UL (ref 0–0.85)
MONOCYTES NFR BLD AUTO: 11.7 % (ref 0–13.4)
NEUTROPHILS # BLD AUTO: 3.88 K/UL (ref 2–7.15)
NEUTROPHILS NFR BLD: 61.9 % (ref 44–72)
NRBC # BLD AUTO: 0 K/UL
NRBC BLD-RTO: 0 /100 WBC
PHOSPHATE SERPL-MCNC: 4.7 MG/DL (ref 2.5–4.5)
PLATELET # BLD AUTO: 137 K/UL (ref 164–446)
PMV BLD AUTO: 9.8 FL (ref 9–12.9)
POTASSIUM SERPL-SCNC: 4.2 MMOL/L (ref 3.6–5.5)
RBC # BLD AUTO: 2.55 M/UL (ref 4.2–5.4)
SODIUM SERPL-SCNC: 133 MMOL/L (ref 135–145)
WBC # BLD AUTO: 6.3 K/UL (ref 4.8–10.8)

## 2018-04-29 PROCEDURE — 700102 HCHG RX REV CODE 250 W/ 637 OVERRIDE(OP): Performed by: SURGERY

## 2018-04-29 PROCEDURE — 84100 ASSAY OF PHOSPHORUS: CPT

## 2018-04-29 PROCEDURE — 700102 HCHG RX REV CODE 250 W/ 637 OVERRIDE(OP): Performed by: HOSPITALIST

## 2018-04-29 PROCEDURE — A9270 NON-COVERED ITEM OR SERVICE: HCPCS | Performed by: HOSPITALIST

## 2018-04-29 PROCEDURE — 700111 HCHG RX REV CODE 636 W/ 250 OVERRIDE (IP): Performed by: SURGERY

## 2018-04-29 PROCEDURE — 36415 COLL VENOUS BLD VENIPUNCTURE: CPT

## 2018-04-29 PROCEDURE — 80048 BASIC METABOLIC PNL TOTAL CA: CPT

## 2018-04-29 PROCEDURE — 85025 COMPLETE CBC W/AUTO DIFF WBC: CPT

## 2018-04-29 PROCEDURE — A9270 NON-COVERED ITEM OR SERVICE: HCPCS | Performed by: SURGERY

## 2018-04-29 PROCEDURE — 700111 HCHG RX REV CODE 636 W/ 250 OVERRIDE (IP): Performed by: HOSPITALIST

## 2018-04-29 PROCEDURE — 99238 HOSP IP/OBS DSCHRG MGMT 30/<: CPT | Mod: GC | Performed by: HOSPITALIST

## 2018-04-29 RX ADMIN — MORPHINE SULFATE 4 MG: 4 INJECTION INTRAVENOUS at 00:20

## 2018-04-29 RX ADMIN — OXYCODONE HYDROCHLORIDE AND ACETAMINOPHEN 2 TABLET: 10; 325 TABLET ORAL at 15:11

## 2018-04-29 RX ADMIN — OXYCODONE HYDROCHLORIDE AND ACETAMINOPHEN 2 TABLET: 10; 325 TABLET ORAL at 05:09

## 2018-04-29 RX ADMIN — SEVELAMER CARBONATE 800 MG: 800 TABLET, FILM COATED ORAL at 05:01

## 2018-04-29 RX ADMIN — PROCHLORPERAZINE EDISYLATE 10 MG: 5 INJECTION INTRAMUSCULAR; INTRAVENOUS at 08:48

## 2018-04-29 RX ADMIN — LABETALOL HCL 200 MG: 100 TABLET, FILM COATED ORAL at 05:00

## 2018-04-29 RX ADMIN — LISINOPRIL 40 MG: 20 TABLET ORAL at 05:00

## 2018-04-29 RX ADMIN — STANDARDIZED SENNA CONCENTRATE AND DOCUSATE SODIUM 2 TABLET: 8.6; 5 TABLET, FILM COATED ORAL at 05:00

## 2018-04-29 RX ADMIN — SEVELAMER CARBONATE 800 MG: 800 TABLET, FILM COATED ORAL at 12:00

## 2018-04-29 RX ADMIN — FAMOTIDINE 20 MG: 20 TABLET ORAL at 05:00

## 2018-04-29 ASSESSMENT — ENCOUNTER SYMPTOMS
CHILLS: 0
CARDIOVASCULAR NEGATIVE: 1
RESPIRATORY NEGATIVE: 1
HEADACHES: 0
NAUSEA: 0
MUSCULOSKELETAL NEGATIVE: 1
SENSORY CHANGE: 1
GASTROINTESTINAL NEGATIVE: 1
PALPITATIONS: 0
FOCAL WEAKNESS: 0
CONSTITUTIONAL NEGATIVE: 1
SORE THROAT: 0
COUGH: 0
ABDOMINAL PAIN: 0
TINGLING: 1
SHORTNESS OF BREATH: 0
DIZZINESS: 0
NERVOUS/ANXIOUS: 0
BACK PAIN: 0
NECK PAIN: 0
FEVER: 0
VOMITING: 0
EYES NEGATIVE: 1

## 2018-04-29 ASSESSMENT — PAIN SCALES - GENERAL: PAINLEVEL_OUTOF10: 8

## 2018-04-29 ASSESSMENT — LIFESTYLE VARIABLES: SUBSTANCE_ABUSE: 0

## 2018-04-29 NOTE — CARE PLAN
Problem: Safety  Goal: Will remain free from falls  Outcome: PROGRESSING AS EXPECTED  Educated patient on the importance of good handwashing for self and staff, verbalized understanding.

## 2018-04-29 NOTE — PROGRESS NOTES
Patient back for dialysis. Patient c/o severe pain to fistula site. Noted to have a pocket of bright red blood under tegaderm. Notified by dialysis nurse don't remove for 5 hours. Will continue to monitor for excess bleeding.

## 2018-04-29 NOTE — OP REPORT
DATE OF SERVICE:  04/27/2018    PREOPERATIVE DIAGNOSES:  1.  Left brachial artery pseudoaneurysm.  2.  End-stage renal disease, on dialysis.    POSTOPERATIVE DIAGNOSES:  1.  Left brachial artery pseudoaneurysm.  2.  End-stage renal disease, on dialysis.    PROCEDURE:  Exploration of the left brachial artery and primary repair for   control of bleeding.    SURGEON:  Jimbo Davenport MD    ASSISTANT:  None.    ANESTHESIA:  General.    BLOOD LOSS:  200 mL.    SPECIMENS:  None.    DISPOSITION:  Patient was extubated, sent to recovery in stable condition.    DESCRIPTION OF PROCEDURE:  Following informed consent, patient was brought to   the operating suite, placed supine on the operating table and general   anesthesia was administered.  Left arm was prepped and draped sterile,   surgical time-out was called, everyone was in agreement.  Patient received   preoperative prophylactic antibiotics.    The patient did undergo an arm block by the anesthesia service.  Therefore,   local anesthetic injection was not used.  I evaluated the site of the   pseudoaneurysm using ultrasound.  I was then able to plot an incision that was   next to the overlying dialysis AV fistula.  By making the incision medial to   and parallel to the AV fistula, I was able to avoid having to disrupt the   fistula, which she is currently dependent on poor dialysis.  We made an   approximately 5 cm incision along the inside of the left upper arm.  We   carried this incision down into the deeper tissues until the brachial artery   was identified.  We did encounter some arterial bleeding and therefore, a   tourniquet was used on the upper arm to achieve hemostasis.  We then dissected   directly on to the opening in the brachial artery and we found a discrete   puncture wound in the anterior wall of the brachial artery. We were able to   easily oversew this with a figure-of-eight 6-0 Prolene suture, then the   tourniquet was released and the brachial  artery was allowed to reperfuse.    There was a palpable radial pulse in the arm and a very brisk thrill in the   fistula at point in time.  The artery was carefully examined for any other   potential injury and no additional bleeding was identified.  Topical   hemostatic was applied.  Once we had adequate hemostasis, we closed the wound   with multiple layers of absorbable suture and staples for the skin, dry   sterile dressing was placed.  Patient tolerated the procedure well.  She was   extubated and sent to recovery in stable condition.    POSTOPERATIVE PLAN:  I was able to repair the brachial artery without   disrupting the overlying AV fistula.  I therefore believe that we can continue   using the fistula for dialysis as we normally would.  However, if the patient   experiences too pain with the access needle due to the recent surgery in the   area, then we will have to bring her back to place a tunneled catheter while   the arm is recovering, but hopefully this could be avoided.       ____________________________________     MD MICHELLE Núñez / ANNA    DD:  04/29/2018 00:06:09  DT:  04/29/2018 01:02:09    D#:  [id number]  Job#:  [id number]

## 2018-04-29 NOTE — PROGRESS NOTES
Nephrologist ordered  3 hours hd tx. Initiated at 1327 and completed at 1627 .  Net uf  2000 ml. Tolerated HD.See flow sheet for details.  Report given to Adonis SANTOS.

## 2018-04-29 NOTE — PROGRESS NOTES
Discharge Summary    Educated patient on new medications, to make appointments with surgeon, and s/s to look for when returning to the emergency room. Patient verbalized understanding. PIV removed with no s/s of bleeding or infection at the site. Vitals WNL. Escorted patient downstairs via wheelchair to meet friend for discharge.

## 2018-04-29 NOTE — PROGRESS NOTES
Internal Medicine Interval Note  Note Author: Pilo Jordan M.D.     Name Isabelle Coyle     1965   Age/Sex 52 y.o. female   MRN [id number]   Code Status FULL     After 5PM or if no immediate response to page, please call for cross-coverage  Attending/Team: Dr. Spain/Filiberto See Patient List for primary contact information  Call [phone] to page    1st Call - Day Intern (R1):   Dr. Jordan 2nd Call - Day Sr. Resident (R2/R3):   Dr. Mcdowell         Reason for interval visit  (Principal Problem)   Hematoma of arm, left, initial encounter    Interval Problem Daily Status Update  (24 hours)     Patient reports left arm pain is improving. Still some swelling by left arm, but had full hemodialysis yesterday without complication. BMP labs show improved levels. Patient reports median nerve distribution numbness.          Review of Systems   Constitutional: Negative for chills and fever.   HENT: Negative for congestion and sore throat.    Respiratory: Negative for cough and shortness of breath.    Cardiovascular: Negative for chest pain and palpitations.   Gastrointestinal: Negative for abdominal pain, nausea and vomiting.   Genitourinary: Negative for dysuria.   Musculoskeletal: Negative for back pain and neck pain.   Skin: Negative for itching and rash.   Neurological: Positive for tingling and sensory change. Negative for dizziness, focal weakness and headaches.   Psychiatric/Behavioral: Negative for substance abuse. The patient is not nervous/anxious.        Consultants/Specialty  Vascular surgery  Nephrology    Disposition  Inpatient    Quality Measures  Quality-Core Measures   Reviewed items::  Labs reviewed and Medications reviewed  Murdock catheter::  No Murdock  DVT prophylaxis - mechanical:  SCDs          Physical Exam       Vitals:    18 2020 18 2339 18 0402 18 0700   BP: 136/80 149/91 150/83 (!) 161/81   Pulse: 87 92 93 82   Resp: 16 16 16 16   Temp: 36.7 °C (98 °F) 37.2  °C (99 °F) 37.4 °C (99.3 °F) 36.7 °C (98.1 °F)   TempSrc:       SpO2: 95% 94% 93% 92%   Weight: 61.6 kg (135 lb 12.9 oz)      Height:         Body mass index is 24.84 kg/m². Weight: 61.6 kg (135 lb 12.9 oz)  Oxygen Therapy:  Pulse Oximetry: 92 %, O2 (LPM): 92, O2 Delivery: None (Room Air)    Physical Exam   Constitutional: She is oriented to person, place, and time and well-developed, well-nourished, and in no distress. No distress.   HENT:   Head: Normocephalic and atraumatic.   Mouth/Throat: No oropharyngeal exudate.   Eyes: Conjunctivae and EOM are normal. Pupils are equal, round, and reactive to light. No scleral icterus.   Neck: Normal range of motion. Neck supple.   Cardiovascular: Normal rate, regular rhythm and normal heart sounds.  Exam reveals no gallop and no friction rub.    No murmur heard.  Pulmonary/Chest: Effort normal and breath sounds normal. No respiratory distress.   Abdominal: Soft. Bowel sounds are normal. She exhibits no distension. There is no tenderness.   Musculoskeletal: She exhibits edema and tenderness.   Left arm swelling and ecchymosis by fistula, radial pulses 2+   Lymphadenopathy:     She has no cervical adenopathy.   Neurological: She is alert and oriented to person, place, and time. No cranial nerve deficit. Coordination normal.   Skin: Skin is warm and dry. She is not diaphoretic. There is erythema.   Psychiatric: Affect and judgment normal.         Lab Data Review:         4/27/2018  2:42 PM    Recent Labs      04/27/18   0001  04/27/18 2215  04/28/18   0256  04/29/18   0410   SODIUM  129*  130*  131*  133*   POTASSIUM  4.9  5.4  4.8  4.2   CHLORIDE  88*  91*  90*  95*   CO2  29  25  25  27   BUN  39*  51*  56*  25*   CREATININE  6.38*  9.02*  9.84*  6.36*   MAGNESIUM  2.2  2.3  2.2   --    PHOSPHORUS  5.1*   --   7.3*  4.7*   CALCIUM  8.8  8.2*  8.3*  8.6       Recent Labs      04/27/18   0001  04/27/18 2215  04/28/18   0256  04/29/18   0410   [facility]  23   --    --    --       ASTSGOT  20   --    --    --    ALKPHOSPHAT  53   --    --    --    TBILIRUBIN  0.7   --    --    --    GLUCOSE  83  85  128*  106*       Recent Labs      04/27/18   0001  04/28/18   0256  04/29/18   0410   RBC  2.94*  2.78*  2.55*   HEMOGLOBIN  8.8*  8.2*  7.6*   HEMATOCRIT  26.1*  25.3*  23.5*   PLATELETCT  153*  148*  137*   PROTHROMBTM  13.5   --    --    APTT  27.5   --    --    INR  1.06   --    --        Recent Labs      04/27/18   0001  04/28/18   0256  04/29/18   0410   WBC  5.5  5.6  6.3   NEUTSPOLYS  61.70  74.80*  61.90   LYMPHOCYTES  26.70  15.00*  23.80   MONOCYTES  8.60  8.10  11.70   EOSINOPHILS  2.20  1.30  2.10   BASOPHILS  0.40  0.40  0.30   ASTSGOT  20   --    --    ALTSGPT  23   --    --    ALKPHOSPHAT  53   --    --    TBILIRUBIN  0.7   --    --            Assessment/Plan     * Hematoma of arm, left, initial encounter- (present on admission)   Assessment & Plan    - Status post repair of left brachial artery puncture, likely from puncture during previous dialysis  - Pain is improving, limb remains neurovascularly intact  - AV fistula intact, no complications from hemodialysis yesterday  - Continue pain management        Hypertensive urgency- (present on admission)   Assessment & Plan    - Presented with hypertensive urgency SBP >200  - BP now improved with 's  - Will continue home labetalol and lisinopril  - Labetalol and hydralazine prn  - Monitor for symptoms of end organ damage        End-stage renal disease (ESRD) ([facility])- (present on admission)   Assessment & Plan    - HD on MWF schedule, last dialysis on Wednesday  - Dialysis performed yesterday 4/28 without complication        Anemia of chronic disease- (present on admission)   Assessment & Plan    - Likely secondary to chronic kidney disease  - Hgb stable, CTM

## 2018-04-29 NOTE — DISCHARGE SUMMARY
Internal Medicine Discharge Summary  Note Author: Pilo Jordan M.D.       Admit Date:  4/26/2018       Discharge Date:   4/28/2018    Service:   [facility] Internal Medicine Green Team  Attending Physician(s):   Dr. Spain       Senior Resident(s):   Dr. Mcdowell  John Resident(s):   Dr. Jordan      Primary Diagnosis:   Hematoma of arm, left, initial encounter    Secondary Diagnoses:                Principal Problem:    Hematoma of arm, left, initial encounter POA: Yes  Active Problems:    Hypertensive urgency POA: Yes    End-stage renal disease (ESRD) ([facility]) POA: Yes    Anemia of chronic disease POA: Yes    Hyperlipidemia POA: Yes    Hx of right BKA (CMS-HCC) POA: Yes    Hyponatremia POA: Yes    Postoperative pain POA: Unknown  Resolved Problems:    Pseudoaneurysm of brachial artery, left (CMS-[facility]) POA: Yes      Hospital Summary (Brief Narrative):       Ms. Coyle is a 52 year old female with history of end stage renal disease on dialysis, presents for left arm swelling and pain which started after dialysis, the day before presentation. She was found to have left arm swelling from hematoma with associated pain, but good peripheral pulses and strength. CT angio showed pseudoaneurysm of the mid to distal brachial artery. Vascular surgery repaired left brachial pseudoaneurysm due to small puncture hole in the left brachial artery. AV fistula remained intact. Patient had hemodialysis using AV fistula without complication. Patient's pain and swelling improved, however patient notes numbness in her left first to third fingers, likely from median nerve compression from the left arm hematoma. Patient's hospitalization also notable for hypertensive urgency, with systolic blood pressure >200 on admission. Patient received antihypertensive medications and dialysis which stabilized her blood pressure in an acceptable range. Therefore, patient is discharged in fair and stable condition with follow up. [facility] scheduling called  to schedule appointment with primary care provider.    Patient /Hospital Summary (Details -- Problem Oriented) :          Hypertensive urgency   Assessment & Plan    - Presented with hypertensive urgency SBP >200  - BP now improved with 's  - Will continue home labetalol and lisinopril  - Labetalol and hydralazine prn  - Monitor for symptoms of end organ damage        * Hematoma of arm, left, initial encounter   Assessment & Plan    - Status post repair of left brachial artery puncture, likely from puncture during previous dialysis  - Pain is improving, limb remains neurovascularly intact  - AV fistula intact, no complications from hemodialysis yesterday  - Numbness along L first three fingers, consistent with median nerve compression; should improve as hematoma improves, pt to follow up with PCP  - Continue pain management        Pseudoaneurysm of brachial artery, left (CMS-HCC)-resolved as of 2018   Assessment & Plan    - CTA shows pseudoaneurysm adjacent to the mid to distal brachial artery  - Vascular surgery to evaluate AV fistula and repair if needed, plan for surgery today  - Nephrology consulted for dialysis needs        End-stage renal disease (ESRD) ([facility])   Assessment & Plan    - HD on MWF schedule, last dialysis on Wednesday  - Dialysis performed yesterday  without complication        Anemia of chronic disease   Assessment & Plan    - Likely secondary to chronic kidney disease  - Hgb stable, CTM            Consultants:     Nephrology  Vascular surgery    Procedures:        2018: Exploration of left brachial artery and primary repair    Imaging/ Testin/26/2018 Ultrasound hemodialysis access  2018 CT angiogram upper extremity    Discharge Medications:         Medication Reconciliation: Completed       Medication List      START taking these medications      Instructions   lidocaine-prilocaine 2.5-2.5 % Crea  Commonly known as:  EMLA   Apply to left upper arm 2 hours prior  to dialysis treatments. It may be helpful to cover the upper arm with plastic wrap to keep the cream from rubbing off.     oxyCODONE-acetaminophen 5-325 MG Tabs  Commonly known as:  PERCOCET   Take 1-2 Tabs by mouth every 6 hours as needed for Moderate Pain or Severe Pain for up to 5 days.  Dose:  1-2 Tab        CONTINUE taking these medications      Instructions   famotidine 20 MG Tabs  Commonly known as:  PEPCID   TAKE ONE TABLET BY MOUTH TWICE DAILY     labetalol 200 MG Tabs  Commonly known as:  NORMODYNE   Take 1 Tab by mouth 2 Times a Day.  Dose:  200 mg     lisinopril 40 MG tablet  Commonly known as:  PRINIVIL, ZESTRIL   Take 1 Tab by mouth every day.  Dose:  40 mg     sevelamer 800 MG Tabs  Commonly known as:  RENAGEL   Take 1 Tab by mouth 3 times a day, with meals.  Dose:  800 mg     terazosin 2 MG Caps  Commonly known as:  HYTRIN   Take 2 mg by mouth every evening.  Dose:  2 mg            Can use .DISCHARGEMEDSLIST if going to another facility         Disposition:   Discharge to home    Diet:   Renal    Activity:   As tolerated and directed by vascular surgery    Instructions:         The patient was instructed to return to the ER in the event of worsening symptoms. I have counseled the patient on the importance of compliance and the patient has agreed to proceed with all medical recommendations and follow up plan indicated above.   The patient understands that all medications come with benefits and risks. Risks may include permanent injury or death and these risks can be minimized with close reassessment and monitoring.        Primary Care Provider:    Juan Moreno  Discharge summary faxed to primary care provider:  Deferred  Copy of discharge summary given to the patient: Deferred      Follow up appointment details :      [facility] scheduling to make appointment for next 1-2 weeks    Pending Studies:        none    Time spent on discharge day patient visit, preparing discharge paperwork and arranging  for patient follow up.    Summary of follow up issues:   Follow up left arm hematoma swelling/pain and left hand numbness    Discharge Time (Minutes) :    45 minutes  Hospital Course Type:  Inpatient Stay >2 midnights      Condition on Discharge    ______________________________________________________________________    Interval history/exam for day of discharge:     Patient seen this AM in [facility]. She states her pain is improved, although she still has some left arm swelling. She also c/o left hand numbness along first to third fingers. BP and labs better controlled today after dialysis yesterday. Will discharge to home.    Vitals:    04/28/18 2020 04/28/18 2339 04/29/18 0402 04/29/18 0700   BP: 136/80 149/91 150/83 (!) 161/81   Pulse: 87 92 93 82   Resp: 16 16 16 16   Temp: 36.7 °C (98 °F) 37.2 °C (99 °F) 37.4 °C (99.3 °F) 36.7 °C (98.1 °F)   TempSrc:       SpO2: 95% 94% 93% 92%   Weight: 61.6 kg (135 lb 12.9 oz)      Height:         Weight/BMI: Body mass index is 24.84 kg/m².  Pulse Oximetry: 92 %, O2 (LPM): 92, O2 Delivery: None (Room Air)    General: woman sitting in bed in [facility]  CVS: RRR, no m/r/g  PULM: CTA b/l, no wheezes or rales  MSK: left arm swelling mostly proximal to elbow, some pain to palpation  NEURO: decreased sensation to left hand first to third fingers    Most Recent Labs:    Lab Results   Component Value Date/Time    WBC 6.3 04/29/2018 04:10 AM    RBC 2.55 (L) 04/29/2018 04:10 AM    HEMOGLOBIN 7.6 (L) 04/29/2018 04:10 AM    HEMATOCRIT 23.5 (L) 04/29/2018 04:10 AM    MCV 92.2 04/29/2018 04:10 AM    MCH 29.8 04/29/2018 04:10 AM    MCHC 32.3 (L) 04/29/2018 04:10 AM    MPV 9.8 04/29/2018 04:10 AM    NEUTSPOLYS 61.90 04/29/2018 04:10 AM    LYMPHOCYTES 23.80 04/29/2018 04:10 AM    MONOCYTES 11.70 04/29/2018 04:10 AM    EOSINOPHILS 2.10 04/29/2018 04:10 AM    BASOPHILS 0.30 04/29/2018 04:10 AM    HYPOCHROMIA 1+ 10/04/2013 03:24 PM    ANISOCYTOSIS 3+ 07/14/2007 06:15 AM      Lab Results   Component Value  Date/Time    SODIUM 133 (L) 04/29/2018 04:10 AM    POTASSIUM 4.2 04/29/2018 04:10 AM    CHLORIDE 95 (L) 04/29/2018 04:10 AM    CO2 27 04/29/2018 04:10 AM    GLUCOSE 106 (H) 04/29/2018 04:10 AM    BUN 25 (H) 04/29/2018 04:10 AM    CREATININE 6.36 (HH) 04/29/2018 04:10 AM    CREATININE 2.2 (H) 05/06/2009 03:10 AM      Lab Results   Component Value Date/Time    ALTSGPT 23 04/27/2018 12:01 AM    ASTSGOT 20 04/27/2018 12:01 AM    ALKPHOSPHAT 53 04/27/2018 12:01 AM    TBILIRUBIN 0.7 04/27/2018 12:01 AM    DBILIRUBIN <0.1 06/20/2017 01:19 PM    LIPASE 23 01/07/2018 07:20 AM    ALBUMIN 4.1 04/27/2018 12:01 AM    GLOBULIN 2.8 04/27/2018 12:01 AM    INR 1.06 04/27/2018 12:01 AM     Lab Results   Component Value Date/Time    PROTHROMBTM 13.5 04/27/2018 12:01 AM    INR 1.06 04/27/2018 12:01 AM

## 2018-04-29 NOTE — DISCHARGE INSTRUCTIONS
Discharge Instructions    Discharged to home by car with relative. Discharged via wheelchair, hospital escort: Yes.  Special equipment needed: Not Applicable    Be sure to schedule a follow-up appointment with your primary care doctor or any specialists as instructed.     Discharge Plan:   Smoking Cessation Offered: Patient Refused  Influenza Vaccine Indication: Not indicated: Previously immunized this influenza season and > 8 years of age    I understand that a diet low in cholesterol, fat, and sodium is recommended for good health. Unless I have been given specific instructions below for another diet, I accept this instruction as my diet prescription.   Other diet: Renal    Special Instructions: None     Oxycodone tablets or capsules  What is this medicine?  OXYCODONE (ox i KOE done) is a pain reliever. It is used to treat moderate to severe pain.  This medicine may be used for other purposes; ask your health care provider or pharmacist if you have questions.  COMMON BRAND NAME(S): Dazidox, Endocodone, Oxaydo, OXECTA, OxyIR, Percolone, Roxicodone, ROXYBOND  What should I tell my health care provider before I take this medicine?  They need to know if you have any of these conditions:  -[place]'s disease  -brain tumor  -head injury  -heart disease  -history of drug or alcohol abuse problem  -if you often drink alcohol  -kidney disease  -liver disease  -lung or breathing disease, like asthma  -mental illness  -pancreatic disease  -seizures  -thyroid disease  -an unusual or allergic reaction to oxycodone, codeine, hydrocodone, morphine, other medicines, foods, dyes, or preservatives  -pregnant or trying to get pregnant  -breast-feeding  How should I use this medicine?  Take this medicine by mouth with a glass of water. Follow the directions on the prescription label. You can take it with or without food. If it upsets your stomach, take it with food. Take your medicine at regular intervals. Do not take it more often  than directed. Do not stop taking except on your doctor's advice.  Some brands of this medicine, like Oxecta, have special instructions. Ask your doctor or pharmacist if these directions are for you: Do not cut, crush or chew this medicine. Swallow only one tablet at a time. Do not wet, soak, or lick the tablet before you take it.  A special MedGuide will be given to you by the pharmacist with each prescription and refill. Be sure to read this information carefully each time.  Talk to your pediatrician regarding the use of this medicine in children. Special care may be needed.  Overdosage: If you think you have taken too much of this medicine contact a poison control center or emergency room at once.  NOTE: This medicine is only for you. Do not share this medicine with others.  What if I miss a dose?  If you miss a dose, take it as soon as you can. If it is almost time for your next dose, take only that dose. Do not take double or extra doses.  What may interact with this medicine?  This medicine may interact with the following medications:  -alcohol  -antihistamines for allergy, cough and cold  -antiviral medicines for HIV or AIDS  -atropine  -certain antibiotics like clarithromycin, erythromycin, linezolid, rifampin  -certain medicines for anxiety or sleep  -certain medicines for bladder problems like oxybutynin, tolterodine  -certain medicines for depression like amitriptyline, fluoxetine, sertraline  -certain medicines for fungal infections like ketoconazole, itraconazole, voriconazole  -certain medicines for migraine headache like almotriptan, eletriptan, frovatriptan, naratriptan, rizatriptan, sumatriptan, zolmitriptan  -certain medicines for nausea or vomiting like dolasetron, ondansetron, palonosetron  -certain medicines for Parkinson's disease like benztropine, trihexyphenidyl  -certain medicines for seizures like phenobarbital, phenytoin, primidone  -certain medicines for stomach problems like dicyclomine,  hyoscyamine  -certain medicines for travel sickness like scopolamine  -diuretics  -general anesthetics like halothane, isoflurane, methoxyflurane, propofol  -ipratropium  -local anesthetics like lidocaine, pramoxine, tetracaine  -MAOIs like Carbex, Eldepryl, Marplan, Nardil, and Parnate  -medicines that relax muscles for surgery  -methylene blue  -nilotinib  -other narcotic medicines for pain or cough  -phenothiazines like chlorpromazine, mesoridazine, prochlorperazine, thioridazine  This list may not describe all possible interactions. Give your health care provider a list of all the medicines, herbs, non-prescription drugs, or dietary supplements you use. Also tell them if you smoke, drink alcohol, or use illegal drugs. Some items may interact with your medicine.  What should I watch for while using this medicine?  Tell your doctor or health care professional if your pain does not go away, if it gets worse, or if you have new or a different type of pain. You may develop tolerance to the medicine. Tolerance means that you will need a higher dose of the medicine for pain relief. Tolerance is normal and is expected if you take this medicine for a long time.  Do not suddenly stop taking your medicine because you may develop a severe reaction. Your body becomes used to the medicine. This does NOT mean you are addicted. Addiction is a behavior related to getting and using a drug for a non-medical reason. If you have pain, you have a medical reason to take pain medicine. Your doctor will tell you how much medicine to take. If your doctor wants you to stop the medicine, the dose will be slowly lowered over time to avoid any side effects.  There are different types of narcotic medicines (opiates). If you take more than one type at the same time or if you are taking another medicine that also causes drowsiness, you may have more side effects. Give your health care provider a list of all medicines you use. Your doctor will  tell you how much medicine to take. Do not take more medicine than directed. Call emergency for help if you have problems breathing or unusual sleepiness.  You may get drowsy or dizzy. Do not drive, use machinery, or do anything that needs mental alertness until you know how the medicine affects you. Do not stand or sit up quickly, especially if you are an older patient. This reduces the risk of dizzy or fainting spells. Alcohol may interfere with the effect of this medicine. Avoid alcoholic drinks.  This medicine will cause constipation. Try to have a bowel movement at least every 2 to 3 days. If you do not have a bowel movement for 3 days, call your doctor or health care professional.  Your mouth may get dry. Chewing sugarless gum or sucking hard candy, and drinking plenty of water may help. Contact your doctor if the problem does not go away or is severe.  What side effects may I notice from receiving this medicine?  Side effects that you should report to your doctor or health care professional as soon as possible:  -allergic reactions like skin rash, itching or hives, swelling of the face, lips, or tongue  -breathing problems  -confusion  -signs and symptoms of low blood pressure like dizziness; feeling faint or lightheaded, falls; unusually weak or tired  -trouble passing urine or change in the amount of urine  -trouble swallowing  Side effects that usually do not require medical attention (report to your doctor or health care professional if they continue or are bothersome):  -constipation  -dry mouth  -nausea, vomiting  -tiredness  This list may not describe all possible side effects. Call your doctor for medical advice about side effects. You may report side effects to FDA at 9-201-FDA-7660.  Where should I keep my medicine?  Keep out of the reach of children. This medicine can be abused. Keep your medicine in a safe place to protect it from theft. Do not share this medicine with anyone. Selling or giving  away this medicine is dangerous and against the law.  Store at room temperature between 15 and 30 degrees C (59 and 86 degrees F). Protect from light. Keep container tightly closed.  This medicine may cause accidental overdose and death if it is taken by other adults, children, or pets. Flush any unused medicine down the toilet to reduce the chance of harm. Do not use the medicine after the expiration date.  NOTE: This sheet is a summary. It may not cover all possible information. If you have questions about this medicine, talk to your doctor, pharmacist, or health care provider.  © 2018 [employer] (2016-11-01 16:55:57)  Lidocaine; Prilocaine cream  What is this medicine?  LIDOCAINE; PRILOCAINE (LYE hampton chacko; PRIL oh chacko) is a topical anesthetic that causes loss of feeling in the skin and surrounding tissues. It is used to numb the skin before procedures or injections.  This medicine may be used for other purposes; ask your health care provider or pharmacist if you have questions.  COMMON BRAND NAME(S): ANODYNE LPT, EMLA, Lido-Prilo Chema, LiProZonePak, LIVIXIL Frantz, LP Lite Frantz, Medolor Frantz with Tegaderm Dressing, Relador  What should I tell my health care provider before I take this medicine?  They need to know if you have any of these conditions:  -glucose-6-phosphate deficiencies  -heart disease  -kidney or liver disease  -methemoglobinemia  -an unusual or allergic reaction to lidocaine, prilocaine, other medicines, foods, dyes, or preservatives  -pregnant or trying to get pregnant  -breast-feeding  How should I use this medicine?  This medicine is for external use only on the skin. Do not take by mouth. Follow the directions on the prescription label. Wash hands before and after use. Do not use more or leave in contact with the skin longer than directed. Do not apply to eyes or open wounds. It can cause irritation and blurred or temporary loss of vision. If this medicine comes in contact with your  eyes, immediately rinse the eye with water. Do not touch or rub the eye. Contact your health care provider right away.  Talk to your pediatrician regarding the use of this medicine in children. While this medicine may be prescribed for children for selected conditions, precautions do apply.  Overdosage: If you think you have taken too much of this medicine contact a poison control center or emergency room at once.  NOTE: This medicine is only for you. Do not share this medicine with others.  What if I miss a dose?  This medicine is usually only applied once prior to each procedure. It must be in contact with the skin for a period of time for it to work. If you applied this medicine later than directed, tell your health care professional before starting the procedure.  What may interact with this medicine?  -acetaminophen  -chloroquine  -dapsone  -medicines to control heart rhythm  -nitrates like nitroglycerin and nitroprusside  -other ointments, creams, or sprays that may contain anesthetic medicine  -phenobarbital  -phenytoin  -quinine  -sulfonamides like sulfacetamide, sulfamethoxazole, sulfasalazine and others  This list may not describe all possible interactions. Give your health care provider a list of all the medicines, herbs, non-prescription drugs, or dietary supplements you use. Also tell them if you smoke, drink alcohol, or use illegal drugs. Some items may interact with your medicine.  What should I watch for while using this medicine?  Be careful to avoid injury to the treated area while it is numb and you are not aware of pain. Avoid scratching, rubbing, or exposing the treated area to hot or cold temperatures until complete sensation has returned. The numb feeling will wear off a few hours after applying the cream.  What side effects may I notice from receiving this medicine?  Side effects that you should report to your doctor or health care professional as soon as possible:  -blurred vision  -chest  pain  -difficulty breathing  -dizziness  -drowsiness  -fast or irregular heartbeat  -skin rash or itching  -swelling of your throat, lips, or face  -trembling  Side effects that usually do not require medical attention (report to your doctor or health care professional if they continue or are bothersome):  -changes in ability to feel hot or cold  -redness and swelling at the application site  This list may not describe all possible side effects. Call your doctor for medical advice about side effects. You may report side effects to FDA at 8-950-QGE-8026.  Where should I keep my medicine?  Keep out of reach of children.  Store at room temperature between 15 and 30 degrees C (59 and 86 degrees F). Keep container tightly closed. Throw away any unused medicine after the expiration date.  NOTE: This sheet is a summary. It may not cover all possible information. If you have questions about this medicine, talk to your doctor, pharmacist, or health care provider.  © 2018 [employer]/Gold Standard (2009-06-22 17:14:35)    How can pain medicine affect me?  You were prescribed pain medicine. This medicine may:  · Make you tired or sleepy.  · Make you feel dizzy.  · Affect how well you can:  ¨ Drive  ¨ Do certain activities.  Pain medicine may not make all of your pain go away. You should be comfortable enough to:  · Move.  · Breathe.  · Take care of yourself.  How often should I take pain medicine and how much should I take?  · Take pain medicine only as told by your doctor and only as needed for pain.  · You do not need to take pain medicine if you are not having pain, unless your doctor tells you to do that.  · You can take less than the prescribed dose if you find that less medicine helps your pain.  · If you have very bad (severe) pain, call your doctor. Do not take more pills than told by your doctor. Do not take pills more often than told by your doctor.  What should I avoid while I am taking pain medicine?  Follow these  instructions after you start taking pain medicine, while you are taking the medicine, and for 8 hours after you stop taking the medicine:  · Do not drive.  · Do not use machinery.  · Do not use power tools.  · Do not sign legal documents.  · Do not drink alcohol.  · Do not take sleeping pills.  · Do not take care of children by yourself.  · Do not do any activities that involve climbing or being in high places.  · Do not go into any body of water unless there is an adult nearby who can watch and help you. This includes:  ¨ Lakes.  ¨ Rivers.  ¨ Oceans.  ¨ Spas.  ¨ Swimming pools.  How can I keep others safe while I am taking pain medicine?  · Store your pain medicine as told by your doctor. Make sure that you keep it where children and pets cannot reach it.  · Do not share your pain medicine with anyone.  · Do not save any leftover pills. If you have any leftover pain medicine, get rid of it or destroy it as told by your doctor.  What else do I need to know about taking pain medicine?  · Use a poop (stool) softener if you have trouble pooping (constipation) because of your pain medicine. Eating more fruits and vegetables also helps with constipation.  · Write down the times when you take your pain medicine. Look at the times before you take your next dose of medicine.  · Your pain medicine might have acetaminophen in it. Do not take any other acetaminophen while you are taking this medicine. An overdose of acetaminophen can do very bad damage to your liver. If you are taking any medicines in addition to your pain medicine, check the active ingredients on those medicines to see if acetaminophen is listed.  When should I call my doctor?  · Your medicine is not helping the pain.  · You do either of these soon after you take the medicine:  ¨ Throw up (vomit).  ¨ Have watery poop (diarrhea).  · You have new pain in areas that did not hurt before.  · You have an allergic reaction to your medicine. This may  include:  ¨ Feeling itchy.  ¨ Swelling.  ¨ Feeling dizzy.  ¨ Getting a new rash.  · You cannot put up with feeling:  ¨ Dizzy.  ¨ Sick to your stomach (nauseous).  When should I call 911 or go to the emergency room?  · You pass out (faint).  · You feel very confused.  · You throw up again and again.  · Your skin or lips turn pale or bluish in color.  · You are:  ¨ Short of breath.  ¨ Breathing much more slowly than usual.  · You have a very bad allergic reaction to your medicine. This includes:  ¨ Developing a swollen tongue.  ¨ Having trouble breathing.  This information is not intended to replace advice given to you by your health care provider. Make sure you discuss any questions you have with your health care provider.  Document Released: 06/05/2009 Document Revised: 08/24/2017 Document Reviewed: 10/22/2015  © 2017 [employer]    Chronic Kidney Disease, Adult  Chronic kidney disease (CKD) happens when the kidneys are damaged during a time of 3 or more months. The kidneys are two organs that do many important jobs in the body. These jobs include:  · Removing wastes and extra fluids from the blood.  · Making hormones that maintain the amount of fluid in your tissues and blood vessels.  · Making sure that the body has the right amount of fluids and chemicals.  Most of the time, this condition does not go away, but it can usually be controlled. Steps must be taken to slow down the kidney damage or stop it from getting worse. Otherwise, the kidneys may stop working.  Follow these instructions at home:  · Follow your diet as told by your doctor. You may need to avoid alcohol, salty foods (sodium), and foods that are high in potassium, calcium, and protein.  · Take over-the-counter and prescription medicines only as told by your doctor. Do not take any new medicines unless your doctor says you can do that. These include vitamins and minerals.  ¨ Medicines and nutritional supplements can make kidney damage worse.  ¨ Your  doctor may need to change how much medicine you take.  · Do not use any tobacco products. These include cigarettes, chewing tobacco, and e-cigarettes. If you need help quitting, ask your doctor.  · Keep all follow-up visits as told by your doctor. This is important.  · Check your blood pressure. Tell your doctor if there are changes to your blood pressure.  · Get to a healthy weight. Stay at that weight. If you need help with this, ask your doctor.  · Start or continue an exercise plan. Try to exercise at least 30 minutes a day, 5 days a week.  · Stay up-to-date with your shots (immunizations) as told by your doctor.  Contact a doctor if:  · Your symptoms get worse.  · You have new symptoms.  Get help right away if:  · You have symptoms of end-stage kidney disease. These include:  ¨ Headaches.  ¨ Skin that is darker or lighter than normal.  ¨ Numbness in your hands or feet.  ¨ Easy bruising.  ¨ Having hiccups often.  ¨ Chest pain.  ¨ Shortness of breath.  ¨ Stopping of menstrual periods in women.  · You have a fever.  · You are making very little pee (urine).  · You have pain or bleeding when you pee (urinate).  This information is not intended to replace advice given to you by your health care provider. Make sure you discuss any questions you have with your health care provider.  Document Released: 03/14/2011 Document Revised: 05/25/2017 Document Reviewed: 08/16/2013  [employer] Interactive Patient Education © 2017 [employer]    Hypertension  Hypertension, commonly called high blood pressure, is when the force of blood pumping through your arteries is too strong. Your arteries are the blood vessels that carry blood from your heart throughout your body. A blood pressure reading consists of a higher number over a lower number, such as 110/72. The higher number (systolic) is the pressure inside your arteries when your heart pumps. The lower number (diastolic) is the pressure inside your arteries when your heart  relaxes. Ideally you want your blood pressure below 120/80.  Hypertension forces your heart to work harder to pump blood. Your arteries may become narrow or stiff. Having untreated or uncontrolled hypertension can cause heart attack, stroke, kidney disease, and other problems.  What increases the risk?  Some risk factors for high blood pressure are controllable. Others are not.  Risk factors you cannot control include:  · Race. You may be at higher risk if you are .  · Age. Risk increases with age.  · Gender. Men are at higher risk than women before age 45 years. After age 65, women are at higher risk than men.  Risk factors you can control include:  · Not getting enough exercise or physical activity.  · Being overweight.  · Getting too much fat, sugar, calories, or salt in your diet.  · Drinking too much alcohol.  What are the signs or symptoms?  Hypertension does not usually cause signs or symptoms. Extremely high blood pressure (hypertensive crisis) may cause headache, anxiety, shortness of breath, and nosebleed.  How is this diagnosed?  To check if you have hypertension, your health care provider will measure your blood pressure while you are seated, with your arm held at the level of your heart. It should be measured at least twice using the same arm. Certain conditions can cause a difference in blood pressure between your right and left arms. A blood pressure reading that is higher than normal on one occasion does not mean that you need treatment. If it is not clear whether you have high blood pressure, you may be asked to return on a different day to have your blood pressure checked again. Or, you may be asked to monitor your blood pressure at home for 1 or more weeks.  How is this treated?  Treating high blood pressure includes making lifestyle changes and possibly taking medicine. Living a healthy lifestyle can help lower high blood pressure. You may need to change some of your  habits.  Lifestyle changes may include:  · Following the DASH diet. This diet is high in fruits, vegetables, and whole grains. It is low in salt, red meat, and added sugars.  · Keep your sodium intake below 2,300 mg per day.  · Getting at least 30-45 minutes of aerobic exercise at least 4 times per week.  · Losing weight if necessary.  · Not smoking.  · Limiting alcoholic beverages.  · Learning ways to reduce stress.  Your health care provider may prescribe medicine if lifestyle changes are not enough to get your blood pressure under control, and if one of the following is true:  · You are 18-59 years of age and your systolic blood pressure is above 140.  · You are 60 years of age or older, and your systolic blood pressure is above 150.  · Your diastolic blood pressure is above 90.  · You have diabetes, and your systolic blood pressure is over 140 or your diastolic blood pressure is over 90.  · You have kidney disease and your blood pressure is above 140/90.  · You have heart disease and your blood pressure is above 140/90.  Your personal target blood pressure may vary depending on your medical conditions, your age, and other factors.  Follow these instructions at home:  · Have your blood pressure rechecked as directed by your health care provider.  · Take medicines only as directed by your health care provider. Follow the directions carefully. Blood pressure medicines must be taken as prescribed. The medicine does not work as well when you skip doses. Skipping doses also puts you at risk for problems.  · Do not smoke.  · Monitor your blood pressure at home as directed by your health care provider.  Contact a health care provider if:  · You think you are having a reaction to medicines taken.  · You have recurrent headaches or feel dizzy.  · You have swelling in your ankles.  · You have trouble with your vision.  Get help right away if:  · You develop a severe headache or confusion.  · You have unusual weakness,  numbness, or feel faint.  · You have severe chest or abdominal pain.  · You vomit repeatedly.  · You have trouble breathing.  This information is not intended to replace advice given to you by your health care provider. Make sure you discuss any questions you have with your health care provider.  Document Released: 12/18/2006 Document Revised: 05/25/2017 Document Reviewed: 10/10/2014  [employer] Interactive Patient Education © 2017 [employer]      · Is patient discharged on Warfarin / Coumadin?   No     Depression / Suicide Risk    As you are discharged from this [facility] facility, it is important to learn how to keep safe from harming yourself.    Recognize the warning signs:  · Abrupt changes in personality, positive or negative- including increase in energy   · Giving away possessions  · Change in eating patterns- significant weight changes-  positive or negative  · Change in sleeping patterns- unable to sleep or sleeping all the time   · Unwillingness or inability to communicate  · Depression  · Unusual sadness, discouragement and loneliness  · Talk of wanting to die  · Neglect of personal appearance   · Rebelliousness- reckless behavior  · Withdrawal from people/activities they love  · Confusion- inability to concentrate     If you or a loved one observes any of these behaviors or has concerns about self-harm, here's what you can do:  · Talk about it- your feelings and reasons for harming yourself  · Remove any means that you might use to hurt yourself (examples: pills, rope, extension cords, firearm)  · Get professional help from the community (Mental Health, Substance Abuse, psychological counseling)  · Do not be alone:Call your Safe Contact- someone whom you trust who will be there for you.  · Call your local CRISIS HOTLINE 040-5499 or 349-071-5077  · Call your local Children's Mobile Crisis Response Team [place] (349) 598-8002 or www.365looks ([place].me)  · Call the toll free National Suicide  Prevention Hotlines   · National Suicide Prevention Lifeline 625-360-HTLM (6122)  · National Hope Line Network 800-SUICIDE (777-3700)

## 2018-04-29 NOTE — PROGRESS NOTES
Hospital Medicine Progress Note, Adult, Complex               Author: Raphael Martinez Date & Time created: 4/29/2018  11:11 AM     Interval History:  4/28/18 - Left upper arm AV fistula revised yesterday.  She complains of pain in upper arm and numbness first - third fingers.  4/29/18 - Feels better.  Dialyzed yesterday via AV fistula.  Pain is less today.     Review of Systems:  Review of Systems   Constitutional: Negative.    HENT: Negative.    Eyes: Negative.    Respiratory: Negative.    Cardiovascular: Negative.    Gastrointestinal: Negative.    Genitourinary: Negative.    Musculoskeletal: Negative.    Skin: Negative.        Physical Exam:  Physical Exam   Constitutional: She is oriented to person, place, and time. She appears well-developed and well-nourished.   HENT:   Head: Normocephalic and atraumatic.   Right Ear: External ear normal.   Left Ear: External ear normal.   Nose: Nose normal.   Mouth/Throat: Oropharynx is clear and moist.   Eyes: Conjunctivae and EOM are normal.   Neck: Normal range of motion. Neck supple.   Cardiovascular: Normal rate, regular rhythm, normal heart sounds and intact distal pulses.    Pulmonary/Chest: Effort normal and breath sounds normal.   Abdominal: Soft. Bowel sounds are normal.   Musculoskeletal: Normal range of motion.   Neurological: She is alert and oriented to person, place, and time.   Skin: Skin is warm and dry.   Psychiatric: She has a normal mood and affect. Her behavior is normal. Judgment and thought content normal.       Labs:        Invalid input(s): [id number]      Recent Labs      04/27/18   0001  04/27/18   2215  04/28/18   0256  04/29/18   0410   SODIUM  129*  130*  131*  133*   POTASSIUM  4.9  5.4  4.8  4.2   CHLORIDE  88*  91*  90*  95*   CO2  29  25  25  27   BUN  39*  51*  56*  25*   CREATININE  6.38*  9.02*  9.84*  6.36*   MAGNESIUM  2.2  2.3  2.2   --    PHOSPHORUS  5.1*   --   7.3*  4.7*   CALCIUM  8.8  8.2*  8.3*  8.6     Recent Labs       18   0001  18   2215  18   0256  180   ALTSGPT  23   --    --    --    ASTSGOT  20   --    --    --    ALKPHOSPHAT  53   --    --    --    TBILIRUBIN  0.7   --    --    --    GLUCOSE  83  85  128*  106*     Recent Labs      18   0001  18   RBC  2.94*  2.78*  2.55*   HEMOGLOBIN  8.8*  8.2*  7.6*   HEMATOCRIT  26.1*  25.3*  23.5*   PLATELETCT  153*  148*  137*   PROTHROMBTM  13.5   --    --    APTT  27.5   --    --    INR  1.06   --    --      Recent Labs      18   WBC  5.5  5.6  6.3   NEUTSPOLYS  61.70  74.80*  61.90   LYMPHOCYTES  26.70  15.00*  23.80   MONOCYTES  8.60  8.10  11.70   EOSINOPHILS  2.20  1.30  2.10   BASOPHILS  0.40  0.40  0.30   ASTSGOT  20   --    --    ALTSGPT  23   --    --    ALKPHOSPHAT  53   --    --    TBILIRUBIN  0.7   --    --            Hemodynamics:  Temp (24hrs), Av.9 °C (98.5 °F), Min:36.7 °C (98 °F), Max:37.4 °C (99.3 °F)  Temperature: 36.7 °C (98.1 °F)  Pulse  Av.3  Min: 68  Max: 93  Blood Pressure: (!) 161/81     Respiratory:    Respiration: 16, Pulse Oximetry: 92 %        RUL Breath Sounds: Clear, RML Breath Sounds: Clear, RLL Breath Sounds: Clear, PHILIP Breath Sounds: Clear, LLL Breath Sounds: Clear  Fluids:    Intake/Output Summary (Last 24 hours) at 18 1111  Last data filed at 18 1627   Gross per 24 hour   Intake              500 ml   Output             2500 ml   Net            -2000 ml     Weight: 61.6 kg (135 lb 12.9 oz)  GI/Nutrition:  Orders Placed This Encounter   Procedures   • DIET ORDER     Standing Status:   Standing     Number of Occurrences:   1     Order Specific Question:   Diet:     Answer:   Renal [8]     Medical Decision Making, by Problem:  Active Hospital Problems    Diagnosis   • *Pseudoaneurysm of AV hemodialysis fistula, initial encounter ([facility]) [T82.638A]   • Hypertensive urgency [I16.0]   • Hematoma of arm, left, initial  encounter [S40.022A]   • End-stage renal disease (ESRD) ([facility]) [N18.6]   • Hyponatremia [E87.1]   • Hx of right BKA (CMS-HCC) [Z89.511]   • Hyperlipidemia [E78.5]   • Anemia of chronic disease [D63.8]       Quality-Core Measures     IMPRESSION:  1.  End-stage renal failure, on chronic maintenance hemodialysis.  2.  Hypertension.  3.  Hematoma adjacent to left upper arm AV fistula - revised.    PLAN:     No dialysis today.   OK to DC home.

## 2018-04-29 NOTE — PROGRESS NOTES
Dressing to fistula reinforced. Foam mepilex applied over pressure dressing from dialysis. Patient was concerned with removing dressing as the blood blister at the base of dressing. Blister area intact.

## 2018-05-18 ENCOUNTER — HOSPITAL ENCOUNTER (EMERGENCY)
Facility: MEDICAL CENTER | Age: 53
End: 2018-05-18
Attending: EMERGENCY MEDICINE
Payer: MEDICAID

## 2018-05-18 ENCOUNTER — APPOINTMENT (OUTPATIENT)
Dept: RADIOLOGY | Facility: MEDICAL CENTER | Age: 53
End: 2018-05-18
Attending: EMERGENCY MEDICINE
Payer: MEDICAID

## 2018-05-18 VITALS
OXYGEN SATURATION: 99 % | HEIGHT: 62 IN | BODY MASS INDEX: 24.84 KG/M2 | RESPIRATION RATE: 16 BRPM | SYSTOLIC BLOOD PRESSURE: 163 MMHG | DIASTOLIC BLOOD PRESSURE: 88 MMHG | HEART RATE: 82 BPM | WEIGHT: 135 LBS | TEMPERATURE: 97.2 F

## 2018-05-18 DIAGNOSIS — R00.2 PALPITATIONS: ICD-10-CM

## 2018-05-18 LAB
ALBUMIN SERPL BCP-MCNC: 5 G/DL (ref 3.2–4.9)
ALBUMIN/GLOB SERPL: 1.9 G/DL
ALP SERPL-CCNC: 63 U/L (ref 30–99)
ALT SERPL-CCNC: 7 U/L (ref 2–50)
ANION GAP SERPL CALC-SCNC: 14 MMOL/L (ref 0–11.9)
APTT PPP: 29.1 SEC (ref 24.7–36)
AST SERPL-CCNC: 13 U/L (ref 12–45)
BASOPHILS # BLD AUTO: 0.4 % (ref 0–1.8)
BASOPHILS # BLD: 0.02 K/UL (ref 0–0.12)
BILIRUB SERPL-MCNC: 1.6 MG/DL (ref 0.1–1.5)
BNP SERPL-MCNC: 641 PG/ML (ref 0–100)
BUN SERPL-MCNC: 14 MG/DL (ref 8–22)
CALCIUM SERPL-MCNC: 10.2 MG/DL (ref 8.5–10.5)
CHLORIDE SERPL-SCNC: 87 MMOL/L (ref 96–112)
CO2 SERPL-SCNC: 29 MMOL/L (ref 20–33)
CREAT SERPL-MCNC: 3.6 MG/DL (ref 0.5–1.4)
EKG IMPRESSION: NORMAL
EOSINOPHIL # BLD AUTO: 0.07 K/UL (ref 0–0.51)
EOSINOPHIL NFR BLD: 1.5 % (ref 0–6.9)
ERYTHROCYTE [DISTWIDTH] IN BLOOD BY AUTOMATED COUNT: 54.4 FL (ref 35.9–50)
GLOBULIN SER CALC-MCNC: 2.7 G/DL (ref 1.9–3.5)
GLUCOSE SERPL-MCNC: 87 MG/DL (ref 65–99)
HCT VFR BLD AUTO: 34.6 % (ref 37–47)
HGB BLD-MCNC: 11.6 G/DL (ref 12–16)
IMM GRANULOCYTES # BLD AUTO: 0 K/UL (ref 0–0.11)
IMM GRANULOCYTES NFR BLD AUTO: 0 % (ref 0–0.9)
INR PPP: 0.96 (ref 0.87–1.13)
LIPASE SERPL-CCNC: 15 U/L (ref 11–82)
LYMPHOCYTES # BLD AUTO: 1.15 K/UL (ref 1–4.8)
LYMPHOCYTES NFR BLD: 24.3 % (ref 22–41)
MCH RBC QN AUTO: 30.4 PG (ref 27–33)
MCHC RBC AUTO-ENTMCNC: 33.5 G/DL (ref 33.6–35)
MCV RBC AUTO: 90.8 FL (ref 81.4–97.8)
MONOCYTES # BLD AUTO: 0.26 K/UL (ref 0–0.85)
MONOCYTES NFR BLD AUTO: 5.5 % (ref 0–13.4)
NEUTROPHILS # BLD AUTO: 3.24 K/UL (ref 2–7.15)
NEUTROPHILS NFR BLD: 68.3 % (ref 44–72)
NRBC # BLD AUTO: 0 K/UL
NRBC BLD-RTO: 0 /100 WBC
PLATELET # BLD AUTO: 199 K/UL (ref 164–446)
PMV BLD AUTO: 9.5 FL (ref 9–12.9)
POTASSIUM SERPL-SCNC: 3.9 MMOL/L (ref 3.6–5.5)
PROT SERPL-MCNC: 7.7 G/DL (ref 6–8.2)
PROTHROMBIN TIME: 12.5 SEC (ref 12–14.6)
RBC # BLD AUTO: 3.81 M/UL (ref 4.2–5.4)
SODIUM SERPL-SCNC: 130 MMOL/L (ref 135–145)
TROPONIN I SERPL-MCNC: 0.02 NG/ML (ref 0–0.04)
TSH SERPL DL<=0.005 MIU/L-ACNC: 0.43 UIU/ML (ref 0.38–5.33)
WBC # BLD AUTO: 4.7 K/UL (ref 4.8–10.8)

## 2018-05-18 PROCEDURE — 84443 ASSAY THYROID STIM HORMONE: CPT

## 2018-05-18 PROCEDURE — 93005 ELECTROCARDIOGRAM TRACING: CPT | Performed by: EMERGENCY MEDICINE

## 2018-05-18 PROCEDURE — 93005 ELECTROCARDIOGRAM TRACING: CPT

## 2018-05-18 PROCEDURE — 85730 THROMBOPLASTIN TIME PARTIAL: CPT

## 2018-05-18 PROCEDURE — 99284 EMERGENCY DEPT VISIT MOD MDM: CPT

## 2018-05-18 PROCEDURE — 85025 COMPLETE CBC W/AUTO DIFF WBC: CPT

## 2018-05-18 PROCEDURE — 83880 ASSAY OF NATRIURETIC PEPTIDE: CPT

## 2018-05-18 PROCEDURE — 36415 COLL VENOUS BLD VENIPUNCTURE: CPT

## 2018-05-18 PROCEDURE — 80053 COMPREHEN METABOLIC PANEL: CPT

## 2018-05-18 PROCEDURE — 84484 ASSAY OF TROPONIN QUANT: CPT

## 2018-05-18 PROCEDURE — 71045 X-RAY EXAM CHEST 1 VIEW: CPT

## 2018-05-18 PROCEDURE — 83690 ASSAY OF LIPASE: CPT

## 2018-05-18 PROCEDURE — 85610 PROTHROMBIN TIME: CPT

## 2018-05-18 ASSESSMENT — LIFESTYLE VARIABLES: DO YOU DRINK ALCOHOL: NO

## 2018-05-19 NOTE — ED TRIAGE NOTES
"Pt dialysis pt since last June.  Pt was at dialysis today about 2 hours into a 3 hr treatment when she felt her chest pain.  Pt completed dialysis today, called the ambulance to bring her here.  Pt with all kinds of rhythms for EMS, a-fib, a-fib RVR, ST, PACs and PVCs in a rate between 110-150.  Pt NSR on arrival to ED.  Pt states \"it's gone now, I feel 100 times better.\"  "

## 2018-05-19 NOTE — ED PROVIDER NOTES
ED Provider Note    Scribed for Naveed Healy M.D. by Kenan Arceo. 5/18/2018, 8:00 PM.    Primary care provider: Juan Moreno M.D.  Means of arrival: Ambulance  History obtained from: patient  History limited by: None     CHIEF COMPLAINT  Chief Complaint   Patient presents with   • Palpitations     HPI  Isabelle Coyle is a 52 y.o. female who was transported by ambulance to the Emergency Department for heart palpitations.  The patient had an onset of heart palpitations about three hours ago while at dialysis appointment. Her heart spontaneously resolved about 30 minutes ago upon her arrival to the ED. She denies any associated chest pain or shortness of breath.   She describes her heart palpitations as feeling similar to her history of supraventricular tachycardia. Her SVT has resolved previously with Adenosine and on one occasion required an electrical shock.    The patient is a currently dialysis patient for a history of end stage renal disease, and receives dialysis on Mondays Wednesdays and Fridays at [facility]. She finished her dialysis infusion today prior to coming to the ED. She currently feels back to baseline.  The patient denies any chest pain, shortness of breath, nausea, vomiting, fever, abdominal pain, or leg swelling.       REVIEW OF SYSTEMS  Pertinent positives include heart palpitations. Pertinent negatives include no chest pain, shortness of breath, nausea, vomiting, fever, abdominal pain, leg swelling. As above, all other systems reviewed and are negative.   See HPI for further details.     PAST MEDICAL HISTORY   has a past medical history of Anemia; Anemia associated with chronic renal failure (11/5/2009); Dental disorder (8-25-17); Dialysis patient ([facility]) (8-25-17); Dysrhythmia; ESRD (end stage renal disease) ([facility]) (8-25-17); Glomerulonephritis, chronic (11/5/2009); Heart burn; High cholesterol; HTN (hypertension) (11/5/2009); Port catheter in place (8-25-17); SVT  "(supraventricular tachycardia) (HCC); and SVT (supraventricular tachycardia) (HCC).    SURGICAL HISTORY   has a past surgical history that includes amputation, below the knee; capitation payment (insurance); appendectomy laparoscopic (5/4/2009); enlarge breast with implant; av fistula creation (Left, 6/20/2017); other; femur orif (10/9/08); iliac bone graft (10/9/08); av fistula creation (Left, 8/28/2017); and av fistula creation (Left, 4/27/2018).    SOCIAL HISTORY  Social History   Substance Use Topics   • Smoking status: Current Every Day Smoker     Packs/day: 0.25     Years: 20.00     Types: Cigarettes   • Smokeless tobacco: Never Used      Comment: 5 cigs/day   • Alcohol use Yes      Comment: occ      History   Drug Use No     FAMILY HISTORY  Family History   Problem Relation Age of Onset   • Heart Disease       CURRENT MEDICATIONS  Home Medications    **Home medications have not yet been reviewed for this encounter**       ALLERGIES  Allergies   Allergen Reactions   • Sulfa Drugs Hives     [id number] rash swelling itching     PHYSICAL EXAM  VITAL SIGNS: BP (!) 165/97   Pulse 88   Resp 16   Ht 1.575 m (5' 2\")   Wt 61.2 kg (135 lb)   SpO2 99%   BMI 24.69 kg/m²   Vitals reviewed.  Constitutional: Alert in no apparent distress.  HENT: No signs of trauma, Bilateral external ears normal, Nose normal.   Eyes: Pupils are equal and reactive, Conjunctiva normal, Non-icteric.   Neck: Normal range of motion, No tenderness, Supple, No stridor.   Lymphatic: No lymphadenopathy noted.   Cardiovascular: Regular rate and rhythm, no murmurs.   Thorax & Lungs: Normal breath sounds, No respiratory distress, No wheezing, No chest tenderness.   Abdomen: Bowel sounds normal, Soft, No tenderness, No peritoneal signs, No masses, No pulsatile masses.   Skin: Warm, Dry, No erythema, No rash.   Back: No bony tenderness, No CVA tenderness.   Extremities: Intact distal pulses, No edema, No tenderness, No cyanosis  Musculoskeletal: " Fistula in LUE. Good range of motion in all major joints. No tenderness to palpation or major deformities noted.   Neurologic: Alert , Normal motor function, Normal sensory function, No focal deficits noted.   Psychiatric: Affect normal, Judgment normal, Mood normal.     DIAGNOSTIC STUDIES / PROCEDURES    LABS  Labs Reviewed   BTYPE NATRIURETIC PEPTIDE - Abnormal; Notable for the following:        Result Value    B Natriuretic Peptide 641 (*)     All other components within normal limits    Narrative:     Indicate which anticoagulants the patient is on:->UNKNOWN   CBC WITH DIFFERENTIAL - Abnormal; Notable for the following:     WBC 4.7 (*)     RBC 3.81 (*)     Hemoglobin 11.6 (*)     Hematocrit 34.6 (*)     MCHC 33.5 (*)     RDW 54.4 (*)     All other components within normal limits    Narrative:     Indicate which anticoagulants the patient is on:->UNKNOWN   COMP METABOLIC PANEL - Abnormal; Notable for the following:     Sodium 130 (*)     Chloride 87 (*)     Anion Gap 14.0 (*)     Creatinine 3.60 (*)     Total Bilirubin 1.6 (*)     Albumin 5.0 (*)     All other components within normal limits    Narrative:     Indicate which anticoagulants the patient is on:->UNKNOWN   ESTIMATED GFR - Abnormal; Notable for the following:     GFR If  16 (*)     GFR If Non  13 (*)     All other components within normal limits    Narrative:     Indicate which anticoagulants the patient is on:->UNKNOWN   TROPONIN    Narrative:     Indicate which anticoagulants the patient is on:->UNKNOWN   PROTHROMBIN TIME    Narrative:     Indicate which anticoagulants the patient is on:->UNKNOWN   APTT    Narrative:     Indicate which anticoagulants the patient is on:->UNKNOWN   LIPASE    Narrative:     Indicate which anticoagulants the patient is on:->UNKNOWN   TSH      All labs reviewed by me.    EKG Interpretation:  Interpreted by me    12 Lead EKG interpreted by me to show:  Normal sinus rhythm  Rate 87  Axis:  Normal  Intervals: Normal  Prominent T waves throughout with TWI in leads V1 and V2  Normal ST segments  My impression of this EKG: Does not indicate ischemia or arrhythmia at this time.    RADIOLOGY  DX-CHEST-LIMITED (1 VIEW)   Final Result         No acute cardiac or pulmonary abnormality is identified.        The radiologist's interpretation of all radiological studies have been reviewed by me.    COURSE & MEDICAL DECISION MAKING  Nursing notes, VS, PMSFHx reviewed in chart.  Differential diagnoses include but not limited to: ACS, aortic dissection, electrolyte abnormality, arrhythmia, hypoglycemia, anemia, thyroid pathology.    8:00 PM Patient seen and examined at bedside. Patient arrives hypertensive but afebrile with otherwise normal vital signs. Patient appears well hydrated and non-toxic. The physical exam is remarkable for a fistula in the LUE. She does have a history of SVT that has required hospitalization in the past. Today, her symptoms resolved spontaneously and she feels back to baseline. She denies any chest pain or shortness of breath. Did have a full run of dialysis today. Ordered for Chest X ray, TSH, GFR, troponin, BNP, CBC, CMP, prothrombin time, APTT, lipase to evaluate.     Labs without leukocytosis. No significant anemia. Labs are consistent with ESRD. Mildly low sodium. Potassium is 3.9. Troponin is normal. BNP is elevated but patient does not appear to be fluid overloaded and did complete her full run of dialysis. No obvious thyroid pathology.    Patient does feel back to normal. She is safe for discharge with close outpatient follow up.    DISPOSITION:  Patient will be discharged home in stable condition.    FOLLOW UP:  Juan Moreno M.D.  [address] [id number] [phone]    Schedule an appointment as soon as possible for a visit in 2 days  for recheck      FINAL IMPRESSION  1. Palpitations          I, Kenan Arceo (Scribe), am scribing for, and in the  presence of, Naveed Healy M.D..    Electronically signed by: Kenan Arceo (Scribe), 5/18/2018    I, Naveed Healy M.D. personally performed the services described in this documentation, as scribed by Kenan Arceo in my presence, and it is both accurate and complete.    The note accurately reflects work and decisions made by me.  Naveed Healy  5/19/2018  2:41 PM

## 2018-06-13 ENCOUNTER — OFFICE VISIT (OUTPATIENT)
Dept: INTERNAL MEDICINE | Facility: MEDICAL CENTER | Age: 53
End: 2018-06-13
Payer: MEDICAID

## 2018-06-13 VITALS
HEART RATE: 86 BPM | BODY MASS INDEX: 25.03 KG/M2 | DIASTOLIC BLOOD PRESSURE: 90 MMHG | OXYGEN SATURATION: 95 % | SYSTOLIC BLOOD PRESSURE: 170 MMHG | HEIGHT: 62 IN | WEIGHT: 136 LBS | TEMPERATURE: 97.8 F

## 2018-06-13 DIAGNOSIS — K21.9 GASTROESOPHAGEAL REFLUX DISEASE WITHOUT ESOPHAGITIS: ICD-10-CM

## 2018-06-13 DIAGNOSIS — I47.10 SUPRAVENTRICULAR TACHYCARDIA, PAROXYSMAL: ICD-10-CM

## 2018-06-13 DIAGNOSIS — I10 ESSENTIAL HYPERTENSION: ICD-10-CM

## 2018-06-13 DIAGNOSIS — Z72.0 TOBACCO USE: ICD-10-CM

## 2018-06-13 PROBLEM — E87.5 HYPERKALEMIA: Status: RESOLVED | Noted: 2017-12-28 | Resolved: 2018-06-13

## 2018-06-13 PROBLEM — S40.022A HEMATOMA OF ARM, LEFT, INITIAL ENCOUNTER: Status: RESOLVED | Noted: 2018-04-27 | Resolved: 2018-06-13

## 2018-06-13 PROCEDURE — 99213 OFFICE O/P EST LOW 20 MIN: CPT | Mod: GE | Performed by: INTERNAL MEDICINE

## 2018-06-13 RX ORDER — LISINOPRIL 40 MG/1
40 TABLET ORAL DAILY
Qty: 30 TAB | Refills: 0 | OUTPATIENT
Start: 2018-06-13

## 2018-06-13 RX ORDER — FAMOTIDINE 20 MG/1
20 TABLET, FILM COATED ORAL 2 TIMES DAILY
Qty: 60 TAB | Refills: 2 | Status: SHIPPED
Start: 2018-06-13 | End: 2018-07-29

## 2018-06-13 RX ORDER — AMLODIPINE BESYLATE 5 MG/1
5 TABLET ORAL DAILY
Qty: 30 TAB | Refills: 2 | Status: ON HOLD
Start: 2018-06-13 | End: 2018-07-30

## 2018-06-13 RX ORDER — LISINOPRIL 40 MG/1
40 TABLET ORAL DAILY
Qty: 30 TAB | Refills: 2 | Status: SHIPPED
Start: 2018-06-13 | End: 2018-09-18 | Stop reason: SDUPTHER

## 2018-06-13 RX ORDER — TERAZOSIN 5 MG/1
5 CAPSULE ORAL EVERY EVENING
COMMUNITY
Start: 2018-05-11 | End: 2019-04-12

## 2018-06-13 RX ORDER — LABETALOL 200 MG/1
200 TABLET, FILM COATED ORAL 2 TIMES DAILY
Qty: 60 TAB | Refills: 2 | Status: SHIPPED
Start: 2018-06-13 | End: 2019-01-14 | Stop reason: SDUPTHER

## 2018-06-13 NOTE — TELEPHONE ENCOUNTER
Dr Moreno refused med, pt needs to be seen. I have notified and scheduled her this afternoon as a same day with Dr Westfall.

## 2018-06-13 NOTE — TELEPHONE ENCOUNTER
Was the patient seen in the last year in this department? Yes  Pt last seen on: 08/24/17 by Dr. Moreno  Next appt on: none, told needs to schedule      Does patient have an active prescription for medications requested? No     Received Request Via: Patient

## 2018-06-14 NOTE — PROGRESS NOTES
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 She denies other acute complains.    HPI:     Essential hypertension  She takes lisinopril, labetalol, amlodipine, nightly terazosin, and weekly clonidine patch.  At home her SBP about 130s. However, she ran out of lisinopril for two days and also low on labetalol and amlodipine.     Gastroesophageal reflux disease   Intermittent heartburn, ran out of the famotidine, requesting for refills.    Supraventricular tachycardia, paroxysmal s/p ablation in Jan 2018  - she forgot to follow up with her cardiologist after the ablation.    Tobacco use  - Smokes a few cigarettes daily  - Education and counseling provided for tobacco cessation. She states that she will try.    ESRD on HD M,W,F: She missed HD today and will get it tomorrow and follow up with nephrology.    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   Patient Active Problem List    Diagnosis Date Noted   • Hypertensive urgency 04/27/2018     Priority: High   • Hematoma of arm, left, initial encounter 04/27/2018     Priority: High   • Supraventricular tachycardia, paroxysmal ([facility]) 01/25/2015     Priority: High   • Hyperkalemia 12/28/2017     Priority: Medium   • End-stage renal disease (ESRD) ([facility]) 06/16/2017     Priority: Medium   • Severe uncontrolled hypertension 11/05/2009     Priority: Medium   • Hyponatremia 01/08/2018     Priority: Low   • Vitamin D deficiency 06/17/2017     Priority: Low   • Hyperphosphatemia 06/17/2017     Priority: Low   • Hx of right BKA ([facility]) 01/25/2015     Priority: Low   • Hyperlipidemia 09/10/2010     Priority: Low   • Anemia  of chronic disease 11/05/2009     Priority: Low   • GERD (gastroesophageal reflux disease) 06/13/2018   • Postoperative pain 04/28/2018   • Elevated brain natriuretic peptide (BNP) level 01/08/2018   • SVT (supraventricular tachycardia) (HCC) 01/07/2018   • Health care maintenance 07/18/2017   • Tobacco use 07/18/2017   • Secondary hyperparathyroidism of renal origin (HCC) 06/17/2017   • Hypocalcemia 06/17/2017   • Alcohol abuse 01/25/2015   • Menopausal syndrome 02/08/2011   • Glomerulonephritis, chronic 11/05/2009       Current Outpatient Prescriptions   Medication Sig Dispense Refill   • cloNIDine (CATAPRES) 0.1 MG/24HR PATCH WEEKLY      • terazosin (HYTRIN) 5 MG Cap      • lisinopril (PRINIVIL, ZESTRIL) 40 MG tablet Take 1 Tab by mouth every day. 30 Tab 2   • labetalol (NORMODYNE) 200 MG Tab Take 1 Tab by mouth 2 Times a Day. 60 Tab 2   • amLODIPine (NORVASC) 5 MG Tab Take 1 Tab by mouth every day. 30 Tab 2   • famotidine (PEPCID) 20 MG Tab Take 1 Tab by mouth 2 times a day. 60 Tab 2   • lidocaine-prilocaine (EMLA) 2.5-2.5 % Cream Apply to left upper arm 2 hours prior to dialysis treatments. It may be helpful to cover the upper arm with plastic wrap to keep the cream from rubbing off. 30 g 11   • terazosin (HYTRIN) 2 MG Cap Take 2 mg by mouth every evening.     • sevelamer (RENAGEL) 800 MG Tab Take 1 Tab by mouth 3 times a day, with meals. 90 Tab 3     No current facility-administered medications for this visit.        Allergies:  Allergies   Allergen Reactions   • Sulfa Drugs Hives     [id number] rash swelling itching       ROS  Constitutional: Denies fevers or chills  Eyes: Denies changes in vision  Ears/Nose/Throat/Mouth: Denies nasal congestion or sore throat   Cardiovascular: Denies chest pain or palpitations   Respiratory: Denies shortness of breath , Denies cough  Gastrointestinal/Hepatic: Denies abd pain, nausea, vomiting   Genitourinary: Denies dysuria or frequency  Musculoskeletal/Rheum: some pain of her  "left arm where the surgery was performed. Denies joint pain and swelling   Neurological: Denies headache  Psychiatric: Denies mood disorder   Endocrine: Denies hx of diabetes or thyroid dysfunction  Heme/Oncology/Lymph Nodes: Denies weight changes or enlarged LNs.    BP (!) 170/90   Pulse 86   Temp 36.6 °C (97.8 °F)   Ht 1.575 m (5' 2\")   Wt 61.7 kg (136 lb)   SpO2 95%   BMI 24.87 kg/m²     Physical Exam  General: non-toxic apeparnce. NAD.   HEENT: EOMI. Scleral clear.  CVS: RRR, systolic murmur  PULM: CTABL . No w/r/r. No basilar crackles.   ABD: soft, NT, ND. +BS.   : No suprapubic tenderness. No CVA tenderness.   EXT: LUE AV fistula. Right BKA. no LE edema b/l. No cyanosis.  Neuro: No focal deficits.   Pysch: AAOx4  Skin: no rashes.     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 She denies other acute complains.    Essential hypertension  She takes lisinopril, labetalol, amlodipine, nightly terazosin, and weekly clonidine patch.  At home her SBP about 130s. However, she ran out of lisinopril for two days and also low on labetalol and amlodipine which explain her BP in clinic today.  Since her home SBP is within normal limits, we will continue current regimen.  - Lisinopril, labetalol, amlodipine refilled.    Gastroesophageal reflux disease   Intermittent heartburn, ran out of the famotidine, requesting for refills.  - famotidine (PEPCID) 20 MG Tab; Take 1 Tab by mouth 2 times a day.  Dispense: 60 Tab; Refill: 2    Supraventricular tachycardia, paroxysmal s/p ablation in Jan 2018  - she forgot to follow up with her cardiologist after the ablation.  - REFERRAL TO CARDIOLOGY    Tobacco use  - Smokes a few cigarettes daily  - Education and counseling provided for tobacco cessation. She " states that she will try.    ESRD on HD M,W,F: She missed HD today and will get it tomorrow and follow up with nephrology.    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 She missed HD today and will get it tomorrow and follow up with nephrology. She will follow up with her PCP for chronic issues.      Follow-up:Return in about 3 weeks (around 7/4/2018) for Long.    Signed by: Cedrick Westfall M.D.

## 2018-06-15 ENCOUNTER — HOSPITAL ENCOUNTER (INPATIENT)
Facility: MEDICAL CENTER | Age: 53
LOS: 1 days | DRG: 640 | End: 2018-06-16
Attending: EMERGENCY MEDICINE | Admitting: HOSPITALIST
Payer: MEDICAID

## 2018-06-15 DIAGNOSIS — Z91.199 NONCOMPLIANCE: ICD-10-CM

## 2018-06-15 DIAGNOSIS — E87.5 HYPERKALEMIA: ICD-10-CM

## 2018-06-15 DIAGNOSIS — N18.4 CHRONIC RENAL FAILURE IN PEDIATRIC PATIENT, STAGE 4 (SEVERE) (HCC): ICD-10-CM

## 2018-06-15 LAB
ALBUMIN SERPL BCP-MCNC: 4.1 G/DL (ref 3.2–4.9)
ALBUMIN/GLOB SERPL: 1.5 G/DL
ALP SERPL-CCNC: 45 U/L (ref 30–99)
ALT SERPL-CCNC: 10 U/L (ref 2–50)
ANION GAP SERPL CALC-SCNC: 21 MMOL/L (ref 0–11.9)
AST SERPL-CCNC: 8 U/L (ref 12–45)
BASOPHILS # BLD AUTO: 0.3 % (ref 0–1.8)
BASOPHILS # BLD: 0.03 K/UL (ref 0–0.12)
BILIRUB SERPL-MCNC: 0.4 MG/DL (ref 0.1–1.5)
BUN SERPL-MCNC: 113 MG/DL (ref 8–22)
CALCIUM SERPL-MCNC: 8.8 MG/DL (ref 8.5–10.5)
CHLORIDE SERPL-SCNC: 101 MMOL/L (ref 96–112)
CO2 SERPL-SCNC: 13 MMOL/L (ref 20–33)
CREAT SERPL-MCNC: 15.15 MG/DL (ref 0.5–1.4)
EOSINOPHIL # BLD AUTO: 0.15 K/UL (ref 0–0.51)
EOSINOPHIL NFR BLD: 1.6 % (ref 0–6.9)
ERYTHROCYTE [DISTWIDTH] IN BLOOD BY AUTOMATED COUNT: 49.3 FL (ref 35.9–50)
GLOBULIN SER CALC-MCNC: 2.7 G/DL (ref 1.9–3.5)
GLUCOSE SERPL-MCNC: 110 MG/DL (ref 65–99)
HAV IGM SERPL QL IA: NEGATIVE
HBV CORE IGM SER QL: NEGATIVE
HBV SURFACE AB SERPL IA-ACNC: <3.1 MIU/ML (ref 0–10)
HBV SURFACE AG SER QL: NEGATIVE
HCT VFR BLD AUTO: 25.9 % (ref 37–47)
HCV AB SER QL: NEGATIVE
HGB BLD-MCNC: 8 G/DL (ref 12–16)
IMM GRANULOCYTES # BLD AUTO: 0.04 K/UL (ref 0–0.11)
IMM GRANULOCYTES NFR BLD AUTO: 0.4 % (ref 0–0.9)
LIPASE SERPL-CCNC: 45 U/L (ref 11–82)
LYMPHOCYTES # BLD AUTO: 1.5 K/UL (ref 1–4.8)
LYMPHOCYTES NFR BLD: 15.9 % (ref 22–41)
MAGNESIUM SERPL-MCNC: 2.7 MG/DL (ref 1.5–2.5)
MCH RBC QN AUTO: 30.5 PG (ref 27–33)
MCHC RBC AUTO-ENTMCNC: 30.9 G/DL (ref 33.6–35)
MCV RBC AUTO: 98.9 FL (ref 81.4–97.8)
MONOCYTES # BLD AUTO: 0.35 K/UL (ref 0–0.85)
MONOCYTES NFR BLD AUTO: 3.7 % (ref 0–13.4)
NEUTROPHILS # BLD AUTO: 7.35 K/UL (ref 2–7.15)
NEUTROPHILS NFR BLD: 78.1 % (ref 44–72)
NRBC # BLD AUTO: 0 K/UL
NRBC BLD-RTO: 0 /100 WBC
PLATELET # BLD AUTO: 101 K/UL (ref 164–446)
PMV BLD AUTO: 11.3 FL (ref 9–12.9)
POTASSIUM SERPL-SCNC: 7.1 MMOL/L (ref 3.6–5.5)
PROT SERPL-MCNC: 6.8 G/DL (ref 6–8.2)
RBC # BLD AUTO: 2.62 M/UL (ref 4.2–5.4)
SODIUM SERPL-SCNC: 135 MMOL/L (ref 135–145)
WBC # BLD AUTO: 9.4 K/UL (ref 4.8–10.8)

## 2018-06-15 PROCEDURE — 80074 ACUTE HEPATITIS PANEL: CPT

## 2018-06-15 PROCEDURE — A9270 NON-COVERED ITEM OR SERVICE: HCPCS | Performed by: STUDENT IN AN ORGANIZED HEALTH CARE EDUCATION/TRAINING PROGRAM

## 2018-06-15 PROCEDURE — 80053 COMPREHEN METABOLIC PANEL: CPT

## 2018-06-15 PROCEDURE — 770006 HCHG ROOM/CARE - MED/SURG/GYN SEMI*

## 2018-06-15 PROCEDURE — 700101 HCHG RX REV CODE 250: Performed by: EMERGENCY MEDICINE

## 2018-06-15 PROCEDURE — 85025 COMPLETE CBC W/AUTO DIFF WBC: CPT

## 2018-06-15 PROCEDURE — 96374 THER/PROPH/DIAG INJ IV PUSH: CPT

## 2018-06-15 PROCEDURE — 700102 HCHG RX REV CODE 250 W/ 637 OVERRIDE(OP): Performed by: STUDENT IN AN ORGANIZED HEALTH CARE EDUCATION/TRAINING PROGRAM

## 2018-06-15 PROCEDURE — 700111 HCHG RX REV CODE 636 W/ 250 OVERRIDE (IP): Performed by: STUDENT IN AN ORGANIZED HEALTH CARE EDUCATION/TRAINING PROGRAM

## 2018-06-15 PROCEDURE — 83690 ASSAY OF LIPASE: CPT

## 2018-06-15 PROCEDURE — 86706 HEP B SURFACE ANTIBODY: CPT

## 2018-06-15 PROCEDURE — 99285 EMERGENCY DEPT VISIT HI MDM: CPT

## 2018-06-15 PROCEDURE — 90935 HEMODIALYSIS ONE EVALUATION: CPT

## 2018-06-15 PROCEDURE — 83735 ASSAY OF MAGNESIUM: CPT

## 2018-06-15 PROCEDURE — 700102 HCHG RX REV CODE 250 W/ 637 OVERRIDE(OP): Performed by: EMERGENCY MEDICINE

## 2018-06-15 PROCEDURE — 5A1D70Z PERFORMANCE OF URINARY FILTRATION, INTERMITTENT, LESS THAN 6 HOURS PER DAY: ICD-10-PCS | Performed by: INTERNAL MEDICINE

## 2018-06-15 PROCEDURE — 93005 ELECTROCARDIOGRAM TRACING: CPT | Performed by: EMERGENCY MEDICINE

## 2018-06-15 PROCEDURE — 700111 HCHG RX REV CODE 636 W/ 250 OVERRIDE (IP): Performed by: EMERGENCY MEDICINE

## 2018-06-15 PROCEDURE — 700111 HCHG RX REV CODE 636 W/ 250 OVERRIDE (IP)

## 2018-06-15 PROCEDURE — 96375 TX/PRO/DX INJ NEW DRUG ADDON: CPT

## 2018-06-15 RX ORDER — BISACODYL 10 MG
10 SUPPOSITORY, RECTAL RECTAL
Status: DISCONTINUED | OUTPATIENT
Start: 2018-06-15 | End: 2018-06-16 | Stop reason: HOSPADM

## 2018-06-15 RX ORDER — AMOXICILLIN 250 MG
2 CAPSULE ORAL 2 TIMES DAILY PRN
Status: DISCONTINUED | OUTPATIENT
Start: 2018-06-15 | End: 2018-06-16 | Stop reason: HOSPADM

## 2018-06-15 RX ORDER — HEPARIN SODIUM 1000 [USP'U]/ML
500 INJECTION, SOLUTION INTRAVENOUS; SUBCUTANEOUS
Status: DISCONTINUED | OUTPATIENT
Start: 2018-06-15 | End: 2018-06-16 | Stop reason: HOSPADM

## 2018-06-15 RX ORDER — HEPARIN SODIUM 1000 [USP'U]/ML
INJECTION, SOLUTION INTRAVENOUS; SUBCUTANEOUS
Status: COMPLETED
Start: 2018-06-15 | End: 2018-06-15

## 2018-06-15 RX ORDER — POLYETHYLENE GLYCOL 3350 17 G/17G
1 POWDER, FOR SOLUTION ORAL
Status: DISCONTINUED | OUTPATIENT
Start: 2018-06-15 | End: 2018-06-16 | Stop reason: HOSPADM

## 2018-06-15 RX ORDER — DEXTROSE MONOHYDRATE 25 G/50ML
50 INJECTION, SOLUTION INTRAVENOUS ONCE
Status: COMPLETED | OUTPATIENT
Start: 2018-06-15 | End: 2018-06-15

## 2018-06-15 RX ORDER — FAMOTIDINE 20 MG/1
20 TABLET, FILM COATED ORAL DAILY
Status: DISCONTINUED | OUTPATIENT
Start: 2018-06-16 | End: 2018-06-16 | Stop reason: HOSPADM

## 2018-06-15 RX ORDER — HEPARIN SODIUM 5000 [USP'U]/ML
5000 INJECTION, SOLUTION INTRAVENOUS; SUBCUTANEOUS EVERY 8 HOURS
Status: DISCONTINUED | OUTPATIENT
Start: 2018-06-15 | End: 2018-06-16 | Stop reason: HOSPADM

## 2018-06-15 RX ORDER — DEXTROSE MONOHYDRATE 25 G/50ML
50 INJECTION, SOLUTION INTRAVENOUS ONCE
Status: DISCONTINUED | OUTPATIENT
Start: 2018-06-15 | End: 2018-06-16 | Stop reason: HOSPADM

## 2018-06-15 RX ORDER — HYDRALAZINE HYDROCHLORIDE 20 MG/ML
10 INJECTION INTRAMUSCULAR; INTRAVENOUS EVERY 6 HOURS PRN
Status: DISCONTINUED | OUTPATIENT
Start: 2018-06-15 | End: 2018-06-16 | Stop reason: HOSPADM

## 2018-06-15 RX ORDER — CALCIUM CHLORIDE 100 MG/ML
0.33 INJECTION INTRAVENOUS; INTRAVENTRICULAR ONCE
Status: COMPLETED | OUTPATIENT
Start: 2018-06-15 | End: 2018-06-15

## 2018-06-15 RX ORDER — HYDRALAZINE HYDROCHLORIDE 20 MG/ML
20 INJECTION INTRAMUSCULAR; INTRAVENOUS EVERY 6 HOURS PRN
Status: DISCONTINUED | OUTPATIENT
Start: 2018-06-15 | End: 2018-06-15

## 2018-06-15 RX ORDER — NICOTINE 21 MG/24HR
14 PATCH, TRANSDERMAL 24 HOURS TRANSDERMAL
Status: DISCONTINUED | OUTPATIENT
Start: 2018-06-16 | End: 2018-06-16 | Stop reason: HOSPADM

## 2018-06-15 RX ORDER — CALCIUM GLUCONATE 94 MG/ML
1 INJECTION, SOLUTION INTRAVENOUS ONCE
Status: DISCONTINUED | OUTPATIENT
Start: 2018-06-15 | End: 2018-06-15

## 2018-06-15 RX ORDER — AMLODIPINE BESYLATE 5 MG/1
5 TABLET ORAL DAILY
Status: DISCONTINUED | OUTPATIENT
Start: 2018-06-16 | End: 2018-06-16 | Stop reason: HOSPADM

## 2018-06-15 RX ORDER — TERAZOSIN 5 MG/1
5 CAPSULE ORAL DAILY
Status: DISCONTINUED | OUTPATIENT
Start: 2018-06-15 | End: 2018-06-16 | Stop reason: HOSPADM

## 2018-06-15 RX ADMIN — INSULIN HUMAN 5 UNITS: 100 INJECTION, SOLUTION PARENTERAL at 18:32

## 2018-06-15 RX ADMIN — HEPARIN SODIUM 500 UNITS: 1000 INJECTION, SOLUTION INTRAVENOUS; SUBCUTANEOUS at 19:27

## 2018-06-15 RX ADMIN — HYDRALAZINE HYDROCHLORIDE 10 MG: 20 INJECTION INTRAMUSCULAR; INTRAVENOUS at 22:00

## 2018-06-15 RX ADMIN — TERAZOSIN HYDROCHLORIDE ANHYDROUS 5 MG: 5 CAPSULE ORAL at 22:00

## 2018-06-15 RX ADMIN — DEXTROSE MONOHYDRATE 50 ML: 25 INJECTION, SOLUTION INTRAVENOUS at 18:28

## 2018-06-15 RX ADMIN — CALCIUM CHLORIDE 0.33 G: 100 INJECTION INTRAVENOUS; INTRAVENTRICULAR at 18:25

## 2018-06-15 ASSESSMENT — ENCOUNTER SYMPTOMS
DIAPHORESIS: 0
DIZZINESS: 1
CONSTIPATION: 0
SPUTUM PRODUCTION: 0
NAUSEA: 1
CHILLS: 0
ORTHOPNEA: 0
BLOOD IN STOOL: 0
ABDOMINAL PAIN: 1
FALLS: 0
WHEEZING: 0
FEVER: 0
HEMOPTYSIS: 0
DIARRHEA: 1
TINGLING: 0
PALPITATIONS: 0
WEAKNESS: 0
FOCAL WEAKNESS: 0
COUGH: 0
TREMORS: 0
BLURRED VISION: 0
STRIDOR: 0
VOMITING: 1
SINUS PAIN: 0
POLYDIPSIA: 0
NERVOUS/ANXIOUS: 0
MYALGIAS: 0
SEIZURES: 0
PHOTOPHOBIA: 0

## 2018-06-15 ASSESSMENT — LIFESTYLE VARIABLES
SUBSTANCE_ABUSE: 0
DO YOU DRINK ALCOHOL: NO

## 2018-06-15 ASSESSMENT — PAIN SCALES - GENERAL: PAINLEVEL_OUTOF10: 8

## 2018-06-15 NOTE — ED TRIAGE NOTES
Chief Complaint   Patient presents with   • Nausea/Vomiting/Diarrhea     x1wk   • Other     missed dialysis x3     Pt bib ems , pt c/o diarrhea x1wk which made her missed her dialysis last Monday,Wednesday and today.

## 2018-06-15 NOTE — ED NOTES
[place] 1 Fall Risk Assessment Tool    Present to ED because of fall  NO  (Syncope, seizure, or ALOC)  Age>70   NO  Altered Mental Status:  (Intoxicated with Alcohol or Substance Confusion,  Inability to follow instructions, disorientation)   NO  Impaired Mobility:  Ambulates or transfers with assistive devices or assist  Ambulates with unsteady gait and no assistance  Unable to ambulate or transfer   NO  Nursing Judgement  (Bowel or bladder incontinence, diarrhea, urinary   frequency or urgency, leg weakness, orthostatic   hypotension, dizziness or vertigo, narcotic use).   NO

## 2018-06-16 VITALS
BODY MASS INDEX: 24.1 KG/M2 | WEIGHT: 130.95 LBS | RESPIRATION RATE: 18 BRPM | HEIGHT: 62 IN | TEMPERATURE: 97.7 F | DIASTOLIC BLOOD PRESSURE: 91 MMHG | SYSTOLIC BLOOD PRESSURE: 153 MMHG | OXYGEN SATURATION: 96 % | HEART RATE: 95 BPM

## 2018-06-16 PROBLEM — I10 HTN (HYPERTENSION): Status: ACTIVE | Noted: 2018-06-16

## 2018-06-16 PROBLEM — R19.7 DIARRHEA: Status: ACTIVE | Noted: 2018-06-16

## 2018-06-16 LAB
ALBUMIN SERPL BCP-MCNC: 4.5 G/DL (ref 3.2–4.9)
ALBUMIN/GLOB SERPL: 1.5 G/DL
ALP SERPL-CCNC: 53 U/L (ref 30–99)
ALT SERPL-CCNC: 12 U/L (ref 2–50)
ANION GAP SERPL CALC-SCNC: 14 MMOL/L (ref 0–11.9)
AST SERPL-CCNC: 10 U/L (ref 12–45)
BASOPHILS # BLD AUTO: 0.2 % (ref 0–1.8)
BASOPHILS # BLD AUTO: 0.6 % (ref 0–1.8)
BASOPHILS # BLD: 0.01 K/UL (ref 0–0.12)
BASOPHILS # BLD: 0.03 K/UL (ref 0–0.12)
BILIRUB SERPL-MCNC: 0.7 MG/DL (ref 0.1–1.5)
BUN SERPL-MCNC: 32 MG/DL (ref 8–22)
CALCIUM SERPL-MCNC: 9.6 MG/DL (ref 8.5–10.5)
CHLORIDE SERPL-SCNC: 100 MMOL/L (ref 96–112)
CO2 SERPL-SCNC: 27 MMOL/L (ref 20–33)
CREAT SERPL-MCNC: 6.33 MG/DL (ref 0.5–1.4)
EOSINOPHIL # BLD AUTO: 0.06 K/UL (ref 0–0.51)
EOSINOPHIL # BLD AUTO: 0.14 K/UL (ref 0–0.51)
EOSINOPHIL NFR BLD: 1.3 % (ref 0–6.9)
EOSINOPHIL NFR BLD: 3 % (ref 0–6.9)
ERYTHROCYTE [DISTWIDTH] IN BLOOD BY AUTOMATED COUNT: 43.8 FL (ref 35.9–50)
ERYTHROCYTE [DISTWIDTH] IN BLOOD BY AUTOMATED COUNT: 45 FL (ref 35.9–50)
FERRITIN SERPL-MCNC: 334.4 NG/ML (ref 10–291)
FOLATE SERPL-MCNC: 14.9 NG/ML
GLOBULIN SER CALC-MCNC: 3.1 G/DL (ref 1.9–3.5)
GLUCOSE SERPL-MCNC: 163 MG/DL (ref 65–99)
HCT VFR BLD AUTO: 24.9 % (ref 37–47)
HCT VFR BLD AUTO: 27.5 % (ref 37–47)
HGB BLD-MCNC: 8.4 G/DL (ref 12–16)
HGB BLD-MCNC: 9.1 G/DL (ref 12–16)
HGB RETIC QN AUTO: 33.7 PG/CELL (ref 29–35)
IMM GRANULOCYTES # BLD AUTO: 0.01 K/UL (ref 0–0.11)
IMM GRANULOCYTES # BLD AUTO: 0.02 K/UL (ref 0–0.11)
IMM GRANULOCYTES NFR BLD AUTO: 0.2 % (ref 0–0.9)
IMM GRANULOCYTES NFR BLD AUTO: 0.4 % (ref 0–0.9)
IMM RETICS NFR: 7.2 % (ref 9.3–17.4)
IRON SATN MFR SERPL: 46 % (ref 15–55)
IRON SERPL-MCNC: 141 UG/DL (ref 40–170)
LYMPHOCYTES # BLD AUTO: 0.95 K/UL (ref 1–4.8)
LYMPHOCYTES # BLD AUTO: 1.52 K/UL (ref 1–4.8)
LYMPHOCYTES NFR BLD: 20 % (ref 22–41)
LYMPHOCYTES NFR BLD: 32.9 % (ref 22–41)
MAGNESIUM SERPL-MCNC: 2.1 MG/DL (ref 1.5–2.5)
MCH RBC QN AUTO: 30.1 PG (ref 27–33)
MCH RBC QN AUTO: 30.1 PG (ref 27–33)
MCHC RBC AUTO-ENTMCNC: 33.1 G/DL (ref 33.6–35)
MCHC RBC AUTO-ENTMCNC: 33.7 G/DL (ref 33.6–35)
MCV RBC AUTO: 89.2 FL (ref 81.4–97.8)
MCV RBC AUTO: 91.1 FL (ref 81.4–97.8)
MONOCYTES # BLD AUTO: 0.24 K/UL (ref 0–0.85)
MONOCYTES # BLD AUTO: 0.35 K/UL (ref 0–0.85)
MONOCYTES NFR BLD AUTO: 5 % (ref 0–13.4)
MONOCYTES NFR BLD AUTO: 7.6 % (ref 0–13.4)
NEUTROPHILS # BLD AUTO: 2.57 K/UL (ref 2–7.15)
NEUTROPHILS # BLD AUTO: 3.48 K/UL (ref 2–7.15)
NEUTROPHILS NFR BLD: 55.7 % (ref 44–72)
NEUTROPHILS NFR BLD: 73.1 % (ref 44–72)
NRBC # BLD AUTO: 0 K/UL
NRBC # BLD AUTO: 0 K/UL
NRBC BLD-RTO: 0 /100 WBC
NRBC BLD-RTO: 0 /100 WBC
PHOSPHATE SERPL-MCNC: 4.6 MG/DL (ref 2.5–4.5)
PLATELET # BLD AUTO: 162 K/UL (ref 164–446)
PLATELET # BLD AUTO: 188 K/UL (ref 164–446)
PMV BLD AUTO: 9.6 FL (ref 9–12.9)
PMV BLD AUTO: 9.7 FL (ref 9–12.9)
POTASSIUM SERPL-SCNC: 3.7 MMOL/L (ref 3.6–5.5)
PROT SERPL-MCNC: 7.6 G/DL (ref 6–8.2)
PTH-INTACT SERPL-MCNC: 243 PG/ML (ref 14–72)
RBC # BLD AUTO: 2.79 M/UL (ref 4.2–5.4)
RBC # BLD AUTO: 3.02 M/UL (ref 4.2–5.4)
RETICS # AUTO: 0.02 M/UL (ref 0.04–0.06)
RETICS/RBC NFR: 0.9 % (ref 0.8–2.1)
SODIUM SERPL-SCNC: 141 MMOL/L (ref 135–145)
TIBC SERPL-MCNC: 304 UG/DL (ref 250–450)
VIT B12 SERPL-MCNC: 725 PG/ML (ref 211–911)
WBC # BLD AUTO: 4.6 K/UL (ref 4.8–10.8)
WBC # BLD AUTO: 4.8 K/UL (ref 4.8–10.8)

## 2018-06-16 PROCEDURE — 36415 COLL VENOUS BLD VENIPUNCTURE: CPT

## 2018-06-16 PROCEDURE — 83735 ASSAY OF MAGNESIUM: CPT

## 2018-06-16 PROCEDURE — 83550 IRON BINDING TEST: CPT

## 2018-06-16 PROCEDURE — 90935 HEMODIALYSIS ONE EVALUATION: CPT

## 2018-06-16 PROCEDURE — 82746 ASSAY OF FOLIC ACID SERUM: CPT

## 2018-06-16 PROCEDURE — 83970 ASSAY OF PARATHORMONE: CPT

## 2018-06-16 PROCEDURE — 82607 VITAMIN B-12: CPT

## 2018-06-16 PROCEDURE — 85025 COMPLETE CBC W/AUTO DIFF WBC: CPT

## 2018-06-16 PROCEDURE — 83540 ASSAY OF IRON: CPT

## 2018-06-16 PROCEDURE — 700102 HCHG RX REV CODE 250 W/ 637 OVERRIDE(OP): Performed by: STUDENT IN AN ORGANIZED HEALTH CARE EDUCATION/TRAINING PROGRAM

## 2018-06-16 PROCEDURE — 82728 ASSAY OF FERRITIN: CPT

## 2018-06-16 PROCEDURE — 80053 COMPREHEN METABOLIC PANEL: CPT

## 2018-06-16 PROCEDURE — 84100 ASSAY OF PHOSPHORUS: CPT

## 2018-06-16 PROCEDURE — 700102 HCHG RX REV CODE 250 W/ 637 OVERRIDE(OP): Performed by: INTERNAL MEDICINE

## 2018-06-16 PROCEDURE — 85046 RETICYTE/HGB CONCENTRATE: CPT

## 2018-06-16 PROCEDURE — 99238 HOSP IP/OBS DSCHRG MGMT 30/<: CPT | Mod: GC | Performed by: INTERNAL MEDICINE

## 2018-06-16 PROCEDURE — A9270 NON-COVERED ITEM OR SERVICE: HCPCS | Performed by: STUDENT IN AN ORGANIZED HEALTH CARE EDUCATION/TRAINING PROGRAM

## 2018-06-16 RX ORDER — NICOTINE 21 MG/24HR
1 PATCH, TRANSDERMAL 24 HOURS TRANSDERMAL EVERY 24 HOURS
Qty: 30 PATCH | Refills: 0 | Status: SHIPPED | OUTPATIENT
Start: 2018-06-16 | End: 2018-07-29

## 2018-06-16 RX ORDER — ACETAMINOPHEN 500 MG
500 TABLET ORAL EVERY 6 HOURS PRN
Status: DISCONTINUED | OUTPATIENT
Start: 2018-06-16 | End: 2018-06-16 | Stop reason: HOSPADM

## 2018-06-16 RX ADMIN — ACETAMINOPHEN 500 MG: 500 TABLET ORAL at 00:59

## 2018-06-16 RX ADMIN — ACETAMINOPHEN 500 MG: 500 TABLET ORAL at 07:25

## 2018-06-16 RX ADMIN — TERAZOSIN HYDROCHLORIDE ANHYDROUS 5 MG: 5 CAPSULE ORAL at 05:26

## 2018-06-16 RX ADMIN — FAMOTIDINE 20 MG: 20 TABLET, FILM COATED ORAL at 05:25

## 2018-06-16 RX ADMIN — NICOTINE 14 MG: 14 PATCH, EXTENDED RELEASE TRANSDERMAL at 05:25

## 2018-06-16 RX ADMIN — AMLODIPINE BESYLATE 5 MG: 5 TABLET ORAL at 05:26

## 2018-06-16 RX ADMIN — CLONIDINE 1 PATCH: 0.1 PATCH TRANSDERMAL at 02:11

## 2018-06-16 ASSESSMENT — ENCOUNTER SYMPTOMS
RESPIRATORY NEGATIVE: 1
WEAKNESS: 1
VOMITING: 0
CHILLS: 0
NAUSEA: 0
DIARRHEA: 0
GASTROINTESTINAL NEGATIVE: 1
CARDIOVASCULAR NEGATIVE: 1
MUSCULOSKELETAL NEGATIVE: 1
EYES NEGATIVE: 1
SHORTNESS OF BREATH: 0
FEVER: 0
COUGH: 0
ABDOMINAL PAIN: 0

## 2018-06-16 ASSESSMENT — PAIN SCALES - GENERAL
PAINLEVEL_OUTOF10: 8
PAINLEVEL_OUTOF10: 7

## 2018-06-16 ASSESSMENT — LIFESTYLE VARIABLES: EVER_SMOKED: YES

## 2018-06-16 NOTE — PROGRESS NOTES
Patient tolerated her 3 hr HD and 2 liter UF. FEMI ANDERSENF has a good B/T. Post HD report given to her Primary RN. She's alert and oriented, no complaint. No distress noted.

## 2018-06-16 NOTE — CARE PLAN
Problem: Safety  Goal: Will remain free from injury    Intervention: Provide assistance with mobility  Pt oriented to room and all environmental hazards removed for free mobility.       Problem: Knowledge Deficit  Goal: Knowledge of disease process/condition, treatment plan, diagnostic tests, and medications will improve    Intervention: Explain information regarding disease process/condition, treatment plan, diagnostic tests, and medications and document in education  Pt educated to the indication of emergent dialysis r/t lab work. Pt verbalized understanding.

## 2018-06-16 NOTE — ED NOTES
Med rec complete per pt at bedside with RX bottles (returned)  Allergies reviewed  No oral ABX within last 30 days
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
## 2018-06-16 NOTE — CARE PLAN
Problem: Safety  Goal: Will remain free from injury  Patient educated on importance of using the call light if in need of assistance. Patient verbalizes understanding. Bed locked in lowest position, non skid socks on, personal belongings and call light within reach, appropriate sign placed on door.    Problem: Knowledge Deficit  Goal: Knowledge of disease process/condition, treatment plan, diagnostic tests, and medications will improve  Patient updated on POC> All questions answered at this time.

## 2018-06-16 NOTE — ED NOTES
Murphy from Lab called with critical results of (K 7.1), (), (Cr 15.15). Critical lab result read back to Murphy.   Dr. Sagastume notified of critical lab result at 1744.  Critical lab result read back by Dr. Sagastume. ERP was standing near phone when results were received
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
## 2018-06-16 NOTE — ASSESSMENT & PLAN NOTE
Hgb 8.0, with low platelet count (101)   Likely from chronic disease (with prior dx in chart),   Will obtain iron panel, B12/folate, and FOBT.   Continue to monitor.

## 2018-06-16 NOTE — PROGRESS NOTES
Hemodialysis treatment ordered today per Dr Rodriguez x 3.5 hours. Treatment initiated at 1925, ended at 2255.     Patient tolerated tx; see paper flow sheet for details.     Net UF 2,800 mL.     Needles removed from access site. Dressings applied and sites held x 10 minutes; verified no bleeding. Positive bruit/thrill post tx. Staff RN to monitor AVF for breakthrough bleeding. Should breakthrough bleeding occur, staff RN to apply pressure to access sites until bleeding resolved. Notify Dialysis and Nephrologist for follow-up.    Report given to Primary RN.

## 2018-06-16 NOTE — PROGRESS NOTES
2 RN skin check complete with Cielo SANTOS.     Pt has red and blanchable area on the bottom of her right stump. Prosthesis removed. Left foot is dry and callused. Otherwise skin intact.

## 2018-06-16 NOTE — ED PROVIDER NOTES
ED Provider Note    HPI: Patient is a 53-year-old female who presented to the emergency department Camila 15, 2018 at 4:28 PM with a chief complaint of nausea vomiting and diarrhea.    Patient elected to not go to dialysis for the last 3 visits because she didn't feel good. She's had nausea vomiting and diarrhea. No fever no chills no cough no change in bowel habits. No other somatic complaints    Review of Systems: Positive for nausea vomiting diarrhea and negative for fever chills cough change in bowel habits. Review of systems reviewed with patient, all other systems negative    Past medical/surgical history: SVT anemia and renal failure on dialysis elevated cholesterol hypertension BKA femur fracture    Medications: Catapres Hytrin lisinopril labetalol Norvasc Pepcid renagel    Allergies: Sulfa    Social History: patient smokes one quarter pack of cigarettes per day occasional use of alcohol      Physical exam: Constitutional: Chronically ill-appearing female awake alert  Vital signs:  Temperature 98.0 pulse 75 respirations 18 blood pressure 151/93 pulse oximetry 92%  EYES: PERRL, EOMI, Conjunctivae and sclera normal, eyelids normal bilaterally.  Neck: Trachea midline. No cervical masses seen or palpated. Normal range of motion, supple. No meningeal signs elicited.  Cardiac: Regular rate and rhythm. S1-S2 present. No S3 or S4 present. No murmurs, rubs, or gallops heard. No edema or varicosities were seen.   Lungs: Clear to auscultation with good aeration throughout. No wheezes, rales, or rhonchi heard. Patient's chest wall moved symmetrically with each respiratory effort. Patient was not making use of accessory muscles of respiration in breathing.  Abdomen: Soft nontender to palpation. No rebound or guarding elicited. No organomegaly identified. No pulsatile abdominal masses identified.   Musculoskeletal:  no  pain with palpitation or movement of muscle, bone or joint , no obvious musculoskeletal deformities  identified. BKA is present  Neurologic: alert and awake answers questions appropriately. Moves all four extremities independently, no gross focal abnormalities identified. Normal strength and motor.  Skin: no rash or lesion seen, no palpable dermatologic lesions identified. Strong thrill felt in dialysis shunt left upper extremity  Psychiatric: not anxious, delusional, or hallucinating.    Medical decision making:  Laboratory studies obtained on arrival (interpretation) significant findings included anemia with hemoglobin of 8.0 which I felt was due to chronic renal disease. The patient has significant renal failure with a  and creatinine 15.15. Her potassium is markedly elevated 7.1    Patient given an amp of D50 5 units of regular insulin and a gram of calcium gluconate IV    EKG obtained (interpretation) 12-lead EKG sinus rhythm at 74 morphology P waves QRS complexes unremarkable T waves are markedly elevated. No evidence of ST elevation or depression. R-wave progression normal. Interpreted as unremarkable EKG    Nephrology immediately consulted; patient be taken to dialysis for emergent dialysis. She'll be subsequently admitted by the internal medicine service.    Further care and hospital course per Attending physician summary    Impression 1) hyperkalemia  2) chronic renal failure  #3) noncompliance

## 2018-06-16 NOTE — PROGRESS NOTES
"[facility] -  PROGRESS NOTE               Author: Heraclio Junior D.O. Date & Time created: 6/16/2018  9:57 AM     Interval History:  HISTORY OF PRESENT ILLNESS:  This is a 53-year-old  female with past medical history significant for end-stage renal disease, on chronic hemodialysis Monday, Wednesday, and Fridays via a left arm AV fistula under the care of [facility], hypertension, history of right BKA, history of recurrent SVT requiring ablation in 01/2018, anemia secondary to chronic kidney disease, renal osteodystrophy, who was admitted with complaints of diarrhea and abdominal pain and found to have hyperkalemia with potassium of 7.1 and elevated BUN of 114 after missing 1 week of outpatient dialysis therapy.  The patient reports that for the past several days, she has had nausea as well as diarrhea.  She had been able to eat earlier in the week; however, today she is unable to eat at all due to the abdominal pain and continuous diarrhea.  She felt so sick that she has been unable to go to dialysis for the past 1 week.  Patient has a history of being noncompliant with her outpatient dialysis treatments and on arrival in the emergency room, she was noted to have elevated potassium of 7.1 with some peaked T waves on her EKG.  Her BUN was also elevated at 114 and her serum bicarbonate level was low at 13.  The patient reports that she has had somewhat of a poor appetite  the last couple of days as well.  She has not really been eating or drinking very much.  She denies any shortness of breath.  She does report significant  fatigue and she denies any lower extremity edema.  She will be urgently dialyzed tonight.    DAILY NEPHROLOGY SUMMARY:   6/15 - Consult done.  Urgent dialysis provided   6/16 - Seen again on HD.  \"My diarrhea is gone\".  Feels \"better\".  No dyspnea.  No edema.  +appetite       Review of Systems:  Review of Systems   Constitutional: Negative for " chills and fever.   HENT: Negative.    Eyes: Negative.    Respiratory: Negative.  Negative for cough and shortness of breath.    Cardiovascular: Negative.  Negative for chest pain and leg swelling.   Gastrointestinal: Negative.  Negative for abdominal pain, diarrhea, nausea and vomiting.   Genitourinary: Negative.    Musculoskeletal: Negative.    Skin: Negative.    Neurological: Positive for weakness.       Physical Exam:  Physical Exam   Constitutional: She is oriented to person, place, and time. She appears well-developed and well-nourished.   HENT:   Head: Normocephalic and atraumatic.   Eyes: Conjunctivae and EOM are normal. Pupils are equal, round, and reactive to light. No scleral icterus.   Neck: Normal range of motion. No JVD present.   Cardiovascular: Normal rate, regular rhythm and normal heart sounds.  Exam reveals no friction rub.    Pulmonary/Chest: Effort normal and breath sounds normal. She has no wheezes.   Abdominal: Soft. Bowel sounds are normal. She exhibits no distension. There is no tenderness.   Musculoskeletal: Normal range of motion. She exhibits no edema or tenderness.   Neurological: She is alert and oriented to person, place, and time.   Skin: Skin is warm and dry. No erythema.       Labs:        Invalid input(s): [id number]      Recent Labs      06/15/18   1655  06/16/18   0034  06/16/18   0352   SODIUM  135  141   --    POTASSIUM  7.1*  3.7   --    CHLORIDE  101  100   --    CO2  13*  27   --    BUN  113*  32*   --    CREATININE  15.15*  6.33*   --    MAGNESIUM  2.7*  2.1   --    PHOSPHORUS   --    --   4.6*   CALCIUM  8.8  9.6   --      Recent Labs      06/15/18   1655  06/16/18   0034   ALTSGPT  10  12   ASTSGOT  8*  10*   ALKPHOSPHAT  45  53   TBILIRUBIN  0.4  0.7   LIPASE  45   --    GLUCOSE  110*  163*     Recent Labs      06/15/18   1655  06/16/18   0034  06/16/18 0352   RBC  2.62*  2.79*   --    HEMOGLOBIN  8.0*  8.4*   --    HEMATOCRIT  25.9*  24.9*   --    PLATELETCT  101*   162*   --    IRON   --    --   141   FERRITIN   --    --   334.4*   TOTIRONBC   --    --   304     Recent Labs      06/15/18   1655  18   0034   WBC  9.4  4.8   NEUTSPOLYS  78.10*  73.10*   LYMPHOCYTES  15.90*  20.00*   MONOCYTES  3.70  5.00   EOSINOPHILS  1.60  1.30   BASOPHILS  0.30  0.20   ASTSGOT  8*  10*   ALTSGPT  10  12   ALKPHOSPHAT  45  53   TBILIRUBIN  0.4  0.7           Hemodynamics:  Temp (24hrs), Av.6 °C (97.9 °F), Min:36.2 °C (97.1 °F), Max:37 °C (98.6 °F)  Temperature: 36.7 °C (98 °F)  Pulse  Av.6  Min: 75  Max: 96Heart Rate (Monitored): 79  Blood Pressure: (!) 182/93 (Nurse notified.), NIBP: (!) 172/86     Respiratory:    Respiration: 18, Pulse Oximetry: 96 %           Fluids:    Intake/Output Summary (Last 24 hours) at 18 0957  Last data filed at 18 0148   Gross per 24 hour   Intake              740 ml   Output             3300 ml   Net            -2560 ml     Weight: 59.4 kg (130 lb 15.3 oz)  GI/Nutrition:  Orders Placed This Encounter   Procedures   • DIET ORDER     Standing Status:   Standing     Number of Occurrences:   1     Order Specific Question:   Diet:     Answer:   Renal [8]     Comments:   low Potassium diet     Order Specific Question:   Electrolyte modifications:     Answer:   Low Phosphorus [5]     Medical Decision Making, by Problem:  Active Hospital Problems    Diagnosis   • *End-stage renal disease (ESRD)  - with missed HD, and hyperKalemia, and HAGMA. [N18.6]   • HTN (hypertension) [I10]   • Anemia - likely of chronic disease [D63.8]   • Diarrhea [R19.7]       Quality-Core Measures       ASSESSMENTS:   1.  End-stage renal disease.      Dialysis today (SAT) - due to missed treatments    Maintenance dialysis qMWF and PRN    Left arm AVF   2.  Hyperkalemia.      Resolved with HD    Low K diet   3.  Hypertension, sub-optimal control    Compliance and volume seem to be an issue    Continue usual meds    Volume off with HD  r.   4.  Diarrhea, infectious vs.  functional     Seems to have resolved.   5.  Anemia     Secondary to chronic kidney disease.     WOO with HD to goal Hgb 10-11    Adequate iron stores    Transfuse PRN   6.  Metabolic acidosis    Corrected with HD    Off bicarb drip.   7.  Renal osteodystrophy.      PTH = 243    Phos = 4.6    SUGGESTIONS:   Dialysis today (SAT)   Continue maintenance dialysis qMWF and PRN this week   Continue usual meds   Volume off with HD as BP tolerates   No dietary protein restrictions   No need for IV iron   WOO with HD to goal Hgb 10-11   Follow labs with further recommendations to follow    Okay for transition back to outpatient dialysis from renal standpoint.  Will follow.     Thank you,

## 2018-06-16 NOTE — DISCHARGE SUMMARY
Internal Medicine Discharge Summary  Note Author: Maricruz Renteria M.D.       Admit Date:  6/15/2018       Discharge Date:   6/16/2018    Service:   [facility] Internal Medicine purple team  Attending Physician(s):   Dr. Woodall       Senior Resident(s):   Dr. Rojo  John Resident(s):     PCP: Juan Moreno M.D.        Primary Diagnosis:   End-stage renal disease (ESRD) (HCC)      Secondary Diagnoses:                Principal Problem:    End-stage renal disease (ESRD)  - with missed HD, and hyperKalemia, and HAGMA. POA: Yes  Active Problems:    HTN (hypertension) POA: Unknown    Anemia - likely of chronic disease POA: Yes    Diarrhea POA: Unknown  Resolved Problems:    * No resolved hospital problems. *      Hospital Summary (Brief Narrative):       The patient is a 53-year-old female with past medical history of end-stage renal disease on Hemodialysis, hypertension, renal osteodystrophy, anemia of chronic disease,recurrent SVT s/p ablation 1/2018 presented to the ED after missing 3 sessions of hemodialysis.  On admission BUN/Cr 114/15.1 Potassium 7.1 with EKG changes - Peaked T waves.Nephrology was consulted and Urgent hemodialysis was done in the ED.Potassium improved to 3.7.High anion gap metabolic acidosis improved after two sessions of hemodialysis.Patient improved after two sessions of Hemodialysis.stable at the time of discharge with a close follow up with Nephrologist and PCP.    Patient /Hospital Summary (Details -- Problem Oriented) :          * End-stage renal disease (ESRD)  - with missed HD, and hyperKalemia, and HAGMA.   Assessment & Plan    -Patient presented to the ED after missing dialysis.Last dialysis was one week prior to the presentation.  -BUN/Cr 115/15 on admission with elevated Anion gap, decreased bicarb.Potassium at 7.1 with peaked t waves on EKG.  -Nephrology consulted-Urgent hemodialysis in the ED.  -Gap is closed on the day of discharge.        HTN (hypertension)    Assessment & Plan     Outpatient: lisinopril and amlodipine., and terazosin, and clonidine.  Continue the same medications on discharge.        Diarrhea   Assessment & Plan    -Resolved  -Presented with diarrhea one week prior to the presentation.  -Improved after dialysis, no further episodes noted after the dialysis.        Anemia - likely of chronic disease   Assessment & Plan    -Hgb 8.0 on admission  -secondary to chronic kidney disease.  -Epogen at the time of dialysis per nephrology.              Consultants:     Nephrology    Procedures:        Hemodialysis    Imaging/ Testing:      No orders to display         Discharge Medications:         Medication Reconciliation: Completed       Medication List      START taking these medications      Instructions   nicotine 14 MG/24HR Pt24  Commonly known as:  NICODERM   Apply 1 Patch to skin as directed every 24 hours.  Dose:  1 Patch        CONTINUE taking these medications      Instructions   amLODIPine 5 MG Tabs  Commonly known as:  NORVASC   Take 1 Tab by mouth every day.  Dose:  5 mg     cloNIDine 0.1 MG/24HR Ptwk  Commonly known as:  CATAPRES   Apply 1 Patch to skin as directed every 7 days.  Dose:  1 Patch     famotidine 20 MG Tabs  Commonly known as:  PEPCID   Take 1 Tab by mouth 2 times a day.  Dose:  20 mg     labetalol 200 MG Tabs  Commonly known as:  NORMODYNE   Take 1 Tab by mouth 2 Times a Day.  Dose:  200 mg     lisinopril 40 MG tablet  Commonly known as:  PRINIVIL, ZESTRIL   Take 1 Tab by mouth every day.  Dose:  40 mg     terazosin 5 MG Caps  Commonly known as:  HYTRIN   Take 5 mg by mouth every day.  Dose:  5 mg            Can use .DISCHARGEMEDSLIST if going to another facility         Disposition:   Home    Diet:   Renal diet    Activity:   As tolerated    Instructions:         The patient was instructed to return to the ER in the event of worsening symptoms. I have counseled the patient on the importance of compliance and the patient has agreed  to proceed with all medical recommendations and follow up plan indicated above.   The patient understands that all medications come with benefits and risks. Risks may include permanent injury or death and these risks can be minimized with close reassessment and monitoring.        Primary Care Provider:    Juan Moreno M.D.    Discharge summary faxed to primary care provider:  Deferred  Copy of discharge summary given to the patient: Deferred      Follow up appointment details :      July 5 ,3 PM with PCP    Pending Studies:        BMP in 2-3 weeks    Time spent on discharge day patient visit, preparing discharge paperwork and arranging for patient follow up.      Discharge Time (Minutes) :    55 minutes  Hospital Course Type: Inpatient Stay < 2 midnights, patient recovered more rapidly than anticipated      Condition on Discharge    ______________________________________________________________________    Interval history/exam for day of discharge:     -Patient denies any complains.She stated she is feeling better after the dialysis.denies any nausea,vomiting,abdominal pain,diarrhea,dizziness or headache.  -Discussed with the patient regarding smoking cessation and the need to follow up with her nephrologist.Verbalizes understanding, will follow up for maintenance dialysis.    Vitals:    06/16/18 0000 06/16/18 0346 06/16/18 0730 06/16/18 1220   BP: 152/96 160/95 (!) 182/93 153/91   Pulse: 94 96 96 95   Resp: 16 17 18 18   Temp: 36.6 °C (97.9 °F) 37 °C (98.6 °F) 36.7 °C (98 °F) 36.5 °C (97.7 °F)   SpO2: 93% 93% 96% 96%   Weight: 59.4 kg (130 lb 15.3 oz)      Height:         Weight/BMI: Body mass index is 23.95 kg/m².  Pulse Oximetry: 96 %, O2 (LPM): 0, O2 Delivery: None (Room Air)    General: Alert, appears not in acute distress.  CVS: S1-S2 heard.  No rubs or gallops.  No JVD.  PULM: Clear to auscultation bilaterally.  No chest wall tenderness.    Most Recent Labs:    Lab Results   Component Value Date/Time     WBC 4.6 (L) 06/16/2018 11:57 AM    RBC 3.02 (L) 06/16/2018 11:57 AM    HEMOGLOBIN 9.1 (L) 06/16/2018 11:57 AM    HEMATOCRIT 27.5 (L) 06/16/2018 11:57 AM    MCV 91.1 06/16/2018 11:57 AM    MCH 30.1 06/16/2018 11:57 AM    MCHC 33.1 (L) 06/16/2018 11:57 AM    MPV 9.6 06/16/2018 11:57 AM    NEUTSPOLYS 55.70 06/16/2018 11:57 AM    LYMPHOCYTES 32.90 06/16/2018 11:57 AM    MONOCYTES 7.60 06/16/2018 11:57 AM    EOSINOPHILS 3.00 06/16/2018 11:57 AM    BASOPHILS 0.60 06/16/2018 11:57 AM    HYPOCHROMIA 1+ 10/04/2013 03:24 PM    ANISOCYTOSIS 3+ 07/14/2007 06:15 AM      Lab Results   Component Value Date/Time    SODIUM 141 06/16/2018 12:34 AM    POTASSIUM 3.7 06/16/2018 12:34 AM    CHLORIDE 100 06/16/2018 12:34 AM    CO2 27 06/16/2018 12:34 AM    GLUCOSE 163 (H) 06/16/2018 12:34 AM    BUN 32 (H) 06/16/2018 12:34 AM    CREATININE 6.33 (HH) 06/16/2018 12:34 AM    CREATININE 2.2 (H) 05/06/2009 03:10 AM      Lab Results   Component Value Date/Time    ALTSGPT 12 06/16/2018 12:34 AM    ASTSGOT 10 (L) 06/16/2018 12:34 AM    ALKPHOSPHAT 53 06/16/2018 12:34 AM    TBILIRUBIN 0.7 06/16/2018 12:34 AM    DBILIRUBIN <0.1 06/20/2017 01:19 PM    LIPASE 45 06/15/2018 04:55 PM    ALBUMIN 4.5 06/16/2018 12:34 AM    GLOBULIN 3.1 06/16/2018 12:34 AM    INR 0.96 05/18/2018 07:48 PM     Lab Results   Component Value Date/Time    PROTHROMBTM 12.5 05/18/2018 07:48 PM    INR 0.96 05/18/2018 07:48 PM

## 2018-06-16 NOTE — PROGRESS NOTES
Received report from nightshift RN, assumed care of patient. Patient is A&O x 4, no bed alarm indicated. Patient educated on importance of calling if in need of assistance. Verbalizes understanding. Patient with arm pain at this time, tylenol given. Patient updated on plan of care, voices no concerns at this time. Will continue to monitor for safety and comfort.

## 2018-06-16 NOTE — ASSESSMENT & PLAN NOTE
As outpatient, on lisinopril and amlodipine., and terazosin, and clonidine.  Will continue meds, but hold lisinopril for now.   On PRN-hydralazine, if needed.

## 2018-06-16 NOTE — PROGRESS NOTES
[facility] Progress [facility]    Patient seen and examined on hemodialysis  VSS--see dialysis treatment sheet for full details  Labs reviewed

## 2018-06-16 NOTE — CONSULTS
[facility] Progress Note    Patient seen and examined, please see my dictated consult note for full details.  53yoF with PMH significant for ESRD on HD via L arm AVF, HTN, R BKA, h/o recurrent SVT requiring ablation 1/18, Anemia secondary to CKD, Renal Osteodystrophy, admitted with complaints of diarrhea and abd pain and found to have hyperkalemia with K 7.1 and elevated  after missing 1 week of outpatient dialysis.  Assessment/Plan:  1. ESRD--HD MWF via L arm AVF, non-compliant with outpatient dialysis, last HD 1 week ago  --HD today on low K bath  --HD again tomorrow  --Dose adjust all meds for decreased GFR  2. Hyperkalemia--has peaked T-waves on EKG  --Urgent HD now on Low K bath  --Low K diet  --Discussed compliance with outpatient HD  3. HTN--poorly controlled as outpatient, and pt with non-compliance with BP meds as outpatient  --Continue home BP meds  --Gentle fluid removal with HD  4. N/V/Diarrhea--recommend checking stool studies  --Gentle fluid removal with HD  5. Anemia--secondary to CKD  --Epogen with HD  --Transfuse PRN  --Check iron studies  6. Metabolic Acidosis--HD now, dc bicarb drip  7. Renal Osteodystrophy--check phos level, pt is non-compliant with phos binders as outpatient    Report Confirmation #[id number]

## 2018-06-16 NOTE — PROGRESS NOTES
Pt received from dialysis awake alert and oriented. Report given by dialysis RN. Pt oriented to room and unit policies and procedures. Pt verbalized understanding. VSS. Telemetry monitor placed and verified by monitor room, SR 78. Pt oriented to call system. Bed locked in low position with fall precautions in place and call light in reach.

## 2018-06-16 NOTE — DISCHARGE INSTRUCTIONS
Discharge Instructions    Discharged to home by car with relative. Discharged via wheelchair, hospital escort: Yes.  Special equipment needed: Not Applicable    Be sure to schedule a follow-up appointment with your primary care doctor or any specialists as instructed.     Discharge Plan:   Diet Plan: Discussed  Activity Level: Discussed  Smoking Cessation Offered: Patient Refused  Confirmed Follow up Appointment: Patient to Call and Schedule Appointment  Confirmed Symptoms Management: Discussed  Medication Reconciliation Updated: Yes  Influenza Vaccine Indication: Not indicated: Previously immunized this influenza season and > 8 years of age    I understand that a diet low in cholesterol, fat, and sodium is recommended for good health. Unless I have been given specific instructions below for another diet, I accept this instruction as my diet prescription.   Other diet: Renal    Special Instructions: None    · Is patient discharged on Warfarin / Coumadin?   No     Depression / Suicide Risk    As you are discharged from this [facility] facility, it is important to learn how to keep safe from harming yourself.    Recognize the warning signs:  · Abrupt changes in personality, positive or negative- including increase in energy   · Giving away possessions  · Change in eating patterns- significant weight changes-  positive or negative  · Change in sleeping patterns- unable to sleep or sleeping all the time   · Unwillingness or inability to communicate  · Depression  · Unusual sadness, discouragement and loneliness  · Talk of wanting to die  · Neglect of personal appearance   · Rebelliousness- reckless behavior  · Withdrawal from people/activities they love  · Confusion- inability to concentrate     If you or a loved one observes any of these behaviors or has concerns about self-harm, here's what you can do:  · Talk about it- your feelings and reasons for harming yourself  · Remove any means that you might use to hurt  yourself (examples: pills, rope, extension cords, firearm)  · Get professional help from the community (Mental Health, Substance Abuse, psychological counseling)  · Do not be alone:Call your Safe Contact- someone whom you trust who will be there for you.  · Call your local CRISIS HOTLINE 771-2267 or 840-967-7451  · Call your local Children's Mobile Crisis Response Team [place] (729) 939-2574 or www.Surveypal  · Call the toll free National Suicide Prevention Hotlines   · National Suicide Prevention Lifeline 979-175-TJDG (7154)  · CMGE Line Network 800-SUICIDE (736-9634)    Hyperkalemia  Introduction  Hyperkalemia is when you have too much potassium in your blood. Potassium is normally removed (excreted) from your body by your kidneys. If there is too much potassium in your blood, it can affect your heart’s ability to function.  What are the causes?  Hyperkalemia may be caused by:  · Taking in too much potassium. You can do this by:  ¨ Using salt substitutes. They contain large amounts of potassium.  ¨ Taking potassium supplements.  ¨ Eating foods high in potassium.  · Excreting too little potassium. This can happen if:  ¨ Your kidneys are not working properly. Kidney (renal) disease, including short- or long-term renal failure, is a very common cause of hyperkalemia.  ¨ You are taking medicines that lower your excretion of potassium.  ¨ You have Walton disease.  ¨ You have a urinary tract blockage, such as kidney stones.  ¨ You are on treatment to mechanically clean your blood (dialysis) and you skip a treatment.  · Releasing a high amount of potassium from your cells into your blood. This can happen with:  ¨ Injury to muscles (rhabdomyolysis) or other tissues. Most potassium is stored in your muscles.  ¨ Severe burns or infections.  ¨ Acidic blood plasma (acidosis). Acidosis can result from many diseases, such as uncontrolled diabetes.  What increases the risk?  The most common risk factor of  hyperkalemia is kidney disease. Other risk factors of hyperkalemia include:  · Jerome disease. This is a condition where your glands do not produce enough hormones.  · Alcoholism or heavy drug use.  · Using certain blood pressure medicines, such as angiotensin-converting enzyme (ACE) inhibitors, angiotensin II receptor blockers (ARBs), or potassium-sparing diuretics such as spironolactone.  · Severe injury or burn.  What are the signs or symptoms?  Oftentimes, there are no signs or symptoms of hyperkalemia. However, when your potassium level becomes high enough, you may experience symptoms such as:  · Irregular or very slow heartbeat.  · Nausea.  · Fatigue.  · Tingling of the skin or numbness of the hands or feet.  · Muscle weakness.  · Fatigue.  · Not being able to move (paralysis).  You may not have any symptoms of hyperkalemia.  How is this diagnosed?  Hyperkalemia may be diagnosed by:  · Physical exam.  · Blood tests.  · ECG (electrocardiogram).  · Discussion of prescription and non-prescription drug use.  How is this treated?  Treatment for hyperkalemia is often directed at the underlying cause. In some instances, treatment may include:  · Insulin.  · Glucose (sugar) and water solution given through a vein (intravenous or IV ).  · Dialysis.  · Medicines to remove the potassium from your body.  · Medicines to move calcium from your bloodstream into your tissues.  Follow these instructions at home:  · Take medicines only as directed by your health care provider.  · Do not take any supplements, natural products, herbs, or vitamins without reviewing them with your health care provider. Certain supplements and natural food products can have high amounts of potassium.  · Limit your alcohol intake as directed by your health care provider.  · Stop illegal drug use. If you need help quitting, ask your health care provider.  · Keep all follow-up visits as directed by your health care provider. This is important.  · If  you have kidney disease, you may need to follow a low potassium diet. A dietitian can help educate you on low potassium foods.  Contact a health care provider if:  · You notice an irregular or very slow heartbeat.  · You feel light-headed.  · You feel weak.  · You are nauseous.  · You have tingling or numbness in your hands or feet.  Get help right away if:  · You have shortness of breath.  · You have chest pain or discomfort.  · You pass out.  · You have muscle paralysis.  This information is not intended to replace advice given to you by your health care provider. Make sure you discuss any questions you have with your health care provider.  Document Released: 12/08/2003 Document Revised: 05/25/2017 Document Reviewed: 03/25/2015  © 2017 Elsevier

## 2018-06-16 NOTE — ASSESSMENT & PLAN NOTE
Patient gets HD on MWF.  Missed HD for past week.   CR- 7.1 (with AG-21), and EKG with prominent T-waves.   Ordered insuline (with dextrose),  Calcium chloride, and bicarb.   ERP called Nephrology for emergent HD.   - Patient received HD, and improved K-3.7 (AG-14).   - Original plan for HD tomorrow, as well.  Monitor labs, and (when stable) return to outpatient HD.
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
## 2018-06-21 RX ORDER — NICOTINE 21 MG/24HR
1 PATCH, TRANSDERMAL 24 HOURS TRANSDERMAL EVERY 24 HOURS
Qty: 30 PATCH | Refills: 0 | OUTPATIENT
Start: 2018-06-21

## 2018-06-21 NOTE — TELEPHONE ENCOUNTER
Was the patient seen in the last year in this department? Yes  Pt last seen on: 06/13/18 by Dr. Westfall  Next appt on: 07/5/05/18      Does patient have an active prescription for medications requested? No     Received Request Via: Patient

## 2018-07-05 ENCOUNTER — APPOINTMENT (OUTPATIENT)
Dept: INTERNAL MEDICINE | Facility: MEDICAL CENTER | Age: 53
End: 2018-07-05
Payer: MEDICAID

## 2018-07-29 ENCOUNTER — HOSPITAL ENCOUNTER (OUTPATIENT)
Facility: MEDICAL CENTER | Age: 53
End: 2018-07-30
Attending: EMERGENCY MEDICINE | Admitting: INTERNAL MEDICINE
Payer: MEDICAID

## 2018-07-29 ENCOUNTER — APPOINTMENT (OUTPATIENT)
Dept: RADIOLOGY | Facility: MEDICAL CENTER | Age: 53
End: 2018-07-29
Attending: EMERGENCY MEDICINE
Payer: MEDICAID

## 2018-07-29 DIAGNOSIS — R06.02 SHORTNESS OF BREATH: ICD-10-CM

## 2018-07-29 DIAGNOSIS — I15.9 SECONDARY HYPERTENSION: ICD-10-CM

## 2018-07-29 DIAGNOSIS — I10 ESSENTIAL HYPERTENSION: ICD-10-CM

## 2018-07-29 DIAGNOSIS — N18.9 CHRONIC KIDNEY DISEASE, UNSPECIFIED CKD STAGE: ICD-10-CM

## 2018-07-29 DIAGNOSIS — J45.51 SEVERE PERSISTENT ASTHMA WITH ACUTE EXACERBATION: ICD-10-CM

## 2018-07-29 LAB
ALBUMIN SERPL BCP-MCNC: 4.5 G/DL (ref 3.2–4.9)
ALBUMIN/GLOB SERPL: 2 G/DL
ALP SERPL-CCNC: 61 U/L (ref 30–99)
ALT SERPL-CCNC: 10 U/L (ref 2–50)
ANION GAP SERPL CALC-SCNC: 16 MMOL/L (ref 0–11.9)
APTT PPP: 28.3 SEC (ref 24.7–36)
AST SERPL-CCNC: 14 U/L (ref 12–45)
BASOPHILS # BLD AUTO: 0.8 % (ref 0–1.8)
BASOPHILS # BLD: 0.06 K/UL (ref 0–0.12)
BILIRUB SERPL-MCNC: 0.7 MG/DL (ref 0.1–1.5)
BNP SERPL-MCNC: 2154 PG/ML (ref 0–100)
BUN SERPL-MCNC: 66 MG/DL (ref 8–22)
CALCIUM SERPL-MCNC: 9.2 MG/DL (ref 8.5–10.5)
CHLORIDE SERPL-SCNC: 98 MMOL/L (ref 96–112)
CO2 SERPL-SCNC: 27 MMOL/L (ref 20–33)
CREAT SERPL-MCNC: 10.71 MG/DL (ref 0.5–1.4)
EKG IMPRESSION: NORMAL
EOSINOPHIL # BLD AUTO: 0.23 K/UL (ref 0–0.51)
EOSINOPHIL NFR BLD: 3 % (ref 0–6.9)
ERYTHROCYTE [DISTWIDTH] IN BLOOD BY AUTOMATED COUNT: 63.3 FL (ref 35.9–50)
GLOBULIN SER CALC-MCNC: 2.3 G/DL (ref 1.9–3.5)
GLUCOSE SERPL-MCNC: 98 MG/DL (ref 65–99)
HAV IGM SERPL QL IA: NEGATIVE
HBV CORE IGM SER QL: NEGATIVE
HBV SURFACE AB SERPL IA-ACNC: <3.1 MIU/ML (ref 0–10)
HBV SURFACE AG SER QL: NEGATIVE
HCT VFR BLD AUTO: 42.3 % (ref 37–47)
HCV AB SER QL: NEGATIVE
HGB BLD-MCNC: 12.8 G/DL (ref 12–16)
IMM GRANULOCYTES # BLD AUTO: 0.02 K/UL (ref 0–0.11)
IMM GRANULOCYTES NFR BLD AUTO: 0.3 % (ref 0–0.9)
INR PPP: 1.04 (ref 0.87–1.13)
LIPASE SERPL-CCNC: 35 U/L (ref 11–82)
LYMPHOCYTES # BLD AUTO: 1.5 K/UL (ref 1–4.8)
LYMPHOCYTES NFR BLD: 19.4 % (ref 22–41)
MCH RBC QN AUTO: 29.4 PG (ref 27–33)
MCHC RBC AUTO-ENTMCNC: 30.3 G/DL (ref 33.6–35)
MCV RBC AUTO: 97.2 FL (ref 81.4–97.8)
MONOCYTES # BLD AUTO: 0.31 K/UL (ref 0–0.85)
MONOCYTES NFR BLD AUTO: 4 % (ref 0–13.4)
NEUTROPHILS # BLD AUTO: 5.63 K/UL (ref 2–7.15)
NEUTROPHILS NFR BLD: 72.5 % (ref 44–72)
NRBC # BLD AUTO: 0.02 K/UL
NRBC BLD-RTO: 0.3 /100 WBC
PLATELET # BLD AUTO: 171 K/UL (ref 164–446)
PMV BLD AUTO: 10.2 FL (ref 9–12.9)
POTASSIUM SERPL-SCNC: 6.3 MMOL/L (ref 3.6–5.5)
PROT SERPL-MCNC: 6.8 G/DL (ref 6–8.2)
PROTHROMBIN TIME: 13.3 SEC (ref 12–14.6)
RBC # BLD AUTO: 4.35 M/UL (ref 4.2–5.4)
SODIUM SERPL-SCNC: 141 MMOL/L (ref 135–145)
TROPONIN I SERPL-MCNC: 0.02 NG/ML (ref 0–0.04)
WBC # BLD AUTO: 7.8 K/UL (ref 4.8–10.8)

## 2018-07-29 PROCEDURE — 85730 THROMBOPLASTIN TIME PARTIAL: CPT

## 2018-07-29 PROCEDURE — A9270 NON-COVERED ITEM OR SERVICE: HCPCS | Performed by: INTERNAL MEDICINE

## 2018-07-29 PROCEDURE — 71045 X-RAY EXAM CHEST 1 VIEW: CPT

## 2018-07-29 PROCEDURE — 94640 AIRWAY INHALATION TREATMENT: CPT

## 2018-07-29 PROCEDURE — 86706 HEP B SURFACE ANTIBODY: CPT

## 2018-07-29 PROCEDURE — 93005 ELECTROCARDIOGRAM TRACING: CPT | Performed by: EMERGENCY MEDICINE

## 2018-07-29 PROCEDURE — 700102 HCHG RX REV CODE 250 W/ 637 OVERRIDE(OP): Performed by: EMERGENCY MEDICINE

## 2018-07-29 PROCEDURE — 700102 HCHG RX REV CODE 250 W/ 637 OVERRIDE(OP): Performed by: INTERNAL MEDICINE

## 2018-07-29 PROCEDURE — 84484 ASSAY OF TROPONIN QUANT: CPT

## 2018-07-29 PROCEDURE — 80074 ACUTE HEPATITIS PANEL: CPT

## 2018-07-29 PROCEDURE — A9270 NON-COVERED ITEM OR SERVICE: HCPCS | Performed by: EMERGENCY MEDICINE

## 2018-07-29 PROCEDURE — 304561 HCHG STAT O2

## 2018-07-29 PROCEDURE — G0378 HOSPITAL OBSERVATION PER HR: HCPCS

## 2018-07-29 PROCEDURE — 99406 BEHAV CHNG SMOKING 3-10 MIN: CPT | Performed by: INTERNAL MEDICINE

## 2018-07-29 PROCEDURE — 80053 COMPREHEN METABOLIC PANEL: CPT

## 2018-07-29 PROCEDURE — 83690 ASSAY OF LIPASE: CPT

## 2018-07-29 PROCEDURE — 99285 EMERGENCY DEPT VISIT HI MDM: CPT

## 2018-07-29 PROCEDURE — A9270 NON-COVERED ITEM OR SERVICE: HCPCS | Performed by: HOSPITALIST

## 2018-07-29 PROCEDURE — 700101 HCHG RX REV CODE 250: Performed by: INTERNAL MEDICINE

## 2018-07-29 PROCEDURE — 99220 PR INITIAL OBSERVATION CARE,LEVL III: CPT | Mod: 25 | Performed by: INTERNAL MEDICINE

## 2018-07-29 PROCEDURE — 83880 ASSAY OF NATRIURETIC PEPTIDE: CPT

## 2018-07-29 PROCEDURE — 90935 HEMODIALYSIS ONE EVALUATION: CPT

## 2018-07-29 PROCEDURE — 700101 HCHG RX REV CODE 250: Performed by: EMERGENCY MEDICINE

## 2018-07-29 PROCEDURE — 96375 TX/PRO/DX INJ NEW DRUG ADDON: CPT

## 2018-07-29 PROCEDURE — 85610 PROTHROMBIN TIME: CPT

## 2018-07-29 PROCEDURE — 85025 COMPLETE CBC W/AUTO DIFF WBC: CPT

## 2018-07-29 PROCEDURE — 700102 HCHG RX REV CODE 250 W/ 637 OVERRIDE(OP): Performed by: HOSPITALIST

## 2018-07-29 RX ORDER — ONDANSETRON 4 MG/1
4 TABLET, ORALLY DISINTEGRATING ORAL EVERY 4 HOURS PRN
Status: DISCONTINUED | OUTPATIENT
Start: 2018-07-29 | End: 2018-07-30 | Stop reason: HOSPADM

## 2018-07-29 RX ORDER — FAMOTIDINE 20 MG/1
20 TABLET, FILM COATED ORAL DAILY
Status: DISCONTINUED | OUTPATIENT
Start: 2018-07-29 | End: 2018-07-30 | Stop reason: HOSPADM

## 2018-07-29 RX ORDER — PROMETHAZINE HYDROCHLORIDE 25 MG/1
12.5-25 SUPPOSITORY RECTAL EVERY 4 HOURS PRN
Status: DISCONTINUED | OUTPATIENT
Start: 2018-07-29 | End: 2018-07-30 | Stop reason: HOSPADM

## 2018-07-29 RX ORDER — TERAZOSIN 5 MG/1
5 CAPSULE ORAL
Status: DISCONTINUED | OUTPATIENT
Start: 2018-07-29 | End: 2018-07-30 | Stop reason: HOSPADM

## 2018-07-29 RX ORDER — AMLODIPINE BESYLATE 5 MG/1
5 TABLET ORAL DAILY
Status: DISCONTINUED | OUTPATIENT
Start: 2018-07-30 | End: 2018-07-30

## 2018-07-29 RX ORDER — AMOXICILLIN 250 MG
2 CAPSULE ORAL 2 TIMES DAILY
Status: DISCONTINUED | OUTPATIENT
Start: 2018-07-29 | End: 2018-07-30 | Stop reason: HOSPADM

## 2018-07-29 RX ORDER — POLYETHYLENE GLYCOL 3350 17 G/17G
1 POWDER, FOR SOLUTION ORAL
Status: DISCONTINUED | OUTPATIENT
Start: 2018-07-29 | End: 2018-07-30 | Stop reason: HOSPADM

## 2018-07-29 RX ORDER — PROMETHAZINE HYDROCHLORIDE 25 MG/1
12.5-25 TABLET ORAL EVERY 4 HOURS PRN
Status: DISCONTINUED | OUTPATIENT
Start: 2018-07-29 | End: 2018-07-30 | Stop reason: HOSPADM

## 2018-07-29 RX ORDER — FAMOTIDINE 20 MG/1
20 TABLET, FILM COATED ORAL DAILY
COMMUNITY
End: 2018-09-24

## 2018-07-29 RX ORDER — BISACODYL 10 MG
10 SUPPOSITORY, RECTAL RECTAL
Status: DISCONTINUED | OUTPATIENT
Start: 2018-07-29 | End: 2018-07-30 | Stop reason: HOSPADM

## 2018-07-29 RX ORDER — LISINOPRIL 20 MG/1
40 TABLET ORAL DAILY
Status: DISCONTINUED | OUTPATIENT
Start: 2018-07-30 | End: 2018-07-30 | Stop reason: HOSPADM

## 2018-07-29 RX ORDER — HEPARIN SODIUM 5000 [USP'U]/ML
5000 INJECTION, SOLUTION INTRAVENOUS; SUBCUTANEOUS EVERY 8 HOURS
Status: DISCONTINUED | OUTPATIENT
Start: 2018-07-29 | End: 2018-07-30 | Stop reason: HOSPADM

## 2018-07-29 RX ORDER — CALCIUM CARBONATE 500 MG/1
500 TABLET, CHEWABLE ORAL EVERY 8 HOURS PRN
Status: DISCONTINUED | OUTPATIENT
Start: 2018-07-29 | End: 2018-07-30 | Stop reason: HOSPADM

## 2018-07-29 RX ORDER — LABETALOL HYDROCHLORIDE 5 MG/ML
10 INJECTION, SOLUTION INTRAVENOUS EVERY 4 HOURS PRN
Status: DISCONTINUED | OUTPATIENT
Start: 2018-07-29 | End: 2018-07-30 | Stop reason: HOSPADM

## 2018-07-29 RX ORDER — ACETAMINOPHEN 325 MG/1
650 TABLET ORAL EVERY 6 HOURS PRN
Status: DISCONTINUED | OUTPATIENT
Start: 2018-07-29 | End: 2018-07-30 | Stop reason: HOSPADM

## 2018-07-29 RX ORDER — ONDANSETRON 2 MG/ML
4 INJECTION INTRAMUSCULAR; INTRAVENOUS EVERY 4 HOURS PRN
Status: DISCONTINUED | OUTPATIENT
Start: 2018-07-29 | End: 2018-07-30 | Stop reason: HOSPADM

## 2018-07-29 RX ORDER — LABETALOL 100 MG/1
200 TABLET, FILM COATED ORAL 2 TIMES DAILY
Status: DISCONTINUED | OUTPATIENT
Start: 2018-07-29 | End: 2018-07-30 | Stop reason: HOSPADM

## 2018-07-29 RX ADMIN — ANTACID TABLETS 500 MG: 500 TABLET, CHEWABLE ORAL at 22:23

## 2018-07-29 RX ADMIN — ALBUTEROL SULFATE 2.5 MG: 2.5 SOLUTION RESPIRATORY (INHALATION) at 09:10

## 2018-07-29 RX ADMIN — STANDARDIZED SENNA CONCENTRATE AND DOCUSATE SODIUM 2 TABLET: 8.6; 5 TABLET, FILM COATED ORAL at 17:09

## 2018-07-29 RX ADMIN — NITROGLYCERIN 1 INCH: 20 OINTMENT TOPICAL at 09:04

## 2018-07-29 RX ADMIN — CLONIDINE 1 PATCH: 0.1 PATCH TRANSDERMAL at 20:23

## 2018-07-29 RX ADMIN — LABETALOL HYDROCHLORIDE 200 MG: 100 TABLET, FILM COATED ORAL at 18:11

## 2018-07-29 RX ADMIN — ACETAMINOPHEN 650 MG: 325 TABLET, FILM COATED ORAL at 19:26

## 2018-07-29 RX ADMIN — TERAZOSIN HYDROCHLORIDE ANHYDROUS 5 MG: 5 CAPSULE ORAL at 19:26

## 2018-07-29 RX ADMIN — FAMOTIDINE 20 MG: 20 TABLET ORAL at 20:22

## 2018-07-29 RX ADMIN — LABETALOL HYDROCHLORIDE 10 MG: 5 INJECTION, SOLUTION INTRAVENOUS at 15:20

## 2018-07-29 ASSESSMENT — COGNITIVE AND FUNCTIONAL STATUS - GENERAL
DAILY ACTIVITIY SCORE: 24
SUGGESTED CMS G CODE MODIFIER MOBILITY: CI
SUGGESTED CMS G CODE MODIFIER DAILY ACTIVITY: CH
MOBILITY SCORE: 23
CLIMB 3 TO 5 STEPS WITH RAILING: A LITTLE

## 2018-07-29 ASSESSMENT — PAIN SCALES - GENERAL
PAINLEVEL_OUTOF10: 0
PAINLEVEL_OUTOF10: 0
PAINLEVEL_OUTOF10: 6
PAINLEVEL_OUTOF10: 0
PAINLEVEL_OUTOF10: 0

## 2018-07-29 ASSESSMENT — ENCOUNTER SYMPTOMS
HEADACHES: 0
FOCAL WEAKNESS: 0
NERVOUS/ANXIOUS: 0
DEPRESSION: 0
FEVER: 0
VOMITING: 0
SPUTUM PRODUCTION: 0
BLURRED VISION: 0
SHORTNESS OF BREATH: 1
PALPITATIONS: 0
ABDOMINAL PAIN: 0
WEIGHT LOSS: 0
ORTHOPNEA: 0
INSOMNIA: 0
STRIDOR: 0
EYE REDNESS: 0
EYE PAIN: 0
COUGH: 0
EYE DISCHARGE: 0
DIZZINESS: 0
HEARTBURN: 0
NAUSEA: 0
SEIZURES: 0
BACK PAIN: 0
MYALGIAS: 0
CHILLS: 0
NECK PAIN: 0
DIARRHEA: 0

## 2018-07-29 ASSESSMENT — COPD QUESTIONNAIRES
DO YOU EVER COUGH UP ANY MUCUS OR PHLEGM?: YES, A FEW DAYS A WEEK OR MONTH
HAVE YOU SMOKED AT LEAST 100 CIGARETTES IN YOUR ENTIRE LIFE: YES
DURING THE PAST 4 WEEKS HOW MUCH DID YOU FEEL SHORT OF BREATH: NONE/LITTLE OF THE TIME

## 2018-07-29 ASSESSMENT — LIFESTYLE VARIABLES
EVER_SMOKED: YES
EVER_SMOKED: YES

## 2018-07-29 ASSESSMENT — PATIENT HEALTH QUESTIONNAIRE - PHQ9
2. FEELING DOWN, DEPRESSED, IRRITABLE, OR HOPELESS: NOT AT ALL
SUM OF ALL RESPONSES TO PHQ9 QUESTIONS 1 AND 2: 0
1. LITTLE INTEREST OR PLEASURE IN DOING THINGS: NOT AT ALL
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
## 2018-07-29 NOTE — ED TRIAGE NOTES
Patient arrived via EMS c/o shortness of breath, missed dialysis Friday, typically goes M/W/F.  Patient 02 sats 79 on RA by EMS given nebulizer and NRB, 02 2LNC applied upon arrival. Patient mildly tachypnic, placed on cardiac montiors, MD to evaluate.

## 2018-07-29 NOTE — CARE PLAN
Problem: Communication  Goal: The ability to communicate needs accurately and effectively will improve  Outcome: PROGRESSING AS EXPECTED  Pt updated on POC, discussed goals for the day, and what to expect while staying on the unit.     Problem: Safety  Goal: Will remain free from injury  Outcome: PROGRESSING AS EXPECTED  Educated pt to call for assistance prior to getting out of bed. Pt bed is locked in lowest position. Call light and belongings within reach. Non skid sock on L foot and  R limb is a BKA with a prosthesis which has non skid shoe on.

## 2018-07-29 NOTE — PROGRESS NOTES
Hemodialysis ordered by Dr. Junior. Pt came from ER with tele box. Treatment started at 1112 and ended at 1412. Pt stable, vss, no c/o post tx. See flow sheets for details. Net UF 2.0 L. Report to SAIMA Barnett RN. Lt ua avf dressing clean, dry, intact.

## 2018-07-29 NOTE — PROGRESS NOTES
Received report from ER nurse Azalia, pt will go to dialysis and then come to floor. Charge nurse Kasey placed monitor on pt, called monitor room, verified pt name, heart rhythm and rate.

## 2018-07-29 NOTE — ASSESSMENT & PLAN NOTE
- on HD MWF  - Nephro recs and mgmt appreciated   Patient's daughter calling, patient is still having some greenish drainage per rectum with mucous.  Still having some discomfort.  Is done with antibiotics.

## 2018-07-29 NOTE — ASSESSMENT & PLAN NOTE
SBP>180  Continue outpatient meds  - titrating Amlodipine  - Monitor BP  - prn anti-HTNs on board with fluid mgmt with HD

## 2018-07-29 NOTE — PROGRESS NOTES
2 RN skin check completed with RN Keyanna:  Pt skin intact with bilateral ears red and blanching, sacrum red and blanching, and right limb pink and blanching.

## 2018-07-29 NOTE — H&P
Hospital Medicine History and Physical      Date of Service  7/29/2018    Chief Complaint  Chief Complaint   Patient presents with   • Shortness of Breath     patient missed dialysis on friday, now c/o dyspnea, 02 RA 79 per EMS, increased with albuterol and NRB to 98%       History of Presenting Illness  Coyle is a 53 y.o. female PMH of ESRD on HD MWF, right BKA who presents with SOB for the since last night. Denies any cough, chest pain, palpitation. She missed her HD on Friday because she was sleeping on that day. In the ER she was found to be fluid overloaded and hyperkalemia. Nephrology was consulted. She will be admitted for HD.    Primary Care Physician  Juan Moreno M.D.      Code Status  Full code    Review of Systems  Review of Systems   Constitutional: Negative for chills, fever and weight loss.   HENT: Negative for congestion and nosebleeds.    Eyes: Negative for blurred vision, pain, discharge and redness.   Respiratory: Positive for shortness of breath. Negative for cough, sputum production and stridor.    Cardiovascular: Negative for chest pain, palpitations, orthopnea and leg swelling.   Gastrointestinal: Negative for abdominal pain, diarrhea, heartburn, nausea and vomiting.   Genitourinary: Negative for dysuria, frequency and urgency.   Musculoskeletal: Negative for back pain, myalgias and neck pain.   Skin: Negative for itching and rash.   Neurological: Negative for dizziness, focal weakness, seizures and headaches.   Psychiatric/Behavioral: Negative for depression. The patient is not nervous/anxious and does not have insomnia.      Please see HPI, all other systems were reviewed and are negative (AMA/CMS criteria)     Past Medical History  Past Medical History:   Diagnosis Date   • ESRD (end stage renal disease) ([facility]) 8-25-17    Dialysis MWF@ [facility]   • Port catheter in place 8-25-17    For Dialysis   • Dialysis patient ([facility]) 8-25-17    [facility] M,W,F   • Dental  "disorder 8-25-17    \"Full Upper & Partial Lower Dentures\"   • Glomerulonephritis, chronic 11/5/2009   • Anemia associated with chronic renal failure 11/5/2009   • HTN (hypertension) 11/5/2009    2017 states well controlled   • Anemia    • Dysrhythmia     \"SVT\"   • Heart burn    • High cholesterol    • SVT (supraventricular tachycardia) (HCC)    • SVT (supraventricular tachycardia) (HCC)        Surgical History  Past Surgical History:   Procedure Laterality Date   • AV FISTULA CREATION Left 4/27/2018    Procedure: Left Brachial artery Repair;  Surgeon: Jimbo Davenport M.D.;  Location: SURGERY [facility];  Service: Vascular   • AV FISTULA CREATION Left 8/28/2017    Procedure: AV FISTULA CREATION  UPPER EXTREMITY -BRACHIAL BASALIC;  Surgeon: Glen Rodriguez M.D.;  Location: SURGERY [facility];  Service: General   • AV FISTULA CREATION Left 6/20/2017    Procedure: AV FISTULA CREATION- LEFT;  Surgeon: Jimbo Davenport M.D.;  Location: SURGERY [facility];  Service:    • APPENDECTOMY LAPAROSCOPIC  5/4/2009    Performed by BANDAR TIMMONS at [facility]   • FEMUR ORIF  10/9/08    Performed by GLEN GATES at [facility]   • ILIAC BONE GRAFT  10/9/08    Performed by GLEN GATES at [facility]   • AMPUTATION, BELOW THE KNEE     • CAPITATION PAYMENT (INSURANCE)     • OTHER      BELOW KNEE AMPUTATION RIGHT   • PB ENLARGE BREAST WITH IMPLANT         Medications  No current facility-administered medications on file prior to encounter.      Current Outpatient Prescriptions on File Prior to Encounter   Medication Sig Dispense Refill   • nicotine (NICODERM) 14 MG/24HR PATCH 24 HR Apply 1 Patch to skin as directed every 24 hours. 30 Patch 0   • cloNIDine (CATAPRES) 0.1 MG/24HR PATCH WEEKLY Apply 1 Patch to skin as directed every 7 days.     • terazosin (HYTRIN) 5 MG Cap Take 5 mg by mouth every day.     • lisinopril (PRINIVIL, ZESTRIL) 40 MG tablet Take 1 Tab by " mouth every day. 30 Tab 2   • labetalol (NORMODYNE) 200 MG Tab Take 1 Tab by mouth 2 Times a Day. 60 Tab 2   • amLODIPine (NORVASC) 5 MG Tab Take 1 Tab by mouth every day. 30 Tab 2   • famotidine (PEPCID) 20 MG Tab Take 1 Tab by mouth 2 times a day. 60 Tab 2     Family History  Family History   Problem Relation Age of Onset   • Heart Disease Unknown          Social History  Social History   Substance Use Topics   • Smoking status: Current Every Day Smoker     Packs/day: 0.25     Years: 20.00     Types: Cigarettes   • Smokeless tobacco: Never Used      Comment: 5 cigs/day   • Alcohol use No       Allergies  Allergies   Allergen Reactions   • Sulfa Drugs Hives     [id number] rash swelling itching        Physical Exam  Laboratory   Hemodynamics  Temp (24hrs), Av.3 °C (97.3 °F), Min:36.3 °C (97.3 °F), Max:36.3 °C (97.3 °F)   Temperature: 36.3 °C (97.3 °F)  Pulse  Av  Min: 79  Max: 95 Heart Rate (Monitored): 82  Blood Pressure: (!) 214/119, NIBP: (!) 198/112      Respiratory      Respiration: 18, Pulse Oximetry: 99 %, O2 Daily Delivery Respiratory : Nasal Cannula     Given By:: Mouthpiece, Work Of Breathing / Effort: Mild  RUL Breath Sounds: Expiratory Wheezes, RML Breath Sounds: Diminished, RLL Breath Sounds: Crackles, PHILIP Breath Sounds: Expiratory Wheezes, LLL Breath Sounds: Diminished    Physical Exam   Constitutional: She is oriented to person, place, and time. No distress.   HENT:   Head: Normocephalic and atraumatic.   Mouth/Throat: Oropharynx is clear and moist.   Eyes: Pupils are equal, round, and reactive to light. Conjunctivae and EOM are normal.   Neck: Normal range of motion. Neck supple. No tracheal deviation present. No thyromegaly present.   Cardiovascular: Normal rate and regular rhythm.    No murmur heard.  Pulmonary/Chest: Effort normal. No respiratory distress. She has wheezes. She has rales.   Abdominal: Soft. Bowel sounds are normal. She exhibits no distension. There is no tenderness.    Musculoskeletal: She exhibits no edema or tenderness.   Right bka   Neurological: She is alert and oriented to person, place, and time. No cranial nerve deficit.   Skin: Skin is warm and dry. She is not diaphoretic. No erythema.   Psychiatric: She has a normal mood and affect. Her behavior is normal. Thought content normal.       Recent Labs      07/29/18   0829   WBC  7.8   RBC  4.35   HEMOGLOBIN  12.8   HEMATOCRIT  42.3   MCV  97.2   MCH  29.4   MCHC  30.3*   RDW  63.3*   PLATELETCT  171   MPV  10.2     Recent Labs      07/29/18   0829   SODIUM  141   POTASSIUM  6.3*   CHLORIDE  98   CO2  27   GLUCOSE  98   BUN  66*   CREATININE  10.71*   CALCIUM  9.2     Recent Labs      07/29/18   0829   ALTSGPT  10   ASTSGOT  14   ALKPHOSPHAT  61   TBILIRUBIN  0.7   LIPASE  35   GLUCOSE  98     Recent Labs      07/29/18   0829   APTT  28.3   INR  1.04             Lab Results   Component Value Date    TROPONINI 0.02 07/29/2018       Imaging  DX-CHEST-PORTABLE (1 VIEW)   Final Result      Cardiomegaly with pulmonary vascular congestion and interstitial edema.      Bibasilar opacities may represent atelectasis, consolidation or dependent edema.           EKG  per my independant read:  QTc: 503, HR: 86, Normal Sinus Rhythm, no ST/T changes     Assessment/Plan     I anticipate this patient is appropriate for observation status at this time.    Hypertensive urgency- (present on admission)   Assessment & Plan    SBP>180  Continue outpatient meds  Monitor BP        End-stage renal disease (ESRD)  - with missed HD, and hyperKalemia, and HAGMA.- (present on admission)   Assessment & Plan    On HD MWF  Consulted nephro  To have HD today  Follow cmp        Hyperkalemia- (present on admission)   Assessment & Plan    Monitor on tele  HD today        Tobacco use- (present on admission)   Assessment & Plan    Spent 4 minutes on smoking cessation education            Prophylaxis:  sc heparin

## 2018-07-29 NOTE — ED NOTES
Med rec updated and complete  Allergies reviewed  Pt reports no antibiotics in the last 30 days.  Pt reports no vitamins.

## 2018-07-29 NOTE — ED PROVIDER NOTES
"ED Provider Note    CHIEF COMPLAINT  Chief Complaint   Patient presents with   • Shortness of Breath     patient missed dialysis on friday, now c/o dyspnea, 02 RA 79 per EMS, increased with albuterol and NRB to 98%       [facility]  Isabelle Coyle is a 53 y.o. female who presents with shortness of breath, last night, orthopnea or dyspnea on exertion and wheezing. No fever no chills, chest pain, lower chest bilaterally. No nausea no vomiting no diaphoresis no radiation of the chest pain. Chest pain is described as with respiration only moderate, no other modifiers. History of renal failure, last dialysis was Wednesday, 5 days ago. Next dialysis is scheduled tomorrow. Evidently she slept through her Friday dialysis, 2 days ago.    REVIEW OF SYSTEMS  See HPI for further details. History of anemia, renal failure cardiac dysrhythmia elevated lipids, supraventricular tachycardia bK amputation, right leg, Denies other G.I., G.U.. endrocine, cardiovascular, respriatory or neurological problems. All other systems are negative.     PAST MEDICAL HISTORY  Past Medical History:   Diagnosis Date   • Anemia    • Anemia associated with chronic renal failure 11/5/2009   • Dental disorder 8-25-17    \"Full Upper & Partial Lower Dentures\"   • Dialysis patient ([facility]) 8-25-17    [facility] M,W,F   • Dysrhythmia     \"SVT\"   • ESRD (end stage renal disease) ([facility]) 8-25-17    Dialysis MWF@ [facility]   • Glomerulonephritis, chronic 11/5/2009   • Heart burn    • High cholesterol    • HTN (hypertension) 11/5/2009    2017 states well controlled   • Port catheter in place 8-25-17    For Dialysis   • SVT (supraventricular tachycardia) ([facility])    • SVT (supraventricular tachycardia) ([facility])        FAMILY HISTORY  Family History   Problem Relation Age of Onset   • Heart Disease Unknown        SOCIAL HISTORY  Social History     Social History   • Marital status:      Spouse name: N/A   • Number of children: N/A   • Years of education: " N/A     Social History Main Topics   • Smoking status: Current Every Day Smoker     Packs/day: 0.25     Years: 20.00     Types: Cigarettes   • Smokeless tobacco: Never Used      Comment: 5 cigs/day   • Alcohol use No   • Drug use: No   • Sexual activity: Not on file     Other Topics Concern   • Not on file     Social History Narrative   • No narrative on file       SURGICAL HISTORY  Past Surgical History:   Procedure Laterality Date   • AV FISTULA CREATION Left 4/27/2018    Procedure: Left Brachial artery Repair;  Surgeon: Jimbo Davenport M.D.;  Location: SURGERY [facility];  Service: Vascular   • AV FISTULA CREATION Left 8/28/2017    Procedure: AV FISTULA CREATION  UPPER EXTREMITY -BRACHIAL BASALIC;  Surgeon: Stella Rodriguez M.D.;  Location: SURGERY [facility];  Service: General   • AV FISTULA CREATION Left 6/20/2017    Procedure: AV FISTULA CREATION- LEFT;  Surgeon: Jimbo Davenport M.D.;  Location: SURGERY [facility];  Service:    • APPENDECTOMY LAPAROSCOPIC  5/4/2009    Performed by BANDAR TIMMONS at SURGERY [facility]   • FEMUR ORIF  10/9/08    Performed by STELLA GATES at [facility]   • ILIAC BONE GRAFT  10/9/08    Performed by STELLA GATES at [facility]   • AMPUTATION, BELOW THE KNEE     • CAPITATION PAYMENT (INSURANCE)     • OTHER      BELOW KNEE AMPUTATION RIGHT   • PB ENLARGE BREAST WITH IMPLANT         CURRENT MEDICATIONS  Home Medications     Reviewed by Wendi Schumacher R.N. (Registered Nurse) on 07/29/18 at 0830  Med List Status: Partial   Medication Last Dose Status   amLODIPine (NORVASC) 5 MG Tab 6/15/2018 Active   cloNIDine (CATAPRES) 0.1 MG/24HR PATCH WEEKLY 6/14/2018 Active   famotidine (PEPCID) 20 MG Tab 6/15/2018 Active   labetalol (NORMODYNE) 200 MG Tab 6/15/2018 Active   lisinopril (PRINIVIL, ZESTRIL) 40 MG tablet 6/15/2018 Active   nicotine (NICODERM) 14 MG/24HR PATCH 24 HR  Active   terazosin (HYTRIN) 5 MG Cap 6/14/2018  Active                ALLERGIES  Allergies   Allergen Reactions   • Sulfa Drugs Hives     PCS=3423 rash swelling itching       PHYSICAL EXAM  VITAL SIGNS: BP (!) 214/119   Pulse 95   Temp 36.3 °C (97.3 °F)   Resp 20   Wt 63.5 kg (140 lb)   SpO2 90%   BMI 25.61 kg/m²    Constitutional: She appears chronically debilitated, and respiratory distress, audible wheezing  HENT: Normocephalic, Atraumatic, Bilateral external ears normal, Oropharynx moist, No oral exudates, Nose normal.   Eyes: PERRL, EOMI, Conjunctiva normal, No discharge.   Neck: Normal range of motion, No tenderness, Supple, No stridor. Veins distended  Lymphatic: No lymphadenopathy noted.   Cardiovascular: Normal heart rate, Normal rhythm, No murmurs, No rubs, No gallops.   Thorax & Lungs: Wheezes in all lung fields chest wall motion is labored, No chest tenderness.   Abdomen:  No tenderness, no guarding no rigidity and the abdomen is soft.  No masses, No pulsatile masses.  Skin: Warm, Dry, No erythema, No rash.   Back: No tenderness, No CVA tenderness.   Extremities: Intact distal pulses, No edema, became amputation on the right  Musculoskeletal: Good range of motion in all major joints. Except as above Neurologic: Alert & oriented x 3, Normal motor function, Normal sensory function, No focal deficits noted.   Psychiatric: Affect normal, Judgment normal, Mood normal.   EKG Interpretation    Interpreted by me    Rhythm: normal sinus   Rate: normal  Axis: normal  Ectopy: none  Conduction: normal  ST Segments: ST-T wave changes consistent with left ventricular hypertrophy Q Waves: none  Dosage criteria for left ventricular hypertrophy  Clinical Impression: I do not have an old EKG to compare to      RADIOLOGY/PROCEDURES  DX-CHEST-PORTABLE (1 VIEW)   Final Result      Cardiomegaly with pulmonary vascular congestion and interstitial edema.      Bibasilar opacities may represent atelectasis, consolidation or dependent edema.               COURSE & MEDICAL  DECISION MAKING  Pertinent Labs & Imaging studies reviewed. (See chart for details) white count 7.8. Hematocrit normal platelet count normal. No shift. Electrolytes, potassium elevated at 6.3, BUN and creatinine markedly elevated.          She comes in with shortness of breath all night, her last dialysis 5 days ago. Thinks he probably congestive heart failure although also is having wheezing, was given albuterol, I will talk with her nephrologist about arranging dialysis as soon as possible.. She has had aspirin for chest pain  Nitroglycerin paste for hypertension.      Spoken with her nephrologist Dr. Junior who is arranging dialysis. Hospitalist to admit  FINAL IMPRESSION  1.   1. Shortness of breath    2. Severe persistent asthma with acute exacerbation    3. Chronic kidney disease, unspecified CKD stage    4. Secondary hypertension    5. Hyperkalemia        2.   3.     Disposition  I have talked with hospitalist about admission, nephrologist is arranging dialysis  Electronically signed by: Ulices Wolf, 7/29/2018 8:41 AM

## 2018-07-30 VITALS
SYSTOLIC BLOOD PRESSURE: 218 MMHG | RESPIRATION RATE: 18 BRPM | HEIGHT: 62 IN | WEIGHT: 136.24 LBS | OXYGEN SATURATION: 92 % | HEART RATE: 87 BPM | TEMPERATURE: 98.2 F | BODY MASS INDEX: 25.07 KG/M2 | DIASTOLIC BLOOD PRESSURE: 107 MMHG

## 2018-07-30 PROBLEM — J96.01 ACUTE RESPIRATORY FAILURE WITH HYPOXIA (HCC): Status: ACTIVE | Noted: 2018-07-30

## 2018-07-30 LAB
ALBUMIN SERPL BCP-MCNC: 3.7 G/DL (ref 3.2–4.9)
ALBUMIN SERPL BCP-MCNC: 3.7 G/DL (ref 3.2–4.9)
ALBUMIN/GLOB SERPL: 1.9 G/DL
ALP SERPL-CCNC: 52 U/L (ref 30–99)
ALT SERPL-CCNC: 7 U/L (ref 2–50)
ANION GAP SERPL CALC-SCNC: 9 MMOL/L (ref 0–11.9)
AST SERPL-CCNC: 11 U/L (ref 12–45)
BILIRUB SERPL-MCNC: 0.7 MG/DL (ref 0.1–1.5)
BUN SERPL-MCNC: 42 MG/DL (ref 8–22)
BUN SERPL-MCNC: 43 MG/DL (ref 8–22)
CALCIUM SERPL-MCNC: 8.6 MG/DL (ref 8.5–10.5)
CALCIUM SERPL-MCNC: 8.7 MG/DL (ref 8.5–10.5)
CHLORIDE SERPL-SCNC: 100 MMOL/L (ref 96–112)
CHLORIDE SERPL-SCNC: 100 MMOL/L (ref 96–112)
CO2 SERPL-SCNC: 27 MMOL/L (ref 20–33)
CO2 SERPL-SCNC: 28 MMOL/L (ref 20–33)
CREAT SERPL-MCNC: 7.74 MG/DL (ref 0.5–1.4)
CREAT SERPL-MCNC: 7.77 MG/DL (ref 0.5–1.4)
ERYTHROCYTE [DISTWIDTH] IN BLOOD BY AUTOMATED COUNT: 66.2 FL (ref 35.9–50)
GLOBULIN SER CALC-MCNC: 2 G/DL (ref 1.9–3.5)
GLUCOSE SERPL-MCNC: 96 MG/DL (ref 65–99)
GLUCOSE SERPL-MCNC: 96 MG/DL (ref 65–99)
HCT VFR BLD AUTO: 36.9 % (ref 37–47)
HGB BLD-MCNC: 11.1 G/DL (ref 12–16)
MCH RBC QN AUTO: 30 PG (ref 27–33)
MCHC RBC AUTO-ENTMCNC: 30.1 G/DL (ref 33.6–35)
MCV RBC AUTO: 99.7 FL (ref 81.4–97.8)
PHOSPHATE SERPL-MCNC: 7.2 MG/DL (ref 2.5–4.5)
PLATELET # BLD AUTO: 146 K/UL (ref 164–446)
PMV BLD AUTO: 10.4 FL (ref 9–12.9)
POTASSIUM SERPL-SCNC: 5.2 MMOL/L (ref 3.6–5.5)
POTASSIUM SERPL-SCNC: 5.3 MMOL/L (ref 3.6–5.5)
PROT SERPL-MCNC: 5.7 G/DL (ref 6–8.2)
RBC # BLD AUTO: 3.7 M/UL (ref 4.2–5.4)
SODIUM SERPL-SCNC: 137 MMOL/L (ref 135–145)
SODIUM SERPL-SCNC: 138 MMOL/L (ref 135–145)
WBC # BLD AUTO: 6.6 K/UL (ref 4.8–10.8)

## 2018-07-30 PROCEDURE — 90935 HEMODIALYSIS ONE EVALUATION: CPT

## 2018-07-30 PROCEDURE — 700111 HCHG RX REV CODE 636 W/ 250 OVERRIDE (IP)

## 2018-07-30 PROCEDURE — 96374 THER/PROPH/DIAG INJ IV PUSH: CPT

## 2018-07-30 PROCEDURE — 36415 COLL VENOUS BLD VENIPUNCTURE: CPT

## 2018-07-30 PROCEDURE — 99226 PR SUBSEQUENT OBSERVATION CARE,LEVEL III: CPT | Performed by: INTERNAL MEDICINE

## 2018-07-30 PROCEDURE — 80053 COMPREHEN METABOLIC PANEL: CPT

## 2018-07-30 PROCEDURE — 80069 RENAL FUNCTION PANEL: CPT

## 2018-07-30 PROCEDURE — A9270 NON-COVERED ITEM OR SERVICE: HCPCS | Performed by: INTERNAL MEDICINE

## 2018-07-30 PROCEDURE — 700111 HCHG RX REV CODE 636 W/ 250 OVERRIDE (IP): Performed by: INTERNAL MEDICINE

## 2018-07-30 PROCEDURE — 96376 TX/PRO/DX INJ SAME DRUG ADON: CPT

## 2018-07-30 PROCEDURE — 85027 COMPLETE CBC AUTOMATED: CPT

## 2018-07-30 PROCEDURE — 700102 HCHG RX REV CODE 250 W/ 637 OVERRIDE(OP): Performed by: INTERNAL MEDICINE

## 2018-07-30 PROCEDURE — 700101 HCHG RX REV CODE 250: Performed by: INTERNAL MEDICINE

## 2018-07-30 PROCEDURE — G0378 HOSPITAL OBSERVATION PER HR: HCPCS

## 2018-07-30 RX ORDER — CALCIUM CARBONATE 500 MG/1
500 TABLET, CHEWABLE ORAL EVERY 8 HOURS PRN
Qty: 30 TAB | Refills: 0 | Status: SHIPPED | OUTPATIENT
Start: 2018-07-30 | End: 2018-09-24

## 2018-07-30 RX ORDER — AMLODIPINE BESYLATE 5 MG/1
10 TABLET ORAL DAILY
Qty: 30 TAB | Refills: 2 | Status: ON HOLD | OUTPATIENT
Start: 2018-07-30 | End: 2019-04-14

## 2018-07-30 RX ORDER — HEPARIN SODIUM 1000 [USP'U]/ML
1500 INJECTION, SOLUTION INTRAVENOUS; SUBCUTANEOUS
Status: DISCONTINUED | OUTPATIENT
Start: 2018-07-30 | End: 2018-07-30

## 2018-07-30 RX ORDER — AMLODIPINE BESYLATE 10 MG/1
10 TABLET ORAL DAILY
Status: DISCONTINUED | OUTPATIENT
Start: 2018-07-31 | End: 2018-07-30 | Stop reason: HOSPADM

## 2018-07-30 RX ORDER — HEPARIN SODIUM 1000 [USP'U]/ML
INJECTION, SOLUTION INTRAVENOUS; SUBCUTANEOUS
Status: COMPLETED
Start: 2018-07-30 | End: 2018-07-30

## 2018-07-30 RX ORDER — FAMOTIDINE 20 MG/1
20 TABLET, FILM COATED ORAL DAILY
Qty: 60 TAB | Refills: 0 | Status: SHIPPED | OUTPATIENT
Start: 2018-07-31 | End: 2018-09-24

## 2018-07-30 RX ORDER — HYDRALAZINE HYDROCHLORIDE 20 MG/ML
20 INJECTION INTRAMUSCULAR; INTRAVENOUS EVERY 4 HOURS PRN
Status: DISCONTINUED | OUTPATIENT
Start: 2018-07-30 | End: 2018-07-30 | Stop reason: HOSPADM

## 2018-07-30 RX ORDER — HEPARIN SODIUM 1000 [USP'U]/ML
1500 INJECTION, SOLUTION INTRAVENOUS; SUBCUTANEOUS
Status: DISCONTINUED | OUTPATIENT
Start: 2018-07-30 | End: 2018-07-30 | Stop reason: HOSPADM

## 2018-07-30 RX ADMIN — AMLODIPINE BESYLATE 5 MG: 5 TABLET ORAL at 04:39

## 2018-07-30 RX ADMIN — LABETALOL HYDROCHLORIDE 200 MG: 100 TABLET, FILM COATED ORAL at 17:28

## 2018-07-30 RX ADMIN — HEPARIN SODIUM 1500 UNITS: 1000 INJECTION, SOLUTION INTRAVENOUS; SUBCUTANEOUS at 13:44

## 2018-07-30 RX ADMIN — HYDRALAZINE HYDROCHLORIDE 20 MG: 20 INJECTION INTRAMUSCULAR; INTRAVENOUS at 17:28

## 2018-07-30 RX ADMIN — HYDRALAZINE HYDROCHLORIDE 20 MG: 20 INJECTION INTRAMUSCULAR; INTRAVENOUS at 09:44

## 2018-07-30 RX ADMIN — LISINOPRIL 40 MG: 20 TABLET ORAL at 04:39

## 2018-07-30 RX ADMIN — FAMOTIDINE 20 MG: 20 TABLET ORAL at 04:38

## 2018-07-30 RX ADMIN — LABETALOL HYDROCHLORIDE 200 MG: 100 TABLET, FILM COATED ORAL at 04:39

## 2018-07-30 RX ADMIN — LABETALOL HYDROCHLORIDE 10 MG: 5 INJECTION, SOLUTION INTRAVENOUS at 00:47

## 2018-07-30 ASSESSMENT — PAIN SCALES - GENERAL
PAINLEVEL_OUTOF10: 0

## 2018-07-30 ASSESSMENT — ENCOUNTER SYMPTOMS
MUSCULOSKELETAL NEGATIVE: 1
NAUSEA: 0
VOMITING: 0
NEUROLOGICAL NEGATIVE: 1
EYES NEGATIVE: 1
WEAKNESS: 1
CONSTITUTIONAL NEGATIVE: 1
SHORTNESS OF BREATH: 1
CARDIOVASCULAR NEGATIVE: 1
GASTROINTESTINAL NEGATIVE: 1

## 2018-07-30 NOTE — PROGRESS NOTES
Hospital Medicine Interval Note  Date of Service:  7/30/2018    Chief Complaint  53 y.o.-year-old female admitted 7/29/2018 with acute respiratory failure with hypoxia due to pulmonary edema from missed HD resulting in fluid overload.  Also found to be hyperkalemic and hypertensive urgency.    Interval Problem Update  Pt has no acute complaints.  Denies feeling SOB on O2 supplementation.  Awaiting HD treatment    Consultants/Specialty  Nephrology    Disposition  Home when medically stable     Review of Systems   Constitutional: Negative.    HENT: Negative.    Eyes: Negative.    Respiratory: Positive for shortness of breath.    Cardiovascular: Negative.    Gastrointestinal: Negative.    Musculoskeletal: Negative.    Skin: Negative.    Neurological: Negative.       Physical Exam Laboratory/Imaging   Vitals:    07/29/18 1810 07/29/18 2035 07/30/18 0008 07/30/18 0404   BP: (!) 196/108 (!) 194/109 (!) 192/108 (!) 185/102   Pulse: 77 93 76 72   Resp:  16 16 16   Temp:  36.9 °C (98.5 °F) 37.4 °C (99.3 °F) 37.3 °C (99.2 °F)   SpO2:  97% 98% 98%   Weight:       Height:         Physical Exam   Constitutional: She is oriented to person, place, and time. She appears well-developed and well-nourished. No distress.   HENT:   Head: Normocephalic and atraumatic.   Mouth/Throat: No oropharyngeal exudate.   Eyes: Pupils are equal, round, and reactive to light. Conjunctivae and EOM are normal. No scleral icterus.   Neck: Normal range of motion. Neck supple.   Cardiovascular: Exam reveals no gallop and no friction rub.    No murmur heard.  Pulmonary/Chest: Effort normal and breath sounds normal. No respiratory distress.   Abdominal: Soft. Bowel sounds are normal. She exhibits no distension.   Musculoskeletal: She exhibits no edema, tenderness or deformity.   Neurological: She is alert and oriented to person, place, and time. She has normal reflexes.   Skin: Skin is warm and dry.   Psychiatric: She has a normal mood and affect. Her  behavior is normal.   Nursing note and vitals reviewed.   Lab Results   Component Value Date/Time    WBC 6.6 07/30/2018 03:09 AM    HEMOGLOBIN 11.1 (L) 07/30/2018 03:09 AM    HEMATOCRIT 36.9 (L) 07/30/2018 03:09 AM    PLATELETCT 146 (L) 07/30/2018 03:09 AM     Lab Results   Component Value Date/Time    SODIUM 138 07/30/2018 03:09 AM    SODIUM 137 07/30/2018 03:09 AM    POTASSIUM 5.3 07/30/2018 03:09 AM    POTASSIUM 5.2 07/30/2018 03:09 AM    CHLORIDE 100 07/30/2018 03:09 AM    CHLORIDE 100 07/30/2018 03:09 AM    CO2 27 07/30/2018 03:09 AM    CO2 28 07/30/2018 03:09 AM    GLUCOSE 96 07/30/2018 03:09 AM    GLUCOSE 96 07/30/2018 03:09 AM    BUN 43 (H) 07/30/2018 03:09 AM    BUN 42 (H) 07/30/2018 03:09 AM    CREATININE 7.74 (HH) 07/30/2018 03:09 AM    CREATININE 7.77 (HH) 07/30/2018 03:09 AM    CREATININE 2.2 (H) 05/06/2009 03:10 AM      Assessment/Plan    * End-stage renal disease (ESRD)  - with missed HD, and hyperKalemia, and HAGMA.- (present on admission)   Assessment & Plan    - on HD MWF  - Nephro recs and mgmt appreciated        Acute respiratory failure with hypoxia (HCC)- (present on admission)   Assessment & Plan    - due to pulmonary edema from renal etiology (missed HD)  - cont O2 supplementation and wean as tolerated  - Nephro mgmt of fluid status with HD appreciated        Hypertensive urgency- (present on admission)   Assessment & Plan    SBP>180  Continue outpatient meds  - titrating Amlodipine  - Monitor BP  - prn anti-HTNs on board with fluid mgmt with HD        Hyperkalemia- (present on admission)   Assessment & Plan    - improved with HD  - cont monitoring        Tobacco use- (present on admission)   Assessment & Plan    - colleague spent 4 minutes on smoking cessation education on admission           Quality-Core Measures

## 2018-07-30 NOTE — DISCHARGE INSTRUCTIONS
Discharge Instructions    Discharged to home by car with relative. Discharged via wheelchair, hospital escort: Yes.  Special equipment needed: Not Applicable    Be sure to schedule a follow-up appointment with your primary care doctor or any specialists as instructed.     Discharge Plan:   Diet Plan: Discussed  Activity Level: Discussed  Smoking Cessation Offered: Patient Refused  Confirmed Follow up Appointment: Appointment Scheduled  Medication Reconciliation Updated: Yes  Influenza Vaccine Indication: Not indicated: Previously immunized this influenza season and > 8 years of age    I understand that a diet low in cholesterol, fat, and sodium is recommended for good health. Unless I have been given specific instructions below for another diet, I accept this instruction as my diet prescription.   Other diet: Renal, low potassium    Special Instructions: None    · Is patient discharged on Warfarin / Coumadin?   No     Depression / Suicide Risk    [facility], it is important to learn how to keep safe from harming yourself.    Recognize the warning signs:  · Abrupt changes in personality, positive or negative- including increase in energy   · Giving away possessions  · Change in eating patterns- significant weight changes-  positive or negative  · Change in sleeping patterns- unable to sleep or sleeping all the time   · Unwillingness or inability to communicate  · Depression  · Unusual sadness, discouragement and loneliness  · Talk of wanting to die  · Neglect of personal appearance   · Rebelliousness- reckless behavior  · Withdrawal from people/activities they love  · Confusion- inability to concentrate     If you or a loved one observes any of these behaviors or has concerns about self-harm, here's what you can do:  · Talk about it- your feelings and reasons for harming yourself  · Remove any means that you might use to hurt yourself (examples: pills, rope, extension  cords, firearm)  · Get professional help from the community (Mental Health, Substance Abuse, psychological counseling)  · Do not be alone:Call your Safe Contact- someone whom you trust who will be there for you.  · Call your local CRISIS HOTLINE 954-5030 or 680-134-8516  · Call your local Children's Mobile Crisis Response Team [place] (665) 477-6583 or www.Alibaba  · Call the toll free National Suicide Prevention Hotlines   · National Suicide Prevention Lifeline 985-435-IKXQ (6579)  · [place] [facility] Hope Line Network 800-SUICIDE (333-2902)      Dialysis Diet  Dialysis is a treatment that cleans your blood. It is used when the kidneys are damaged. When you need dialysis, you should watch your diet. This is because some nutrients can build up in your blood between treatments and make you sick. These nutrients are:  · Potassium.  · Phosphorus.  · Sodium.  Your doctor or dietitian will:  · Tell you how much of these you can have.  · Tell you if you need to look out for other nutrients too.  · Help you plan meals.  · Tell you how much to drink each day.  WHAT DO I NEED TO KNOW ABOUT THIS DIET?  · Limit potassium. Potassium is in milk, fruits, and vegetables.  · Limit phosphorus. Phosphorus is in milk, cheese, beans, nuts, and carbonated beverages.  · Limit salt (sodium). Foods that have a lot sodium include processed and cured meats, ready-made frozen meals, canned vegetables, and salty snack foods.  · Do not use salt substitutes.  · Try not to eat whole-grain foods and foods that have a lot of fiber.  · Follow your doctor's instructions about how much to drink. You may be told to:  ¨ Write down what you drink.  ¨ Write down foods you eat that are made mostly from water, such as gelatin and soups.  ¨ Drink from small cups.  · Ask your doctor if you should take a medicine that binds phosphorus.  · Take vitamin and mineral supplements only as told by your doctor.  · Eat meat, poultry, fish, and eggs. Limit nuts  and beans.  · Before you cook potatoes, cut them into small pieces. Then boil them in unsalted water.  · Drain all fluid from cooked vegetables and canned fruits before you eat them.  WHAT FOODS CAN I EAT?  Grains  White bread. White rice. Cooked cereal. Unsalted popcorn. Tortillas. Pasta.  Vegetables  Fresh or frozen broccoli, carrots, and green beans. Cabbage. Cauliflower. Celery. Cucumbers. Eggplant. Radishes. Zucchini.  Fruits  Apples. Fresh or frozen berries. Fresh or canned pears, peaches, and pineapple. Grapes. Plums.  Meats and Other Protein Sources  Fresh or frozen beef, pork, chicken, and fish. Eggs. Low-sodium canned tuna or salmon.  Dairy  Cream cheese. Heavy cream. Ricotta cheese.  Beverages  Apple cider. Cranberry juice. Grape juice. Lemonade. Black coffee.  Condiments  Herbs. Spices. Jam and jelly. Honey.  Sweets and Desserts  Sherbet. Cakes. Cookies.  Fats and Oils  Olive oil, canola oil, and safflower oil.  Other  Non-dairy creamer. Non-dairy whipped topping. Homemade broth without salt.  The items listed above may not be a complete list of recommended foods or beverages. Contact your dietitian for more option  WHAT FOODS ARE NOT RECOMMENDED?  Grains  Whole-grain bread. Whole-grain pasta. High-fiber cereal.  Vegetables  Potatoes. Beets. Tomatoes. Winter squash and pumpkin. Asparagus. Spinach. Parsnips.  Fruits  Star fruit. Bananas. Oranges. Kiwi. Nectarines. Prunes. Melon. Dried fruit. Avocado.  Meats and Other Protein Sources  Canned, smoked, and cured meats. Packaged luncheon meat. Sardines. Nuts and seeds. Peanut butter. Beans and legumes.  Dairy  Milk. Buttermilk. Yogurt. Cheese and cottage cheese. Processed cheese spreads.   Beverages  Orange juice. Prune juice. Carbonated soft drinks.  Condiments  Salt. Salt substitutes. Soy sauce.   Sweets and Desserts  Ice cream. Chocolate. Candied nuts.  Fats and Oils  Butter. Margarine.  Other  Ready-made frozen meals. Canned soups.   The items listed  above may not be a complete list of foods and beverages to avoid. Contact your dietitian for more information.     This information is not intended to replace advice given to you by your health care provider. Make sure you discuss any questions you have with your health care provider.     Document Released: 06/18/2013 Document Revised: 01/08/2016 Document Reviewed: 07/21/2015  [employer] Interactive Patient Education ©2016 [employer]    Food Basics for Chronic Kidney Disease  When your kidneys are not working well, they cannot remove waste and excess substances from your blood as effectively as they did before. This can lead to a buildup and imbalance of these substances, which can affect how your body functions. This buildup can also make your kidneys work harder, causing even more damage.  You may need to eat less of certain foods that can lead to the buildup of these substances in your body. By making the changes to your diet that are recommended by your dietitian or health care provider, you could possibly help prevent further kidney damage and delay or prevent the need for dialysis.  The following information can help give you a basic understanding of these substances and how they affect your bodily functions. The information also gives examples of foods that contain the highest amounts of these substances.  WHAT DO I NEED TO KNOW ABOUT SUBSTANCES IN MY FOOD THAT I MAY NEED TO ADJUST?  Food adjustments will be different for each person with chronic kidney disease. It is important that you see a dietitian who can help you determine the specific adjustments that you will need to make for each of the following substances:  Potassium   Potassium affects how steadily your heart beats. If too much potassium builds up in your blood, it can cause an irregular heartbeat or even a heart attack.  Examples of foods rich in potassium include:  · Milk.  · Fruits.  · Vegetables.  Phosphorus   Phosphorus is a mineral found in  your bones. A balance between calcium and phosphorous is needed to build and maintain healthy bones. Too much phosphorus pulls calcium from your bones. This can make your bones weak and more likely to break. Too much phosphorus can also make your skin itch.  Examples of foods rich in phosphorus include:  · Milk and cheese.  · Dried beans.  · Peas.  · Betito.  · Nuts and peanut butter.  Animal Protein   Animal protein helps you make and keep muscle. It also helps in the repair of your body's cells and tissues. One of the natural breakdown products of protein is a waste product called urea. When your kidneys are not working properly, they cannot remove wastes such as urea like they did before you developed chronic kidney disease. You will likely need to limit the amount of protein you eat to help prevent a buildup of urea in your blood.  Examples of animal protein include:  · Meat (all types).  · Fish and seafood.  · Poultry.  · Eggs.  Sodium   Sodium, which is found in salt, helps maintain a healthy balance of fluids in your body. Too much sodium can increase your blood pressure level and have a negative affect on the function of your heart and lungs. Too much sodium also can cause your body to retain too much fluid, making your kidneys work harder. Examples of foods with high levels of sodium include:  · Salt seasonings.  · Soy sauce.  · Cured and processed meats.  · Salted crackers and snack foods.  · Fast food.  · Canned soups and most canned foods.  Glucose   Glucose provides energy for your body. If you have diabetes mellitus that is not properly controlled, you have too much glucose in your blood. Too much glucose in your blood can worsen the function of your kidneys by damaging small blood vessels. This prevents enough blood flow to your kidneys to give them what they need to work. If you have diabetes mellitus and chronic kidney disease, it is important to maintain your blood glucose at a level recommended by  your health care provider.  SHOULD I TAKE A VITAMIN AND MINERAL SUPPLEMENT?  Because you may need to avoid eating certain foods, you may not get all of the vitamins and minerals that would normally come from those foods. Your health care provider or dietitian may recommend that you take a supplement to ensure that you get all of the vitamins and minerals that your body needs.   This information is not intended to replace advice given to you by your health care provider. Make sure you discuss any questions you have with your health care provider.  Document Released: 03/09/2004 Document Revised: 01/08/2016 Document Reviewed: 11/14/2014  [employer] Interactive Patient Education © 2017 [employer]

## 2018-07-30 NOTE — ASSESSMENT & PLAN NOTE
- due to pulmonary edema from renal etiology (missed HD)  - cont O2 supplementation and wean as tolerated  - Nephro mgmt of fluid status with HD appreciated

## 2018-07-30 NOTE — DISCHARGE PLANNING
Outpatient Dialysis Note    Confirmed patient is active at:    [facility]  [address] [address]      Schedule: Monday, Wednesday, Friday  Time: 3:25 pm    Spoke with Abby at facility who confirmed.    Forwarded records for review.    Dialysis Coordinator, [facility]  Alexia Ayoub [phone]

## 2018-07-30 NOTE — PROGRESS NOTES
Assumed care of pt.  Bedside report received and whiteboard updated. Discussed plan of care for the day and answered questions.  Chart and MAR reviewed.  Call light and personal belongings are within reach.  Bed in low position and locked. Pt has no needs at this time.

## 2018-07-30 NOTE — PROGRESS NOTES
Pt recently given PRN labetalol at 1520, pt has a scheduled dose of 200 mg PO due, consulted pharmacy and charge, will recheck BP at 1800 and give medication of BP is still elevated.    Note Text (......Xxx Chief Complaint.): This diagnosis correlates with the Detail Level: Zone Other (Free Text): Alternating with Tazorac and Efudex

## 2018-07-30 NOTE — PROGRESS NOTES
"[facility] -  PROGRESS NOTE               Author: Heraclio Junior D.O. Date & Time created: 7/30/2018  11:38 AM     Interval History:  The patient is a very pleasant 53-year-old female known to us from outpatient dialysis care.  She receives her maintenance hemodialysis on a Monday, Wednesday, and Friday schedule through a left upper arm AV fistula.  Her last treatment was Wednesday, described as a complete treatment without complication.  It is unclear whether or not she is able to achieve her dry weight at the end of dialysis.  She apparently was unable to go to her treatment on Friday.  She presents herself to the emergency  department at [facility] with complaints of dyspnea.  Her room air oxygen saturation was 79% per EMS.  She was given treatment and placed on oxygen here in the emergency department with some improvement.  Evaluation found her to have an elevation of her potassium to 6.3 with a BUN of 66 and a creatinine of 10.7.  BNP was elevated to 2154, troponin 0.02.    DAILY NEPHROLOGY SUMMARY:   7/29 - Consult done.  Dialysis provided.   7/30 - Feeling \"much better\".  Off oxygen.  No dyspnea.  Getting stronger.     Review of Systems:  Review of Systems   HENT: Negative.    Eyes: Negative.    Respiratory: Positive for shortness of breath.    Cardiovascular: Positive for leg swelling.   Gastrointestinal: Negative.  Negative for nausea and vomiting.   Genitourinary: Negative.    Musculoskeletal: Negative.    Skin: Negative.    Neurological: Positive for weakness.       Physical Exam:  Physical Exam   Constitutional: She is oriented to person, place, and time. She appears well-developed and well-nourished.   HENT:   Head: Normocephalic and atraumatic.   Eyes: Pupils are equal, round, and reactive to light. Conjunctivae and EOM are normal. Right eye exhibits no discharge.   Neck: Normal range of motion. Neck supple. No JVD present.   Cardiovascular: Normal rate, regular " rhythm and normal heart sounds.  Exam reveals no friction rub.    Pulmonary/Chest: Effort normal. She has no wheezes. She has rales.   Abdominal: Soft. Bowel sounds are normal. There is no tenderness.   Musculoskeletal: Normal range of motion. She exhibits edema. She exhibits no tenderness.   Neurological: She is alert and oriented to person, place, and time.   Skin: Skin is warm and dry. No erythema.       Labs:        Invalid input(s): [id number]  Recent Labs      18   08   TROPONINI  0.02   BNPBTYPENAT  2154*     Recent Labs      18   0829  18   0309   SODIUM  141  137  138   POTASSIUM  6.3*  5.2  5.3   CHLORIDE  98  100  100   CO2  27  28  27   BUN  66*  42*  43*   CREATININE  10.71*  7.77*  7.74*   PHOSPHORUS   --   7.2*   CALCIUM  9.2  8.6  8.7     Recent Labs      18   0818   0309   ALTSGPT  10  7   ASTSGOT  14  11*   ALKPHOSPHAT  61  52   TBILIRUBIN  0.7  0.7   LIPASE  35   --    GLUCOSE  98  96  96     Recent Labs      18   0829  18   0309   RBC  4.35  3.70*   HEMOGLOBIN  12.8  11.1*   HEMATOCRIT  42.3  36.9*   PLATELETCT  171  146*   PROTHROMBTM  13.3   --    APTT  28.3   --    INR  1.04   --      Recent Labs      18   0309   WBC  7.8  6.6   NEUTSPOLYS  72.50*   --    LYMPHOCYTES  19.40*   --    MONOCYTES  4.00   --    EOSINOPHILS  3.00   --    BASOPHILS  0.80   --    ASTSGOT  14  11*   ALTSGPT  10  7   ALKPHOSPHAT  61  52   TBILIRUBIN  0.7  0.7           Hemodynamics:  Temp (24hrs), Av.7 °C (98 °F), Min:36.1 °C (97 °F), Max:37.4 °C (99.3 °F)  Temperature: 36.5 °C (97.7 °F)  Pulse  Av.3  Min: 71  Max: 95   Blood Pressure: (!) 184/94 (Nurse notified.)     Respiratory:    Respiration: 18, Pulse Oximetry: 95 %     Work Of Breathing / Effort: Mild  RUL Breath Sounds: Clear, RML Breath Sounds: Diminished, RLL Breath Sounds: Diminished, PHILIP Breath Sounds: Clear, LLL Breath Sounds: Diminished  Fluids:    Intake/Output  Summary (Last 24 hours) at 07/30/18 1138  Last data filed at 07/29/18 1412   Gross per 24 hour   Intake              500 ml   Output             2500 ml   Net            -2000 ml     Weight: 61.8 kg (136 lb 3.9 oz)  GI/Nutrition:  Orders Placed This Encounter   Procedures   • Diet Order Regular     Standing Status:   Standing     Number of Occurrences:   1     Order Specific Question:   Diet:     Answer:   Regular [1]     PAST FAMILY HISTORY: Reviewed and Unchanged  SOCIAL HISTORY: Reviewed and Unchanged  CURRENT MEDICATIONS: Reviewed  LABORATORY RESULTS: Reviewed  IMAGING STUDIES: Reviewed      Quality-Core Measures       ASSESSMENTS:   1.  End-stage renal disease,     Dialysis today (MON)    Maintenance hemodialysis qMWF and PRN   2.  Dyspnea     Secondary to fluid overload in the setting of missed dialysis treatment.    Better this morning   3.  Hyperkalemia, corrected   4.  Anemia secondary to chronic kidney disease.   5.  Renal osteodystrophy.   6.  Hypertension, poor control.    7.  History of supraventricular tachycardia, status post ablation.     SUGGESTIONS:   Dialysis today (MON)   Maintenance dialysis Monday, Wednesday, Friday this week.   Fluid restrictions.   Continue usual medications.   Volume off dialysis in an effort to control her blood pressure better.   No dietary protein restrictions.   Follow labs with further recommendations to follow.    Okay to discharge home today (MON) after dialysis and resume usual outpatient schedule    Thank you,

## 2018-07-30 NOTE — PROGRESS NOTES
Paged MD Addison pt potassium was 6.3 on admission, wanted to ask if the MD wanted a redraw before 0300 since pt had HD today.

## 2018-07-30 NOTE — PROGRESS NOTES
"Received report and care assumed. Patient A&Ox 4. Pt. Reports \"bad\" headache. Work of breathing even and mildly labored on 2L. Discussed POC. Call light within reach and pt. instructed to call when in need of assistance.  Denies any other needs at this time.     "

## 2018-07-31 NOTE — PROGRESS NOTES
Hemodialysis ordered by Dr. Junior. Treatment started at 1344 and ended at 1644. Pt stable, hypertensive post tx. Primary RN notified and will give med when return to the floor, no c/o post tx. See flow sheets for details. Net UF 2.0 L. Report to BOGDAN Morales RN. LT UA AVF dressing clean, dry, intact.

## 2018-07-31 NOTE — PROGRESS NOTES
Pt discharged per orders. Pt verbalizes that they have all personal belongings and prescriptions. IV and tele monitor removed. Pt verbalizes and signs understanding of discharge instructions.

## 2018-07-31 NOTE — PROGRESS NOTES
Pt discharged. Discharge instructions given. Questions answered. IV Dc'd, cardiac monitor taken off. Pt ambulated off unit. Steady gate, A&O x4.

## 2018-08-07 ENCOUNTER — APPOINTMENT (OUTPATIENT)
Dept: INTERNAL MEDICINE | Facility: MEDICAL CENTER | Age: 53
End: 2018-08-07
Payer: MEDICAID

## 2018-09-18 DIAGNOSIS — I10 ESSENTIAL HYPERTENSION: ICD-10-CM

## 2018-09-18 RX ORDER — LISINOPRIL 40 MG/1
40 TABLET ORAL DAILY
Qty: 90 TAB | Refills: 0 | Status: ON HOLD | OUTPATIENT
Start: 2018-09-18 | End: 2018-09-25

## 2018-09-18 NOTE — TELEPHONE ENCOUNTER
Last seen: 06/13/18 by Dr. Westfall  Next appt: None     Was the patient seen in the last year in this department? Yes   Does patient have an active prescription for medications requested? No   Received Request Via: Pharmacy

## 2018-09-24 ENCOUNTER — HOSPITAL ENCOUNTER (INPATIENT)
Facility: MEDICAL CENTER | Age: 53
LOS: 1 days | DRG: 189 | End: 2018-09-25
Attending: EMERGENCY MEDICINE | Admitting: INTERNAL MEDICINE
Payer: MEDICAID

## 2018-09-24 ENCOUNTER — APPOINTMENT (OUTPATIENT)
Dept: RADIOLOGY | Facility: MEDICAL CENTER | Age: 53
DRG: 189 | End: 2018-09-24
Attending: EMERGENCY MEDICINE
Payer: MEDICAID

## 2018-09-24 DIAGNOSIS — Z99.2 END STAGE RENAL DISEASE ON DIALYSIS (HCC): ICD-10-CM

## 2018-09-24 DIAGNOSIS — Z72.0 TOBACCO USE: ICD-10-CM

## 2018-09-24 DIAGNOSIS — N18.6 END STAGE RENAL DISEASE ON DIALYSIS (HCC): ICD-10-CM

## 2018-09-24 DIAGNOSIS — N18.6 ESRD ON HEMODIALYSIS (HCC): ICD-10-CM

## 2018-09-24 DIAGNOSIS — R09.02 HYPOXIA: ICD-10-CM

## 2018-09-24 DIAGNOSIS — I50.9 CONGESTIVE HEART FAILURE, UNSPECIFIED HF CHRONICITY, UNSPECIFIED HEART FAILURE TYPE (HCC): ICD-10-CM

## 2018-09-24 DIAGNOSIS — Z99.2 ESRD ON HEMODIALYSIS (HCC): ICD-10-CM

## 2018-09-24 LAB
ALBUMIN SERPL BCP-MCNC: 3.1 G/DL (ref 3.2–4.9)
ALBUMIN/GLOB SERPL: 1.2 G/DL
ALP SERPL-CCNC: 46 U/L (ref 30–99)
ALT SERPL-CCNC: 7 U/L (ref 2–50)
ANION GAP SERPL CALC-SCNC: 10 MMOL/L (ref 0–11.9)
AST SERPL-CCNC: 8 U/L (ref 12–45)
BASOPHILS # BLD AUTO: 0.3 % (ref 0–1.8)
BASOPHILS # BLD: 0.02 K/UL (ref 0–0.12)
BILIRUB SERPL-MCNC: 0.5 MG/DL (ref 0.1–1.5)
BNP SERPL-MCNC: 2356 PG/ML (ref 0–100)
BUN SERPL-MCNC: 57 MG/DL (ref 8–22)
CALCIUM SERPL-MCNC: 9.3 MG/DL (ref 8.5–10.5)
CHLORIDE SERPL-SCNC: 104 MMOL/L (ref 96–112)
CO2 SERPL-SCNC: 24 MMOL/L (ref 20–33)
CREAT SERPL-MCNC: 7.49 MG/DL (ref 0.5–1.4)
EKG IMPRESSION: NORMAL
EOSINOPHIL # BLD AUTO: 0.12 K/UL (ref 0–0.51)
EOSINOPHIL NFR BLD: 2 % (ref 0–6.9)
ERYTHROCYTE [DISTWIDTH] IN BLOOD BY AUTOMATED COUNT: 45.4 FL (ref 35.9–50)
GLOBULIN SER CALC-MCNC: 2.6 G/DL (ref 1.9–3.5)
GLUCOSE SERPL-MCNC: 80 MG/DL (ref 65–99)
HCT VFR BLD AUTO: 26.5 % (ref 37–47)
HGB BLD-MCNC: 8.9 G/DL (ref 12–16)
IMM GRANULOCYTES # BLD AUTO: 0.02 K/UL (ref 0–0.11)
IMM GRANULOCYTES NFR BLD AUTO: 0.3 % (ref 0–0.9)
LYMPHOCYTES # BLD AUTO: 1.2 K/UL (ref 1–4.8)
LYMPHOCYTES NFR BLD: 19.6 % (ref 22–41)
MCH RBC QN AUTO: 29.5 PG (ref 27–33)
MCHC RBC AUTO-ENTMCNC: 33.6 G/DL (ref 33.6–35)
MCV RBC AUTO: 87.7 FL (ref 81.4–97.8)
MONOCYTES # BLD AUTO: 0.31 K/UL (ref 0–0.85)
MONOCYTES NFR BLD AUTO: 5.1 % (ref 0–13.4)
NEUTROPHILS # BLD AUTO: 4.46 K/UL (ref 2–7.15)
NEUTROPHILS NFR BLD: 72.7 % (ref 44–72)
NRBC # BLD AUTO: 0 K/UL
NRBC BLD-RTO: 0 /100 WBC
PLATELET # BLD AUTO: 130 K/UL (ref 164–446)
PMV BLD AUTO: 10.9 FL (ref 9–12.9)
POTASSIUM SERPL-SCNC: 5.5 MMOL/L (ref 3.6–5.5)
PROT SERPL-MCNC: 5.7 G/DL (ref 6–8.2)
RBC # BLD AUTO: 3.02 M/UL (ref 4.2–5.4)
SODIUM SERPL-SCNC: 138 MMOL/L (ref 135–145)
TROPONIN I SERPL-MCNC: 0.02 NG/ML (ref 0–0.04)
WBC # BLD AUTO: 6.1 K/UL (ref 4.8–10.8)

## 2018-09-24 PROCEDURE — 84484 ASSAY OF TROPONIN QUANT: CPT

## 2018-09-24 PROCEDURE — 85025 COMPLETE CBC W/AUTO DIFF WBC: CPT

## 2018-09-24 PROCEDURE — 700101 HCHG RX REV CODE 250: Performed by: EMERGENCY MEDICINE

## 2018-09-24 PROCEDURE — 700111 HCHG RX REV CODE 636 W/ 250 OVERRIDE (IP): Performed by: STUDENT IN AN ORGANIZED HEALTH CARE EDUCATION/TRAINING PROGRAM

## 2018-09-24 PROCEDURE — 96375 TX/PRO/DX INJ NEW DRUG ADDON: CPT

## 2018-09-24 PROCEDURE — 90935 HEMODIALYSIS ONE EVALUATION: CPT

## 2018-09-24 PROCEDURE — A9270 NON-COVERED ITEM OR SERVICE: HCPCS | Performed by: INTERNAL MEDICINE

## 2018-09-24 PROCEDURE — 700111 HCHG RX REV CODE 636 W/ 250 OVERRIDE (IP): Performed by: INTERNAL MEDICINE

## 2018-09-24 PROCEDURE — 99291 CRITICAL CARE FIRST HOUR: CPT

## 2018-09-24 PROCEDURE — 83880 ASSAY OF NATRIURETIC PEPTIDE: CPT

## 2018-09-24 PROCEDURE — 71045 X-RAY EXAM CHEST 1 VIEW: CPT

## 2018-09-24 PROCEDURE — 96374 THER/PROPH/DIAG INJ IV PUSH: CPT

## 2018-09-24 PROCEDURE — 36415 COLL VENOUS BLD VENIPUNCTURE: CPT

## 2018-09-24 PROCEDURE — A9270 NON-COVERED ITEM OR SERVICE: HCPCS | Performed by: STUDENT IN AN ORGANIZED HEALTH CARE EDUCATION/TRAINING PROGRAM

## 2018-09-24 PROCEDURE — 700102 HCHG RX REV CODE 250 W/ 637 OVERRIDE(OP): Performed by: INTERNAL MEDICINE

## 2018-09-24 PROCEDURE — 700102 HCHG RX REV CODE 250 W/ 637 OVERRIDE(OP): Performed by: STUDENT IN AN ORGANIZED HEALTH CARE EDUCATION/TRAINING PROGRAM

## 2018-09-24 PROCEDURE — 80053 COMPREHEN METABOLIC PANEL: CPT

## 2018-09-24 PROCEDURE — 93005 ELECTROCARDIOGRAM TRACING: CPT

## 2018-09-24 PROCEDURE — 99291 CRITICAL CARE FIRST HOUR: CPT | Performed by: INTERNAL MEDICINE

## 2018-09-24 PROCEDURE — 5A1D70Z PERFORMANCE OF URINARY FILTRATION, INTERMITTENT, LESS THAN 6 HOURS PER DAY: ICD-10-PCS | Performed by: INTERNAL MEDICINE

## 2018-09-24 PROCEDURE — 93005 ELECTROCARDIOGRAM TRACING: CPT | Performed by: EMERGENCY MEDICINE

## 2018-09-24 PROCEDURE — 700111 HCHG RX REV CODE 636 W/ 250 OVERRIDE (IP): Performed by: EMERGENCY MEDICINE

## 2018-09-24 PROCEDURE — 94002 VENT MGMT INPAT INIT DAY: CPT

## 2018-09-24 PROCEDURE — 770022 HCHG ROOM/CARE - ICU (200)

## 2018-09-24 RX ORDER — LOSARTAN POTASSIUM 50 MG/1
50 TABLET ORAL
Status: DISCONTINUED | OUTPATIENT
Start: 2018-09-24 | End: 2018-09-25

## 2018-09-24 RX ORDER — ONDANSETRON 2 MG/ML
4 INJECTION INTRAMUSCULAR; INTRAVENOUS ONCE
Status: DISCONTINUED | OUTPATIENT
Start: 2018-09-24 | End: 2018-09-24

## 2018-09-24 RX ORDER — BISACODYL 10 MG
10 SUPPOSITORY, RECTAL RECTAL
Status: DISCONTINUED | OUTPATIENT
Start: 2018-09-24 | End: 2018-09-25 | Stop reason: HOSPADM

## 2018-09-24 RX ORDER — AMOXICILLIN 250 MG
2 CAPSULE ORAL 2 TIMES DAILY
Status: DISCONTINUED | OUTPATIENT
Start: 2018-09-24 | End: 2018-09-25 | Stop reason: HOSPADM

## 2018-09-24 RX ORDER — HEPARIN SODIUM 5000 [USP'U]/ML
5000 INJECTION, SOLUTION INTRAVENOUS; SUBCUTANEOUS EVERY 8 HOURS
Status: DISCONTINUED | OUTPATIENT
Start: 2018-09-24 | End: 2018-09-25 | Stop reason: HOSPADM

## 2018-09-24 RX ORDER — AMLODIPINE BESYLATE 10 MG/1
10 TABLET ORAL
Status: DISCONTINUED | OUTPATIENT
Start: 2018-09-24 | End: 2018-09-25 | Stop reason: HOSPADM

## 2018-09-24 RX ORDER — HEPARIN SODIUM 1000 [USP'U]/ML
1800 INJECTION, SOLUTION INTRAVENOUS; SUBCUTANEOUS
Status: DISCONTINUED | OUTPATIENT
Start: 2018-09-24 | End: 2018-09-25

## 2018-09-24 RX ORDER — POLYETHYLENE GLYCOL 3350 17 G/17G
1 POWDER, FOR SOLUTION ORAL
Status: DISCONTINUED | OUTPATIENT
Start: 2018-09-24 | End: 2018-09-25 | Stop reason: HOSPADM

## 2018-09-24 RX ORDER — HYDRALAZINE HYDROCHLORIDE 20 MG/ML
10 INJECTION INTRAMUSCULAR; INTRAVENOUS ONCE
Status: DISCONTINUED | OUTPATIENT
Start: 2018-09-24 | End: 2018-09-24

## 2018-09-24 RX ORDER — LABETALOL HYDROCHLORIDE 5 MG/ML
10 INJECTION, SOLUTION INTRAVENOUS ONCE
Status: COMPLETED | OUTPATIENT
Start: 2018-09-24 | End: 2018-09-24

## 2018-09-24 RX ORDER — HEPARIN SODIUM 1000 [USP'U]/ML
1800 INJECTION, SOLUTION INTRAVENOUS; SUBCUTANEOUS
Status: DISCONTINUED | OUTPATIENT
Start: 2018-09-24 | End: 2018-09-24

## 2018-09-24 RX ORDER — ONDANSETRON 2 MG/ML
4 INJECTION INTRAMUSCULAR; INTRAVENOUS ONCE
Status: COMPLETED | OUTPATIENT
Start: 2018-09-24 | End: 2018-09-24

## 2018-09-24 RX ORDER — FUROSEMIDE 10 MG/ML
80 INJECTION INTRAMUSCULAR; INTRAVENOUS ONCE
Status: DISCONTINUED | OUTPATIENT
Start: 2018-09-24 | End: 2018-09-24

## 2018-09-24 RX ORDER — CALCIUM CARBONATE 500 MG/1
500 TABLET, CHEWABLE ORAL 4 TIMES DAILY PRN
Status: DISCONTINUED | OUTPATIENT
Start: 2018-09-24 | End: 2018-09-25 | Stop reason: HOSPADM

## 2018-09-24 RX ADMIN — HEPARIN SODIUM 5000 UNITS: 5000 INJECTION, SOLUTION INTRAVENOUS; SUBCUTANEOUS at 21:38

## 2018-09-24 RX ADMIN — Medication 2 TABLET: at 16:13

## 2018-09-24 RX ADMIN — HEPARIN SODIUM 5000 UNITS: 5000 INJECTION, SOLUTION INTRAVENOUS; SUBCUTANEOUS at 16:12

## 2018-09-24 RX ADMIN — ANTACID TABLETS 500 MG: 500 TABLET, CHEWABLE ORAL at 17:43

## 2018-09-24 RX ADMIN — ONDANSETRON HYDROCHLORIDE 4 MG: 2 INJECTION, SOLUTION INTRAMUSCULAR; INTRAVENOUS at 10:30

## 2018-09-24 RX ADMIN — LOSARTAN POTASSIUM 50 MG: 50 TABLET ORAL at 20:05

## 2018-09-24 RX ADMIN — LABETALOL HYDROCHLORIDE 10 MG: 5 INJECTION, SOLUTION INTRAVENOUS at 10:07

## 2018-09-24 RX ADMIN — CLONIDINE 1 PATCH: 0.1 PATCH TRANSDERMAL at 17:42

## 2018-09-24 RX ADMIN — HEPARIN SODIUM 1800 UNITS: 1000 INJECTION, SOLUTION INTRAVENOUS; SUBCUTANEOUS at 11:57

## 2018-09-24 RX ADMIN — AMLODIPINE BESYLATE 10 MG: 5 TABLET ORAL at 11:15

## 2018-09-24 RX ADMIN — LABETALOL HYDROCHLORIDE 10 MG: 5 INJECTION, SOLUTION INTRAVENOUS at 09:50

## 2018-09-24 ASSESSMENT — ENCOUNTER SYMPTOMS
HEADACHES: 0
LIGHT-HEADEDNESS: 0
ACTIVITY CHANGE: 0
SHORTNESS OF BREATH: 1
HEMOPTYSIS: 0
FEVER: 0
PALPITATIONS: 0
APPETITE CHANGE: 0
MYALGIAS: 0
DIZZINESS: 0
NERVOUS/ANXIOUS: 0
WHEEZING: 0
ABDOMINAL PAIN: 0
NAUSEA: 0
BLURRED VISION: 0
CONFUSION: 0
SPUTUM PRODUCTION: 0
COUGH: 1
WEAKNESS: 0

## 2018-09-24 ASSESSMENT — PAIN SCALES - GENERAL
PAINLEVEL_OUTOF10: 0

## 2018-09-24 ASSESSMENT — PATIENT HEALTH QUESTIONNAIRE - PHQ9
SUM OF ALL RESPONSES TO PHQ9 QUESTIONS 1 AND 2: 0
2. FEELING DOWN, DEPRESSED, IRRITABLE, OR HOPELESS: NOT AT ALL
1. LITTLE INTEREST OR PLEASURE IN DOING THINGS: NOT AT ALL

## 2018-09-24 ASSESSMENT — COGNITIVE AND FUNCTIONAL STATUS - GENERAL
DAILY ACTIVITIY SCORE: 22
CLIMB 3 TO 5 STEPS WITH RAILING: A LITTLE
MOBILITY SCORE: 20
MOVING FROM LYING ON BACK TO SITTING ON SIDE OF FLAT BED: A LITTLE
SUGGESTED CMS G CODE MODIFIER MOBILITY: CJ
TOILETING: A LITTLE
HELP NEEDED FOR BATHING: A LITTLE
STANDING UP FROM CHAIR USING ARMS: A LITTLE
WALKING IN HOSPITAL ROOM: A LITTLE
SUGGESTED CMS G CODE MODIFIER DAILY ACTIVITY: CJ

## 2018-09-24 ASSESSMENT — PULMONARY FUNCTION TESTS: EPAP_CMH2O: 5

## 2018-09-24 ASSESSMENT — LIFESTYLE VARIABLES: EVER_SMOKED: YES

## 2018-09-24 NOTE — ED TRIAGE NOTES
Pt had scheduled dialysis today at 1500hs but awoke with a productive cough and shortness of breath.  85% on RA, improved on 4L oxygen via NC

## 2018-09-24 NOTE — PROGRESS NOTES
Patient transported on monitor from ED to ICU. Alert and oriented x 4. VSS. Currently denies pain.

## 2018-09-24 NOTE — DISCHARGE PLANNING
Outpatient Dialysis Note    Confirmed patient is active at:    [facility]  [address] [address]       Schedule: Monday, Wednesday, Friday  Time: 3:25 pm    Spoke with Abby at facility who confirmed.    Forwarded records for review.    Dialysis Coordinator, [facility]  Alexia Ayoub [phone]

## 2018-09-24 NOTE — PROGRESS NOTES
STAT HD Tx per Dr. STARR Mari x 3.5 hrs., initiated at 1149, ended at 1519, net UF 2600 ml. See paper flow-sheet for details.    Report given to ISAAC Naranjo RN.

## 2018-09-24 NOTE — ASSESSMENT & PLAN NOTE
- RT/O2 Protocols  - Titrate supplemental FiO2 to maintain SpO2 >92%  - BiPAP for positive pressure support  - PRN ABG  - attempt forced diuresis for now

## 2018-09-24 NOTE — ED NOTES
Med rec updated and complete.  Allergies reviewed. Met with pt at bedside and dicussed  Current medications and last doses taken.  Pt denies antibiotic use in last 30 days  Pt is currently out of her clonidine 0.1 mg patch. ranout approx 1 month   Ago.   She is also currently out of her labetalol. Last dose Friday.
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
## 2018-09-24 NOTE — ASSESSMENT & PLAN NOTE
Will likely improve after hemodialysis  On arrival: 86% on room air   CXR edema, 1+ LE edema  Currently requiring BiPAP - slowly wean off after HD  Received one dose of IV Lasix 80 mg  Admit to ICU

## 2018-09-24 NOTE — H&P
Internal Medicine Admitting History and Physical    Note Author: Kathleen Corrales M.D.       Name Isabelle Coyle     1965   Age/Sex 53 y.o. female   MRN [id number]   Code Status Full     After 5PM or if no immediate response to page, please call for cross-coverage  Attending/Team: Devaughn/Berta See Patient List for primary contact information  Call [phone] to page    1st Call - Day Intern (R1):   Dr. Kathleen Corrales        Chief Complaint:   Shortness of breath    HPI:  Patient is a pleasant 53-year-old lady who comes in complaining of one day of worsening shortness of breath. She is on dialysis MWF through her left upper arm AV fistula.. She has end-stage renal disease from glomerulonephritis and hypertension. Patient states that over the weekend she had increased salt and water intake. She states that she sometimes gets short of breath before dialysis, but this time is worse. She has been compliant with her medications.    Review of Systems   Constitutional: Negative for fever and malaise/fatigue.   HENT: Negative for hearing loss.    Eyes: Negative for blurred vision.   Respiratory: Positive for cough and shortness of breath. Negative for hemoptysis, sputum production and wheezing.    Cardiovascular: Negative for chest pain and palpitations.        Chronic, stable trace to 1+ bilateral lower extremity edema.   Gastrointestinal: Negative for abdominal pain and nausea.   Genitourinary: Negative for dysuria.   Musculoskeletal: Negative for joint pain and myalgias.   Neurological: Negative for dizziness, weakness and headaches.   Psychiatric/Behavioral: The patient is not nervous/anxious.       Past Medical History: ESRD HD MWF, anemia of chronic kidney disease, renal osteodystrophy, chronic glomerulonephritis, hyperlipidemia, GERD, hypertension, history of SVT.    Past Surgical History:  Past Surgical History:   Procedure Laterality Date   • AV FISTULA CREATION Left 2018    Procedure:  Left Brachial artery Repair;  Surgeon: Jimbo Davenport M.D.;  Location: SURGERY [facility];  Service: Vascular   • AV FISTULA CREATION Left 8/28/2017    Procedure: AV FISTULA CREATION  UPPER EXTREMITY -BRACHIAL BASALIC;  Surgeon: Stella Rodriguez M.D.;  Location: SURGERY [facility];  Service: General   • AV FISTULA CREATION Left 6/20/2017    Procedure: AV FISTULA CREATION- LEFT;  Surgeon: Jimbo Davenport M.D.;  Location: SURGERY [facility];  Service:    • APPENDECTOMY LAPAROSCOPIC  5/4/2009    Performed by BANDAR TIMMOSN at SURGERY [facility]   • FEMUR ORIF  10/9/08    Performed by STELLA GATES at [facility]   • ILIAC BONE GRAFT  10/9/08    Performed by TSELLA GATES at [facility]   • AMPUTATION, BELOW THE KNEE     • CAPITATION PAYMENT (INSURANCE)     • OTHER      BELOW KNEE AMPUTATION RIGHT   • PB ENLARGE BREAST WITH IMPLANT         Current Outpatient Medications:  Home Medications     Reviewed by Castro Naranjo R.N. (Registered Nurse) on 09/24/18 at 0905  Med List Status: Not Addressed   Medication Last Dose Status   amLODIPine (NORVASC) 5 MG Tab  Active   calcium carbonate (TUMS) 500 MG Chew Tab  Active   cloNIDine (CATAPRES) 0.1 MG/24HR PATCH WEEKLY 7/28/2018 Active   famotidine (PEPCID) 20 MG Tab 7/28/2018 Active   famotidine (PEPCID) 20 MG Tab  Active   labetalol (NORMODYNE) 200 MG Tab 7/29/2018 Active   lisinopril (PRINIVIL, ZESTRIL) 40 MG tablet  Active   terazosin (HYTRIN) 5 MG Cap 7/28/2018 Active                Medication Allergy/Sensitivities:  Allergies   Allergen Reactions   • Sulfa Drugs Hives     [id number] rash swelling itching     Family History noncontributory  Family History   Problem Relation Age of Onset   • Heart Disease Unknown        Social History   Social History     Social History   • Marital status:      Spouse name: N/A   • Number of children: N/A   • Years of education: N/A     Occupational History   • Not on file.  "    Social History Main Topics   • Smoking status: Current Every Day Smoker     Packs/day: 0.25     Years: 20.00     Types: Cigarettes   • Smokeless tobacco: Never Used      Comment: 5 cigs/day   • Alcohol use No   • Drug use: No   • Sexual activity: Not on file     Other Topics Concern   • Not on file     Social History Narrative   • No narrative on file     PCP : Juan Moreno M.D.    Physical Exam     Vitals:    09/24/18 1016 09/24/18 1023 09/24/18 1030 09/24/18 1045   BP: (!) 178/100      Pulse: 77  77 64   Resp:       Temp:       SpO2: 100% 100% 89% 100%   Weight:       Height:         Body mass index is 23.78 kg/m².  BP (!) 178/100   Pulse 64   Temp 36.7 °C (98 °F)   Resp 20   Ht 1.575 m (5' 2\")   Wt 59 kg (130 lb)   SpO2 100%   BMI 23.78 kg/m²   O2 therapy: Pulse Oximetry: 100 %, O2 (LPM): 15, O2 Delivery: Non-Rebreather Mask    Physical Exam   Constitutional: She is oriented to person, place, and time and well-developed, well-nourished, and in no distress.   In moderate respiratory distress - improved with BiPAP    HENT:   Head: Normocephalic and atraumatic.   Mouth/Throat: Oropharynx is clear and moist.   Eyes: Pupils are equal, round, and reactive to light. EOM are normal. No scleral icterus.   Neck: Neck supple.   Cardiovascular: Normal rate and regular rhythm.    AV fistula thrill is auscultated   Pulmonary/Chest:   No wheezes or crackles auscultated anteriorly   Abdominal: Soft. There is no tenderness.   Musculoskeletal:   1+ bilateral pitting lower extremity edema   Neurological: She is alert and oriented to person, place, and time. She exhibits normal muscle tone.   Skin: Skin is warm and dry. No rash noted.   Psychiatric: Mood, memory, affect and judgment normal.       Data Review       Old Records Request:   Completed  Current Records review/summary: Completed    Lab Data Review:  Recent Results (from the past 24 hour(s))   CBC WITH DIFFERENTIAL    Collection Time: 09/24/18  9:00 AM "   Result Value Ref Range    WBC 6.1 4.8 - 10.8 K/uL    RBC 3.02 (L) 4.20 - 5.40 M/uL    Hemoglobin 8.9 (L) 12.0 - 16.0 g/dL    Hematocrit 26.5 (L) 37.0 - 47.0 %    MCV 87.7 81.4 - 97.8 fL    MCH 29.5 27.0 - 33.0 pg    MCHC 33.6 33.6 - 35.0 g/dL    RDW 45.4 35.9 - 50.0 fL    Platelet Count 130 (L) 164 - 446 K/uL    MPV 10.9 9.0 - 12.9 fL    Neutrophils-Polys 72.70 (H) 44.00 - 72.00 %    Lymphocytes 19.60 (L) 22.00 - 41.00 %    Monocytes 5.10 0.00 - 13.40 %    Eosinophils 2.00 0.00 - 6.90 %    Basophils 0.30 0.00 - 1.80 %    Immature Granulocytes 0.30 0.00 - 0.90 %    Nucleated RBC 0.00 /100 WBC    Neutrophils (Absolute) 4.46 2.00 - 7.15 K/uL    Lymphs (Absolute) 1.20 1.00 - 4.80 K/uL    Monos (Absolute) 0.31 0.00 - 0.85 K/uL    Eos (Absolute) 0.12 0.00 - 0.51 K/uL    Baso (Absolute) 0.02 0.00 - 0.12 K/uL    Immature Granulocytes (abs) 0.02 0.00 - 0.11 K/uL    NRBC (Absolute) 0.00 K/uL   COMP METABOLIC PANEL    Collection Time: 09/24/18  9:00 AM   Result Value Ref Range    Sodium 138 135 - 145 mmol/L    Potassium 5.5 3.6 - 5.5 mmol/L    Chloride 104 96 - 112 mmol/L    Co2 24 20 - 33 mmol/L    Anion Gap 10.0 0.0 - 11.9    Glucose 80 65 - 99 mg/dL    Bun 57 (H) 8 - 22 mg/dL    Creatinine 7.49 (HH) 0.50 - 1.40 mg/dL    Calcium 9.3 8.5 - 10.5 mg/dL    AST(SGOT) 8 (L) 12 - 45 U/L    ALT(SGPT) 7 2 - 50 U/L    Alkaline Phosphatase 46 30 - 99 U/L    Total Bilirubin 0.5 0.1 - 1.5 mg/dL    Albumin 3.1 (L) 3.2 - 4.9 g/dL    Total Protein 5.7 (L) 6.0 - 8.2 g/dL    Globulin 2.6 1.9 - 3.5 g/dL    A-G Ratio 1.2 g/dL   TROPONIN    Collection Time: 09/24/18  9:00 AM   Result Value Ref Range    Troponin I 0.02 0.00 - 0.04 ng/mL   ESTIMATED GFR    Collection Time: 09/24/18  9:00 AM   Result Value Ref Range    GFR If  7 (A) >60 mL/min/1.73 m 2    GFR If Non African American 6 (A) >60 mL/min/1.73 m 2   EKG (NOW)    Collection Time: 09/24/18  9:01 AM   Result Value Ref Range    Report       [facility]  Emergency Dept.    Test Date:  2018  Pt Name:    MELISSA BARDALES                 Department: ER  MRN:        [id number]                      Room:       RD 11  Gender:     Female                       Technician: [id number]  :        1965                   Requested By:ER TRIAGE PROTOCOL  Order #:    [id number]                    Reading MD:    Measurements  Intervals                                Axis  Rate:       96                           P:          65  AR:         172                          QRS:        44  QRSD:       94                           T:          77  QT:         372  QTc:        471    Interpretive Statements  SINUS RHYTHM  PROBABLE LEFT ATRIAL ABNORMALITY  LEFT VENTRICULAR HYPERTROPHY  Compared to ECG 2018 08:32:13  No significant changes         Imaging/Procedures Review:    Independant Imaging Review: Completed  DX-CHEST-PORTABLE (1 VIEW)   Final Result      Findings consistent with mild/moderate vascular congestion and edema.      EKG:   EKG Independant Review: Completed  QTc:471, HR: 96, Normal Sinus Rhythm, no ST/T changes compared to prior.     Records reviewed and summarized in current documentation :  Yes  [employer] teaching service handout given to patient:  No         Assessment/Plan     * Acute respiratory failure with hypoxia (HCC)- (present on admission)   Assessment & Plan    Will likely improve after hemodialysis  On arrival: 86% on room air   CXR edema, 1+ LE edema  Currently requiring BiPAP - slowly wean off after HD  Received one dose of IV Lasix 80 mg  Admit to ICU        Hypertensive urgency- (present on admission)   Assessment & Plan    Likely due to volume overload - requires hemodialysis  Did not take her medications today  Per nephro: Continue home amlodipine, clonidine patch - switched lisinopril to losartan - as lisinopril is filtered in HD  Hold home labetalol and Terazosin        ESRD on hemodialysis (HCC)- (present on admission)   Assessment & Plan    ESRD on HD  MWF  Received HD in ED today - will likely repeat tomorrow  Pending Vit D, PTH, phos        Anemia - likely of chronic disease- (present on admission)   Assessment & Plan    Stable, chronic condition  Likely due to ESRD  Nephrology following          Anticipated Hospital stay:  >2 midnights  Quality Measures  Quality-Core Measures   Reviewed items::  Radiology images reviewed, Medications reviewed, Labs reviewed and EKG reviewed  Murdock catheter::  No Murdock  DVT prophylaxis pharmacological::  Heparin    PCP: Juan Moreno M.D.

## 2018-09-24 NOTE — CONSULTS
Critical Care Consultation    Date of consult: 9/24/2018    Referring Physician  Fuad Robert M.D.    Reason for Consultation  Acute hypoxic respiratory failure    History of Presenting Illness  53 y.o. female who presented 9/24/2018 with history of hypertensive nephropathy and end-stage renal disease requiring hemodialysis on Monday, Wednesday, Friday.  Presents prior to her dialysis today for worsening shortness of breath, she reports she ate more salty foods this weekend than usual and also may have drank more water.  She was reportedly hypoxic at 85% on room air and was placed on 4 L prior to coming to the emergency room.  In the emergency room she was placed on BiPAP with substantial improvement in her shortness of breath and hypoxia.  Chest x-ray confirms vascular congestion and pulmonary edema.  Nephrology has been contacted for urgent dialysis and 80 mg of Lasix has been given as the patient still has residual urine output.    Code Status  Full Code    Review of Systems  Review of Systems   Constitutional: Negative for activity change and appetite change.   Eyes: Negative for visual disturbance.   Respiratory: Positive for shortness of breath.    Cardiovascular: Positive for leg swelling.   Genitourinary: Positive for difficulty urinating (oliguria).   Musculoskeletal:        RLE BKA   Skin: Negative for rash.   Neurological: Negative for light-headedness.   Psychiatric/Behavioral: Negative for confusion.       Past Medical History   has a past medical history of Anemia; Anemia associated with chronic renal failure (11/5/2009); Dental disorder (8-25-17); Dialysis patient (HCC) (8-25-17); Dysrhythmia; ESRD (end stage renal disease) ([facility]) (8-25-17); Glomerulonephritis, chronic (11/5/2009); Heart burn; High cholesterol; HTN (hypertension) (11/5/2009); Port catheter in place (8-25-17); SVT (supraventricular tachycardia) ([facility]); and SVT (supraventricular tachycardia) ([facility]).    Surgical History   has a  past surgical history that includes amputation, below the knee; capitation payment (insurance); appendectomy laparoscopic (5/4/2009); pr enlarge breast with implant; av fistula creation (Left, 6/20/2017); other; femur orif (10/9/08); iliac bone graft (10/9/08); av fistula creation (Left, 8/28/2017); and av fistula creation (Left, 4/27/2018).    Family History  family history includes Heart Disease in her unknown relative.    Social History   reports that she has been smoking Cigarettes.  She has a 5.00 pack-year smoking history. She has never used smokeless tobacco. She reports that she does not drink alcohol or use drugs.    Medications  Home Medications     Reviewed by Castro Naranjo R.N. (Registered Nurse) on 09/24/18 at 0905  Med List Status: Not Addressed   Medication Last Dose Status   amLODIPine (NORVASC) 5 MG Tab  Active   calcium carbonate (TUMS) 500 MG Chew Tab  Active   cloNIDine (CATAPRES) 0.1 MG/24HR PATCH WEEKLY 7/28/2018 Active   famotidine (PEPCID) 20 MG Tab 7/28/2018 Active   famotidine (PEPCID) 20 MG Tab  Active   labetalol (NORMODYNE) 200 MG Tab 7/29/2018 Active   lisinopril (PRINIVIL, ZESTRIL) 40 MG tablet  Active   terazosin (HYTRIN) 5 MG Cap 7/28/2018 Active              Current Facility-Administered Medications   Medication Dose Route Frequency Provider Last Rate Last Dose   • hydrALAZINE (APRESOLINE) injection 10 mg  10 mg Intravenous Once Fuad Robert M.D.       • furosemide (LASIX) injection 80 mg  80 mg Intravenous Once Cindy Mari D.O.       • amLODIPine (NORVASC) tablet 10 mg  10 mg Oral Q DAY Cindy Mari D.O.       • losartan (COZAAR) tablet 50 mg  50 mg Oral QHS Cindy Mari D.O.       • senna-docusate (PERICOLACE or SENOKOT S) 8.6-50 MG per tablet 2 Tab  2 Tab Oral BID Kathleen Corrales M.D.        And   • polyethylene glycol/lytes (MIRALAX) PACKET 1 Packet  1 Packet Oral QDAY PRN Kathleen M Antoni, M.D.        And   • bisacodyl (DULCOLAX) suppository  10 mg  10 mg Rectal QDAY PRN Kathleen Corrales M.D.       • heparin injection 5,000 Units  5,000 Units Subcutaneous Q8HRS Kathleen Corrales M.D.         Current Outpatient Prescriptions   Medication Sig Dispense Refill   • lisinopril (PRINIVIL, ZESTRIL) 40 MG tablet Take 1 Tab by mouth every day. 90 Tab 0   • famotidine (PEPCID) 20 MG Tab Take 1 Tab by mouth every day. 60 Tab 0   • calcium carbonate (TUMS) 500 MG Chew Tab Take 1 Tab by mouth every 8 hours as needed (Heart burn/acid reflux). 30 Tab 0   • amLODIPine (NORVASC) 5 MG Tab Take 2 Tabs by mouth every day. 30 Tab 2   • famotidine (PEPCID) 20 MG Tab Take 20 mg by mouth every day.     • cloNIDine (CATAPRES) 0.1 MG/24HR PATCH WEEKLY Apply 1 Patch to skin as directed every 7 days. On Sat     • terazosin (HYTRIN) 5 MG Cap Take 5 mg by mouth every evening.     • labetalol (NORMODYNE) 200 MG Tab Take 1 Tab by mouth 2 Times a Day. 60 Tab 2       Allergies  Allergies   Allergen Reactions   • Sulfa Drugs Hives     SIC=[id number] rash swelling itching       Vital Signs last 24 hours  Temp:  [36.7 °C (98 °F)] 36.7 °C (98 °F)  Pulse:  [64-93] 64  Resp:  [20] 20  BP: (178-215)/(100-123) 178/100    Physical Exam  Physical Exam   Constitutional: She is oriented to person, place, and time. She appears well-developed and well-nourished. She appears ill.   HENT:   Head: Normocephalic and atraumatic.   Right Ear: External ear normal.   Left Ear: External ear normal.   Nose: Nose normal.   Mouth/Throat: Oropharynx is clear and moist.   BiPAP mask in place   Eyes: Pupils are equal, round, and reactive to light. Conjunctivae are normal.   Neck: Neck supple. JVD present. No tracheal deviation present.   Cardiovascular: Normal rate, regular rhythm and intact distal pulses.    Pulmonary/Chest: No accessory muscle usage. She is in respiratory distress. She has rales ( Diffuse).   Abdominal: Soft. Bowel sounds are normal. She exhibits no distension. There is no tenderness.    Musculoskeletal: Normal range of motion. She exhibits edema (1+ LLE). She exhibits no tenderness or deformity.   Left upper extremity fistula with palpable thrill.  Right lower extremity BKA   Neurological: She is alert and oriented to person, place, and time. She exhibits normal muscle tone. Coordination normal.   Skin: Skin is warm and dry. No rash noted.   Psychiatric: She has a normal mood and affect. Her behavior is normal.   Nursing note and vitals reviewed.      Fluids  No intake or output data in the 24 hours ending 09/24/18 1118    Laboratory  Recent Results (from the past 48 hour(s))   CBC WITH DIFFERENTIAL    Collection Time: 09/24/18  9:00 AM   Result Value Ref Range    WBC 6.1 4.8 - 10.8 K/uL    RBC 3.02 (L) 4.20 - 5.40 M/uL    Hemoglobin 8.9 (L) 12.0 - 16.0 g/dL    Hematocrit 26.5 (L) 37.0 - 47.0 %    MCV 87.7 81.4 - 97.8 fL    MCH 29.5 27.0 - 33.0 pg    MCHC 33.6 33.6 - 35.0 g/dL    RDW 45.4 35.9 - 50.0 fL    Platelet Count 130 (L) 164 - 446 K/uL    MPV 10.9 9.0 - 12.9 fL    Neutrophils-Polys 72.70 (H) 44.00 - 72.00 %    Lymphocytes 19.60 (L) 22.00 - 41.00 %    Monocytes 5.10 0.00 - 13.40 %    Eosinophils 2.00 0.00 - 6.90 %    Basophils 0.30 0.00 - 1.80 %    Immature Granulocytes 0.30 0.00 - 0.90 %    Nucleated RBC 0.00 /100 WBC    Neutrophils (Absolute) 4.46 2.00 - 7.15 K/uL    Lymphs (Absolute) 1.20 1.00 - 4.80 K/uL    Monos (Absolute) 0.31 0.00 - 0.85 K/uL    Eos (Absolute) 0.12 0.00 - 0.51 K/uL    Baso (Absolute) 0.02 0.00 - 0.12 K/uL    Immature Granulocytes (abs) 0.02 0.00 - 0.11 K/uL    NRBC (Absolute) 0.00 K/uL   COMP METABOLIC PANEL    Collection Time: 09/24/18  9:00 AM   Result Value Ref Range    Sodium 138 135 - 145 mmol/L    Potassium 5.5 3.6 - 5.5 mmol/L    Chloride 104 96 - 112 mmol/L    Co2 24 20 - 33 mmol/L    Anion Gap 10.0 0.0 - 11.9    Glucose 80 65 - 99 mg/dL    Bun 57 (H) 8 - 22 mg/dL    Creatinine 7.49 (HH) 0.50 - 1.40 mg/dL    Calcium 9.3 8.5 - 10.5 mg/dL    AST(SGOT) 8 (L) 12  - 45 U/L    ALT(SGPT) 7 2 - 50 U/L    Alkaline Phosphatase 46 30 - 99 U/L    Total Bilirubin 0.5 0.1 - 1.5 mg/dL    Albumin 3.1 (L) 3.2 - 4.9 g/dL    Total Protein 5.7 (L) 6.0 - 8.2 g/dL    Globulin 2.6 1.9 - 3.5 g/dL    A-G Ratio 1.2 g/dL   TROPONIN    Collection Time: 18  9:00 AM   Result Value Ref Range    Troponin I 0.02 0.00 - 0.04 ng/mL   ESTIMATED GFR    Collection Time: 18  9:00 AM   Result Value Ref Range    GFR If  7 (A) >60 mL/min/1.73 m 2    GFR If Non African American 6 (A) >60 mL/min/1.73 m 2   EKG (NOW)    Collection Time: 18  9:01 AM   Result Value Ref Range    Report       [facility] Emergency Dept.    Test Date:  2018  Pt Name:    MELISSA BARDALES                 Department: ER  MRN:        [id number]                      Room:        11  Gender:     Female                       Technician: [id number]  :        1965                   Requested By:ER TRIAGE PROTOCOL  Order #:    [id number]                    Reading MD:    Measurements  Intervals                                Axis  Rate:       96                           P:          65  ND:         172                          QRS:        44  QRSD:       94                           T:          77  QT:         372  QTc:        471    Interpretive Statements  SINUS RHYTHM  PROBABLE LEFT ATRIAL ABNORMALITY  LEFT VENTRICULAR HYPERTROPHY  Compared to ECG 2018 08:32:13  No significant changes         Imaging  Chest x-ray from today demonstrates mild to moderate vascular congestion with pulmonary edema    Assessment/Plan  * Acute respiratory failure with hypoxia (HCC)- (present on admission)   Assessment & Plan    - RT/O2 Protocols  - Titrate supplemental FiO2 to maintain SpO2 >92%  - BiPAP for positive pressure support  - PRN ABG  - attempt forced diuresis for now          Hypertensive urgency- (present on admission)   Assessment & Plan    - Norvasc 10 mg qday  - Catapres 0.1 mg/24hrs  -  Cozaar 50mg  - PRNs available        ESRD on hemodialysis (HCC)- (present on admission)   Assessment & Plan    - Urgent HD for volume overload        Tobacco use- (present on admission)   Assessment & Plan    Nicotine replacement protocol        Anemia - likely of chronic disease- (present on admission)   Assessment & Plan    - Monitor  - transfuse for Hg <7            Discussed patient condition and risk of morbidity and/or mortality with RN, RT, Pharmacy, [employer] resident and Patient.      The patient remains critically ill.  Critical care time = 37 minutes in directly providing and coordinating critical care and extensive data review.  No time overlap and excludes procedures.

## 2018-09-24 NOTE — ED PROVIDER NOTES
ED Provider Note    Scribed for Fuad Robert M.D. by Tena Sanchez. 9/24/2018, 9:19 AM.    Primary care provider: Juan Moreno M.D.; Dr. Junior (Nephrology)  Means of arrival: Ambulance  History obtained from: Patient  History limited by: None    CHIEF COMPLAINT  •  Shortness of Breath    HPI  Isabelle Coyle is a 53 y.o. female who presents to the Emergency Department by ambulance for shortness of breath with an onset of today. History of end stage renal failure and receives dialysis. Last dialysis was on Friday, and she is scheduled for dialysis today. She was asymptomatic yesterday and reports eating salty foods. She woke up at 3:00 AM with an acute onset of shortness of breath. She denies home oxygen use. She was hypoxic at 85% room air and is currently on 4 liters of oxygen. She has previously experienced similar difficulty breathing when she has needed dialysis. Associated symptoms include a productive cough and diarrhea (onset today). Negative for fevers, chills, chest pain, abdominal pain or vomiting. She does make urine. No prior history of CHF.      REVIEW OF SYSTEMS  Pertinent positives include shortness of breath, hypoxia, productive cough and diarrhea. Pertinent negatives include fevers, chills, chest pain, abdominal pain or vomiting. All other systems negative.      PAST MEDICAL HISTORY  Patient has a past medical history of Anemia; Anemia associated with chronic renal failure (11/5/2009); Dental disorder (8-25-17); Dialysis patient ([facility]) (8-25-17); Dysrhythmia; ESRD (end stage renal disease) ([facility]) (8-25-17); Glomerulonephritis, chronic (11/5/2009); Heart burn; High cholesterol; HTN (hypertension) (11/5/2009); Port catheter in place (8-25-17); SVT (supraventricular tachycardia) ([facility]); and SVT (supraventricular tachycardia) ([facility]). No prior history of CHF.      SURGICAL HISTORY  Patient has a past surgical history that includes amputation, below the knee; capitation payment  (insurance); appendectomy laparoscopic (5/4/2009); enlarge breast with implant; av fistula creation (Left, 6/20/2017); other; femur orif (10/9/08); iliac bone graft (10/9/08); av fistula creation (Left, 8/28/2017); and av fistula creation (Left, 4/27/2018).      SOCIAL HISTORY  Social History   Substance Use Topics   • Smoking status: Current Every Day Smoker     Packs/day: 0.25     Years: 20.00     Types: Cigarettes   • Smokeless tobacco: Never Used      Comment: 5 cigs/day   • Alcohol use No      History   Drug Use No       FAMILY HISTORY  Family History   Problem Relation Age of Onset   • Heart Disease Unknown        CURRENT MEDICATIONS  Home Medications     Reviewed by Rona Fulton (Pharmacy Tech) on 09/24/18 at 1410  Med List Status: Complete   Medication Last Dose Status   amLODIPine (NORVASC) 5 MG Tab 9/23/2018 Active   labetalol (NORMODYNE) 200 MG Tab 9/21/2018 Active   lisinopril (PRINIVIL, ZESTRIL) 40 MG tablet 9/23/2018 Active   terazosin (HYTRIN) 5 MG Cap 9/23/2018 Active                ALLERGIES  Allergies   Allergen Reactions   • Sulfa Drugs Hives     MYC=4538 rash swelling itching       PHYSICAL EXAM  VITAL SIGNS: BP (!) 215/118   Pulse 93   Temp 36.7 °C (98 °F)   Resp 20   Wt 59 kg (130 lb)   SpO2 95%   BMI 23.78 kg/m²     Constitutional: Well developed, Well nourished, Mild distress.   HENT: Normocephalic, Atraumatic, Oropharynx moist.   Eyes: Conjunctiva normal, No discharge.   Neck: Supple, No stridor.  Cardiovascular: Normal heart rate, Normal rhythm, No murmurs, equal pulses.   Pulmonary: Bilateral rales, Mild respiratory distress, No wheezing, No rhonchi.  Chest: No chest wall tenderness or deformity.   Abdomen:Soft, No tenderness, No masses, no rebound, no guarding.   Back: No CVA tenderness.   Musculoskeletal: No major deformities noted, No tenderness. Fistula to left upper extremity with palpable thrill. Right lower extremity BKA.  Skin: Warm, Dry, No erythema, No rash.    Neurologic: Alert & oriented x 3, Normal motor function,  No focal deficits noted.   Psychiatric: Affect normal, Judgment normal, Mood normal.       LABS  Results for orders placed or performed during the hospital encounter of 09/24/18   CBC WITH DIFFERENTIAL   Result Value Ref Range    WBC 6.1 4.8 - 10.8 K/uL    RBC 3.02 (L) 4.20 - 5.40 M/uL    Hemoglobin 8.9 (L) 12.0 - 16.0 g/dL    Hematocrit 26.5 (L) 37.0 - 47.0 %    MCV 87.7 81.4 - 97.8 fL    MCH 29.5 27.0 - 33.0 pg    MCHC 33.6 33.6 - 35.0 g/dL    RDW 45.4 35.9 - 50.0 fL    Platelet Count 130 (L) 164 - 446 K/uL    MPV 10.9 9.0 - 12.9 fL    Neutrophils-Polys 72.70 (H) 44.00 - 72.00 %    Lymphocytes 19.60 (L) 22.00 - 41.00 %    Monocytes 5.10 0.00 - 13.40 %    Eosinophils 2.00 0.00 - 6.90 %    Basophils 0.30 0.00 - 1.80 %    Immature Granulocytes 0.30 0.00 - 0.90 %    Nucleated RBC 0.00 /100 WBC    Neutrophils (Absolute) 4.46 2.00 - 7.15 K/uL    Lymphs (Absolute) 1.20 1.00 - 4.80 K/uL    Monos (Absolute) 0.31 0.00 - 0.85 K/uL    Eos (Absolute) 0.12 0.00 - 0.51 K/uL    Baso (Absolute) 0.02 0.00 - 0.12 K/uL    Immature Granulocytes (abs) 0.02 0.00 - 0.11 K/uL    NRBC (Absolute) 0.00 K/uL   COMP METABOLIC PANEL   Result Value Ref Range    Sodium 138 135 - 145 mmol/L    Potassium 5.5 3.6 - 5.5 mmol/L    Chloride 104 96 - 112 mmol/L    Co2 24 20 - 33 mmol/L    Anion Gap 10.0 0.0 - 11.9    Glucose 80 65 - 99 mg/dL    Bun 57 (H) 8 - 22 mg/dL    Creatinine 7.49 (HH) 0.50 - 1.40 mg/dL    Calcium 9.3 8.5 - 10.5 mg/dL    AST(SGOT) 8 (L) 12 - 45 U/L    ALT(SGPT) 7 2 - 50 U/L    Alkaline Phosphatase 46 30 - 99 U/L    Total Bilirubin 0.5 0.1 - 1.5 mg/dL    Albumin 3.1 (L) 3.2 - 4.9 g/dL    Total Protein 5.7 (L) 6.0 - 8.2 g/dL    Globulin 2.6 1.9 - 3.5 g/dL    A-G Ratio 1.2 g/dL   TROPONIN   Result Value Ref Range    Troponin I 0.02 0.00 - 0.04 ng/mL   ESTIMATED GFR   Result Value Ref Range    GFR If  7 (A) >60 mL/min/1.73 m 2    GFR If Non African American 6  (A) >60 mL/min/1.73 m 2   BTYPE NATRIURETIC PEPTIDE   Result Value Ref Range    B Natriuretic Peptide 2356 (H) 0 - 100 pg/mL   EKG (NOW)   Result Value Ref Range    Report       [facility] Emergency Dept.    Test Date:  2018  Pt Name:    MELISSA BARDALES                 Department: ER  MRN:        [id number]                      Room:       [facility]  Gender:     Female                       Technician: [id number]  :        1965                   Requested By:ER TRIAGE PROTOCOL  Order #:    [id number]                    Reading MD:    Measurements  Intervals                                Axis  Rate:       96                           P:          65  DE:         172                          QRS:        44  QRSD:       94                           T:          77  QT:         372  QTc:        471    Interpretive Statements  SINUS RHYTHM  PROBABLE LEFT ATRIAL ABNORMALITY  LEFT VENTRICULAR HYPERTROPHY  Compared to ECG 2018 08:32:13  No significant changes        All labs reviewed by me.      EKG  12 Lead EKG interpreted by me shows a normal sinus rhythm at a rate of 96. Axis normal. No ST elevations. No T wave inversions. LVH. Old EKG from 18 shows no significant changes; LVH at that time. Final impression: Normal sinus with LVH.      RADIOLOGY  DX-CHEST-PORTABLE (1 VIEW)   Final Result      Findings consistent with mild/moderate vascular congestion and edema.        The radiologist's interpretation of all radiological studies have been reviewed by me.      COURSE & MEDICAL DECISION MAKING  Pertinent Labs & Imaging studies reviewed. (See chart for details)    9:23 AM Patient seen and examined at bedside. Patient presents for shortness of breath.       Initial orders in the Emergency Department included EKG, XR chest and laboratory testing: CBC with differential, CMP, estimated GFR and troponin.  Hypertension will be addressed with 10 mg of Labetalol IV. The differential diagnoses include  but are not limited to: CHF, PE or pneumonia.      10:01 AM Patient is actively vomiting. Ordered 4 mg of Zofran IV.    10:11 AM Paged Respiratory Therapy for CPAP.    10:16 AM Ordered 10 mg of Hydralazine IV.    10:17 AM Paged Dr. Junior, Nephrology, and [facility].    10:21 AM On repeat evaluation, CPAP started. Breathing improved per patient.    10:28 AM Spoke with Dr. Mari, Nephrology, regarding the patient's presenting symptoms and diagnostic results. He will consult.    10:29 AM Consulted with Dr. Corrales, [facility], regarding the patient's presentation and results. Plan for Nephrology to consult. Patient will be admitted for further evaluation and care.      CRITICAL CARE  The very real possibility of a deterioration of this patient's condition required the highest level of my preparedness for sudden, emergent intervention.  I provided critical care services, which included medication orders, frequent reevaluations of the patient's condition and response to treatment, ordering and reviewing test results, and discussing the case with various consultants.  The critical care time associated with the care of the patient was 35 minutes. Review chart for interventions. This time is exclusive of any other billable procedures.       Medical Decision Making: Patient presents with acute shortness of breath with productive cough I think this is all likely secondary to volume overload and congestive heart failure.  Patient was responding to CPAP.  I have made arrangements for the patient to undergo dialysis.  Patient was also started on Lasix to help with the volume reduction.  Patient does not have a fever I do not think this is pneumonia.  She does not have any wheezing I do not think this is COPD.        DISPOSITION  Patient will be admitted to [facility] in critical condition.      DIAGNOSIS  1. Congestive heart failure, unspecified HF chronicity, unspecified heart failure type  (HCC)    2. Hypoxia    3. End stage renal disease on dialysis (HCC)            Tena LANE (Scribe), am scribing for, and in the presence of, Fuad Robert M.D.    Electronically signed by: Tena Sanchez (Scribe), 9/24/2018    Fuad LANE M.D. personally performed the services described in this documentation, as scribed by Tena Sanchez in my presence, and it is both accurate and complete. C.    The note accurately reflects work and decisions made by me.  Fuad Robert  9/24/2018  2:25 PM

## 2018-09-24 NOTE — ASSESSMENT & PLAN NOTE
Likely due to volume overload - requires hemodialysis  Did not take her medications today  Per nephro: Continue home amlodipine, clonidine patch - switched lisinopril to losartan - as lisinopril is filtered in HD  Hold home labetalol and Terazosin

## 2018-09-25 ENCOUNTER — APPOINTMENT (OUTPATIENT)
Dept: RADIOLOGY | Facility: MEDICAL CENTER | Age: 53
DRG: 189 | End: 2018-09-25
Attending: INTERNAL MEDICINE
Payer: MEDICAID

## 2018-09-25 VITALS
OXYGEN SATURATION: 92 % | TEMPERATURE: 98.3 F | SYSTOLIC BLOOD PRESSURE: 178 MMHG | HEIGHT: 62 IN | WEIGHT: 129.19 LBS | RESPIRATION RATE: 15 BRPM | BODY MASS INDEX: 23.77 KG/M2 | DIASTOLIC BLOOD PRESSURE: 100 MMHG | HEART RATE: 82 BPM

## 2018-09-25 LAB
25(OH)D3 SERPL-MCNC: 37 NG/ML (ref 30–100)
ANION GAP SERPL CALC-SCNC: 10 MMOL/L (ref 0–11.9)
BASOPHILS # BLD AUTO: 0.2 % (ref 0–1.8)
BASOPHILS # BLD: 0.01 K/UL (ref 0–0.12)
BUN SERPL-MCNC: 39 MG/DL (ref 8–22)
CALCIUM SERPL-MCNC: 9.4 MG/DL (ref 8.5–10.5)
CHLORIDE SERPL-SCNC: 99 MMOL/L (ref 96–112)
CO2 SERPL-SCNC: 29 MMOL/L (ref 20–33)
CREAT SERPL-MCNC: 6.25 MG/DL (ref 0.5–1.4)
EOSINOPHIL # BLD AUTO: 0.18 K/UL (ref 0–0.51)
EOSINOPHIL NFR BLD: 2.9 % (ref 0–6.9)
ERYTHROCYTE [DISTWIDTH] IN BLOOD BY AUTOMATED COUNT: 45.9 FL (ref 35.9–50)
GLUCOSE SERPL-MCNC: 92 MG/DL (ref 65–99)
HBV CORE AB SERPL QL IA: REACTIVE
HBV CORE IGM SER QL: NEGATIVE
HCT VFR BLD AUTO: 28.8 % (ref 37–47)
HGB BLD-MCNC: 9.5 G/DL (ref 12–16)
IMM GRANULOCYTES # BLD AUTO: 0.01 K/UL (ref 0–0.11)
IMM GRANULOCYTES NFR BLD AUTO: 0.2 % (ref 0–0.9)
LYMPHOCYTES # BLD AUTO: 1.88 K/UL (ref 1–4.8)
LYMPHOCYTES NFR BLD: 30.4 % (ref 22–41)
MAGNESIUM SERPL-MCNC: 2.3 MG/DL (ref 1.5–2.5)
MCH RBC QN AUTO: 29 PG (ref 27–33)
MCHC RBC AUTO-ENTMCNC: 33 G/DL (ref 33.6–35)
MCV RBC AUTO: 87.8 FL (ref 81.4–97.8)
MONOCYTES # BLD AUTO: 0.41 K/UL (ref 0–0.85)
MONOCYTES NFR BLD AUTO: 6.6 % (ref 0–13.4)
NEUTROPHILS # BLD AUTO: 3.7 K/UL (ref 2–7.15)
NEUTROPHILS NFR BLD: 59.7 % (ref 44–72)
NRBC # BLD AUTO: 0 K/UL
NRBC BLD-RTO: 0 /100 WBC
PHOSPHATE SERPL-MCNC: 5.4 MG/DL (ref 2.5–4.5)
PLATELET # BLD AUTO: 131 K/UL (ref 164–446)
PMV BLD AUTO: 10.4 FL (ref 9–12.9)
POTASSIUM SERPL-SCNC: 5.2 MMOL/L (ref 3.6–5.5)
PTH-INTACT SERPL-MCNC: 107.3 PG/ML (ref 14–72)
RBC # BLD AUTO: 3.28 M/UL (ref 4.2–5.4)
SODIUM SERPL-SCNC: 138 MMOL/L (ref 135–145)
URATE SERPL-MCNC: 3.3 MG/DL (ref 1.9–8.2)
WBC # BLD AUTO: 6.2 K/UL (ref 4.8–10.8)

## 2018-09-25 PROCEDURE — 83735 ASSAY OF MAGNESIUM: CPT

## 2018-09-25 PROCEDURE — 700111 HCHG RX REV CODE 636 W/ 250 OVERRIDE (IP)

## 2018-09-25 PROCEDURE — A9270 NON-COVERED ITEM OR SERVICE: HCPCS | Performed by: INTERNAL MEDICINE

## 2018-09-25 PROCEDURE — 90935 HEMODIALYSIS ONE EVALUATION: CPT

## 2018-09-25 PROCEDURE — 82306 VITAMIN D 25 HYDROXY: CPT

## 2018-09-25 PROCEDURE — 80074 ACUTE HEPATITIS PANEL: CPT

## 2018-09-25 PROCEDURE — 80048 BASIC METABOLIC PNL TOTAL CA: CPT

## 2018-09-25 PROCEDURE — 700102 HCHG RX REV CODE 250 W/ 637 OVERRIDE(OP): Performed by: INTERNAL MEDICINE

## 2018-09-25 PROCEDURE — 86704 HEP B CORE ANTIBODY TOTAL: CPT

## 2018-09-25 PROCEDURE — 84550 ASSAY OF BLOOD/URIC ACID: CPT

## 2018-09-25 PROCEDURE — 86705 HEP B CORE ANTIBODY IGM: CPT

## 2018-09-25 PROCEDURE — 700111 HCHG RX REV CODE 636 W/ 250 OVERRIDE (IP): Performed by: STUDENT IN AN ORGANIZED HEALTH CARE EDUCATION/TRAINING PROGRAM

## 2018-09-25 PROCEDURE — 71045 X-RAY EXAM CHEST 1 VIEW: CPT

## 2018-09-25 PROCEDURE — 85025 COMPLETE CBC W/AUTO DIFF WBC: CPT

## 2018-09-25 PROCEDURE — 84100 ASSAY OF PHOSPHORUS: CPT

## 2018-09-25 PROCEDURE — 99239 HOSP IP/OBS DSCHRG MGMT >30: CPT | Performed by: INTERNAL MEDICINE

## 2018-09-25 PROCEDURE — 83970 ASSAY OF PARATHORMONE: CPT

## 2018-09-25 RX ORDER — HEPARIN SODIUM 1000 [USP'U]/ML
INJECTION, SOLUTION INTRAVENOUS; SUBCUTANEOUS
Status: COMPLETED
Start: 2018-09-25 | End: 2018-09-25

## 2018-09-25 RX ORDER — HEPARIN SODIUM 1000 [USP'U]/ML
1500 INJECTION, SOLUTION INTRAVENOUS; SUBCUTANEOUS
Status: DISCONTINUED | OUTPATIENT
Start: 2018-09-25 | End: 2018-09-25 | Stop reason: HOSPADM

## 2018-09-25 RX ORDER — CALCIUM CARBONATE 500 MG/1
500 TABLET, CHEWABLE ORAL 4 TIMES DAILY PRN
Qty: 90 TAB | Refills: 3 | Status: SHIPPED | OUTPATIENT
Start: 2018-09-25 | End: 2018-12-09

## 2018-09-25 RX ORDER — LOSARTAN POTASSIUM 100 MG/1
100 TABLET ORAL
Qty: 90 TAB | Refills: 3 | Status: SHIPPED | OUTPATIENT
Start: 2018-09-25 | End: 2019-04-12

## 2018-09-25 RX ORDER — LOSARTAN POTASSIUM 50 MG/1
100 TABLET ORAL
Status: DISCONTINUED | OUTPATIENT
Start: 2018-09-25 | End: 2018-09-25 | Stop reason: HOSPADM

## 2018-09-25 RX ADMIN — AMLODIPINE BESYLATE 10 MG: 5 TABLET ORAL at 04:59

## 2018-09-25 RX ADMIN — HEPARIN SODIUM 1500 UNITS: 1000 INJECTION, SOLUTION INTRAVENOUS; SUBCUTANEOUS at 06:58

## 2018-09-25 RX ADMIN — HEPARIN SODIUM 5000 UNITS: 5000 INJECTION, SOLUTION INTRAVENOUS; SUBCUTANEOUS at 04:59

## 2018-09-25 ASSESSMENT — ENCOUNTER SYMPTOMS
COUGH: 1
WHEEZING: 0
FEVER: 0
HEADACHES: 0
NAUSEA: 0
CHILLS: 0
SPUTUM PRODUCTION: 1
DIARRHEA: 0
MYALGIAS: 0
DIZZINESS: 0
ABDOMINAL PAIN: 0
SORE THROAT: 0
BACK PAIN: 0
VOMITING: 0
SHORTNESS OF BREATH: 0

## 2018-09-25 ASSESSMENT — PAIN SCALES - GENERAL
PAINLEVEL_OUTOF10: 0

## 2018-09-25 NOTE — HEART FAILURE PROGRAM
Cardiovascular Nurse Navigator () Advanced Heart Failure Program Inpatient Progress Note:     Chief Complaint: SOB    Please note that patient was diagnosed with HF by the ER Physician. Echo from 2017 does not indicate systolic or diastolic dysfunction, no repeat echo ordered at this time.    This patient has had 9 encounters with cardiology over the past three years, heart failure was never diagnosed in these encounters.    Message sent to Dr. Hampton inquiring as to whether or not heart failure is ruled out for this patient. Awaiting response.    If HF is ruled out, will need attending physician to specifically say so in their note. If this does not happen, this patient will still code for heart failure solely based upon the ER Physician's diagnosis.      Addendum 9/25/18 1001:    Discussed with Dr. Hampton. Although the ER Physician diagnosed heart failure initially, it has been found that patient's fluid overload is caused by ESRD and non compliance with salt and water restrictions, not HF.     Dr. Hampton will note this per our discussion. If this does not happen, this patient will still code for heart failure solely based upon the ER Physician's diagnosis.

## 2018-09-25 NOTE — PROGRESS NOTES
Patient ambulated around unit and sat at EOB monitored on room air, SpO2 96-99% throughout. Patient denies SOB.

## 2018-09-25 NOTE — PROGRESS NOTES
2 RN skin check    Small bruises to bilateral forearms. Bruising also noted to L shin. Distal portion of R stump appears pink, is blanching. LUE AV fistula with tape dressing c/d/i. Skin otherwise intact.

## 2018-09-25 NOTE — CARE PLAN
Problem: Safety  Goal: Will remain free from falls  Outcome: PROGRESSING AS EXPECTED  Patient educated to call for assistance to get out of bed. Call light and personal belongings within reach. Bed in low, locked position with siderails upx2. Treaded slipper sock on patient and prosthesis on RLE.     Problem: Respiratory:  Goal: Respiratory status will improve  Outcome: PROGRESSING AS EXPECTED  Patient on 1 L nasal cannula. Encouraging cough and deep breathe. Patient educated on use of incentive spirometer, able to pull about 2500.

## 2018-09-25 NOTE — CARE PLAN
Problem: Safety  Goal: Will remain free from injury  Bed is in low and locked position. Bed rails up x 3. Bed alarm on. Patient instructed to call for assistance when needed.      Problem: Respiratory:  Goal: Respiratory status will improve  Patient denies SOB. SpO2 99% on 3L O2 nasal cannula, weaning down as tolerated. Fine crackles auscultated in bilateral lower lobes.

## 2018-09-25 NOTE — PROGRESS NOTES
Patient discharged to home via car with boyfriend. Exited hospital via wheelchair with escort. Reviewed discharge instructions with patient and verbalized understanding. All belongings sent with patient.

## 2018-09-25 NOTE — PROGRESS NOTES
Critical Care Progress Note    Date of admission  9/24/2018    Chief Complaint  53 y.o. female admitted 9/24/2018 with acute hypoxic respiratory failure    Hospital Course  53 y.o. female who presented 9/24/2018 with history of hypertensive nephropathy and end-stage renal disease requiring hemodialysis on Monday, Wednesday, Friday.  Presents prior to her dialysis today for worsening shortness of breath, she reports she ate more salty foods this weekend than usual and also may have drank more water.  She was reportedly hypoxic at 85% on room air and was placed on 4 L prior to coming to the emergency room.  In the emergency room she was placed on BiPAP with substantial improvement in her shortness of breath and hypoxia.  Chest x-ray confirms vascular congestion and pulmonary edema.  Nephrology has been contacted for urgent dialysis and 80 mg of Lasix has been given as the patient still has residual urine output.  9/25/2018    Interval Problem Update  Reviewed last 24 hour events:   - NAEON, now on 2L   - Neuro: AOx4   - HR: SR 70s   - BP: 140s-190s systolic   - GI: tolerating renal diet   - UOP: oliguric   - Murdock: no   - Tm: afeb   - Lines: PIV   - PPx: GI not indicated, DVT heparin   - weaned off of oxygen after dialysis    Review of Systems  Review of Systems   Constitutional: Positive for unexpected weight change. Negative for fatigue and fever.   HENT: Negative for sore throat and voice change.    Eyes: Negative for visual disturbance.   Respiratory: Positive for shortness of breath (resolved after dialysis). Negative for cough and stridor.    Cardiovascular: Negative for chest pain, palpitations and leg swelling.   Gastrointestinal: Negative for abdominal distention, abdominal pain, nausea and vomiting.   Genitourinary: Positive for difficulty urinating (anuric).   Musculoskeletal: Negative for arthralgias and myalgias.   Skin: Negative for rash.   Neurological: Negative for seizures and weakness.    Psychiatric/Behavioral: Negative for confusion.        Vital Signs for last 24 hours   Temp:  [36.2 °C (97.2 °F)-37 °C (98.6 °F)] 36.7 °C (98.1 °F)  Pulse:  [64-93] 84  Resp:  [16-43] 31  BP: (178-215)/(100-123) 178/100    Hemodynamic parameters for last 24 hours       Vent Settings for last 24 hours       Physical Exam   Physical Exam   Constitutional: She is oriented to person, place, and time. She appears well-developed and well-nourished. No distress.   HENT:   Head: Normocephalic and atraumatic.   Right Ear: External ear normal.   Left Ear: External ear normal.   Nose: Nose normal.   Mouth/Throat: Oropharynx is clear and moist.   Eyes: Pupils are equal, round, and reactive to light. Conjunctivae and EOM are normal.   Neck: Neck supple. No JVD present. No tracheal deviation present.   Cardiovascular: Normal rate, regular rhythm, normal heart sounds and intact distal pulses.    Pulmonary/Chest: Effort normal and breath sounds normal. No respiratory distress. She has no rales.   Abdominal: Soft. Bowel sounds are normal. She exhibits no distension. There is no tenderness.   Musculoskeletal: She exhibits edema (trace).   Right BKA, LUE fistula   Neurological: She is alert and oriented to person, place, and time. She exhibits normal muscle tone. Coordination normal.   Skin: Skin is warm and dry. No rash noted.   Psychiatric: She has a normal mood and affect.   Nursing note and vitals reviewed.      Medications  Current Facility-Administered Medications   Medication Dose Route Frequency Provider Last Rate Last Dose   • HEPARIN SODIUM (PORCINE) 1000 UNIT/ML INJ SOLN            • amLODIPine (NORVASC) tablet 10 mg  10 mg Oral Q DAY Cindy Mari, D.O.   10 mg at 09/25/18 0459   • losartan (COZAAR) tablet 50 mg  50 mg Oral QHS Cindy Mari, D.O.   50 mg at 09/24/18 2005   • senna-docusate (PERICOLACE or SENOKOT S) 8.6-50 MG per tablet 2 Tab  2 Tab Oral BID Kathleen Corrales M.D.   Stopped at 09/24/18 1800     And   • polyethylene glycol/lytes (MIRALAX) PACKET 1 Packet  1 Packet Oral QDAY PRN Kathleen Corrales M.D.        And   • bisacodyl (DULCOLAX) suppository 10 mg  10 mg Rectal QDAY PRN Kathleen Corrales M.D.       • heparin injection 5,000 Units  5,000 Units Subcutaneous Q8HRS Kathleen Corrales M.D.   5,000 Units at 09/25/18 0459   • heparin injection 1,800 Units  1,800 Units Intravenous DIALYSIS PRN Cindy Mari D.OHannah   1,800 Units at 09/24/18 1157   • cloNIDine (CATAPRES) 0.1 MG/24HR 1 Patch  1 Patch Transdermal Q7 DAYS Kathleen Corrales M.D.   1 Patch at 09/24/18 1742   • calcium carbonate (TUMS) chewable tab 500 mg  500 mg Oral 4X/DAY PRN Yayo Hampton Jr., D.O.   500 mg at 09/24/18 1743       Fluids    Intake/Output Summary (Last 24 hours) at 09/25/18 0747  Last data filed at 09/25/18 0600   Gross per 24 hour   Intake              560 ml   Output             3175 ml   Net            -2615 ml       Laboratory  Recent Results (from the past 48 hour(s))   CBC WITH DIFFERENTIAL    Collection Time: 09/24/18  9:00 AM   Result Value Ref Range    WBC 6.1 4.8 - 10.8 K/uL    RBC 3.02 (L) 4.20 - 5.40 M/uL    Hemoglobin 8.9 (L) 12.0 - 16.0 g/dL    Hematocrit 26.5 (L) 37.0 - 47.0 %    MCV 87.7 81.4 - 97.8 fL    MCH 29.5 27.0 - 33.0 pg    MCHC 33.6 33.6 - 35.0 g/dL    RDW 45.4 35.9 - 50.0 fL    Platelet Count 130 (L) 164 - 446 K/uL    MPV 10.9 9.0 - 12.9 fL    Neutrophils-Polys 72.70 (H) 44.00 - 72.00 %    Lymphocytes 19.60 (L) 22.00 - 41.00 %    Monocytes 5.10 0.00 - 13.40 %    Eosinophils 2.00 0.00 - 6.90 %    Basophils 0.30 0.00 - 1.80 %    Immature Granulocytes 0.30 0.00 - 0.90 %    Nucleated RBC 0.00 /100 WBC    Neutrophils (Absolute) 4.46 2.00 - 7.15 K/uL    Lymphs (Absolute) 1.20 1.00 - 4.80 K/uL    Monos (Absolute) 0.31 0.00 - 0.85 K/uL    Eos (Absolute) 0.12 0.00 - 0.51 K/uL    Baso (Absolute) 0.02 0.00 - 0.12 K/uL    Immature Granulocytes (abs) 0.02 0.00 - 0.11 K/uL    NRBC (Absolute) 0.00 K/uL   COMP  METABOLIC PANEL    Collection Time: 18  9:00 AM   Result Value Ref Range    Sodium 138 135 - 145 mmol/L    Potassium 5.5 3.6 - 5.5 mmol/L    Chloride 104 96 - 112 mmol/L    Co2 24 20 - 33 mmol/L    Anion Gap 10.0 0.0 - 11.9    Glucose 80 65 - 99 mg/dL    Bun 57 (H) 8 - 22 mg/dL    Creatinine 7.49 (HH) 0.50 - 1.40 mg/dL    Calcium 9.3 8.5 - 10.5 mg/dL    AST(SGOT) 8 (L) 12 - 45 U/L    ALT(SGPT) 7 2 - 50 U/L    Alkaline Phosphatase 46 30 - 99 U/L    Total Bilirubin 0.5 0.1 - 1.5 mg/dL    Albumin 3.1 (L) 3.2 - 4.9 g/dL    Total Protein 5.7 (L) 6.0 - 8.2 g/dL    Globulin 2.6 1.9 - 3.5 g/dL    A-G Ratio 1.2 g/dL   TROPONIN    Collection Time: 18  9:00 AM   Result Value Ref Range    Troponin I 0.02 0.00 - 0.04 ng/mL   ESTIMATED GFR    Collection Time: 18  9:00 AM   Result Value Ref Range    GFR If  7 (A) >60 mL/min/1.73 m 2    GFR If Non African American 6 (A) >60 mL/min/1.73 m 2   BTYPE NATRIURETIC PEPTIDE    Collection Time: 18  9:00 AM   Result Value Ref Range    B Natriuretic Peptide 2356 (H) 0 - 100 pg/mL   EKG (NOW)    Collection Time: 18  9:01 AM   Result Value Ref Range    Report       [facility] Emergency Dept[facility]    Test Date:  2018  Pt Name:    MELISSA BARDALES                 Department: ER  MRN:        [id number]                      Room:       RD 11  Gender:     Female                       Technician: [id number]  :        1965                   Requested By:ER TRIAGE PROTOCOL  Order #:    [id number]                    Reading MD:    Measurements  Intervals                                Axis  Rate:       96                           P:          65  TX:         172                          QRS:        44  QRSD:       94                           T:          77  QT:         372  QTc:        471    Interpretive Statements  SINUS RHYTHM  PROBABLE LEFT ATRIAL ABNORMALITY  LEFT VENTRICULAR HYPERTROPHY  Compared to ECG 2018 08:32:13  No  significant changes     VITAMIN D,25 HYDROXY    Collection Time: 09/25/18  4:50 AM   Result Value Ref Range    25-Hydroxy   Vitamin D 25 37 30 - 100 ng/mL   PTH INTACT (PTH ONLY)    Collection Time: 09/25/18  4:50 AM   Result Value Ref Range    Pth, Intact 107.3 (H) 14.0 - 72.0 pg/mL   CBC with Differential    Collection Time: 09/25/18  4:50 AM   Result Value Ref Range    WBC 6.2 4.8 - 10.8 K/uL    RBC 3.28 (L) 4.20 - 5.40 M/uL    Hemoglobin 9.5 (L) 12.0 - 16.0 g/dL    Hematocrit 28.8 (L) 37.0 - 47.0 %    MCV 87.8 81.4 - 97.8 fL    MCH 29.0 27.0 - 33.0 pg    MCHC 33.0 (L) 33.6 - 35.0 g/dL    RDW 45.9 35.9 - 50.0 fL    Platelet Count 131 (L) 164 - 446 K/uL    MPV 10.4 9.0 - 12.9 fL    Neutrophils-Polys 59.70 44.00 - 72.00 %    Lymphocytes 30.40 22.00 - 41.00 %    Monocytes 6.60 0.00 - 13.40 %    Eosinophils 2.90 0.00 - 6.90 %    Basophils 0.20 0.00 - 1.80 %    Immature Granulocytes 0.20 0.00 - 0.90 %    Nucleated RBC 0.00 /100 WBC    Neutrophils (Absolute) 3.70 2.00 - 7.15 K/uL    Lymphs (Absolute) 1.88 1.00 - 4.80 K/uL    Monos (Absolute) 0.41 0.00 - 0.85 K/uL    Eos (Absolute) 0.18 0.00 - 0.51 K/uL    Baso (Absolute) 0.01 0.00 - 0.12 K/uL    Immature Granulocytes (abs) 0.01 0.00 - 0.11 K/uL    NRBC (Absolute) 0.00 K/uL   Basic Metabolic Panel (BMP)    Collection Time: 09/25/18  4:50 AM   Result Value Ref Range    Sodium 138 135 - 145 mmol/L    Potassium 5.2 3.6 - 5.5 mmol/L    Chloride 99 96 - 112 mmol/L    Co2 29 20 - 33 mmol/L    Glucose 92 65 - 99 mg/dL    Bun 39 (H) 8 - 22 mg/dL    Creatinine 6.25 (HH) 0.50 - 1.40 mg/dL    Calcium 9.4 8.5 - 10.5 mg/dL    Anion Gap 10.0 0.0 - 11.9   Magnesium    Collection Time: 09/25/18  4:50 AM   Result Value Ref Range    Magnesium 2.3 1.5 - 2.5 mg/dL   PHOSPHORUS    Collection Time: 09/25/18  4:50 AM   Result Value Ref Range    Phosphorus 5.4 (H) 2.5 - 4.5 mg/dL   URIC ACID    Collection Time: 09/25/18  4:50 AM   Result Value Ref Range    Uric Acid 3.3 1.9 - 8.2 mg/dL    ESTIMATED GFR    Collection Time: 09/25/18  4:50 AM   Result Value Ref Range    GFR If  8 (A) >60 mL/min/1.73 m 2    GFR If Non African American 7 (A) >60 mL/min/1.73 m 2   HEPATITIS PANEL ACUTE(4 COMPONENTS)    Collection Time: 09/25/18  4:50 AM   Result Value Ref Range    Hepatitis B Surface Antigen Negative Negative    Hepatitis C Antibody Negative Negative    Hepatitis B Cors Ab,IgM Negative Negative       Imaging  X-Ray:  I have personally reviewed the images and compared with prior images.    Assessment/Plan  * Acute respiratory failure with hypoxia (HCC)- (present on admission)   Assessment & Plan    - RT/O2 Protocols  - Titrate supplemental FiO2 to maintain SpO2 >92%  - BiPAP for positive pressure support  - PRN ABG  - attempt forced diuresis for now          Acute pulmonary edema (HCC)- (present on admission)   Assessment & Plan    -Due to volume overload in a end-stage renal disease patient  -Noncompliance with water restriction and sodium restriction  -EKG normal, troponin normal, patient complains of no chest pain, Echo in 2017: Left ventricular ejection fraction is visually estimated to be 55%  -Not felt to be due to heart failure  -Resolved on this morning's chest x-ray        Hypertensive urgency- (present on admission)   Assessment & Plan    -Continue home regimen        ESRD on hemodialysis (HCC)- (present on admission)   Assessment & Plan    -Repeat HD today for volume overload, then will likely be stable for discharge        Tobacco use- (present on admission)   Assessment & Plan    Nicotine replacement protocol        Anemia - likely of chronic disease- (present on admission)   Assessment & Plan    - Monitor  - transfuse for Hg <7             VTE:  Heparin  Ulcer: Not Indicated  Lines: None    I have performed a physical exam and reviewed and updated ROS and Plan today (9/25/2018). In review of yesterday's note (9/24/2018), there are no changes except as documented above.      Stable for transfer or discharge today with close follow-up with nephrology and strict return instructions for any worsening    Discussed patient condition and risk of morbidity and/or mortality with RN, RT, Pharmacy, , , [facility] resident, Charge nurse / hot rounds and Patient

## 2018-09-25 NOTE — PROGRESS NOTES
2 RN skin check completed.     Small bruises to bilateral forearms. Bruising also noted to L shin. Distal portion of R stump appears pink, is blanching. Skin otherwise intact.

## 2018-09-25 NOTE — PROGRESS NOTES
2 RN skin check complete.    Small bruises on bilateral forearms and left shin. Distal portion of RLE stump pink and blanching. LUE fistula covered with gauze tape, thrill present.

## 2018-09-25 NOTE — CARE PLAN
Problem: Venous Thromboembolism (VTW)/Deep Vein Thrombosis (DVT) Prevention:  Goal: Patient will participate in Venous Thrombosis (VTE)/Deep Vein Thrombosis (DVT)Prevention Measures  SCD boot applied to LLE. Receiving heparin per MAR for DVT prophylaxis.     Problem: Urinary Elimination:  Goal: Ability to reestablish a normal urinary elimination pattern will improve  Patient received dialysis this am. Able to void 75 ml per bedside commode.

## 2018-09-25 NOTE — DISCHARGE SUMMARY
Internal Medicine Discharge Summary  Note Author: Kathleen Corrales M.D.       Name Isabelle Coyle     1965   Age/Sex 53 y.o. female   MRN [id number]         Admit Date:  2018       Discharge Date:   18    Service:   [facility] Internal Medicine GOLD Team  Attending Physician(s):   Dr. Hampton       Senior Resident(s):   Dr. Corrales  PCP: Juan Moreno M.D.    Primary Diagnosis:     Acute respiratory failure with hypoxia     Secondary Diagnoses:                  ESRD on hemodialysis     Hypertensive urgency     Anemia - likely of chronic disease     Tobacco use      Hospital Summary:       Patient is a pleasant 53 year old lady admitted with increased shortness of breath with volume overload. She admitted to having increased salt and water over the weekend. She has been compliant with meds and hemodialysis Monday, Wednesday and Friday. She was admitted to ICU for BiPAP and hemodialysis x2. She will continue with her normal hemodialysis schedule as outpatient.   She was also found to have hypertension. Her meds were resumed and blood pressure improved with dialysis. His lisinopril was switched to losartan, as lisinopril is filtered out in hemodialysis.   She is currently hemodynamically stable, no longr requiring oxygen. She is medically cleared for discharge with close follow up with her nephrology team and PCP.  She was given education on tobacco use, low salt and water restricted diet before discharge.     Consultants:     Nephrology    Procedures:        Hemodialysis     Imaging/ Testing:      DX-CHEST-PORTABLE (1 VIEW)   Final Result      Findings consistent with mild/moderate vascular congestion and edema.          Discharge Medications:        Medication Reconciliation: Completed        Medication List      START taking these medications      Instructions   calcium carbonate 500 MG Chew  Commonly known as:  TUMS   Take 1 Tab by mouth 4 times a day as needed  (dyspepsia).  Dose:  500 mg     losartan 100 MG Tabs  Commonly known as:  COZAAR   Take 1 Tab by mouth every bedtime.  Dose:  100 mg        CONTINUE taking these medications      Instructions   amLODIPine 5 MG Tabs  Commonly known as:  NORVASC   Take 2 Tabs by mouth every day.  Dose:  10 mg     labetalol 200 MG Tabs  Commonly known as:  NORMODYNE   Take 1 Tab by mouth 2 Times a Day.  Dose:  200 mg     terazosin 5 MG Caps  Commonly known as:  HYTRIN   Take 5 mg by mouth every evening.  Dose:  5 mg        STOP taking these medications    lisinopril 40 MG tablet  Commonly known as:  PRINIVIL, ZESTRIL            Disposition:   home    Diet:   Renal, low salt, fluid restriction 1.2 L    Activity:   As tolerated    Instructions:       Low salt and restricted water diet  The patient was instructed to return to the ER in the event of worsening symptoms. I have counseled the patient on the importance of compliance and the patient has agreed to proceed with all medical recommendations and follow up plan indicated above.   The patient understands that all medications come with benefits and risks. Risks may include permanent injury or death and these risks can be minimized with close reassessment and monitoring.        Primary Care Provider:   Juan Moreno M.D.  Discharge summary faxed to primary care provider:  Completed  Copy of discharge summary given to the patient: Deferred      Follow up appointment details :      Tomorrow hemodialysis with nephrology team    Pending Studies:        none    Time spent on discharge day patient visit, preparing discharge paperwork and arranging for patient follow up.    Summary of follow up issues:   hemodialysis and blood pressure management     Discharge Time (Minutes) :    45min  Hospital Course Type: Inpatient Stay < 2 midnights, patient recovered more rapidly than anticipated      Condition on Discharge     ______________________________________________________________________    Interval history/exam for day of discharge:    Is now only requiring 0-1 L NC.  Feels she can breath much easier - feels ready to go home.   Hemodialysis x 2 today  CXR clear  Cont with current hemodialysis schedule as outpatient - tomorrow Wednesday.   Reinforced low salt and restricted water diet.    Physical Exam   Constitutional: She is oriented to person, place, and time and well-developed, well-nourished, and in no distress.   No longer in respiratory distress   Mouth/Throat: Oropharynx is clear and moist.   Eyes: Pupils are equal, round, and reactive to light. EOM are normal. No scleral icterus.   Neck: Neck supple.   Cardiovascular: Normal rate and regular rhythm.    AV fistula thrill is auscultated   Pulmonary/Chest:   No wheezes or crackles auscultated    Abdominal: Soft. There is no tenderness.   Musculoskeletal:   Trace bilateral lower extremity edema   Neurological: She is alert and oriented to person, place, and time.   Skin: Skin is warm and dry. No rash noted.   Psychiatric: Mood, memory, affect and judgment normal.     Most Recent Labs:    Lab Results   Component Value Date/Time    WBC 6.2 09/25/2018 04:50 AM    RBC 3.28 (L) 09/25/2018 04:50 AM    HEMOGLOBIN 9.5 (L) 09/25/2018 04:50 AM    HEMATOCRIT 28.8 (L) 09/25/2018 04:50 AM    MCV 87.8 09/25/2018 04:50 AM    MCH 29.0 09/25/2018 04:50 AM    MCHC 33.0 (L) 09/25/2018 04:50 AM    MPV 10.4 09/25/2018 04:50 AM    NEUTSPOLYS 59.70 09/25/2018 04:50 AM    LYMPHOCYTES 30.40 09/25/2018 04:50 AM    MONOCYTES 6.60 09/25/2018 04:50 AM    EOSINOPHILS 2.90 09/25/2018 04:50 AM    BASOPHILS 0.20 09/25/2018 04:50 AM    HYPOCHROMIA 1+ 10/04/2013 03:24 PM    ANISOCYTOSIS 3+ 07/14/2007 06:15 AM      Lab Results   Component Value Date/Time    SODIUM 138 09/25/2018 04:50 AM    POTASSIUM 5.2 09/25/2018 04:50 AM    CHLORIDE 99 09/25/2018 04:50 AM    CO2 29 09/25/2018 04:50 AM    GLUCOSE 92  09/25/2018 04:50 AM    BUN 39 (H) 09/25/2018 04:50 AM    CREATININE 6.25 (HH) 09/25/2018 04:50 AM    CREATININE 2.2 (H) 05/06/2009 03:10 AM      Lab Results   Component Value Date/Time    ALTSGPT 7 09/24/2018 09:00 AM    ASTSGOT 8 (L) 09/24/2018 09:00 AM    ALKPHOSPHAT 46 09/24/2018 09:00 AM    TBILIRUBIN 0.5 09/24/2018 09:00 AM    DBILIRUBIN <0.1 06/20/2017 01:19 PM    LIPASE 35 07/29/2018 08:29 AM    ALBUMIN 3.1 (L) 09/24/2018 09:00 AM    GLOBULIN 2.6 09/24/2018 09:00 AM    INR 1.04 07/29/2018 08:29 AM     Lab Results   Component Value Date/Time    PROTHROMBTM 13.3 07/29/2018 08:29 AM    INR 1.04 07/29/2018 08:29 AM

## 2018-09-25 NOTE — PROGRESS NOTES
HD ordered by Dr. Mari. Treatment started at 0704 and ended at 1004.     Total Net UF 2000mL.    Pt tolerated treatment well with no issues. See flow sheet for details. Cannulation needles taken out, held pressure for 10 min and placed gauze dressing with no bleeding. LOR AVF + for bruit/thrill. Report given to primary RN.

## 2018-09-25 NOTE — PROGRESS NOTES
Nephrology Daily Progress Note    Date of Service  9/25/2018    HISTORY OF PRESENT ILLNESS:  The patient is a 53-year-old female with   end-stage renal disease, on hemodialysis via left upper arm AV fistula Monday,   Wednesday, Friday.  Last hemodialysis was Friday.  She denies any issues with   dialysis or with her AV fistula.  She reports she did consume a high salt   diet over the weekend.  She woke at approximately 3:00 this morning with   significant shortness of breath.  She denies any chest pain, no abdominal   pain.  She has had diarrhea as well as nausea, vomiting, and increased edema.    She denies any fevers, chills, or sweats.  She is not reporting any ill   contacts.  She does report compliance with her hemodialysis schedule as well   as with her medications.  The patient was brought in by ambulance today, she   was found to be hypoxic at 85% on room air and was placed on 4 liters of   oxygen.  Subsequently, she was placed on BiPAP.    Interval Problem Update  09/24/18 - consult done  09/25/18 - tolerated HD, now on PUF, off BiPAP, SBP still elevated but improved, feels significantly improved    Review of Systems  Review of Systems   Constitutional: Negative for chills and fever.   HENT: Positive for congestion. Negative for sore throat.    Respiratory: Positive for cough and sputum production. Negative for shortness of breath and wheezing.    Cardiovascular: Negative for chest pain and leg swelling.   Gastrointestinal: Negative for abdominal pain, diarrhea, nausea and vomiting.   Musculoskeletal: Negative for back pain and myalgias.   Skin: Negative for itching.   Neurological: Negative for dizziness and headaches.        Physical Exam  Temp:  [36.2 °C (97.2 °F)-37 °C (98.6 °F)] 36.7 °C (98.1 °F)  Pulse:  [64-93] 84  Resp:  [16-43] 31  BP: (178-215)/(100-123) 178/100    Physical Exam   Constitutional: She is oriented to person, place, and time. She appears well-developed and well-nourished. No  distress.   HENT:   Head: Normocephalic and atraumatic.   Eyes: Conjunctivae and EOM are normal. Right eye exhibits no discharge. Left eye exhibits no discharge. No scleral icterus.   Neck: No tracheal deviation present.   Cardiovascular: Normal rate and regular rhythm.    Pulmonary/Chest: Effort normal. No respiratory distress. She has no wheezes. She has rales.   Abdominal: She exhibits no distension. There is no tenderness. There is no rebound and no guarding.   Musculoskeletal: She exhibits no edema.   Neurological: She is alert and oriented to person, place, and time.   Skin: Skin is warm and dry. She is not diaphoretic. No erythema.   Psychiatric: She has a normal mood and affect. Her behavior is normal. Judgment and thought content normal.       Fluids    Intake/Output Summary (Last 24 hours) at 09/25/18 0807  Last data filed at 09/25/18 0800   Gross per 24 hour   Intake              560 ml   Output             3175 ml   Net            -2615 ml       Laboratory  Recent Labs      09/24/18   0900 09/25/18   0450   WBC  6.1  6.2   RBC  3.02*  3.28*   HEMOGLOBIN  8.9*  9.5*   HEMATOCRIT  26.5*  28.8*   MCV  87.7  87.8   MCH  29.5  29.0   MCHC  33.6  33.0*   RDW  45.4  45.9   PLATELETCT  130*  131*   MPV  10.9  10.4     Recent Labs      09/24/18   0900  09/25/18   0450   SODIUM  138  138   POTASSIUM  5.5  5.2   CHLORIDE  104  99   CO2  24  29   GLUCOSE  80  92   BUN  57*  39*   CREATININE  7.49*  6.25*   CALCIUM  9.3  9.4         Recent Labs      09/24/18   0900   BNPBTYPENAT  2356*           Imaging   reviewed    Assessment/Plan     ASSESSMENT AND PLAN:  1.  End-stage renal disease.  Continue qMWF scheduled, with additional HD/PUF prn    2.  Hypertension.  Improved with improved volume status. PUF today, HD with UF tomorrow. Losartan increased to 100mg qhs. Continue CCB and clonidine.     3.  Hypervolemia. Continue low salt diet, continue fluid restriction. UF with HD, PUF today. Have d/w pt multiple times  importance of low salt diet.    4.  Anemia of chronic kidney disease. Mild. Defer iron studies for the time being.    No WOO in the setting of hypertension. Transfuse hgb<7.    5.  Chronic kidney disease mineral bone disorder. MBD labs reasonable, low phos diet.     6.  Acute respiratory failure. Off BiPAP, improved with volume off. PUF today, UF with HD tomorrow. Pulm following.     7.  Hyperkalemia.  Improved. Low K diet, will utilize low K bath with HD. If ongoing issues, will have to consider change of ARB    8.  Thrombocytopenia.  Mild, stable, monitor.     Other management per primary service.

## 2018-09-25 NOTE — DISCHARGE INSTRUCTIONS
Discharge Instructions    Discharged to home by car with boyfriend. Discharged via wheelchair, hospital escort: Yes.  Special equipment needed: Not Applicable    Be sure to schedule a follow-up appointment with your primary care doctor or any specialists as instructed.     Discharge Plan:   Diet Plan: Discussed  Activity Level: Discussed  Confirmed Follow up Appointment: Patient to Call and Schedule Appointment  Confirmed Symptoms Management: Discussed  Medication Reconciliation Updated: Yes  Influenza Vaccine Indication: Patient Refuses    I understand that a diet low in cholesterol, fat, and sodium is recommended for good health. Unless I have been given specific instructions below for another diet, I accept this instruction as my diet prescription.   Other diet: Renal, Low salt, 1.2 L fluid restriction diet  Activity: As tolerated  Special Instructions: None    · Is patient discharged on Warfarin / Coumadin?   No     Depression / Suicide Risk    As you are discharged from this [facility] facility, it is important to learn how to keep safe from harming yourself.    Recognize the warning signs:  · Abrupt changes in personality, positive or negative- including increase in energy   · Giving away possessions  · Change in eating patterns- significant weight changes-  positive or negative  · Change in sleeping patterns- unable to sleep or sleeping all the time   · Unwillingness or inability to communicate  · Depression  · Unusual sadness, discouragement and loneliness  · Talk of wanting to die  · Neglect of personal appearance   · Rebelliousness- reckless behavior  · Withdrawal from people/activities they love  · Confusion- inability to concentrate     If you or a loved one observes any of these behaviors or has concerns about self-harm, here's what you can do:  · Talk about it- your feelings and reasons for harming yourself  · Remove any means that you might use to hurt yourself (examples: pills, rope, extension  cords, firearm)  · Get professional help from the community (Mental Health, Substance Abuse, psychological counseling)  · Do not be alone:Call your Safe Contact- someone whom you trust who will be there for you.  · Call your local CRISIS HOTLINE 941-7380 or 761-454-1050  · Call your local Children's Mobile Crisis Response Team [place] (500) 579-2322 or www.CoreOS  · Call the toll free National Suicide Prevention Hotlines   · National Suicide Prevention Lifeline 029-363-QTUH (3978)  · National Hope Line Network 800-SUICIDE (598-0674)

## 2018-09-26 ENCOUNTER — PATIENT OUTREACH (OUTPATIENT)
Dept: HEALTH INFORMATION MANAGEMENT | Facility: OTHER | Age: 53
End: 2018-09-26

## 2018-09-26 NOTE — PROGRESS NOTES
Placed discharge outreach phone call to pt s/p hospital discharge 9/25/18.  Left voicemail providing my contact information and instructions to call with any questions or concerns.

## 2018-09-27 PROBLEM — J81.0 ACUTE PULMONARY EDEMA (HCC): Status: ACTIVE | Noted: 2018-09-27

## 2018-09-27 ASSESSMENT — ENCOUNTER SYMPTOMS
ARTHRALGIAS: 0
NAUSEA: 0
WEAKNESS: 0
PALPITATIONS: 0
COUGH: 0
STRIDOR: 0
VOICE CHANGE: 0
SEIZURES: 0
FATIGUE: 0
MYALGIAS: 0
UNEXPECTED WEIGHT CHANGE: 1
VOMITING: 0
FEVER: 0
ABDOMINAL DISTENTION: 0
CONFUSION: 0
SORE THROAT: 0
SHORTNESS OF BREATH: 1
ABDOMINAL PAIN: 0

## 2018-09-27 NOTE — ASSESSMENT & PLAN NOTE
-Due to volume overload in a end-stage renal disease patient  -Noncompliance with water restriction and sodium restriction  -EKG normal, troponin normal, patient complains of no chest pain, Echo in 2017: Left ventricular ejection fraction is visually estimated to be 55%  -Not felt to be due to heart failure  -Resolved on this morning's chest x-ray

## 2018-10-04 LAB
HAV IGM SERPL QL IA: NEGATIVE
HBV CORE IGM SER QL: NEGATIVE
HBV SURFACE AG SER QL: NEGATIVE
HCV AB SER QL: NEGATIVE

## 2018-11-08 NOTE — ED NOTES
Olinda Bello [address]            11/8/2018      Dear April,    Recently you were referred to our Gastroenterology Department by your primary care provider for colorectal cancer screening. I would like to share some important information about a colonoscopy. Colorectal cancer is a leading cause of cancer death in this country. Colorectal cancer can be stopped in its tracks or even prevented with screening tests. Our office attempted to contact you by phone on 11/8/18. Please call [phone] to schedule your procedure with one of our Gastroenterology providers at either [facility] in [facility] or [facility] in [place]. If you already spoke to someone in the GI Department and have your procedure scheduled you can disregard this letter. If you decide to have your colonoscopy elsewhere, please call us at [phone] with the information so that we can update your record. Your good health is important to us, a colonoscopy is important for you.     Sincerely,      HANG GAVIN [facility]  [address] [facility] [address] Pt medicated per MD's orders.  Awaiting xray

## 2018-12-09 ENCOUNTER — HOSPITAL ENCOUNTER (OUTPATIENT)
Facility: MEDICAL CENTER | Age: 53
End: 2018-12-10
Attending: EMERGENCY MEDICINE | Admitting: HOSPITALIST
Payer: MEDICAID

## 2018-12-09 ENCOUNTER — APPOINTMENT (OUTPATIENT)
Dept: RADIOLOGY | Facility: MEDICAL CENTER | Age: 53
End: 2018-12-09
Attending: EMERGENCY MEDICINE
Payer: MEDICAID

## 2018-12-09 DIAGNOSIS — E83.39 HYPERPHOSPHATEMIA: ICD-10-CM

## 2018-12-09 DIAGNOSIS — R06.02 SOB (SHORTNESS OF BREATH): ICD-10-CM

## 2018-12-09 DIAGNOSIS — E87.79 OTHER HYPERVOLEMIA: ICD-10-CM

## 2018-12-09 DIAGNOSIS — E87.5 HYPERKALEMIA: ICD-10-CM

## 2018-12-09 DIAGNOSIS — E83.41 HYPERMAGNESEMIA: ICD-10-CM

## 2018-12-09 LAB
ALBUMIN SERPL BCP-MCNC: 4.1 G/DL (ref 3.2–4.9)
ALBUMIN/GLOB SERPL: 1.5 G/DL
ALP SERPL-CCNC: 60 U/L (ref 30–99)
ALT SERPL-CCNC: 20 U/L (ref 2–50)
ANION GAP SERPL CALC-SCNC: 19 MMOL/L (ref 0–11.9)
AST SERPL-CCNC: 12 U/L (ref 12–45)
BASOPHILS # BLD AUTO: 0.7 % (ref 0–1.8)
BASOPHILS # BLD: 0.05 K/UL (ref 0–0.12)
BILIRUB SERPL-MCNC: 0.9 MG/DL (ref 0.1–1.5)
BLOOD CULTURE HOLD CXBCH: NORMAL
BNP SERPL-MCNC: 4501 PG/ML (ref 0–100)
BUN SERPL-MCNC: 106 MG/DL (ref 8–22)
CALCIUM SERPL-MCNC: 9.9 MG/DL (ref 8.5–10.5)
CHLORIDE SERPL-SCNC: 100 MMOL/L (ref 96–112)
CO2 SERPL-SCNC: 21 MMOL/L (ref 20–33)
CREAT SERPL-MCNC: 13.26 MG/DL (ref 0.5–1.4)
EKG IMPRESSION: NORMAL
EOSINOPHIL # BLD AUTO: 0.18 K/UL (ref 0–0.51)
EOSINOPHIL NFR BLD: 2.7 % (ref 0–6.9)
ERYTHROCYTE [DISTWIDTH] IN BLOOD BY AUTOMATED COUNT: 55.9 FL (ref 35.9–50)
GLOBULIN SER CALC-MCNC: 2.7 G/DL (ref 1.9–3.5)
GLUCOSE SERPL-MCNC: 124 MG/DL (ref 65–99)
HAV IGM SERPL QL IA: NEGATIVE
HBV CORE IGM SER QL: NEGATIVE
HBV SURFACE AB SERPL IA-ACNC: <3.1 MIU/ML (ref 0–10)
HBV SURFACE AG SER QL: NEGATIVE
HCT VFR BLD AUTO: 34.2 % (ref 37–47)
HCV AB SER QL: NEGATIVE
HGB BLD-MCNC: 10.7 G/DL (ref 12–16)
IMM GRANULOCYTES # BLD AUTO: 0.01 K/UL (ref 0–0.11)
IMM GRANULOCYTES NFR BLD AUTO: 0.1 % (ref 0–0.9)
LYMPHOCYTES # BLD AUTO: 1.39 K/UL (ref 1–4.8)
LYMPHOCYTES NFR BLD: 20.6 % (ref 22–41)
MAGNESIUM SERPL-MCNC: 2.9 MG/DL (ref 1.5–2.5)
MCH RBC QN AUTO: 31.2 PG (ref 27–33)
MCHC RBC AUTO-ENTMCNC: 31.3 G/DL (ref 33.6–35)
MCV RBC AUTO: 99.7 FL (ref 81.4–97.8)
MONOCYTES # BLD AUTO: 0.32 K/UL (ref 0–0.85)
MONOCYTES NFR BLD AUTO: 4.7 % (ref 0–13.4)
NEUTROPHILS # BLD AUTO: 4.81 K/UL (ref 2–7.15)
NEUTROPHILS NFR BLD: 71.2 % (ref 44–72)
NRBC # BLD AUTO: 0 K/UL
NRBC BLD-RTO: 0 /100 WBC
PHOSPHATE SERPL-MCNC: 11.3 MG/DL (ref 2.5–4.5)
PLATELET # BLD AUTO: 149 K/UL (ref 164–446)
PMV BLD AUTO: 9.8 FL (ref 9–12.9)
POTASSIUM SERPL-SCNC: 6.3 MMOL/L (ref 3.6–5.5)
PROT SERPL-MCNC: 6.8 G/DL (ref 6–8.2)
RBC # BLD AUTO: 3.43 M/UL (ref 4.2–5.4)
SODIUM SERPL-SCNC: 140 MMOL/L (ref 135–145)
TROPONIN I SERPL-MCNC: 0.04 NG/ML (ref 0–0.04)
WBC # BLD AUTO: 6.8 K/UL (ref 4.8–10.8)

## 2018-12-09 PROCEDURE — 84100 ASSAY OF PHOSPHORUS: CPT

## 2018-12-09 PROCEDURE — 304561 HCHG STAT O2

## 2018-12-09 PROCEDURE — 700105 HCHG RX REV CODE 258: Performed by: HOSPITALIST

## 2018-12-09 PROCEDURE — 96365 THER/PROPH/DIAG IV INF INIT: CPT | Mod: XU

## 2018-12-09 PROCEDURE — 700102 HCHG RX REV CODE 250 W/ 637 OVERRIDE(OP): Performed by: STUDENT IN AN ORGANIZED HEALTH CARE EDUCATION/TRAINING PROGRAM

## 2018-12-09 PROCEDURE — 700101 HCHG RX REV CODE 250: Performed by: EMERGENCY MEDICINE

## 2018-12-09 PROCEDURE — 700111 HCHG RX REV CODE 636 W/ 250 OVERRIDE (IP): Performed by: HOSPITALIST

## 2018-12-09 PROCEDURE — 99220 PR INITIAL OBSERVATION CARE,LEVL III: CPT | Performed by: HOSPITALIST

## 2018-12-09 PROCEDURE — G0378 HOSPITAL OBSERVATION PER HR: HCPCS

## 2018-12-09 PROCEDURE — 80074 ACUTE HEPATITIS PANEL: CPT

## 2018-12-09 PROCEDURE — A9270 NON-COVERED ITEM OR SERVICE: HCPCS | Performed by: STUDENT IN AN ORGANIZED HEALTH CARE EDUCATION/TRAINING PROGRAM

## 2018-12-09 PROCEDURE — 85025 COMPLETE CBC W/AUTO DIFF WBC: CPT

## 2018-12-09 PROCEDURE — 94640 AIRWAY INHALATION TREATMENT: CPT

## 2018-12-09 PROCEDURE — 80053 COMPREHEN METABOLIC PANEL: CPT

## 2018-12-09 PROCEDURE — 84484 ASSAY OF TROPONIN QUANT: CPT

## 2018-12-09 PROCEDURE — 94760 N-INVAS EAR/PLS OXIMETRY 1: CPT

## 2018-12-09 PROCEDURE — 71045 X-RAY EXAM CHEST 1 VIEW: CPT

## 2018-12-09 PROCEDURE — 99285 EMERGENCY DEPT VISIT HI MDM: CPT

## 2018-12-09 PROCEDURE — 83880 ASSAY OF NATRIURETIC PEPTIDE: CPT

## 2018-12-09 PROCEDURE — 83735 ASSAY OF MAGNESIUM: CPT

## 2018-12-09 PROCEDURE — 93005 ELECTROCARDIOGRAM TRACING: CPT | Performed by: HOSPITALIST

## 2018-12-09 PROCEDURE — 86706 HEP B SURFACE ANTIBODY: CPT

## 2018-12-09 PROCEDURE — 90935 HEMODIALYSIS ONE EVALUATION: CPT

## 2018-12-09 RX ORDER — TERAZOSIN 5 MG/1
5 CAPSULE ORAL EVERY EVENING
Status: DISCONTINUED | OUTPATIENT
Start: 2018-12-09 | End: 2018-12-10 | Stop reason: HOSPADM

## 2018-12-09 RX ORDER — IPRATROPIUM BROMIDE AND ALBUTEROL SULFATE 2.5; .5 MG/3ML; MG/3ML
3 SOLUTION RESPIRATORY (INHALATION)
Status: COMPLETED | OUTPATIENT
Start: 2018-12-09 | End: 2018-12-09

## 2018-12-09 RX ORDER — AMOXICILLIN 250 MG
2 CAPSULE ORAL 2 TIMES DAILY
Status: DISCONTINUED | OUTPATIENT
Start: 2018-12-09 | End: 2018-12-10 | Stop reason: HOSPADM

## 2018-12-09 RX ORDER — LOSARTAN POTASSIUM 50 MG/1
100 TABLET ORAL
Status: DISCONTINUED | OUTPATIENT
Start: 2018-12-09 | End: 2018-12-10 | Stop reason: HOSPADM

## 2018-12-09 RX ORDER — LABETALOL HYDROCHLORIDE 5 MG/ML
10 INJECTION, SOLUTION INTRAVENOUS EVERY 4 HOURS PRN
Status: DISCONTINUED | OUTPATIENT
Start: 2018-12-09 | End: 2018-12-10 | Stop reason: HOSPADM

## 2018-12-09 RX ORDER — LABETALOL 200 MG/1
200 TABLET, FILM COATED ORAL 2 TIMES DAILY
Status: DISCONTINUED | OUTPATIENT
Start: 2018-12-09 | End: 2018-12-10 | Stop reason: HOSPADM

## 2018-12-09 RX ORDER — BISACODYL 10 MG
10 SUPPOSITORY, RECTAL RECTAL
Status: DISCONTINUED | OUTPATIENT
Start: 2018-12-09 | End: 2018-12-10 | Stop reason: HOSPADM

## 2018-12-09 RX ORDER — NICOTINE 21 MG/24HR
14 PATCH, TRANSDERMAL 24 HOURS TRANSDERMAL
Status: DISCONTINUED | OUTPATIENT
Start: 2018-12-09 | End: 2018-12-10 | Stop reason: HOSPADM

## 2018-12-09 RX ORDER — GABAPENTIN 100 MG/1
200 CAPSULE ORAL 2 TIMES DAILY
Status: ON HOLD | COMMUNITY
End: 2019-04-14

## 2018-12-09 RX ORDER — POLYETHYLENE GLYCOL 3350 17 G/17G
1 POWDER, FOR SOLUTION ORAL
Status: DISCONTINUED | OUTPATIENT
Start: 2018-12-09 | End: 2018-12-10 | Stop reason: HOSPADM

## 2018-12-09 RX ORDER — GABAPENTIN 100 MG/1
100 CAPSULE ORAL 2 TIMES DAILY
Status: DISCONTINUED | OUTPATIENT
Start: 2018-12-09 | End: 2018-12-10 | Stop reason: HOSPADM

## 2018-12-09 RX ORDER — CALCIUM GLUCONATE 94 MG/ML
1 INJECTION, SOLUTION INTRAVENOUS ONCE
Status: DISCONTINUED | OUTPATIENT
Start: 2018-12-09 | End: 2018-12-09

## 2018-12-09 RX ORDER — AMLODIPINE BESYLATE 10 MG/1
10 TABLET ORAL DAILY
Status: DISCONTINUED | OUTPATIENT
Start: 2018-12-10 | End: 2018-12-10 | Stop reason: HOSPADM

## 2018-12-09 RX ADMIN — IPRATROPIUM BROMIDE AND ALBUTEROL SULFATE 3 ML: .5; 3 SOLUTION RESPIRATORY (INHALATION) at 12:58

## 2018-12-09 RX ADMIN — CALCIUM GLUCONATE 1 G: 98 INJECTION, SOLUTION INTRAVENOUS at 19:36

## 2018-12-09 RX ADMIN — GABAPENTIN 100 MG: 100 CAPSULE ORAL at 20:31

## 2018-12-09 RX ADMIN — LABETALOL HYDROCHLORIDE 200 MG: 200 TABLET, FILM COATED ORAL at 18:34

## 2018-12-09 RX ADMIN — TERAZOSIN HYDROCHLORIDE ANHYDROUS 5 MG: 5 CAPSULE ORAL at 20:31

## 2018-12-09 RX ADMIN — LOSARTAN POTASSIUM 100 MG: 50 TABLET ORAL at 20:31

## 2018-12-09 ASSESSMENT — COGNITIVE AND FUNCTIONAL STATUS - GENERAL
MOBILITY SCORE: 24
DAILY ACTIVITIY SCORE: 24
SUGGESTED CMS G CODE MODIFIER DAILY ACTIVITY: CH
SUGGESTED CMS G CODE MODIFIER MOBILITY: CH

## 2018-12-09 ASSESSMENT — ENCOUNTER SYMPTOMS
WEAKNESS: 0
HEARTBURN: 0
NAUSEA: 0
DIZZINESS: 0
VOMITING: 0
CONSTIPATION: 0
DEPRESSION: 0
DIARRHEA: 0
COUGH: 1
PALPITATIONS: 0
HALLUCINATIONS: 0
ORTHOPNEA: 1
SORE THROAT: 0
SPUTUM PRODUCTION: 1
CHILLS: 0
BLURRED VISION: 0
DOUBLE VISION: 0
SPEECH CHANGE: 0
FEVER: 0
SHORTNESS OF BREATH: 1
HEADACHES: 0
BACK PAIN: 0
BLOOD IN STOOL: 0
NECK PAIN: 0
EYE PAIN: 0
SENSORY CHANGE: 0
ABDOMINAL PAIN: 0
BRUISES/BLEEDS EASILY: 0
LOSS OF CONSCIOUSNESS: 0
MYALGIAS: 0

## 2018-12-09 ASSESSMENT — COPD QUESTIONNAIRES
IN THE PAST 12 MONTHS DO YOU DO LESS THAN YOU USED TO BECAUSE OF YOUR BREATHING PROBLEMS: DISAGREE/UNSURE
DURING THE PAST 4 WEEKS HOW MUCH DID YOU FEEL SHORT OF BREATH: SOME OF THE TIME
HAVE YOU SMOKED AT LEAST 100 CIGARETTES IN YOUR ENTIRE LIFE: YES
COPD SCREENING SCORE: 4
DO YOU EVER COUGH UP ANY MUCUS OR PHLEGM?: NO/ONLY WITH OCCASIONAL COLDS OR INFECTIONS

## 2018-12-09 ASSESSMENT — LIFESTYLE VARIABLES
ALCOHOL_USE: NO
EVER_SMOKED: YES

## 2018-12-09 ASSESSMENT — PAIN SCALES - GENERAL: PAINLEVEL_OUTOF10: 0
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
## 2018-12-09 NOTE — ED NOTES
Med rec updated and complete.  Allergies reviewed.  Pt  Denies antibiotic use in last 30 days at home.

## 2018-12-09 NOTE — ED TRIAGE NOTES
Ambulates to triage  Chief Complaint   Patient presents with   • Shortness of Breath     Pt was not able to get to her dialysis appt on Friday, c/o of SOB and feeling fluid overloaded, was not able to sleep last night because she couldn't lay flat.
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
## 2018-12-09 NOTE — ED PROVIDER NOTES
"ED Provider Note    CHIEF COMPLAINT  Chief Complaint   Patient presents with   • Shortness of Breath       HPI  Isabelle Coyle is a 53 y.o. female who presents for evaluation of shortness of breath.  Patient is a dialysis patient.  She is normally dialyzed Monday Wednesday and Friday.  She states she missed her Friday dialysis and now is having shortness of breath.  She denies any fevers or chills.  She has a mild cough but states she is a smoker and always has a bit of a cough.  She has had no vomiting or diarrhea.    REVIEW OF SYSTEMS  See HPI for further details. All other systems negative.    PAST MEDICAL HISTORY  Past Medical History:   Diagnosis Date   • Anemia    • Anemia associated with chronic renal failure 11/5/2009   • Dental disorder 8-25-17    \"Full Upper & Partial Lower Dentures\"   • Dialysis patient ([facility]) 8-25-17    [facility] M,W,F   • Dysrhythmia     \"SVT\"   • ESRD (end stage renal disease) ([facility]) 8-25-17    Dialysis MWF@ [facility]   • Glomerulonephritis, chronic 11/5/2009   • Heart burn    • High cholesterol    • HTN (hypertension) 11/5/2009    2017 states well controlled   • Port catheter in place 8-25-17    For Dialysis   • SVT (supraventricular tachycardia) ([facility])    • SVT (supraventricular tachycardia) ([facility])        FAMILY HISTORY  Family History   Problem Relation Age of Onset   • Heart Disease Unknown        SOCIAL HISTORY  Social History     Social History   • Marital status:      Spouse name: N/A   • Number of children: N/A   • Years of education: N/A     Social History Main Topics   • Smoking status: Current Every Day Smoker     Packs/day: 0.25     Years: 20.00     Types: Cigarettes   • Smokeless tobacco: Never Used      Comment: 5 cigs/day   • Alcohol use No   • Drug use: No   • Sexual activity: Not on file     Other Topics Concern   • Not on file     Social History Narrative   • No narrative on file       SURGICAL HISTORY  Past Surgical History:   Procedure " "Laterality Date   • AV FISTULA CREATION Left 4/27/2018    Procedure: Left Brachial artery Repair;  Surgeon: Jimbo Davenport M.D.;  Location: SURGERY [facility];  Service: Vascular   • AV FISTULA CREATION Left 8/28/2017    Procedure: AV FISTULA CREATION  UPPER EXTREMITY -BRACHIAL BASALIC;  Surgeon: Stella Rodriguez M.D.;  Location: SURGERY [facility];  Service: General   • AV FISTULA CREATION Left 6/20/2017    Procedure: AV FISTULA CREATION- LEFT;  Surgeon: Jimbo Davenport M.D.;  Location: SURGERY [facility];  Service:    • APPENDECTOMY LAPAROSCOPIC  5/4/2009    Performed by BANDAR TIMMONS at [facility]   • FEMUR ORIF  10/9/08    Performed by STELLA GATES at [facility]   • ILIAC BONE GRAFT  10/9/08    Performed by STELLA GATES at [facility]   • AMPUTATION, BELOW THE KNEE     • CAPITATION PAYMENT (INSURANCE)     • OTHER      BELOW KNEE AMPUTATION RIGHT   • PB ENLARGE BREAST WITH IMPLANT         CURRENT MEDICATIONS  Home Medications     Reviewed by Melissa Palacios R.N. (Registered Nurse) on 12/09/18 at 1143  Med List Status: Partial   Medication Last Dose Status   amLODIPine (NORVASC) 5 MG Tab 12/9/2018 Active   calcium carbonate (TUMS) 500 MG Chew Tab  Active   GABAPENTIN PO 12/9/2018 Active   labetalol (NORMODYNE) 200 MG Tab 12/9/2018 Active   losartan (COZAAR) 100 MG Tab  Active   terazosin (HYTRIN) 5 MG Cap 12/8/2018 Active                ALLERGIES  Allergies   Allergen Reactions   • Sulfa Drugs Hives     [id number] rash swelling itching       PHYSICAL EXAM  VITAL SIGNS: BP (!) 174/101   Pulse 84   Temp 36.6 °C (97.9 °F) (Temporal)   Resp (!) 24   Ht 1.575 m (5' 2\")   Wt 63.7 kg (140 lb 6.9 oz)   SpO2 92%   BMI 25.69 kg/m²   Constitutional: Well developed, Well nourished, No acute distress, Non-toxic appearance.   HENT: Normocephalic, Atraumatic.  Eyes:  EOMI, Conjunctiva normal, No discharge.   Cardiovascular: Normal heart rate, " Normal rhythm, No murmurs, No rubs, No gallops.   Thorax & Lungs: Breath sounds appear full bilaterally.  She does have few faint expiratory wheezes noted.  No crackles noted.  Abdomen: Soft and nontender.   Skin: Warm, Dry.  Extremities: No edema, right BKA  Musculoskeletal: Good range of motion in all major joints.    Neurologic: Awake and alert, No focal deficits noted.           RADIOLOGY/PROCEDURES  DX-CHEST-PORTABLE (1 VIEW)   Final Result      1.  There are findings as described consistent with diffuse pulmonary edema.            COURSE & MEDICAL DECISION MAKING  Pertinent Labs & Imaging studies reviewed. (See chart for details)  Is a 53-year-old here for evaluation of shortness of breath.  She is a dialysis patient who missed her dialysis 2 days ago.  She was last dialyzed 4 days ago.  Laboratories today include chemistries which show BUN of 106 and a creatinine of 13.26.  Bicarb is normal.  Potassium is elevated at 6.3.  Magnesium is elevated at 2.9.  Phosphorus is elevated at 11.3.  BNP is significantly elevated at 4500 with a troponin I of 0.04.  CBC shows a chronic anemia with a hemoglobin of 10.7.  Chest x-ray is consistent with diffuse pulmonary edema.  I discussed the results of all the studies with the patient.  She was treated with a DuoNeb with no improvement.  I discussed the case with Dr. Madrigal of the hospitalist service and he will be the primary admitting physician.  I will contact Dr. Junior who is her nephrologist so that the patient will be dialyzed.    FINAL IMPRESSION  1.  Fluid overload  2.  Hyperkalemia  3.  Hyperphosphatemia  4.  Hypermagnesemia  5.  Shortness of breath  6.  Chronic anemia  7.  Patient required 35 minutes of critical care time         Electronically signed by: Nikolai Wheeler, 12/9/2018 12:24 PM

## 2018-12-10 VITALS
SYSTOLIC BLOOD PRESSURE: 152 MMHG | DIASTOLIC BLOOD PRESSURE: 81 MMHG | BODY MASS INDEX: 24.91 KG/M2 | RESPIRATION RATE: 17 BRPM | TEMPERATURE: 98 F | HEART RATE: 71 BPM | OXYGEN SATURATION: 95 % | WEIGHT: 135.36 LBS | HEIGHT: 62 IN

## 2018-12-10 PROBLEM — E87.70 VOLUME OVERLOAD: Status: ACTIVE | Noted: 2018-12-10

## 2018-12-10 PROBLEM — N19 ACUTE UREMIA: Status: ACTIVE | Noted: 2018-12-10

## 2018-12-10 LAB
ALBUMIN SERPL BCP-MCNC: 3.6 G/DL (ref 3.2–4.9)
ALBUMIN/GLOB SERPL: 1.6 G/DL
ALP SERPL-CCNC: 53 U/L (ref 30–99)
ALT SERPL-CCNC: 16 U/L (ref 2–50)
ANION GAP SERPL CALC-SCNC: 11 MMOL/L (ref 0–11.9)
ANION GAP SERPL CALC-SCNC: 12 MMOL/L (ref 0–11.9)
AST SERPL-CCNC: 10 U/L (ref 12–45)
BASOPHILS # BLD AUTO: 0.7 % (ref 0–1.8)
BASOPHILS # BLD: 0.03 K/UL (ref 0–0.12)
BILIRUB SERPL-MCNC: 1 MG/DL (ref 0.1–1.5)
BNP SERPL-MCNC: 4036 PG/ML (ref 0–100)
BUN SERPL-MCNC: 55 MG/DL (ref 8–22)
BUN SERPL-MCNC: 55 MG/DL (ref 8–22)
CALCIUM SERPL-MCNC: 9.1 MG/DL (ref 8.5–10.5)
CALCIUM SERPL-MCNC: 9.3 MG/DL (ref 8.5–10.5)
CHLORIDE SERPL-SCNC: 97 MMOL/L (ref 96–112)
CHLORIDE SERPL-SCNC: 98 MMOL/L (ref 96–112)
CO2 SERPL-SCNC: 31 MMOL/L (ref 20–33)
CO2 SERPL-SCNC: 31 MMOL/L (ref 20–33)
CREAT SERPL-MCNC: 8.27 MG/DL (ref 0.5–1.4)
CREAT SERPL-MCNC: 8.28 MG/DL (ref 0.5–1.4)
EOSINOPHIL # BLD AUTO: 0.15 K/UL (ref 0–0.51)
EOSINOPHIL NFR BLD: 3.3 % (ref 0–6.9)
ERYTHROCYTE [DISTWIDTH] IN BLOOD BY AUTOMATED COUNT: 51.7 FL (ref 35.9–50)
GLOBULIN SER CALC-MCNC: 2.3 G/DL (ref 1.9–3.5)
GLUCOSE SERPL-MCNC: 90 MG/DL (ref 65–99)
GLUCOSE SERPL-MCNC: 92 MG/DL (ref 65–99)
HCT VFR BLD AUTO: 30.7 % (ref 37–47)
HGB BLD-MCNC: 10 G/DL (ref 12–16)
IMM GRANULOCYTES # BLD AUTO: 0.01 K/UL (ref 0–0.11)
IMM GRANULOCYTES NFR BLD AUTO: 0.2 % (ref 0–0.9)
LACTATE BLD-SCNC: 0.8 MMOL/L (ref 0.5–2)
LYMPHOCYTES # BLD AUTO: 1.42 K/UL (ref 1–4.8)
LYMPHOCYTES NFR BLD: 31.6 % (ref 22–41)
MAGNESIUM SERPL-MCNC: 2.3 MG/DL (ref 1.5–2.5)
MAGNESIUM SERPL-MCNC: 2.3 MG/DL (ref 1.5–2.5)
MCH RBC QN AUTO: 31.2 PG (ref 27–33)
MCHC RBC AUTO-ENTMCNC: 32.6 G/DL (ref 33.6–35)
MCV RBC AUTO: 95.6 FL (ref 81.4–97.8)
MONOCYTES # BLD AUTO: 0.35 K/UL (ref 0–0.85)
MONOCYTES NFR BLD AUTO: 7.8 % (ref 0–13.4)
NEUTROPHILS # BLD AUTO: 2.53 K/UL (ref 2–7.15)
NEUTROPHILS NFR BLD: 56.4 % (ref 44–72)
NRBC # BLD AUTO: 0 K/UL
NRBC BLD-RTO: 0 /100 WBC
PHOSPHATE SERPL-MCNC: 7.9 MG/DL (ref 2.5–4.5)
PHOSPHATE SERPL-MCNC: 8.1 MG/DL (ref 2.5–4.5)
PLATELET # BLD AUTO: 144 K/UL (ref 164–446)
PMV BLD AUTO: 9.7 FL (ref 9–12.9)
POTASSIUM SERPL-SCNC: 5 MMOL/L (ref 3.6–5.5)
POTASSIUM SERPL-SCNC: 5.1 MMOL/L (ref 3.6–5.5)
PROT SERPL-MCNC: 5.9 G/DL (ref 6–8.2)
RBC # BLD AUTO: 3.21 M/UL (ref 4.2–5.4)
SODIUM SERPL-SCNC: 140 MMOL/L (ref 135–145)
SODIUM SERPL-SCNC: 140 MMOL/L (ref 135–145)
WBC # BLD AUTO: 4.5 K/UL (ref 4.8–10.8)

## 2018-12-10 PROCEDURE — 700102 HCHG RX REV CODE 250 W/ 637 OVERRIDE(OP): Performed by: STUDENT IN AN ORGANIZED HEALTH CARE EDUCATION/TRAINING PROGRAM

## 2018-12-10 PROCEDURE — 700101 HCHG RX REV CODE 250: Performed by: STUDENT IN AN ORGANIZED HEALTH CARE EDUCATION/TRAINING PROGRAM

## 2018-12-10 PROCEDURE — 84100 ASSAY OF PHOSPHORUS: CPT | Mod: 91

## 2018-12-10 PROCEDURE — A9270 NON-COVERED ITEM OR SERVICE: HCPCS | Performed by: HOSPITALIST

## 2018-12-10 PROCEDURE — 83735 ASSAY OF MAGNESIUM: CPT | Mod: 91

## 2018-12-10 PROCEDURE — 85025 COMPLETE CBC W/AUTO DIFF WBC: CPT

## 2018-12-10 PROCEDURE — 83880 ASSAY OF NATRIURETIC PEPTIDE: CPT

## 2018-12-10 PROCEDURE — 80048 BASIC METABOLIC PNL TOTAL CA: CPT

## 2018-12-10 PROCEDURE — 80053 COMPREHEN METABOLIC PANEL: CPT

## 2018-12-10 PROCEDURE — 96376 TX/PRO/DX INJ SAME DRUG ADON: CPT

## 2018-12-10 PROCEDURE — A9270 NON-COVERED ITEM OR SERVICE: HCPCS | Performed by: STUDENT IN AN ORGANIZED HEALTH CARE EDUCATION/TRAINING PROGRAM

## 2018-12-10 PROCEDURE — 99217 PR OBSERVATION CARE DISCHARGE: CPT | Performed by: HOSPITALIST

## 2018-12-10 PROCEDURE — 36415 COLL VENOUS BLD VENIPUNCTURE: CPT

## 2018-12-10 PROCEDURE — 96375 TX/PRO/DX INJ NEW DRUG ADDON: CPT

## 2018-12-10 PROCEDURE — 83605 ASSAY OF LACTIC ACID: CPT

## 2018-12-10 PROCEDURE — 700102 HCHG RX REV CODE 250 W/ 637 OVERRIDE(OP): Performed by: HOSPITALIST

## 2018-12-10 PROCEDURE — G0378 HOSPITAL OBSERVATION PER HR: HCPCS

## 2018-12-10 RX ORDER — TERAZOSIN 1 MG/1
2 CAPSULE ORAL ONCE
Status: COMPLETED | OUTPATIENT
Start: 2018-12-10 | End: 2018-12-10

## 2018-12-10 RX ORDER — TERAZOSIN 2 MG/1
2 CAPSULE ORAL DAILY
Qty: 30 CAP | Refills: 3 | Status: ON HOLD | OUTPATIENT
Start: 2018-12-10 | End: 2019-04-14

## 2018-12-10 RX ADMIN — LABETALOL HYDROCHLORIDE 200 MG: 200 TABLET, FILM COATED ORAL at 05:06

## 2018-12-10 RX ADMIN — LABETALOL HYDROCHLORIDE 10 MG: 5 INJECTION, SOLUTION INTRAVENOUS at 01:51

## 2018-12-10 RX ADMIN — LABETALOL HYDROCHLORIDE 10 MG: 5 INJECTION, SOLUTION INTRAVENOUS at 03:21

## 2018-12-10 RX ADMIN — GABAPENTIN 100 MG: 100 CAPSULE ORAL at 05:06

## 2018-12-10 RX ADMIN — TERAZOSIN HYDROCHLORIDE 2 MG: 1 CAPSULE ORAL at 10:20

## 2018-12-10 RX ADMIN — AMLODIPINE BESYLATE 10 MG: 10 TABLET ORAL at 05:06

## 2018-12-10 ASSESSMENT — ENCOUNTER SYMPTOMS
CONSTITUTIONAL NEGATIVE: 1
RESPIRATORY NEGATIVE: 1
CARDIOVASCULAR NEGATIVE: 1
GASTROINTESTINAL NEGATIVE: 1
NEUROLOGICAL NEGATIVE: 1
EYES NEGATIVE: 1
PSYCHIATRIC NEGATIVE: 1

## 2018-12-10 ASSESSMENT — PAIN SCALES - GENERAL
PAINLEVEL_OUTOF10: 0
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
## 2018-12-10 NOTE — CARE PLAN
Problem: Communication  Goal: The ability to communicate needs accurately and effectively will improve    Intervention: Reorient patient to environment as needed  Discussed with patient the importance of utilizing the call light for assistance.  Verbalized understanding and demonstrated use.

## 2018-12-10 NOTE — CARE PLAN
Problem: Safety  Goal: Will remain free from falls    Intervention: Assess risk factors for falls  Assessed patient's fall risk and discussed scoring with her.  Encouraged to call for assistance if weakness or dizziness occurs to prevent injury.  Verbalized understanding.

## 2018-12-10 NOTE — ASSESSMENT & PLAN NOTE
Baseline GFR -ESRD diagnosed 1.5 years ago, cause unknown, likely secondary to hypertension.  No history of diabetes mellitus.   - Patient also reports shrunken kidneys since the time of birth  - Baseline GFR at 5-7 and creatinine of 6-7  - BUN/creatinine at 106/13.2 at the time of presentation  - Patient complains of sudden increase in shortness of breath, chest x-ray shows diffuse bilateral pulmonary edema.  BNP of 4500.  patient is hypertensive with blood pressure of 176/105 at the time of presentation  - Potassium at 6.3, phosphorus at 11.3 and magnesium at 2.9  - Patient is alert and oriented, no signs of encephalopathy; denies nausea, vomiting, bleeding from mucosal membranes are blood in stools.    Plan:  - Nephrology consulted-immediate hemodialysis in the ED  - Blood pressure control, goal <140/90  - Renal diet with salt restriction  - Patient not on any phosphate binders at home, will wait for recommendations by nephrology team  - Avoid nephrotoxic drugs

## 2018-12-10 NOTE — ASSESSMENT & PLAN NOTE
-Patient's blood pressure was at 176/105 at the time of admission, likely secondary to ESRD  -Patient is fluid overloaded with pulmonary edema after missing her last dialysis session  -Patient denied symptoms of headache, dizziness, chest pain, palpitations, nausea or vomiting     Plan:  -Restart home meds losartan, amlodipine and labetalol  - As needed IV labetalol in place

## 2018-12-10 NOTE — ASSESSMENT & PLAN NOTE
-Patient has ESRD on hemodialysis.Missed hemodialysis session, 4 days since last hemodialysis.  -Labs show potassium of 6.8  -EKG shows peaked T waves and prolonged QTc of 494    Plan:  -Calcium gluconate for cardioprotection  -Patient started on hemodialysis in the ED  - Avoid drugs prolonging QT interval

## 2018-12-10 NOTE — PROGRESS NOTES
HD treatment were ordered by Dr. Balderas, pt was able to pull 3.0 liters of fluids. Gave report to Lynda SANTOS, held pt AVF site for 10 min each, Lynda verified that pt AVF sites were clean dry and intact, no swelling or redness were noted post tx, +B/T pre and post tx. Pt was stable post tx, denies any complaint of pain and SOB, no fever or any distress were noted post tx. Pt denies complaint of headache and dizziness, asymptomatic throughout HD treatment given BP meds during HD treatment without any changes. Treatment started at 1645 and ended at 1912. See flow sheets for further details.

## 2018-12-10 NOTE — PROGRESS NOTES
Pt bp remaining 190s systolic; not responding to 2 doses of labetalol(see MAR). [facility] on call notified. Awaiting orders. Will continue to monitor

## 2018-12-10 NOTE — DISCHARGE PLANNING
Outpatient Dialysis Note    Confirmed patient is active at:    [facility]  [address] [id number]       Schedule: Monday, Wednesday, Friday  Time: 3:30 pm    Spoke with Abby at facility who confirmed.    Forwarded records for review.    Dialysis Coordinator, [facility]  Alexia Ayoub [phone]
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
## 2018-12-10 NOTE — ASSESSMENT & PLAN NOTE
-Patient has a history of orthopnea, however developed sudden worsening of SOB and orthopnea after missing her last dialysis session on Friday.  -Satting at 95% on 2 L of oxygen via nasal cannula, no improvement in shortness of breath after DuoNeb's in the ER  -EKG showed sinus rhythm with peaked T waves and prolonged QT interval, troponin 0.04  -Chest x-ray showed diffuse pulmonary edema   -BNP elevated at 4500    Plan:  - Immediate hemodialysis in the ED  - We will continue to monitor.

## 2018-12-10 NOTE — CARE PLAN
Problem: Safety  Goal: Will remain free from injury  Outcome: PROGRESSING AS EXPECTED      Problem: Venous Thromboembolism (VTW)/Deep Vein Thrombosis (DVT) Prevention:  Goal: Patient will participate in Venous Thrombosis (VTE)/Deep Vein Thrombosis (DVT)Prevention Measures  Outcome: PROGRESSING AS EXPECTED      Problem: Knowledge Deficit  Goal: Knowledge of disease process/condition, treatment plan, diagnostic tests, and medications will improve  Outcome: PROGRESSING AS EXPECTED

## 2018-12-10 NOTE — ASSESSMENT & PLAN NOTE
-Hemoglobin is 10.7, chronically anemic  -MCV elevated at 99.7, however vitamin B12 and folic acid within reference range  - Last iron studies done in 6/2018 show picture of anemia of chronic disease:  Iron-141  % sats-46  TIBC-304  Ferritin- 334.4    Plan:  - Patient is at goal for hemoglobin inpatients with ESRD  - No signs of mucosal bleeding or platelet dysfunction  -We will continue to monitor

## 2018-12-10 NOTE — PROGRESS NOTES
Patient awake, alert, and oriented.  Discharged instructions provided and copy given.  Instructed to keep dialysis appointment today.  Patient verbalized understanding.  Right FA INT d/c'd.  Site benign.  LOR AVF (+) thrill/(+) Bruit.  No acute distress noted.

## 2018-12-10 NOTE — DISCHARGE INSTRUCTIONS
Discharge Instructions    Discharged to home by car with self. Discharged via wheelchair, hospital escort: Yes.  Special equipment needed: Not Applicable    Be sure to schedule a follow-up appointment with your primary care doctor or any specialists as instructed.     Discharge Plan:   Smoking Cessation Offered: Patient Counseled  Influenza Vaccine Indication: Not indicated: Previously immunized this influenza season and > 8 years of age    I understand that a diet low in cholesterol, fat, and sodium is recommended for good health. Unless I have been given specific instructions below for another diet, I accept this instruction as my diet prescription.   Other diet: Renal    Special Instructions: None    · Is patient discharged on Warfarin / Coumadin?   No     Depression / Suicide Risk    As you are discharged from this [facility] facility, it is important to learn how to keep safe from harming yourself.    Recognize the warning signs:  · Abrupt changes in personality, positive or negative- including increase in energy   · Giving away possessions  · Change in eating patterns- significant weight changes-  positive or negative  · Change in sleeping patterns- unable to sleep or sleeping all the time   · Unwillingness or inability to communicate  · Depression  · Unusual sadness, discouragement and loneliness  · Talk of wanting to die  · Neglect of personal appearance   · Rebelliousness- reckless behavior  · Withdrawal from people/activities they love  · Confusion- inability to concentrate     If you or a loved one observes any of these behaviors or has concerns about self-harm, here's what you can do:  · Talk about it- your feelings and reasons for harming yourself  · Remove any means that you might use to hurt yourself (examples: pills, rope, extension cords, firearm)  · Get professional help from the community (Mental Health, Substance Abuse, psychological counseling)  · Do not be alone:Call your Safe Contact-  someone whom you trust who will be there for you.  · Call your local CRISIS HOTLINE 209-6879 or 281-066-3938  · Call your local Children's Mobile Crisis Response Team [place] (049) 725-2428 or www.[employer]  · Call the toll free National Suicide Prevention Hotlines   · National Suicide Prevention Lifeline 040-887-BDYQ (9337)  · [employer] 800-SUICIDE (872-1350)

## 2018-12-10 NOTE — CARE PLAN
Problem: Safety  Goal: Will remain free from injury  Outcome: PROGRESSING AS EXPECTED      Problem: Infection  Goal: Will remain free from infection  Outcome: PROGRESSING AS EXPECTED      Problem: Knowledge Deficit  Goal: Knowledge of disease process/condition, treatment plan, diagnostic tests, and medications will improve  Outcome: PROGRESSING AS EXPECTED      Problem: Fluid Volume:  Goal: Will maintain balanced intake and output  Outcome: PROGRESSING AS EXPECTED

## 2018-12-10 NOTE — H&P
Internal Medicine Admitting History and Physical    Note Author: Brijesh Hoskins M.D.       Name Isabelle Coyle 1965   Age/Sex 53 y.o. female   MRN [id number]   Code Status Full     After 5PM or if no immediate response to page, please call for cross-coverage  Attending/Team: Dr. Spain/ Ben See Patient List for primary contact information  Call [phone] to page    1st Call - Day Intern (R1):   Dr. Hoskins  2nd Call - Day Sr. Resident (R2/R3):   Dr. Cotton        Chief Complaint:   Shortness of breath and missed hemodialysis    HPI:  Ms. Coyle is a 53-year-old female with a past medical history of ESRD on hemodialysis, hypertension, PSVT s/p ablation presented to the hospital with worsening shortness of breath since last night.  Patient receives hemodialysis on Monday, Wednesday and .  She missed her last dialysis session on Friday due to lack of transport in the snow.  Patient has orthopnea at baseline, however now she has to sit upright to breathe.  She has associated cough producing small quantity of clear to pale yellow colored sputum.  Patient also reports transient, nonradiating chest pain on the left, nonreproducible and resolved spontaneously.  She has associated swelling in her feet.   Patient denies fever, chills, palpitations, dizziness, blurry vision, headache, nausea, vomiting, abdominal pain, change in bowel or bladder habits or blood in stools.    In the ED, patient had BUN of 106, creatinine 13.26, potassium 6.3, magnesium 2.9, phosphate levels of 11.3.  EKG showed tall peaked T waves, prolonged QTC.  Troponin less than 0.04, BNP-4500.  CXR showed diffuse pulmonary edema.  Patient was satting at 95% on 2 L of oxygen via nasal cannula.  Duo nebs were started however, it did not provide any relief.  Patient's nephrologist Dr. Junior was consulted for starting the patient on dialysis and patient received hemodialysis in the ED and 3 L of fluid was drawn  "out.      Review of Systems   Constitutional: Negative for chills and fever.   HENT: Negative for congestion and sore throat.    Eyes: Negative for blurred vision and pain.   Respiratory: Positive for cough and shortness of breath.    Cardiovascular: Positive for chest pain (transient, non reproducilble), orthopnea and leg swelling. Negative for palpitations.   Gastrointestinal: Negative for abdominal pain, blood in stool, constipation, diarrhea and heartburn.   Genitourinary: Negative for dysuria and hematuria.   Musculoskeletal: Negative for back pain and myalgias.   Skin: Negative for itching and rash.   Neurological: Negative for dizziness, sensory change, speech change, loss of consciousness and headaches.   Endo/Heme/Allergies: Negative for environmental allergies. Does not bruise/bleed easily.   Psychiatric/Behavioral: Negative for depression and hallucinations.             Past Medical History (Chronic medical problem, known complications and current treatment)    Past Medical History:   Diagnosis Date   • Anemia    • Anemia associated with chronic renal failure 11/5/2009   • Dental disorder 8-25-17    \"Full Upper & Partial Lower Dentures\"   • Dialysis patient ([facility]) 8-25-17    [facility] M,W,F   • Dysrhythmia     \"SVT\"   • ESRD (end stage renal disease) ([facility]) 8-25-17    Dialysis MWF@ [facility]   • Glomerulonephritis, chronic 11/5/2009   • Heart burn    • High cholesterol    • HTN (hypertension) 11/5/2009    2017 states well controlled   • Port catheter in place 8-25-17    For Dialysis   • SVT (supraventricular tachycardia) ([facility])    • SVT (supraventricular tachycardia) ([facility])           Past Surgical History:  Past Surgical History:   Procedure Laterality Date   • AV FISTULA CREATION Left 4/27/2018    Procedure: Left Brachial artery Repair;  Surgeon: Jimbo Davenport M.D.;  Location: SURGERY [facility];  Service: Vascular   • AV FISTULA CREATION Left 8/28/2017    Procedure: AV FISTULA " CREATION  UPPER EXTREMITY -BRACHIAL BASALIC;  Surgeon: Stella Rodriguez M.D.;  Location: SURGERY [facility];  Service: General   • AV FISTULA CREATION Left 6/20/2017    Procedure: AV FISTULA CREATION- LEFT;  Surgeon: Jimbo Davenport M.D.;  Location: SURGERY [facility];  Service:    • APPENDECTOMY LAPAROSCOPIC  5/4/2009    Performed by BANDAR TIMMONS at SURGERY [facility]   • FEMUR ORIF  10/9/08    Performed by STELLA GATES at SURGERY [facility]   • ILIAC BONE GRAFT  10/9/08    Performed by STELLA GAETS at SURGERY [facility]   • AMPUTATION, BELOW THE KNEE     • CAPITATION PAYMENT (INSURANCE)     • OTHER      BELOW KNEE AMPUTATION RIGHT   • PB ENLARGE BREAST WITH IMPLANT         Current Outpatient Medications:  Home Medications     Reviewed by Jacque Jensen R.N. (Registered Nurse) on 12/09/18 at 2125  Med List Status: Complete   Medication Last Dose Status   amLODIPine (NORVASC) 5 MG Tab 12/9/2018 Active   gabapentin (NEURONTIN) 100 MG Cap 12/9/2018 Active   labetalol (NORMODYNE) 200 MG Tab 12/9/2018 Active   losartan (COZAAR) 100 MG Tab 12/8/2018 Active   terazosin (HYTRIN) 5 MG Cap 12/8/2018 Active                Medication Allergy/Sensitivities:  Allergies   Allergen Reactions   • Sulfa Drugs Hives     EEN=4000 rash swelling itching         Family History (mandatory)   Family History   Problem Relation Age of Onset   • Heart Disease Unknown        Social History (mandatory)   Social History     Social History   • Marital status:      Spouse name: N/A   • Number of children: N/A   • Years of education: N/A     Occupational History   • Not on file.     Social History Main Topics   • Smoking status: Current Every Day Smoker     Packs/day: 0.25     Years: 20.00     Types: Cigarettes   • Smokeless tobacco: Never Used      Comment: 5 cigs/day   • Alcohol use No   • Drug use: No   • Sexual activity: Not on file     Other Topics Concern   • Not on file     Social  "History Narrative   • No narrative on file     [facility]  PCP : Juan Moreno M.D.    Physical Exam     Vitals:    12/09/18 1834 12/09/18 1900 12/09/18 1914 12/09/18 1955   BP: (!) 180/109   (!) 170/110   Pulse:  100 (!) 110 (!) 119   Resp:    18   Temp:    36.3 °C (97.4 °F)   TempSrc:    Temporal   SpO2:  98% 99% 94%   Weight:    61.4 kg (135 lb 5.8 oz)   Height:    1.575 m (5' 2.01\")     Body mass index is 24.75 kg/m².  BP (!) 170/110   Pulse (!) 119   Temp 36.3 °C (97.4 °F) (Temporal)   Resp 18   Ht 1.575 m (5' 2.01\")   Wt 61.4 kg (135 lb 5.8 oz)   SpO2 94%   Breastfeeding? No   BMI 24.75 kg/m²   O2 therapy: Pulse Oximetry: 94 %, O2 (LPM): (S) 2, FiO2%: 21 %, O2 Delivery: None (Room Air)    Physical Exam   Constitutional: She is oriented to person, place, and time. No distress.   HENT:   Head: Normocephalic and atraumatic.   Mouth/Throat: Oropharynx is clear and moist.   Eyes: Pupils are equal, round, and reactive to light. Conjunctivae are normal. No scleral icterus.   Neck: JVD present.   Cardiovascular: Normal rate and regular rhythm.    Murmur (Systolic murmur, heard best ) heard.  Pulmonary/Chest:   -Diffuse bilateral crackles   Abdominal: Soft. Bowel sounds are normal. She exhibits no distension. There is no tenderness.   Musculoskeletal: Normal range of motion. She exhibits edema (2+) and deformity (Rt BKA).   Lymphadenopathy:     She has no cervical adenopathy.   Neurological: She is alert and oriented to person, place, and time. No cranial nerve deficit.   Skin: No rash noted. No erythema.   Psychiatric: Mood, affect and judgment normal.         Data Review       Old Records Request:   Deferred  Current Records review/summary: Completed    Lab Data Review:  Recent Results (from the past 24 hour(s))   CBC w/ Differential    Collection Time: 12/09/18 12:28 PM   Result Value Ref Range    WBC 6.8 4.8 - 10.8 K/uL    RBC 3.43 (L) 4.20 - 5.40 M/uL    Hemoglobin 10.7 (L) " 12.0 - 16.0 g/dL    Hematocrit 34.2 (L) 37.0 - 47.0 %    MCV 99.7 (H) 81.4 - 97.8 fL    MCH 31.2 27.0 - 33.0 pg    MCHC 31.3 (L) 33.6 - 35.0 g/dL    RDW 55.9 (H) 35.9 - 50.0 fL    Platelet Count 149 (L) 164 - 446 K/uL    MPV 9.8 9.0 - 12.9 fL    Neutrophils-Polys 71.20 44.00 - 72.00 %    Lymphocytes 20.60 (L) 22.00 - 41.00 %    Monocytes 4.70 0.00 - 13.40 %    Eosinophils 2.70 0.00 - 6.90 %    Basophils 0.70 0.00 - 1.80 %    Immature Granulocytes 0.10 0.00 - 0.90 %    Nucleated RBC 0.00 /100 WBC    Neutrophils (Absolute) 4.81 2.00 - 7.15 K/uL    Lymphs (Absolute) 1.39 1.00 - 4.80 K/uL    Monos (Absolute) 0.32 0.00 - 0.85 K/uL    Eos (Absolute) 0.18 0.00 - 0.51 K/uL    Baso (Absolute) 0.05 0.00 - 0.12 K/uL    Immature Granulocytes (abs) 0.01 0.00 - 0.11 K/uL    NRBC (Absolute) 0.00 K/uL   Complete Metabolic Panel (CMP)    Collection Time: 12/09/18 12:28 PM   Result Value Ref Range    Sodium 140 135 - 145 mmol/L    Potassium 6.3 (H) 3.6 - 5.5 mmol/L    Chloride 100 96 - 112 mmol/L    Co2 21 20 - 33 mmol/L    Anion Gap 19.0 (H) 0.0 - 11.9    Glucose 124 (H) 65 - 99 mg/dL    Bun 106 (HH) 8 - 22 mg/dL    Creatinine 13.26 (HH) 0.50 - 1.40 mg/dL    Calcium 9.9 8.5 - 10.5 mg/dL    AST(SGOT) 12 12 - 45 U/L    ALT(SGPT) 20 2 - 50 U/L    Alkaline Phosphatase 60 30 - 99 U/L    Total Bilirubin 0.9 0.1 - 1.5 mg/dL    Albumin 4.1 3.2 - 4.9 g/dL    Total Protein 6.8 6.0 - 8.2 g/dL    Globulin 2.7 1.9 - 3.5 g/dL    A-G Ratio 1.5 g/dL   Btype Natriuretic Peptide    Collection Time: 12/09/18 12:28 PM   Result Value Ref Range    B Natriuretic Peptide 4501 (H) 0 - 100 pg/mL   Troponin STAT    Collection Time: 12/09/18 12:28 PM   Result Value Ref Range    Troponin I 0.04 0.00 - 0.04 ng/mL   MAGNESIUM    Collection Time: 12/09/18 12:28 PM   Result Value Ref Range    Magnesium 2.9 (H) 1.5 - 2.5 mg/dL   PHOSPHORUS    Collection Time: 12/09/18 12:28 PM   Result Value Ref Range    Phosphorus 11.3 (H) 2.5 - 4.5 mg/dL   ESTIMATED GFR     Collection Time: 18 12:28 PM   Result Value Ref Range    GFR If  4 (A) >60 mL/min/1.73 m 2    GFR If Non African American 3 (A) >60 mL/min/1.73 m 2   BLOOD CULTURE,HOLD    Collection Time: 18 12:28 PM   Result Value Ref Range    Blood Culture Hold Collected    HEP B SURFACE AB    Collection Time: 18 12:28 PM   Result Value Ref Range    Hep B Surface Antibody Quant <3.10 0.00 - 10.00 mIU/mL   HEPATITIS PANEL ACUTE(4 COMPONENTS)    Collection Time: 18 12:28 PM   Result Value Ref Range    Hepatitis B Surface Antigen Negative Negative    Hepatitis C Antibody Negative Negative    Hepatitis B Cors Ab,IgM Negative Negative    Hepatitis A Virus Ab, IgM Negative Negative   EKG    Collection Time: 18  3:33 PM   Result Value Ref Range    Report       [facility]    Test Date:  2018  Pt Name:    MELISSA BARDALES                 Department: ER  MRN:        [id number]                      Room:       [facility]  Gender:     Female                       Technician: [id number]  :        1965                   Requested By:ADITYA TOLEDO  Order #:    [id number]                    Reading MD:    Measurements  Intervals                                Axis  Rate:       73                           P:          32  SC:         156                          QRS:        49  QRSD:       90                           T:          75  QT:         448  QTc:        494    Interpretive Statements  SINUS RHYTHM  LEFT VENTRICULAR HYPERTROPHY  BORDERLINE PROLONGED QT INTERVAL  BASELINE WANDER IN LEAD(S) V3  Compared to ECG 2018 09:01:58  No significant changes         Imaging/Procedures Review:    Independant Imaging Review: Completed  DX-CHEST-PORTABLE (1 VIEW)   Final Result      1.  There are findings as described consistent with diffuse pulmonary edema.           EKG:   EKG Independant Review: Completed  QTc:494, HR: 73, Normal Sinus Rhythm, Tall peaked T waves      Records reviewed and summarized in current documentation :  Yes  [employer] teaching service handout given to patient:  No         Assessment/Plan     * ESRD on hemodialysis (HCC)- (present on admission)   Assessment & Plan    Baseline GFR -ESRD diagnosed 1.5 years ago, cause unknown, likely secondary to hypertension.  No history of diabetes mellitus.   - Patient also reports shrunken kidneys since the time of birth  - Baseline GFR at 5-7 and creatinine of 6-7  - BUN/creatinine at 106/13.2 at the time of presentation  - Patient complains of sudden increase in shortness of breath, chest x-ray shows diffuse bilateral pulmonary edema.  BNP of 4500.  patient is hypertensive with blood pressure of 176/105 at the time of presentation  - Potassium at 6.3, phosphorus at 11.3 and magnesium at 2.9  - Patient is alert and oriented, no signs of encephalopathy; denies nausea, vomiting, bleeding from mucosal membranes are blood in stools.    Plan:  - Nephrology consulted-immediate hemodialysis in the ED  - Blood pressure control, goal <140/90  - Renal diet with salt restriction  - Patient not on any phosphate binders at home, will wait for recommendations by nephrology team  - Avoid nephrotoxic drugs         Hyperkalemia- (present on admission)   Assessment & Plan    -Patient has ESRD on hemodialysis.Missed hemodialysis session, 4 days since last hemodialysis.  -Labs show potassium of 6.8  -EKG shows peaked T waves and prolonged QTc of 494    Plan:  -Calcium gluconate for cardioprotection  -Patient started on hemodialysis in the ED  - Avoid drugs prolonging QT interval     Acute pulmonary edema (HCC)- (present on admission)   Assessment & Plan    -Patient has a history of orthopnea, however developed sudden worsening of SOB and orthopnea after missing her last dialysis session on Friday.  -Satting at 95% on 2 L of oxygen via nasal cannula, no improvement in shortness of breath after DuoNeb's in the ER  -EKG showed sinus rhythm with  peaked T waves and prolonged QT interval, troponin 0.04  -Chest x-ray showed diffuse pulmonary edema   -BNP elevated at 4500    Plan:  - Immediate hemodialysis in the ED  - We will continue to monitor.      Anemia - likely of chronic disease- (present on admission)   Assessment & Plan    -Hemoglobin is 10.7, chronically anemic  -MCV elevated at 99.7, however vitamin B12 and folic acid within reference range  - Last iron studies done in 6/2018 show picture of anemia of chronic disease:  Iron-141  % sats-46  TIBC-304  Ferritin- 334.4    Plan:  - Patient is at goal for hemoglobin inpatients with ESRD  - No signs of mucosal bleeding or platelet dysfunction  -We will continue to monitor     Severe uncontrolled hypertension- (present on admission)   Assessment & Plan    -Patient's blood pressure was at 176/105 at the time of admission, likely secondary to ESRD  -Patient is fluid overloaded with pulmonary edema after missing her last dialysis session  -Patient denied symptoms of headache, dizziness, chest pain, palpitations, nausea or vomiting     Plan:  -Restart home meds losartan, amlodipine and labetalol  - As needed IV labetalol in place         Anticipated Hospital stay: Observation admit        Quality Measures  Quality-Core Measures   Reviewed items::  EKG reviewed, Radiology images reviewed, Labs reviewed and Medications reviewed  Murdock catheter::  No Murdock  DVT prophylaxis - mechanical:  SCDs    PCP: Juan Moreno M.D.

## 2018-12-10 NOTE — PROGRESS NOTES
Pt transported from ed with all belongings and zoll. Pt assisted from cart to bed, standby. A/o tele box on/confirmed. Denies sob/pain. Oriented to unit and safety instructions give. Call light within reach, bed locked, low position, alarm in place. White board updated.

## 2018-12-10 NOTE — PROGRESS NOTES
Bedside shift report received from TOM Spencer.  Patient awake, alert and oriented.  No c/o voiced.  No acute distress noted.

## 2018-12-10 NOTE — PROGRESS NOTES
[facility] -  PROGRESS NOTE               Author: Khushi Balderas M.D. Date & Time: 12/10/2018  3:43 PM     HPI:  54 yo WF PMH ESRD MWF iHD, HTN, anemia of CKD, renal osteodystrophy, and HLD who presents to ED with CC as above.  She states she was feeling ill on 12/7/18 and so skipped her dialysis treatment that day and then her SOB and cough which she was experiencing that day worsened over the weekend.  It got to the point where she was unable to lay flat and so came to ED.  Labs in ED showed K+ 6.3 and CXR c/w fluid overload.  BNP 4500.  She denies recent sick contacts or travel.    DAILY NEPHROLOGY SUMMARY:  12/09/18 - Consult done  12/10/18 - NAEO, feeling much better, and very happy this AM    Review of Systems   Constitutional: Negative.    HENT: Negative.    Eyes: Negative.    Respiratory: Negative.    Cardiovascular: Negative.    Gastrointestinal: Negative.    Skin: Negative.    Neurological: Negative.    Endo/Heme/Allergies: Negative.    Psychiatric/Behavioral: Negative.    All other systems reviewed and are negative.      Physical Exam   Constitutional: She is oriented to person, place, and time. She appears well-developed and well-nourished.   HENT:   Head: Normocephalic and atraumatic.   Eyes: Conjunctivae are normal. No scleral icterus.   Neck: Neck supple. No tracheal deviation present.   Cardiovascular: Normal rate and regular rhythm.    Pulmonary/Chest: Effort normal and breath sounds normal.   Abdominal: Soft. Bowel sounds are normal.   Musculoskeletal: She exhibits no edema or tenderness.   Neurological: She is alert and oriented to person, place, and time.   Skin: Skin is warm and dry.   Psychiatric: She has a normal mood and affect. Her behavior is normal.   Nursing note and vitals reviewed.      PAST FAMILY HISTORY: Reviewed and Unchanged  SOCIAL HISTORY: Reviewed and Unchanged  CURRENT MEDICATIONS: Reviewed  IMAGING STUDIES: Reviewed    Fluids:  In: 500  [Dialysis:500]  Out: 2550     LABS:  Recent Results (from the past 24 hour(s))   BASIC METABOLIC PANEL    Collection Time: 12/10/18  2:04 AM   Result Value Ref Range    Sodium 140 135 - 145 mmol/L    Potassium 5.1 3.6 - 5.5 mmol/L    Chloride 98 96 - 112 mmol/L    Co2 31 20 - 33 mmol/L    Glucose 90 65 - 99 mg/dL    Bun 55 (H) 8 - 22 mg/dL    Creatinine 8.28 (HH) 0.50 - 1.40 mg/dL    Calcium 9.3 8.5 - 10.5 mg/dL    Anion Gap 11.0 0.0 - 11.9   MAGNESIUM    Collection Time: 12/10/18  2:04 AM   Result Value Ref Range    Magnesium 2.3 1.5 - 2.5 mg/dL   PHOSPHORUS    Collection Time: 12/10/18  2:04 AM   Result Value Ref Range    Phosphorus 8.1 (H) 2.5 - 4.5 mg/dL   Comp Metabolic Panel (CMP)    Collection Time: 12/10/18  2:04 AM   Result Value Ref Range    Sodium 140 135 - 145 mmol/L    Potassium 5.0 3.6 - 5.5 mmol/L    Chloride 97 96 - 112 mmol/L    Co2 31 20 - 33 mmol/L    Anion Gap 12.0 (H) 0.0 - 11.9    Glucose 92 65 - 99 mg/dL    Bun 55 (H) 8 - 22 mg/dL    Creatinine 8.27 (HH) 0.50 - 1.40 mg/dL    Calcium 9.1 8.5 - 10.5 mg/dL    AST(SGOT) 10 (L) 12 - 45 U/L    ALT(SGPT) 16 2 - 50 U/L    Alkaline Phosphatase 53 30 - 99 U/L    Total Bilirubin 1.0 0.1 - 1.5 mg/dL    Albumin 3.6 3.2 - 4.9 g/dL    Total Protein 5.9 (L) 6.0 - 8.2 g/dL    Globulin 2.3 1.9 - 3.5 g/dL    A-G Ratio 1.6 g/dL   LACTIC ACID    Collection Time: 12/10/18  2:04 AM   Result Value Ref Range    Lactic Acid 0.8 0.5 - 2.0 mmol/L   MAGNESIUM    Collection Time: 12/10/18  2:04 AM   Result Value Ref Range    Magnesium 2.3 1.5 - 2.5 mg/dL   PHOSPHORUS    Collection Time: 12/10/18  2:04 AM   Result Value Ref Range    Phosphorus 7.9 (H) 2.5 - 4.5 mg/dL   ESTIMATED GFR    Collection Time: 12/10/18  2:04 AM   Result Value Ref Range    GFR If  6 (A) >60 mL/min/1.73 m 2    GFR If Non African American 5 (A) >60 mL/min/1.73 m 2   ESTIMATED GFR    Collection Time: 12/10/18  2:04 AM   Result Value Ref Range    GFR If  6 (A) >60  mL/min/1.73 m 2    GFR If Non African American 5 (A) >60 mL/min/1.73 m 2   CBC with Differential    Collection Time: 12/10/18  2:05 AM   Result Value Ref Range    WBC 4.5 (L) 4.8 - 10.8 K/uL    RBC 3.21 (L) 4.20 - 5.40 M/uL    Hemoglobin 10.0 (L) 12.0 - 16.0 g/dL    Hematocrit 30.7 (L) 37.0 - 47.0 %    MCV 95.6 81.4 - 97.8 fL    MCH 31.2 27.0 - 33.0 pg    MCHC 32.6 (L) 33.6 - 35.0 g/dL    RDW 51.7 (H) 35.9 - 50.0 fL    Platelet Count 144 (L) 164 - 446 K/uL    MPV 9.7 9.0 - 12.9 fL    Neutrophils-Polys 56.40 44.00 - 72.00 %    Lymphocytes 31.60 22.00 - 41.00 %    Monocytes 7.80 0.00 - 13.40 %    Eosinophils 3.30 0.00 - 6.90 %    Basophils 0.70 0.00 - 1.80 %    Immature Granulocytes 0.20 0.00 - 0.90 %    Nucleated RBC 0.00 /100 WBC    Neutrophils (Absolute) 2.53 2.00 - 7.15 K/uL    Lymphs (Absolute) 1.42 1.00 - 4.80 K/uL    Monos (Absolute) 0.35 0.00 - 0.85 K/uL    Eos (Absolute) 0.15 0.00 - 0.51 K/uL    Baso (Absolute) 0.03 0.00 - 0.12 K/uL    Immature Granulocytes (abs) 0.01 0.00 - 0.11 K/uL    NRBC (Absolute) 0.00 K/uL   BTYPE NATRIURETIC PEPTIDE    Collection Time: 12/10/18  2:05 AM   Result Value Ref Range    B Natriuretic Peptide 4036 (H) 0 - 100 pg/mL       (click the triangle to expand results)      IMPRESSION:  - ESRD    * Etiology likely 2/2 HTN    * [facility]; MWF iHD    * LAVF (+thrill/bruit)  - Hyperkalemia  - Volume overload  - Non-adherence to dialysis  - HTN    * Goal BP < 140/90    * Hypervolemic  - Anemia of CKD    * Goal Hgb 10-11  - Renal osteodystrophy     PLAN:  - MWF iHD  - Counseled on importance of compliance to dialysis/diet  - Hold BP meds until after dialysis on HD days  - Dose all meds per ESRD  - Her chair time is at 3 pm, she ok from renal standpoint to be transitioned to OP care as she can go there for her dialysis today

## 2018-12-10 NOTE — RESPIRATORY CARE
COPD EDUCATION by COPD CLINICAL EDUCATOR  12/10/2018 at 6:42 AM by Josephine Salazar     Patient reviewed by COPD education team. Patient does not qualify for COPD program.

## 2019-04-12 ENCOUNTER — APPOINTMENT (OUTPATIENT)
Dept: RADIOLOGY | Facility: MEDICAL CENTER | Age: 54
DRG: 640 | End: 2019-04-12
Attending: STUDENT IN AN ORGANIZED HEALTH CARE EDUCATION/TRAINING PROGRAM
Payer: MEDICAID

## 2019-04-12 ENCOUNTER — HOSPITAL ENCOUNTER (INPATIENT)
Facility: MEDICAL CENTER | Age: 54
LOS: 2 days | DRG: 640 | End: 2019-04-14
Attending: EMERGENCY MEDICINE | Admitting: INTERNAL MEDICINE
Payer: MEDICAID

## 2019-04-12 ENCOUNTER — APPOINTMENT (OUTPATIENT)
Dept: RADIOLOGY | Facility: MEDICAL CENTER | Age: 54
DRG: 640 | End: 2019-04-12
Attending: EMERGENCY MEDICINE
Payer: MEDICAID

## 2019-04-12 DIAGNOSIS — J44.1 COPD WITH ACUTE EXACERBATION (HCC): Primary | ICD-10-CM

## 2019-04-12 PROBLEM — N18.9 ANEMIA DUE TO CHRONIC KIDNEY DISEASE: Status: ACTIVE | Noted: 2019-04-12

## 2019-04-12 PROBLEM — D63.1 ANEMIA DUE TO CHRONIC KIDNEY DISEASE: Status: ACTIVE | Noted: 2019-04-12

## 2019-04-12 LAB
ANION GAP SERPL CALC-SCNC: 15 MMOL/L (ref 0–11.9)
ANION GAP SERPL CALC-SCNC: 17 MMOL/L (ref 0–11.9)
ANION GAP SERPL CALC-SCNC: 18 MMOL/L (ref 0–11.9)
BASOPHILS # BLD AUTO: 0.4 % (ref 0–1.8)
BASOPHILS # BLD: 0.02 K/UL (ref 0–0.12)
BNP SERPL-MCNC: 4936 PG/ML (ref 0–100)
BUN SERPL-MCNC: 30 MG/DL (ref 8–22)
BUN SERPL-MCNC: 78 MG/DL (ref 8–22)
BUN SERPL-MCNC: 79 MG/DL (ref 8–22)
CALCIUM SERPL-MCNC: 8.6 MG/DL (ref 8.5–10.5)
CALCIUM SERPL-MCNC: 8.7 MG/DL (ref 8.5–10.5)
CALCIUM SERPL-MCNC: 8.8 MG/DL (ref 8.5–10.5)
CHLORIDE SERPL-SCNC: 105 MMOL/L (ref 96–112)
CHLORIDE SERPL-SCNC: 97 MMOL/L (ref 96–112)
CHLORIDE SERPL-SCNC: 97 MMOL/L (ref 96–112)
CO2 SERPL-SCNC: 22 MMOL/L (ref 20–33)
CO2 SERPL-SCNC: 26 MMOL/L (ref 20–33)
CO2 SERPL-SCNC: 29 MMOL/L (ref 20–33)
CREAT SERPL-MCNC: 10 MG/DL (ref 0.5–1.4)
CREAT SERPL-MCNC: 5.06 MG/DL (ref 0.5–1.4)
CREAT SERPL-MCNC: 9.94 MG/DL (ref 0.5–1.4)
EKG IMPRESSION: NORMAL
EOSINOPHIL # BLD AUTO: 0.07 K/UL (ref 0–0.51)
EOSINOPHIL NFR BLD: 1.5 % (ref 0–6.9)
ERYTHROCYTE [DISTWIDTH] IN BLOOD BY AUTOMATED COUNT: 57.7 FL (ref 35.9–50)
FERRITIN SERPL-MCNC: 438 NG/ML (ref 10–291)
FLUAV RNA SPEC QL NAA+PROBE: NEGATIVE
FLUBV RNA SPEC QL NAA+PROBE: NEGATIVE
GLUCOSE SERPL-MCNC: 102 MG/DL (ref 65–99)
GLUCOSE SERPL-MCNC: 138 MG/DL (ref 65–99)
GLUCOSE SERPL-MCNC: 98 MG/DL (ref 65–99)
HBV SURFACE AB SERPL IA-ACNC: <3.1 MIU/ML (ref 0–10)
HBV SURFACE AG SER QL: NEGATIVE
HCT VFR BLD AUTO: 29.6 % (ref 37–47)
HGB BLD-MCNC: 9.6 G/DL (ref 12–16)
HGB RETIC QN AUTO: 29.3 PG/CELL (ref 29–35)
IMM GRANULOCYTES # BLD AUTO: 0.01 K/UL (ref 0–0.11)
IMM GRANULOCYTES NFR BLD AUTO: 0.2 % (ref 0–0.9)
IMM RETICS NFR: 10.2 % (ref 9.3–17.4)
IRON SATN MFR SERPL: 6 % (ref 15–55)
IRON SERPL-MCNC: 18 UG/DL (ref 40–170)
LACTATE BLD-SCNC: 1 MMOL/L (ref 0.5–2)
LACTATE BLD-SCNC: 1.1 MMOL/L (ref 0.5–2)
LYMPHOCYTES # BLD AUTO: 0.7 K/UL (ref 1–4.8)
LYMPHOCYTES NFR BLD: 15.3 % (ref 22–41)
MAGNESIUM SERPL-MCNC: 2.7 MG/DL (ref 1.5–2.5)
MCH RBC QN AUTO: 32.7 PG (ref 27–33)
MCHC RBC AUTO-ENTMCNC: 32.4 G/DL (ref 33.6–35)
MCV RBC AUTO: 100.7 FL (ref 81.4–97.8)
MONOCYTES # BLD AUTO: 0.27 K/UL (ref 0–0.85)
MONOCYTES NFR BLD AUTO: 5.9 % (ref 0–13.4)
NEUTROPHILS # BLD AUTO: 3.52 K/UL (ref 2–7.15)
NEUTROPHILS NFR BLD: 76.7 % (ref 44–72)
NRBC # BLD AUTO: 0 K/UL
NRBC BLD-RTO: 0 /100 WBC
PHOSPHATE SERPL-MCNC: 4.6 MG/DL (ref 2.5–4.5)
PLATELET # BLD AUTO: 112 K/UL (ref 164–446)
PMV BLD AUTO: 9.9 FL (ref 9–12.9)
POTASSIUM SERPL-SCNC: 4.6 MMOL/L (ref 3.6–5.5)
POTASSIUM SERPL-SCNC: 6.3 MMOL/L (ref 3.6–5.5)
POTASSIUM SERPL-SCNC: 6.8 MMOL/L (ref 3.6–5.5)
PROCALCITONIN SERPL-MCNC: 2.08 NG/ML
RBC # BLD AUTO: 2.94 M/UL (ref 4.2–5.4)
RETICS # AUTO: 0.04 M/UL (ref 0.04–0.06)
RETICS/RBC NFR: 1.2 % (ref 0.8–2.1)
SODIUM SERPL-SCNC: 137 MMOL/L (ref 135–145)
SODIUM SERPL-SCNC: 138 MMOL/L (ref 135–145)
SODIUM SERPL-SCNC: 151 MMOL/L (ref 135–145)
TIBC SERPL-MCNC: 280 UG/DL (ref 250–450)
WBC # BLD AUTO: 4.6 K/UL (ref 4.8–10.8)

## 2019-04-12 PROCEDURE — 80048 BASIC METABOLIC PNL TOTAL CA: CPT | Mod: 91

## 2019-04-12 PROCEDURE — 700111 HCHG RX REV CODE 636 W/ 250 OVERRIDE (IP): Performed by: STUDENT IN AN ORGANIZED HEALTH CARE EDUCATION/TRAINING PROGRAM

## 2019-04-12 PROCEDURE — 99223 1ST HOSP IP/OBS HIGH 75: CPT | Mod: GC | Performed by: INTERNAL MEDICINE

## 2019-04-12 PROCEDURE — 83550 IRON BINDING TEST: CPT

## 2019-04-12 PROCEDURE — 87340 HEPATITIS B SURFACE AG IA: CPT

## 2019-04-12 PROCEDURE — 700111 HCHG RX REV CODE 636 W/ 250 OVERRIDE (IP): Performed by: INTERNAL MEDICINE

## 2019-04-12 PROCEDURE — A9270 NON-COVERED ITEM OR SERVICE: HCPCS | Performed by: STUDENT IN AN ORGANIZED HEALTH CARE EDUCATION/TRAINING PROGRAM

## 2019-04-12 PROCEDURE — 700101 HCHG RX REV CODE 250: Performed by: EMERGENCY MEDICINE

## 2019-04-12 PROCEDURE — 700105 HCHG RX REV CODE 258: Performed by: EMERGENCY MEDICINE

## 2019-04-12 PROCEDURE — 96365 THER/PROPH/DIAG IV INF INIT: CPT

## 2019-04-12 PROCEDURE — 96375 TX/PRO/DX INJ NEW DRUG ADDON: CPT

## 2019-04-12 PROCEDURE — 86706 HEP B SURFACE ANTIBODY: CPT

## 2019-04-12 PROCEDURE — 700105 HCHG RX REV CODE 258

## 2019-04-12 PROCEDURE — 90686 IIV4 VACC NO PRSV 0.5 ML IM: CPT | Performed by: INTERNAL MEDICINE

## 2019-04-12 PROCEDURE — 83605 ASSAY OF LACTIC ACID: CPT | Mod: 91

## 2019-04-12 PROCEDURE — 82728 ASSAY OF FERRITIN: CPT

## 2019-04-12 PROCEDURE — A9270 NON-COVERED ITEM OR SERVICE: HCPCS | Performed by: EMERGENCY MEDICINE

## 2019-04-12 PROCEDURE — 700105 HCHG RX REV CODE 258: Performed by: STUDENT IN AN ORGANIZED HEALTH CARE EDUCATION/TRAINING PROGRAM

## 2019-04-12 PROCEDURE — 700101 HCHG RX REV CODE 250: Performed by: STUDENT IN AN ORGANIZED HEALTH CARE EDUCATION/TRAINING PROGRAM

## 2019-04-12 PROCEDURE — 85046 RETICYTE/HGB CONCENTRATE: CPT

## 2019-04-12 PROCEDURE — 99285 EMERGENCY DEPT VISIT HI MDM: CPT

## 2019-04-12 PROCEDURE — 90471 IMMUNIZATION ADMIN: CPT

## 2019-04-12 PROCEDURE — 770020 HCHG ROOM/CARE - TELE (206)

## 2019-04-12 PROCEDURE — 96372 THER/PROPH/DIAG INJ SC/IM: CPT

## 2019-04-12 PROCEDURE — 94640 AIRWAY INHALATION TREATMENT: CPT

## 2019-04-12 PROCEDURE — 86713 LEGIONELLA ANTIBODY: CPT

## 2019-04-12 PROCEDURE — 36415 COLL VENOUS BLD VENIPUNCTURE: CPT

## 2019-04-12 PROCEDURE — 83735 ASSAY OF MAGNESIUM: CPT

## 2019-04-12 PROCEDURE — 90935 HEMODIALYSIS ONE EVALUATION: CPT

## 2019-04-12 PROCEDURE — 87040 BLOOD CULTURE FOR BACTERIA: CPT

## 2019-04-12 PROCEDURE — 700102 HCHG RX REV CODE 250 W/ 637 OVERRIDE(OP): Performed by: STUDENT IN AN ORGANIZED HEALTH CARE EDUCATION/TRAINING PROGRAM

## 2019-04-12 PROCEDURE — 96366 THER/PROPH/DIAG IV INF ADDON: CPT

## 2019-04-12 PROCEDURE — 93005 ELECTROCARDIOGRAM TRACING: CPT | Performed by: EMERGENCY MEDICINE

## 2019-04-12 PROCEDURE — 3E02340 INTRODUCTION OF INFLUENZA VACCINE INTO MUSCLE, PERCUTANEOUS APPROACH: ICD-10-PCS | Performed by: INTERNAL MEDICINE

## 2019-04-12 PROCEDURE — 84145 PROCALCITONIN (PCT): CPT

## 2019-04-12 PROCEDURE — 71045 X-RAY EXAM CHEST 1 VIEW: CPT

## 2019-04-12 PROCEDURE — 87502 INFLUENZA DNA AMP PROBE: CPT

## 2019-04-12 PROCEDURE — 83540 ASSAY OF IRON: CPT

## 2019-04-12 PROCEDURE — 85025 COMPLETE CBC W/AUTO DIFF WBC: CPT

## 2019-04-12 PROCEDURE — 700111 HCHG RX REV CODE 636 W/ 250 OVERRIDE (IP): Performed by: EMERGENCY MEDICINE

## 2019-04-12 PROCEDURE — 5A1D70Z PERFORMANCE OF URINARY FILTRATION, INTERMITTENT, LESS THAN 6 HOURS PER DAY: ICD-10-PCS | Performed by: INTERNAL MEDICINE

## 2019-04-12 PROCEDURE — 84100 ASSAY OF PHOSPHORUS: CPT

## 2019-04-12 PROCEDURE — 94760 N-INVAS EAR/PLS OXIMETRY 1: CPT

## 2019-04-12 PROCEDURE — 83880 ASSAY OF NATRIURETIC PEPTIDE: CPT

## 2019-04-12 PROCEDURE — 700102 HCHG RX REV CODE 250 W/ 637 OVERRIDE(OP): Performed by: EMERGENCY MEDICINE

## 2019-04-12 PROCEDURE — 71046 X-RAY EXAM CHEST 2 VIEWS: CPT

## 2019-04-12 RX ORDER — ACETAMINOPHEN 500 MG
1000 TABLET ORAL EVERY 8 HOURS PRN
Status: DISCONTINUED | OUTPATIENT
Start: 2019-04-12 | End: 2019-04-14 | Stop reason: HOSPADM

## 2019-04-12 RX ORDER — PREDNISONE 20 MG/1
40 TABLET ORAL DAILY
Status: DISCONTINUED | OUTPATIENT
Start: 2019-04-12 | End: 2019-04-14 | Stop reason: HOSPADM

## 2019-04-12 RX ORDER — HEPARIN SODIUM 5000 [USP'U]/ML
5000 INJECTION, SOLUTION INTRAVENOUS; SUBCUTANEOUS EVERY 12 HOURS
Status: DISCONTINUED | OUTPATIENT
Start: 2019-04-12 | End: 2019-04-14 | Stop reason: HOSPADM

## 2019-04-12 RX ORDER — DOXYCYCLINE 100 MG/1
100 TABLET ORAL EVERY 12 HOURS
Status: DISCONTINUED | OUTPATIENT
Start: 2019-04-12 | End: 2019-04-14 | Stop reason: HOSPADM

## 2019-04-12 RX ORDER — AMOXICILLIN 250 MG
2 CAPSULE ORAL 2 TIMES DAILY
Status: DISCONTINUED | OUTPATIENT
Start: 2019-04-12 | End: 2019-04-14 | Stop reason: HOSPADM

## 2019-04-12 RX ORDER — BISACODYL 10 MG
10 SUPPOSITORY, RECTAL RECTAL
Status: DISCONTINUED | OUTPATIENT
Start: 2019-04-12 | End: 2019-04-14 | Stop reason: HOSPADM

## 2019-04-12 RX ORDER — GABAPENTIN 100 MG/1
100 CAPSULE ORAL 2 TIMES DAILY
Status: DISCONTINUED | OUTPATIENT
Start: 2019-04-12 | End: 2019-04-14 | Stop reason: HOSPADM

## 2019-04-12 RX ORDER — AMLODIPINE BESYLATE 10 MG/1
10 TABLET ORAL DAILY
Status: DISCONTINUED | OUTPATIENT
Start: 2019-04-12 | End: 2019-04-14 | Stop reason: HOSPADM

## 2019-04-12 RX ORDER — DEXTROSE MONOHYDRATE 25 G/50ML
25 INJECTION, SOLUTION INTRAVENOUS ONCE
Status: COMPLETED | OUTPATIENT
Start: 2019-04-12 | End: 2019-04-12

## 2019-04-12 RX ORDER — SODIUM CHLORIDE 9 MG/ML
INJECTION, SOLUTION INTRAVENOUS
Status: COMPLETED
Start: 2019-04-12 | End: 2019-04-12

## 2019-04-12 RX ORDER — IPRATROPIUM BROMIDE AND ALBUTEROL SULFATE 2.5; .5 MG/3ML; MG/3ML
3 SOLUTION RESPIRATORY (INHALATION)
Status: DISCONTINUED | OUTPATIENT
Start: 2019-04-12 | End: 2019-04-13

## 2019-04-12 RX ORDER — LABETALOL 100 MG/1
200 TABLET, FILM COATED ORAL TWICE DAILY
Status: DISCONTINUED | OUTPATIENT
Start: 2019-04-12 | End: 2019-04-14 | Stop reason: HOSPADM

## 2019-04-12 RX ORDER — TERAZOSIN 5 MG/1
5 CAPSULE ORAL EVERY EVENING
Status: DISCONTINUED | OUTPATIENT
Start: 2019-04-12 | End: 2019-04-14 | Stop reason: HOSPADM

## 2019-04-12 RX ORDER — NICOTINE 21 MG/24HR
1 PATCH, TRANSDERMAL 24 HOURS TRANSDERMAL ONCE
Status: COMPLETED | OUTPATIENT
Start: 2019-04-12 | End: 2019-04-12

## 2019-04-12 RX ORDER — METHYLPREDNISOLONE SODIUM SUCCINATE 125 MG/2ML
62.5 INJECTION, POWDER, LYOPHILIZED, FOR SOLUTION INTRAMUSCULAR; INTRAVENOUS ONCE
Status: COMPLETED | OUTPATIENT
Start: 2019-04-12 | End: 2019-04-12

## 2019-04-12 RX ORDER — LOSARTAN POTASSIUM 50 MG/1
100 TABLET ORAL
Status: DISCONTINUED | OUTPATIENT
Start: 2019-04-12 | End: 2019-04-14 | Stop reason: HOSPADM

## 2019-04-12 RX ORDER — IPRATROPIUM BROMIDE AND ALBUTEROL SULFATE 2.5; .5 MG/3ML; MG/3ML
3 SOLUTION RESPIRATORY (INHALATION) ONCE
Status: COMPLETED | OUTPATIENT
Start: 2019-04-12 | End: 2019-04-12

## 2019-04-12 RX ORDER — HYDRALAZINE HYDROCHLORIDE 20 MG/ML
10 INJECTION INTRAMUSCULAR; INTRAVENOUS ONCE
Status: COMPLETED | OUTPATIENT
Start: 2019-04-12 | End: 2019-04-12

## 2019-04-12 RX ORDER — POLYETHYLENE GLYCOL 3350 17 G/17G
1 POWDER, FOR SOLUTION ORAL
Status: DISCONTINUED | OUTPATIENT
Start: 2019-04-12 | End: 2019-04-14 | Stop reason: HOSPADM

## 2019-04-12 RX ADMIN — HEPARIN SODIUM 5000 UNITS: 5000 INJECTION, SOLUTION INTRAVENOUS; SUBCUTANEOUS at 06:20

## 2019-04-12 RX ADMIN — IPRATROPIUM BROMIDE AND ALBUTEROL SULFATE 3 ML: .5; 3 SOLUTION RESPIRATORY (INHALATION) at 18:27

## 2019-04-12 RX ADMIN — DEXTROSE MONOHYDRATE 25 ML: 25 INJECTION, SOLUTION INTRAVENOUS at 04:55

## 2019-04-12 RX ADMIN — CEFTRIAXONE 2 G: 2 INJECTION, POWDER, FOR SOLUTION INTRAMUSCULAR; INTRAVENOUS at 05:24

## 2019-04-12 RX ADMIN — PREDNISONE 40 MG: 20 TABLET ORAL at 13:31

## 2019-04-12 RX ADMIN — SODIUM CHLORIDE 500 ML: 9 INJECTION, SOLUTION INTRAVENOUS at 13:29

## 2019-04-12 RX ADMIN — INSULIN HUMAN 5 UNITS: 100 INJECTION, SOLUTION PARENTERAL at 04:55

## 2019-04-12 RX ADMIN — IPRATROPIUM BROMIDE AND ALBUTEROL SULFATE 3 ML: .5; 3 SOLUTION RESPIRATORY (INHALATION) at 04:03

## 2019-04-12 RX ADMIN — INFLUENZA A VIRUS A/MICHIGAN/45/2015 X-275 (H1N1) ANTIGEN (FORMALDEHYDE INACTIVATED), INFLUENZA A VIRUS A/HONG KONG/4801/2014 X-263B (H3N2) ANTIGEN (FORMALDEHYDE INACTIVATED), INFLUENZA B VIRUS B/PHUKET/3073/2013 ANTIGEN (FORMALDEHYDE INACTIVATED), AND INFLUENZA B VIRUS B/BRISBANE/60/2008 ANTIGEN (FORMALDEHYDE INACTIVATED) 0.5 ML: 15; 15; 15; 15 INJECTION, SUSPENSION INTRAMUSCULAR at 16:00

## 2019-04-12 RX ADMIN — HEPARIN SODIUM 5000 UNITS: 5000 INJECTION, SOLUTION INTRAVENOUS; SUBCUTANEOUS at 17:27

## 2019-04-12 RX ADMIN — IPRATROPIUM BROMIDE AND ALBUTEROL SULFATE 3 ML: .5; 3 SOLUTION RESPIRATORY (INHALATION) at 15:34

## 2019-04-12 RX ADMIN — GABAPENTIN 100 MG: 100 CAPSULE ORAL at 17:26

## 2019-04-12 RX ADMIN — NICOTINE 1 PATCH: 21 PATCH, EXTENDED RELEASE TRANSDERMAL at 03:34

## 2019-04-12 RX ADMIN — ACETAMINOPHEN 1000 MG: 500 TABLET ORAL at 13:02

## 2019-04-12 RX ADMIN — HYDRALAZINE HYDROCHLORIDE 10 MG: 20 INJECTION INTRAMUSCULAR; INTRAVENOUS at 07:48

## 2019-04-12 RX ADMIN — GABAPENTIN 100 MG: 100 CAPSULE ORAL at 06:21

## 2019-04-12 RX ADMIN — TERAZOSIN HYDROCHLORIDE 5 MG: 5 CAPSULE ORAL at 17:26

## 2019-04-12 RX ADMIN — LOSARTAN POTASSIUM 100 MG: 50 TABLET ORAL at 21:15

## 2019-04-12 RX ADMIN — AMLODIPINE BESYLATE 10 MG: 5 TABLET ORAL at 06:20

## 2019-04-12 RX ADMIN — DOXYCYCLINE 100 MG: 100 TABLET, FILM COATED ORAL at 17:26

## 2019-04-12 RX ADMIN — CALCIUM GLUCONATE 1000 MG: 98 INJECTION, SOLUTION INTRAVENOUS at 06:26

## 2019-04-12 RX ADMIN — LABETALOL HYDROCHLORIDE 200 MG: 100 TABLET, FILM COATED ORAL at 17:25

## 2019-04-12 RX ADMIN — IRON SUCROSE 200 MG: 20 INJECTION, SOLUTION INTRAVENOUS at 13:05

## 2019-04-12 RX ADMIN — CEFTRIAXONE SODIUM 2 G: 2 INJECTION, POWDER, FOR SOLUTION INTRAMUSCULAR; INTRAVENOUS at 13:28

## 2019-04-12 RX ADMIN — IPRATROPIUM BROMIDE AND ALBUTEROL SULFATE 3 ML: .5; 3 SOLUTION RESPIRATORY (INHALATION) at 22:38

## 2019-04-12 RX ADMIN — METHYLPREDNISOLONE SODIUM SUCCINATE 62.5 MG: 125 INJECTION, POWDER, FOR SOLUTION INTRAMUSCULAR; INTRAVENOUS at 06:20

## 2019-04-12 RX ADMIN — DOXYCYCLINE 100 MG: 100 TABLET, FILM COATED ORAL at 06:20

## 2019-04-12 ASSESSMENT — COGNITIVE AND FUNCTIONAL STATUS - GENERAL
WALKING IN HOSPITAL ROOM: A LITTLE
STANDING UP FROM CHAIR USING ARMS: A LITTLE
TOILETING: A LITTLE
DRESSING REGULAR UPPER BODY CLOTHING: A LITTLE
PERSONAL GROOMING: A LITTLE
HELP NEEDED FOR BATHING: A LITTLE
SUGGESTED CMS G CODE MODIFIER MOBILITY: CK
SUGGESTED CMS G CODE MODIFIER DAILY ACTIVITY: CK
MOVING FROM LYING ON BACK TO SITTING ON SIDE OF FLAT BED: A LITTLE
TURNING FROM BACK TO SIDE WHILE IN FLAT BAD: A LITTLE
DRESSING REGULAR LOWER BODY CLOTHING: A LITTLE
MOVING TO AND FROM BED TO CHAIR: A LITTLE
MOBILITY SCORE: 18
CLIMB 3 TO 5 STEPS WITH RAILING: A LITTLE
DAILY ACTIVITIY SCORE: 19

## 2019-04-12 ASSESSMENT — COPD QUESTIONNAIRES
DO YOU EVER COUGH UP ANY MUCUS OR PHLEGM?: NO/ONLY WITH OCCASIONAL COLDS OR INFECTIONS
DURING THE PAST 4 WEEKS HOW MUCH DID YOU FEEL SHORT OF BREATH: NONE/LITTLE OF THE TIME
COPD SCREENING SCORE: 3
HAVE YOU SMOKED AT LEAST 100 CIGARETTES IN YOUR ENTIRE LIFE: YES

## 2019-04-12 ASSESSMENT — ENCOUNTER SYMPTOMS
VOMITING: 0
FEVER: 1
NAUSEA: 0
WHEEZING: 1
SHORTNESS OF BREATH: 1
PALPITATIONS: 0
SPUTUM PRODUCTION: 1
CHILLS: 1
COUGH: 1

## 2019-04-12 ASSESSMENT — PATIENT HEALTH QUESTIONNAIRE - PHQ9
1. LITTLE INTEREST OR PLEASURE IN DOING THINGS: NOT AT ALL
2. FEELING DOWN, DEPRESSED, IRRITABLE, OR HOPELESS: NOT AT ALL
SUM OF ALL RESPONSES TO PHQ9 QUESTIONS 1 AND 2: 0

## 2019-04-12 ASSESSMENT — LIFESTYLE VARIABLES: EVER_SMOKED: YES

## 2019-04-12 NOTE — ED NOTES
Partial med rec complete   Pt unsure of doses of medications   Pt home pharmacy opens at 0900, will follow up then   Allergies reviewed  No oral ABX within last 30 days

## 2019-04-12 NOTE — SENIOR ADMIT NOTE
"Miss. Coyle is a 53 y.o. female with PMHx of ESRD on HD MWF, s/p right BKA, hypertension who presented with c/o cough since 4 days.      Patient reports cough since Monday.  Associated with light brown sputum, chest pain worsening on coughing and inspiration.  Denies any blood in sputum.  Also had a fever, T-max of 102 at home with chills and headaches.  Denies any nausea, vomiting but has abdominal bloating and diarrhea since 1 day, 3-4 times per day, loose watery.  Denies any recent antibiotics or sick contacts.  Also has worsening shortness of breath since last night especially on exertion.  Positive for orthopnea and PND.  No leg edema.  Could not sleep last night.  Missed dialysis on Wednesday as she was sick.      Exam:  /94   Pulse 84   Temp 36.7 °C (98 °F) (Temporal)   Resp 20   Ht 1.575 m (5' 2\")   Wt 61.7 kg (136 lb 0.4 oz)   SpO2 98%   BMI 24.88 kg/m²     Physical exam:   General: in mild respiratory distress  HEENT: NC/AT. PERRLA, EOMI. moist mucous membranes.   CVS: RRR, S1S2 WNL, no MRG  RS: Diffuse wheezing all over her lungs  Abdomen: Soft, NT/ND, BS +. No organomegaly  Ext: LUE AVF seen with palpable thrill, s/p R BKA, no left lower extremity edema  Neuro: CN 2-12 wnl. Motor and sensory exam wnl.      Labs:  No leukocytosis, anemia of chronic disease with Hb of 9.6, elevated anion gap at 15, hypokalemia at 6.8, EKG showing peaked T waves.    Blood cultures collected in the ED    Was given insulin and dextrose in the ED for hyperkalemia.    DX-CHEST-PORTABLE (1 VIEW)   Final Result      No acute cardiopulmonary abnormality identified.          Assessment and Plan:    #Acute hypoxemic respiratory failure- likely from fluid overload from missed dialysis vs ?atypical pneumonia vs ?flu  #?Atypical pneumonia   #ESRD on HD  #Hyperkalemia at 6.8  #Anemia of chronic disease  #Hypertension    -Admit to observation with telemetry and continuous pulse oximetry  -CXR showed no infiltrates, flu " pending  -Given fevers at home, cough with sputum, increased oxygen demands, started doxycycline for possible atypical pneumonia  -f/u blood and sputum cx, deescalate abx as appropriate  -Was given insulin and dextrose in ED, given EKG changes 1g of calcium gluconate ordered  -Follow-up repeat K in 4 hours, at 7am  -ED physician consulting nephrology for dialysis this a.m., day team to follow up  -f/u iron panel and ferritin  -Restart home BP meds  -Heparin for DVT Px    For further details , please refer to H&P by Dr. Pena

## 2019-04-12 NOTE — PROGRESS NOTES
Hemodialysis done today, started @ 0852 and ended @ 1157 with net UF= 3382ml. Blood returned 2 mins earlier due to severe generalized cramping, NS bolus given and after few mins, patient stated she feels better,Dr Cadena notified, said OK to stop HD for today. Report given to TOM Dobbs. See flow sheet for details.
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
## 2019-04-12 NOTE — ASSESSMENT & PLAN NOTE
Patient is dialysis dependent end-stage renal disease, missed hemodialysis on Wednesday, presents the emergency department with respiratory distress, hyperkalemia, saturating 88% requiring 3 L oxygen.  Now improved and no longer requiring oxygen status post dialysis although continues to have JVD and pulmonary edema on repeat chest x-ray    Admit to telemetry  Hemodialysis again today  Renal diet  Started sevelamer 800 mg 3 times daily for hyperphosphatemia of 6.4 in ESRD

## 2019-04-12 NOTE — ED TRIAGE NOTES
"Isabelle Coyle  53 y.o. female  Chief Complaint   Patient presents with   • Cough     Reports productive cough. Past 4 days. Missed wednesday dialysis because of cough.     /94   Pulse 89   Temp 36.7 °C (98 °F) (Temporal)   Resp 20   Ht 1.575 m (5' 2\")   Wt 61.7 kg (136 lb 0.4 oz)   SpO2 95%   BMI 24.88 kg/m²       Pt amb to triage with steady gait for above complaint.   Pt is alert and oriented, speaking in full sentences, follows commands and responds appropriately to questions. Resp are even and unlabored. No behavioral indicators of pain.   Pt placed in lobby. Pt educated on triage process. Pt encouraged to alert staff for any changes.    "

## 2019-04-12 NOTE — ED NOTES
Pt apologized for rude behavior to this RN earlier. Pt informed it is okay and RN is glad she feels better.

## 2019-04-12 NOTE — ED NOTES
Spoke to Cielo SANTOS on tele 8, informed RN that patient is still receiving dialysis in ER at this time. Will call when pt is finished.
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
## 2019-04-12 NOTE — ED NOTES
Pt refusing to use bedside commode. Pt also refusing breathing treatment. Dialysis nurse at bedside.

## 2019-04-12 NOTE — ED PROVIDER NOTES
ED Provider Note    ED Provider Note      Primary care provider: Juan Moreno M.D.    CHIEF COMPLAINT  Chief Complaint   Patient presents with   • Cough     Reports productive cough. Past 4 days. Missed wednesday dialysis because of cough.       HPI  Isabelle Coyle is a 53 y.o. female who presents to the Emergency Department with chief complaint of cough.  Patient reports she is had cough over the last 4 days gotten progressively worse progressed more productive she is had chills and subjective fevers at home.  She is had minimal headache not experiencing this currently she is had increasing shortness of breath over the last 2 days she reports that she missed dialysis due to the cough.  Did not get an influenza vaccine this year her pain is throughout her chest with cough only no exertional pain no pain at rest.  No abdominal pain no other acute symptoms or concerns at this time.    REVIEW OF SYSTEMS  10 systems reviewed and otherwise negative, pertinent positives and negatives listed in the history of present illness.    PAST MEDICAL HISTORY   has a past medical history of Anemia; Anemia associated with chronic renal failure (11/5/2009); Dental disorder (8-25-17); Dialysis patient ([facility]) (8-25-17); Dysrhythmia; ESRD (end stage renal disease) ([facility]) (8-25-17); Glomerulonephritis, chronic (11/5/2009); Heart burn; High cholesterol; HTN (hypertension) (11/5/2009); Port catheter in place (8-25-17); SVT (supraventricular tachycardia) ([facility]); and SVT (supraventricular tachycardia) ([facility]).    SURGICAL HISTORY   has a past surgical history that includes amputation, below the knee; capitation payment (insurance); appendectomy laparoscopic (5/4/2009); enlarge breast with implant; av fistula creation (Left, 6/20/2017); other; femur orif (10/9/08); iliac bone graft (10/9/08); av fistula creation (Left, 8/28/2017); and av fistula creation (Left, 4/27/2018).    SOCIAL HISTORY  Social History   Substance Use Topics   •  "Smoking status: Current Every Day Smoker     Packs/day: 0.25     Years: 20.00     Types: Cigarettes   • Smokeless tobacco: Never Used      Comment: 5 cigs/day   • Alcohol use No      History   Drug Use No       FAMILY HISTORY  Non-Contributory    CURRENT MEDICATIONS  Home Medications    **Home medications have not yet been reviewed for this encounter**         ALLERGIES  Allergies   Allergen Reactions   • Sulfa Drugs Hives     PKM=2682 rash swelling itching       PHYSICAL EXAM  VITAL SIGNS: /94   Pulse 89   Temp 36.7 °C (98 °F) (Temporal)   Resp 20   Ht 1.575 m (5' 2\")   Wt 61.7 kg (136 lb 0.4 oz)   SpO2 95%   BMI 24.88 kg/m²   Pulse ox interpretation: I interpret this pulse ox as normal.  Constitutional: Alert and oriented x 3, moderate distress  HEENT: Atraumatic normocephalic, pupils are equal round reactive to light extraocular movements are intact. The nares is clear, external ears are normal, mouth shows moist mucous membranes  Neck: Supple, no JVD no tracheal deviation  Cardiovascular: Regular rate and rhythm no murmur rub or gallop 2+ pulses peripherally x4  Thorax & Lungs: Decreased air movement throughout expiratory wheeze the apices bilaterally.  GI: Soft nontender nondistended positive bowel sounds, no peritoneal signs  Skin: Warm dry no acute rash or lesion  Musculoskeletal: Moving all extremities with full range and 5 of 5 strength, no acute  deformity  Neurologic: Cranial nerves III through XII are grossly intact, no sensory deficit, no cerebellar dysfunction   Psychiatric: Appropriate affect for situation at this time      DIAGNOSTIC STUDIES / PROCEDURES  LABS      Results for orders placed or performed during the hospital encounter of 04/12/19   CBC w/ Differential   Result Value Ref Range    WBC 4.6 (L) 4.8 - 10.8 K/uL    RBC 2.94 (L) 4.20 - 5.40 M/uL    Hemoglobin 9.6 (L) 12.0 - 16.0 g/dL    Hematocrit 29.6 (L) 37.0 - 47.0 %    .7 (H) 81.4 - 97.8 fL    MCH 32.7 27.0 - 33.0 pg "    MCHC 32.4 (L) 33.6 - 35.0 g/dL    RDW 57.7 (H) 35.9 - 50.0 fL    Platelet Count 112 (L) 164 - 446 K/uL    MPV 9.9 9.0 - 12.9 fL    Neutrophils-Polys 76.70 (H) 44.00 - 72.00 %    Lymphocytes 15.30 (L) 22.00 - 41.00 %    Monocytes 5.90 0.00 - 13.40 %    Eosinophils 1.50 0.00 - 6.90 %    Basophils 0.40 0.00 - 1.80 %    Immature Granulocytes 0.20 0.00 - 0.90 %    Nucleated RBC 0.00 /100 WBC    Neutrophils (Absolute) 3.52 2.00 - 7.15 K/uL    Lymphs (Absolute) 0.70 (L) 1.00 - 4.80 K/uL    Monos (Absolute) 0.27 0.00 - 0.85 K/uL    Eos (Absolute) 0.07 0.00 - 0.51 K/uL    Baso (Absolute) 0.02 0.00 - 0.12 K/uL    Immature Granulocytes (abs) 0.01 0.00 - 0.11 K/uL    NRBC (Absolute) 0.00 K/uL   Basic Metabolic Panel (BMP)   Result Value Ref Range    Sodium 138 135 - 145 mmol/L    Potassium 6.8 (HH) 3.6 - 5.5 mmol/L    Chloride 97 96 - 112 mmol/L    Co2 26 20 - 33 mmol/L    Glucose 102 (H) 65 - 99 mg/dL    Bun 78 (HH) 8 - 22 mg/dL    Creatinine 9.94 (HH) 0.50 - 1.40 mg/dL    Calcium 8.6 8.5 - 10.5 mg/dL    Anion Gap 15.0 (H) 0.0 - 11.9   LACTIC ACID   Result Value Ref Range    Lactic Acid 1.0 0.5 - 2.0 mmol/L   ESTIMATED GFR   Result Value Ref Range    GFR If African American 5 (A) >60 mL/min/1.73 m 2    GFR If Non African American 4 (A) >60 mL/min/1.73 m 2   Influenza A/B By PCR (Adult - Flu Only)   Result Value Ref Range    Influenza virus A RNA Negative Negative    Influenza virus B, PCR Negative Negative                                   EKG   Result Value Ref Range    Report       [facility] Emergency Dept.    Test Date:  2019  Pt Name:    MELISSA BARDALES                 Department: ER  MRN:        [id number]                      Room:       [facility]  Gender:     Female                       Technician: [id number]  :        1965                   Requested By:HORTENCIA SHETTY  Order #:    [id number]                    Reading MD:    Measurements  Intervals                                Axis  Rate:    "    85                           P:          51  KY:         150                          QRS:        22  QRSD:       100                          T:          69  QT:         399  QTc:        475    Interpretive Statements  Sinus rhythm  Probable left atrial enlargement  Left ventricular hypertrophy  Compared to ECG 12/09/2018 15:33:57  No significant changes         All labs reviewed by me.      RADIOLOGY  No orders to display     The radiologist's interpretation of all radiological studies have been reviewed by me.    COURSE & MEDICAL DECISION MAKING  Pertinent Labs & Imaging studies reviewed. (See chart for details)    3:00 AM - Patient seen and examined at bedside.  Will be ordered breathing treatment chest x-ray basic labs.  Page concern for volume overload and hyperkalemia after missing dialysis.    EKG as above shows peak T waves. x-ray is clear. patient has ongoing symptoms at this point and will need urgent dialysis.  To be admitted to the hospital for further evaluation treatment I have contacted the internal medicine resident housestaff as well as nephrologist Dr. Muro today their help with this patient's greatly appreciated admitted in guarded condition.  /94   Pulse 89   Temp 36.7 °C (98 °F) (Temporal)   Resp 20   Ht 1.575 m (5' 2\")   Wt 61.7 kg (136 lb 0.4 oz)   SpO2 95%   BMI 24.88 kg/m²            FINAL IMPRESSION  1.  Cough  2. Shortness of breath  3.  Noncompliance with dialysis  4.  Hyperkalemia  5.  Bronchitis        This dictation has been created using voice recognition software and/or scribes. The accuracy of the dictation is limited by the abilities of the software and the expertise of the scribes. I expect there may be some errors of grammar and possibly content. I made every attempt to manually correct the errors within my dictation. However, errors related to voice recognition software and/or scribes may still exist and should be interpreted within the appropriate " context.

## 2019-04-12 NOTE — DISCHARGE PLANNING
Outpatient Dialysis Note     Confirmed patient is active at:     [facility] [place] [place]/[facility]  [address] [address]   Phone: [phone] Fax: [phone]     Schedule: Monday, Wednesday, Friday  Time: 07:00am-10:00am     Spoke with Abby at facility who confirmed.   Forwarded records for review    Dialysis Coordinator- [facility],  Garret Espinoza [phone]

## 2019-04-12 NOTE — CARE PLAN
Problem: Communication  Goal: The ability to communicate needs accurately and effectively will improve  Outcome: PROGRESSING AS EXPECTED  Updated pt on POC regarding medications and treatment.    Problem: Safety  Goal: Will remain free from injury  Outcome: PROGRESSING AS EXPECTED  Pt educated to call before getting out of bed, fall precautions are in place.

## 2019-04-12 NOTE — ED NOTES
Pt's dialysis was stopped due to cramping, but was almost finished anyway. Pt no longer cramping. Floor RN at bedside for report.

## 2019-04-13 LAB
ALBUMIN SERPL BCP-MCNC: 3.5 G/DL (ref 3.2–4.9)
ALBUMIN/GLOB SERPL: 1.5 G/DL
ALP SERPL-CCNC: 45 U/L (ref 30–99)
ALT SERPL-CCNC: 21 U/L (ref 2–50)
ANION GAP SERPL CALC-SCNC: 13 MMOL/L (ref 0–11.9)
AST SERPL-CCNC: 14 U/L (ref 12–45)
BASOPHILS # BLD AUTO: 0.2 % (ref 0–1.8)
BASOPHILS # BLD: 0.01 K/UL (ref 0–0.12)
BILIRUB SERPL-MCNC: 0.5 MG/DL (ref 0.1–1.5)
BUN SERPL-MCNC: 49 MG/DL (ref 8–22)
CALCIUM SERPL-MCNC: 8.3 MG/DL (ref 8.5–10.5)
CHLORIDE SERPL-SCNC: 97 MMOL/L (ref 96–112)
CO2 SERPL-SCNC: 28 MMOL/L (ref 20–33)
CREAT SERPL-MCNC: 6.97 MG/DL (ref 0.5–1.4)
EOSINOPHIL # BLD AUTO: 0 K/UL (ref 0–0.51)
EOSINOPHIL NFR BLD: 0 % (ref 0–6.9)
ERYTHROCYTE [DISTWIDTH] IN BLOOD BY AUTOMATED COUNT: 56.4 FL (ref 35.9–50)
GLOBULIN SER CALC-MCNC: 2.3 G/DL (ref 1.9–3.5)
GLUCOSE SERPL-MCNC: 110 MG/DL (ref 65–99)
HCT VFR BLD AUTO: 29.2 % (ref 37–47)
HGB BLD-MCNC: 9.2 G/DL (ref 12–16)
IMM GRANULOCYTES # BLD AUTO: 0.01 K/UL (ref 0–0.11)
IMM GRANULOCYTES NFR BLD AUTO: 0.2 % (ref 0–0.9)
LYMPHOCYTES # BLD AUTO: 0.43 K/UL (ref 1–4.8)
LYMPHOCYTES NFR BLD: 8.4 % (ref 22–41)
MAGNESIUM SERPL-MCNC: 2.3 MG/DL (ref 1.5–2.5)
MCH RBC QN AUTO: 31.8 PG (ref 27–33)
MCHC RBC AUTO-ENTMCNC: 31.5 G/DL (ref 33.6–35)
MCV RBC AUTO: 101 FL (ref 81.4–97.8)
MONOCYTES # BLD AUTO: 0.24 K/UL (ref 0–0.85)
MONOCYTES NFR BLD AUTO: 4.7 % (ref 0–13.4)
NEUTROPHILS # BLD AUTO: 4.42 K/UL (ref 2–7.15)
NEUTROPHILS NFR BLD: 86.5 % (ref 44–72)
NRBC # BLD AUTO: 0 K/UL
NRBC BLD-RTO: 0 /100 WBC
PHOSPHATE SERPL-MCNC: 6.4 MG/DL (ref 2.5–4.5)
PLATELET # BLD AUTO: 117 K/UL (ref 164–446)
PMV BLD AUTO: 10.5 FL (ref 9–12.9)
POTASSIUM SERPL-SCNC: 5.2 MMOL/L (ref 3.6–5.5)
PROT SERPL-MCNC: 5.8 G/DL (ref 6–8.2)
RBC # BLD AUTO: 2.89 M/UL (ref 4.2–5.4)
SODIUM SERPL-SCNC: 138 MMOL/L (ref 135–145)
WBC # BLD AUTO: 5.1 K/UL (ref 4.8–10.8)

## 2019-04-13 PROCEDURE — A9270 NON-COVERED ITEM OR SERVICE: HCPCS | Performed by: STUDENT IN AN ORGANIZED HEALTH CARE EDUCATION/TRAINING PROGRAM

## 2019-04-13 PROCEDURE — 770020 HCHG ROOM/CARE - TELE (206)

## 2019-04-13 PROCEDURE — 700105 HCHG RX REV CODE 258: Performed by: STUDENT IN AN ORGANIZED HEALTH CARE EDUCATION/TRAINING PROGRAM

## 2019-04-13 PROCEDURE — 700111 HCHG RX REV CODE 636 W/ 250 OVERRIDE (IP): Performed by: STUDENT IN AN ORGANIZED HEALTH CARE EDUCATION/TRAINING PROGRAM

## 2019-04-13 PROCEDURE — 700101 HCHG RX REV CODE 250: Performed by: STUDENT IN AN ORGANIZED HEALTH CARE EDUCATION/TRAINING PROGRAM

## 2019-04-13 PROCEDURE — 85025 COMPLETE CBC W/AUTO DIFF WBC: CPT

## 2019-04-13 PROCEDURE — 90935 HEMODIALYSIS ONE EVALUATION: CPT

## 2019-04-13 PROCEDURE — 94640 AIRWAY INHALATION TREATMENT: CPT

## 2019-04-13 PROCEDURE — 700102 HCHG RX REV CODE 250 W/ 637 OVERRIDE(OP): Performed by: STUDENT IN AN ORGANIZED HEALTH CARE EDUCATION/TRAINING PROGRAM

## 2019-04-13 PROCEDURE — 84100 ASSAY OF PHOSPHORUS: CPT

## 2019-04-13 PROCEDURE — 5A1D70Z PERFORMANCE OF URINARY FILTRATION, INTERMITTENT, LESS THAN 6 HOURS PER DAY: ICD-10-PCS | Performed by: INTERNAL MEDICINE

## 2019-04-13 PROCEDURE — 700102 HCHG RX REV CODE 250 W/ 637 OVERRIDE(OP): Performed by: INTERNAL MEDICINE

## 2019-04-13 PROCEDURE — 99232 SBSQ HOSP IP/OBS MODERATE 35: CPT | Mod: GC | Performed by: INTERNAL MEDICINE

## 2019-04-13 PROCEDURE — A9270 NON-COVERED ITEM OR SERVICE: HCPCS | Performed by: INTERNAL MEDICINE

## 2019-04-13 PROCEDURE — 36415 COLL VENOUS BLD VENIPUNCTURE: CPT

## 2019-04-13 PROCEDURE — 700111 HCHG RX REV CODE 636 W/ 250 OVERRIDE (IP): Performed by: INTERNAL MEDICINE

## 2019-04-13 PROCEDURE — 83735 ASSAY OF MAGNESIUM: CPT

## 2019-04-13 PROCEDURE — 94760 N-INVAS EAR/PLS OXIMETRY 1: CPT

## 2019-04-13 PROCEDURE — 99407 BEHAV CHNG SMOKING > 10 MIN: CPT

## 2019-04-13 PROCEDURE — 80053 COMPREHEN METABOLIC PANEL: CPT

## 2019-04-13 RX ORDER — AMOXICILLIN AND CLAVULANATE POTASSIUM 250; 125 MG/1; MG/1
1 TABLET, FILM COATED ORAL EVERY 24 HOURS
Status: COMPLETED | OUTPATIENT
Start: 2019-04-13 | End: 2019-04-14

## 2019-04-13 RX ORDER — CALCIUM CARBONATE 500 MG/1
500 TABLET, CHEWABLE ORAL DAILY
Status: DISCONTINUED | OUTPATIENT
Start: 2019-04-13 | End: 2019-04-14 | Stop reason: HOSPADM

## 2019-04-13 RX ORDER — NICOTINE 21 MG/24HR
21 PATCH, TRANSDERMAL 24 HOURS TRANSDERMAL ONCE
Status: COMPLETED | OUTPATIENT
Start: 2019-04-13 | End: 2019-04-14

## 2019-04-13 RX ORDER — IPRATROPIUM BROMIDE AND ALBUTEROL SULFATE 2.5; .5 MG/3ML; MG/3ML
3 SOLUTION RESPIRATORY (INHALATION)
Status: DISCONTINUED | OUTPATIENT
Start: 2019-04-14 | End: 2019-04-14 | Stop reason: HOSPADM

## 2019-04-13 RX ORDER — SEVELAMER CARBONATE 800 MG/1
800 TABLET, FILM COATED ORAL
Status: DISCONTINUED | OUTPATIENT
Start: 2019-04-13 | End: 2019-04-14 | Stop reason: HOSPADM

## 2019-04-13 RX ADMIN — IPRATROPIUM BROMIDE AND ALBUTEROL SULFATE 3 ML: .5; 3 SOLUTION RESPIRATORY (INHALATION) at 18:03

## 2019-04-13 RX ADMIN — CEFTRIAXONE SODIUM 2 G: 2 INJECTION, POWDER, FOR SOLUTION INTRAMUSCULAR; INTRAVENOUS at 05:14

## 2019-04-13 RX ADMIN — BENZOCAINE AND MENTHOL 1 LOZENGE: 15; 3.6 LOZENGE ORAL at 22:14

## 2019-04-13 RX ADMIN — NICOTINE 21 MG: 21 PATCH, EXTENDED RELEASE TRANSDERMAL at 12:35

## 2019-04-13 RX ADMIN — HEPARIN SODIUM 5000 UNITS: 5000 INJECTION, SOLUTION INTRAVENOUS; SUBCUTANEOUS at 17:16

## 2019-04-13 RX ADMIN — SENNOSIDES, DOCUSATE SODIUM 2 TABLET: 50; 8.6 TABLET, FILM COATED ORAL at 17:20

## 2019-04-13 RX ADMIN — AMLODIPINE BESYLATE 10 MG: 5 TABLET ORAL at 05:14

## 2019-04-13 RX ADMIN — IPRATROPIUM BROMIDE AND ALBUTEROL SULFATE 3 ML: .5; 3 SOLUTION RESPIRATORY (INHALATION) at 06:19

## 2019-04-13 RX ADMIN — DOXYCYCLINE 100 MG: 100 TABLET, FILM COATED ORAL at 17:15

## 2019-04-13 RX ADMIN — LOSARTAN POTASSIUM 100 MG: 50 TABLET ORAL at 20:56

## 2019-04-13 RX ADMIN — LABETALOL HYDROCHLORIDE 200 MG: 100 TABLET, FILM COATED ORAL at 17:19

## 2019-04-13 RX ADMIN — SEVELAMER CARBONATE 800 MG: 800 TABLET, FILM COATED ORAL at 17:15

## 2019-04-13 RX ADMIN — IRON SUCROSE 200 MG: 20 INJECTION, SOLUTION INTRAVENOUS at 05:14

## 2019-04-13 RX ADMIN — ANTACID TABLETS 500 MG: 500 TABLET, CHEWABLE ORAL at 16:39

## 2019-04-13 RX ADMIN — PREDNISONE 40 MG: 20 TABLET ORAL at 05:13

## 2019-04-13 RX ADMIN — GABAPENTIN 100 MG: 100 CAPSULE ORAL at 17:15

## 2019-04-13 RX ADMIN — SENNOSIDES, DOCUSATE SODIUM 2 TABLET: 50; 8.6 TABLET, FILM COATED ORAL at 05:14

## 2019-04-13 RX ADMIN — IPRATROPIUM BROMIDE AND ALBUTEROL SULFATE 3 ML: .5; 3 SOLUTION RESPIRATORY (INHALATION) at 10:29

## 2019-04-13 RX ADMIN — SEVELAMER CARBONATE 800 MG: 800 TABLET, FILM COATED ORAL at 08:43

## 2019-04-13 RX ADMIN — IPRATROPIUM BROMIDE AND ALBUTEROL SULFATE 3 ML: .5; 3 SOLUTION RESPIRATORY (INHALATION) at 02:26

## 2019-04-13 RX ADMIN — TERAZOSIN HYDROCHLORIDE 5 MG: 5 CAPSULE ORAL at 17:15

## 2019-04-13 RX ADMIN — AMOXICILLIN AND CLAVULANATE POTASSIUM 1 TABLET: 250; 125 TABLET, FILM COATED ORAL at 12:35

## 2019-04-13 RX ADMIN — DOXYCYCLINE 100 MG: 100 TABLET, FILM COATED ORAL at 05:13

## 2019-04-13 RX ADMIN — LABETALOL HYDROCHLORIDE 200 MG: 100 TABLET, FILM COATED ORAL at 05:14

## 2019-04-13 RX ADMIN — SEVELAMER CARBONATE 800 MG: 800 TABLET, FILM COATED ORAL at 12:35

## 2019-04-13 RX ADMIN — GABAPENTIN 100 MG: 100 CAPSULE ORAL at 05:14

## 2019-04-13 RX ADMIN — HEPARIN SODIUM 5000 UNITS: 5000 INJECTION, SOLUTION INTRAVENOUS; SUBCUTANEOUS at 05:14

## 2019-04-13 ASSESSMENT — ENCOUNTER SYMPTOMS
BACK PAIN: 0
MYALGIAS: 1
EYES NEGATIVE: 1
NECK PAIN: 0
NAUSEA: 0
SPUTUM PRODUCTION: 1
CARDIOVASCULAR NEGATIVE: 1
COUGH: 1
PALPITATIONS: 0
VOMITING: 0
WHEEZING: 1
NEUROLOGICAL NEGATIVE: 1
CHILLS: 0
GASTROINTESTINAL NEGATIVE: 1
SHORTNESS OF BREATH: 0
SPUTUM PRODUCTION: 0
FEVER: 0

## 2019-04-13 NOTE — PROGRESS NOTES
Received from ED nurse, transferred patient on Zole with ACLS  Nurse. Placed patient on heart monitor, called monitor room, verified patient name, room number, HR, and rhythm

## 2019-04-13 NOTE — ASSESSMENT & PLAN NOTE
Assessment: Patient presents with high anion gap in the setting of a normal lactate and elevated BUN, suggesting that the likely etiology is uremia secondary to end-stage renal disease.    Plan:  Dialysis per nephrology

## 2019-04-13 NOTE — CARE PLAN
Problem: Bronchoconstriction:  Goal: Improve in air movement and diminished wheezing  Outcome: PROGRESSING AS EXPECTED  Respiratory Therapy Update    Interdisciplinary Plan of Care-Goals (Indications)  Bronchodilator Indications: History / Diagnosis (04/13/19 0226)  Interdisciplinary Plan of Care-Outcomes   Bronchodilator Outcome: Diminished Wheezing and Volume of Air Movement Increased (04/13/19 0226)     #SVN Performed: Yes (04/13/19 0226)    Cough: Non Productive (04/13/19 0226)  Sputum Amount: Unable to Evaluate (04/13/19 0226)  Sputum Color: Unable to Evaluate (04/13/19 0226)  Sputum Consistency: Unable to Evaluate (04/13/19 0226)     O2 (LPM): 3 (04/13/19 0226)  O2 Daily Delivery Respiratory : Silicone Nasal Cannula (04/13/19 0226)    Breath Sounds  Pre/Post Intervention: Pre Intervention Assessment (04/13/19 0226)  RUL Breath Sounds: Diminished (04/13/19 0226)  RML Breath Sounds: Diminished (04/13/19 0226)  RLL Breath Sounds: Diminished (04/13/19 0226)  PHILIP Breath Sounds: Diminished (04/13/19 0226)  LLL Breath Sounds: Diminished (04/13/19 0226)    Events/Summary/Plan: PT compliant with therapy. Duo Q4.

## 2019-04-13 NOTE — CARE PLAN
Problem: Knowledge Deficit  Goal: Knowledge of disease process/condition, treatment plan, diagnostic tests, and medications will improve  Outcome: PROGRESSING AS EXPECTED  Patient confirms understanding    Problem: Discharge Barriers/Planning  Goal: Patient's continuum of care needs will be met  Outcome: PROGRESSING AS EXPECTED  Continuum of care being met    Problem: Pain Management  Goal: Pain level will decrease to patient's comfort goal  Outcome: PROGRESSING AS EXPECTED  Patient has no C/O pain

## 2019-04-13 NOTE — ASSESSMENT & PLAN NOTE
Assessment: Patient presents with acute hypoxic respiratory failure likely secondary to a combination of COPD exacerbation, potentially with infectious agent due to her elevated pro-calcitonin, as well as volume overload due to her missing dialysis causing acute pulmonary edema.  Patient was treated appropriately with dialysis to remove the additional volume, and with bronchodilators, corticosteroids, and antibiotics to treat her COPD exacerbation    Plan:  See COPD exacerbation and ESRD problems

## 2019-04-13 NOTE — PROGRESS NOTES
Internal Medicine Interval Note  Note Author: Giovani Weller M.D.     Name Isabelle Coyle     1965   Age/Sex 53 y.o. female   MRN [id number]   Code Status  full     After 5PM or if no immediate response to page, please call for cross-coverage  Attending/Team: Dr. Dalton See Patient List for primary contact information  Call [phone] to page    1st Call - Day Intern (R1):   Dr. Weller 2nd Call - Day Sr. Resident (R2/R3):   Dr. Ramos         Reason for interval visit  (Principal Problem)   Acute hypoxic respiratory failure      Interval Problem Daily Status Update  (24 hours, problem oriented, brief subjective history, new lab/imaging data pertinent to that problem)   No acute events overnight although patient was admitted only a few hours before being seen, patient received dialysis this morning and subsequently her hypoxic respiratory failure improved dramatically and she became much less symptomatic.  Initially when assessed before dialysis, patient was tripoding and had significant wheezing with prolonged expiratory phase.  Subsequent to dialysis patient was comfortable on 1 L oxygen per nasal cannula.  Discussed with patient regarding her history of smoking.,  Patient admitted to smoking 1 pack/day for many years.  She denied knowledge of a diagnosis of COPD.  Clarified the history, patient endorsed recent fevers and chills over the past day with a temperature of 102 at home.  She stated that she was having production of purulent sputum.      Review of Systems   Constitutional: Positive for chills, fever and malaise/fatigue.   Respiratory: Positive for cough, sputum production, shortness of breath and wheezing.    Cardiovascular: Negative for chest pain, palpitations and leg swelling.   Gastrointestinal: Negative for nausea and vomiting.   Skin: Negative for itching and rash.       Disposition/Barriers to discharge:   Treatment for COPD exacerbation    Consultants/Specialty  Nephrology  PCP:  Juan Moreno M.D.      Quality Measures  Quality-Core Measures   Reviewed items::  Labs reviewed and Medications reviewed  Murdock catheter::  No Murdock  DVT prophylaxis pharmacological::  Heparin          Physical Exam       Vitals:    04/12/19 1157 04/12/19 1239 04/12/19 1536 04/12/19 1624   BP:  157/83  151/77   Pulse: 92 91 90 81   Resp:  17 16 16   Temp:  37 °C (98.6 °F)  36.8 °C (98.3 °F)   TempSrc:  Temporal  Temporal   SpO2: 97% 97% 96% 97%   Weight:       Height:         Body mass index is 24.88 kg/m². Weight: 61.7 kg (136 lb 0.4 oz)  Oxygen Therapy:  Pulse Oximetry: 97 %, O2 (LPM): 3, O2 Delivery: Silicone Nasal Cannula    Physical Exam   Cardiovascular: Regular rhythm.  Exam reveals no gallop and no friction rub.    No murmur heard.  During initial assessment patient was in acute respiratory distress and very tachycardic, no murmur heard due to tachycardia   Pulmonary/Chest:   Diffuse, bilateral expiratory wheezing with prolonged expiratory phase  Patient initially in tripod position with purse lipped breathing which subsequently resolved after dialysis and breathing treatments.   Abdominal: Soft. Bowel sounds are normal. She exhibits no distension. There is no tenderness.   Musculoskeletal: She exhibits no edema.             Assessment/Plan     * Acute respiratory failure with hypoxia (HCC)- (present on admission)   Assessment & Plan    Assessment: Patient presents with acute hypoxic respiratory failure likely secondary to a combination of COPD exacerbation, potentially with infectious agent due to her elevated pro-calcitonin, as well as volume overload due to her missing dialysis causing acute pulmonary edema.  Patient was treated appropriately with dialysis to remove the additional volume, and with bronchodilators, corticosteroids, and antibiotics to treat her COPD exacerbation    Plan:  See COPD exacerbation and ESRD problems     Probable COPD with acute exacerbation (HCC)- (present on  admission)   Assessment & Plan    Assessment: Patient presents with diffuse, bilateral wheezes, fever, chills, and increased sputum production of a creamy color consistent with purulence in a patient with a long and heavy history of tobacco use.  Patient does not have diagnosed COPD, but the clinical presentation is consistent with a COPD exacerbation, potentially from an infectious organism given the elevated pro-calcitonin of above 2 despite the lack of leukocytosis.  Initial chest x-ray clear indicating no grossly obvious lobar pneumonia, consider atypical organism.    Plan:  Prednisone 40 mg daily for 5 days  Duo nebs every 4 hours  Ceftriaxone and doxycycline for possible community-acquired pneumonia  Oxygen titrated to needs, respiratory therapy  Incentive spirometry  Legionella antibody  Sputum culture  2 view chest x-ray to better visualize possible pneumonia     ESRD on hemodialysis (HCC)- (present on admission)   Assessment & Plan    Patient is dialysis dependent end-stage renal disease, missed hemodialysis on Wednesday, presents the emergency department with respiratory distress, hyperkalemia, saturating 88% requiring 3 L oxygen.  Now improved    Admit to telemetry  Hemodialysis       Hyperkalemia- (present on admission)   Assessment & Plan    Status post calcium gluconate, insulin, D50    Monitored, hyperkalemia resolved after dialysis, will monitor with further labs tomorrow early in the morning  Telemetry     Anemia due to chronic kidney disease   Assessment & Plan    Patient has macrocytic anemia with apparent reticulocyte hypoproliferation, being also treated for iron deficiency anemia by Dr. Muro today with intravenous iron as well as erythropoietin.  Hemoglobin at baseline.    Continue IV iron per nephrology  Erythropoietin per nephrology     Tobacco use- (present on admission)   Assessment & Plan    Nicotine patch     High anion gap metabolic acidosis- (present on admission)   Assessment & Plan     Assessment: Patient presents with high anion gap in the setting of a normal lactate and elevated BUN, suggesting that the likely etiology is uremia secondary to end-stage renal disease.    Plan:  Dialysis per nephrology     Severe uncontrolled hypertension- (present on admission)   Assessment & Plan    Initially extremely elevated due to volume overload, continue home medications, now improved

## 2019-04-13 NOTE — PROGRESS NOTES
Received report from Cindy, at pt bedside. Pt has no complaints at this time, POC discussed. Call light and belongings within reach. Bed locked and in low position. Alarm on and fall precautions in place.

## 2019-04-13 NOTE — ASSESSMENT & PLAN NOTE
Assessment: Patient presents with diffuse, bilateral wheezes, fever, chills, and increased sputum production of a creamy color consistent with purulence in a patient with a long and heavy history of tobacco use.  Patient does not have diagnosed COPD, but the clinical presentation is consistent with a COPD exacerbation, potentially from an infectious organism given the elevated pro-calcitonin of above 2 despite the lack of leukocytosis.  Initial chest x-ray and repeat two-view x-ray are both negative for lobar consolidation indicating possible atypical organism  Plan:  Prednisone 40 mg daily for 5 days  Duo nebs every 4 hours  Ceftriaxone and doxycycline transition to Augmentin 250-125 every 24 hours and doxycycline for 5 days total of antibiotics, stop date set  Oxygen titrated to needs, respiratory therapy  Incentive spirometry  Legionella antibody pending  Sputum culture  Given her lack of previous diagnosis of COPD she will need outpatient pulmonary function tests to confirm the diagnosis

## 2019-04-13 NOTE — DISCHARGE SUMMARY
Internal Medicine Discharge Summary  Note Author: Castro Ramos M.D.       Name Isabelle Coyle 1965   Age/Sex 53 y.o. female   MRN [id number]         Admit Date:  2019       Discharge Date:   2019    Service:   [facility] Internal Medicine White Team  Attending Physician(s):   Yonathan Dalton M.D.       Senior Resident(s):   Castro Ramos M.D.  John Resident(s):   Giovani Weller M.D.  PCP: Juan Moreno M.D.      Primary Diagnosis:   Acute respiratory failure with hypoxia in setting of community-acquired pneumonia and probable COPD with acute exacerbation    Secondary Diagnoses:                Principal Problem (Resolved):    Acute respiratory failure with hypoxia (HCC) POA: Yes  Active Problems:    ESRD on hemodialysis (HCC) POA: Yes    Probable COPD with acute exacerbation (HCC) POA: Yes    HTN (hypertension) POA: Yes    Tobacco use POA: Yes    Anemia due to chronic kidney disease POA: Unknown    Community acquired pneumonia POA: Yes  Resolved Problems:    Hyperkalemia POA: Yes    Severe uncontrolled hypertension POA: Yes    High anion gap metabolic acidosis POA: Yes      Hospital Summary (Brief Narrative):       Ms. Coyle is a pleasant 53-year-old woman with a history of right lower lower extremity traumatic amputation, anemia, end-stage renal disease on hemodialysis , , and  with history of noncompliance, supraventricular tachycardia, GERD, and hypertension, who presented with shortness of breath, productive cough, and fever.  She required 3 L of oxygen to maintain SPO2 greater than 88%.  She was noted to be hypertensive , , .  On presentation, patient's blood pressure was noted to be elevated in the 180/90s secondary to volume overload.  Patient also had a potassium level of 6.8 with peak T waves on EKG.  She underwent emergent hemodialysis with improvement of her hyperkalemia, blood pressure, and respiratory status.  She was  admitted to telemetry for further monitoring.  She underwent another session of hemodialysis on hospital day 2.  Patient was dialyzed -5.5 L net fluid balance.   Patient was started on antibiotics for treatment of community-acquired pneumonia.  She was also started on course of steroids for diffuse wheezing concerning for acute exacerbation of COPD.  Patient continued to recover well and was weaned off supplemental oxygen.  She was discharged in stable medical condition on April 12, 2019.        Patient /Hospital Summary (Details -- Problem Oriented) :          Probable COPD with acute exacerbation (HCC)   Assessment & Plan    Assessment: Presented with diffuse wheezing.  Improved with hemodialysis and administration of steroids and breathing treatments.    Plan:  -Continue prednisone 40 mg by mouth for 5 days.  -Continue doxycycline for total of 5 days.  -Start Spiriva inhaler daily and albuterol inhalers as needed.  -Outpatient pulmonary function testing with follow-up with PCP.  -Follow-up Legionella antibodies with PCP.       ESRD on hemodialysis (HCC)   Assessment & Plan    Patient is dialysis dependent end-stage renal disease, missed hemodialysis on Wednesday, presents the emergency department with respiratory distress, hyperkalemia, saturating 88% requiring 3 L oxygen.  Now improved and no longer requiring oxygen status post dialysis although continues to have JVD and pulmonary edema on repeat chest x-ray.  Sevelamer was started due to hyperphosphatemia of 6.4.    Plan:  -Continue sevelamer.  -Continue hemodialysis Mondays, Wednesdays, and Fridays.  -Importance of compliance emphasized.  -Continue renal diet.     * Acute respiratory failure with hypoxia (HCC)-resolved as of 4/14/2019   Assessment & Plan    Assessment: Patient presents with acute hypoxic respiratory failure likely secondary to a combination of COPD exacerbation, potentially with infectious agent due to her elevated pro-calcitonin, as well as  volume overload due to her missing dialysis causing acute pulmonary edema.  Patient was treated appropriately with dialysis to remove the additional volume, and with bronchodilators, corticosteroids, and antibiotics to treat her COPD exacerbation    Plan:  -Continue 5 days of Augmentin and doxycycline.  -Continue 5 days of prednisone 40 mg by mouth daily.     HTN (hypertension)   Assessment & Plan    Improved but still suboptimally controlled due to volume overload.    Plan:  -Continue home blood pressure medications with increased doses.  -Continue hemodialysis Mondays, Wednesdays, and Fridays.     Hyperkalemia-resolved as of 4/14/2019   Assessment & Plan    Status post calcium gluconate, insulin, D50 on admission.  Potassium improved to 4.6 on day of discharge.    Plan:  -Continue compliance with hemodialysis schedule as per above.     Community acquired pneumonia   Assessment & Plan    White normal but procalcitonin elevated at 2.08 in setting of ESRD.  Presented with infiltrates and productive cough consistent with clinical diagnosis of atypical pneumonia.  Received ceftriaxone and doxycyline.    Plan:  -Continue doxycycline and Augmentin for 5 day course.  -Continue prednisone 40 mg by daily for 5-day course.     Anemia due to chronic kidney disease   Assessment & Plan    Patient had macrocytic anemia with apparent reticulocyte hypoproliferation, being also treated for iron deficiency anemia by Dr. Muro today with intravenous iron as well as erythropoietin.  Hemoglobin was at baseline.  Patient was given IV iron per nephrology.    Plan:  -Continue erythropoietin outpatient per nephrology.     Tobacco use   Assessment & Plan    Patient was counseled to quit smoking.  She requested cessation assistance.    Plan:  -Nicotine patch, gum, and Chantix prescribed.  -Follow-up with outpatient PCP.     High anion gap metabolic acidosis-resolved as of 4/14/2019   Assessment & Plan    Assessment: Patient presents with  high anion gap in the setting of a normal lactate and elevated BUN, suggesting that the likely etiology is uremia secondary to end-stage renal disease.  Anion gap improved from 18 to 12.    Plan:  -Continue dialysis as per schedule.     Severe uncontrolled hypertension-resolved as of 4/14/2019   Assessment & Plan    Initially extremely elevated due to volume overload    Plan:  -Continue increased dose of amlodipine 10 mg by mouth daily.  -Continue increase dose of terazosin 5 mg by mouth daily.  -Continue home labetalol 200 mg by mouth twice daily.  -Resume dialysis schedule.         Consultants:     Nephrology    Procedures:        Hemodialysis    Imaging/ Testing:      DX-CHEST-PORTABLE (1 VIEW)   Final Result      No acute cardiopulmonary abnormality identified.      DX-CHEST-2 VIEWS    (Results Pending)         Discharge Medications:         Medication Reconciliation: Completed       Medication List      START taking these medications      Instructions   albuterol 108 (90 Base) MCG/ACT Aers inhalation aerosol   Inhale 2 Puffs by mouth every 6 hours as needed for Shortness of Breath.  Dose:  2 Puff     amoxicillin-clavulanate 250-62.5 MG/5ML Susr suspension  Commonly known as:  AUGMENTIN   Take 5 mL by mouth every day for 2 days. Take after dialysis on dialysis days.  Dose:  250 mg     doxycycline monohydrate 100 MG tablet  Commonly known as:  ADOXA   Take 1 Tab by mouth every 12 hours for 2 days.  Dose:  100 mg     predniSONE 20 MG Tabs  Commonly known as:  DELTASONE   Take 2 Tabs by mouth every day for 2 days.  Dose:  40 mg     sevelamer carbonate 800 MG Tabs tablet  Commonly known as:  RENVELA   Take 1 Tab by mouth 3 times a day, with meals.  Dose:  800 mg     tiotropium 18 MCG Caps  Commonly known as:  SPIRIVA   Doctor's comments:  Please provide inhalers. 2 puffs daily.  Inhale 1 Cap by mouth every day for 30 days.  Dose:  18 mcg     varenicline 0.5 MG X 11 & 1 MG X 42 tablet  Commonly known as:  CHANTIX  STARTING MONTH SID   Take as per  instructions. Follow up with PCP for continued therapy.        CHANGE how you take these medications      Instructions   amLODIPine 10 MG Tabs  Start taking on:  4/15/2019  What changed:  medication strength  Commonly known as:  NORVASC   Take 1 Tab by mouth every day.  Dose:  10 mg     gabapentin 100 MG Caps  What changed:  how much to take  Commonly known as:  NEURONTIN   Take 1 Cap by mouth 2 Times a Day.  Dose:  100 mg     terazosin 5 MG Caps  What changed:  Another medication with the same name was removed. Continue taking this medication, and follow the directions you see here.  Commonly known as:  HYTRIN   Take 1 Cap by mouth every evening.  Dose:  5 mg        CONTINUE taking these medications      Instructions   labetalol 200 MG Tabs  Commonly known as:  NORMODYNE   TAKE 1 TABLET BY MOUTH TWICE DAILY            Disposition:   To home.    Diet:   Low phosphorous, low sodium renal diet.    Activity:   As tolerated.    Instructions:      The patient was instructed to return to the ER in the event of worsening symptoms. I have counseled the patient on the importance of compliance and the patient has agreed to proceed with all medical recommendations and follow up plan indicated above.   The patient understands that all medications come with benefits and risks. Risks may include permanent injury or death and these risks can be minimized with close reassessment and monitoring.        Primary Care Provider:    Juan Moreno M.D.  Discharge summary faxed to primary care provider:  Completed  Copy of discharge summary given to the patient: Deferred      Follow up appointment details :      No future appointments.      Call      Juan Moreno M.D.  [address] [address] [phone]    In 1 week  Follow up for hospitalization and pulmonary function testing.      Pending Studies:        Legionella antibody pending    Time spent on  discharge day patient visit, preparing discharge paperwork and arranging for patient follow up.    Summary of follow up issues:   -Continue scheduled dialysis session and follow up with nephrology.  -Follow up with PCP following pulmonary function testing to assess for COPD, for continued treatment plan for smoking cessation, and monitoring of blood pressure.      Discharge Time (Minutes) :    68  Hospital Course Type:  Inpatient Stay >2 midnights      Condition on Discharge    ______________________________________________________________________    Interval history/exam for day of discharge:    Patient was weaned of oxygen.  Was dialyzed -5.5 L net fluid balance.  She was tolerating a diet and ambulating.    Vitals:    04/14/19 1000 04/14/19 1040 04/14/19 1232 04/14/19 1458   BP:   (!) 167/85    Pulse:  75 76 72   Resp:  17 17 16   Temp:   36.1 °C (97 °F)    TempSrc:   Temporal    SpO2: 92% 92% 95% 96%   Weight:       Height:             Physical Exam   Constitutional: She is oriented to person, place, and time and well-developed, well-nourished, and in no distress. No distress.   HENT:   Head: Normocephalic and atraumatic.   Right Ear: External ear normal.   Left Ear: External ear normal.   Nose: Nose normal.   Mouth/Throat: Oropharynx is clear and moist. No oropharyngeal exudate.   Eyes: Pupils are equal, round, and reactive to light. Conjunctivae and EOM are normal. Right eye exhibits no discharge. Left eye exhibits no discharge. No scleral icterus.   Neck: Neck supple. JVD (lower neck improved) present. No tracheal deviation present. No thyromegaly present.   Cardiovascular: Normal rate, regular rhythm, normal heart sounds and intact distal pulses.  Exam reveals no gallop and no friction rub.    No murmur heard.  Pulmonary/Chest: Effort normal. No stridor. No respiratory distress. She has no wheezes. She has rales (mild, improved).   Abdominal: Soft. Bowel sounds are normal. She exhibits no distension and no  mass. There is no tenderness. There is no rebound and no guarding.   Musculoskeletal: She exhibits deformity (Right amputation with prothetic in place). She exhibits no edema or tenderness.   Neurological: She is alert and oriented to person, place, and time. She exhibits normal muscle tone.   Skin: Skin is warm and dry. No rash noted. She is not diaphoretic. No erythema. No pallor.   Psychiatric: Mood, memory, affect and judgment normal.   Nursing note and vitals reviewed.        Most Recent Labs:    Lab Results   Component Value Date/Time    WBC 7.0 04/14/2019 02:56 AM    RBC 3.04 (L) 04/14/2019 02:56 AM    HEMOGLOBIN 9.5 (L) 04/14/2019 02:56 AM    HEMATOCRIT 31.5 (L) 04/14/2019 02:56 AM    .6 (H) 04/14/2019 02:56 AM    MCH 31.3 04/14/2019 02:56 AM    MCHC 30.2 (L) 04/14/2019 02:56 AM    MPV 10.2 04/14/2019 02:56 AM    NEUTSPOLYS 68.30 04/14/2019 02:56 AM    LYMPHOCYTES 22.80 04/14/2019 02:56 AM    MONOCYTES 7.90 04/14/2019 02:56 AM    EOSINOPHILS 0.40 04/14/2019 02:56 AM    BASOPHILS 0.30 04/14/2019 02:56 AM    HYPOCHROMIA 1+ 10/04/2013 03:24 PM    ANISOCYTOSIS 3+ 07/14/2007 06:15 AM      Lab Results   Component Value Date/Time    SODIUM 151 (H) 04/12/2019 02:01 PM    POTASSIUM 4.6 04/12/2019 02:01 PM    CHLORIDE 105 04/12/2019 02:01 PM    CO2 29 04/12/2019 02:01 PM    GLUCOSE 138 (H) 04/12/2019 02:01 PM    BUN 30 (H) 04/12/2019 02:01 PM    CREATININE 5.06 (H) 04/12/2019 02:01 PM    CREATININE 2.2 (H) 05/06/2009 03:10 AM      Lab Results   Component Value Date/Time    ALTSGPT 16 12/10/2018 02:04 AM    ASTSGOT 10 (L) 12/10/2018 02:04 AM    ALKPHOSPHAT 53 12/10/2018 02:04 AM    TBILIRUBIN 1.0 12/10/2018 02:04 AM    DBILIRUBIN <0.1 06/20/2017 01:19 PM    LIPASE 35 07/29/2018 08:29 AM    ALBUMIN 3.6 12/10/2018 02:04 AM    GLOBULIN 2.3 12/10/2018 02:04 AM    INR 1.04 07/29/2018 08:29 AM     Lab Results   Component Value Date/Time    PROTHROMBTM 13.3 07/29/2018 08:29 AM    INR 1.04 07/29/2018 08:29 AM

## 2019-04-13 NOTE — RESPIRATORY CARE
COPD Education by COPD Clinical Educator  4/13/2019 at 9:50 AM by Josephine Salazar    Patient interviewed by COPD education team. A comprehensive packet including information about COPD, treatments, and smoking cessation given. Spoke with RN regarding a home nebulizer and maintenance inhalers.   66012 Exp Problem Focused - Mod. Complex

## 2019-04-13 NOTE — ASSESSMENT & PLAN NOTE
Patient has macrocytic anemia with apparent reticulocyte hypoproliferation, being also treated for iron deficiency anemia by Dr. Muro today with intravenous iron as well as erythropoietin.  Hemoglobin at baseline.    Continue IV iron per nephrology  Erythropoietin per nephrology

## 2019-04-13 NOTE — PROGRESS NOTES
Internal Medicine Interval Note  Note Author: Giovani Weller M.D.     Name Isabelle Coyle     1965   Age/Sex 53 y.o. female   MRN [id number]   Code Status  full     After 5PM or if no immediate response to page, please call for cross-coverage  Attending/Team: Dr. Dalton See Patient List for primary contact information  Call [phone] to page    1st Call - Day Intern (R1):   Dr. Weller 2nd Call - Day Sr. Resident (R2/R3):   Dr. Ramos         Reason for interval visit  (Principal Problem)   Acute hypoxic respiratory failure      Interval Problem Daily Status Update  (24 hours, problem oriented, brief subjective history, new lab/imaging data pertinent to that problem)   No acute events overnight, patient denies fevers, chills, endorses improving shortness of breath today.  We discussed going home, but the fact that she was still volume overloaded and hypertensive indicated likely dialysis today, would likely be here 1 more day which she was agreeable to.  Patient was out of bed on attending rounds sitting in the couch without any oxygen.  Oxygen requirements improving.      Review of Systems   Constitutional: Positive for malaise/fatigue. Negative for chills and fever.   Respiratory: Positive for cough, sputum production and wheezing. Negative for shortness of breath.    Cardiovascular: Negative for chest pain, palpitations and leg swelling.   Gastrointestinal: Negative for nausea and vomiting.   Skin: Negative for itching and rash.       Disposition/Barriers to discharge:   Treatment for COPD exacerbation    Consultants/Specialty  Nephrology  PCP: Juan Moreno M.D.      Quality Measures  Quality-Core Measures   Reviewed items::  Labs reviewed and Medications reviewed  Murdock catheter::  No Murdock  DVT prophylaxis pharmacological::  Heparin          Physical Exam       Vitals:    19 0619 19 0743 19 1029 19 1227   BP:  137/75  149/77   Pulse: 81 82 80 80   Resp: 16 18  16 18   Temp:  36.9 °C (98.5 °F)  36.8 °C (98.2 °F)   TempSrc:  Temporal  Temporal   SpO2: 97% 99% 95% 91%   Weight:       Height:         Body mass index is 23.99 kg/m². Weight: 59.5 kg (131 lb 2.8 oz)  Oxygen Therapy:  Pulse Oximetry: 91 %, O2 (LPM): 0, O2 Delivery: None (Room Air)    Physical Exam   Neck:   Continues to have JVD   Cardiovascular: Regular rhythm.  Exam reveals no gallop and no friction rub.    No murmur heard.  Regular rate and rhythm, 2 out of 6 systolic murmur   Pulmonary/Chest:   Diffuse, bilateral expiratory wheezing with prolonged expiratory phase, interval improvement over yesterday  No respiratory distress this morning, no expiratory wheezing   Abdominal: Soft. Bowel sounds are normal. She exhibits no distension. There is no tenderness.   Musculoskeletal: She exhibits no edema.             Assessment/Plan     * Acute respiratory failure with hypoxia (HCC)- (present on admission)   Assessment & Plan    Assessment: Patient presents with acute hypoxic respiratory failure likely secondary to a combination of COPD exacerbation, potentially with infectious agent due to her elevated pro-calcitonin, as well as volume overload due to her missing dialysis causing acute pulmonary edema.  Patient was treated appropriately with dialysis to remove the additional volume, and with bronchodilators, corticosteroids, and antibiotics to treat her COPD exacerbation    Plan:  See COPD exacerbation and ESRD problems     Probable COPD with acute exacerbation (HCC)- (present on admission)   Assessment & Plan    Assessment: Patient presents with diffuse, bilateral wheezes, fever, chills, and increased sputum production of a creamy color consistent with purulence in a patient with a long and heavy history of tobacco use.  Patient does not have diagnosed COPD, but the clinical presentation is consistent with a COPD exacerbation, potentially from an infectious organism given the elevated pro-calcitonin of above 2  despite the lack of leukocytosis.  Initial chest x-ray and repeat two-view x-ray are both negative for lobar consolidation indicating possible atypical organism  Plan:  Prednisone 40 mg daily for 5 days  Duo nebs every 4 hours  Ceftriaxone and doxycycline transition to Augmentin 250-125 every 24 hours and doxycycline for 5 days total of antibiotics, stop date set  Oxygen titrated to needs, respiratory therapy  Incentive spirometry  Legionella antibody pending  Sputum culture  Given her lack of previous diagnosis of COPD she will need outpatient pulmonary function tests to confirm the diagnosis     ESRD on hemodialysis (HCC)- (present on admission)   Assessment & Plan    Patient is dialysis dependent end-stage renal disease, missed hemodialysis on Wednesday, presents the emergency department with respiratory distress, hyperkalemia, saturating 88% requiring 3 L oxygen.  Now improved and no longer requiring oxygen status post dialysis although continues to have JVD and pulmonary edema on repeat chest x-ray    Admit to telemetry  Hemodialysis again today  Renal diet  Started sevelamer 800 mg 3 times daily for hyperphosphatemia of 6.4 in ESRD     Hyperkalemia- (present on admission)   Assessment & Plan    Status post calcium gluconate, insulin, D50 at admit    Monitored, hyperkalemia resolved after dialysis  Telemetry     Anemia due to chronic kidney disease   Assessment & Plan    Patient has macrocytic anemia with apparent reticulocyte hypoproliferation, being also treated for iron deficiency anemia by Dr. Muro today with intravenous iron as well as erythropoietin.  Hemoglobin at baseline.    Continue IV iron per nephrology  Erythropoietin per nephrology     Tobacco use- (present on admission)   Assessment & Plan    Nicotine patch     High anion gap metabolic acidosis- (present on admission)   Assessment & Plan    Assessment: Patient presents with high anion gap in the setting of a normal lactate and elevated BUN,  suggesting that the likely etiology is uremia secondary to end-stage renal disease.    Plan:  Dialysis per nephrology     Severe uncontrolled hypertension- (present on admission)   Assessment & Plan    Initially extremely elevated due to volume overload, continue home medications, now improved

## 2019-04-13 NOTE — PROGRESS NOTES
HD treatment today using LUAAVF.Treatment terminated 10 mins early due to patient's c/o leg cramping.Net UF removed 2500 ml.Report given to primary Rn.

## 2019-04-13 NOTE — PROGRESS NOTES
· 2 RN skin check complete with TOM Preciado.  · Devices in place silicone NC, pt turns self.  · Skin assessed under devices gown, non skid socks.  · Confirmed pressure ulcers found on N/A.  · New potential pressure ulcers noted on N/A. Wound consult placed and wound reported.  · The following interventions in place Silicone NC, pt turns self  Bilateral ears are pink and blanching  Elbows are pink and blanching  Sacrum is pink and blanching  Left heel pink and blanching, right BKA pink and blanching

## 2019-04-14 VITALS
RESPIRATION RATE: 16 BRPM | HEART RATE: 72 BPM | HEIGHT: 62 IN | OXYGEN SATURATION: 96 % | TEMPERATURE: 97 F | SYSTOLIC BLOOD PRESSURE: 167 MMHG | DIASTOLIC BLOOD PRESSURE: 85 MMHG | WEIGHT: 125 LBS | BODY MASS INDEX: 23 KG/M2

## 2019-04-14 PROBLEM — E87.5 HYPERKALEMIA: Status: RESOLVED | Noted: 2017-12-28 | Resolved: 2019-04-14

## 2019-04-14 PROBLEM — J18.9 COMMUNITY ACQUIRED PNEUMONIA: Status: ACTIVE | Noted: 2019-04-14

## 2019-04-14 PROBLEM — J96.01 ACUTE RESPIRATORY FAILURE WITH HYPOXIA (HCC): Status: RESOLVED | Noted: 2018-07-30 | Resolved: 2019-04-14

## 2019-04-14 PROBLEM — Z72.0 TOBACCO USE: Status: RESOLVED | Noted: 2017-07-18 | Resolved: 2019-04-14

## 2019-04-14 PROBLEM — E87.29 HIGH ANION GAP METABOLIC ACIDOSIS: Status: RESOLVED | Noted: 2017-06-17 | Resolved: 2019-04-14

## 2019-04-14 LAB
ALBUMIN SERPL BCP-MCNC: 3.4 G/DL (ref 3.2–4.9)
ALBUMIN/GLOB SERPL: 1.6 G/DL
ALP SERPL-CCNC: 42 U/L (ref 30–99)
ALT SERPL-CCNC: 17 U/L (ref 2–50)
ANION GAP SERPL CALC-SCNC: 12 MMOL/L (ref 0–11.9)
AST SERPL-CCNC: 15 U/L (ref 12–45)
BASOPHILS # BLD AUTO: 0.3 % (ref 0–1.8)
BASOPHILS # BLD: 0.02 K/UL (ref 0–0.12)
BILIRUB SERPL-MCNC: 0.5 MG/DL (ref 0.1–1.5)
BUN SERPL-MCNC: 33 MG/DL (ref 8–22)
CALCIUM SERPL-MCNC: 8.3 MG/DL (ref 8.5–10.5)
CHLORIDE SERPL-SCNC: 100 MMOL/L (ref 96–112)
CO2 SERPL-SCNC: 28 MMOL/L (ref 20–33)
CREAT SERPL-MCNC: 4.91 MG/DL (ref 0.5–1.4)
EOSINOPHIL # BLD AUTO: 0.03 K/UL (ref 0–0.51)
EOSINOPHIL NFR BLD: 0.4 % (ref 0–6.9)
ERYTHROCYTE [DISTWIDTH] IN BLOOD BY AUTOMATED COUNT: 57.9 FL (ref 35.9–50)
GLOBULIN SER CALC-MCNC: 2.1 G/DL (ref 1.9–3.5)
GLUCOSE SERPL-MCNC: 93 MG/DL (ref 65–99)
HCT VFR BLD AUTO: 31.5 % (ref 37–47)
HGB BLD-MCNC: 9.5 G/DL (ref 12–16)
IMM GRANULOCYTES # BLD AUTO: 0.02 K/UL (ref 0–0.11)
IMM GRANULOCYTES NFR BLD AUTO: 0.3 % (ref 0–0.9)
LYMPHOCYTES # BLD AUTO: 1.59 K/UL (ref 1–4.8)
LYMPHOCYTES NFR BLD: 22.8 % (ref 22–41)
MAGNESIUM SERPL-MCNC: 2 MG/DL (ref 1.5–2.5)
MCH RBC QN AUTO: 31.3 PG (ref 27–33)
MCHC RBC AUTO-ENTMCNC: 30.2 G/DL (ref 33.6–35)
MCV RBC AUTO: 103.6 FL (ref 81.4–97.8)
MONOCYTES # BLD AUTO: 0.55 K/UL (ref 0–0.85)
MONOCYTES NFR BLD AUTO: 7.9 % (ref 0–13.4)
NEUTROPHILS # BLD AUTO: 4.76 K/UL (ref 2–7.15)
NEUTROPHILS NFR BLD: 68.3 % (ref 44–72)
NRBC # BLD AUTO: 0 K/UL
NRBC BLD-RTO: 0 /100 WBC
PHOSPHATE SERPL-MCNC: 6.1 MG/DL (ref 2.5–4.5)
PLATELET # BLD AUTO: 121 K/UL (ref 164–446)
PMV BLD AUTO: 10.2 FL (ref 9–12.9)
POTASSIUM SERPL-SCNC: 4.6 MMOL/L (ref 3.6–5.5)
PROT SERPL-MCNC: 5.5 G/DL (ref 6–8.2)
RBC # BLD AUTO: 3.04 M/UL (ref 4.2–5.4)
SODIUM SERPL-SCNC: 140 MMOL/L (ref 135–145)
WBC # BLD AUTO: 7 K/UL (ref 4.8–10.8)

## 2019-04-14 PROCEDURE — 700102 HCHG RX REV CODE 250 W/ 637 OVERRIDE(OP): Performed by: INTERNAL MEDICINE

## 2019-04-14 PROCEDURE — A9270 NON-COVERED ITEM OR SERVICE: HCPCS | Performed by: STUDENT IN AN ORGANIZED HEALTH CARE EDUCATION/TRAINING PROGRAM

## 2019-04-14 PROCEDURE — 94760 N-INVAS EAR/PLS OXIMETRY 1: CPT

## 2019-04-14 PROCEDURE — 700111 HCHG RX REV CODE 636 W/ 250 OVERRIDE (IP): Performed by: STUDENT IN AN ORGANIZED HEALTH CARE EDUCATION/TRAINING PROGRAM

## 2019-04-14 PROCEDURE — A9270 NON-COVERED ITEM OR SERVICE: HCPCS | Performed by: INTERNAL MEDICINE

## 2019-04-14 PROCEDURE — 83735 ASSAY OF MAGNESIUM: CPT

## 2019-04-14 PROCEDURE — 36415 COLL VENOUS BLD VENIPUNCTURE: CPT

## 2019-04-14 PROCEDURE — 700102 HCHG RX REV CODE 250 W/ 637 OVERRIDE(OP): Performed by: STUDENT IN AN ORGANIZED HEALTH CARE EDUCATION/TRAINING PROGRAM

## 2019-04-14 PROCEDURE — 700101 HCHG RX REV CODE 250: Performed by: INTERNAL MEDICINE

## 2019-04-14 PROCEDURE — 94640 AIRWAY INHALATION TREATMENT: CPT

## 2019-04-14 PROCEDURE — 85025 COMPLETE CBC W/AUTO DIFF WBC: CPT

## 2019-04-14 PROCEDURE — 700111 HCHG RX REV CODE 636 W/ 250 OVERRIDE (IP): Performed by: INTERNAL MEDICINE

## 2019-04-14 PROCEDURE — 84100 ASSAY OF PHOSPHORUS: CPT

## 2019-04-14 PROCEDURE — 80053 COMPREHEN METABOLIC PANEL: CPT

## 2019-04-14 RX ORDER — PREDNISONE 20 MG/1
40 TABLET ORAL DAILY
Qty: 4 TAB | Refills: 0 | Status: SHIPPED | OUTPATIENT
Start: 2019-04-14 | End: 2019-04-16

## 2019-04-14 RX ORDER — GABAPENTIN 100 MG/1
100 CAPSULE ORAL 2 TIMES DAILY
Qty: 60 CAP | Refills: 1 | Status: SHIPPED | OUTPATIENT
Start: 2019-04-14 | End: 2019-07-09 | Stop reason: SDUPTHER

## 2019-04-14 RX ORDER — SEVELAMER CARBONATE 800 MG/1
800 TABLET, FILM COATED ORAL
Qty: 90 TAB | Refills: 1 | Status: ON HOLD | OUTPATIENT
Start: 2019-04-14 | End: 2019-06-20

## 2019-04-14 RX ORDER — TERAZOSIN 5 MG/1
5 CAPSULE ORAL DAILY
Qty: 30 CAP | Refills: 1 | Status: SHIPPED | OUTPATIENT
Start: 2019-04-14 | End: 2019-04-14

## 2019-04-14 RX ORDER — TERAZOSIN 5 MG/1
5 CAPSULE ORAL EVERY EVENING
Qty: 30 CAP | Refills: 1 | Status: ON HOLD | OUTPATIENT
Start: 2019-04-14 | End: 2019-06-20

## 2019-04-14 RX ORDER — TIOTROPIUM BROMIDE 18 UG/1
18 CAPSULE ORAL; RESPIRATORY (INHALATION) DAILY
Qty: 30 CAP | Refills: 1 | Status: SHIPPED | OUTPATIENT
Start: 2019-04-14 | End: 2019-05-14

## 2019-04-14 RX ORDER — AMOXICILLIN AND CLAVULANATE POTASSIUM 250; 62.5 MG/5ML; MG/5ML
250 POWDER, FOR SUSPENSION ORAL DAILY
Qty: 10 ML | Refills: 0 | Status: SHIPPED | OUTPATIENT
Start: 2019-04-14 | End: 2019-04-16

## 2019-04-14 RX ORDER — ALBUTEROL SULFATE 90 UG/1
2 AEROSOL, METERED RESPIRATORY (INHALATION) EVERY 6 HOURS PRN
Qty: 8.5 G | Refills: 1 | Status: SHIPPED | OUTPATIENT
Start: 2019-04-14 | End: 2019-04-29 | Stop reason: SDUPTHER

## 2019-04-14 RX ORDER — AMOXICILLIN AND CLAVULANATE POTASSIUM 250; 62.5 MG/5ML; MG/5ML
250 POWDER, FOR SUSPENSION ORAL DAILY
Qty: 15 ML | Refills: 0 | Status: SHIPPED | OUTPATIENT
Start: 2019-04-14 | End: 2019-04-14

## 2019-04-14 RX ORDER — DOXYCYCLINE 100 MG/1
100 TABLET ORAL EVERY 12 HOURS
Qty: 5 TAB | Refills: 0 | Status: SHIPPED | OUTPATIENT
Start: 2019-04-14 | End: 2019-04-16

## 2019-04-14 RX ORDER — AMLODIPINE BESYLATE 10 MG/1
10 TABLET ORAL DAILY
Qty: 30 TAB | Refills: 1 | Status: ON HOLD | OUTPATIENT
Start: 2019-04-15 | End: 2019-06-20

## 2019-04-14 RX ADMIN — IPRATROPIUM BROMIDE AND ALBUTEROL SULFATE 3 ML: .5; 3 SOLUTION RESPIRATORY (INHALATION) at 06:58

## 2019-04-14 RX ADMIN — IPRATROPIUM BROMIDE AND ALBUTEROL SULFATE 3 ML: .5; 3 SOLUTION RESPIRATORY (INHALATION) at 10:38

## 2019-04-14 RX ADMIN — IPRATROPIUM BROMIDE AND ALBUTEROL SULFATE 3 ML: .5; 3 SOLUTION RESPIRATORY (INHALATION) at 14:56

## 2019-04-14 RX ADMIN — LABETALOL HYDROCHLORIDE 200 MG: 100 TABLET, FILM COATED ORAL at 06:09

## 2019-04-14 RX ADMIN — DOXYCYCLINE 100 MG: 100 TABLET, FILM COATED ORAL at 06:09

## 2019-04-14 RX ADMIN — SEVELAMER CARBONATE 800 MG: 800 TABLET, FILM COATED ORAL at 12:47

## 2019-04-14 RX ADMIN — IRON SUCROSE 200 MG: 20 INJECTION, SOLUTION INTRAVENOUS at 06:08

## 2019-04-14 RX ADMIN — HEPARIN SODIUM 5000 UNITS: 5000 INJECTION, SOLUTION INTRAVENOUS; SUBCUTANEOUS at 06:08

## 2019-04-14 RX ADMIN — SEVELAMER CARBONATE 800 MG: 800 TABLET, FILM COATED ORAL at 06:08

## 2019-04-14 RX ADMIN — GABAPENTIN 100 MG: 100 CAPSULE ORAL at 06:09

## 2019-04-14 RX ADMIN — AMOXICILLIN AND CLAVULANATE POTASSIUM 1 TABLET: 250; 125 TABLET, FILM COATED ORAL at 12:47

## 2019-04-14 RX ADMIN — PREDNISONE 40 MG: 20 TABLET ORAL at 06:09

## 2019-04-14 RX ADMIN — AMLODIPINE BESYLATE 10 MG: 5 TABLET ORAL at 06:09

## 2019-04-14 ASSESSMENT — ENCOUNTER SYMPTOMS
EYES NEGATIVE: 1
FEVER: 0
SHORTNESS OF BREATH: 0
SPUTUM PRODUCTION: 0
MYALGIAS: 1
GASTROINTESTINAL NEGATIVE: 1
NEUROLOGICAL NEGATIVE: 1
NECK PAIN: 0
CHILLS: 0
BACK PAIN: 0
CARDIOVASCULAR NEGATIVE: 1
COUGH: 1

## 2019-04-14 ASSESSMENT — PATIENT HEALTH QUESTIONNAIRE - PHQ9
1. LITTLE INTEREST OR PLEASURE IN DOING THINGS: NOT AT ALL
SUM OF ALL RESPONSES TO PHQ9 QUESTIONS 1 AND 2: 0
2. FEELING DOWN, DEPRESSED, IRRITABLE, OR HOPELESS: NOT AT ALL

## 2019-04-14 NOTE — DISCHARGE PLANNING
Anticipated Discharge Disposition: Home/Self Care    Action: LSW met with [facility] team. Pt to discharge home with no needs.    Barriers to Discharge: None    Plan: No additional discharge needs at this time.

## 2019-04-14 NOTE — PROGRESS NOTES
Pt dscharged home with friend. D/c instructions d/w pt. She indicated understanding. Teach back done.

## 2019-04-14 NOTE — CARE PLAN
Problem: Safety  Goal: Will remain free from falls  Outcome: PROGRESSING AS EXPECTED  Patient will remain free from falls during shift.    Problem: Infection  Goal: Will remain free from infection  Outcome: PROGRESSING AS EXPECTED  Patient will remain free from infection during shift.    Problem: Venous Thromboembolism (VTW)/Deep Vein Thrombosis (DVT) Prevention:  Goal: Patient will participate in Venous Thrombosis (VTE)/Deep Vein Thrombosis (DVT)Prevention Measures  Outcome: PROGRESSING AS EXPECTED  Patient will not develop DVT during shift.

## 2019-04-15 LAB — L PNEUMO1 IGG TITR SER IF: NORMAL {TITER}

## 2019-04-15 NOTE — ASSESSMENT & PLAN NOTE
Improved but still suboptimally controlled due to volume overload.    Plan:  -Continue home blood pressure medications with increased doses.  -Continue hemodialysis Mondays, Wednesdays, and Fridays.

## 2019-04-15 NOTE — ASSESSMENT & PLAN NOTE
White normal but procalcitonin elevated at 2.08 in setting of ESRD.  Presented with infiltrates and productive cough consistent with clinical diagnosis of atypical pneumonia.  Received ceftriaxone and doxycyline.    Plan:  -Continue doxycycline and Augmentin for 5 day course.

## 2019-04-24 ENCOUNTER — HOSPITAL ENCOUNTER (INPATIENT)
Facility: MEDICAL CENTER | Age: 54
LOS: 4 days | DRG: 682 | End: 2019-04-28
Attending: EMERGENCY MEDICINE | Admitting: HOSPITALIST
Payer: MEDICAID

## 2019-04-24 ENCOUNTER — APPOINTMENT (OUTPATIENT)
Dept: RADIOLOGY | Facility: MEDICAL CENTER | Age: 54
DRG: 682 | End: 2019-04-24
Attending: EMERGENCY MEDICINE
Payer: MEDICAID

## 2019-04-24 DIAGNOSIS — N18.6 ESRD (END STAGE RENAL DISEASE) (HCC): ICD-10-CM

## 2019-04-24 DIAGNOSIS — R41.82 ALTERED MENTAL STATUS, UNSPECIFIED ALTERED MENTAL STATUS TYPE: ICD-10-CM

## 2019-04-24 DIAGNOSIS — R41.0 CONFUSION: ICD-10-CM

## 2019-04-24 DIAGNOSIS — E87.5 HYPERKALEMIA: ICD-10-CM

## 2019-04-24 LAB
ANION GAP SERPL CALC-SCNC: 20 MMOL/L (ref 0–11.9)
BASOPHILS # BLD AUTO: 0.5 % (ref 0–1.8)
BASOPHILS # BLD: 0.05 K/UL (ref 0–0.12)
BUN SERPL-MCNC: 96 MG/DL (ref 8–22)
CALCIUM SERPL-MCNC: 10.3 MG/DL (ref 8.4–10.2)
CHLORIDE SERPL-SCNC: 92 MMOL/L (ref 96–112)
CO2 SERPL-SCNC: 24 MMOL/L (ref 20–33)
CREAT SERPL-MCNC: 15.57 MG/DL (ref 0.5–1.4)
EKG IMPRESSION: NORMAL
EOSINOPHIL # BLD AUTO: 0.07 K/UL (ref 0–0.51)
EOSINOPHIL NFR BLD: 0.7 % (ref 0–6.9)
ERYTHROCYTE [DISTWIDTH] IN BLOOD BY AUTOMATED COUNT: 57.5 FL (ref 35.9–50)
GLUCOSE SERPL-MCNC: 83 MG/DL (ref 65–99)
HCT VFR BLD AUTO: 36 % (ref 37–47)
HGB BLD-MCNC: 11.6 G/DL (ref 12–16)
IMM GRANULOCYTES # BLD AUTO: 0.04 K/UL (ref 0–0.11)
IMM GRANULOCYTES NFR BLD AUTO: 0.4 % (ref 0–0.9)
LYMPHOCYTES # BLD AUTO: 0.73 K/UL (ref 1–4.8)
LYMPHOCYTES NFR BLD: 7.3 % (ref 22–41)
MCH RBC QN AUTO: 31.5 PG (ref 27–33)
MCHC RBC AUTO-ENTMCNC: 32.2 G/DL (ref 33.6–35)
MCV RBC AUTO: 97.8 FL (ref 81.4–97.8)
MONOCYTES # BLD AUTO: 0.61 K/UL (ref 0–0.85)
MONOCYTES NFR BLD AUTO: 6.1 % (ref 0–13.4)
NEUTROPHILS # BLD AUTO: 8.56 K/UL (ref 2–7.15)
NEUTROPHILS NFR BLD: 85 % (ref 44–72)
NRBC # BLD AUTO: 0 K/UL
NRBC BLD-RTO: 0 /100 WBC
PLATELET # BLD AUTO: 148 K/UL (ref 164–446)
PMV BLD AUTO: 9.3 FL (ref 9–12.9)
POTASSIUM SERPL-SCNC: 6 MMOL/L (ref 3.6–5.5)
RBC # BLD AUTO: 3.68 M/UL (ref 4.2–5.4)
SODIUM SERPL-SCNC: 136 MMOL/L (ref 135–145)
WBC # BLD AUTO: 10.1 K/UL (ref 4.8–10.8)

## 2019-04-24 PROCEDURE — 700111 HCHG RX REV CODE 636 W/ 250 OVERRIDE (IP)

## 2019-04-24 PROCEDURE — 770020 HCHG ROOM/CARE - TELE (206)

## 2019-04-24 PROCEDURE — 96374 THER/PROPH/DIAG INJ IV PUSH: CPT

## 2019-04-24 PROCEDURE — 93005 ELECTROCARDIOGRAM TRACING: CPT | Performed by: EMERGENCY MEDICINE

## 2019-04-24 PROCEDURE — 99285 EMERGENCY DEPT VISIT HI MDM: CPT

## 2019-04-24 PROCEDURE — 304561 HCHG STAT O2

## 2019-04-24 PROCEDURE — 96375 TX/PRO/DX INJ NEW DRUG ADDON: CPT

## 2019-04-24 PROCEDURE — 80048 BASIC METABOLIC PNL TOTAL CA: CPT

## 2019-04-24 PROCEDURE — 99223 1ST HOSP IP/OBS HIGH 75: CPT | Performed by: HOSPITALIST

## 2019-04-24 PROCEDURE — 700102 HCHG RX REV CODE 250 W/ 637 OVERRIDE(OP): Performed by: EMERGENCY MEDICINE

## 2019-04-24 PROCEDURE — 85025 COMPLETE CBC W/AUTO DIFF WBC: CPT

## 2019-04-24 PROCEDURE — 71045 X-RAY EXAM CHEST 1 VIEW: CPT

## 2019-04-24 PROCEDURE — 700101 HCHG RX REV CODE 250: Performed by: EMERGENCY MEDICINE

## 2019-04-24 RX ORDER — BISACODYL 10 MG
10 SUPPOSITORY, RECTAL RECTAL
Status: DISCONTINUED | OUTPATIENT
Start: 2019-04-24 | End: 2019-04-28 | Stop reason: HOSPADM

## 2019-04-24 RX ORDER — TIOTROPIUM BROMIDE 18 UG/1
1 CAPSULE ORAL; RESPIRATORY (INHALATION) DAILY
Status: DISCONTINUED | OUTPATIENT
Start: 2019-04-25 | End: 2019-04-28 | Stop reason: HOSPADM

## 2019-04-24 RX ORDER — TERAZOSIN 5 MG/1
5 CAPSULE ORAL EVERY EVENING
Status: DISCONTINUED | OUTPATIENT
Start: 2019-04-25 | End: 2019-04-28 | Stop reason: HOSPADM

## 2019-04-24 RX ORDER — DEXTROSE MONOHYDRATE 25 G/50ML
25 INJECTION, SOLUTION INTRAVENOUS ONCE
Status: COMPLETED | OUTPATIENT
Start: 2019-04-24 | End: 2019-04-24

## 2019-04-24 RX ORDER — AMLODIPINE BESYLATE 5 MG/1
10 TABLET ORAL DAILY
Status: DISCONTINUED | OUTPATIENT
Start: 2019-04-25 | End: 2019-04-28 | Stop reason: HOSPADM

## 2019-04-24 RX ORDER — LABETALOL 100 MG/1
200 TABLET, FILM COATED ORAL TWICE DAILY
Status: DISCONTINUED | OUTPATIENT
Start: 2019-04-25 | End: 2019-04-28 | Stop reason: HOSPADM

## 2019-04-24 RX ORDER — GABAPENTIN 100 MG/1
100 CAPSULE ORAL 2 TIMES DAILY
Status: DISCONTINUED | OUTPATIENT
Start: 2019-04-25 | End: 2019-04-28 | Stop reason: HOSPADM

## 2019-04-24 RX ORDER — AMOXICILLIN 250 MG
2 CAPSULE ORAL 2 TIMES DAILY
Status: DISCONTINUED | OUTPATIENT
Start: 2019-04-25 | End: 2019-04-28 | Stop reason: HOSPADM

## 2019-04-24 RX ORDER — ALBUTEROL SULFATE 90 UG/1
2 AEROSOL, METERED RESPIRATORY (INHALATION) EVERY 6 HOURS PRN
Status: DISCONTINUED | OUTPATIENT
Start: 2019-04-24 | End: 2019-04-28 | Stop reason: HOSPADM

## 2019-04-24 RX ORDER — ACETAMINOPHEN 325 MG/1
650 TABLET ORAL EVERY 6 HOURS PRN
Status: DISCONTINUED | OUTPATIENT
Start: 2019-04-24 | End: 2019-04-28 | Stop reason: HOSPADM

## 2019-04-24 RX ORDER — POLYETHYLENE GLYCOL 3350 17 G/17G
1 POWDER, FOR SOLUTION ORAL
Status: DISCONTINUED | OUTPATIENT
Start: 2019-04-24 | End: 2019-04-28 | Stop reason: HOSPADM

## 2019-04-24 RX ORDER — CALCIUM GLUCONATE 94 MG/ML
INJECTION, SOLUTION INTRAVENOUS
Status: COMPLETED
Start: 2019-04-24 | End: 2019-04-24

## 2019-04-24 RX ADMIN — INSULIN HUMAN 10 UNITS: 100 INJECTION, SOLUTION PARENTERAL at 23:15

## 2019-04-24 RX ADMIN — DEXTROSE MONOHYDRATE 50 ML: 500 INJECTION PARENTERAL at 23:15

## 2019-04-24 RX ADMIN — CALCIUM GLUCONATE 1000 MG: 98 INJECTION, SOLUTION INTRAVENOUS at 23:19

## 2019-04-25 PROBLEM — G93.41 METABOLIC ENCEPHALOPATHY: Status: ACTIVE | Noted: 2019-04-25

## 2019-04-25 PROBLEM — Z91.199 NONCOMPLIANCE: Status: ACTIVE | Noted: 2019-04-25

## 2019-04-25 LAB
AMMONIA PLAS-SCNC: 33 UMOL/L (ref 11–45)
ANION GAP SERPL CALC-SCNC: 14 MMOL/L (ref 0–11.9)
ANION GAP SERPL CALC-SCNC: 14 MMOL/L (ref 0–11.9)
ANION GAP SERPL CALC-SCNC: 19 MMOL/L (ref 0–11.9)
ANION GAP SERPL CALC-SCNC: 21 MMOL/L (ref 0–11.9)
BASOPHILS # BLD AUTO: 0.8 % (ref 0–1.8)
BASOPHILS # BLD: 0.07 K/UL (ref 0–0.12)
BUN SERPL-MCNC: 27 MG/DL (ref 8–22)
BUN SERPL-MCNC: 30 MG/DL (ref 8–22)
BUN SERPL-MCNC: 97 MG/DL (ref 8–22)
BUN SERPL-MCNC: 99 MG/DL (ref 8–22)
CALCIUM SERPL-MCNC: 10.2 MG/DL (ref 8.5–10.5)
CALCIUM SERPL-MCNC: 10.3 MG/DL (ref 8.4–10.2)
CALCIUM SERPL-MCNC: 9 MG/DL (ref 8.4–10.2)
CALCIUM SERPL-MCNC: 9.3 MG/DL (ref 8.4–10.2)
CHLORIDE SERPL-SCNC: 93 MMOL/L (ref 96–112)
CHLORIDE SERPL-SCNC: 94 MMOL/L (ref 96–112)
CHLORIDE SERPL-SCNC: 94 MMOL/L (ref 96–112)
CHLORIDE SERPL-SCNC: 96 MMOL/L (ref 96–112)
CO2 SERPL-SCNC: 24 MMOL/L (ref 20–33)
CO2 SERPL-SCNC: 24 MMOL/L (ref 20–33)
CO2 SERPL-SCNC: 26 MMOL/L (ref 20–33)
CO2 SERPL-SCNC: 27 MMOL/L (ref 20–33)
CREAT SERPL-MCNC: 16.02 MG/DL (ref 0.5–1.4)
CREAT SERPL-MCNC: 16.71 MG/DL (ref 0.5–1.4)
CREAT SERPL-MCNC: 7.16 MG/DL (ref 0.5–1.4)
CREAT SERPL-MCNC: 7.94 MG/DL (ref 0.5–1.4)
EOSINOPHIL # BLD AUTO: 0.07 K/UL (ref 0–0.51)
EOSINOPHIL NFR BLD: 0.8 % (ref 0–6.9)
ERYTHROCYTE [DISTWIDTH] IN BLOOD BY AUTOMATED COUNT: 63 FL (ref 35.9–50)
GLUCOSE SERPL-MCNC: 74 MG/DL (ref 65–99)
GLUCOSE SERPL-MCNC: 87 MG/DL (ref 65–99)
GLUCOSE SERPL-MCNC: 87 MG/DL (ref 65–99)
GLUCOSE SERPL-MCNC: 89 MG/DL (ref 65–99)
HCT VFR BLD AUTO: 36.3 % (ref 37–47)
HGB BLD-MCNC: 10.9 G/DL (ref 12–16)
IMM GRANULOCYTES # BLD AUTO: 0.02 K/UL (ref 0–0.11)
IMM GRANULOCYTES NFR BLD AUTO: 0.2 % (ref 0–0.9)
LYMPHOCYTES # BLD AUTO: 1.06 K/UL (ref 1–4.8)
LYMPHOCYTES NFR BLD: 12.5 % (ref 22–41)
MCH RBC QN AUTO: 31.5 PG (ref 27–33)
MCHC RBC AUTO-ENTMCNC: 30 G/DL (ref 33.6–35)
MCV RBC AUTO: 104.9 FL (ref 81.4–97.8)
MONOCYTES # BLD AUTO: 0.76 K/UL (ref 0–0.85)
MONOCYTES NFR BLD AUTO: 9 % (ref 0–13.4)
NEUTROPHILS # BLD AUTO: 6.48 K/UL (ref 2–7.15)
NEUTROPHILS NFR BLD: 76.7 % (ref 44–72)
NRBC # BLD AUTO: 0 K/UL
NRBC BLD-RTO: 0 /100 WBC
PLATELET # BLD AUTO: 110 K/UL (ref 164–446)
PMV BLD AUTO: 10.6 FL (ref 9–12.9)
POTASSIUM SERPL-SCNC: 4.2 MMOL/L (ref 3.6–5.5)
POTASSIUM SERPL-SCNC: 4.8 MMOL/L (ref 3.6–5.5)
POTASSIUM SERPL-SCNC: 5.3 MMOL/L (ref 3.6–5.5)
POTASSIUM SERPL-SCNC: 6.1 MMOL/L (ref 3.6–5.5)
RBC # BLD AUTO: 3.46 M/UL (ref 4.2–5.4)
SODIUM SERPL-SCNC: 135 MMOL/L (ref 135–145)
SODIUM SERPL-SCNC: 136 MMOL/L (ref 135–145)
SODIUM SERPL-SCNC: 137 MMOL/L (ref 135–145)
SODIUM SERPL-SCNC: 138 MMOL/L (ref 135–145)
WBC # BLD AUTO: 8.5 K/UL (ref 4.8–10.8)

## 2019-04-25 PROCEDURE — 700111 HCHG RX REV CODE 636 W/ 250 OVERRIDE (IP): Performed by: INTERNAL MEDICINE

## 2019-04-25 PROCEDURE — 85025 COMPLETE CBC W/AUTO DIFF WBC: CPT

## 2019-04-25 PROCEDURE — 82140 ASSAY OF AMMONIA: CPT

## 2019-04-25 PROCEDURE — 700101 HCHG RX REV CODE 250: Performed by: HOSPITALIST

## 2019-04-25 PROCEDURE — 770020 HCHG ROOM/CARE - TELE (206)

## 2019-04-25 PROCEDURE — 90935 HEMODIALYSIS ONE EVALUATION: CPT

## 2019-04-25 PROCEDURE — 700111 HCHG RX REV CODE 636 W/ 250 OVERRIDE (IP): Performed by: HOSPITALIST

## 2019-04-25 PROCEDURE — 99232 SBSQ HOSP IP/OBS MODERATE 35: CPT | Performed by: INTERNAL MEDICINE

## 2019-04-25 PROCEDURE — 5A1D70Z PERFORMANCE OF URINARY FILTRATION, INTERMITTENT, LESS THAN 6 HOURS PER DAY: ICD-10-PCS | Performed by: INTERNAL MEDICINE

## 2019-04-25 PROCEDURE — 80048 BASIC METABOLIC PNL TOTAL CA: CPT | Mod: 91

## 2019-04-25 RX ORDER — LABETALOL HYDROCHLORIDE 5 MG/ML
10 INJECTION, SOLUTION INTRAVENOUS EVERY 6 HOURS PRN
Status: DISCONTINUED | OUTPATIENT
Start: 2019-04-25 | End: 2019-04-28 | Stop reason: HOSPADM

## 2019-04-25 RX ORDER — SODIUM POLYSTYRENE SULFONATE 15 G/60ML
15 SUSPENSION ORAL; RECTAL ONCE
Status: ACTIVE | OUTPATIENT
Start: 2019-04-25 | End: 2019-04-26

## 2019-04-25 RX ORDER — HYDRALAZINE HYDROCHLORIDE 20 MG/ML
20 INJECTION INTRAMUSCULAR; INTRAVENOUS EVERY 6 HOURS PRN
Status: DISCONTINUED | OUTPATIENT
Start: 2019-04-25 | End: 2019-04-28 | Stop reason: HOSPADM

## 2019-04-25 RX ORDER — HEPARIN SODIUM 1000 [USP'U]/ML
1750 INJECTION, SOLUTION INTRAVENOUS; SUBCUTANEOUS
Status: COMPLETED | OUTPATIENT
Start: 2019-04-25 | End: 2019-04-25

## 2019-04-25 RX ORDER — SEVELAMER CARBONATE 800 MG/1
800 TABLET, FILM COATED ORAL
Status: DISCONTINUED | OUTPATIENT
Start: 2019-04-25 | End: 2019-04-28 | Stop reason: HOSPADM

## 2019-04-25 RX ADMIN — HYDRALAZINE HYDROCHLORIDE 20 MG: 20 INJECTION INTRAMUSCULAR; INTRAVENOUS at 07:57

## 2019-04-25 RX ADMIN — LABETALOL HYDROCHLORIDE 10 MG: 5 INJECTION INTRAVENOUS at 18:07

## 2019-04-25 RX ADMIN — HEPARIN SODIUM 1750 UNITS: 1000 INJECTION, SOLUTION INTRAVENOUS; SUBCUTANEOUS at 11:08

## 2019-04-25 RX ADMIN — LABETALOL HYDROCHLORIDE 10 MG: 5 INJECTION INTRAVENOUS at 01:15

## 2019-04-25 RX ADMIN — HYDRALAZINE HYDROCHLORIDE 20 MG: 20 INJECTION INTRAMUSCULAR; INTRAVENOUS at 15:36

## 2019-04-25 ASSESSMENT — ENCOUNTER SYMPTOMS
SORE THROAT: 0
DIZZINESS: 0
DOUBLE VISION: 0
FEVER: 0
NECK PAIN: 0
NAUSEA: 0
SHORTNESS OF BREATH: 0
VOMITING: 0
HEADACHES: 0
BRUISES/BLEEDS EASILY: 0
PALPITATIONS: 0
INSOMNIA: 0
DEPRESSION: 0
COUGH: 0
BLURRED VISION: 0
WEAKNESS: 0
MYALGIAS: 0

## 2019-04-25 NOTE — PROGRESS NOTES
Hospital Medicine Daily Progress Note    Date of Service  4/25/2019    Chief Complaint  53 y.o. female admitted 4/24/2019 with somnolence    Hospital Course    History of end-stage renal disease on dialysis Monday Wednesday Friday, hypertension, noncompliance, BKA, alcohol abuse, admitted for somnolence after missing dialysis for at least 3 sessions found to have hyperkalemia and uremia      Interval Problem Update  4/25: I consulted nephrology, they will perform urgent dialysis today.  Patient is somnolent vitals are stable aside from needing 5 L of oxygen    Consultants/Specialty  Nephrology, Dr. Richardson    Code Status  Full    Disposition  Follow-up mental status and labs after dialysis    Review of Systems  Review of Systems   Unable to perform ROS: Mental acuity        Physical Exam  Temp:  [36.4 °C (97.6 °F)-37.5 °C (99.5 °F)] 36.4 °C (97.6 °F)  Pulse:  [69-91] 77  Resp:  [16-30] 18  BP: (158-192)/(67-88) 165/78  SpO2:  [84 %-99 %] 98 %    Physical Exam   Constitutional: She appears lethargic. No distress.   HENT:   Head: Normocephalic.   Mouth/Throat: No oropharyngeal exudate.   Neck: No JVD present. No thyromegaly present.   Cardiovascular: Normal rate and regular rhythm.    No murmur heard.  Pulmonary/Chest: Effort normal. No respiratory distress. She has no wheezes. She has no rales.   Abdominal: She exhibits no distension. There is no tenderness.   Musculoskeletal: Normal range of motion. She exhibits no edema or tenderness.   Neurological: She appears lethargic. No cranial nerve deficit. Coordination normal.   Skin: Skin is warm. No rash noted. No erythema.       Fluids    Intake/Output Summary (Last 24 hours) at 04/25/19 1405  Last data filed at 04/25/19 0800   Gross per 24 hour   Intake                0 ml   Output                0 ml   Net                0 ml       Laboratory  Recent Labs      04/24/19   2204  04/25/19   0413   WBC  10.1  8.5   RBC  3.68*  3.46*   HEMOGLOBIN  11.6*  10.9*   HEMATOCRIT   36.0*  36.3*   MCV  97.8  104.9*   MCH  31.5  31.5   MCHC  32.2*  30.0*   RDW  57.5*  63.0*   PLATELETCT  148*  110*   MPV  9.3  10.6     Recent Labs      04/24/19   2204  04/25/19   0413  04/25/19   0851   SODIUM  136  137  138   POTASSIUM  6.0*  6.1*  5.3   CHLORIDE  92*  94*  93*   CO2  24  24  24   GLUCOSE  83  74  89   BUN  96*  97*  99*   CREATININE  15.57*  16.02*  16.71*   CALCIUM  10.3*  10.2  10.3*                   Imaging  DX-CHEST-PORTABLE (1 VIEW)   Final Result      Cardiomegaly. No evidence of pulmonary edema.           Assessment/Plan  Metabolic encephalopathy   Assessment & Plan    -Unclear baseline mentation.  -This is likely secondary to Metabolic Acidosis versus uremia  -She wakes up only briefly and then nods back to sleep  -Check UDS.  -Dialyze today  -No evidence of infection, chest x-ray clear, monitor       Noncompliance   Assessment & Plan    -As above  -Frequently misses HD and requires hospitalization     Hypertensive urgency- (present on admission)   Assessment & Plan    -Poorly controlled hypertension, due to missed HD and essential hypertension.  Possible noncompliance at home.   -Resume amlodipine and labetalol  -Dialyzed     ESRD on hemodialysis (HCC)- (present on admission)   Assessment & Plan    On HD MWF, has missed 2 sessions, somnolent and obtunded     Hyperkalemia- (present on admission)   Assessment & Plan    -Due to noncompliance with hemodialysis.  Admits to missing at least 3 sessions.  -Also has underlying EKG changes with peaked T waves.  -Received  calcium gluconate, insulin, glucose in the ER.  -We will need urgent dialysis today.  -I consulted nephrology       Alcohol abuse- (present on admission)   Assessment & Plan    -No elevated LFTs, ammonia normal     Hx of right BKA (CMS-HCC)- (present on admission)   Assessment & Plan    As above          VTE prophylaxis: Heparin

## 2019-04-25 NOTE — PROGRESS NOTES
· 2 RN skin check complete with TOM Drummond.  · Devices in place nasal cannula and right below the knee prosthetic leg  · Skin assessed under devices yes.  · Confirmed pressure ulcers found on none.  · New potential pressure ulcers noted on none. Wound consult placed and wound reported.  · The following interventions in place HOB less than 30 degrees, pt self turns Q2,*

## 2019-04-25 NOTE — CONSULTS
"[facility] -  CONSULTATION NOTE               Author: Elliot Richardson M.D. Date & Time: 4/25/2019  1:45 PM       REASON FOR CONSULTATION:   Inpatient hemodialysis management.    CHIEF COMPLAINT:       HISTORY OF PRESENT ILLNESS:    53 y.o. female with end-stage renal disease, on chronic hemodialysis Monday, Wednesday, and Fridays at [facility] and a history of noncompliance and recent hospitalization dialysis presented via EMS with altered mental status.    Patient is currently not answering questions appropriately, oriented x2 on admission.  Per her outpatient dialysis unit her last dialysis was last Friday 4/19, missed on Monday and yesterday.  On presentation potassium noted to be 6, given Kayexalate.    REVIEW OF SYSTEMS:    Unable to obtain    PAST MEDICAL HISTORY:   Past Medical History:   Diagnosis Date   • Anemia    • Anemia associated with chronic renal failure 11/5/2009   • Dental disorder 8-25-17    \"Full Upper & Partial Lower Dentures\"   • Dialysis patient ([facility]) 8-25-17    [facility] M,W,F   • Dysrhythmia     \"SVT\"   • ESRD (end stage renal disease) ([facility]) 8-25-17    Dialysis MWF@ [facility]   • Glomerulonephritis, chronic 11/5/2009   • Heart burn    • High cholesterol    • HTN (hypertension) 11/5/2009    2017 states well controlled   • Port catheter in place 8-25-17    For Dialysis   • SVT (supraventricular tachycardia) ([facility])    • SVT (supraventricular tachycardia) ([facility])          PAST SURGICAL HISTORY:   Past Surgical History:   Procedure Laterality Date   • AV FISTULA CREATION Left 4/27/2018    Procedure: Left Brachial artery Repair;  Surgeon: Jimbo Davenport M.D.;  Location: SURGERY [facility];  Service: Vascular   • AV FISTULA CREATION Left 8/28/2017    Procedure: AV FISTULA CREATION  UPPER EXTREMITY -BRACHIAL BASALIC;  Surgeon: Glen Rodriguez M.D.;  Location: SURGERY [facility];  Service: General   • AV FISTULA CREATION Left 6/20/2017    " "Procedure: AV FISTULA CREATION- LEFT;  Surgeon: Jimbo Davenport M.D.;  Location: SURGERY [facility];  Service:    • APPENDECTOMY LAPAROSCOPIC  5/4/2009    Performed by BANDAR TIMMONS at SURGERY [facility]   • FEMUR ORIF  10/9/08    Performed by STELLA GATES at SURGERY [facility]   • ILIAC BONE GRAFT  10/9/08    Performed by STELLA GATES at SURGERY [facility]   • AMPUTATION, BELOW THE KNEE     • CAPITATION PAYMENT (INSURANCE)     • OTHER      BELOW KNEE AMPUTATION RIGHT   • PB ENLARGE BREAST WITH IMPLANT         FAMILY HISTORY:   Reviewed and non contributory to current illness    SOCIAL HISTORY:   History of Etoh and illicit drugs.    HOME MEDICATIONS:   Reviewed and documented in chart    ALLERGIES:  Sulfa drugs    PHYSICAL EXAM:  VS:  BP (!) 165/78   Pulse 77   Temp 36.4 °C (97.6 °F) (Axillary)   Resp 18   Ht 1.575 m (5' 2\")   Wt 55.2 kg (121 lb 11.1 oz)   SpO2 98%   BMI 22.26 kg/m²   GENERAL: Lying in bed, no acute distress  HEAD: Normocephalic, atraumatic  EYES: no scleral icterus; normal conjunctiva  NECK: Supple; trachea midline  CV: RRR, systolic murmur  LUNGS: Clear to ausculation bilaterally anteriorly, No rhales or wheezes  ABDOMEN: Soft, non-tender  EXTREMETIES:+lower extremity edema, no clubbing or cyanosis  SKIN: Warm and dry, + excoriations  NEURO: A&O2, no focal deficits  ACCESS: Left upper extremity AV fistula without surrounding erythema and edema    LABS:  Recent Results (from the past 24 hour(s))   CBC WITH DIFFERENTIAL    Collection Time: 04/24/19 10:04 PM   Result Value Ref Range    WBC 10.1 4.8 - 10.8 K/uL    RBC 3.68 (L) 4.20 - 5.40 M/uL    Hemoglobin 11.6 (L) 12.0 - 16.0 g/dL    Hematocrit 36.0 (L) 37.0 - 47.0 %    MCV 97.8 81.4 - 97.8 fL    MCH 31.5 27.0 - 33.0 pg    MCHC 32.2 (L) 33.6 - 35.0 g/dL    RDW 57.5 (H) 35.9 - 50.0 fL    Platelet Count 148 (L) 164 - 446 K/uL    MPV 9.3 9.0 - 12.9 fL    Neutrophils-Polys 85.00 (H) 44.00 - 72.00 %    " Lymphocytes 7.30 (L) 22.00 - 41.00 %    Monocytes 6.10 0.00 - 13.40 %    Eosinophils 0.70 0.00 - 6.90 %    Basophils 0.50 0.00 - 1.80 %    Immature Granulocytes 0.40 0.00 - 0.90 %    Nucleated RBC 0.00 /100 WBC    Neutrophils (Absolute) 8.56 (H) 2.00 - 7.15 K/uL    Lymphs (Absolute) 0.73 (L) 1.00 - 4.80 K/uL    Monos (Absolute) 0.61 0.00 - 0.85 K/uL    Eos (Absolute) 0.07 0.00 - 0.51 K/uL    Baso (Absolute) 0.05 0.00 - 0.12 K/uL    Immature Granulocytes (abs) 0.04 0.00 - 0.11 K/uL    NRBC (Absolute) 0.00 K/uL   BASIC METABOLIC PANEL    Collection Time: 19 10:04 PM   Result Value Ref Range    Sodium 136 135 - 145 mmol/L    Potassium 6.0 (H) 3.6 - 5.5 mmol/L    Chloride 92 (L) 96 - 112 mmol/L    Co2 24 20 - 33 mmol/L    Glucose 83 65 - 99 mg/dL    Bun 96 (HH) 8 - 22 mg/dL    Creatinine 15.57 (HH) 0.50 - 1.40 mg/dL    Calcium 10.3 (H) 8.4 - 10.2 mg/dL    Anion Gap 20.0 (H) 0.0 - 11.9   ESTIMATED GFR    Collection Time: 19 10:04 PM   Result Value Ref Range    GFR If  3 (A) >60 mL/min/1.73 m 2    GFR If Non African American 2 (A) >60 mL/min/1.73 m 2   EKG (Now)    Collection Time: 19 10:25 PM   Result Value Ref Range    Report       [facility] Emergency Dept[facility]    Test Date:  2019  Pt Name:    MELISSA BARDALES                 Department: [facility]  MRN:        [id number]                      Room:       -ROOM 3  Gender:     Female                       Technician: KELSEA  :        1965                   Requested By:ALMA MARTIN  Order #:    [id number]                    Reading MD:    Measurements  Intervals                                Axis  Rate:       88                           P:          66  SC:         168                          QRS:        3  QRSD:       86                           T:          58  QT:         416  QTc:        504    Interpretive Statements  SINUS RHYTHM  LIGIA, CONSIDER BIATRIAL ABNORMALITIES  LEFT VENTRICULAR  HYPERTROPHY  BORDERLINE PROLONGED QT INTERVAL  Compared to ECG 04/12/2019 04:35:19  No significant changes     CBC WITH DIFFERENTIAL    Collection Time: 04/25/19  4:13 AM   Result Value Ref Range    WBC 8.5 4.8 - 10.8 K/uL    RBC 3.46 (L) 4.20 - 5.40 M/uL    Hemoglobin 10.9 (L) 12.0 - 16.0 g/dL    Hematocrit 36.3 (L) 37.0 - 47.0 %    .9 (H) 81.4 - 97.8 fL    MCH 31.5 27.0 - 33.0 pg    MCHC 30.0 (L) 33.6 - 35.0 g/dL    RDW 63.0 (H) 35.9 - 50.0 fL    Platelet Count 110 (L) 164 - 446 K/uL    MPV 10.6 9.0 - 12.9 fL    Neutrophils-Polys 76.70 (H) 44.00 - 72.00 %    Lymphocytes 12.50 (L) 22.00 - 41.00 %    Monocytes 9.00 0.00 - 13.40 %    Eosinophils 0.80 0.00 - 6.90 %    Basophils 0.80 0.00 - 1.80 %    Immature Granulocytes 0.20 0.00 - 0.90 %    Nucleated RBC 0.00 /100 WBC    Neutrophils (Absolute) 6.48 2.00 - 7.15 K/uL    Lymphs (Absolute) 1.06 1.00 - 4.80 K/uL    Monos (Absolute) 0.76 0.00 - 0.85 K/uL    Eos (Absolute) 0.07 0.00 - 0.51 K/uL    Baso (Absolute) 0.07 0.00 - 0.12 K/uL    Immature Granulocytes (abs) 0.02 0.00 - 0.11 K/uL    NRBC (Absolute) 0.00 K/uL   Basic Metabolic Panel    Collection Time: 04/25/19  4:13 AM   Result Value Ref Range    Sodium 137 135 - 145 mmol/L    Potassium 6.1 (H) 3.6 - 5.5 mmol/L    Chloride 94 (L) 96 - 112 mmol/L    Co2 24 20 - 33 mmol/L    Glucose 74 65 - 99 mg/dL    Bun 97 (HH) 8 - 22 mg/dL    Creatinine 16.02 (HH) 0.50 - 1.40 mg/dL    Calcium 10.2 8.5 - 10.5 mg/dL    Anion Gap 19.0 (H) 0.0 - 11.9   AMMONIA    Collection Time: 04/25/19  4:13 AM   Result Value Ref Range    Ammonia 33 11 - 45 umol/L   ESTIMATED GFR    Collection Time: 04/25/19  4:13 AM   Result Value Ref Range    GFR If  3 (A) >60 mL/min/1.73 m 2    GFR If Non African American 2 (A) >60 mL/min/1.73 m 2   Basic Metabolic Panel    Collection Time: 04/25/19  8:51 AM   Result Value Ref Range    Sodium 138 135 - 145 mmol/L    Potassium 5.3 3.6 - 5.5 mmol/L    Chloride 93 (L) 96 - 112 mmol/L    Co2  24 20 - 33 mmol/L    Glucose 89 65 - 99 mg/dL    Bun 99 (HH) 8 - 22 mg/dL    Creatinine 16.71 (HH) 0.50 - 1.40 mg/dL    Calcium 10.3 (H) 8.4 - 10.2 mg/dL    Anion Gap 21.0 (H) 0.0 - 11.9   ESTIMATED GFR    Collection Time: 04/25/19  8:51 AM   Result Value Ref Range    GFR If  3 (A) >60 mL/min/1.73 m 2    GFR If Non African American 2 (A) >60 mL/min/1.73 m 2       (click the triangle to expand results)    IMAGING:  DX-CHEST-PORTABLE (1 VIEW)   Final Result      Cardiomegaly. No evidence of pulmonary edema.          ASSESSMENT:  # ESRD: normally M/W/F via. AVF.  History of noncompliance  # HTN: Likely volume driven  # Volume Status: Hypervolemic  # Anemia of CKD, recent iron load  # CKD-MBD  # Hyperkalemia  # Altered mental status: Concern for substance abuse  # Tobacco use  # History of drug and alcohol use    PLAN:  -iHD today, then to continue M/W/F during stay  -Dose all meds for ESRD  -Renal Diet  -Limit IVFs/Strict I/Os  -Limit peripheral lines, avoid PICCs  -Continue home blood pressure meds  -Continue Renvela  -Epogen 5,000 units with dialysis    Thank you for this consult, we will continue to follow.    Elliot Richardson MD

## 2019-04-25 NOTE — CARE PLAN
Problem: Venous Thromboembolism (VTW)/Deep Vein Thrombosis (DVT) Prevention:  Goal: Patient will participate in Venous Thrombosis (VTE)/Deep Vein Thrombosis (DVT)Prevention Measures  Outcome: PROGRESSING AS EXPECTED  Sequential on left leg right leg BKA

## 2019-04-25 NOTE — ASSESSMENT & PLAN NOTE
Acute, with agitation and aggression, has improved today.   She is out of restraints and answers some questions, still drowsy  -I think this is less likely due to uremia as she did not test and is now refusing all other dialysis sessions.  Wonder about drug ingestion versus less likely infection  -Last HD was 426  -No evidence of infection, chest x-ray clear, monitor  -PRN IM or IV Ativan as needed

## 2019-04-25 NOTE — PROGRESS NOTES
Pt arrived to floor around 0040.  Pt is lethargic, hard to arouse.  Pt opens eyes when saying name however is not talking at this time.  Attempted to perform bedside swallow on pt, however pt is not following direction, upon placing ice chip in mouth, pt would not swallow, opened mouth and let fall out.  Contacted MD Rita, aware, will try again when she is able to arouse.

## 2019-04-25 NOTE — PROGRESS NOTES
Telemetry Shift Summary    Rhythm SR  HR Range 70-90  Ectopy none  Measurements .16/.08/.38        Normal Values  Rhythm SR  HR Range    Measurements 0.12-0.20 / 0.06-0.10  / 0.30-0.52

## 2019-04-25 NOTE — DISCHARGE PLANNING
Outpatient Dialysis Note    Confirmed patient is active at:    [facility]  [address] [place] [address]       Schedule: Monday, Wednesday, Friday  Time: 6:50 am    Spoke with Abby at facility who confirmed.    Forwarded records for review.    Dialysis Coordinator, [facility]  Alexia Ayoub [phone]

## 2019-04-25 NOTE — ED PROVIDER NOTES
ED Provider Note    ER Provider Note         CHIEF COMPLAINT  Chief Complaint   Patient presents with   • Altered Mental Status     pt missed dialysis x 2 days hx of ESRD       HPI  Isabelle Coyle is a 53 y.o. female who presents to the Emergency Department with altered mental status.  She can only tell me what her name is and the fact she is in the hospital.  She does not answer any questions.  She has reported to if not had dialysis and missed the last 2 dialysis opportunities.  There is no report of trauma from the paramedics.  History is limited by alteration of mental status.    REVIEW OF SYSTEMS  See HPI for further details.  Limited by alteration mental status.    PAST MEDICAL HISTORY   has a past medical history of Anemia; Anemia associated with chronic renal failure (11/5/2009); Dental disorder (8-25-17); Dialysis patient ([facility]) (8-25-17); Dysrhythmia; ESRD (end stage renal disease) ([facility]) (8-25-17); Glomerulonephritis, chronic (11/5/2009); Heart burn; High cholesterol; HTN (hypertension) (11/5/2009); Port catheter in place (8-25-17); SVT (supraventricular tachycardia) ([facility]); and SVT (supraventricular tachycardia) ([facility]).    SURGICAL HISTORY   has a past surgical history that includes amputation, below the knee; capitation payment (insurance); appendectomy laparoscopic (5/4/2009); enlarge breast with implant; av fistula creation (Left, 6/20/2017); other; femur orif (10/9/08); iliac bone graft (10/9/08); av fistula creation (Left, 8/28/2017); and av fistula creation (Left, 4/27/2018).    SOCIAL HISTORY  Social History   Substance Use Topics   • Smoking status: Current Every Day Smoker     Packs/day: 0.25     Years: 20.00     Types: Cigarettes   • Smokeless tobacco: Never Used      Comment: 5 cigs/day   • Alcohol use No      History   Drug Use No       FAMILY HISTORY  Family History   Problem Relation Age of Onset   • Heart Disease Unknown        CURRENT MEDICATIONS  Home Medications    **Home medications  "have not yet been reviewed for this encounter**         ALLERGIES  Allergies   Allergen Reactions   • Sulfa Drugs Hives     [id number] rash swelling itching       PHYSICAL EXAM  VITAL SIGNS: /74   Pulse 69   Temp 37.2 °C (99 °F) (Oral)   Resp 18   Ht 1.575 m (5' 2\")   Wt 55.2 kg (121 lb 11.1 oz)   SpO2 97%   BMI 22.26 kg/m²      Constitutional: Oriented to person and place but unable to answer any questions and just smiles at me and nods.  HENT: No signs of trauma, Bilateral external ears normal, Nose normal.   Eyes: Pupils are equal and reactive, Conjunctiva normal, Non-icteric.   Neck: Normal range of motion, No tenderness, Supple, No stridor.   Lymphatic: No lymphadenopathy noted.   Cardiovascular: Regular rate and rhythm, no murmurs.   Thorax & Lungs: Normal breath sounds, No respiratory distress, No wheezing, No chest tenderness.   Abdomen: Bowel sounds normal, Soft, No tenderness, No masses, No pulsatile masses. No peritoneal signs.  Skin: Warm, Dry, No erythema, No rash.   Back: No bony tenderness, No CVA tenderness.   Extremities: Intact distal pulses, No edema, No tenderness, No cyanosis.  Musculoskeletal: Good range of motion in all major joints.  Has a BKA in her right lower extremity.  No tenderness to palpation or major deformities noted.   Neurologic: Altered, Normal motor function, Normal sensory function, No focal deficits noted.   Psychiatric: Smiles and nods and is altered and alert and oriented x2.    DIAGNOSTIC STUDIES / PROCEDURES    EKG Interpretation:  Interpreted by me  Peaked T waves, sinus rhythm at a rate of 88 with no ST elevation or depression.       LABS  Labs Reviewed   CBC WITH DIFFERENTIAL - Abnormal; Notable for the following:        Result Value    RBC 3.68 (*)     Hemoglobin 11.6 (*)     Hematocrit 36.0 (*)     MCHC 32.2 (*)     RDW 57.5 (*)     Platelet Count 148 (*)     Neutrophils-Polys 85.00 (*)     Lymphocytes 7.30 (*)     Neutrophils (Absolute) 8.56 (*)     Lymphs " (Absolute) 0.73 (*)     All other components within normal limits   BASIC METABOLIC PANEL - Abnormal; Notable for the following:     Potassium 6.0 (*)     Chloride 92 (*)     Bun 96 (*)     Creatinine 15.57 (*)     Calcium 10.3 (*)     Anion Gap 20.0 (*)     All other components within normal limits   ESTIMATED GFR - Abnormal; Notable for the following:     GFR If  3 (*)     GFR If Non  2 (*)     All other components within normal limits   CBC WITH DIFFERENTIAL   BASIC METABOLIC PANEL   BASIC METABOLIC PANEL   AMMONIA       All labs reviewed by me.    RADIOLOGY  DX-CHEST-PORTABLE (1 VIEW)   Final Result      Cardiomegaly. No evidence of pulmonary edema.          The radiologist's interpretation of all radiological studies have been reviewed by me.    COURSE & MEDICAL DECISION MAKING  Pertinent Labs & Imaging studies reviewed. (See chart for details)    This is a 53 y.o. female that presents with alteration in mental status in a patient with end-stage renal disease.  There is no evidence of trauma on her head to suggest need for CT scan of her head.  This could represent electrode derangement and therefore I will get a BMP.  EKG does show evidence of peaked T waves therefore I will treat the patient with insulin and dextrose as well as Kayexalate and calcium gluconate.  I will get basic labs as well.  I will get a chest x-ray to evaluate for signs of volume overload given the fact the patient has not been on dialysis.    10:32 PM - Patient seen and examined at bedside.    The patient's mental status has maintained the same.  She has cardiomegaly with no evidence of pulmonary edema.  Her potassium is 6.  Her creatinine is 15.  His BUN is 96 which could respond to the cause.  She is mildly anemic.  Her vital signs have stayed stable.  She is being treated maximally with insulin as well as glucose and calcium as well as Kayexalate to treat her hyperkalemia.  We will admit her to the  hospital in guarded condition.    The total critical care time on this patient is 40 minutes, resuscitating patient, speaking with admitting physician, and deciphering test results. This 40 minutes is exclusive of separately billable procedures.          FINAL IMPRESSION  1. Confusion    2. Hyperkalemia    3. Altered mental status, unspecified altered mental status type    4. ESRD (end stage renal disease) ([facility])              Electronically signed by: Aron Mejia, 4/24/2019

## 2019-04-25 NOTE — DISCHARGE PLANNING
Care Transition Team Assessment    Information Source  Orientation : Disoriented to Time, Disoriented to Place, Disoriented to Event  Informant's Name:  (chart review)  Who is responsible for making decisions for patient? : Patient    Readmission Evaluation  Is this a readmission?: Yes - unplanned readmission    Elopement Risk  Legal Hold: No  Ambulatory or Self Mobile in Wheelchair: No-Not an Elopement Risk    Interdisciplinary Discharge Planning  Patient or legal guardian wants to designate a caregiver (see row info): No    Discharge Preparedness  What is your plan after discharge?: Uncertain - pending medical team collaboration  What are your discharge supports?: Other (comment) (Significant other, mother)  Prior Functional Level: Independent with Activities of Daily Living    Functional Assesment  Prior Functional Level: Independent with Activities of Daily Living    Finances  Financial Barriers to Discharge: Yes  Average Monthly Income:  ($850, SS/disability and food stamps)  Source of Income: Social Security Disability  Prescription Coverage: Yes              Advance Directive  Advance Directive?: None         Psychological Assessment  History of Substance Abuse: Alcohol  Substance Abuse Comments:  (noted in problems list)  History of Psychiatric Problems: No  Non-compliant with Treatment: Yes    Discharge Risks or Barriers  Discharge risks or barriers?: Complex medical needs  Patient risk factors: Noncompliance, Readmission, Substance abuse, Vulnerable adult    Anticipated Discharge Information  Anticipated discharge disposition: Discharge needs currently unknown  Discharge Address:  ([address] [facility] [address] [place] [address])  Discharge Contact Phone Number:  ([phone])

## 2019-04-25 NOTE — RESPIRATORY CARE
COPD EDUCATION by COPD CLINICAL EDUCATOR  4/25/2019 at 12:07 PM by Lisa Verma     Patient interviewed by COPD education team. Patient has AMS/ALOC unable to provide education to patient.

## 2019-04-25 NOTE — ED TRIAGE NOTES
"Pt BIB EMS with c/o    Chief Complaint   Patient presents with   • Altered Mental Status     pt missed dialysis x 2 days hx of ESRD       Pulse 91   Temp 37.5 °C (99.5 °F) (Rectal)   Resp (!) 24   Ht 1.575 m (5' 2\")   Wt 64 kg (141 lb 1.5 oz)   SpO2 88%   BMI 25.81 kg/m²   "

## 2019-04-25 NOTE — CARE PLAN
Problem: Safety  Goal: Will remain free from falls  Outcome: PROGRESSING AS EXPECTED  Bed in low position, call light in reach, bed alarm on    Problem: Respiratory:  Goal: Respiratory status will improve  Outcome: PROGRESSING SLOWER THAN EXPECTED  Pt on 3L NC, instructed on deep breathing and coughing,

## 2019-04-25 NOTE — PROGRESS NOTES
[facility] Services Progress Note    Hemodialysis treatment ordered today per Dr. Richardson x 3.5 hours. Treatment initiated at 1118.     Patient tolerated tx; see paper flow sheet for details.     Net UF 3,000 mL.     Needles removed from access site. Dressings applied and sites held x 10 minutes; verified no bleeding. Positive bruit/thrill post tx. Staff RN to monitor AVF for breakthrough bleeding. Should breakthrough bleeding occur, staff RN to apply pressure to access sites until bleeding resolved. Notify Dialysis and Nephrologist for follow-up.    Report given to Primary RN.

## 2019-04-25 NOTE — ASSESSMENT & PLAN NOTE
-Poorly controlled hypertension, due to missed HD and essential hypertension.  Possible noncompliance at home.   -Resume amlodipine and labetalol  -Added hydralazine  -Dialyzed and less anxious so improved

## 2019-04-25 NOTE — H&P
Hospital Medicine History & Physical Note    Date of Service  4/25/2019    Primary Care Physician  Juan Moreno M.D.    Consultants  None    Code Status  Full Code    Chief Complaint  Altered Mental Status    History of Presenting Illness  Very Poor historian, not appropriately answering questions.  He came here for altered mental status.  There is no family members by the bedside on admission.    53 y.o. female who was brought to the ER via EMS on 4/24/2019 for evaluation of altered mental status.  Patient is unable to provide further history at this time.  Patient is alert and oriented x2, able to respond to simple questions and follows simple commands however unable to provide history.  Denies having any pain at this time.    Chart was reviewed, she was recently admitted to [facility] from April 12 to April 14, 2019 with similar problems.    Laboratory findings: She has a potassium of 6.0.  BUN is 96 with a creatinine of 15.57.  -EKG was done and demonstrated peaked T waves.  -She received calcium gluconate, insulin, glucose in the ER.    Review of Systems  Review of Systems   Constitutional: Negative for fever.   HENT: Negative for congestion and sore throat.    Eyes: Negative for blurred vision and double vision.   Respiratory: Negative for cough and shortness of breath.    Cardiovascular: Negative for chest pain and palpitations.   Gastrointestinal: Negative for nausea and vomiting.   Genitourinary: Negative for dysuria and urgency.   Musculoskeletal: Negative for myalgias and neck pain.   Skin: Positive for itching. Negative for rash.   Neurological: Negative for dizziness, weakness and headaches.   Endo/Heme/Allergies: Does not bruise/bleed easily.   Psychiatric/Behavioral: Negative for depression. The patient does not have insomnia.        Past Medical History   has a past medical history of Anemia; Anemia associated with chronic renal failure (11/5/2009); Dental disorder (8-25-17); Dialysis  patient ([facility]) (8-25-17); Dysrhythmia; ESRD (end stage renal disease) ([facility]) (8-25-17); Glomerulonephritis, chronic (11/5/2009); Heart burn; High cholesterol; HTN (hypertension) (11/5/2009); Port catheter in place (8-25-17); SVT (supraventricular tachycardia) ([facility]); and SVT (supraventricular tachycardia) ([facility]).    Surgical History   has a past surgical history that includes amputation, below the knee; capitation payment (insurance); appendectomy laparoscopic (5/4/2009); pr enlarge breast with implant; av fistula creation (Left, 6/20/2017); other; femur orif (10/9/08); iliac bone graft (10/9/08); av fistula creation (Left, 8/28/2017); and av fistula creation (Left, 4/27/2018).     Family History  family history includes Heart Disease in her unknown relative.     Social History   reports that she has been smoking Cigarettes.  She has a 5.00 pack-year smoking history. She has never used smokeless tobacco. She reports that she does not drink alcohol or use drugs.    Allergies  Allergies   Allergen Reactions   • Sulfa Drugs Hives     AUL=1997 rash swelling itching       Medications  Prior to Admission Medications   Prescriptions Last Dose Informant Patient Reported? Taking?   albuterol 108 (90 Base) MCG/ACT Aero Soln inhalation aerosol   No No   Sig: Inhale 2 Puffs by mouth every 6 hours as needed for Shortness of Breath.   amLODIPine (NORVASC) 10 MG Tab   No No   Sig: Take 1 Tab by mouth every day.   gabapentin (NEURONTIN) 100 MG Cap   No No   Sig: Take 1 Cap by mouth 2 Times a Day.   labetalol (NORMODYNE) 200 MG Tab  Patient No No   Sig: TAKE 1 TABLET BY MOUTH TWICE DAILY   sevelamer carbonate (RENVELA) 800 MG Tab tablet   No No   Sig: Take 1 Tab by mouth 3 times a day, with meals.   terazosin (HYTRIN) 5 MG Cap   No No   Sig: Take 1 Cap by mouth every evening.   tiotropium (SPIRIVA) 18 MCG Cap   No No   Sig: Inhale 1 Cap by mouth every day for 30 days.   varenicline (CHANTIX STARTING MONTH PAK) 0.5 MG X 11 & 1 MG X 42  tablet   No No   Sig: Take as per  instructions. Follow up with PCP for continued therapy.      Facility-Administered Medications: None       Physical Exam  Temp:  [37.5 °C (99.5 °F)] 37.5 °C (99.5 °F)  Pulse:  [86-91] 86  Resp:  [21-30] 21  SpO2:  [84 %-96 %] 96 %    Physical Exam   Constitutional: She is oriented to person, place, and time. She appears well-developed and well-nourished.   HENT:   Head: Normocephalic and atraumatic.   Eyes: Pupils are equal, round, and reactive to light. EOM are normal.   Neck: Normal range of motion. Neck supple.   Cardiovascular: Normal rate and regular rhythm.    Murmur heard.   Systolic murmur is present with a grade of 4/6   Pulmonary/Chest: Effort normal and breath sounds normal.   Abdominal: Soft. Bowel sounds are normal.   Musculoskeletal: Normal range of motion. She exhibits no edema.   Neurological: She is alert and oriented to person, place, and time.   Skin: Skin is warm and dry.   Psychiatric: Her behavior is normal. She expresses inappropriate judgment. She exhibits a depressed mood.       Laboratory:  Recent Labs      04/24/19 2204   WBC  10.1   RBC  3.68*   HEMOGLOBIN  11.6*   HEMATOCRIT  36.0*   MCV  97.8   MCH  31.5   MCHC  32.2*   RDW  57.5*   PLATELETCT  148*   MPV  9.3     Recent Labs      04/24/19   2204   SODIUM  136   POTASSIUM  6.0*   CHLORIDE  92*   CO2  24   GLUCOSE  83   BUN  96*   CREATININE  15.57*   CALCIUM  10.3*     Recent Labs      04/24/19   2204   GLUCOSE  83                 No results for input(s): TROPONINI in the last 72 hours.     EKG: Normal sinus rhythm at 88 bpm.  Normal axis.  No acute ST elevation however she does have diffuse peaked T waves.    Urinalysis:    No results found     Imaging:  DX-CHEST-PORTABLE (1 VIEW)   Final Result      Cardiomegaly. No evidence of pulmonary edema.            Assessment/Plan:  I anticipate this patient will require at least two midnights for appropriate medical management, necessitating  inpatient admission.    Metabolic encephalopathy   Assessment & Plan    -Unclear baseline mentation.  -This is likely secondary to Metabolic Acidosis   -Patient is alert and oriented x2, however unable to appropriately answer my questions.  -She also has underlying history of alcohol abuse therefore we will add ammonia level at this time.       Noncompliance   Assessment & Plan    -As above     Hypertensive urgency- (present on admission)   Assessment & Plan    -Poorly controlled hypertension.  Her blood pressure now is 187/88.  I suspect she is not taking her home medications.  -Per records she is supposed to be taking amlodipine and labetalol.  -We will reorder medications, closely monitor her blood pressure and treat PRN.     ESRD on hemodialysis ([facility])- (present on admission)   Assessment & Plan    -As above     Hyperkalemia- (present on admission)   Assessment & Plan    -Due to noncompliance with hemodialysis.  Admits to missing at least 3 sessions.  -Also has underlying EKG changes with peaked T waves.  -Received  calcium gluconate, insulin, glucose in the ER.  -Kayexalate 15 mg now.  -BMP every 4 hours overnight and arrange hemodialysis session in the morning.  -She has hemodialysis on Mondays, Wednesdays and Fridays.  -She was recently admitted to [facility] from 4/12-14/19 with similar problems.       Alcohol abuse- (present on admission)   Assessment & Plan    -No elevated LFTs, will add ammonia level as above.     Hx of right BKA (CMS-[facility])- (present on admission)   Assessment & Plan    As above         VTE prophylaxis: SCD's

## 2019-04-25 NOTE — ASSESSMENT & PLAN NOTE
-Due to noncompliance with hemodialysis.  Admits to missing at least 3 sessions.  -Improved after dialysis  -I discussed with nephrology  -Possible home tomorrow

## 2019-04-25 NOTE — CARE PLAN
Problem: Safety  Goal: Will remain free from falls  Outcome: PROGRESSING AS EXPECTED  Bed alarm on, call light in reach, all safety precautions in place

## 2019-04-26 ENCOUNTER — APPOINTMENT (OUTPATIENT)
Dept: RADIOLOGY | Facility: MEDICAL CENTER | Age: 54
DRG: 682 | End: 2019-04-26
Attending: HOSPITALIST
Payer: MEDICAID

## 2019-04-26 ENCOUNTER — APPOINTMENT (OUTPATIENT)
Dept: RADIOLOGY | Facility: MEDICAL CENTER | Age: 54
DRG: 682 | End: 2019-04-26
Attending: INTERNAL MEDICINE
Payer: MEDICAID

## 2019-04-26 PROBLEM — R11.10 VOMITING: Status: ACTIVE | Noted: 2019-04-26

## 2019-04-26 LAB
ALBUMIN SERPL BCP-MCNC: 3.5 G/DL (ref 3.2–4.9)
ALBUMIN SERPL BCP-MCNC: 3.8 G/DL (ref 3.2–4.9)
ALBUMIN/GLOB SERPL: 1.3 G/DL
ALP SERPL-CCNC: 56 U/L (ref 30–99)
ALT SERPL-CCNC: 56 U/L (ref 2–50)
ANION GAP SERPL CALC-SCNC: 17 MMOL/L (ref 0–11.9)
ANION GAP SERPL CALC-SCNC: 18 MMOL/L (ref 0–11.9)
ANION GAP SERPL CALC-SCNC: 20 MMOL/L (ref 0–11.9)
AST SERPL-CCNC: 26 U/L (ref 12–45)
BASOPHILS # BLD AUTO: 0.6 % (ref 0–1.8)
BASOPHILS # BLD: 0.04 K/UL (ref 0–0.12)
BILIRUB SERPL-MCNC: 1.5 MG/DL (ref 0.1–1.5)
BUN SERPL-MCNC: 35 MG/DL (ref 8–22)
BUN SERPL-MCNC: 37 MG/DL (ref 8–22)
BUN SERPL-MCNC: 40 MG/DL (ref 8–22)
BUN SERPL-MCNC: 51 MG/DL (ref 8–22)
CALCIUM SERPL-MCNC: 8.8 MG/DL (ref 8.4–10.2)
CALCIUM SERPL-MCNC: 8.9 MG/DL (ref 8.4–10.2)
CALCIUM SERPL-MCNC: 9.3 MG/DL (ref 8.4–10.2)
CALCIUM SERPL-MCNC: 9.3 MG/DL (ref 8.4–10.2)
CHLORIDE SERPL-SCNC: 94 MMOL/L (ref 96–112)
CHLORIDE SERPL-SCNC: 95 MMOL/L (ref 96–112)
CO2 SERPL-SCNC: 23 MMOL/L (ref 20–33)
CO2 SERPL-SCNC: 23 MMOL/L (ref 20–33)
CO2 SERPL-SCNC: 25 MMOL/L (ref 20–33)
CO2 SERPL-SCNC: 27 MMOL/L (ref 20–33)
CREAT SERPL-MCNC: 10.2 MG/DL (ref 0.5–1.4)
CREAT SERPL-MCNC: 8.63 MG/DL (ref 0.5–1.4)
CREAT SERPL-MCNC: 9.01 MG/DL (ref 0.5–1.4)
CREAT SERPL-MCNC: 9.07 MG/DL (ref 0.5–1.4)
EOSINOPHIL # BLD AUTO: 0.11 K/UL (ref 0–0.51)
EOSINOPHIL NFR BLD: 1.6 % (ref 0–6.9)
ERYTHROCYTE [DISTWIDTH] IN BLOOD BY AUTOMATED COUNT: 58.2 FL (ref 35.9–50)
GLOBULIN SER CALC-MCNC: 2.8 G/DL (ref 1.9–3.5)
GLUCOSE SERPL-MCNC: 82 MG/DL (ref 65–99)
GLUCOSE SERPL-MCNC: 82 MG/DL (ref 65–99)
GLUCOSE SERPL-MCNC: 83 MG/DL (ref 65–99)
GLUCOSE SERPL-MCNC: 86 MG/DL (ref 65–99)
HCT VFR BLD AUTO: 38.2 % (ref 37–47)
HGB BLD-MCNC: 12.1 G/DL (ref 12–16)
IMM GRANULOCYTES # BLD AUTO: 0.02 K/UL (ref 0–0.11)
IMM GRANULOCYTES NFR BLD AUTO: 0.3 % (ref 0–0.9)
LACTATE BLD-SCNC: 1.3 MMOL/L (ref 0.5–2)
LYMPHOCYTES # BLD AUTO: 1.1 K/UL (ref 1–4.8)
LYMPHOCYTES NFR BLD: 15.8 % (ref 22–41)
MCH RBC QN AUTO: 31.1 PG (ref 27–33)
MCHC RBC AUTO-ENTMCNC: 31.7 G/DL (ref 33.6–35)
MCV RBC AUTO: 98.2 FL (ref 81.4–97.8)
MONOCYTES # BLD AUTO: 0.61 K/UL (ref 0–0.85)
MONOCYTES NFR BLD AUTO: 8.8 % (ref 0–13.4)
NEUTROPHILS # BLD AUTO: 5.09 K/UL (ref 2–7.15)
NEUTROPHILS NFR BLD: 72.9 % (ref 44–72)
NRBC # BLD AUTO: 0 K/UL
NRBC BLD-RTO: 0 /100 WBC
PHOSPHATE SERPL-MCNC: 5.9 MG/DL (ref 2.5–4.5)
PLATELET # BLD AUTO: 133 K/UL (ref 164–446)
PMV BLD AUTO: 9.8 FL (ref 9–12.9)
POTASSIUM SERPL-SCNC: 4.7 MMOL/L (ref 3.6–5.5)
POTASSIUM SERPL-SCNC: 4.7 MMOL/L (ref 3.6–5.5)
POTASSIUM SERPL-SCNC: 4.8 MMOL/L (ref 3.6–5.5)
POTASSIUM SERPL-SCNC: 5.1 MMOL/L (ref 3.6–5.5)
PROT SERPL-MCNC: 6.3 G/DL (ref 6–8.2)
RBC # BLD AUTO: 3.89 M/UL (ref 4.2–5.4)
SODIUM SERPL-SCNC: 136 MMOL/L (ref 135–145)
SODIUM SERPL-SCNC: 137 MMOL/L (ref 135–145)
SODIUM SERPL-SCNC: 138 MMOL/L (ref 135–145)
SODIUM SERPL-SCNC: 138 MMOL/L (ref 135–145)
WBC # BLD AUTO: 7 K/UL (ref 4.8–10.8)

## 2019-04-26 PROCEDURE — 770020 HCHG ROOM/CARE - TELE (206)

## 2019-04-26 PROCEDURE — 80069 RENAL FUNCTION PANEL: CPT

## 2019-04-26 PROCEDURE — 80053 COMPREHEN METABOLIC PANEL: CPT

## 2019-04-26 PROCEDURE — 83605 ASSAY OF LACTIC ACID: CPT

## 2019-04-26 PROCEDURE — 80048 BASIC METABOLIC PNL TOTAL CA: CPT | Mod: 91

## 2019-04-26 PROCEDURE — 99232 SBSQ HOSP IP/OBS MODERATE 35: CPT | Performed by: INTERNAL MEDICINE

## 2019-04-26 PROCEDURE — 85025 COMPLETE CBC W/AUTO DIFF WBC: CPT

## 2019-04-26 PROCEDURE — 700111 HCHG RX REV CODE 636 W/ 250 OVERRIDE (IP): Performed by: INTERNAL MEDICINE

## 2019-04-26 PROCEDURE — 90935 HEMODIALYSIS ONE EVALUATION: CPT

## 2019-04-26 PROCEDURE — 700101 HCHG RX REV CODE 250: Performed by: HOSPITALIST

## 2019-04-26 PROCEDURE — 700111 HCHG RX REV CODE 636 W/ 250 OVERRIDE (IP): Performed by: HOSPITALIST

## 2019-04-26 PROCEDURE — 74018 RADEX ABDOMEN 1 VIEW: CPT

## 2019-04-26 PROCEDURE — 76705 ECHO EXAM OF ABDOMEN: CPT

## 2019-04-26 RX ORDER — LORAZEPAM 2 MG/ML
1 INJECTION INTRAMUSCULAR ONCE
Status: DISPENSED | OUTPATIENT
Start: 2019-04-26 | End: 2019-04-27

## 2019-04-26 RX ORDER — LORAZEPAM 2 MG/ML
1 INJECTION INTRAMUSCULAR EVERY 4 HOURS PRN
Status: DISCONTINUED | OUTPATIENT
Start: 2019-04-26 | End: 2019-04-28 | Stop reason: HOSPADM

## 2019-04-26 RX ORDER — MORPHINE SULFATE 4 MG/ML
2 INJECTION, SOLUTION INTRAMUSCULAR; INTRAVENOUS ONCE
Status: COMPLETED | OUTPATIENT
Start: 2019-04-26 | End: 2019-04-26

## 2019-04-26 RX ORDER — ONDANSETRON 2 MG/ML
4 INJECTION INTRAMUSCULAR; INTRAVENOUS EVERY 4 HOURS PRN
Status: DISCONTINUED | OUTPATIENT
Start: 2019-04-26 | End: 2019-04-28 | Stop reason: HOSPADM

## 2019-04-26 RX ORDER — HALOPERIDOL 1 MG/1
3 TABLET ORAL ONCE
Status: DISCONTINUED | OUTPATIENT
Start: 2019-04-26 | End: 2019-04-26

## 2019-04-26 RX ORDER — HEPARIN SODIUM 1000 [USP'U]/ML
1750 INJECTION, SOLUTION INTRAVENOUS; SUBCUTANEOUS
Status: DISCONTINUED | OUTPATIENT
Start: 2019-04-26 | End: 2019-04-28 | Stop reason: HOSPADM

## 2019-04-26 RX ORDER — HYDRALAZINE HYDROCHLORIDE 25 MG/1
25 TABLET, FILM COATED ORAL EVERY 8 HOURS
Status: DISCONTINUED | OUTPATIENT
Start: 2019-04-26 | End: 2019-04-28 | Stop reason: HOSPADM

## 2019-04-26 RX ORDER — METOCLOPRAMIDE HYDROCHLORIDE 5 MG/ML
10 INJECTION INTRAMUSCULAR; INTRAVENOUS EVERY 6 HOURS
Status: DISCONTINUED | OUTPATIENT
Start: 2019-04-27 | End: 2019-04-28 | Stop reason: HOSPADM

## 2019-04-26 RX ADMIN — LABETALOL HYDROCHLORIDE 10 MG: 5 INJECTION INTRAVENOUS at 00:26

## 2019-04-26 RX ADMIN — ONDANSETRON 4 MG: 2 INJECTION INTRAMUSCULAR; INTRAVENOUS at 09:16

## 2019-04-26 RX ADMIN — LORAZEPAM 1 MG: 2 INJECTION INTRAMUSCULAR; INTRAVENOUS at 11:10

## 2019-04-26 RX ADMIN — ONDANSETRON 4 MG: 2 INJECTION INTRAMUSCULAR; INTRAVENOUS at 04:41

## 2019-04-26 RX ADMIN — EPOETIN ALFA 5000 UNITS: 3000 SOLUTION INTRAVENOUS; SUBCUTANEOUS at 09:00

## 2019-04-26 RX ADMIN — HYDRALAZINE HYDROCHLORIDE 20 MG: 20 INJECTION INTRAMUSCULAR; INTRAVENOUS at 14:25

## 2019-04-26 RX ADMIN — LORAZEPAM 1 MG: 2 INJECTION INTRAMUSCULAR; INTRAVENOUS at 15:46

## 2019-04-26 RX ADMIN — LORAZEPAM 1 MG: 2 INJECTION INTRAMUSCULAR; INTRAVENOUS at 20:16

## 2019-04-26 RX ADMIN — MORPHINE SULFATE 2 MG: 4 INJECTION INTRAVENOUS at 01:58

## 2019-04-26 RX ADMIN — HYDRALAZINE HYDROCHLORIDE 20 MG: 20 INJECTION INTRAMUSCULAR; INTRAVENOUS at 03:51

## 2019-04-26 RX ADMIN — HEPARIN SODIUM 1750 UNITS: 1000 INJECTION, SOLUTION INTRAVENOUS; SUBCUTANEOUS at 17:34

## 2019-04-26 NOTE — CARE PLAN
Problem: Communication  Goal: The ability to communicate needs accurately and effectively will improve  Outcome: PROGRESSING SLOWER THAN EXPECTED  Encouraged patient to communicate needs, patient continues to be nonverbal. Patient responds to her name but is unable to answer orientation questions. Call light is within reach, but patient is unable to use properly.     Problem: Safety  Goal: Will remain free from falls  Outcome: PROGRESSING AS EXPECTED  Falls precautions have been implemented. Patient has bed alarm on, bed is locked and in the lowest positions.

## 2019-04-26 NOTE — PROGRESS NOTES
1915 Report received from Maryann RN. Plan of care discussed. Patient resting comfortably in bed with significant other at [facility]. Patient responds to verbal commands but is somnolent. Safety precautions in place.     2000 Assessment completed, patient responds to her name but is not able to communicate, patient stated her significant other's name but when asked orientation questions, patient looked at this RN and smiled but did not respond. Patient has a right BKA and SCD on left leg. Patient received dialysis today. Patient's BP is elevated, will continue to monitor as prns given by AM RN. Patient is strict NPO due to somnolence, lips are dry, chapstick provided and applied by this RN. Bed alarm is on, call light and belongings are within reach.     2240 Patient is sleeping in bed comfortably, respirations are even and unlabored. Safety precautions in place, will continue to monitor.

## 2019-04-26 NOTE — PROGRESS NOTES
PRN Zofran administered, patient continues to not answer to verbal commands. BP re-checked after prn hydralazine given, see flowsheet. Linen changed, pt is now resting comfortably in bed, bed alarm is on. Call light is within reach.

## 2019-04-26 NOTE — PROGRESS NOTES
Telemetry Shift Summary    Rhythm SR  HR Range upper 60s-upper 90s  Ectopy rare PVCs  Measurements .14/.08/.40        Normal Values  Rhythm SR  HR Range    Measurements 0.12-0.20 / 0.06-0.10  / 0.30-0.52

## 2019-04-26 NOTE — ASSESSMENT & PLAN NOTE
No history of diabetes but I wonder about gastroparesis versus gastroenteritis versus GERD.  LFTs are normal  Gallstones were seen on plain film, check RUQ ultrasound

## 2019-04-26 NOTE — PROGRESS NOTES
Patient had bright green emesis with scant bloody sputum, Ese King ordered and stat lactic lab ordered. Lab is at bedside.

## 2019-04-26 NOTE — CARE PLAN
Problem: Skin Integrity  Goal: Risk for impaired skin integrity will decrease  Outcome: PROGRESSING AS EXPECTED  No breakdown noted

## 2019-04-26 NOTE — CARE PLAN
Problem: Safety  Goal: Will remain free from injury  Outcome: PROGRESSING AS EXPECTED  Bed alarm on, bed in low position, all needs met, no injury

## 2019-04-26 NOTE — PROGRESS NOTES
[facility] Note:      Attempted to perform dialysis on pt this AM but pt kept refusing after several attempts. Started to get agitated as well. Nephrologist notified and gave orders to let pt rest and try again later. Will attempt to dialyze later this afternoon.

## 2019-04-26 NOTE — PROGRESS NOTES
"Dr. Salinas paged to update on patient's BP. Patient is saying \"ow.\" When this RN asked the patient what hurts, patient looks up at this RN and smiles. Patient is sitting up and squeezing her abdomen while saying \"ow.\" Dr. Salinas ordered Xray of abdomen and one time dose of morphine, ordered entered by Dr. Salinas.   "

## 2019-04-26 NOTE — PROGRESS NOTES
Report given to Maryann SANTOS. Plan of care discussed. Patient resting comfortably in bed. Safety precautions in place.

## 2019-04-26 NOTE — PROGRESS NOTES
"[facility] -  PROGRESS NOTE               Author: Elliot Richardson M.D. Date & Time: 4/26/2019  12:01 PM     HPI:  53 y.o. female with end-stage renal disease, on chronic hemodialysis Monday, Wednesday, and Fridays at [facility] and a history of noncompliance and recent hospitalization dialysis presented via EMS with altered mental status.     Patient is currently not answering questions appropriately, oriented x2 on admission.  Per her outpatient dialysis unit her last dialysis was last Friday 4/19, missed on Monday and yesterday.  On presentation potassium noted to be 6, given Kayexalate.    DAILY NEPHROLOGY SUMMARY:  4/25: consult done  4/26: tolerated HD yesterday, combative this AM requiring restraints, refusing HD    PAST FAMILY HISTORY: Reviewed and Unchanged  SOCIAL HISTORY: Reviewed and Unchanged  CURRENT MEDICATIONS: Reviewed  IMAGING STUDIES: Reviewed    ROS  Unable to obtain, tearful     PHYSICAL EXAM  VS:  BP (!) 163/79 Comment: nurse notifed  Pulse 82   Temp 36.3 °C (97.4 °F) (Axillary)   Resp 18   Ht 1.575 m (5' 2\")   Wt 55.2 kg (121 lb 11.1 oz)   SpO2 90%   BMI 22.26 kg/m²   GENERAL: Lying in bed, no acute distress  HEAD: Normocephalic, atraumatic  EYES: no scleral icterus; normal conjunctiva  NECK: Supple; trachea midline  CV: RRR, systolic murmur  LUNGS: Clear to ausculation bilaterally anteriorly, No rhales or wheezes  ABDOMEN: Soft, non-tender  EXTREMETIES:+lower extremity edema, no clubbing or cyanosis  SKIN: Warm and dry, + excoriations  NEURO: A&O2, no focal deficits  ACCESS: Left upper extremity AV fistula without surrounding erythema and edema    Fluids:  In: 500 [Dialysis:500]  Out: 3500     LABS:  Recent Results (from the past 24 hour(s))   Basic Metabolic Panel    Collection Time: 04/25/19  5:09 PM   Result Value Ref Range    Sodium 136 135 - 145 mmol/L    Potassium 4.2 3.6 - 5.5 mmol/L    Chloride 96 96 - 112 mmol/L    Co2 26 20 - 33 mmol/L    " Glucose 87 65 - 99 mg/dL    Bun 27 (H) 8 - 22 mg/dL    Creatinine 7.16 (HH) 0.50 - 1.40 mg/dL    Calcium 9.3 8.4 - 10.2 mg/dL    Anion Gap 14.0 (H) 0.0 - 11.9   ESTIMATED GFR    Collection Time: 04/25/19  5:09 PM   Result Value Ref Range    GFR If  7 (A) >60 mL/min/1.73 m 2    GFR If Non African American 6 (A) >60 mL/min/1.73 m 2   Basic Metabolic Panel    Collection Time: 04/25/19  8:59 PM   Result Value Ref Range    Sodium 135 135 - 145 mmol/L    Potassium 4.8 3.6 - 5.5 mmol/L    Chloride 94 (L) 96 - 112 mmol/L    Co2 27 20 - 33 mmol/L    Glucose 87 65 - 99 mg/dL    Bun 30 (H) 8 - 22 mg/dL    Creatinine 7.94 (HH) 0.50 - 1.40 mg/dL    Calcium 9.0 8.4 - 10.2 mg/dL    Anion Gap 14.0 (H) 0.0 - 11.9   ESTIMATED GFR    Collection Time: 04/25/19  8:59 PM   Result Value Ref Range    GFR If  6 (A) >60 mL/min/1.73 m 2    GFR If Non African American 5 (A) >60 mL/min/1.73 m 2   Basic Metabolic Panel    Collection Time: 04/26/19  1:20 AM   Result Value Ref Range    Sodium 137 135 - 145 mmol/L    Potassium 4.7 3.6 - 5.5 mmol/L    Chloride 94 (L) 96 - 112 mmol/L    Co2 25 20 - 33 mmol/L    Glucose 82 65 - 99 mg/dL    Bun 35 (H) 8 - 22 mg/dL    Creatinine 8.63 (HH) 0.50 - 1.40 mg/dL    Calcium 8.9 8.4 - 10.2 mg/dL    Anion Gap 18.0 (H) 0.0 - 11.9   ESTIMATED GFR    Collection Time: 04/26/19  1:20 AM   Result Value Ref Range    GFR If  6 (A) >60 mL/min/1.73 m 2    GFR If Non African American 5 (A) >60 mL/min/1.73 m 2   Basic Metabolic Panel    Collection Time: 04/26/19  4:36 AM   Result Value Ref Range    Sodium 138 135 - 145 mmol/L    Potassium 4.7 3.6 - 5.5 mmol/L    Chloride 95 (L) 96 - 112 mmol/L    Co2 23 20 - 33 mmol/L    Glucose 86 65 - 99 mg/dL    Bun 37 (H) 8 - 22 mg/dL    Creatinine 9.01 (HH) 0.50 - 1.40 mg/dL    Calcium 9.3 8.4 - 10.2 mg/dL    Anion Gap 20.0 (H) 0.0 - 11.9   CBC WITH DIFFERENTIAL    Collection Time: 04/26/19  4:36 AM   Result Value Ref Range    WBC 7.0 4.8  - 10.8 K/uL    RBC 3.89 (L) 4.20 - 5.40 M/uL    Hemoglobin 12.1 12.0 - 16.0 g/dL    Hematocrit 38.2 37.0 - 47.0 %    MCV 98.2 (H) 81.4 - 97.8 fL    MCH 31.1 27.0 - 33.0 pg    MCHC 31.7 (L) 33.6 - 35.0 g/dL    RDW 58.2 (H) 35.9 - 50.0 fL    Platelet Count 133 (L) 164 - 446 K/uL    MPV 9.8 9.0 - 12.9 fL    Neutrophils-Polys 72.90 (H) 44.00 - 72.00 %    Lymphocytes 15.80 (L) 22.00 - 41.00 %    Monocytes 8.80 0.00 - 13.40 %    Eosinophils 1.60 0.00 - 6.90 %    Basophils 0.60 0.00 - 1.80 %    Immature Granulocytes 0.30 0.00 - 0.90 %    Nucleated RBC 0.00 /100 WBC    Neutrophils (Absolute) 5.09 2.00 - 7.15 K/uL    Lymphs (Absolute) 1.10 1.00 - 4.80 K/uL    Monos (Absolute) 0.61 0.00 - 0.85 K/uL    Eos (Absolute) 0.11 0.00 - 0.51 K/uL    Baso (Absolute) 0.04 0.00 - 0.12 K/uL    Immature Granulocytes (abs) 0.02 0.00 - 0.11 K/uL    NRBC (Absolute) 0.00 K/uL   LACTIC ACID    Collection Time: 04/26/19  4:36 AM   Result Value Ref Range    Lactic Acid 1.3 0.5 - 2.0 mmol/L   ESTIMATED GFR    Collection Time: 04/26/19  4:36 AM   Result Value Ref Range    GFR If African American 5 (A) >60 mL/min/1.73 m 2    GFR If Non African American 5 (A) >60 mL/min/1.73 m 2   Renal Function Panel    Collection Time: 04/26/19  4:36 AM   Result Value Ref Range    Sodium 136 135 - 145 mmol/L    Potassium 4.8 3.6 - 5.5 mmol/L    Chloride 94 (L) 96 - 112 mmol/L    Co2 23 20 - 33 mmol/L    Glucose 82 65 - 99 mg/dL    Creatinine 9.07 (HH) 0.50 - 1.40 mg/dL    Bun 40 (H) 8 - 22 mg/dL    Calcium 9.3 8.4 - 10.2 mg/dL    Phosphorus 5.9 (H) 2.5 - 4.5 mg/dL    Albumin 3.8 3.2 - 4.9 g/dL   ESTIMATED GFR    Collection Time: 04/26/19  4:36 AM   Result Value Ref Range    GFR If African American 5 (A) >60 mL/min/1.73 m 2    GFR If Non African American 5 (A) >60 mL/min/1.73 m 2       (click the triangle to expand results)      ASSESSMENT:  # ESRD: normally M/W/F via. AVF.  History of noncompliance  # HTN: Likely volume driven, agitation contributing   # Volume  Status: Hypervolemic  # Anemia of CKD, recent iron load  # CKD-MBD  # Hyperkalemia  # Altered mental status: Concern for substance abuse  # Tobacco use  # History of drug and alcohol use     PLAN:  -attempted HD this am, but refused  -will attempt HD again later today vs tomorrow, then to continue M/W/F during stay  -Dose all meds for ESRD  -Renal Diet  -Limit IVFs/Strict I/Os  -Limit peripheral lines, avoid PICCs  -Continue home blood pressure meds  -Continue Renvela  -Epogen 5,000 units with dialysis     Thank you,     Elliot Richardson MD

## 2019-04-26 NOTE — PROGRESS NOTES
Hospital Medicine Daily Progress Note    Date of Service  4/26/2019    Chief Complaint  53 y.o. female admitted 4/24/2019 with somnolence    Hospital Course    History of end-stage renal disease on dialysis Monday Wednesday Friday, hypertension, noncompliance, BKA, alcohol abuse, admitted for somnolence after missing dialysis for at least 3 sessions found to have hyperkalemia and uremia      Interval Problem Update  4/25: I consulted nephrology, they will perform urgent dialysis today.  Patient is somnolent vitals are stable aside from needing 5 L of oxygen  4/26: Combative this morning, pulled IV, requiring multiple doses of IM Ativan.  Long QT so unable to give Haldol.  RUQ US ordered due to vomiting    Consultants/Specialty  Nephrology, Dr. Richardson    Code Status  Full    Disposition  Follow-up mental status and labs in AM  F/U RUQ US    Review of Systems  Review of Systems   Unable to perform ROS: Mental acuity        Physical Exam  Temp:  [36.3 °C (97.4 °F)-37.1 °C (98.8 °F)] 36.8 °C (98.3 °F)  Pulse:  [71-88] 79  Resp:  [14-20] 14  BP: (125-200)/(62-95) 125/62  SpO2:  [90 %-98 %] 96 %    Physical Exam   Constitutional: No distress.   Unkempt   HENT:   Head: Normocephalic.   Mouth/Throat: No oropharyngeal exudate.   Neck: No JVD present. No thyromegaly present.   Cardiovascular: Normal rate and regular rhythm.    No murmur heard.  Pulmonary/Chest: Effort normal. No respiratory distress. She has no wheezes. She has no rales.   Abdominal: She exhibits no distension. There is no tenderness.   Musculoskeletal: Normal range of motion. She exhibits no edema or tenderness.   Neurological: No cranial nerve deficit. Coordination normal.   Skin: Skin is warm. No rash noted. No erythema.   Psychiatric: Her affect is angry, labile and inappropriate. She is aggressive. Cognition and memory are impaired. She expresses impulsivity and inappropriate judgment.       Fluids  No intake or output data in the 24 hours ending  04/26/19 1644    Laboratory  Recent Labs      04/24/19   2204  04/25/19   0413  04/26/19   0436   WBC  10.1  8.5  7.0   RBC  3.68*  3.46*  3.89*   HEMOGLOBIN  11.6*  10.9*  12.1   HEMATOCRIT  36.0*  36.3*  38.2   MCV  97.8  104.9*  98.2*   MCH  31.5  31.5  31.1   MCHC  32.2*  30.0*  31.7*   RDW  57.5*  63.0*  58.2*   PLATELETCT  148*  110*  133*   MPV  9.3  10.6  9.8     Recent Labs      04/26/19   0120  04/26/19   0436  04/26/19   1438   SODIUM  137  136  138  138   POTASSIUM  4.7  4.8  4.7  5.1   CHLORIDE  94*  94*  95*  94*   CO2  25  23  23  27   GLUCOSE  82  82  86  83   BUN  35*  40*  37*  51*   CREATININE  8.63*  9.07*  9.01*  10.20*   CALCIUM  8.9  9.3  9.3  8.8                   Imaging  [id number] VIEW   Final Result         Moderate amount of stool throughout the colon.      Cholelithiasis.      DX-CHEST-PORTABLE (1 VIEW)   Final Result      Cardiomegaly. No evidence of pulmonary edema.      US-RUQ    (Results Pending)        Assessment/Plan  Metabolic encephalopathy   Assessment & Plan    Acute, with agitation and aggression.  She required a and pulled out her IV.  IM doses of Ativan have been used to keep her sedated-Unclear baseline mentation.  -I think this is less likely due to uremia as she did not test and is now refusing all other dialysis sessions.  Wonder about drug ingestion versus less likely infection  -Check UDS, she is not forthcoming with whether she makes any urine or not  -Dialyze today, patient was aggressively refusing this earlier  -No evidence of infection, chest x-ray clear, monitor  -Check ammonia  -PRN IM Ativan or IV Ativan if IV access established       Noncompliance   Assessment & Plan    -As above  -Frequently misses HD and requires hospitalization     Hypertensive urgency- (present on admission)   Assessment & Plan    -Poorly controlled hypertension, due to missed HD and essential hypertension.  Possible noncompliance at home.   -Resume amlodipine and  labetalol  -Dialyzed     ESRD on hemodialysis (HCC)- (present on admission)   Assessment & Plan    On HD MWF, has missed 2 sessions, somnolent and obtunded     Volume overload- (present on admission)   Assessment & Plan    Improved with dialysis, she is refusing today.  If this continues, consider psych consultation     Hyperkalemia- (present on admission)   Assessment & Plan    -Due to noncompliance with hemodialysis.  Admits to missing at least 3 sessions.  -Improved after dialysis yesterday but she is refusing again.  -Received calcium gluconate, repeat if needed  -I discussed with nephrology       Vomiting   Assessment & Plan    No history of diabetes but I wonder about gastroparesis versus gastroenteritis versus GERD.  LFTs are normal  Gallstones were seen on plain film, check RUQ ultrasound     Alcohol abuse- (present on admission)   Assessment & Plan    -No elevated LFTs  -Check ammonia     Hx of right BKA (CMS-HCC)- (present on admission)   Assessment & Plan    As above          VTE prophylaxis: Heparin

## 2019-04-26 NOTE — PROGRESS NOTES
Patient is sitting in up in bed, ICU RN to start IV as floor RNs were unsuccessful. Bed alarm is on, call light is within reach, will continue to monitor.

## 2019-04-26 NOTE — PROGRESS NOTES
Fariha, ICU RN placed PIV on patient. Xredilia called and asked when pt can be taken down, this RN explained to tech, patient was having PIV placed. Patient is now resting comfortably in bed, respirations are even and unlabored. Xray called, with no answer and no option to leave a message. This RN will attempt to call back.

## 2019-04-27 LAB
ALBUMIN SERPL BCP-MCNC: 3.5 G/DL (ref 3.2–4.9)
AMMONIA PLAS-SCNC: 22 UMOL/L (ref 11–45)
BASOPHILS # BLD AUTO: 0.7 % (ref 0–1.8)
BASOPHILS # BLD: 0.04 K/UL (ref 0–0.12)
BUN SERPL-MCNC: 23 MG/DL (ref 8–22)
CALCIUM SERPL-MCNC: 8.9 MG/DL (ref 8.4–10.2)
CHLORIDE SERPL-SCNC: 93 MMOL/L (ref 96–112)
CO2 SERPL-SCNC: 25 MMOL/L (ref 20–33)
CREAT SERPL-MCNC: 6.61 MG/DL (ref 0.5–1.4)
EOSINOPHIL # BLD AUTO: 0.11 K/UL (ref 0–0.51)
EOSINOPHIL NFR BLD: 1.8 % (ref 0–6.9)
ERYTHROCYTE [DISTWIDTH] IN BLOOD BY AUTOMATED COUNT: 60 FL (ref 35.9–50)
GLUCOSE SERPL-MCNC: 83 MG/DL (ref 65–99)
HCT VFR BLD AUTO: 41.9 % (ref 37–47)
HGB BLD-MCNC: 13.1 G/DL (ref 12–16)
IMM GRANULOCYTES # BLD AUTO: 0.01 K/UL (ref 0–0.11)
IMM GRANULOCYTES NFR BLD AUTO: 0.2 % (ref 0–0.9)
LYMPHOCYTES # BLD AUTO: 1.27 K/UL (ref 1–4.8)
LYMPHOCYTES NFR BLD: 21.2 % (ref 22–41)
MCH RBC QN AUTO: 31.4 PG (ref 27–33)
MCHC RBC AUTO-ENTMCNC: 31.3 G/DL (ref 33.6–35)
MCV RBC AUTO: 100.5 FL (ref 81.4–97.8)
MONOCYTES # BLD AUTO: 0.7 K/UL (ref 0–0.85)
MONOCYTES NFR BLD AUTO: 11.7 % (ref 0–13.4)
NEUTROPHILS # BLD AUTO: 3.87 K/UL (ref 2–7.15)
NEUTROPHILS NFR BLD: 64.4 % (ref 44–72)
NRBC # BLD AUTO: 0 K/UL
NRBC BLD-RTO: 0 /100 WBC
PHOSPHATE SERPL-MCNC: 5.6 MG/DL (ref 2.5–4.5)
PLATELET # BLD AUTO: 144 K/UL (ref 164–446)
PMV BLD AUTO: 9.8 FL (ref 9–12.9)
POTASSIUM SERPL-SCNC: 4.9 MMOL/L (ref 3.6–5.5)
RBC # BLD AUTO: 4.17 M/UL (ref 4.2–5.4)
SODIUM SERPL-SCNC: 134 MMOL/L (ref 135–145)
WBC # BLD AUTO: 6 K/UL (ref 4.8–10.8)

## 2019-04-27 PROCEDURE — A9270 NON-COVERED ITEM OR SERVICE: HCPCS | Performed by: HOSPITALIST

## 2019-04-27 PROCEDURE — 82140 ASSAY OF AMMONIA: CPT

## 2019-04-27 PROCEDURE — 700102 HCHG RX REV CODE 250 W/ 637 OVERRIDE(OP): Performed by: HOSPITALIST

## 2019-04-27 PROCEDURE — 770020 HCHG ROOM/CARE - TELE (206)

## 2019-04-27 PROCEDURE — 700111 HCHG RX REV CODE 636 W/ 250 OVERRIDE (IP): Performed by: INTERNAL MEDICINE

## 2019-04-27 PROCEDURE — 80069 RENAL FUNCTION PANEL: CPT

## 2019-04-27 PROCEDURE — 85025 COMPLETE CBC W/AUTO DIFF WBC: CPT

## 2019-04-27 PROCEDURE — A9270 NON-COVERED ITEM OR SERVICE: HCPCS | Performed by: INTERNAL MEDICINE

## 2019-04-27 PROCEDURE — 99232 SBSQ HOSP IP/OBS MODERATE 35: CPT | Performed by: INTERNAL MEDICINE

## 2019-04-27 PROCEDURE — 700102 HCHG RX REV CODE 250 W/ 637 OVERRIDE(OP): Performed by: INTERNAL MEDICINE

## 2019-04-27 RX ADMIN — LORAZEPAM 1 MG: 2 INJECTION INTRAMUSCULAR; INTRAVENOUS at 23:37

## 2019-04-27 RX ADMIN — ACETAMINOPHEN 650 MG: 325 TABLET, FILM COATED ORAL at 18:06

## 2019-04-27 RX ADMIN — METOCLOPRAMIDE 10 MG: 5 INJECTION, SOLUTION INTRAMUSCULAR; INTRAVENOUS at 23:35

## 2019-04-27 RX ADMIN — METOCLOPRAMIDE 10 MG: 5 INJECTION, SOLUTION INTRAMUSCULAR; INTRAVENOUS at 17:55

## 2019-04-27 RX ADMIN — METOCLOPRAMIDE 10 MG: 5 INJECTION, SOLUTION INTRAMUSCULAR; INTRAVENOUS at 00:04

## 2019-04-27 RX ADMIN — HYDRALAZINE HYDROCHLORIDE 25 MG: 25 TABLET ORAL at 23:34

## 2019-04-27 RX ADMIN — SEVELAMER CARBONATE 800 MG: 800 TABLET, FILM COATED ORAL at 17:55

## 2019-04-27 RX ADMIN — METOCLOPRAMIDE 10 MG: 5 INJECTION, SOLUTION INTRAMUSCULAR; INTRAVENOUS at 05:34

## 2019-04-27 RX ADMIN — TERAZOSIN HYDROCHLORIDE 5 MG: 5 CAPSULE ORAL at 17:54

## 2019-04-27 RX ADMIN — LABETALOL HYDROCHLORIDE 200 MG: 100 TABLET, FILM COATED ORAL at 17:54

## 2019-04-27 RX ADMIN — LORAZEPAM 1 MG: 2 INJECTION INTRAMUSCULAR; INTRAVENOUS at 00:04

## 2019-04-27 RX ADMIN — SEVELAMER CARBONATE 800 MG: 800 TABLET, FILM COATED ORAL at 11:41

## 2019-04-27 RX ADMIN — LORAZEPAM 1 MG: 2 INJECTION INTRAMUSCULAR; INTRAVENOUS at 11:40

## 2019-04-27 RX ADMIN — METOCLOPRAMIDE 10 MG: 5 INJECTION, SOLUTION INTRAMUSCULAR; INTRAVENOUS at 11:41

## 2019-04-27 RX ADMIN — LORAZEPAM 1 MG: 2 INJECTION INTRAMUSCULAR; INTRAVENOUS at 03:50

## 2019-04-27 RX ADMIN — SENNOSIDES,DOCUSATE SODIUM 2 TABLET: 8.6; 5 TABLET, FILM COATED ORAL at 17:54

## 2019-04-27 NOTE — PROGRESS NOTES
"[facility] -  PROGRESS NOTE               Author: Elliot Richardson M.D. Date & Time: 4/27/2019  9:35 AM     HPI:  53 y.o. female with end-stage renal disease, on chronic hemodialysis Monday, Wednesday, and Fridays at [facility] and a history of noncompliance and recent hospitalization dialysis presented via EMS with altered mental status.     Patient is currently not answering questions appropriately, oriented x2 on admission.  Per her outpatient dialysis unit her last dialysis was last Friday 4/19, missed on Monday and yesterday.  On presentation potassium noted to be 6, given Kayexalate.    DAILY NEPHROLOGY SUMMARY:  4/25: consult done  4/26: tolerated HD yesterday, combative this AM requiring restraints, refusing HD  4/27: tolerated HD in PM, session cut short, tolerating PO intake    PAST FAMILY HISTORY: Reviewed and Unchanged  SOCIAL HISTORY: Reviewed and Unchanged  CURRENT MEDICATIONS: Reviewed  IMAGING STUDIES: Reviewed    ROS  Unable to obtain due to patient participation    PHYSICAL EXAM  VS:  /77   Pulse 86   Temp 36.8 °C (98.3 °F) (Oral)   Resp 20   Ht 1.575 m (5' 2\")   Wt 55.2 kg (121 lb 11.1 oz)   SpO2 91%   BMI 22.26 kg/m²   GENERAL: Lying in bed, no acute distress  HEAD: Normocephalic, atraumatic  EYES: no scleral icterus; normal conjunctiva  NECK: Supple; trachea midline  CV: RRR, systolic murmur  LUNGS: Clear to ausculation bilaterally anteriorly, No rhales or wheezes  ABDOMEN: Soft, non-tender  EXTREMETIES:No lower extremity edema, RLE BKA  SKIN: Warm and dry, + excoriations  NEURO: A&O2, no focal deficits  ACCESS: Left upper extremity AV fistula without surrounding erythema and edema    Fluids:  In: 500 [Dialysis:500]  Out: 2665     LABS:  Recent Results (from the past 24 hour(s))   Comp Metabolic Panel    Collection Time: 04/26/19  2:38 PM   Result Value Ref Range    Sodium 138 135 - 145 mmol/L    Potassium 5.1 3.6 - 5.5 mmol/L    Chloride 94 (L) 96 - " 112 mmol/L    Co2 27 20 - 33 mmol/L    Anion Gap 17.0 (H) 0.0 - 11.9    Glucose 83 65 - 99 mg/dL    Bun 51 (H) 8 - 22 mg/dL    Creatinine 10.20 (HH) 0.50 - 1.40 mg/dL    Calcium 8.8 8.4 - 10.2 mg/dL    AST(SGOT) 26 12 - 45 U/L    ALT(SGPT) 56 (H) 2 - 50 U/L    Alkaline Phosphatase 56 30 - 99 U/L    Total Bilirubin 1.5 0.1 - 1.5 mg/dL    Albumin 3.5 3.2 - 4.9 g/dL    Total Protein 6.3 6.0 - 8.2 g/dL    Globulin 2.8 1.9 - 3.5 g/dL    A-G Ratio 1.3 g/dL   ESTIMATED GFR    Collection Time: 04/26/19  2:38 PM   Result Value Ref Range    GFR If African American 5 (A) >60 mL/min/1.73 m 2    GFR If Non African American 4 (A) >60 mL/min/1.73 m 2   CBC with Differential    Collection Time: 04/27/19  4:34 AM   Result Value Ref Range    WBC 6.0 4.8 - 10.8 K/uL    RBC 4.17 (L) 4.20 - 5.40 M/uL    Hemoglobin 13.1 12.0 - 16.0 g/dL    Hematocrit 41.9 37.0 - 47.0 %    .5 (H) 81.4 - 97.8 fL    MCH 31.4 27.0 - 33.0 pg    MCHC 31.3 (L) 33.6 - 35.0 g/dL    RDW 60.0 (H) 35.9 - 50.0 fL    Platelet Count 144 (L) 164 - 446 K/uL    MPV 9.8 9.0 - 12.9 fL    Neutrophils-Polys 64.40 44.00 - 72.00 %    Lymphocytes 21.20 (L) 22.00 - 41.00 %    Monocytes 11.70 0.00 - 13.40 %    Eosinophils 1.80 0.00 - 6.90 %    Basophils 0.70 0.00 - 1.80 %    Immature Granulocytes 0.20 0.00 - 0.90 %    Nucleated RBC 0.00 /100 WBC    Neutrophils (Absolute) 3.87 2.00 - 7.15 K/uL    Lymphs (Absolute) 1.27 1.00 - 4.80 K/uL    Monos (Absolute) 0.70 0.00 - 0.85 K/uL    Eos (Absolute) 0.11 0.00 - 0.51 K/uL    Baso (Absolute) 0.04 0.00 - 0.12 K/uL    Immature Granulocytes (abs) 0.01 0.00 - 0.11 K/uL    NRBC (Absolute) 0.00 K/uL   Renal Function Panel    Collection Time: 04/27/19  4:34 AM   Result Value Ref Range    Sodium 134 (L) 135 - 145 mmol/L    Potassium 4.9 3.6 - 5.5 mmol/L    Chloride 93 (L) 96 - 112 mmol/L    Co2 25 20 - 33 mmol/L    Glucose 83 65 - 99 mg/dL    Creatinine 6.61 (HH) 0.50 - 1.40 mg/dL    Bun 23 (H) 8 - 22 mg/dL    Calcium 8.9 8.4 - 10.2 mg/dL     Phosphorus 5.6 (H) 2.5 - 4.5 mg/dL    Albumin 3.5 3.2 - 4.9 g/dL   AMMONIA    Collection Time: 04/27/19  4:34 AM   Result Value Ref Range    Ammonia 22 11 - 45 umol/L   ESTIMATED GFR    Collection Time: 04/27/19  4:34 AM   Result Value Ref Range    GFR If  8 (A) >60 mL/min/1.73 m 2    GFR If Non African American 6 (A) >60 mL/min/1.73 m 2       (click the triangle to expand results)      ASSESSMENT:  # ESRD: normally M/W/F via. AVF.  History of noncompliance  # HTN: Likely volume driven, agitation contributing   # Volume Status: Hypervolemic  # Anemia of CKD, recent iron load  # CKD-MBD  # Hyperkalemia: resolved  # Altered mental status: Concern for substance abuse  # Tobacco use  # History of drug and alcohol use     PLAN:  -No HD today, then to continue M/W/F during stay  -OK for discharge from renal standpoint  -Dose all meds for ESRD  -Renal Diet  -Limit IVFs/Strict I/Os  -Limit peripheral lines, avoid PICCs  -Continue home blood pressure meds  -Continue Renvela  -hold Epogen     Thank you,     Elliot Richardson MD

## 2019-04-27 NOTE — PROGRESS NOTES
Patient on the monitor with a run of SVT rate 176 at 1517. Now back in SR Call placed to Dr. Negrete ,she was notified of this, no new orders, will continue to monitor

## 2019-04-27 NOTE — DIETARY
"Nutrition services: Day 3 of admit. Isabelle Coyle is a 53 y.o. female with admitting DX of hyperkalemia, ESRD, non-compliance.    Consult received for poor PO intake.    Assessment:  Height: 157.5 cm (5' 2\")  Weight: 55.2 kg (121 lb 11.1 oz)  Body mass index is 22.26 kg/m² - WNL  Diet/Intake: NTFL, low K+, 1.5g Na+, 1500mL fluid restriction, PO intake % x1    Evaluation:   1. Pt visited in room today. She was resting with her eyes closed but her significant other Lalo was present and helped with the following information.  2. Pt began having N/V on Wednesday. Since Thursday of last week she has essentially been unable to keep anything down until breakfast this morning. The pt did consume most of her breakfast.  3. Per Lalo, the pt has actually been having poor intake and losing wt over the prior 2.5 weeks 2' hospitalizations and being 'sick'. He estimates 15# wt loss during this time (approx 11% severe wt loss x 2.5 weeks). PO intake has likely been <75% of her estimated needs during this time based on our discussion.  4. Wt records indicate wt was 56.7kg by standing scale 4/13 and was 55.2kg by bed scale 4/25 (wt loss of 2.6% x 12 days). Both wts obtained on days that HD occurred, and may be closer to dry weight.  5. Pt does like Boost in the vanilla flavor. Diet was changed today to low K+ / Na+ with 1.5L fluid restriction. Will be judicious with supplements and provide Boost (1x day) and magic cups (1x day) to assist her with eating better and to help keep her protein status up.  6. Labs: Na+ 134, K+ 4.9, Phos 5.6    Malnutrition Risk: Pt with moderate malnutrition in the context of chronic illness related malnutrition (hx ESRD in the setting of non-compliance with HD) as evidenced by family reported severe 11% wt loss x 2.5 weeks and PO intake <75% of estimated nutritional needs during this time.    Recommendations/Plan:  1. Diet per MD/Nephrology. Boost Glucose Control vanilla once per day. Magic " cup berry flavor once per day to maximize nutritional intake.  2. Monitor renal labs and adjust interventions PRN.  3. Encourage intake of meals/supplements  4. Document intake of all meals/supplements as % taken in ADL's to provide interdisciplinary communication across all shifts.   5. Monitor weight.  6. Nutrition rep will continue to see patient for ongoing meal and snack preferences.     RD monitoring.

## 2019-04-27 NOTE — CARE PLAN
Problem: Safety  Goal: Will remain free from injury  Outcome: PROGRESSING AS EXPECTED  Safety precautions in place. Bed locked in low position, bed alarm on, hourly rounding.

## 2019-04-27 NOTE — PROGRESS NOTES
HD Tx per Dr. ITZ Richardson x 3.5 hrs., initiated at 1739 via LUE AVF, ended at 2020, net UF 2165 ml. See paper flow-sheet for details.    Report given to JOURDAN Osorio RN.

## 2019-04-27 NOTE — PROGRESS NOTES
Received report from Maryann SANTOS, POC discussed. Pt currently receiving dialysis, family at bedside. Assessment completed following report.  Safety precautions in place.

## 2019-04-27 NOTE — PROGRESS NOTES
Report given to Maryann SANTOS, POC discussed. Pt sleeping in bed, HOB elevated. Calm with unlabored breathing. Bed alarm on, bed in low position.

## 2019-04-27 NOTE — PROGRESS NOTES
Report received. A/O x1 Call light and belongings within reach. Bed in the lowest position. Bed alarm on.  Treaded socks in place. Hourly rounding in progress. Safety precautions in place

## 2019-04-27 NOTE — PROGRESS NOTES
Hospital Medicine Daily Progress Note    Date of Service  4/27/2019    Chief Complaint  53 y.o. female admitted 4/24/2019 with somnolence    Hospital Course    History of end-stage renal disease on dialysis Monday Wednesday Friday, hypertension, noncompliance, BKA, alcohol abuse, admitted for somnolence after missing dialysis for at least 3 sessions found to have hyperkalemia and uremia      Interval Problem Update  4/25: I consulted nephrology, they will perform urgent dialysis today.  Patient is somnolent vitals are stable aside from needing 5 L of oxygen  4/26: Combative this morning, pulled IV, requiring multiple doses of IM Ativan.  Long QT so unable to give Haldol.  RUQ US ordered due to vomiting  4/27: Calm today, boyfriend at bedside.  BP improved, K better. Out of restraints.  RUQ US negative.    Consultants/Specialty  Nephrology, Dr. Richardson    Code Status  Full    Disposition  Follow-up labs in AM    Review of Systems  Review of Systems   Unable to perform ROS: Mental acuity        Physical Exam  Temp:  [36.7 °C (98 °F)-36.8 °C (98.3 °F)] 36.7 °C (98.1 °F)  Pulse:  [79-90] 82  Resp:  [14-20] 20  BP: (136-159)/(75-90) 151/90  SpO2:  [90 %-96 %] 94 %    Physical Exam   Constitutional: No distress.   Unkempt, drowsy.   HENT:   Head: Normocephalic.   Mouth/Throat: No oropharyngeal exudate.   Neck: No JVD present. No thyromegaly present.   Cardiovascular: Normal rate and regular rhythm.    No murmur heard.  Pulmonary/Chest: Effort normal. No respiratory distress. She has no wheezes. She has no rales.   Abdominal: She exhibits no distension. There is no tenderness.   Musculoskeletal: Normal range of motion. She exhibits no edema or tenderness.   R BKA   Neurological: No cranial nerve deficit. Coordination normal.   Skin: Skin is warm. No rash noted. No erythema.   Psychiatric: Her affect is inappropriate. Her affect is not angry and not labile. Her speech is slurred. She is withdrawn. She is not aggressive.  Cognition and memory are impaired. She expresses impulsivity and inappropriate judgment. She exhibits abnormal recent memory. She is inattentive.       Fluids    Intake/Output Summary (Last 24 hours) at 04/27/19 1652  Last data filed at 04/27/19 1000   Gross per 24 hour   Intake              560 ml   Output             2665 ml   Net            -2105 ml       Laboratory  Recent Labs      04/25/19   0413  04/26/19   0436  04/27/19   0434   WBC  8.5  7.0  6.0   RBC  3.46*  3.89*  4.17*   HEMOGLOBIN  10.9*  12.1  13.1   HEMATOCRIT  36.3*  38.2  41.9   MCV  104.9*  98.2*  100.5*   MCH  31.5  31.1  31.4   MCHC  30.0*  31.7*  31.3*   RDW  63.0*  58.2*  60.0*   PLATELETCT  110*  133*  144*   MPV  10.6  9.8  9.8     Recent Labs      04/26/19   0436  04/26/19   1438  04/27/19   0434   SODIUM  136  138  138  134*   POTASSIUM  4.8  4.7  5.1  4.9   CHLORIDE  94*  95*  94*  93*   CO2  23  23  27  25   GLUCOSE  82  86  83  83   BUN  40*  37*  51*  23*   CREATININE  9.07*  9.01*  10.20*  6.61*   CALCIUM  9.3  9.3  8.8  8.9                   Imaging  US-RUQ   Final Result         1. Cholelithiasis. No sonographic evidence of acute cholecystitis.      2. Atrophic right kidney.         [id number] VIEW   Final Result         Moderate amount of stool throughout the colon.      Cholelithiasis.      DX-CHEST-PORTABLE (1 VIEW)   Final Result      Cardiomegaly. No evidence of pulmonary edema.           Assessment/Plan  Metabolic encephalopathy   Assessment & Plan    Acute, with agitation and aggression, has improved today.   She is out of restraints and answers some questions, still drowsy  -I think this is less likely due to uremia as she did not test and is now refusing all other dialysis sessions.  Wonder about drug ingestion versus less likely infection  -Last HD was 426  -No evidence of infection, chest x-ray clear, monitor  -PRN IM or IV Ativan as needed       Noncompliance   Assessment & Plan    -As above  -Frequently  misses HD and requires hospitalization due to HD in AM (too early)     Hypertensive urgency- (present on admission)   Assessment & Plan    -Poorly controlled hypertension, due to missed HD and essential hypertension.  Possible noncompliance at home.   -Resume amlodipine and labetalol  -Added hydralazine  -Dialyzed and less anxious so improved     ESRD on hemodialysis (HCC)- (present on admission)   Assessment & Plan    On HD MWF, has missed 2 sessions pta, somnolent and obtunded  Wonder about ?Ingestion/drugs     Volume overload- (present on admission)   Assessment & Plan    Improved with dialysis, she is refusing today.  If this continues, consider psych consultation     Hyperkalemia- (present on admission)   Assessment & Plan    -Due to noncompliance with hemodialysis.  Admits to missing at least 3 sessions.  -Improved after dialysis  -I discussed with nephrology  -Possible home tomorrow     Vomiting   Assessment & Plan    No history of diabetes but I wonder about gastroparesis versus gastroenteritis versus GERD.  LFTs are normal  Gallstones were seen on plain film, check RUQ ultrasound     Alcohol abuse- (present on admission)   Assessment & Plan    -No elevated LFTs  -Check ammonia     Hx of right BKA (CMS-HCC)- (present on admission)   Assessment & Plan    As above          VTE prophylaxis: Heparin

## 2019-04-27 NOTE — PROGRESS NOTES
2030: Pt is becoming more restless and agitated. PRN ativan administered. Family at bedside. Dialysis stopped at this time.   2100: Tech in to complete RUQ US, pt cooperated for testing.

## 2019-04-27 NOTE — PROGRESS NOTES
Due medication given without issue. Calm at this time after 0400 dose of Ativan. Able to follow simple commands. Bed alarm on for patient safety. Call light in reach.

## 2019-04-27 NOTE — CARE PLAN
Problem: Safety  Goal: Will remain free from falls  Outcome: PROGRESSING AS EXPECTED  Bed alarm in place, treaded socks on, bed by nurses station

## 2019-04-27 NOTE — CARE PLAN
Problem: Respiratory:  Goal: Respiratory status will improve  Outcome: PROGRESSING AS EXPECTED    Intervention: Administer and titrate oxygen therapy  Pt weaned from 2L NC to roomair, mainting O2 sats >94%.

## 2019-04-28 VITALS
HEIGHT: 62 IN | DIASTOLIC BLOOD PRESSURE: 72 MMHG | SYSTOLIC BLOOD PRESSURE: 148 MMHG | RESPIRATION RATE: 16 BRPM | OXYGEN SATURATION: 93 % | BODY MASS INDEX: 22.39 KG/M2 | HEART RATE: 79 BPM | WEIGHT: 121.69 LBS | TEMPERATURE: 98.1 F

## 2019-04-28 LAB
BASOPHILS # BLD AUTO: 0.6 % (ref 0–1.8)
BASOPHILS # BLD: 0.03 K/UL (ref 0–0.12)
EOSINOPHIL # BLD AUTO: 0.18 K/UL (ref 0–0.51)
EOSINOPHIL NFR BLD: 3.5 % (ref 0–6.9)
ERYTHROCYTE [DISTWIDTH] IN BLOOD BY AUTOMATED COUNT: 56.2 FL (ref 35.9–50)
HCT VFR BLD AUTO: 40.2 % (ref 37–47)
HGB BLD-MCNC: 12.7 G/DL (ref 12–16)
IMM GRANULOCYTES # BLD AUTO: 0.01 K/UL (ref 0–0.11)
IMM GRANULOCYTES NFR BLD AUTO: 0.2 % (ref 0–0.9)
LYMPHOCYTES # BLD AUTO: 1.42 K/UL (ref 1–4.8)
LYMPHOCYTES NFR BLD: 27.4 % (ref 22–41)
MCH RBC QN AUTO: 30.7 PG (ref 27–33)
MCHC RBC AUTO-ENTMCNC: 31.6 G/DL (ref 33.6–35)
MCV RBC AUTO: 97.1 FL (ref 81.4–97.8)
MONOCYTES # BLD AUTO: 0.69 K/UL (ref 0–0.85)
MONOCYTES NFR BLD AUTO: 13.3 % (ref 0–13.4)
NEUTROPHILS # BLD AUTO: 2.86 K/UL (ref 2–7.15)
NEUTROPHILS NFR BLD: 55 % (ref 44–72)
NRBC # BLD AUTO: 0 K/UL
NRBC BLD-RTO: 0 /100 WBC
PLATELET # BLD AUTO: 143 K/UL (ref 164–446)
PMV BLD AUTO: 10 FL (ref 9–12.9)
RBC # BLD AUTO: 4.14 M/UL (ref 4.2–5.4)
WBC # BLD AUTO: 5.2 K/UL (ref 4.8–10.8)

## 2019-04-28 PROCEDURE — 700102 HCHG RX REV CODE 250 W/ 637 OVERRIDE(OP): Performed by: INTERNAL MEDICINE

## 2019-04-28 PROCEDURE — A9270 NON-COVERED ITEM OR SERVICE: HCPCS | Performed by: INTERNAL MEDICINE

## 2019-04-28 PROCEDURE — 700111 HCHG RX REV CODE 636 W/ 250 OVERRIDE (IP): Performed by: INTERNAL MEDICINE

## 2019-04-28 PROCEDURE — 700102 HCHG RX REV CODE 250 W/ 637 OVERRIDE(OP): Performed by: HOSPITALIST

## 2019-04-28 PROCEDURE — 85025 COMPLETE CBC W/AUTO DIFF WBC: CPT

## 2019-04-28 PROCEDURE — A9270 NON-COVERED ITEM OR SERVICE: HCPCS | Performed by: HOSPITALIST

## 2019-04-28 PROCEDURE — 99239 HOSP IP/OBS DSCHRG MGMT >30: CPT | Performed by: INTERNAL MEDICINE

## 2019-04-28 RX ORDER — HYDRALAZINE HYDROCHLORIDE 25 MG/1
25 TABLET, FILM COATED ORAL EVERY 8 HOURS
Qty: 90 TAB | Refills: 1 | Status: SHIPPED
Start: 2019-04-28 | End: 2020-01-03

## 2019-04-28 RX ADMIN — SENNOSIDES,DOCUSATE SODIUM 2 TABLET: 8.6; 5 TABLET, FILM COATED ORAL at 05:45

## 2019-04-28 RX ADMIN — METOCLOPRAMIDE 10 MG: 5 INJECTION, SOLUTION INTRAMUSCULAR; INTRAVENOUS at 12:27

## 2019-04-28 RX ADMIN — LABETALOL HYDROCHLORIDE 200 MG: 100 TABLET, FILM COATED ORAL at 05:46

## 2019-04-28 RX ADMIN — AMLODIPINE BESYLATE 10 MG: 5 TABLET ORAL at 05:45

## 2019-04-28 RX ADMIN — SEVELAMER CARBONATE 800 MG: 800 TABLET, FILM COATED ORAL at 08:59

## 2019-04-28 RX ADMIN — TIOTROPIUM BROMIDE 1 CAPSULE: 18 CAPSULE ORAL; RESPIRATORY (INHALATION) at 05:46

## 2019-04-28 RX ADMIN — HYDRALAZINE HYDROCHLORIDE 25 MG: 25 TABLET ORAL at 05:45

## 2019-04-28 RX ADMIN — SEVELAMER CARBONATE 800 MG: 800 TABLET, FILM COATED ORAL at 12:27

## 2019-04-28 RX ADMIN — GABAPENTIN 100 MG: 100 CAPSULE ORAL at 05:46

## 2019-04-28 RX ADMIN — METOCLOPRAMIDE 10 MG: 5 INJECTION, SOLUTION INTRAMUSCULAR; INTRAVENOUS at 05:45

## 2019-04-28 NOTE — PROGRESS NOTES
"[facility] -  PROGRESS NOTE               Author: Elliot Richardson M.D. Date & Time: 4/28/2019  9:07 AM     HPI:  53 y.o. female with end-stage renal disease, on chronic hemodialysis Monday, Wednesday, and Fridays at [facility] and a history of noncompliance and recent hospitalization dialysis presented via EMS with altered mental status.     Patient is currently not answering questions appropriately, oriented x2 on admission.  Per her outpatient dialysis unit her last dialysis was last Friday 4/19, missed on Monday and yesterday.  On presentation potassium noted to be 6, given Kayexalate.    DAILY NEPHROLOGY SUMMARY:  4/25: consult done  4/26: tolerated HD yesterday, combative this AM requiring restraints, refusing HD  4/27: tolerated HD in PM, session cut short, tolerating PO intake  4/28: sig improvement today    PAST FAMILY HISTORY: Reviewed and Unchanged  SOCIAL HISTORY: Reviewed and Unchanged  CURRENT MEDICATIONS: Reviewed  IMAGING STUDIES: Reviewed    ROS  General: No weakness, no malaise  HEENT: No vision changes, no hearing changes  CV: No chest pain, no palpitations  Lungs: No cough; no PND  GI: No nausea; no vomiting  : No dysuria or gross hematuria  MSK: No joint pain; no trauma  Skin: No rashes; no lesions  Neuro: No tremors; no LOC  Psych: No depression; +anxiety    PHYSICAL EXAM  VS:  /72   Pulse 79   Temp 36.7 °C (98.1 °F) (Oral)   Resp 16   Ht 1.575 m (5' 2\")   Wt 55.2 kg (121 lb 11.1 oz)   SpO2 93%   BMI 22.26 kg/m²   GENERAL:Sitting in bed, no acute distress  HEAD: Normocephalic, atraumatic  EYES: no scleral icterus; normal conjunctiva  NECK: Supple; trachea midline  CV: RRR, systolic murmur  LUNGS: Clear to ausculation bilaterally anteriorly, No rhales or wheezes  ABDOMEN: Soft, non-tender  EXTREMETIES:No lower extremity edema, RLE BKA  SKIN: Warm and dry, + excoriations  NEURO: A&O2, no focal deficits  ACCESS: Left upper extremity AV fistula without " surrounding erythema and edema    Fluids:  In: 120 [P.O.:120]  Out: -     LABS:  Recent Results (from the past 24 hour(s))   CBC with Differential    Collection Time: 04/28/19  2:20 AM   Result Value Ref Range    WBC 5.2 4.8 - 10.8 K/uL    RBC 4.14 (L) 4.20 - 5.40 M/uL    Hemoglobin 12.7 12.0 - 16.0 g/dL    Hematocrit 40.2 37.0 - 47.0 %    MCV 97.1 81.4 - 97.8 fL    MCH 30.7 27.0 - 33.0 pg    MCHC 31.6 (L) 33.6 - 35.0 g/dL    RDW 56.2 (H) 35.9 - 50.0 fL    Platelet Count 143 (L) 164 - 446 K/uL    MPV 10.0 9.0 - 12.9 fL    Neutrophils-Polys 55.00 44.00 - 72.00 %    Lymphocytes 27.40 22.00 - 41.00 %    Monocytes 13.30 0.00 - 13.40 %    Eosinophils 3.50 0.00 - 6.90 %    Basophils 0.60 0.00 - 1.80 %    Immature Granulocytes 0.20 0.00 - 0.90 %    Nucleated RBC 0.00 /100 WBC    Neutrophils (Absolute) 2.86 2.00 - 7.15 K/uL    Lymphs (Absolute) 1.42 1.00 - 4.80 K/uL    Monos (Absolute) 0.69 0.00 - 0.85 K/uL    Eos (Absolute) 0.18 0.00 - 0.51 K/uL    Baso (Absolute) 0.03 0.00 - 0.12 K/uL    Immature Granulocytes (abs) 0.01 0.00 - 0.11 K/uL    NRBC (Absolute) 0.00 K/uL       (click the triangle to expand results)      ASSESSMENT:  # ESRD: normally M/W/F via. AVF.  History of noncompliance  # HTN: Likely volume driven, agitation contributing   # Volume Status: Hypervolemic  # Anemia of CKD, recent iron load  # CKD-MBD  # Hyperkalemia: resolved  # Altered mental status: Concern for substance abuse  # Tobacco use  # History of drug and alcohol use     PLAN:  -No HD today, to continue M/W/F during stay  -OK for discharge from renal standpoint  -Dose all meds for ESRD  -Renal Diet  -Limit IVFs/Strict I/Os  -Limit peripheral lines, avoid PICCs  -Continue home blood pressure meds  -Continue Renvela  -hold Epogen     Thank you,     Elliot Richardson MD

## 2019-04-28 NOTE — DISCHARGE PLANNING
Pt discussed during IDT rounds. Per MD, pt is missing dialysis frequently due to missing the bus in the morning. VM left for Dialysis liaison Alexia [phone] inquire if clinic would have another chair time for pt

## 2019-04-28 NOTE — CARE PLAN
Problem: Safety  Goal: Will remain free from falls  Outcome: PROGRESSING AS EXPECTED    Intervention: Assess risk factors for falls   04/28/19 0520   OTHER   Fall Risk Risk to Fall - 0 - 1 point   History of fall 0   Pt Calls for Assistance No     Intervention: Implement fall precautions   04/28/19 0520   OTHER   Environmental Precautions Treaded Slipper Socks on Patient;Personal Belongings, Wastebasket, Call Bell etc. in Easy Reach;Report Given to Other Health Care Providers Regarding Fall Risk;Bed in Low Position;Communication Sign for Patients & Families;Mobility Assessed & Appropriate Sign Placed   Bed Alarm Yes - Alarm On

## 2019-04-28 NOTE — DISCHARGE INSTRUCTIONS
Discharge Instructions    Discharged to home by car with friend. Discharged via wheelchair, hospital escort: Yes.  Special equipment needed: Wheelchair    Be sure to schedule a follow-up appointment with your primary care doctor or any specialists as instructed.     Discharge Plan:   Diet Plan: Discussed  Activity Level: Discussed  Confirmed Follow up Appointment: Patient to Call and Schedule Appointment  Confirmed Symptoms Management: Discussed  Medication Reconciliation Updated: Yes    I understand that a diet low in cholesterol, fat, and sodium is recommended for good health. Unless I have been given specific instructions below for another diet, I accept this instruction as my diet prescription.   Other diet: renal    Special Instructions: None    · Is patient discharged on Warfarin / Coumadin?   No     Hypercalcemia  Introduction  Hypercalcemia is having too much calcium in the blood. The body needs calcium to make bones and keep them strong. Calcium also helps the muscles, nerves, brain, and heart work the way they should.  Most of the calcium in the body is in the bones. There is also some calcium in the blood. Hypercalcemia can happen when calcium comes out of the bones, or when the kidneys are not able to remove calcium from the blood. Hypercalcemia can be mild or severe.  What are the causes?  There are many possible causes of hypercalcemia. Common causes include:  Hyperparathyroidism. This is a condition in which the body produces too much parathyroid hormone. There are four parathyroid glands in your neck. These glands produce a chemical messenger (hormone) that helps the body absorb calcium from foods and helps your bones release calcium.  Certain kinds of cancer, such as lung cancer, breast cancer, or myeloma.  Less common causes of hypercalcemia include:  Getting too much calcium or vitamin D from your diet.  Kidney failure.  Hyperthyroidism.  Being on bed rest for a long time.  Certain  medicines.  Infections.  Sarcoidosis.  What increases the risk?  This condition is more likely to develop in:  Women.  People who are 60 years or older.  People who have a family history of hypercalcemia.  What are the signs or symptoms?  Mild hypercalcemia that starts slowly may not cause symptoms. Severe, sudden hypercalcemia is more likely to cause symptoms, such as:  Loss of appetite.  Increased thirst and frequent urination.  Fatigue.  Nausea and vomiting.  Headache.  Abdominal pain.  Muscle pain, twitching, or weakness.  Constipation.  Blood in the urine.  Pain in the side of the back (flank pain).  Anxiety, confusion, or depression.  Irregular heartbeat (arrhythmia).  Loss of consciousness.  How is this diagnosed?  This condition may be diagnosed based on:  Your symptoms.  Blood tests.  Urine tests.  X-rays.  Ultrasound.  MRI.  CT scan.  How is this treated?  Treatment for hypercalcemia depends on the cause. Treatment may include:  Receiving fluids through an IV tube.  Medicines that keep calcium levels steady after receiving fluids (loop diuretics).  Medicines that keep calcium in your bones (bisphosphonates).  Medicines that lower the calcium level in your blood.  Surgery to remove overactive parathyroid glands.  Follow these instructions at home:  Take over-the-counter and prescription medicines only as told by your health care provider.  Follow instructions from your health care provider about eating or drinking restrictions.  Drink enough fluid to keep your urine clear or pale yellow.  Stay active. Weight-bearing exercise helps to keep calcium in your bones. Follow instructions from your health care provider about what type and level of exercise is safe for you.  Keep all follow-up visits as told by your health care provider. This is important.  Contact a health care provider if:  You have a fever.  You have flank or abdominal pain that is getting worse.  Get help right away if:  You have severe  abdominal or flank pain.  You have chest pain.  You have trouble breathing.  You become very confused and sleepy.  You lose consciousness.  This information is not intended to replace advice given to you by your health care provider. Make sure you discuss any questions you have with your health care provider.  Document Released: 03/03/2006 Document Revised: 05/25/2017 Document Reviewed: 05/04/2016  © 2017 Elsevier    Hemodialysis  Hemodialysis is a way of removing wastes, salt, and extra water from your blood. It is done when your kidneys cannot keep the blood clean. During hemodialysis, your blood travels outside of your body to a machine. A filter in the machine cleans the blood. Hemodialysis is usually done 3 times a week. Visits last 3-5 hours.  What happens before the procedure?  An opening must be made to allow blood to be taken from your body and put back into your body (access). It is made weeks or months before you start hemodialysis. It is usually made in your arm.  If you need hemodialysis right away, a thin, flexible tube (catheter) will be placed in your neck, chest, or groin. This type of access is usually temporary.  What happens during the procedure?  Hemodialysis is done while you are sitting or leaning back. During hemodialysis you may do any activity as long as you stay sitting or leaning back. Many people sleep, watch television, or read. If you have side effects or feel uncomfortable, tell your doctor. Your doctor may make changes to help you feel more comfortable.  Your hemodialysis visits may look like this:  1. You will be weighed and your temperature will be taken. Your blood pressure and pulse will be checked.  2. The skin around your access will be cleaned.  3. Two needles will be put into the access. They will be connected to a plastic tube. The needles will be taped to your skin so that they do not move. If you have a temporary access, your catheter will be connected to a plastic  tube.  4. Your blood will go through the tube to the machine. The machine will clean your blood. Then your blood will go back to your body. Your blood pressure and pulse will be checked a few times.  5. Once the procedure is complete, the needles will be removed. A bandage (dressing) will be placed over the access. If your access is a catheter, it will be disconnected.  What happens after the procedure?  · You will be weighed.  · Your blood will be tested. This is usually done once a month.  · You may have side effects. These include:  ¨ Dizziness.  ¨ Muscle cramps.  ¨ Feeling sick to your stomach (nausea).  ¨ Headaches.  ¨ Feeling tired. You usually feel normal the next day.  ¨ Itchiness. Your doctor may give medicine to help with this.  ¨ Achy or jittery legs. You may feel like kicking your legs. This can cause sleeping problems.  ¨ Allergic reaction.  This information is not intended to replace advice given to you by your health care provider. Make sure you discuss any questions you have with your health care provider.  Document Released: 11/30/2009 Document Revised: 05/25/2017 Document Reviewed: 02/03/2014  [employer] Interactive Patient Education © 2017 [employer]      Depression / Suicide Risk    As you are discharged from this [facility] facility, it is important to learn how to keep safe from harming yourself.    Recognize the warning signs:  · Abrupt changes in personality, positive or negative- including increase in energy   · Giving away possessions  · Change in eating patterns- significant weight changes-  positive or negative  · Change in sleeping patterns- unable to sleep or sleeping all the time   · Unwillingness or inability to communicate  · Depression  · Unusual sadness, discouragement and loneliness  · Talk of wanting to die  · Neglect of personal appearance   · Rebelliousness- reckless behavior  · Withdrawal from people/activities they love  · Confusion- inability to concentrate     If you or  a loved one observes any of these behaviors or has concerns about self-harm, here's what you can do:  · Talk about it- your feelings and reasons for harming yourself  · Remove any means that you might use to hurt yourself (examples: pills, rope, extension cords, firearm)  · Get professional help from the community (Mental Health, Substance Abuse, psychological counseling)  · Do not be alone:Call your Safe Contact- someone whom you trust who will be there for you.  · Call your local CRISIS HOTLINE 046-2225 or 910-188-0818  · Call your local Children's Mobile Crisis Response Team [place] (961) 356-6614 or www.[employer]  · Call the toll free National Suicide Prevention Hotlines   · National Suicide Prevention Lifeline 119-450-FCYG (9508)  · National Hope Line Network 800-SUICIDE (347-8777)

## 2019-04-28 NOTE — CARE PLAN
Problem: Communication  Goal: The ability to communicate needs accurately and effectively will improve  Outcome: PROGRESSING AS EXPECTED    Intervention: [place] patient and significant other/support system to call light to alert staff of needs   04/28/19 0518   OTHER   Oriented to: Unit Routine;Call Light & Bedside Controls     Intervention: Reorient patient to environment as needed   04/28/19 0518   OTHER   Oriented to: Unit Routine;Call Light & Bedside Controls   Pt requires frequent reorientation, When asked if pt can show how she can call for help, demonstrates proper use of call light, points to red button.      Problem: Safety  Goal: Will remain free from injury  Outcome: PROGRESSING AS EXPECTED

## 2019-04-28 NOTE — PROGRESS NOTES
Assessment completed. Pt AOx1, able to state she is in cristiano and that it is 2019, more alert and oriented than previous night shift. She denies any pain. Fall precautions in place. PO nectar thick fluids tolerated well.

## 2019-04-28 NOTE — PROGRESS NOTES
Telemetry Shift Summary    Rhythm SR  HR Range 70-80  Ectopy noectopy  Measurements 0.16/0.08/0.44        Normal Values  Rhythm SR  HR Range    Measurements 0.12-0.20 / 0.06-0.10  / 0.30-0.52

## 2019-04-28 NOTE — PROGRESS NOTES
"2330: Pt appears restless, frequently sitting up then lying back down and states \"I want to sleep,\" I asked pt if she wants medication to calm down states \"yes please.\" Due medications and PRN Ativan administered. Pt denies pain, safety precautions in place.   0030: pt calm with unlabroed breathing sleeping in bed.  "

## 2019-04-28 NOTE — PROGRESS NOTES
Gave bedside report to TOM Ramos. Plan of care discussed. Safety precautions in place. Personal belongings and call light are with in reach. Patient resting in bed comfortably with eyes closed.

## 2019-04-29 ENCOUNTER — TELEPHONE (OUTPATIENT)
Dept: INTERNAL MEDICINE | Facility: MEDICAL CENTER | Age: 54
End: 2019-04-29

## 2019-04-29 RX ORDER — ALBUTEROL SULFATE 90 UG/1
2 AEROSOL, METERED RESPIRATORY (INHALATION) EVERY 6 HOURS PRN
Qty: 8.5 G | Refills: 1 | Status: SHIPPED | OUTPATIENT
Start: 2019-04-29 | End: 2019-04-30

## 2019-04-29 NOTE — DISCHARGE SUMMARY
Discharge Summary    CHIEF COMPLAINT ON ADMISSION  Chief Complaint   Patient presents with   • Altered Mental Status     pt missed dialysis x 2 days hx of ESRD       Reason for Admission  Hyperkalemia     Admission Date  4/24/2019    CODE STATUS  Prior    HPI & HOSPITAL COURSE  This is a 53 y.o. Female with a History of end-stage renal disease on dialysis Monday Wednesday Friday, hypertension, noncompliance, BKA, alcohol abuse, admitted for somnolence after missing dialysis for at least 3 sessions found to have hyperkalemia and uremia.  She was dialyzed but remained altered and demonstrated combative behavior requiring restraints.  She had no e/o infection and was not making enough urine to check a UDS but there was concern about possible ingestion, although this was not confirmed.  She improved with supportive care and returned to her baseline     Therefore, she is discharged in fair and stable condition to home with close outpatient follow-up.    The patient met 2-midnight criteria for an inpatient stay at the time of discharge.    Discharge Date  4/28/2019    FOLLOW UP ITEMS POST DISCHARGE  F/U with nephrology  F/U for HD on Monday    DISCHARGE DIAGNOSES  Active Problems:    ESRD on hemodialysis (HCC) POA: Yes    Hypertensive urgency POA: Yes    Noncompliance POA: Unknown    Metabolic encephalopathy POA: Unknown    Hyperkalemia POA: Yes    Volume overload POA: Yes    Hx of right BKA (CMS-HCC) POA: Yes    Alcohol abuse POA: Yes    Vomiting POA: Unknown  Resolved Problems:    * No resolved hospital problems. *      FOLLOW UP  Future Appointments  Date Time Provider Department Center   5/15/2019 3:00 PM Juan Moreno M.D. [facility] None     Juan Moreno M.D.  [address]  [facility] [address]  [place] [address]  [phone]    In 1 week  call for hospital follow up appt.    [facility]  [address] [place] [address]  [phone]  Go in 1 day  on Monday 4/29 for regular  schedule      MEDICATIONS ON DISCHARGE     Medication List      START taking these medications      Instructions   hydrALAZINE 25 MG Tabs  Commonly known as:  APRESOLINE   Take 1 Tab by mouth every 8 hours.  Dose:  25 mg        CONTINUE taking these medications      Instructions   albuterol 108 (90 Base) MCG/ACT Aers inhalation aerosol   Inhale 2 Puffs by mouth every 6 hours as needed for Shortness of Breath.  Dose:  2 Puff     amLODIPine 10 MG Tabs  Commonly known as:  NORVASC   Take 1 Tab by mouth every day.  Dose:  10 mg     gabapentin 100 MG Caps  Commonly known as:  NEURONTIN   Take 1 Cap by mouth 2 Times a Day.  Dose:  100 mg     labetalol 200 MG Tabs  Commonly known as:  NORMODYNE   TAKE 1 TABLET BY MOUTH TWICE DAILY     sevelamer carbonate 800 MG Tabs tablet  Commonly known as:  RENVELA   Take 1 Tab by mouth 3 times a day, with meals.  Dose:  800 mg     terazosin 5 MG Caps  Commonly known as:  HYTRIN   Take 1 Cap by mouth every evening.  Dose:  5 mg     tiotropium 18 MCG Caps  Commonly known as:  SPIRIVA   Doctor's comments:  Please provide inhalers. 2 puffs daily.  Inhale 1 Cap by mouth every day for 30 days.  Dose:  18 mcg     varenicline 0.5 MG X 11 & 1 MG X 42 tablet  Commonly known as:  CHANTIX STARTING MONTH SID   Take as per  instructions. Follow up with PCP for continued therapy.            Allergies  Allergies   Allergen Reactions   • Sulfa Drugs Hives     [id number] rash swelling itching       DIET  No orders of the defined types were placed in this encounter.      ACTIVITY  As tolerated.  BKA    CONSULTATIONS  Dr. Richardson - Nephrology    PROCEDURES  None    LABORATORY  Lab Results   Component Value Date    SODIUM 134 (L) 04/27/2019    POTASSIUM 4.9 04/27/2019    CHLORIDE 93 (L) 04/27/2019    CO2 25 04/27/2019    GLUCOSE 83 04/27/2019    BUN 23 (H) 04/27/2019    CREATININE 6.61 (HH) 04/27/2019    CREATININE 2.2 (H) 05/06/2009        Lab Results   Component Value Date    WBC 5.2 04/28/2019     HEMOGLOBIN 12.7 04/28/2019    HEMATOCRIT 40.2 04/28/2019    PLATELETCT 143 (L) 04/28/2019        Total time of the discharge process exceeds 41 minutes.

## 2019-04-30 RX ORDER — ALBUTEROL SULFATE 90 UG/1
2 AEROSOL, METERED RESPIRATORY (INHALATION) EVERY 6 HOURS PRN
Qty: 8.5 G | Refills: 3 | Status: ON HOLD | OUTPATIENT
Start: 2019-04-30 | End: 2019-06-20

## 2019-05-08 ENCOUNTER — HOSPITAL ENCOUNTER (EMERGENCY)
Facility: MEDICAL CENTER | Age: 54
End: 2019-05-09
Attending: EMERGENCY MEDICINE
Payer: MEDICAID

## 2019-05-08 ENCOUNTER — APPOINTMENT (OUTPATIENT)
Dept: RADIOLOGY | Facility: MEDICAL CENTER | Age: 54
End: 2019-05-08
Attending: EMERGENCY MEDICINE
Payer: MEDICAID

## 2019-05-08 DIAGNOSIS — R11.2 NAUSEA AND VOMITING, INTRACTABILITY OF VOMITING NOT SPECIFIED, UNSPECIFIED VOMITING TYPE: ICD-10-CM

## 2019-05-08 DIAGNOSIS — K80.20 CALCULUS OF GALLBLADDER WITHOUT CHOLECYSTITIS WITHOUT OBSTRUCTION: ICD-10-CM

## 2019-05-08 LAB
BASOPHILS # BLD AUTO: 0.4 % (ref 0–1.8)
BASOPHILS # BLD: 0.03 K/UL (ref 0–0.12)
EKG IMPRESSION: NORMAL
EOSINOPHIL # BLD AUTO: 0.24 K/UL (ref 0–0.51)
EOSINOPHIL NFR BLD: 3 % (ref 0–6.9)
ERYTHROCYTE [DISTWIDTH] IN BLOOD BY AUTOMATED COUNT: 61.4 FL (ref 35.9–50)
HCT VFR BLD AUTO: 37 % (ref 37–47)
HGB BLD-MCNC: 11.9 G/DL (ref 12–16)
IMM GRANULOCYTES # BLD AUTO: 0.03 K/UL (ref 0–0.11)
IMM GRANULOCYTES NFR BLD AUTO: 0.4 % (ref 0–0.9)
LYMPHOCYTES # BLD AUTO: 1.29 K/UL (ref 1–4.8)
LYMPHOCYTES NFR BLD: 16 % (ref 22–41)
MCH RBC QN AUTO: 31.6 PG (ref 27–33)
MCHC RBC AUTO-ENTMCNC: 32.2 G/DL (ref 33.6–35)
MCV RBC AUTO: 98.1 FL (ref 81.4–97.8)
MONOCYTES # BLD AUTO: 0.75 K/UL (ref 0–0.85)
MONOCYTES NFR BLD AUTO: 9.3 % (ref 0–13.4)
NEUTROPHILS # BLD AUTO: 5.72 K/UL (ref 2–7.15)
NEUTROPHILS NFR BLD: 70.9 % (ref 44–72)
NRBC # BLD AUTO: 0 K/UL
NRBC BLD-RTO: 0 /100 WBC
PLATELET # BLD AUTO: 200 K/UL (ref 164–446)
PMV BLD AUTO: 8.8 FL (ref 9–12.9)
RBC # BLD AUTO: 3.77 M/UL (ref 4.2–5.4)
WBC # BLD AUTO: 8.1 K/UL (ref 4.8–10.8)

## 2019-05-08 PROCEDURE — 83690 ASSAY OF LIPASE: CPT

## 2019-05-08 PROCEDURE — 99285 EMERGENCY DEPT VISIT HI MDM: CPT

## 2019-05-08 PROCEDURE — 96375 TX/PRO/DX INJ NEW DRUG ADDON: CPT

## 2019-05-08 PROCEDURE — 36415 COLL VENOUS BLD VENIPUNCTURE: CPT

## 2019-05-08 PROCEDURE — 304561 HCHG STAT O2

## 2019-05-08 PROCEDURE — 85025 COMPLETE CBC W/AUTO DIFF WBC: CPT

## 2019-05-08 PROCEDURE — 80053 COMPREHEN METABOLIC PANEL: CPT

## 2019-05-08 PROCEDURE — 700111 HCHG RX REV CODE 636 W/ 250 OVERRIDE (IP): Performed by: EMERGENCY MEDICINE

## 2019-05-08 PROCEDURE — 96374 THER/PROPH/DIAG INJ IV PUSH: CPT

## 2019-05-08 PROCEDURE — 84484 ASSAY OF TROPONIN QUANT: CPT

## 2019-05-08 PROCEDURE — 93005 ELECTROCARDIOGRAM TRACING: CPT | Performed by: EMERGENCY MEDICINE

## 2019-05-08 RX ORDER — ONDANSETRON 2 MG/ML
4 INJECTION INTRAMUSCULAR; INTRAVENOUS ONCE
Status: COMPLETED | OUTPATIENT
Start: 2019-05-08 | End: 2019-05-08

## 2019-05-08 RX ORDER — MORPHINE SULFATE 4 MG/ML
4 INJECTION, SOLUTION INTRAMUSCULAR; INTRAVENOUS ONCE
Status: COMPLETED | OUTPATIENT
Start: 2019-05-08 | End: 2019-05-08

## 2019-05-08 RX ADMIN — MORPHINE SULFATE 4 MG: 4 INJECTION INTRAVENOUS at 23:36

## 2019-05-08 RX ADMIN — ONDANSETRON 4 MG: 2 INJECTION INTRAMUSCULAR; INTRAVENOUS at 23:35

## 2019-05-09 ENCOUNTER — APPOINTMENT (OUTPATIENT)
Dept: RADIOLOGY | Facility: MEDICAL CENTER | Age: 54
End: 2019-05-09
Attending: EMERGENCY MEDICINE
Payer: MEDICAID

## 2019-05-09 VITALS
HEART RATE: 81 BPM | RESPIRATION RATE: 15 BRPM | SYSTOLIC BLOOD PRESSURE: 207 MMHG | TEMPERATURE: 97.6 F | OXYGEN SATURATION: 92 % | BODY MASS INDEX: 22.26 KG/M2 | HEIGHT: 62 IN | DIASTOLIC BLOOD PRESSURE: 103 MMHG | WEIGHT: 121 LBS

## 2019-05-09 LAB
ALBUMIN SERPL BCP-MCNC: 3.7 G/DL (ref 3.2–4.9)
ALBUMIN/GLOB SERPL: 1.4 G/DL
ALP SERPL-CCNC: 67 U/L (ref 30–99)
ALT SERPL-CCNC: 21 U/L (ref 2–50)
ANION GAP SERPL CALC-SCNC: 9 MMOL/L (ref 0–11.9)
AST SERPL-CCNC: 23 U/L (ref 12–45)
BILIRUB SERPL-MCNC: 0.7 MG/DL (ref 0.1–1.5)
BUN SERPL-MCNC: 32 MG/DL (ref 8–22)
CALCIUM SERPL-MCNC: 9.3 MG/DL (ref 8.5–10.5)
CHLORIDE SERPL-SCNC: 94 MMOL/L (ref 96–112)
CO2 SERPL-SCNC: 33 MMOL/L (ref 20–33)
CREAT SERPL-MCNC: 4.11 MG/DL (ref 0.5–1.4)
GLOBULIN SER CALC-MCNC: 2.6 G/DL (ref 1.9–3.5)
GLUCOSE SERPL-MCNC: 99 MG/DL (ref 65–99)
LIPASE SERPL-CCNC: 87 U/L (ref 11–82)
POTASSIUM SERPL-SCNC: 4.3 MMOL/L (ref 3.6–5.5)
PROT SERPL-MCNC: 6.3 G/DL (ref 6–8.2)
SODIUM SERPL-SCNC: 136 MMOL/L (ref 135–145)
TROPONIN I SERPL-MCNC: 0.04 NG/ML (ref 0–0.04)

## 2019-05-09 PROCEDURE — 700111 HCHG RX REV CODE 636 W/ 250 OVERRIDE (IP): Performed by: EMERGENCY MEDICINE

## 2019-05-09 PROCEDURE — 74177 CT ABD & PELVIS W/CONTRAST: CPT

## 2019-05-09 PROCEDURE — 76705 ECHO EXAM OF ABDOMEN: CPT

## 2019-05-09 PROCEDURE — 700117 HCHG RX CONTRAST REV CODE 255: Performed by: EMERGENCY MEDICINE

## 2019-05-09 RX ORDER — HALOPERIDOL 5 MG/ML
2.5 INJECTION INTRAMUSCULAR ONCE
Status: COMPLETED | OUTPATIENT
Start: 2019-05-09 | End: 2019-05-09

## 2019-05-09 RX ADMIN — IOHEXOL 80 ML: 350 INJECTION, SOLUTION INTRAVENOUS at 00:28

## 2019-05-09 RX ADMIN — HALOPERIDOL LACTATE 2.5 MG: 5 INJECTION, SOLUTION INTRAMUSCULAR at 00:41

## 2019-05-09 NOTE — ED NOTES
Patient resting in bed, medicated per MAR,  at bedside, placed on 2lpm sats in low 80's.  Patient in NAD. Will continue to monitor

## 2019-05-09 NOTE — ED PROVIDER NOTES
ED Provider Note    Scribed for Jaren Melendez M.D. by Kenan Borden. 5/8/2019  11:11 PM    Primary care provider: Juan Moreno M.D.  Means of arrival: Walk In  History obtained from: Patient  History limited by: None    CHIEF COMPLAINT  Chief Complaint   Patient presents with   • Abdominal Pain     pain mostly in epigastric region, started after eating dinner   • N/V     vomiting in triage       HPI  Isabelle Coyle is a 53 y.o. female who presents to the Emergency Department for evaluation of epigastric pain which started earlier this night after eating dinner.  She describes the pain as sharp and she also reports associated nausea and vomiting. She denies any abdominal surgeries. she denies any drug or alcohol use.  She denies any travel or recent trauma or bloody stools.    REVIEW OF SYSTEMS  Pertinent positives include: vomiting. Pertinent negatives include: no fever, no RUQ abd pain. See history of present illness. All other systems are negative.     PAST MEDICAL HISTORY   has a past medical history of Anemia; Anemia associated with chronic renal failure (11/5/2009); Dental disorder (8-25-17); Dialysis patient ([facility]) (8-25-17); Dysrhythmia; ESRD (end stage renal disease) ([facility]) (8-25-17); Glomerulonephritis, chronic (11/5/2009); Heart burn; High cholesterol; HTN (hypertension) (11/5/2009); Port catheter in place (8-25-17); SVT (supraventricular tachycardia) ([facility]); and SVT (supraventricular tachycardia) ([facility]).    SURGICAL HISTORY   has a past surgical history that includes amputation, below the knee; capitation payment (insurance); appendectomy laparoscopic (5/4/2009); enlarge breast with implant; av fistula creation (Left, 6/20/2017); other; femur orif (10/9/08); iliac bone graft (10/9/08); av fistula creation (Left, 8/28/2017); and av fistula creation (Left, 4/27/2018).    SOCIAL HISTORY  Social History   Substance Use Topics   • Smoking status: Current Every Day Smoker     Packs/day: 0.25      "Years: 20.00     Types: Cigarettes   • Smokeless tobacco: Never Used      Comment: 5 cigs/day   • Alcohol use No      History   Drug Use No       FAMILY HISTORY  Family History   Problem Relation Age of Onset   • Heart Disease Unknown        CURRENT MEDICATIONS  Home Medications    **Home medications have not yet been reviewed for this encounter**         ALLERGIES  Allergies   Allergen Reactions   • Sulfa Drugs Hives     [id number] rash swelling itching       PHYSICAL EXAM  VITAL SIGNS: BP (!) 171/103   Pulse 78   Temp 36.4 °C (97.6 °F) (Temporal)   Resp 16   Ht 1.575 m (5' 2\")   Wt 54.9 kg (121 lb)   SpO2 98%   BMI 22.13 kg/m²     Constitutional: Alert in no apparent distress.  HENT: No signs of trauma, Bilateral external ears normal, Nose normal. Uvula midline.   Eyes: Pupils are equal and reactive, Conjunctiva normal, Non-icteric.   Neck: Normal range of motion, No tenderness, Supple, No stridor.   Lymphatic: No lymphadenopathy noted.   Cardiovascular: Regular rate and rhythm, no murmurs.   Thorax & Lungs: Normal breath sounds, No respiratory distress, No wheezing, No chest tenderness.   Abdomen: Bowel sounds normal, Soft, No tenderness, No peritoneal signs, No masses, No pulsatile masses.   Skin: Warm, Dry, No erythema, No rash.   Back: No bony tenderness, No CVA tenderness.   Extremities: Intact distal pulses, No edema, No tenderness, No cyanosis.  Musculoskeletal: Good range of motion in all major joints. No tenderness to palpation or major deformities noted.   Neurologic: Alert , Normal motor function, Normal sensory function, No focal deficits noted.   Psychiatric: Affect normal, Judgment normal, Mood normal.     DIAGNOSTIC STUDIES / PROCEDURES    LABS  Labs Reviewed   CBC WITH DIFFERENTIAL - Abnormal; Notable for the following:        Result Value    RBC 3.77 (*)     Hemoglobin 11.9 (*)     MCV 98.1 (*)     MCHC 32.2 (*)     RDW 61.4 (*)     MPV 8.8 (*)     Lymphocytes 16.00 (*)     All other " components within normal limits   COMP METABOLIC PANEL - Abnormal; Notable for the following:     Chloride 94 (*)     Bun 32 (*)     Creatinine 4.11 (*)     All other components within normal limits   LIPASE - Abnormal; Notable for the following:     Lipase 87 (*)     All other components within normal limits   ESTIMATED GFR - Abnormal; Notable for the following:     GFR If  14 (*)     GFR If Non  11 (*)     All other components within normal limits   TROPONIN   URINALYSIS,CULTURE IF INDICATED      All labs reviewed by me.    EKG  12 Lead EKG interpreted by me to show:  Indication: Arrhythmia  Normal sinus rhythm  Rate 87  Axis: Normal  Intervals: Normal  Normal T waves  Normal ST segments  My impression of this EKG: No STEMI.     RADIOLOGY  US-RUQ   Final Result      1.  Cholelithiasis, with dilatation of the common bile duct. A common duct stone suggested on recent CT could not be confirmed.   2.  Small atrophic appearing right kidney with increased cortical echogenicity, all suggesting medical renal disease.      CT-ABDOMEN-PELVIS WITH   Final Result      1.  Cholelithiasis, with a probable small common bile duct stone.   2.  Bilaterally small kidneys with marked generalized cortical thinning consistent with renal failure.   3.  Tiny lesions in the liver consistent with cysts.        The radiologist's interpretation of all radiological studies have been reviewed by me.    COURSE & MEDICAL DECISION MAKING  Nursing notes, VS, PMSFHx reviewed in chart.    53 y.o. female p/w chief complaint of abdominal pain.    11:11 PM Patient seen and examined at bedside.      The differential diagnoses include but are not limited to:   #abdominal pain    No RLQ pain, TTP or fever to suggest appendicitis  No LLQ pain or TTP or fever to suggest diverticulitis  No pain at of proportion to suggest mesenteric ischemia  No e/o rash or zoster  CT scan of abdomen ordered to r/o AAA, no e/o CT    Morphine 4  mg for pain management  Zofran 4 mg to alleviate patients nausea.  Mild elevation in lipase, plan for repeat labs or return if sx persist.     Haldol 2.5 mg to help alleviate patients vomiting    US-RUQ to further evaluate cholelithiasis.  No acute cholecystitis.  Mild CBD dilatation.      Patient with elevated creatinine consistent with ESRD.  Patient with microcytic anemia however Hb 10.9 in last month.     Pt denies ongoing pain, and is tolerating PO at this time  Pt's boyfriend states that several friends who all ate the same salmon are having similar sx of vomiting.  Plan for immediate return if sx persist or return or follow up as outpt with General surgery.  Pt with planned dialysis f/u today.  No zofran prescribed given pt hx of prolonged QTC although no e/o prolonged QTC at this time.     FINAL IMPRESSION  1. Calculus of gallbladder without cholecystitis without obstruction    2. Nausea and vomiting, intractability of vomiting not specified, unspecified vomiting type          I, Kenan Borden (Scribe), am scribing for, and in the presence of, Jaren Melendez M.D..    Electronically signed by: Kenan Borden (Scribe), 5/8/2019    IJaren M.D. personally performed the services described in this documentation, as scribed by Kenan Borden in my presence, and it is both accurate and complete.    The note accurately reflects work and decisions made by me.  Jaren Melendez  5/9/2019  4:17 AM

## 2019-05-09 NOTE — ED TRIAGE NOTES
"Isabelle Bolanosty   53 y.o. female   Chief Complaint   Patient presents with   • Abdominal Pain     pain mostly in epigastric region, started after eating dinner   • N/V     vomiting in triage      Pt to triage in  for above complaint. Pt in moderate distress in triage, history of GERD. Dialysis patient, full dialysis today. Unable to obtain BP on left arm due to fistula. Pt unable to describe pain, just says \"it hurts\"    Pt is alert and oriented, speaking in full sentences, follows commands and responds appropriately to questions. Resp are even and unlabored.   Pt placed in lobby. Pt educated on triage process. Pt encouraged to alert staff for any changes.    BP (!) 171/103   Pulse 78   Temp 36.4 °C (97.6 °F) (Temporal)   Resp 16   Ht 1.575 m (5' 2\")   Wt 54.9 kg (121 lb)   SpO2 98%   BMI 22.13 kg/m²     "

## 2019-05-09 NOTE — ED NOTES
PO challenge requested by erp, pt provided water and some crackers and informed to eat slowly and drink water slowly to not overwhelm pt's stomach

## 2019-05-09 NOTE — ED NOTES
Pt given discharge and follow up instructions, all questions answered, pt verbalized understanding. PIV removed, dressing applied.  Pt discharged to self, friend picking her up. Copy of discharge provided to pt. Signed copy in chart.  Pt states that all personal belongings are in possession. Pt ambulates off of unit with steady gait.

## 2019-05-15 ENCOUNTER — PATIENT OUTREACH (OUTPATIENT)
Dept: HEALTH INFORMATION MANAGEMENT | Facility: OTHER | Age: 54
End: 2019-05-15

## 2019-05-15 ENCOUNTER — OFFICE VISIT (OUTPATIENT)
Dept: INTERNAL MEDICINE | Facility: MEDICAL CENTER | Age: 54
End: 2019-05-15
Payer: MEDICAID

## 2019-05-15 VITALS
HEART RATE: 83 BPM | SYSTOLIC BLOOD PRESSURE: 160 MMHG | TEMPERATURE: 98 F | WEIGHT: 127 LBS | DIASTOLIC BLOOD PRESSURE: 80 MMHG | HEIGHT: 62 IN | BODY MASS INDEX: 23.37 KG/M2 | RESPIRATION RATE: 18 BRPM | OXYGEN SATURATION: 94 %

## 2019-05-15 DIAGNOSIS — Z72.0 TOBACCO USE: ICD-10-CM

## 2019-05-15 DIAGNOSIS — K80.20 CALCULUS OF GALLBLADDER WITHOUT CHOLECYSTITIS WITHOUT OBSTRUCTION: ICD-10-CM

## 2019-05-15 DIAGNOSIS — Z99.2 ESRD ON HEMODIALYSIS (HCC): ICD-10-CM

## 2019-05-15 DIAGNOSIS — N18.6 ESRD ON HEMODIALYSIS (HCC): ICD-10-CM

## 2019-05-15 DIAGNOSIS — I10 ESSENTIAL HYPERTENSION: ICD-10-CM

## 2019-05-15 PROCEDURE — 99214 OFFICE O/P EST MOD 30 MIN: CPT | Mod: GC | Performed by: INTERNAL MEDICINE

## 2019-05-15 ASSESSMENT — PAIN SCALES - GENERAL: PAINLEVEL: 8=MODERATE-SEVERE PAIN

## 2019-05-15 NOTE — PATIENT INSTRUCTIONS
Urgent referral sent to surgeon.   Follow up with nephrologist for blood pressure.   FU in 3 months

## 2019-05-15 NOTE — PROGRESS NOTES
Established Patient    Isabelle presents today with the following:    CC: ER visit follow-up, epigastric pain    HPI: Ms Coyle is a 53-year-old female with end-stage renal disease, uncontrolled hypertension, COPD, SVT presents today for ER follow up. She was seen for epigastric pain and vomiting, was told that she has gallstones. Pain is improved but still there. Appetite is good. No nausea or vomiting.   She brings a note from a nurse at dialysis center who recommended discussing about starting Lasix to help with the weight gain.  She makes a minimal amount of urine.  They have difficulty in removing volume because she has cramps most of the time.  She is has not seen a nephrologist at the dialysis center or office in a while.  Her blood pressure medications have been adjusted by nephrology.     Patient Active Problem List    Diagnosis Date Noted   • Noncompliance 04/25/2019     Priority: High   • Metabolic encephalopathy 04/25/2019     Priority: High   • Probable COPD with acute exacerbation (HCC) 04/12/2019     Priority: High   • Acute uremia 12/10/2018     Priority: High   • Hypertensive urgency 04/27/2018     Priority: High   • ESRD on hemodialysis (HCC) 06/16/2017     Priority: High   • Supraventricular tachycardia, paroxysmal (CMS-HCC) 01/25/2015     Priority: High   • Volume overload 12/10/2018     Priority: Medium   • Acute pulmonary edema (HCC) 09/27/2018     Priority: Medium   • HTN (hypertension) 06/16/2018     Priority: Medium   • Hyperkalemia 12/28/2017     Priority: Medium   • Anemia due to chronic kidney disease 04/12/2019     Priority: Low   • Hyponatremia 01/08/2018     Priority: Low   • Tobacco use 07/18/2017     Priority: Low   • Vitamin D deficiency 06/17/2017     Priority: Low   • Hyperphosphatemia 06/17/2017     Priority: Low   • Hx of right BKA (CMS-HCC) 01/25/2015     Priority: Low   • Alcohol abuse 01/25/2015     Priority: Low   • Hyperlipidemia 09/10/2010     Priority: Low   •  Macrocytic anemia 11/05/2009     Priority: Low   • Calculus of gallbladder without cholecystitis without obstruction 05/15/2019   • Vomiting 04/26/2019   • Community acquired pneumonia 04/14/2019   • Diarrhea 06/16/2018   • GERD (gastroesophageal reflux disease) 06/13/2018   • Postoperative pain 04/28/2018   • Elevated brain natriuretic peptide (BNP) level 01/08/2018   • SVT (supraventricular tachycardia) ([facility]) 01/07/2018   • Health care maintenance 07/18/2017   • Secondary hyperparathyroidism of renal origin (HCC) 06/17/2017   • Hypocalcemia 06/17/2017   • Menopausal syndrome 02/08/2011   • Glomerulonephritis, chronic 11/05/2009       Current Outpatient Prescriptions   Medication Sig Dispense Refill   • albuterol 108 (90 Base) MCG/ACT Aero Soln inhalation aerosol Inhale 2 Puffs by mouth every 6 hours as needed for Shortness of Breath. 8.5 g 3   • hydrALAZINE (APRESOLINE) 25 MG Tab Take 1 Tab by mouth every 8 hours. 90 Tab 1   • amLODIPine (NORVASC) 10 MG Tab Take 1 Tab by mouth every day. 30 Tab 1   • gabapentin (NEURONTIN) 100 MG Cap Take 1 Cap by mouth 2 Times a Day. 60 Cap 1   • labetalol (NORMODYNE) 200 MG Tab TAKE 1 TABLET BY MOUTH TWICE DAILY 180 Tab 0   • terazosin (HYTRIN) 5 MG Cap Take 1 Cap by mouth every evening. 30 Cap 1   • sevelamer carbonate (RENVELA) 800 MG Tab tablet Take 1 Tab by mouth 3 times a day, with meals. 90 Tab 1   • varenicline (CHANTIX STARTING MONTH PAK) 0.5 MG X 11 & 1 MG X 42 tablet Take as per  instructions. Follow up with PCP for continued therapy. (Patient not taking: Reported on 5/15/2019) 56 Tab 0     No current facility-administered medications for this visit.        ROS: As per HPI. Additional pertinent systems as noted below.  Constitutional: Negative for chills and fever.   HENT: Negative for ear pain and sore throat.    Eyes: Negative for discharge and redness.   Respiratory: Negative for cough, hemoptysis, wheezing and stridor.    Cardiovascular: Negative for  "chest pain, palpitations and leg swelling.   Gastrointestinal: Negative for abdominal pain, constipation, diarrhea, heartburn, nausea and vomiting.   Genitourinary: Negative for dysuria, flank pain and hematuria.   Musculoskeletal: Negative for falls and myalgias.   Skin: Negative for itching and rash.   Neurological: Negative for dizziness, seizures, loss of consciousness and headaches.   Endo/Heme/Allergies: Negative for polydipsia. Does not bruise/bleed easily.   Psychiatric/Behavioral: Negative for substance abuse and suicidal ideas.       /80 (BP Location: Right arm, Patient Position: Sitting, BP Cuff Size: Adult) Comment: repeated bp 5 minutes  Pulse 83   Temp 36.7 °C (98 °F) (Temporal)   Resp 18   Ht 1.575 m (5' 2\")   Wt 57.6 kg (127 lb)   SpO2 94%   Breastfeeding? No   BMI 23.23 kg/m²     Physical Exam   Constitutional:  oriented to person, place, and time. No distress.   Eyes: Pupils are equal, round, and reactive to light. No scleral icterus.  Neck: Neck supple. No thyromegaly present.   Cardiovascular: Normal rate, regular rhythm and normal heart sounds.  Exam reveals no gallop and no friction rub.  No murmur heard.  Pulmonary/Chest: Breath sounds normal. Chest wall is not dull to percussion  Abdomen-soft.  Mild tenderness in the epigastric and right hypochondrium.  Negative Tay sign.  Musculoskeletal:   no edema.   Lymphadenopathy: no cervical adenopathy  Neurological: alert and oriented to person, place, and time.   Skin: No cyanosis. Nails show no clubbing.      Note: I have reviewed all pertinent labs and diagnostic tests associated with this visit with specific comments listed under the assessment and plan below    Assessment and Plan    1. Calculus of gallbladder without cholecystitis without obstruction  Persistent pain but improved severity since the ER visit.  Ultrasound right upper quadrant-5--9-2019 showed Cholelithiasis, with dilatation of the common bile duct. A common duct " stone suggested on recent CT could not be confirmed.  Will be referred to general surgery urgently due to symptomatic gallstones.      2. Essential hypertension  Blood pressure high today.  Medication has been managed by nephrology team.  Recommended to follow-up with the for adjustments.  Echocardiogram ordered  Patient was told at dialysis to discuss about starting Lasix however given the end-stage renal disease and minimal urine output this does not seem indicated.  Recommended to discuss with the nephrologist.    3. Tobacco use  Has used Chantix, feels ready to quit.    4. ESRD on hemodialysis (HCC)  Follow-up with nephrology.      Followup: Return in about 3 months (around 8/15/2019).    Please note that this dictation was created using voice recognition software. I have made every reasonable attempt to correct obvious errors, but I expect that there are errors of grammar and possibly content that I did not discover before finalizing the note.      Signed by: Juan Moreno M.D.

## 2019-05-15 NOTE — ED PROVIDER NOTES
5/15/19 Phone encounter follow up    Patient reports symptoms of vomiting and abdominal pain have resolved completely and she feels back to her normal state of health.      I reminded her to not hesitate to go back to the ED if sx were to return as she has gallstones and CBD stone.  I reminded her that gallstone pancreatitis/ choledocolithiasis can have very similar presentation to her recent gastritis/vomiting which she fully attributes to the salmon/mustard sauce that resulted in many of her friends with similar sx. She states she has no lasting sx or vomiting at this time.     I encouraged her to call today to obtain close follow up with both a general surgeon to discuss potential combo ERCP/surgery and GI for ERCP.  I called  today to help her obtain close GI and general surgeon and PCP follow up.  All questions answered and pt agrees to both call to obtain follow up appointments and to return to ED if sx return or for any other concerns.     Electronically signed by: Jaren Melendez, 5/15/2019 3:50 PM

## 2019-06-20 ENCOUNTER — HOSPITAL ENCOUNTER (INPATIENT)
Facility: MEDICAL CENTER | Age: 54
LOS: 3 days | DRG: 417 | End: 2019-06-24
Attending: SURGERY | Admitting: SURGERY
Payer: MEDICAID

## 2019-06-20 ENCOUNTER — APPOINTMENT (OUTPATIENT)
Dept: RADIOLOGY | Facility: MEDICAL CENTER | Age: 54
DRG: 417 | End: 2019-06-20
Attending: SURGERY
Payer: MEDICAID

## 2019-06-20 ENCOUNTER — APPOINTMENT (OUTPATIENT)
Dept: RADIOLOGY | Facility: MEDICAL CENTER | Age: 54
DRG: 417 | End: 2019-06-20
Attending: INTERNAL MEDICINE
Payer: MEDICAID

## 2019-06-20 DIAGNOSIS — I48.0 PAROXYSMAL ATRIAL FIBRILLATION (HCC): ICD-10-CM

## 2019-06-20 DIAGNOSIS — K80.20 SYMPTOMATIC CHOLELITHIASIS: Primary | ICD-10-CM

## 2019-06-20 DIAGNOSIS — G89.18 POSTOPERATIVE PAIN: ICD-10-CM

## 2019-06-20 PROBLEM — I48.91 ATRIAL FIBRILLATION (HCC): Status: ACTIVE | Noted: 2019-06-20

## 2019-06-20 PROBLEM — R91.8 PULMONARY INFILTRATE: Status: ACTIVE | Noted: 2019-06-20

## 2019-06-20 LAB
ANION GAP SERPL CALC-SCNC: 12 MMOL/L (ref 0–11.9)
ANION GAP SERPL CALC-SCNC: 13 MMOL/L (ref 0–11.9)
BUN SERPL-MCNC: 23 MG/DL (ref 8–22)
BUN SERPL-MCNC: 77 MG/DL (ref 8–22)
CALCIUM SERPL-MCNC: 10.5 MG/DL (ref 8.5–10.5)
CALCIUM SERPL-MCNC: 9.7 MG/DL (ref 8.5–10.5)
CHLORIDE SERPL-SCNC: 96 MMOL/L (ref 96–112)
CHLORIDE SERPL-SCNC: 97 MMOL/L (ref 96–112)
CO2 SERPL-SCNC: 28 MMOL/L (ref 20–33)
CO2 SERPL-SCNC: 29 MMOL/L (ref 20–33)
CREAT SERPL-MCNC: 10.29 MG/DL (ref 0.5–1.4)
CREAT SERPL-MCNC: 4.17 MG/DL (ref 0.5–1.4)
ERYTHROCYTE [DISTWIDTH] IN BLOOD BY AUTOMATED COUNT: 50.8 FL (ref 35.9–50)
GLUCOSE SERPL-MCNC: 92 MG/DL (ref 65–99)
GLUCOSE SERPL-MCNC: 99 MG/DL (ref 65–99)
HBV SURFACE AG SER QL: NEGATIVE
HCT VFR BLD AUTO: 27.2 % (ref 37–47)
HGB BLD-MCNC: 8.7 G/DL (ref 12–16)
MAGNESIUM SERPL-MCNC: 2 MG/DL (ref 1.5–2.5)
MCH RBC QN AUTO: 30.7 PG (ref 27–33)
MCHC RBC AUTO-ENTMCNC: 32 G/DL (ref 33.6–35)
MCV RBC AUTO: 96.1 FL (ref 81.4–97.8)
PLATELET # BLD AUTO: 167 K/UL (ref 164–446)
PMV BLD AUTO: 10 FL (ref 9–12.9)
POTASSIUM SERPL-SCNC: 4.2 MMOL/L (ref 3.6–5.5)
POTASSIUM SERPL-SCNC: 6.7 MMOL/L (ref 3.6–5.5)
POTASSIUM SERPL-SCNC: 7.2 MMOL/L (ref 3.6–5.5)
RBC # BLD AUTO: 2.83 M/UL (ref 4.2–5.4)
SODIUM SERPL-SCNC: 137 MMOL/L (ref 135–145)
SODIUM SERPL-SCNC: 138 MMOL/L (ref 135–145)
TROPONIN I SERPL-MCNC: 0.04 NG/ML (ref 0–0.04)
WBC # BLD AUTO: 8.6 K/UL (ref 4.8–10.8)

## 2019-06-20 PROCEDURE — A9270 NON-COVERED ITEM OR SERVICE: HCPCS | Performed by: ANESTHESIOLOGY

## 2019-06-20 PROCEDURE — 93005 ELECTROCARDIOGRAM TRACING: CPT | Performed by: INTERNAL MEDICINE

## 2019-06-20 PROCEDURE — A9270 NON-COVERED ITEM OR SERVICE: HCPCS | Performed by: HOSPITALIST

## 2019-06-20 PROCEDURE — 99220 PR INITIAL OBSERVATION CARE,LEVL III: CPT | Performed by: INTERNAL MEDICINE

## 2019-06-20 PROCEDURE — G0378 HOSPITAL OBSERVATION PER HR: HCPCS

## 2019-06-20 PROCEDURE — 5A1D70Z PERFORMANCE OF URINARY FILTRATION, INTERMITTENT, LESS THAN 6 HOURS PER DAY: ICD-10-PCS | Performed by: INTERNAL MEDICINE

## 2019-06-20 PROCEDURE — 700102 HCHG RX REV CODE 250 W/ 637 OVERRIDE(OP): Performed by: ANESTHESIOLOGY

## 2019-06-20 PROCEDURE — 700111 HCHG RX REV CODE 636 W/ 250 OVERRIDE (IP): Performed by: INTERNAL MEDICINE

## 2019-06-20 PROCEDURE — 87340 HEPATITIS B SURFACE AG IA: CPT

## 2019-06-20 PROCEDURE — 71045 X-RAY EXAM CHEST 1 VIEW: CPT

## 2019-06-20 PROCEDURE — 700101 HCHG RX REV CODE 250: Performed by: SURGERY

## 2019-06-20 PROCEDURE — 90935 HEMODIALYSIS ONE EVALUATION: CPT

## 2019-06-20 PROCEDURE — 83735 ASSAY OF MAGNESIUM: CPT

## 2019-06-20 PROCEDURE — 700102 HCHG RX REV CODE 250 W/ 637 OVERRIDE(OP): Performed by: HOSPITALIST

## 2019-06-20 PROCEDURE — 85027 COMPLETE CBC AUTOMATED: CPT

## 2019-06-20 PROCEDURE — 700101 HCHG RX REV CODE 250: Performed by: INTERNAL MEDICINE

## 2019-06-20 PROCEDURE — 700111 HCHG RX REV CODE 636 W/ 250 OVERRIDE (IP): Performed by: HOSPITALIST

## 2019-06-20 PROCEDURE — 96375 TX/PRO/DX INJ NEW DRUG ADDON: CPT

## 2019-06-20 PROCEDURE — 700105 HCHG RX REV CODE 258: Performed by: SURGERY

## 2019-06-20 PROCEDURE — 84132 ASSAY OF SERUM POTASSIUM: CPT

## 2019-06-20 PROCEDURE — 96372 THER/PROPH/DIAG INJ SC/IM: CPT

## 2019-06-20 PROCEDURE — 700102 HCHG RX REV CODE 250 W/ 637 OVERRIDE(OP): Performed by: INTERNAL MEDICINE

## 2019-06-20 PROCEDURE — 80048 BASIC METABOLIC PNL TOTAL CA: CPT | Mod: 91

## 2019-06-20 PROCEDURE — 84484 ASSAY OF TROPONIN QUANT: CPT

## 2019-06-20 PROCEDURE — A9270 NON-COVERED ITEM OR SERVICE: HCPCS | Performed by: INTERNAL MEDICINE

## 2019-06-20 PROCEDURE — 36415 COLL VENOUS BLD VENIPUNCTURE: CPT

## 2019-06-20 RX ORDER — GABAPENTIN 300 MG/1
300 CAPSULE ORAL ONCE
Status: COMPLETED | OUTPATIENT
Start: 2019-06-20 | End: 2019-06-20

## 2019-06-20 RX ORDER — AMOXICILLIN 250 MG
2 CAPSULE ORAL 2 TIMES DAILY
Status: DISCONTINUED | OUTPATIENT
Start: 2019-06-20 | End: 2019-06-24 | Stop reason: HOSPADM

## 2019-06-20 RX ORDER — AMLODIPINE BESYLATE 10 MG/1
10 TABLET ORAL DAILY
Status: DISCONTINUED | OUTPATIENT
Start: 2019-06-21 | End: 2019-06-24 | Stop reason: HOSPADM

## 2019-06-20 RX ORDER — SEVELAMER CARBONATE 800 MG/1
800 TABLET, FILM COATED ORAL
COMMUNITY
End: 2020-03-04

## 2019-06-20 RX ORDER — METOPROLOL TARTRATE 50 MG/1
50 TABLET, FILM COATED ORAL 2 TIMES DAILY
Status: ON HOLD | COMMUNITY
End: 2019-06-20

## 2019-06-20 RX ORDER — ACETAMINOPHEN 500 MG
1000 TABLET ORAL ONCE
Status: COMPLETED | OUTPATIENT
Start: 2019-06-20 | End: 2019-06-20

## 2019-06-20 RX ORDER — DILTIAZEM HYDROCHLORIDE 5 MG/ML
10 INJECTION INTRAVENOUS ONCE
Status: COMPLETED | OUTPATIENT
Start: 2019-06-20 | End: 2019-06-20

## 2019-06-20 RX ORDER — OXYCODONE HCL 10 MG/1
10 TABLET, FILM COATED, EXTENDED RELEASE ORAL ONCE
Status: COMPLETED | OUTPATIENT
Start: 2019-06-20 | End: 2019-06-20

## 2019-06-20 RX ORDER — BISACODYL 10 MG
10 SUPPOSITORY, RECTAL RECTAL
Status: DISCONTINUED | OUTPATIENT
Start: 2019-06-20 | End: 2019-06-24 | Stop reason: HOSPADM

## 2019-06-20 RX ORDER — METOPROLOL TARTRATE 1 MG/ML
5 INJECTION, SOLUTION INTRAVENOUS ONCE
Status: COMPLETED | OUTPATIENT
Start: 2019-06-20 | End: 2019-06-20

## 2019-06-20 RX ORDER — TERAZOSIN 2 MG/1
2 CAPSULE ORAL NIGHTLY
Status: DISCONTINUED | OUTPATIENT
Start: 2019-06-20 | End: 2019-06-24 | Stop reason: HOSPADM

## 2019-06-20 RX ORDER — SEVELAMER CARBONATE 800 MG/1
800 TABLET, FILM COATED ORAL 2 TIMES DAILY
Status: DISCONTINUED | OUTPATIENT
Start: 2019-06-20 | End: 2019-06-24 | Stop reason: HOSPADM

## 2019-06-20 RX ORDER — ACETAMINOPHEN 325 MG/1
650 TABLET ORAL EVERY 6 HOURS PRN
Status: DISCONTINUED | OUTPATIENT
Start: 2019-06-20 | End: 2019-06-24 | Stop reason: HOSPADM

## 2019-06-20 RX ORDER — DIPHENHYDRAMINE HCL 25 MG
25 TABLET ORAL ONCE
Status: COMPLETED | OUTPATIENT
Start: 2019-06-20 | End: 2019-06-20

## 2019-06-20 RX ORDER — HEPARIN SODIUM 5000 [USP'U]/ML
5000 INJECTION, SOLUTION INTRAVENOUS; SUBCUTANEOUS EVERY 8 HOURS
Status: DISCONTINUED | OUTPATIENT
Start: 2019-06-20 | End: 2019-06-21

## 2019-06-20 RX ORDER — AMLODIPINE BESYLATE 10 MG/1
10 TABLET ORAL DAILY
Status: ON HOLD | COMMUNITY
End: 2020-01-08

## 2019-06-20 RX ORDER — POLYETHYLENE GLYCOL 3350 17 G/17G
1 POWDER, FOR SOLUTION ORAL
Status: DISCONTINUED | OUTPATIENT
Start: 2019-06-20 | End: 2019-06-24 | Stop reason: HOSPADM

## 2019-06-20 RX ORDER — SODIUM CHLORIDE 9 MG/ML
INJECTION, SOLUTION INTRAVENOUS ONCE
Status: COMPLETED | OUTPATIENT
Start: 2019-06-20 | End: 2019-06-20

## 2019-06-20 RX ORDER — ALBUTEROL SULFATE 90 UG/1
1 AEROSOL, METERED RESPIRATORY (INHALATION) 2 TIMES DAILY PRN
Status: ON HOLD | COMMUNITY
End: 2020-01-08

## 2019-06-20 RX ORDER — HYDRALAZINE HYDROCHLORIDE 25 MG/1
25 TABLET, FILM COATED ORAL EVERY 8 HOURS
Status: DISCONTINUED | OUTPATIENT
Start: 2019-06-20 | End: 2019-06-23

## 2019-06-20 RX ORDER — TERAZOSIN 2 MG/1
2 CAPSULE ORAL NIGHTLY
COMMUNITY
End: 2019-11-28

## 2019-06-20 RX ORDER — HYDRALAZINE HYDROCHLORIDE 20 MG/ML
10 INJECTION INTRAMUSCULAR; INTRAVENOUS EVERY 6 HOURS PRN
Status: DISCONTINUED | OUTPATIENT
Start: 2019-06-20 | End: 2019-06-24 | Stop reason: HOSPADM

## 2019-06-20 RX ORDER — GABAPENTIN 100 MG/1
100 CAPSULE ORAL 2 TIMES DAILY
Status: DISCONTINUED | OUTPATIENT
Start: 2019-06-20 | End: 2019-06-24 | Stop reason: HOSPADM

## 2019-06-20 RX ORDER — DOXYCYCLINE 100 MG/1
100 TABLET ORAL EVERY 12 HOURS
Status: DISCONTINUED | OUTPATIENT
Start: 2019-06-20 | End: 2019-06-21

## 2019-06-20 RX ORDER — ALBUTEROL SULFATE 90 UG/1
1 AEROSOL, METERED RESPIRATORY (INHALATION) 2 TIMES DAILY PRN
Status: DISCONTINUED | OUTPATIENT
Start: 2019-06-20 | End: 2019-06-24 | Stop reason: HOSPADM

## 2019-06-20 RX ADMIN — DIPHENHYDRAMINE HCL 25 MG: 25 TABLET ORAL at 20:56

## 2019-06-20 RX ADMIN — OXYCODONE HYDROCHLORIDE 10 MG: 10 TABLET, FILM COATED, EXTENDED RELEASE ORAL at 09:51

## 2019-06-20 RX ADMIN — METOPROLOL TARTRATE 5 MG: 5 INJECTION, SOLUTION INTRAVENOUS at 19:45

## 2019-06-20 RX ADMIN — HYDRALAZINE HYDROCHLORIDE 25 MG: 25 TABLET, FILM COATED ORAL at 22:56

## 2019-06-20 RX ADMIN — GABAPENTIN 100 MG: 100 CAPSULE ORAL at 22:52

## 2019-06-20 RX ADMIN — SODIUM CHLORIDE: 9 INJECTION, SOLUTION INTRAVENOUS at 09:51

## 2019-06-20 RX ADMIN — DOXYCYCLINE 100 MG: 100 TABLET, FILM COATED ORAL at 23:15

## 2019-06-20 RX ADMIN — ACETAMINOPHEN 1000 MG: 500 TABLET ORAL at 09:51

## 2019-06-20 RX ADMIN — DILTIAZEM HYDROCHLORIDE 10 MG: 5 INJECTION INTRAVENOUS at 20:55

## 2019-06-20 RX ADMIN — LIDOCAINE HYDROCHLORIDE 0.5 ML: 10 INJECTION, SOLUTION EPIDURAL; INFILTRATION; INTRACAUDAL; PERINEURAL at 09:51

## 2019-06-20 RX ADMIN — TERAZOSIN HYDROCHLORIDE ANHYDROUS 2 MG: 2 CAPSULE ORAL at 22:56

## 2019-06-20 RX ADMIN — METOPROLOL TARTRATE 25 MG: 25 TABLET, FILM COATED ORAL at 22:56

## 2019-06-20 RX ADMIN — HEPARIN SODIUM 5000 UNITS: 5000 INJECTION, SOLUTION INTRAVENOUS; SUBCUTANEOUS at 22:55

## 2019-06-20 RX ADMIN — SEVELAMER CARBONATE 800 MG: 800 TABLET, FILM COATED ORAL at 22:52

## 2019-06-20 ASSESSMENT — ENCOUNTER SYMPTOMS
DIARRHEA: 0
SPEECH CHANGE: 0
PHOTOPHOBIA: 0
FEVER: 0
VOMITING: 0
NECK PAIN: 0
SHORTNESS OF BREATH: 0
PALPITATIONS: 0
ABDOMINAL PAIN: 1
FOCAL WEAKNESS: 0
HALLUCINATIONS: 0
COUGH: 0
CONSTIPATION: 0
ORTHOPNEA: 0
TINGLING: 0
BACK PAIN: 0
EYE PAIN: 0
CHILLS: 0
MYALGIAS: 0
SPUTUM PRODUCTION: 0
SENSORY CHANGE: 0
HEADACHES: 0
DOUBLE VISION: 0
TREMORS: 0
NAUSEA: 0
DIZZINESS: 0
BLURRED VISION: 0
WEIGHT LOSS: 0

## 2019-06-20 ASSESSMENT — LIFESTYLE VARIABLES
EVER_SMOKED: YES
SUBSTANCE_ABUSE: 0

## 2019-06-20 ASSESSMENT — COGNITIVE AND FUNCTIONAL STATUS - GENERAL
SUGGESTED CMS G CODE MODIFIER DAILY ACTIVITY: CJ
MOVING FROM LYING ON BACK TO SITTING ON SIDE OF FLAT BED: A LITTLE
SUGGESTED CMS G CODE MODIFIER MOBILITY: CJ
WALKING IN HOSPITAL ROOM: A LITTLE
DAILY ACTIVITIY SCORE: 22
HELP NEEDED FOR BATHING: A LITTLE
TOILETING: A LITTLE
CLIMB 3 TO 5 STEPS WITH RAILING: A LITTLE
MOBILITY SCORE: 20
STANDING UP FROM CHAIR USING ARMS: A LITTLE

## 2019-06-20 NOTE — PROGRESS NOTES
Presented for elective lap yamileth  K 6.9, repeated and came back 7.2 (fomral labs, not istat)  Last HD 3 days ago  Surgery cancelled  Planning dialysis today and then discharge home  Placed OP-Obs order     Jimbo Davenport MD  [facility] (General and Vascular Surgery)  Cell: [phone] (text or call is fine, if you don't reach me please try my office)  Office: [phone]  __________________________________________________________________  Patient:Isabelle Coyle   MRN:[id number]   CSN:[id number]    6/20/2019    12:25 PM

## 2019-06-20 NOTE — PROGRESS NOTES
[facility] Services Progress Note    Hemodialysis treatment ordered today per Dr. Junior x 3 hours.   Treatment initiated at 1529.     Patient hypertensive t/o treatment otherwise tolerated; see paper flow sheet for details.     Net UF 2,000 mL.     Needles removed from access site. Dressings applied and sites held x 10 minutes; verified no bleeding. Positive bruit/thrill post tx. Staff RN to monitor AVF for breakthrough bleeding. Should breakthrough bleeding occur, staff RN to apply pressure to access sites until bleeding resolved. Notify Dialysis and Nephrologist for follow-up.    Report given to Primary RN.

## 2019-06-20 NOTE — OR NURSING
11 am- Dr. Davenport and Dr. Shepherd notified of pt's K being 6.7.   Potassium was re-drawn by lab and is now 7.2. Dr. Davenport aware and in to speak with pt.     Orders placed by Dr. Davenport for patient to be dialyzed. Patient informed about surgery cancellation and pending dialysis. Patient at present time is resting comfortably in bed, VSS, SR 80's on tele monitor. Denies c/p, SOB, dizziness or lightheadedness. Belongings brought back to pt.     Awaiting hospitalist consult for telemetry before patient goes to dialysis. Dr. Davenport aware of needing to consult hospitalist for admission.     Dr. Champion, hospitalist consulted for patient's admission to telemetry. Per Christine at dialysis, is ready to see patient for dialysis. Notified Christine that hospitalist has not yet put orders for telemetry and will notify her as soon as they become available.     Spoke to Dr. Champion, and per MD he will place the order for telemetry for patient to go to dialysis. MD to place order and bed control notified.     Patient updated on POC, patient resting comfortably in bed. Patient remains SR 80s.     Telemetry order received. Patient to go to 712-1 after dialysis.     Report given to tele RN, Carly as well as Christine in dialysis. Transport order placed. RN to bring tele monitor to pre-op.     1512- Patient ANDRE with transport in stable condition on tele monitor to dialysis. Pt's belongings on [facility].

## 2019-06-20 NOTE — PROGRESS NOTES
Janey from Lab called with critical result of potassium of 7.2 at 1210   Critical lab result read back to janey conway notified of critical lab result at 1214 Critical lab result read back by Dr. conway  .

## 2019-06-21 PROBLEM — I48.0 PAROXYSMAL ATRIAL FIBRILLATION (HCC): Status: ACTIVE | Noted: 2019-06-20

## 2019-06-21 LAB
ALBUMIN SERPL BCP-MCNC: 3.8 G/DL (ref 3.2–4.9)
ALBUMIN/GLOB SERPL: 1.4 G/DL
ALP SERPL-CCNC: 61 U/L (ref 30–99)
ALT SERPL-CCNC: 10 U/L (ref 2–50)
ANION GAP SERPL CALC-SCNC: 10 MMOL/L (ref 0–11.9)
AST SERPL-CCNC: 9 U/L (ref 12–45)
BASOPHILS # BLD AUTO: 0.8 % (ref 0–1.8)
BASOPHILS # BLD: 0.05 K/UL (ref 0–0.12)
BILIRUB SERPL-MCNC: 1.1 MG/DL (ref 0.1–1.5)
BUN SERPL-MCNC: 40 MG/DL (ref 8–22)
CALCIUM SERPL-MCNC: 9.7 MG/DL (ref 8.5–10.5)
CHLORIDE SERPL-SCNC: 96 MMOL/L (ref 96–112)
CO2 SERPL-SCNC: 32 MMOL/L (ref 20–33)
CREAT SERPL-MCNC: 5.27 MG/DL (ref 0.5–1.4)
EKG IMPRESSION: NORMAL
EOSINOPHIL # BLD AUTO: 0.23 K/UL (ref 0–0.51)
EOSINOPHIL NFR BLD: 3.6 % (ref 0–6.9)
ERYTHROCYTE [DISTWIDTH] IN BLOOD BY AUTOMATED COUNT: 51.1 FL (ref 35.9–50)
GLOBULIN SER CALC-MCNC: 2.7 G/DL (ref 1.9–3.5)
GLUCOSE SERPL-MCNC: 119 MG/DL (ref 65–99)
HCT VFR BLD AUTO: 27.4 % (ref 37–47)
HGB BLD-MCNC: 8.5 G/DL (ref 12–16)
IMM GRANULOCYTES # BLD AUTO: 0.02 K/UL (ref 0–0.11)
IMM GRANULOCYTES NFR BLD AUTO: 0.3 % (ref 0–0.9)
LYMPHOCYTES # BLD AUTO: 1.3 K/UL (ref 1–4.8)
LYMPHOCYTES NFR BLD: 20.2 % (ref 22–41)
MCH RBC QN AUTO: 29.4 PG (ref 27–33)
MCHC RBC AUTO-ENTMCNC: 31 G/DL (ref 33.6–35)
MCV RBC AUTO: 94.8 FL (ref 81.4–97.8)
MONOCYTES # BLD AUTO: 0.36 K/UL (ref 0–0.85)
MONOCYTES NFR BLD AUTO: 5.6 % (ref 0–13.4)
NEUTROPHILS # BLD AUTO: 4.49 K/UL (ref 2–7.15)
NEUTROPHILS NFR BLD: 69.5 % (ref 44–72)
NRBC # BLD AUTO: 0 K/UL
NRBC BLD-RTO: 0 /100 WBC
PHOSPHATE SERPL-MCNC: 5 MG/DL (ref 2.5–4.5)
PLATELET # BLD AUTO: 171 K/UL (ref 164–446)
PMV BLD AUTO: 9.4 FL (ref 9–12.9)
POTASSIUM SERPL-SCNC: 5 MMOL/L (ref 3.6–5.5)
PROT SERPL-MCNC: 6.5 G/DL (ref 6–8.2)
RBC # BLD AUTO: 2.89 M/UL (ref 4.2–5.4)
SODIUM SERPL-SCNC: 138 MMOL/L (ref 135–145)
WBC # BLD AUTO: 6.5 K/UL (ref 4.8–10.8)

## 2019-06-21 PROCEDURE — A9270 NON-COVERED ITEM OR SERVICE: HCPCS | Performed by: INTERNAL MEDICINE

## 2019-06-21 PROCEDURE — 700111 HCHG RX REV CODE 636 W/ 250 OVERRIDE (IP)

## 2019-06-21 PROCEDURE — 5A1D70Z PERFORMANCE OF URINARY FILTRATION, INTERMITTENT, LESS THAN 6 HOURS PER DAY: ICD-10-PCS | Performed by: INTERNAL MEDICINE

## 2019-06-21 PROCEDURE — 700102 HCHG RX REV CODE 250 W/ 637 OVERRIDE(OP): Performed by: HOSPITALIST

## 2019-06-21 PROCEDURE — 96372 THER/PROPH/DIAG INJ SC/IM: CPT

## 2019-06-21 PROCEDURE — 84100 ASSAY OF PHOSPHORUS: CPT

## 2019-06-21 PROCEDURE — 96365 THER/PROPH/DIAG IV INF INIT: CPT

## 2019-06-21 PROCEDURE — A9270 NON-COVERED ITEM OR SERVICE: HCPCS | Performed by: HOSPITALIST

## 2019-06-21 PROCEDURE — 700105 HCHG RX REV CODE 258: Performed by: INTERNAL MEDICINE

## 2019-06-21 PROCEDURE — 700111 HCHG RX REV CODE 636 W/ 250 OVERRIDE (IP): Performed by: INTERNAL MEDICINE

## 2019-06-21 PROCEDURE — 85025 COMPLETE CBC W/AUTO DIFF WBC: CPT

## 2019-06-21 PROCEDURE — 36415 COLL VENOUS BLD VENIPUNCTURE: CPT

## 2019-06-21 PROCEDURE — 80053 COMPREHEN METABOLIC PANEL: CPT

## 2019-06-21 PROCEDURE — 93010 ELECTROCARDIOGRAM REPORT: CPT | Performed by: INTERNAL MEDICINE

## 2019-06-21 PROCEDURE — 770020 HCHG ROOM/CARE - TELE (206)

## 2019-06-21 PROCEDURE — 700102 HCHG RX REV CODE 250 W/ 637 OVERRIDE(OP): Performed by: INTERNAL MEDICINE

## 2019-06-21 PROCEDURE — 90935 HEMODIALYSIS ONE EVALUATION: CPT

## 2019-06-21 PROCEDURE — 99233 SBSQ HOSP IP/OBS HIGH 50: CPT | Performed by: HOSPITALIST

## 2019-06-21 RX ORDER — CALCIUM CARBONATE 500 MG/1
1000 TABLET, CHEWABLE ORAL 3 TIMES DAILY PRN
Status: DISCONTINUED | OUTPATIENT
Start: 2019-06-21 | End: 2019-06-23

## 2019-06-21 RX ADMIN — HEPARIN SODIUM 5000 UNITS: 5000 INJECTION, SOLUTION INTRAVENOUS; SUBCUTANEOUS at 05:10

## 2019-06-21 RX ADMIN — METOPROLOL TARTRATE 25 MG: 25 TABLET, FILM COATED ORAL at 19:38

## 2019-06-21 RX ADMIN — SEVELAMER CARBONATE 800 MG: 800 TABLET, FILM COATED ORAL at 19:38

## 2019-06-21 RX ADMIN — DOXYCYCLINE 100 MG: 100 TABLET, FILM COATED ORAL at 05:10

## 2019-06-21 RX ADMIN — METOPROLOL TARTRATE 25 MG: 25 TABLET, FILM COATED ORAL at 05:10

## 2019-06-21 RX ADMIN — HYDRALAZINE HYDROCHLORIDE 25 MG: 25 TABLET, FILM COATED ORAL at 14:23

## 2019-06-21 RX ADMIN — HYDRALAZINE HYDROCHLORIDE 25 MG: 25 TABLET, FILM COATED ORAL at 05:10

## 2019-06-21 RX ADMIN — GABAPENTIN 100 MG: 100 CAPSULE ORAL at 05:10

## 2019-06-21 RX ADMIN — ANTACID TABLETS 1000 MG: 500 TABLET, CHEWABLE ORAL at 14:24

## 2019-06-21 RX ADMIN — GABAPENTIN 100 MG: 100 CAPSULE ORAL at 19:38

## 2019-06-21 RX ADMIN — AMLODIPINE BESYLATE 10 MG: 10 TABLET ORAL at 05:10

## 2019-06-21 RX ADMIN — ERYTHROPOIETIN 10000 UNITS: 10000 INJECTION, SOLUTION INTRAVENOUS; SUBCUTANEOUS at 11:54

## 2019-06-21 RX ADMIN — TERAZOSIN HYDROCHLORIDE ANHYDROUS 2 MG: 2 CAPSULE ORAL at 21:12

## 2019-06-21 RX ADMIN — SEVELAMER CARBONATE 800 MG: 800 TABLET, FILM COATED ORAL at 05:10

## 2019-06-21 RX ADMIN — HEPARIN SODIUM 5000 UNITS: 5000 INJECTION, SOLUTION INTRAVENOUS; SUBCUTANEOUS at 14:24

## 2019-06-21 RX ADMIN — CEFTRIAXONE SODIUM 1 G: 1 INJECTION, POWDER, FOR SOLUTION INTRAMUSCULAR; INTRAVENOUS at 05:10

## 2019-06-21 RX ADMIN — HYDRALAZINE HYDROCHLORIDE 25 MG: 25 TABLET, FILM COATED ORAL at 21:12

## 2019-06-21 ASSESSMENT — ENCOUNTER SYMPTOMS
PALPITATIONS: 0
MUSCULOSKELETAL NEGATIVE: 1
FEVER: 0
DIZZINESS: 0
CARDIOVASCULAR NEGATIVE: 1
CHILLS: 0
VOMITING: 0
DIARRHEA: 1
PSYCHIATRIC NEGATIVE: 1
EYES NEGATIVE: 1
RESPIRATORY NEGATIVE: 1
ABDOMINAL PAIN: 0
FOCAL WEAKNESS: 0
NAUSEA: 0
WEAKNESS: 0
HEADACHES: 0

## 2019-06-21 NOTE — PROGRESS NOTES
[facility] Nephrology Daily Progress Note    Date of Service  6/21/2019    Chief Complaint   Follow up ESRD and Hyperkalemia    HPI  The patient is a very pleasant 54-year-old female  known to us from outpatient dialysis care.  She receives her maintenance hemodialysis on a Monday, Wednesday, and Friday schedule at [facility].  She has a left upper extremity AV fistula.  Her last treatment was Monday, described as a complete treatment.  She was admitted to same day surgery earlier today by Dr. Davenport for an elective laparoscopic   cholecystectomy.  Preoperative labs, however, came back finding of potassium of 6.9.  It was repeated and came back at 7.2.  The surgery was canceled and she is admitted for treatment of her hyperkalemia.  She tells me that other than some vague upper abdominal pain, she is feeling in her usual state of health.  She has had no nausea or vomiting.  She has chronic diarrhea.  She thinks she may be eating foods that are high in potassium and did skip her dialysis on Wednesday.  She has a history of persistently elevated potassium.  She denies any shortness of breath, no chest discomfort, no palpitations, no blurry vision, no change in the mental status, no lower extremity edema.  We have been asked to see regarding her renal failure.    Interval Problem Update  6/21/19--No events, urgently dialyzed yesterday for K 7.2, improved today, seen on dialysis this am, no new complaints, BP elevated this am, noted to have paroxysmal AFib last night and then converted to NSR ~2100, remains in NSR    Review of Systems  Review of Systems   Constitutional: Positive for malaise/fatigue. Negative for chills and fever.   HENT: Negative.    Eyes: Negative.    Respiratory: Negative.    Cardiovascular: Negative.    Gastrointestinal: Positive for diarrhea. Negative for abdominal pain, nausea and vomiting.   Genitourinary: Negative.    Musculoskeletal: Negative.    Skin: Negative.     Neurological: Negative for dizziness, focal weakness, weakness and headaches.   Endo/Heme/Allergies: Negative.    Psychiatric/Behavioral: Negative.         Physical Exam  Temp:  [36.2 °C (97.2 °F)-36.8 °C (98.2 °F)] 36.7 °C (98 °F)  Pulse:  [] 89  Resp:  [16-18] 18  BP: (163-192)/(93-99) 179/94  SpO2:  [92 %-95 %] 92 %    Physical Exam   Constitutional: She is oriented to person, place, and time. She appears well-developed and well-nourished. No distress.   Appears chronically ill   HENT:   Head: Normocephalic and atraumatic.   Mouth/Throat: No oropharyngeal exudate.   Eyes: Pupils are equal, round, and reactive to light. Conjunctivae and EOM are normal. No scleral icterus.   Neck: Normal range of motion. Neck supple. No thyromegaly present.   Cardiovascular: Normal rate and regular rhythm.    No murmur heard.  Pulmonary/Chest: Effort normal and breath sounds normal. No respiratory distress. She has no wheezes.   Abdominal: Soft. Bowel sounds are normal. She exhibits no distension. There is no tenderness.   Musculoskeletal: Normal range of motion. She exhibits no edema, tenderness or deformity.   Neurological: She is alert and oriented to person, place, and time. She exhibits normal muscle tone.   Skin: Skin is warm and dry. No erythema.   Psychiatric: She has a normal mood and affect. Her behavior is normal.   Nursing note and vitals reviewed.      Fluids    Intake/Output Summary (Last 24 hours) at 06/21/19 1108  Last data filed at 06/20/19 1829   Gross per 24 hour   Intake              500 ml   Output             2500 ml   Net            -2000 ml       Laboratory  Recent Labs      06/20/19   0950  06/21/19   0202   WBC  8.6  6.5   RBC  2.83*  2.89*   HEMOGLOBIN  8.7*  8.5*   HEMATOCRIT  27.2*  27.4*   MCV  96.1  94.8   MCH  30.7  29.4   MCHC  32.0*  31.0*   RDW  50.8*  51.1*   PLATELETCT  167  171   MPV  10.0  9.4     Recent Labs      06/20/19   0950  06/20/19   1127  06/20/19   1933  06/21/19   0202    SODIUM  138   --   137  138   POTASSIUM  6.7*  7.2*  4.2  5.0   CHLORIDE  96   --   97  96   CO2  29   --   28  32   GLUCOSE  99   --   92  119*   BUN  77*   --   23*  40*   CREATININE  10.29*   --   4.17*  5.27*   CALCIUM  10.5   --   9.7  9.7                 ASSESSMENT:  1.  ESRD--on maintenance hemodialysis on a Monday,   Wednesday, and Friday schedule.  She missed her last treatment, presents with hyperkalemia  2.  Hyperkalemia--resolved with urgent HD  3.  Cough, recently quit smoking.  4.  Upper abdominal pain, scheduled for elective laparoscopic cholecystectomy when she is optimized for surgery.  5.  Anemia secondary to chronic kidney disease.  6.  Renal osteodystrophy.  7.  Chronic hypertension, poor control.  8.  History of supraventricular tachycardia, status post ablation.  --AFib overnight and then converted to NSR     SUGGESTIONS:  1.  HD today and qMWF.  2.  Discussed importance of compliant with outpt dialysis  3.  Low K diet  4.  Will start Valtessa as outpatient  5.  Monitor for AFib  6.  Follow up with Dr. Davenport to reschedule yamileth  7.  Continue current BP meds  8.  Fluid removal with HD  9.  Increase metoprolol if BP remains elevated after HD  10.  Epogen with HD  11.  No dietary protein restrictions    **OK to discharge from renal standpoint if no further issues with cardiac arrhythmia. Pt to follow her regular outpt MWF dialysis schedule

## 2019-06-21 NOTE — PROGRESS NOTES
Hospital Medicine Daily Progress Note    Date of Service  6/21/2019    Chief Complaint  54 y.o. female admitted 6/20/2019 with hyperkalemia    Hospital Course    54-year-old female with past medical history of ESRD on HD on MWF, AVNRT status post ablation, hypertension presented to hospital for elective laparoscopic cholecystectomy and was found to have a potassium of 6.9 along with A. fib with RVR.  Nephrology was consulted and she underwent emergent dialysis.      Interval Problem Update  Patient converted to normal sinus rhythm yesterday at 8 PM  Potassium within normal limits today and will continue her routine dialysis today  I will discuss with patient about anticoagulation for her paroxysmal A. Fib  I discussed with dr. conway and possible plan for lap yamileth tomorrow    Consultants/Specialty  Nephrology  Surgery    Code Status  Full code    Disposition  TBD    Review of Systems  Review of Systems   Constitutional: Negative for chills and fever.   Cardiovascular: Negative for chest pain and palpitations.   Gastrointestinal: Negative for abdominal pain, nausea and vomiting.   Genitourinary: Negative for dysuria and urgency.   Neurological: Negative for dizziness and headaches.        Physical Exam  Temp:  [36.2 °C (97.2 °F)-36.8 °C (98.2 °F)] 36.4 °C (97.5 °F)  Pulse:  [] 82  Resp:  [16-18] 16  BP: (154-192)/(89-98) 154/89  SpO2:  [92 %-95 %] 94 %    Physical Exam   Constitutional: She is oriented to person, place, and time. She appears well-developed and well-nourished.   HENT:   Head: Normocephalic and atraumatic.   Eyes: Conjunctivae are normal. Right eye exhibits no discharge. Left eye exhibits no discharge.   Cardiovascular: Normal rate and regular rhythm.    Murmur heard.  Pulmonary/Chest: Effort normal. No respiratory distress.   Abdominal: Soft. She exhibits no distension. There is no tenderness.   Musculoskeletal: She exhibits no edema.   Neurological: She is alert and oriented to person, place,  and time.   Skin: Skin is warm and dry.   Nursing note and vitals reviewed.      Fluids    Intake/Output Summary (Last 24 hours) at 06/21/19 1735  Last data filed at 06/20/19 1829   Gross per 24 hour   Intake              500 ml   Output             2500 ml   Net            -2000 ml       Laboratory  Recent Labs      06/20/19   0950  06/21/19   0202   WBC  8.6  6.5   RBC  2.83*  2.89*   HEMOGLOBIN  8.7*  8.5*   HEMATOCRIT  27.2*  27.4*   MCV  96.1  94.8   MCH  30.7  29.4   MCHC  32.0*  31.0*   RDW  50.8*  51.1*   PLATELETCT  167  171   MPV  10.0  9.4     Recent Labs      06/20/19   0950  06/20/19   1127  06/20/19   1933  06/21/19   0202   SODIUM  138   --   137  138   POTASSIUM  6.7*  7.2*  4.2  5.0   CHLORIDE  96   --   97  96   CO2  29   --   28  32   GLUCOSE  99   --   92  119*   BUN  77*   --   23*  40*   CREATININE  10.29*   --   4.17*  5.27*   CALCIUM  10.5   --   9.7  9.7                   Imaging  DX-CHEST-LIMITED (1 VIEW)   Final Result         1.  Bilaterally base opacity suggesting infiltrate.   2.  Trace left pleural effusion   3.  Perihilar interstitial prominence and bronchial wall cuffing, appearance suggests changes of underlying bronchial inflammation, consider bronchitis.           Assessment/Plan  ESRD on hemodialysis (HCC)- (present on admission)   Assessment & Plan    She has history of end-stage renal disease and she is on hemodialysis Monday Wednesday and Friday.  She expressed that sometimes she misses her hemodialysis.  Nephrology was consulted and she underwent hemodialysis due to hyperkalemia.  Appreciate nephrology recommendations.     HTN (hypertension)- (present on admission)   Assessment & Plan    She has history of hypertension.  I resume her outpatient medication.  Held labetalol as I started her on metoprolol.  I started her on IV hydralazine 10 mg as needed with parameters for blood pressure control.     Hyperkalemia- (present on admission)   Assessment & Plan    She found to have  hyperkalemia and repeat some level 7.2 and she underwent emergent hemodialysis.  I ordered repeat BMP after hemodialysis is within normal limits.     Pulmonary infiltrate   Assessment & Plan    Noted on CXR  She aidan any respiratory symptoms so I have discontinued her abx     Paroxysmal atrial fibrillation (HCC)   Assessment & Plan    She has a history of AVNRT status post ablation.  cont metoprolol 25 mg twice daily and IV metoprolol IV 5 mg PRN  Back to NSR  I will discuss with her about AC   [id number]: 2  Recommendations:   Total Score 0 = Low risk of thromboembolic stroke. Recommend aspirin 81 to 325 mg daily.   Total Score 1 = Intermediate risk of thromboembolic stroke. Recommend aspirin (81 to 325 mg daily) or warfarin (INR 2.0-3.0) depending on patient preference.   Total Score 2 or more = Elevated risk of thromboembolic stroke. Recommend warfarin (INR 2.0-3.0) unless contraindicated.             VTE prophylaxis: Heparin

## 2019-06-21 NOTE — PROGRESS NOTES
Stat EKG showed afib/flutter with . 5mg IV metoprolol once given per MD Champion order. Lab at bedside.

## 2019-06-21 NOTE — PROGRESS NOTES
2 RN skin check complete with TOM Fried.  Devices in place N/A.  Skin assessed under devices N/A.  Confirmed pressure ulcers found on N/A.  New potential pressure ulcers noted on N/A. Wound consult placed N/A    Left upper arm fistula with some bruising around site.   Right BKA clean, dry, intact.   All skin clean, dry, intact. No areas of skin breakdown noted.     The following interventions in place patient turns self side to side, pillows in use for support and positioning.

## 2019-06-21 NOTE — ASSESSMENT & PLAN NOTE
She has history of hypertension.  I resume her outpatient medication.  Held labetalol as I started her on metoprolol.  I started her on IV hydralazine 10 mg as needed with parameters for blood pressure control.

## 2019-06-21 NOTE — PROGRESS NOTES
Spoke with MD Salinas about patient HR sustaining above 130 despite one time IV metoprolol dose, still in afib/flutter. New orders received for Cardizem. No anticoags orders at this time, will continue to monitor HR.

## 2019-06-21 NOTE — CARE PLAN
Problem: Infection  Goal: Will remain free from infection    Intervention: Assess signs and symptoms of infection  Assess daily labs, vital signs, and symptoms pt exhibits.      Problem: Knowledge Deficit  Goal: Knowledge of disease process/condition, treatment plan, diagnostic tests, and medications will improve    Intervention: Assess knowledge level of disease process/condition, treatment plan, diagnostic tests, and medications  RN will assess knowledge of disease process and provide education as appropriate.

## 2019-06-21 NOTE — H&P
Hospital Medicine History & Physical Note    Date of Service  6/20/2019    Primary Care Physician  Juan Moreno M.D.    Consultants  Surgery Dr. Davenport    Code Status  Full    Chief Complaint  Hyperkalemia    History of Presenting Illness  54 y.o. female with past medical history of end-stage renal disease on hemodialysis who presented to the hospital on 6/20/2019 for elective laparoscopic cholecystectomy.  Patient found to have potassium level of 6.9 and repeated potassium level further increased to 7.2.  Patient last hemodialysis was 3 days ago.  Dr. Davenport was requested me to admit this patient for further evaluation and monitoring.  I evaluated and examined this patient at bedside she expressed that sometimes she missed her hemodialysis.  She underwent hemodialysis this afternoon after nephrology was evaluated her.  At that time I evaluated her she found to have A. fib/a flutter and she reported palpitation along with that she expressed that she has been having chest pain.  She has a history of AVNRT status post ablation.  I ordered a stat EKG and on my evaluation it shows A. fib/a flutter with rate in 150s.  She reported chest pain 3/10 and is nonradiating.  I ordered a stat troponin, BMP and magnesium.  She denies any severe pain in her right upper quadrant for her cholelithiasis.    I discussed about this admission with surgeon Dr. Davenport    Review of Systems  Review of Systems   Constitutional: Negative for chills, fever and weight loss.   HENT: Negative for hearing loss and tinnitus.    Eyes: Negative for blurred vision, double vision, photophobia and pain.   Respiratory: Negative for cough, sputum production and shortness of breath.    Cardiovascular: Positive for chest pain. Negative for palpitations, orthopnea and leg swelling.   Gastrointestinal: Positive for abdominal pain (Very mild right upper quadrant). Negative for constipation, diarrhea, nausea and vomiting.   Genitourinary: Negative  for dysuria, frequency and urgency.   Musculoskeletal: Negative for back pain, joint pain, myalgias and neck pain.   Skin: Negative for rash.   Neurological: Negative for dizziness, tingling, tremors, sensory change, speech change, focal weakness and headaches.   Psychiatric/Behavioral: Negative for hallucinations and substance abuse.   All other systems reviewed and are negative.      Past Medical History   has a past medical history of Anemia; Anemia associated with chronic renal failure (11/5/2009); Dental disorder (8-25-17); Dialysis patient ([facility]) (8-25-17); Dysrhythmia; ESRD (end stage renal disease) ([facility]) (8-25-17); Glomerulonephritis, chronic (11/5/2009); Heart burn; High cholesterol; HTN (hypertension) (11/5/2009); Port catheter in place (8-25-17); SVT (supraventricular tachycardia) ([facility]); and SVT (supraventricular tachycardia) ([facility]).    Surgical History   has a past surgical history that includes amputation, below the knee; capitation payment (insurance); appendectomy laparoscopic (5/4/2009); pr enlarge breast with implant; av fistula creation (Left, 6/20/2017); other; femur orif (10/9/08); iliac bone graft (10/9/08); av fistula creation (Left, 8/28/2017); and av fistula creation (Left, 4/27/2018).     Family History  family history includes Heart Disease in her unknown relative.     Social History   reports that she has been smoking Cigarettes.  She has a 5.00 pack-year smoking history. She has never used smokeless tobacco. She reports that she does not drink alcohol or use drugs.    Allergies  Allergies   Allergen Reactions   • Sulfa Drugs Hives     [id number] rash swelling itching       Medications  Prior to Admission Medications   Prescriptions Last Dose Informant Patient Reported? Taking?   albuterol 108 (90 Base) MCG/ACT Aero Soln inhalation aerosol 6/20/2019 at 0730 Patient Yes Yes   Sig: Inhale 1 Puff by mouth 2 times a day as needed for Shortness of Breath.   amLODIPine (NORVASC) 10 MG Tab 6/20/2019  at 0800 Patient Yes Yes   Sig: Take 10 mg by mouth every day.   gabapentin (NEURONTIN) 100 MG Cap 6/20/2019 at 0800 Patient No No   Sig: Take 1 Cap by mouth 2 Times a Day.   hydrALAZINE (APRESOLINE) 25 MG Tab 6/20/2019 at 0800 Patient No No   Sig: Take 1 Tab by mouth every 8 hours.   labetalol (NORMODYNE) 200 MG Tab 6/20/2019 at 0800 Patient No No   Sig: TAKE 1 TABLET BY MOUTH TWICE DAILY   sevelamer carbonate (RENVELA) 800 MG Tab tablet 6/19/2019 at 2230 Patient Yes Yes   Sig: Take 800 mg by mouth 2 Times a Day.   terazosin (HYTRIN) 2 MG Cap 6/19/2019 at 2230 Patient Yes Yes   Sig: Take 2 mg by mouth every evening.      Facility-Administered Medications: None       Physical Exam  Temp:  [36.2 °C (97.2 °F)-36.7 °C (98 °F)] 36.2 °C (97.2 °F)  Pulse:  [84-85] 85  Resp:  [18-19] 18  BP: (169-192)/(93-99) 192/95  SpO2:  [92 %] 92 %    Physical Exam   Constitutional: She is oriented to person, place, and time. No distress.   HENT:   Head: Normocephalic and atraumatic.   Eyes: Pupils are equal, round, and reactive to light. Right eye exhibits no discharge. Left eye exhibits no discharge.   Neck: Normal range of motion. Neck supple.   Cardiovascular: Normal heart sounds.  Exam reveals no friction rub.    No murmur heard.  Tachycardia  Irregularly irregular   Pulmonary/Chest: Effort normal and breath sounds normal. No respiratory distress. She has no wheezes. She has no rales.   Abdominal: Soft. Bowel sounds are normal. She exhibits no distension. There is no tenderness. There is no rebound.   Musculoskeletal: Normal range of motion. She exhibits no edema or tenderness.   Right BKA   Neurological: She is alert and oriented to person, place, and time. No cranial nerve deficit.   Skin: Skin is warm and dry. No rash noted. She is not diaphoretic. No erythema.   Psychiatric: She has a normal mood and affect. Her behavior is normal.       Laboratory:  Recent Labs      06/20/19   0950   WBC  8.6   RBC  2.83*   HEMOGLOBIN  8.7*    HEMATOCRIT  27.2*   MCV  96.1   MCH  30.7   MCHC  32.0*   RDW  50.8*   PLATELETCT  167   MPV  10.0     Recent Labs      06/20/19   0950  06/20/19   1127   SODIUM  138   --    POTASSIUM  6.7*  7.2*   CHLORIDE  96   --    CO2  29   --    GLUCOSE  99   --    BUN  77*   --    CREATININE  10.29*   --    CALCIUM  10.5   --      Recent Labs      06/20/19   0950   GLUCOSE  99                 No results for input(s): TROPONINI in the last 72 hours.    Urinalysis:    No results found     Imaging:  DX-CHEST-LIMITED (1 VIEW)   Final Result         1.  Bilaterally base opacity suggesting infiltrate.   2.  Trace left pleural effusion   3.  Perihilar interstitial prominence and bronchial wall cuffing, appearance suggests changes of underlying bronchial inflammation, consider bronchitis.            Assessment/Plan:  I anticipate this patient is appropriate for observation status at this time.    ESRD on hemodialysis (HCC)- (present on admission)   Assessment & Plan    She has history of end-stage renal disease and she is on hemodialysis Monday Wednesday and Friday.  She expressed that sometimes she misses her hemodialysis.  Nephrology was consulted and she underwent hemodialysis due to hyperkalemia.  Appreciate nephrology recommendations.     HTN (hypertension)- (present on admission)   Assessment & Plan    She has history of hypertension.  I resume her outpatient medication.  Held labetalol as I started her on metoprolol.  I started her on IV hydralazine 10 mg as needed with parameters for blood pressure control.     Hyperkalemia- (present on admission)   Assessment & Plan    She found to have hyperkalemia and repeat some level 7.2 and she underwent emergent hemodialysis.  I ordered repeat BMP after hemodialysis is within normal limits.     Pulmonary infiltrate   Assessment & Plan    Due to her chest pain I ordered chest x-ray and she found to have bilateral pulmonary infiltrate.  I started her on ceftriaxone and  doxycycline.  Continue monitor on telemetry floor and de-escalate antibiotic if she will remain afebrile     Atrial fibrillation (HCC)   Assessment & Plan    RN notified me that patient monitor showed A. fib/a flutter with heart rate in 150s.  I evaluated the bedside and I ordered a stat EKG which showed possible A. fib/a flutter.  She has a history of AVNRT status post ablation.  I started her on metoprolol 25 mg twice daily.  I ordered metoprolol IV 5 mg times once and I reevaluated her at the bedside and she responded to IV metoprolol.  Her chadsvasc score is high and she may need anticoagulation for her possible paroxysmal A. fib.  She may need consult with cardiology in the morning if she continue to have afib.         VTE prophylaxis: Heparin

## 2019-06-21 NOTE — RESPIRATORY CARE
COPD EDUCATION by COPD CLINICAL EDUCATOR  6/21/2019 at 7:09 AM by Katina Lo     Patient reviewed by COPD education team. Patient does not have an order for Respiratory Care Protocol, therefore the COPD education team cannot treat.

## 2019-06-21 NOTE — DIETARY
"Nutrition services: Day 0 of admit.  Isabelle Coyle is a 54 y.o. female with admitting DX of cholelithiasis, abdominal pain, and hyperkalemia.  Pt noted with wt loss on nutrition admit screen.  Pt appears thin but nourished.  RD visited pt at bedside to discuss appetite and wt hx.  Pt was short.  Pt reports no changes in appetite and denies n/v, abdominal pain, diarrhea, or constipation.  Lunch tray was 50-75% consumed at time of RD visit.  When asked if pt has experienced a gain or loss in wt pt reports, \"I have only been here for one day.\"  RD clarified if wt changes have happened over the past year, 6 months, 3 weeks, etc. and pt denied - she assumes her wt has been stable.  No needs or questions.    Assessment:  Height: 157.5 cm (5' 2\")  Weight: 59 kg (130 lb 1.1 oz) - via bed scale.   Body mass index is 23.79 kg/m²., BMI classification: WNL.   Diet/Intake: Renal.     Evaluation:   1. Pt noted with ESRD on HD, HTN, hyperkalemia, a-fib, and pulmonary infiltrate.  2. Pt receives HD M/W/F schedule.   3. Pt was scheduled for elective lap yamileth however this was cancelled 6/20.  4. Per Nephrology progress notes, pt noted with chronic diarrhea.   5. Per chart review, pt weighed 61.4 kg via stand up scale on 12/9/18, 56.7 kg via stand up scale on 4/13/19, and wt upon current admit is 59.7 kg via stand up scale.  Wt appears stable.  6. Labs: Glucose: 119, BUN: 40, Creat.: 5.27, Phosphorus: 5.0.  7. Meds: Rocephin, Senokot, Renvela.    Malnutrition Risk: No risk identified at this time.     Recommendations/Plan:  1. Encourage intake of meals.  2. Document intake of all meals as % taken in ADL's to provide interdisciplinary communication across all shifts.   3. Monitor weight.  4. Nutrition rep will continue to see patient for ongoing meal and snack preferences.     RD available as needed.            "
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
## 2019-06-21 NOTE — PROGRESS NOTES
Report received from day shift RN, assumed care of pt. Pt transported to room from dialysis. Pt A&O4, in no apparent distress. Plan of care discussed with pt, no questions or needs at this time. Tele box on, monitor room notified. Monitor room called RN at 1858 for patient going into afib/flutter. Day shift RN paged on call hospitalist for update. Call light within reach, bed locked and in lowest position. All fall precautions and hourly rounding in place. Will continue to monitor.

## 2019-06-21 NOTE — DISCHARGE PLANNING
Outpatient Dialysis Note    Confirmed patient is active at:    [facility]  [address] [address]       Schedule: Monday, Wednesday, Friday  Time: 6:40 am    Spoke with Jim at facility who confirmed.    Forwarded records for review.    Dialysis Coordinator, [facility]  Alexia Ayoub [phone]

## 2019-06-21 NOTE — PROGRESS NOTES
Monitor Summary:    Converted to Afib/flutter at 1843, -159  Converted to SR at 2107, HR 83-89  .14/.08/.40

## 2019-06-21 NOTE — ASSESSMENT & PLAN NOTE
She has history of end-stage renal disease and she is on hemodialysis Monday Wednesday and Friday.  She expressed that sometimes she misses her hemodialysis.  Nephrology was consulted and she underwent hemodialysis due to hyperkalemia.  Appreciate nephrology recommendations.

## 2019-06-21 NOTE — PROGRESS NOTES
Pt A&Ox4. (R)BKA  HD today/ M/W/F scheduled  Up 1PA wheelchair to bathroom.  Plan; NPO at midnight foir yoel carson  Pt remains in NSR.

## 2019-06-21 NOTE — CARE PLAN
Problem: Safety  Goal: Will remain free from falls  Outcome: PROGRESSING AS EXPECTED  Pt fall risk assessment completed. Pt educated on fall program rationale and verbalized understanding. Bed is locked and in lowest position, non-skid socks in place. All fall precautions and hourly rounding in place.     Problem: Infection  Goal: Will remain free from infection  Outcome: PROGRESSING AS EXPECTED  Hand hygiene completed before and after patient care. Pt educated about infection prevention and verbalized understanding. RN follows all protocols for infection prevention.

## 2019-06-21 NOTE — ASSESSMENT & PLAN NOTE
She has a history of AVNRT status post ablation.  cont metoprolol 25 mg twice daily and IV metoprolol IV 5 mg PRN  Back to NSR  I have started her on coumadin and placed AC clinic referral   [id number]: 2  Recommendations:   Total Score 0 = Low risk of thromboembolic stroke. Recommend aspirin 81 to 325 mg daily.   Total Score 1 = Intermediate risk of thromboembolic stroke. Recommend aspirin (81 to 325 mg daily) or warfarin (INR 2.0-3.0) depending on patient preference.   Total Score 2 or more = Elevated risk of thromboembolic stroke. Recommend warfarin (INR 2.0-3.0) unless contraindicated.

## 2019-06-22 ENCOUNTER — APPOINTMENT (OUTPATIENT)
Dept: RADIOLOGY | Facility: MEDICAL CENTER | Age: 54
DRG: 417 | End: 2019-06-22
Attending: SURGERY
Payer: MEDICAID

## 2019-06-22 ENCOUNTER — ANESTHESIA (OUTPATIENT)
Dept: SURGERY | Facility: MEDICAL CENTER | Age: 54
DRG: 417 | End: 2019-06-22
Payer: MEDICAID

## 2019-06-22 ENCOUNTER — ANESTHESIA EVENT (OUTPATIENT)
Dept: SURGERY | Facility: MEDICAL CENTER | Age: 54
DRG: 417 | End: 2019-06-22
Payer: MEDICAID

## 2019-06-22 ENCOUNTER — PREP FOR PROCEDURE (OUTPATIENT)
Dept: TELEMETRY | Facility: MEDICAL CENTER | Age: 54
End: 2019-06-22

## 2019-06-22 LAB
ANION GAP SERPL CALC-SCNC: 11 MMOL/L (ref 0–11.9)
BUN SERPL-MCNC: 31 MG/DL (ref 8–22)
CALCIUM SERPL-MCNC: 10.5 MG/DL (ref 8.5–10.5)
CHLORIDE SERPL-SCNC: 98 MMOL/L (ref 96–112)
CO2 SERPL-SCNC: 28 MMOL/L (ref 20–33)
CREAT SERPL-MCNC: 4.87 MG/DL (ref 0.5–1.4)
GLUCOSE SERPL-MCNC: 96 MG/DL (ref 65–99)
INR PPP: 1.05 (ref 0.87–1.13)
POTASSIUM SERPL-SCNC: 4.5 MMOL/L (ref 3.6–5.5)
PROTHROMBIN TIME: 13.9 SEC (ref 12–14.6)
SODIUM SERPL-SCNC: 137 MMOL/L (ref 135–145)

## 2019-06-22 PROCEDURE — A9270 NON-COVERED ITEM OR SERVICE: HCPCS | Performed by: HOSPITALIST

## 2019-06-22 PROCEDURE — 85610 PROTHROMBIN TIME: CPT

## 2019-06-22 PROCEDURE — A9270 NON-COVERED ITEM OR SERVICE: HCPCS | Performed by: INTERNAL MEDICINE

## 2019-06-22 PROCEDURE — 700102 HCHG RX REV CODE 250 W/ 637 OVERRIDE(OP): Performed by: INTERNAL MEDICINE

## 2019-06-22 PROCEDURE — 700102 HCHG RX REV CODE 250 W/ 637 OVERRIDE(OP): Performed by: HOSPITALIST

## 2019-06-22 PROCEDURE — 160009 HCHG ANES TIME/MIN: Performed by: SURGERY

## 2019-06-22 PROCEDURE — 88304 TISSUE EXAM BY PATHOLOGIST: CPT

## 2019-06-22 PROCEDURE — 700105 HCHG RX REV CODE 258: Performed by: ANESTHESIOLOGY

## 2019-06-22 PROCEDURE — 160002 HCHG RECOVERY MINUTES (STAT): Performed by: SURGERY

## 2019-06-22 PROCEDURE — A9270 NON-COVERED ITEM OR SERVICE: HCPCS | Performed by: ANESTHESIOLOGY

## 2019-06-22 PROCEDURE — BF131ZZ FLUOROSCOPY OF GALLBLADDER AND BILE DUCTS USING LOW OSMOLAR CONTRAST: ICD-10-PCS | Performed by: SURGERY

## 2019-06-22 PROCEDURE — 501838 HCHG SUTURE GENERAL: Performed by: SURGERY

## 2019-06-22 PROCEDURE — 501583 HCHG TROCAR, THRD CAN&SEAL 5X100: Performed by: SURGERY

## 2019-06-22 PROCEDURE — 160035 HCHG PACU - 1ST 60 MINS PHASE I: Performed by: SURGERY

## 2019-06-22 PROCEDURE — 501570 HCHG TROCAR, SEPARATOR: Performed by: SURGERY

## 2019-06-22 PROCEDURE — 0FT44ZZ RESECTION OF GALLBLADDER, PERCUTANEOUS ENDOSCOPIC APPROACH: ICD-10-PCS | Performed by: SURGERY

## 2019-06-22 PROCEDURE — 160039 HCHG SURGERY MINUTES - EA ADDL 1 MIN LEVEL 3: Performed by: SURGERY

## 2019-06-22 PROCEDURE — 80048 BASIC METABOLIC PNL TOTAL CA: CPT

## 2019-06-22 PROCEDURE — 160036 HCHG PACU - EA ADDL 30 MINS PHASE I: Performed by: SURGERY

## 2019-06-22 PROCEDURE — 700101 HCHG RX REV CODE 250: Performed by: SURGERY

## 2019-06-22 PROCEDURE — 500868 HCHG NEEDLE, SURGI(VARES): Performed by: SURGERY

## 2019-06-22 PROCEDURE — 160028 HCHG SURGERY MINUTES - 1ST 30 MINS LEVEL 3: Performed by: SURGERY

## 2019-06-22 PROCEDURE — 500002 HCHG ADHESIVE, DERMABOND: Performed by: SURGERY

## 2019-06-22 PROCEDURE — 700111 HCHG RX REV CODE 636 W/ 250 OVERRIDE (IP): Performed by: ANESTHESIOLOGY

## 2019-06-22 PROCEDURE — 700102 HCHG RX REV CODE 250 W/ 637 OVERRIDE(OP): Performed by: ANESTHESIOLOGY

## 2019-06-22 PROCEDURE — 36415 COLL VENOUS BLD VENIPUNCTURE: CPT

## 2019-06-22 PROCEDURE — 99232 SBSQ HOSP IP/OBS MODERATE 35: CPT | Performed by: HOSPITALIST

## 2019-06-22 PROCEDURE — 160048 HCHG OR STATISTICAL LEVEL 1-5: Performed by: SURGERY

## 2019-06-22 PROCEDURE — 501399 HCHG SPECIMAN BAG, ENDO CATC: Performed by: SURGERY

## 2019-06-22 PROCEDURE — 700101 HCHG RX REV CODE 250: Performed by: ANESTHESIOLOGY

## 2019-06-22 PROCEDURE — 501582 HCHG TROCAR, THRD BLADED: Performed by: SURGERY

## 2019-06-22 PROCEDURE — 770020 HCHG ROOM/CARE - TELE (206)

## 2019-06-22 PROCEDURE — 502571 HCHG PACK, LAP CHOLE: Performed by: SURGERY

## 2019-06-22 PROCEDURE — 700117 HCHG RX CONTRAST REV CODE 255: Performed by: SURGERY

## 2019-06-22 RX ORDER — WARFARIN SODIUM 4 MG/1
4 TABLET ORAL
Status: DISCONTINUED | OUTPATIENT
Start: 2019-06-24 | End: 2019-06-24

## 2019-06-22 RX ORDER — HYDROMORPHONE HYDROCHLORIDE 1 MG/ML
0.4 INJECTION, SOLUTION INTRAMUSCULAR; INTRAVENOUS; SUBCUTANEOUS
Status: DISCONTINUED | OUTPATIENT
Start: 2019-06-22 | End: 2019-06-22 | Stop reason: HOSPADM

## 2019-06-22 RX ORDER — CEFAZOLIN SODIUM 1 G/3ML
INJECTION, POWDER, FOR SOLUTION INTRAMUSCULAR; INTRAVENOUS PRN
Status: DISCONTINUED | OUTPATIENT
Start: 2019-06-22 | End: 2019-06-22 | Stop reason: SURG

## 2019-06-22 RX ORDER — BUPIVACAINE HYDROCHLORIDE AND EPINEPHRINE 5; 5 MG/ML; UG/ML
INJECTION, SOLUTION EPIDURAL; INTRACAUDAL; PERINEURAL
Status: DISCONTINUED | OUTPATIENT
Start: 2019-06-22 | End: 2019-06-22 | Stop reason: HOSPADM

## 2019-06-22 RX ORDER — HALOPERIDOL 5 MG/ML
1 INJECTION INTRAMUSCULAR
Status: DISCONTINUED | OUTPATIENT
Start: 2019-06-22 | End: 2019-06-22 | Stop reason: HOSPADM

## 2019-06-22 RX ORDER — MIDAZOLAM HYDROCHLORIDE 1 MG/ML
0.5 INJECTION INTRAMUSCULAR; INTRAVENOUS
Status: DISCONTINUED | OUTPATIENT
Start: 2019-06-22 | End: 2019-06-22 | Stop reason: HOSPADM

## 2019-06-22 RX ORDER — MEPERIDINE HYDROCHLORIDE 25 MG/ML
25 INJECTION INTRAMUSCULAR; INTRAVENOUS; SUBCUTANEOUS
Status: DISCONTINUED | OUTPATIENT
Start: 2019-06-22 | End: 2019-06-22 | Stop reason: HOSPADM

## 2019-06-22 RX ORDER — SODIUM CHLORIDE, SODIUM LACTATE, POTASSIUM CHLORIDE, CALCIUM CHLORIDE 600; 310; 30; 20 MG/100ML; MG/100ML; MG/100ML; MG/100ML
INJECTION, SOLUTION INTRAVENOUS CONTINUOUS
Status: DISCONTINUED | OUTPATIENT
Start: 2019-06-22 | End: 2019-06-22 | Stop reason: HOSPADM

## 2019-06-22 RX ORDER — SODIUM CHLORIDE, SODIUM LACTATE, POTASSIUM CHLORIDE, CALCIUM CHLORIDE 600; 310; 30; 20 MG/100ML; MG/100ML; MG/100ML; MG/100ML
INJECTION, SOLUTION INTRAVENOUS
Status: DISCONTINUED | OUTPATIENT
Start: 2019-06-22 | End: 2019-06-22 | Stop reason: SURG

## 2019-06-22 RX ORDER — DOCUSATE SODIUM 100 MG/1
100 CAPSULE, LIQUID FILLED ORAL 2 TIMES DAILY
Qty: 15 CAP | Refills: 3 | Status: SHIPPED | OUTPATIENT
Start: 2019-06-22 | End: 2019-11-28

## 2019-06-22 RX ORDER — ONDANSETRON 4 MG/1
4 TABLET, FILM COATED ORAL EVERY 4 HOURS PRN
Qty: 20 TAB | Refills: 3 | Status: SHIPPED | OUTPATIENT
Start: 2019-06-22 | End: 2019-11-28

## 2019-06-22 RX ORDER — ONDANSETRON 2 MG/ML
4 INJECTION INTRAMUSCULAR; INTRAVENOUS
Status: DISCONTINUED | OUTPATIENT
Start: 2019-06-22 | End: 2019-06-22 | Stop reason: HOSPADM

## 2019-06-22 RX ORDER — WARFARIN SODIUM 6 MG/1
6 TABLET ORAL
Status: COMPLETED | OUTPATIENT
Start: 2019-06-22 | End: 2019-06-23

## 2019-06-22 RX ORDER — ONDANSETRON 2 MG/ML
INJECTION INTRAMUSCULAR; INTRAVENOUS PRN
Status: DISCONTINUED | OUTPATIENT
Start: 2019-06-22 | End: 2019-06-22 | Stop reason: SURG

## 2019-06-22 RX ORDER — HYDROCODONE BITARTRATE AND ACETAMINOPHEN 5; 325 MG/1; MG/1
1-2 TABLET ORAL EVERY 6 HOURS PRN
Status: DISCONTINUED | OUTPATIENT
Start: 2019-06-22 | End: 2019-06-23

## 2019-06-22 RX ORDER — HYDROCODONE BITARTRATE AND ACETAMINOPHEN 5; 325 MG/1; MG/1
1-2 TABLET ORAL EVERY 8 HOURS PRN
Qty: 18 TAB | Refills: 0 | Status: SHIPPED | OUTPATIENT
Start: 2019-06-22 | End: 2019-06-25

## 2019-06-22 RX ORDER — HYDROMORPHONE HYDROCHLORIDE 1 MG/ML
0.1 INJECTION, SOLUTION INTRAMUSCULAR; INTRAVENOUS; SUBCUTANEOUS
Status: DISCONTINUED | OUTPATIENT
Start: 2019-06-22 | End: 2019-06-22 | Stop reason: HOSPADM

## 2019-06-22 RX ORDER — DEXAMETHASONE SODIUM PHOSPHATE 4 MG/ML
INJECTION, SOLUTION INTRA-ARTICULAR; INTRALESIONAL; INTRAMUSCULAR; INTRAVENOUS; SOFT TISSUE PRN
Status: DISCONTINUED | OUTPATIENT
Start: 2019-06-22 | End: 2019-06-22 | Stop reason: SURG

## 2019-06-22 RX ORDER — OXYCODONE HCL 5 MG/5 ML
5 SOLUTION, ORAL ORAL
Status: COMPLETED | OUTPATIENT
Start: 2019-06-22 | End: 2019-06-22

## 2019-06-22 RX ORDER — HYDROMORPHONE HYDROCHLORIDE 1 MG/ML
0.2 INJECTION, SOLUTION INTRAMUSCULAR; INTRAVENOUS; SUBCUTANEOUS
Status: DISCONTINUED | OUTPATIENT
Start: 2019-06-22 | End: 2019-06-22 | Stop reason: HOSPADM

## 2019-06-22 RX ORDER — OXYCODONE HCL 5 MG/5 ML
10 SOLUTION, ORAL ORAL
Status: COMPLETED | OUTPATIENT
Start: 2019-06-22 | End: 2019-06-22

## 2019-06-22 RX ADMIN — SEVELAMER CARBONATE 800 MG: 800 TABLET, FILM COATED ORAL at 18:05

## 2019-06-22 RX ADMIN — GABAPENTIN 100 MG: 100 CAPSULE ORAL at 05:26

## 2019-06-22 RX ADMIN — ACETAMINOPHEN 650 MG: 325 TABLET, FILM COATED ORAL at 21:52

## 2019-06-22 RX ADMIN — DEXAMETHASONE SODIUM PHOSPHATE 8 MG: 4 INJECTION, SOLUTION INTRA-ARTICULAR; INTRALESIONAL; INTRAMUSCULAR; INTRAVENOUS; SOFT TISSUE at 09:45

## 2019-06-22 RX ADMIN — FENTANYL CITRATE 100 MCG: 50 INJECTION, SOLUTION INTRAMUSCULAR; INTRAVENOUS at 09:41

## 2019-06-22 RX ADMIN — SUCCINYLCHOLINE CHLORIDE 60 MG: 20 INJECTION, SOLUTION INTRAMUSCULAR; INTRAVENOUS at 09:45

## 2019-06-22 RX ADMIN — LIDOCAINE HYDROCHLORIDE 50 MG: 20 INJECTION, SOLUTION INTRAVENOUS at 09:45

## 2019-06-22 RX ADMIN — SODIUM CHLORIDE, POTASSIUM CHLORIDE, SODIUM LACTATE AND CALCIUM CHLORIDE: 600; 310; 30; 20 INJECTION, SOLUTION INTRAVENOUS at 09:40

## 2019-06-22 RX ADMIN — HYDRALAZINE HYDROCHLORIDE 25 MG: 25 TABLET, FILM COATED ORAL at 05:26

## 2019-06-22 RX ADMIN — OXYCODONE HYDROCHLORIDE 10 MG: 5 SOLUTION ORAL at 11:12

## 2019-06-22 RX ADMIN — HYDROMORPHONE HYDROCHLORIDE 0.4 MG: 1 INJECTION, SOLUTION INTRAMUSCULAR; INTRAVENOUS; SUBCUTANEOUS at 11:22

## 2019-06-22 RX ADMIN — ONDANSETRON 4 MG: 2 INJECTION INTRAMUSCULAR; INTRAVENOUS at 09:45

## 2019-06-22 RX ADMIN — HYDRALAZINE HYDROCHLORIDE 25 MG: 25 TABLET, FILM COATED ORAL at 15:42

## 2019-06-22 RX ADMIN — PROPOFOL 110 MG: 10 INJECTION, EMULSION INTRAVENOUS at 09:45

## 2019-06-22 RX ADMIN — SENNOSIDES AND DOCUSATE SODIUM 2 TABLET: 8.6; 5 TABLET ORAL at 18:05

## 2019-06-22 RX ADMIN — METOPROLOL TARTRATE 25 MG: 25 TABLET, FILM COATED ORAL at 18:06

## 2019-06-22 RX ADMIN — TERAZOSIN HYDROCHLORIDE ANHYDROUS 2 MG: 2 CAPSULE ORAL at 21:53

## 2019-06-22 RX ADMIN — METOPROLOL TARTRATE 25 MG: 25 TABLET, FILM COATED ORAL at 05:26

## 2019-06-22 RX ADMIN — SODIUM CHLORIDE, POTASSIUM CHLORIDE, SODIUM LACTATE AND CALCIUM CHLORIDE: 600; 310; 30; 20 INJECTION, SOLUTION INTRAVENOUS at 11:25

## 2019-06-22 RX ADMIN — ROCURONIUM BROMIDE 10 MG: 10 INJECTION, SOLUTION INTRAVENOUS at 09:45

## 2019-06-22 RX ADMIN — HYDROMORPHONE HYDROCHLORIDE 0.4 MG: 1 INJECTION, SOLUTION INTRAMUSCULAR; INTRAVENOUS; SUBCUTANEOUS at 12:51

## 2019-06-22 RX ADMIN — HYDROCODONE BITARTRATE AND ACETAMINOPHEN 2 TABLET: 5; 325 TABLET ORAL at 19:16

## 2019-06-22 RX ADMIN — AMLODIPINE BESYLATE 10 MG: 10 TABLET ORAL at 05:26

## 2019-06-22 RX ADMIN — MEPERIDINE HYDROCHLORIDE 25 MG: 25 INJECTION INTRAMUSCULAR; INTRAVENOUS; SUBCUTANEOUS at 11:13

## 2019-06-22 RX ADMIN — EPHEDRINE SULFATE 15 MG: 50 INJECTION INTRAMUSCULAR; INTRAVENOUS; SUBCUTANEOUS at 10:20

## 2019-06-22 RX ADMIN — FENTANYL CITRATE 25 MCG: 50 INJECTION, SOLUTION INTRAMUSCULAR; INTRAVENOUS at 10:20

## 2019-06-22 RX ADMIN — HYDRALAZINE HYDROCHLORIDE 25 MG: 25 TABLET, FILM COATED ORAL at 21:53

## 2019-06-22 RX ADMIN — CEFAZOLIN 1 G: 330 INJECTION, POWDER, FOR SOLUTION INTRAMUSCULAR; INTRAVENOUS at 09:50

## 2019-06-22 RX ADMIN — WARFARIN SODIUM 6 MG: 6 TABLET ORAL at 18:06

## 2019-06-22 RX ADMIN — GABAPENTIN 100 MG: 100 CAPSULE ORAL at 18:05

## 2019-06-22 ASSESSMENT — ENCOUNTER SYMPTOMS
ABDOMINAL PAIN: 0
PALPITATIONS: 0
CHILLS: 0
DIZZINESS: 0
FEVER: 0
VOMITING: 0
NAUSEA: 0
HEADACHES: 0

## 2019-06-22 ASSESSMENT — CHA2DS2 SCORE
PRIOR STROKE OR TIA OR THROMBOEMBOLISM: NO
CHF OR LEFT VENTRICULAR DYSFUNCTION: NO
DIABETES: NO
VASCULAR DISEASE: NO
SEX: FEMALE
AGE 65 TO 74: NO
HYPERTENSION: YES
CHA2DS2 VASC SCORE: 2
AGE 75 OR GREATER: NO

## 2019-06-22 ASSESSMENT — LIFESTYLE VARIABLES: ALCOHOL_USE: NO

## 2019-06-22 ASSESSMENT — PAIN SCALES - GENERAL: PAIN_LEVEL: 0

## 2019-06-22 NOTE — PROGRESS NOTES
Hemodialysis ordered by Dr Rodriguez for 3hrs on 2K acid bath.  Treatment initiated at 0820 and ended at 1120.   Pt tolerated tx well. Net UF 2000ml. Please see flowsheet for details.  Report given to staff RNSUNG.    Pre and post tx, LUAAVF positive for bruit and thrill. Access needles removed; manual pressure held for 10mins; sites covered with clean and dry dressing, CDI.

## 2019-06-22 NOTE — ANESTHESIA PREPROCEDURE EVALUATION
Relevant Problems   (+) Acute uremia   (+) Community acquired pneumonia   (+) GERD (gastroesophageal reflux disease)   (+) HTN (hypertension)   (+) Paroxysmal atrial fibrillation (HCC)   (+) Probable COPD with acute exacerbation (HCC)       Physical Exam    Airway   Mallampati: II  TM distance: >3 FB  Neck ROM: full       Cardiovascular - normal exam  Rhythm: regular  Rate: normal  (-) murmur     Dental - normal exam         Pulmonary - normal exam  Breath sounds clear to auscultation     Abdominal    Neurological - normal exam               Anesthesia Plan    ASA 3 - emergent   ASA physical status 3 criteria: ESRD undergoing regularly scheduled dialysisASA physical status emergent criteria: compromised vital organ, limb or tissue    Plan - general       Airway plan will be ETT        Induction: intravenous    Postoperative Plan: Postoperative administration of opioids is intended.    Pertinent diagnostic labs and testing reviewed    Informed Consent:    Anesthetic plan and risks discussed with patient.    Use of blood products discussed with: patient whom consented to blood products.

## 2019-06-22 NOTE — ANESTHESIA POSTPROCEDURE EVALUATION
Patient: Isabelle Coyle    Procedure Summary     Date:  06/22/19 Room / Location:  [place] [address] / SURGERY [facility]    Anesthesia Start:  0940 Anesthesia Stop:      Procedure:  CHOLECYSTECTOMY, LAPAROSCOPIC With GRAMS (Abdomen) Diagnosis:  (symptomatic cholelithiasis )    Surgeon:  Jimbo Davenport M.D. Responsible Provider:  Georgi Hancock M.D.    Anesthesia Type:  general ASA Status:  3 - Emergent          Final Anesthesia Type: general  Last vitals  BP   Blood Pressure: (!) 167/87    Temp   36.3 °C (97.4 °F)    Pulse   Pulse: 78   Resp   17    SpO2   100 %      Anesthesia Post Evaluation    Patient location during evaluation: PACU  Patient participation: complete - patient participated  Level of consciousness: awake and alert  Pain score: 0    Airway patency: patent  Anesthetic complications: no  Cardiovascular status: hemodynamically stable  Respiratory status: acceptable  Hydration status: euvolemic    PONV: none           Nurse Pain Score: 0 (NPRS)

## 2019-06-22 NOTE — OP REPORT
General Surgery Operative Report      Date of Service:        6/22/2019    Patient Name:            Isabelle Coyle    Patient MRN:             [id number]    --------------------------------------------------------------------------------    Preoperative Diagnosis:  1) symptomatic cholelithiasis  2) dilated common bile duct    Postoperative Diagnosis:  1) symptomatic cholelithiasis  2) dilated common bile duct    Procedure Performed:  1) Laparoscopic cholecystectomy with intraoperative cholangiogram    --------------------------------------------------------------------------------    Surgeon:                    Jimbo Davenport MD    Assistant:                   Kvein JEAN-BAPTISTE    Anesthesia:               GETA, and local anesthetic    IVF:                             Per anesthesia report    UOP:                           No levy    Est. Blood Loss:       minimal     Drains:                        none     Specimens:                gallbladder for permanent pathology    Findings:                    Cholangiogram revealed a dilated common bile duct with no filling defects and contrast did enter the duodenum    Complications:          none     Wound Class: Class 3: contaminated    Disposition:               PACU in stable condition    --------------------------------------------------------------------------------    Indications:                    54 y.o. Female with right upper quadrant abdominal pain found to have symptomatic cholelithiasis and also dilated common bile duct.  An extensive PARQ conference was held with the patient, in regard to the surgical treatment of her condition. The patient was counseled regarding the benefits of the operation, namely, laparoscopic VS  cholecystectomy. The patient was made aware of the alternatives, including operative and non-operative management. The risks of bleeding, infection, damage to surrounding structures, need for reoperation, bile duct injury, stroke, MI, and  death were discussed with the patient. The patient was given a chance to ask questions, and all questions were answered. The patient demonstrated adequate understanding, seemed pleased with the plan, and wished to proceed.      Procedure in detail:  The patient was brought to the operating room and was placed in the supine position where general endotracheal anesthesia was induced. SCDs were in place and functioning. Preoperative antibiotics were given before incision time. The patient's abdomen was prepped and draped in the usual sterile fashion. A surgical timeout was held to verify the correct patient, procedure, and equipment, and everyone was in agreement.    After infiltration of local anesthetic, a skin incision was made to accommodate a 5 mm port at the umbilicus. We then used the Veress needle to access the peritoneum, and after saline drop test, we insufflated to 15 mmHg. We then placed the 5mm camera and inspected the abdomen for evidence of trauma secondary to Veress needle or port placement, and found none. General survey of all four quadrants revealed no concerning findings, including no diffuse inflammation and no masses.    Patient was placed in the reverse Trendelenburg position with right side up, and after infiltration of local anesthetic, an additional 11-mm port was inserted into the epigastrium just to the right of the midline under laparoscopic visualization. Then two 5-mm ports were inserted in the midclavicular and anterior axillary lines, in the right upper quadrant, all under direct visualization.    The gallbladder was identified, it had a few minor adhesions to the omentum. The gallbladder fundus was retracted cephalad and the infundibulum was retracted laterally using gallbladder graspers. Using blunt dissection and cautery the cystic duct was identified and circumferentially dissected.      A clip was placed distally on the cystic duct and then the duct was partially transected  creating a ductotomy.  Bile was expressed from the lumen.  A LeMaitre cholangiogram catheter was placed into the cystic duct and flushed easily.  Cholangiogram was then performed showing the common bile duct entering the duodenum normally.  The duct appeared dilated however there did not appear to be any filling defects.  A clip and Endoloop were placed on the proximal duct and the duct was divided.      Further dissection of Calot's triangle was carried out until the cystic artery was identified and circumferentially dissected. The cystic artery was clipped three times and divided.  The gallbladder was dissected free from the liver bed with cautery. After ensuring gallbladder bed hemostasis with cautery, we removed the gallbladder and placed it in an endocatch bag, and removed it from the epigastric port site.    The right upper quadrant was irrigated copiously with normal saline solution. We inspected the cystic duct and artery stumps, and found them to be intact. The liver bed was hemostatic.    The trocars were removed under direct visualization.    The fascia of the epigastric port site was closed with 0-vicryl suture.  All incisions were closed with running subcuticular suture and skin glue was applied    All sponge, needle, and instrument counts were reported as correct at the end of the procedure. Patient was extubated and sent to the recovery room in stable and satisfactory condition.    Jimbo Davenport MD  [facility]  [phone]  Cell: [phone]

## 2019-06-22 NOTE — PROGRESS NOTES
Surgery    No acute changes  Potassium 4.5  OR today for laparoscopic cholecystectomy with intraoperative cholangiogram    Jimbo Davenport MD  [facility] (General and Vascular Surgery)  Cell: [phone] (text or call is fine, if you don't reach me please try my office)  Office: [phone]  __________________________________________________________________  Patient:Isabelle Coyle   MRN:[id number]   CSN:[id number]    6/22/2019    8:46 AM

## 2019-06-22 NOTE — ANESTHESIA TIME REPORT
Anesthesia Start and Stop Event Times     Date Time Event    6/22/2019 0940 Anesthesia Start        Responsible Staff  06/22/19    Name Role Begin End    Georgi Hancock M.D. Anesth 0940         Preop Diagnosis (Free Text):  Pre-op Diagnosis     symptomatic cholelithiasis         Preop Diagnosis (Codes):  Diagnosis Information     Diagnosis Code(s):         Post op Diagnosis  Cholelithiasis      Premium Reason  E. Weekend    Comments:

## 2019-06-22 NOTE — PROGRESS NOTES
Received bedside report from RN Yolanda, pt care assumed, VSS, pt assessment complete. Pt AAOx4, no c/o pain at this time. No signs of acute distress noted at this time. POC discussed with pt and verbalizes no questions. Pt denies any additional needs at this time. Bed in lowest position, bed alarm off as client is up to self, pt educated on fall risk and verbalized understanding, call light within reach, hourly rounding initiated.

## 2019-06-22 NOTE — PROGRESS NOTES
Discharge Instructions: Laparoscopic Cholecystectomy  ==========================================    1. DIET:  You may wish to stay with a bland diet for the first few days after surgery but then you can resume your regular diet as soon as you feel ready.    2. ACTIVITIES: After discharge from the hospital, you may resume routine activities including walking and going u/down stairs. However, no strenuous activity and no lifting greater than 15 pounds for two weeks.    3. DRIVING: You may drive whenever you are off pain medications and are able to perform the activities needed to drive, i.e. turning, bending, twisting, etc.    4. BATHING: OK to shower starting one day after surgery but do not soak the incisions in tub/pool for 1 week.  The incisions are covered with skin glue which is waterproof.  It will start to peel off in 5-7 days which is normal.    5. BOWEL FUNCTION: A few patients, after this operation, will develop either frequent or loose stools after meals. This usually corrects itself after a few days to a few weeks. If this occurs, do not worry; it is common and will resolve. You could also experience constipation due to pain medication and decreased activity level. If you feel this is occurring, please take an over-the-counter laxative (Milk of Magnesia, Ex-Lax, Senokot, etc.) until the problem has resolved.    6. PAIN MEDICATION: You will be given a prescription for pain medication at discharge. Please take these as directed. It is important to remember not to take medications on an empty stomach as this may cause nausea.  You can transition from the prescription-strength pain medication to over-the-counter medications as your pain improves, such as tylenol, ibuprofen, or Aleve.    7.CALL IF YOU HAVE: (1) Fevers to more than 101.5 F, (2) Unusual chest or leg pain, (3) Drainage or fluid from incision that may be foul smelling, increased tenderness or soreness at the wound or the wound edges are no longer  together, redness or swelling at the incision site. Please do not hesitate to call with any other questions or concerns that arise.     8. APPOINTMENT: Contact our office at [phone] to schedule a follow-up appointment about 2 weeks following your procedure.    Office address:  Jimbo Davenport M.D. - [facility]  [address] [place] [address] [address] [facility] [address]  [phone]

## 2019-06-22 NOTE — ANESTHESIA PROCEDURE NOTES
Airway  Date/Time: 6/22/2019 9:46 AM  Performed by: BEE WORRELL  Authorized by: BEE WORRELL     Location:  OR  Urgency:  Elective  Indications for Airway Management:  Anesthesia  Spontaneous Ventilation: absent    Sedation Level:  Deep  Preoxygenated: Yes    Patient Position:  Sniffing  Mask Difficulty Assessment:  0 - not attempted  Final Airway Type:  Endotracheal airway  Final Endotracheal Airway:  ETT  Cuffed: Yes    Technique Used for Successful ETT Placement:  Direct laryngoscopy  Insertion Site:  Oral  Blade Type:  Fernando  Laryngoscope Blade/Videolaryngoscope Blade Size:  3  ETT Size (mm):  6.5  Measured from:  Teeth  ETT to Teeth (cm):  20  Placement Verified by: auscultation and capnometry    Cormack-Lehane Classification:  Grade I - full view of glottis  Number of Attempts at Approach:  1  Number of Other Approaches Attempted:  0

## 2019-06-22 NOTE — PROGRESS NOTES
Inpatient Anticoagulation Service Note    Date: 6/22/2019    Reason for Anticoagulation: Atrial Fibrillation   Target INR: 2.0 to 3.0     Hemoglobin Value: (!) 8.5  Hematocrit Value: (!) 27.4    INR from last 7 days     Date/Time INR Value    06/22/19 1437  1.05        Dose from last 7 days     Date/Time Dose (mg)    06/22/19 1536  6        Significant Interactions: Not Applicable  Bridge Therapy: No     HPI: New start warfarin for pAF ([id number] VASc 2, low risk). No bridge therapy.  Assessment: INR at baseline 1.05. No major DDIs identified.   Plan: Warfarin 6mg po x 2 days followed by warfarin 4mg po daily pending INR trend. Customized dosing regimen yet to be determined for patient. Plan for outpatient follow-up with [facility].     Education Material Provided?: No     Pharmacist suggested discharge dosing: Warfarin 4mg po daily.      Taisha Borden, PharmD

## 2019-06-22 NOTE — CARE PLAN
Problem: Infection  Goal: Will remain free from infection  Proper hand hygiene before and after patient care to ensure patient will remain free from infection. Encouraged client to practice hand hygiene. Patient provided with supplies for oral hygiene; discussed the importance of brushing teeth x4/day. Discussed risk of hospital acquired infections.     Problem: Knowledge Deficit  Goal: Knowledge of the prescribed therapeutic regimen will improve  Outcome: PROGRESSING AS EXPECTED  Provided patient with a verbal discussion and printed handout concerning laparoscopic cholecystectomy scheduled for today; 6/22/2019. Patient verbalized understanding and had no further questions or concerns.

## 2019-06-22 NOTE — PROGRESS NOTES
[facility] Progress Note               Author: Rupinder KRISTEL Shields Date & Time created: 6/22/2019  12:28 PM     Interval History:  HPI  The patient is a very pleasant 54-year-old female  known to us from outpatient dialysis care.  She receives her maintenance hemodialysis on a Monday, Wednesday, and Friday schedule at [facility].  She has a left upper extremity AV fistula.  Her last treatment was Monday, described as a complete treatment.  She was admitted to same day surgery earlier today by Dr. Davenport for an elective laparoscopic   cholecystectomy.  Preoperative labs, however, came back finding of potassium of 6.9.  It was repeated and came back at 7.2.  The surgery was canceled and she is admitted for treatment of her hyperkalemia.  She tells me that other than some vague upper abdominal pain, she is feeling in her usual state of health.  She has had no nausea or vomiting.  She has chronic diarrhea.  She thinks she may be eating foods that are high in potassium and did skip her dialysis on Wednesday.  She has a history of persistently elevated potassium.  She denies any shortness of breath, no chest discomfort, no palpitations, no blurry vision, no change in the mental status, no lower extremity edema.  We have been asked to see regarding her renal failure.     Interval Problem Update  6/21/19--No events, urgently dialyzed yesterday for K 7.2, improved today, seen on dialysis this am, no new complaints, BP elevated this am, noted to have paroxysmal AFib last night and then converted to NSR ~2100, remains in NSR  6/22/19--Pt seen in PACU, will be going back to floor now.  Had Lap Crystal this morning.  Had HD yesterday with 2 L UF. Pt may go home today, but likely tomorrow.       Review of Systems:  ROS  Constitutional: Positive for malaise/fatigue. Negative for chills and fever.   HENT: Negative.    Eyes: Negative.    Respiratory: Negative.    Cardiovascular: Negative.     Gastrointestinal: Positive for diarrhea. Negative for abdominal pain, nausea and vomiting.   Genitourinary: Negative.    Musculoskeletal: Negative.    Skin: Negative.    Neurological: Negative for dizziness, focal weakness, weakness and headaches.   Endo/Heme/Allergies: Negative.    Psychiatric/Behavioral: Negative.       Physical Exam:  Physical Exam  Constitutional: She is oriented to person, place, and time. She appears well-developed and well-nourished. No distress.   Appears chronically ill   HENT:   Head: Normocephalic and atraumatic.   Mouth/Throat: No oropharyngeal exudate.   Eyes: Pupils are equal, round, and reactive to light. Conjunctivae and EOM are normal. No scleral icterus.   Neck: Normal range of motion. Neck supple. No thyromegaly present.   Cardiovascular: Normal rate and regular rhythm.    No murmur heard.  Pulmonary/Chest: Effort normal and breath sounds normal. No respiratory distress. She has no wheezes.   Abdominal: Soft. Bowel sounds are normal. She exhibits no distension. There is tenderness.  Lap incisions C/D/I  Musculoskeletal: Normal range of motion. She exhibits no edema, tenderness or deformity.   Neurological: She is alert and oriented to person, place, and time. She exhibits normal muscle tone.   Skin: Skin is warm and dry. No erythema.   Psychiatric: She has a normal mood and affect. Her behavior is normal.   Nursing note and vitals reviewed.     Labs:      Recent Labs      06/20/19 1933   TROPONINI  0.04     Recent Labs      06/20/19 1933 06/21/19 0202 06/22/19   0243   SODIUM  137  138  137   POTASSIUM  4.2  5.0  4.5   CHLORIDE  97  96  98   CO2  28  32  28   BUN  23*  40*  31*   CREATININE  4.17*  5.27*  4.87*   MAGNESIUM  2.0   --    --    PHOSPHORUS   --   5.0*   --    CALCIUM  9.7  9.7  10.5     Recent Labs      06/20/19 1933 06/21/19 0202 06/22/19   0243   ALTSGPT   --   10   --    ASTSGOT   --   9*   --    ALKPHOSPHAT   --   61   --    TBILIRUBIN   --   1.1    --    GLUCOSE  92  119*  96     Recent Labs      19   0950  19   0202   RBC  2.83*  2.89*   HEMOGLOBIN  8.7*  8.5*   HEMATOCRIT  27.2*  27.4*   PLATELETCT  167  171     Recent Labs      19   0950  19   0202   WBC  8.6  6.5   NEUTSPOLYS   --   69.50   LYMPHOCYTES   --   20.20*   MONOCYTES   --   5.60   EOSINOPHILS   --   3.60   BASOPHILS   --   0.80   ASTSGOT   --   9*   ALTSGPT   --   10   ALKPHOSPHAT   --   61   TBILIRUBIN   --   1.1     Hemodynamics:  Temp (24hrs), Av.4 °C (97.5 °F), Min:36.1 °C (97 °F), Max:36.7 °C (98.1 °F)  Temperature: 36.2 °C (97.2 °F)  Pulse  Av.3  Min: 63  Max: 138Heart Rate (Monitored): 67  Blood Pressure: (!) 167/87, NIBP: 136/73     Respiratory:    Respiration: 12, Pulse Oximetry: 94 %           Fluids:    Intake/Output Summary (Last 24 hours) at 19 1228  Last data filed at 19 1125   Gross per 24 hour   Intake              290 ml   Output               25 ml   Net              265 ml     Weight: 55.8 kg (123 lb 0.3 oz)  GI/Nutrition:  Orders Placed This Encounter   Procedures   • Diet NPO     Standing Status:   Standing     Number of Occurrences:   8     Order Specific Question:   Restrict to:     Answer:   Sips with Medications [3]     Medical Decision Making, by Problem:  Active Hospital Problems    Diagnosis   • ESRD on hemodialysis (HCC) [N18.6, Z99.2]   • HTN (hypertension) [I10]   • Hyperkalemia [E87.5]   • Paroxysmal atrial fibrillation (HCC) [I48.0]   • Pulmonary infiltrate [R91.8]       ASSESSMENT:  1.  ESRD--on maintenance hemodialysis on a Monday, Wednesday, and Friday schedule.  She missed her last o/p treatment, presented with hyperkalemia  2.  Hyperkalemia--resolved with urgent HD  3.  Cough, recently quit smoking.  4.  Upper abdominal pain, had elective laparoscopic cholecystectomy today () with Dr. Davenport.  5.  Anemia secondary to chronic kidney disease.  6.  Renal osteodystrophy.  7.  Chronic hypertension, poor  control.  8.  History of supraventricular tachycardia, status post ablation.  --AFib overnight and then converted to NSR    Plan:  1.  No HD today, resume q MWF HD  2.  Discussed importance of compliant with outpt dialysis  3.  Low K diet  4.  Will start Valtessa as outpatient  5.  Monitor for AFib  6.  Dr. Davenport following - had yoel yamileth today 6/22  7.  Continue current BP meds  8.  Fluid removal with HD  9.  Increase metoprolol if BP remains elevated after HD  10. Epogen with HD  11. No dietary protein restrictions     **OK to discharge from renal standpoint if no further issues with cardiac arrhythmia. Pt to follow her regular outpt MWF dialysis schedule       Quality-Core Measures

## 2019-06-22 NOTE — PROGRESS NOTES
Pt A&Ox4. (R)BKA  HD M/W/F   Laparoscopic cholecystectomy today with Dr Davenport  Lap sites Intact with skin glue.  Pt remains in NSR.  Plan/ dc tomorrow

## 2019-06-22 NOTE — ANESTHESIA QCDR
2019 [facility] Clinical Data Registry (for Quality Improvement)     Postoperative nausea/vomiting risk protocol (Adult = 18 yrs and Pediatric 3-17 yrs)- (430 and 463)  General inhalation anesthetic (NOT TIVA) with PONV risk factors: No  Provision of anti-emetic therapy with at least 2 different classes of agents: N/A  Patient DID NOT receive anti-emetic therapy and reason is documented in Medical Record: N/A    Multimodal Pain Management- (AQI59)  Patient undergoing Elective Surgery (i.e. Outpatient, or ASC, or Prescheduled Surgery prior to Hospital Admission): No  Use of Multimodal Pain Management, two or more drugs and/or interventions, NOT including systemic opioids: N/A  Exception: Documented allergy to multiple classes of analgesics: N/A    PACU assessment of acute postoperative pain prior to Anesthesia Care End- Applies to Patients Age = 18- (ABG7)  Initial PACU pain score is which of the following: < 7/10  Patient unable to report pain score: N/A    Post-anesthetic transfer of care checklist/protocol to PACU/ICU- (426 and 427)  Upon conclusion of case, patient transferred to which of the following locations: PACU/Non-ICU  Use of transfer checklist/protocol: Yes  Exclusion: Service Performed in Patient Hospital Room (and thus did not require transfer): N/A    PACU Reintubation- (AQI31)  General anesthesia requiring endotracheal intubation (ETT) along with subsequent extubation in OR or PACU: Yes  Required reintubation in the PACU: No   Extubation was a planned trial documented in the medical record prior to removal of the original airway device:  N/A    Unplanned admission to ICU related to anesthesia service up through end of PACU care- (MD51)  Unplanned admission to ICU (not initially anticipated at anesthesia start time): No

## 2019-06-22 NOTE — CONSULTS
"General Surgery Consult    Date of Service: 6/21/2019    Consulting Physician: Jimbo Davenport M.D. [facility]    -------------------------------------------------------------------------------------------------    Chief complaint: Symptomatic cholelithiasis    HPI: This is a 54 y.o. female with symptomatic cholelithiasis who was brought in for elective cholecystectomy yesterday however she was found to be severely hyperkalemic around 7.2 and therefore was admitted and had dialysis in order to bring the potassium down surgery was postponed but now that she has been dialyzed her potassium is normal and we are planning to perform the surgery tomorrow.  Patient is alert and conversant with no complaints at this time.      Past Medical History:   Diagnosis Date   • Anemia    • Anemia associated with chronic renal failure 11/5/2009   • Dental disorder 8-25-17    \"Full Upper & Partial Lower Dentures\"   • Dialysis patient ([facility]) 8-25-17    [facility] M,W,F   • Dysrhythmia     \"SVT\"   • ESRD (end stage renal disease) ([facility]) 8-25-17    Dialysis MWF@ [facility]   • Glomerulonephritis, chronic 11/5/2009   • Heart burn    • High cholesterol    • HTN (hypertension) 11/5/2009    2017 states well controlled   • Port catheter in place 8-25-17    For Dialysis   • SVT (supraventricular tachycardia) ([facility])    • SVT (supraventricular tachycardia) ([facility])        Past Surgical History:   Procedure Laterality Date   • AV FISTULA CREATION Left 4/27/2018    Procedure: Left Brachial artery Repair;  Surgeon: Jimbo Davenport M.D.;  Location: SURGERY [facility];  Service: Vascular   • AV FISTULA CREATION Left 8/28/2017    Procedure: AV FISTULA CREATION  UPPER EXTREMITY -BRACHIAL BASALIC;  Surgeon: Glen Rodriguez M.D.;  Location: SURGERY [facility];  Service: General   • AV FISTULA CREATION Left 6/20/2017    Procedure: AV FISTULA CREATION- LEFT;  Surgeon: Jimbo Davenport M.D.;  Location: [facility]" ORS;  Service:    • APPENDECTOMY LAPAROSCOPIC  5/4/2009    Performed by BANDAR TIMMONS at SURGERY [facility] ORS   • FEMUR ORIF  10/9/08    Performed by STELLA GATES at SURGERY [facility] ORS   • ILIAC BONE GRAFT  10/9/08    Performed by STELLA GATES at SURGERY [facility] ORS   • AMPUTATION, BELOW THE KNEE     • CAPITATION PAYMENT (INSURANCE)     • OTHER      BELOW KNEE AMPUTATION RIGHT   • PB ENLARGE BREAST WITH IMPLANT         Current Facility-Administered Medications   Medication Dose Route Frequency Provider Last Rate Last Dose   • calcium carbonate (TUMS) chewable tab 1,000 mg  1,000 mg Oral TID PRN Oscar Balderas M.D.   1,000 mg at 06/21/19 1424   • hydrALAZINE (APRESOLINE) injection 10 mg  10 mg Intravenous Q6HRS PRN Alis Champion M.D.       • metoprolol (LOPRESSOR) tablet 25 mg  25 mg Oral TWICE DAILY Alis Champion M.D.   25 mg at 06/21/19 0510   • senna-docusate (PERICOLACE or SENOKOT S) 8.6-50 MG per tablet 2 Tab  2 Tab Oral BID Alis Champion M.D.        And   • polyethylene glycol/lytes (MIRALAX) PACKET 1 Packet  1 Packet Oral QDAY PRN Alis Champion M.D.        And   • magnesium hydroxide (MILK OF MAGNESIA) suspension 30 mL  30 mL Oral QDAY PRN Alis Champion M.D.        And   • bisacodyl (DULCOLAX) suppository 10 mg  10 mg Rectal QDAY PRN Alis Champion M.D.       • acetaminophen (TYLENOL) tablet 650 mg  650 mg Oral Q6HRS PRN Alis Champion M.D.       • albuterol inhaler 1 Puff  1 Puff Inhalation BID PRN Alis Champion M.D.       • amLODIPine (NORVASC) tablet 10 mg  10 mg Oral DAILY Alis Champion M.D.   10 mg at 06/21/19 0510   • gabapentin (NEURONTIN) capsule 100 mg  100 mg Oral BID Alis Champion M.D.   100 mg at 06/21/19 0510   • hydrALAZINE (APRESOLINE) tablet 25 mg  25 mg Oral Q8HRS Snellsunny Champion M.D.   25 mg at 06/21/19 1423   • sevelamer carbonate (RENVELA) tablet 800 mg  800 mg Oral BID  "Alis Champion M.D.   800 mg at 06/21/19 0510   • terazosin (HYTRIN) capsule 2 mg  2 mg Oral Nightly Alis Champion M.D.   2 mg at 06/20/19 4146       Social History     Social History   • Marital status:      Spouse name: N/A   • Number of children: N/A   • Years of education: N/A     Occupational History   • Not on file.     Social History Main Topics   • Smoking status: Current Every Day Smoker     Packs/day: 0.25     Years: 20.00     Types: Cigarettes   • Smokeless tobacco: Never Used      Comment: 5 cigs/day   • Alcohol use No   • Drug use: No   • Sexual activity: Not on file     Other Topics Concern   • Not on file     Social History Narrative   • No narrative on file       Family History   Problem Relation Age of Onset   • Heart Disease Unknown        Allergies:  Sulfa drugs    Review of Systems:  Constitutional: Negative for fever, chills, weight loss,   HENT:   Negative for hearing loss or tinnitus    Eyes:    Negative for blurred vision, double vision, or loss of vision  Respiratory:  Negative for cough, hemoptysis, or wheezing    Cardiac:  Negative for chest pain or palpitations or orthopnea  Vascular:  Negative for claudication or rest pain   Gastrointestinal: As per HPI   Genitourinary: Negative for dysuria, frequency, or hematuria   Musculoskeletal: Negative for myalgias, back pain, or joint pain  Skin:   Negative for itching or rash  Neurological:  Negative for dizziness, headaches, or tremors     Negative for speech disturbance     Negative for extremity weakness or paresthesias  Endo/Heme:  Negative for easy bruising or bleeding  Psychiatric:  Negative for depression, suicidal ideas, or hallucinations    Physical Exam:  BP (!) 177/93   Pulse 83   Temp 36.4 °C (97.5 °F) (Temporal)   Resp 17   Ht 1.575 m (5' 2\")   Wt 59 kg (130 lb 1.1 oz)   SpO2 96%     Constitutional: Alert, oriented, no acute distress  HEENT:  Normocephalic and atraumatic, EOMI  Neck:   Supple, no JVD, "   Cardiovascular: Regular rate and rhythm,   Pulmonary:  Good air entry bilaterally,   Abdominal:  Soft,  Non-distended     nontender to palpation      No rebound/guarding  Musculoskeletal: No edema, no tenderness  Neurological:  CN II-XII grossly intact, no focal deficits  Skin:   Skin is warm and dry. No rash noted.  Psychiatric:  Normal mood and affect.    Labs:  Recent Labs      06/20/19   0950  06/21/19   0202   WBC  8.6  6.5   RBC  2.83*  2.89*   HEMOGLOBIN  8.7*  8.5*   HEMATOCRIT  27.2*  27.4*   MCV  96.1  94.8   MCH  30.7  29.4   MCHC  32.0*  31.0*   RDW  50.8*  51.1*   PLATELETCT  167  171   MPV  10.0  9.4     Recent Labs      06/20/19   0950  06/20/19   1127  06/20/19   1933  06/21/19   0202   SODIUM  138   --   137  138   POTASSIUM  6.7*  7.2*  4.2  5.0   CHLORIDE  96   --   97  96   CO2  29   --   28  32   GLUCOSE  99   --   92  119*   BUN  77*   --   23*  40*   CREATININE  10.29*   --   4.17*  5.27*   CALCIUM  10.5   --   9.7  9.7         Recent Labs      06/21/19   0202   ASTSGOT  9*   ALTSGPT  10   TBILIRUBIN  1.1   ALKPHOSPHAT  61   GLOBULIN  2.7       Radiology:  US:  1.  Cholelithiasis, with dilatation of the common bile duct. A common duct stone suggested on recent CT could not be confirmed.    Assessment: This is a 54 y.o. female with symptomatic cholelithiasis and also a dilated common bile duct on ultrasound.    Plan:  OR tomorrow for laparoscopic cholecystectomy with intraoperative cholangiogram.  We will repeat her blood work in the morning to make sure her potassium is still safe for surgery and if it happens to be elevated then she will need to be dialyzed again before her surgery.  N.p.o. except for medications after midnight      Jimbo Davenport M.D.  [facility]  [phone]  Cell: [phone]

## 2019-06-23 PROBLEM — Z90.49 S/P CHOLECYSTECTOMY: Status: ACTIVE | Noted: 2019-06-23

## 2019-06-23 LAB
ALBUMIN SERPL BCP-MCNC: 3.9 G/DL (ref 3.2–4.9)
BASOPHILS # BLD AUTO: 0.1 % (ref 0–1.8)
BASOPHILS # BLD: 0.01 K/UL (ref 0–0.12)
BUN SERPL-MCNC: 58 MG/DL (ref 8–22)
CALCIUM SERPL-MCNC: 10.5 MG/DL (ref 8.5–10.5)
CHLORIDE SERPL-SCNC: 95 MMOL/L (ref 96–112)
CO2 SERPL-SCNC: 28 MMOL/L (ref 20–33)
CREAT SERPL-MCNC: 7.35 MG/DL (ref 0.5–1.4)
EKG IMPRESSION: NORMAL
EOSINOPHIL # BLD AUTO: 0 K/UL (ref 0–0.51)
EOSINOPHIL NFR BLD: 0 % (ref 0–6.9)
ERYTHROCYTE [DISTWIDTH] IN BLOOD BY AUTOMATED COUNT: 49.5 FL (ref 35.9–50)
GLUCOSE SERPL-MCNC: 117 MG/DL (ref 65–99)
HCT VFR BLD AUTO: 29.3 % (ref 37–47)
HGB BLD-MCNC: 9.6 G/DL (ref 12–16)
IMM GRANULOCYTES # BLD AUTO: 0.04 K/UL (ref 0–0.11)
IMM GRANULOCYTES NFR BLD AUTO: 0.4 % (ref 0–0.9)
INR PPP: 1.12 (ref 0.87–1.13)
LYMPHOCYTES # BLD AUTO: 0.89 K/UL (ref 1–4.8)
LYMPHOCYTES NFR BLD: 8.4 % (ref 22–41)
MAGNESIUM SERPL-MCNC: 2.3 MG/DL (ref 1.5–2.5)
MCH RBC QN AUTO: 30.4 PG (ref 27–33)
MCHC RBC AUTO-ENTMCNC: 32.8 G/DL (ref 33.6–35)
MCV RBC AUTO: 92.7 FL (ref 81.4–97.8)
MONOCYTES # BLD AUTO: 0.68 K/UL (ref 0–0.85)
MONOCYTES NFR BLD AUTO: 6.4 % (ref 0–13.4)
NEUTROPHILS # BLD AUTO: 8.99 K/UL (ref 2–7.15)
NEUTROPHILS NFR BLD: 84.7 % (ref 44–72)
NRBC # BLD AUTO: 0 K/UL
NRBC BLD-RTO: 0 /100 WBC
PHOSPHATE SERPL-MCNC: 5.3 MG/DL (ref 2.5–4.5)
PLATELET # BLD AUTO: 233 K/UL (ref 164–446)
PMV BLD AUTO: 9.4 FL (ref 9–12.9)
POTASSIUM SERPL-SCNC: 5.6 MMOL/L (ref 3.6–5.5)
PROTHROMBIN TIME: 14.7 SEC (ref 12–14.6)
RBC # BLD AUTO: 3.16 M/UL (ref 4.2–5.4)
SODIUM SERPL-SCNC: 138 MMOL/L (ref 135–145)
TROPONIN I SERPL-MCNC: 0.02 NG/ML (ref 0–0.04)
WBC # BLD AUTO: 10.6 K/UL (ref 4.8–10.8)

## 2019-06-23 PROCEDURE — 770020 HCHG ROOM/CARE - TELE (206)

## 2019-06-23 PROCEDURE — 85610 PROTHROMBIN TIME: CPT

## 2019-06-23 PROCEDURE — 700111 HCHG RX REV CODE 636 W/ 250 OVERRIDE (IP): Performed by: HOSPITALIST

## 2019-06-23 PROCEDURE — 700111 HCHG RX REV CODE 636 W/ 250 OVERRIDE (IP)

## 2019-06-23 PROCEDURE — 700111 HCHG RX REV CODE 636 W/ 250 OVERRIDE (IP): Performed by: INTERNAL MEDICINE

## 2019-06-23 PROCEDURE — 36415 COLL VENOUS BLD VENIPUNCTURE: CPT

## 2019-06-23 PROCEDURE — 700102 HCHG RX REV CODE 250 W/ 637 OVERRIDE(OP): Performed by: HOSPITALIST

## 2019-06-23 PROCEDURE — 93005 ELECTROCARDIOGRAM TRACING: CPT | Performed by: HOSPITALIST

## 2019-06-23 PROCEDURE — 99232 SBSQ HOSP IP/OBS MODERATE 35: CPT | Performed by: HOSPITALIST

## 2019-06-23 PROCEDURE — 85025 COMPLETE CBC W/AUTO DIFF WBC: CPT

## 2019-06-23 PROCEDURE — 83735 ASSAY OF MAGNESIUM: CPT

## 2019-06-23 PROCEDURE — 80069 RENAL FUNCTION PANEL: CPT

## 2019-06-23 PROCEDURE — A9270 NON-COVERED ITEM OR SERVICE: HCPCS | Performed by: HOSPITALIST

## 2019-06-23 PROCEDURE — 84484 ASSAY OF TROPONIN QUANT: CPT

## 2019-06-23 PROCEDURE — 700102 HCHG RX REV CODE 250 W/ 637 OVERRIDE(OP): Performed by: INTERNAL MEDICINE

## 2019-06-23 PROCEDURE — 93010 ELECTROCARDIOGRAM REPORT: CPT | Performed by: INTERNAL MEDICINE

## 2019-06-23 PROCEDURE — A9270 NON-COVERED ITEM OR SERVICE: HCPCS | Performed by: INTERNAL MEDICINE

## 2019-06-23 RX ORDER — TRAMADOL HYDROCHLORIDE 50 MG/1
50 TABLET ORAL EVERY 12 HOURS PRN
Status: DISCONTINUED | OUTPATIENT
Start: 2019-06-23 | End: 2019-06-23

## 2019-06-23 RX ORDER — CALCIUM CARBONATE 500 MG/1
500 TABLET, CHEWABLE ORAL
Status: DISCONTINUED | OUTPATIENT
Start: 2019-06-23 | End: 2019-06-24 | Stop reason: HOSPADM

## 2019-06-23 RX ORDER — MORPHINE SULFATE 4 MG/ML
4 INJECTION, SOLUTION INTRAMUSCULAR; INTRAVENOUS ONCE
Status: COMPLETED | OUTPATIENT
Start: 2019-06-23 | End: 2019-06-23

## 2019-06-23 RX ORDER — ONDANSETRON 2 MG/ML
4 INJECTION INTRAMUSCULAR; INTRAVENOUS EVERY 6 HOURS PRN
Status: DISCONTINUED | OUTPATIENT
Start: 2019-06-23 | End: 2019-06-24 | Stop reason: HOSPADM

## 2019-06-23 RX ORDER — OXYCODONE HYDROCHLORIDE AND ACETAMINOPHEN 5; 325 MG/1; MG/1
1-2 TABLET ORAL EVERY 6 HOURS PRN
Status: DISCONTINUED | OUTPATIENT
Start: 2019-06-23 | End: 2019-06-24 | Stop reason: HOSPADM

## 2019-06-23 RX ORDER — NITROGLYCERIN 0.4 MG/1
0.4 TABLET SUBLINGUAL
Status: DISCONTINUED | OUTPATIENT
Start: 2019-06-23 | End: 2019-06-24 | Stop reason: HOSPADM

## 2019-06-23 RX ORDER — ONDANSETRON 2 MG/ML
INJECTION INTRAMUSCULAR; INTRAVENOUS
Status: COMPLETED
Start: 2019-06-23 | End: 2019-06-23

## 2019-06-23 RX ORDER — HYDRALAZINE HYDROCHLORIDE 50 MG/1
50 TABLET, FILM COATED ORAL EVERY 8 HOURS
Status: DISCONTINUED | OUTPATIENT
Start: 2019-06-23 | End: 2019-06-24 | Stop reason: HOSPADM

## 2019-06-23 RX ADMIN — ACETAMINOPHEN 650 MG: 325 TABLET, FILM COATED ORAL at 06:02

## 2019-06-23 RX ADMIN — OXYCODONE HYDROCHLORIDE AND ACETAMINOPHEN 2 TABLET: 5; 325 TABLET ORAL at 23:09

## 2019-06-23 RX ADMIN — ANTACID TABLETS 500 MG: 500 TABLET, CHEWABLE ORAL at 17:40

## 2019-06-23 RX ADMIN — METOPROLOL TARTRATE 25 MG: 25 TABLET, FILM COATED ORAL at 17:40

## 2019-06-23 RX ADMIN — SEVELAMER CARBONATE 800 MG: 800 TABLET, FILM COATED ORAL at 17:40

## 2019-06-23 RX ADMIN — SENNOSIDES AND DOCUSATE SODIUM 2 TABLET: 8.6; 5 TABLET ORAL at 17:40

## 2019-06-23 RX ADMIN — ANTACID TABLETS 500 MG: 500 TABLET, CHEWABLE ORAL at 09:37

## 2019-06-23 RX ADMIN — HYDRALAZINE HYDROCHLORIDE 25 MG: 25 TABLET, FILM COATED ORAL at 06:02

## 2019-06-23 RX ADMIN — SEVELAMER CARBONATE 800 MG: 800 TABLET, FILM COATED ORAL at 06:02

## 2019-06-23 RX ADMIN — WARFARIN SODIUM 6 MG: 6 TABLET ORAL at 17:45

## 2019-06-23 RX ADMIN — ONDANSETRON 4 MG: 2 INJECTION INTRAMUSCULAR; INTRAVENOUS at 10:54

## 2019-06-23 RX ADMIN — HYDROCODONE BITARTRATE AND ACETAMINOPHEN 2 TABLET: 5; 325 TABLET ORAL at 07:24

## 2019-06-23 RX ADMIN — TRAMADOL HYDROCHLORIDE 50 MG: 50 TABLET, FILM COATED ORAL at 09:37

## 2019-06-23 RX ADMIN — GABAPENTIN 100 MG: 100 CAPSULE ORAL at 17:40

## 2019-06-23 RX ADMIN — GABAPENTIN 100 MG: 100 CAPSULE ORAL at 06:02

## 2019-06-23 RX ADMIN — TERAZOSIN HYDROCHLORIDE ANHYDROUS 2 MG: 2 CAPSULE ORAL at 20:19

## 2019-06-23 RX ADMIN — OXYCODONE HYDROCHLORIDE AND ACETAMINOPHEN 2 TABLET: 5; 325 TABLET ORAL at 14:49

## 2019-06-23 RX ADMIN — HYDRALAZINE HYDROCHLORIDE 10 MG: 20 INJECTION INTRAMUSCULAR; INTRAVENOUS at 09:09

## 2019-06-23 RX ADMIN — HYDROCODONE BITARTRATE AND ACETAMINOPHEN 2 TABLET: 5; 325 TABLET ORAL at 01:43

## 2019-06-23 RX ADMIN — HYDRALAZINE HYDROCHLORIDE 50 MG: 50 TABLET ORAL at 14:49

## 2019-06-23 RX ADMIN — HYDRALAZINE HYDROCHLORIDE 50 MG: 50 TABLET ORAL at 20:19

## 2019-06-23 RX ADMIN — METOPROLOL TARTRATE 25 MG: 25 TABLET, FILM COATED ORAL at 06:02

## 2019-06-23 RX ADMIN — HYDRALAZINE HYDROCHLORIDE 10 MG: 20 INJECTION INTRAMUSCULAR; INTRAVENOUS at 00:17

## 2019-06-23 RX ADMIN — AMLODIPINE BESYLATE 10 MG: 10 TABLET ORAL at 00:35

## 2019-06-23 RX ADMIN — MORPHINE SULFATE 4 MG: 4 INJECTION INTRAVENOUS at 00:45

## 2019-06-23 RX ADMIN — SENNOSIDES AND DOCUSATE SODIUM 2 TABLET: 8.6; 5 TABLET ORAL at 06:05

## 2019-06-23 ASSESSMENT — ENCOUNTER SYMPTOMS
CHILLS: 0
VOMITING: 1
FEVER: 0
HEADACHES: 0
ABDOMINAL PAIN: 1
PALPITATIONS: 0
DIZZINESS: 0
NAUSEA: 1

## 2019-06-23 NOTE — PROGRESS NOTES
Pt A&Ox4. (R)BKA  HD M/W/F   Laparoscopic cholecystectomy 6/22  Lap sites Intact with skin glue.  Hydralizine prn sbp>180. Received this am x1.  Nausea and vomiting today. Relieved with zofran.  Pain controled with percocet.  Plan/ dc tomorrow

## 2019-06-23 NOTE — PROGRESS NOTES
Patient's /100; administered hydralazine 10 mg IV per PRN order. Patient complaining of 9/10 pain located to LUQ of ABD with radiation to chest and back accompanied by SOB. Ordered Stat EKG; unremarkable. Notified Dr. Salinas, received order for 4 mg Morphine IV now, received instruction to administer 0600 dose of Amlodipine now and to reassess BP in one hour. Lab test for troponin placed.

## 2019-06-23 NOTE — ASSESSMENT & PLAN NOTE
On 6/22  Pain not well controlled along with n/v  norco changed to oxycodone  She became nauseated with tramadol

## 2019-06-23 NOTE — PROGRESS NOTES
Inpatient Anticoagulation Service Note    Date: 6/23/2019    Reason for Anticoagulation: Atrial Fibrillation   Target INR: 2.0 to 3.0  [id number] VASc Score: 2  HAS-BLED Score: 2   Hemoglobin Value: (!) 9.6  Hematocrit Value: (!) 29.3    INR from last 7 days     Date/Time INR Value    06/23/19 0237  1.12    06/22/19 1437  1.05        Dose from last 7 days     Date/Time Dose (mg)    06/23/19 1517  6    06/22/19 1536  6        Significant Interactions: Not Applicable  Bridge Therapy: No     HPI: New start warfarin for pAF ([id number] VASc 2, low risk). No bridge therapy.  Assessment: INR 1.12 from 1.05. Expect warfarin ~ 5 days to achieve full anticoagulation.   Plan: Warfarin 6mg po x 1 more day followed by warfarin 4mg po daily. Customized dosing regimen yet to be determined for patient. Plan for outpatient follow-up with [facility]. INR tomorrow morning prior to discharge.      Education Material Provided?: No      Pharmacist suggested discharge dosing: Warfarin 4mg po daily.      Taisha Borden, PharmD

## 2019-06-23 NOTE — CARE PLAN
Problem: Knowledge Deficit  Goal: Knowledge of the prescribed therapeutic regimen will improve  Outcome: PROGRESSING AS EXPECTED  Provided patient with a verbal discussion and printed handout related to Warfarin.

## 2019-06-23 NOTE — PROGRESS NOTES
Received bedside report from RN Freda, pt care assumed, VSS, pt assessment complete. Pt AAOx4, c/o 7/10 ABD pain S/P laprascocopy. No signs of acute distress noted at this time. POC discussed with pt and verbalizes no questions. Pt denies any additional needs at this time. Bed in lowest position, bed alarm off, pt educated on fall risk and verbalized understanding, call light within reach, hourly rounding initiated.

## 2019-06-23 NOTE — CARE PLAN
Problem: Safety  Goal: Will remain free from falls  Outcome: PROGRESSING AS EXPECTED  Patient educated to use call light for assistance. Fall precautions in place. Staff will assist with mobilization. Hourly rounding in place.    Problem: Pain Management  Goal: Pain level will decrease to patient's comfort goal  Outcome: PROGRESSING AS EXPECTED  Assessed patients pain using a numerical 0 to 10 scale. Medicated client per MAR orders PRN. Provided patient with a verbal discussion related to non-pharmacological means of pain management. Patient verbalized understanding.

## 2019-06-23 NOTE — PROGRESS NOTES
Surgery    Minimal pain  Tolerating PO  Ambulating  AFVSS  Abd SND mild TTP  Incisions CDI      Home today  Discharge instructions and prescriptions printed and in the chart.      Jimbo Davenport MD  General and Vascular Surgery  [facility]  Cell: [phone]

## 2019-06-23 NOTE — PROGRESS NOTES
Hospital Medicine Daily Progress Note    Date of Service  6/22/2019    Chief Complaint  54 y.o. female admitted 6/20/2019 with hyperkalemia    Hospital Course    54-year-old female with past medical history of ESRD on HD on MWF, AVNRT status post ablation, hypertension presented to hospital for elective laparoscopic cholecystectomy and was found to have a potassium of 6.9 along with A. fib with RVR.  Nephrology was consulted and she underwent emergent dialysis.      Interval Problem Update  Had lap yamileth and tolerated well  Having some post op abdomianl pain  Otherwise doing well  We discussed AFib and AC. She is open to warfain.     Consultants/Specialty  Nephrology  Surgery    Code Status  Full code    Disposition  Dc home tmw    Review of Systems  Review of Systems   Constitutional: Negative for chills and fever.   Cardiovascular: Negative for chest pain and palpitations.   Gastrointestinal: Negative for abdominal pain, nausea and vomiting.   Genitourinary: Negative for dysuria and urgency.   Neurological: Negative for dizziness and headaches.        Physical Exam  Temp:  [36.1 °C (97 °F)-37 °C (98.6 °F)] 37 °C (98.6 °F)  Pulse:  [63-88] 75  Resp:  [12-23] 18  BP: (142-188)/(76-94) 154/83  SpO2:  [90 %-100 %] 96 %    Physical Exam   Constitutional: She is oriented to person, place, and time. She appears well-developed and well-nourished.   HENT:   Head: Normocephalic and atraumatic.   Eyes: Conjunctivae are normal. Right eye exhibits no discharge. Left eye exhibits no discharge.   Cardiovascular: Normal rate and regular rhythm.    Murmur heard.  Pulmonary/Chest: Effort normal. No respiratory distress.   Abdominal: Soft. She exhibits no distension. There is no tenderness.   Musculoskeletal: She exhibits no edema.   Neurological: She is alert and oriented to person, place, and time.   Skin: Skin is warm and dry.   Nursing note and vitals reviewed.      Fluids    Intake/Output Summary (Last 24 hours) at 06/22/19  2150  Last data filed at 06/22/19 1125   Gross per 24 hour   Intake              290 ml   Output               25 ml   Net              265 ml       Laboratory  Recent Labs      06/20/19   0950  06/21/19   0202   WBC  8.6  6.5   RBC  2.83*  2.89*   HEMOGLOBIN  8.7*  8.5*   HEMATOCRIT  27.2*  27.4*   MCV  96.1  94.8   MCH  30.7  29.4   MCHC  32.0*  31.0*   RDW  50.8*  51.1*   PLATELETCT  167  171   MPV  10.0  9.4     Recent Labs      06/20/19   1933  06/21/19   0202  06/22/19   0243   SODIUM  137  138  137   POTASSIUM  4.2  5.0  4.5   CHLORIDE  97  96  98   CO2  28  32  28   GLUCOSE  92  119*  96   BUN  23*  40*  31*   CREATININE  4.17*  5.27*  4.87*   CALCIUM  9.7  9.7  10.5     Recent Labs      06/22/19   1437   INR  1.05               Imaging  DX-CHEST-LIMITED (1 VIEW)   Final Result         1.  Bilaterally base opacity suggesting infiltrate.   2.  Trace left pleural effusion   3.  Perihilar interstitial prominence and bronchial wall cuffing, appearance suggests changes of underlying bronchial inflammation, consider bronchitis.      DX-PORTABLE FLUORO > 1 HOUR    (Results Pending)        Assessment/Plan  ESRD on hemodialysis (HCC)- (present on admission)   Assessment & Plan    She has history of end-stage renal disease and she is on hemodialysis Monday Wednesday and Friday.  She expressed that sometimes she misses her hemodialysis.  Nephrology was consulted and she underwent hemodialysis due to hyperkalemia.  Appreciate nephrology recommendations.     HTN (hypertension)- (present on admission)   Assessment & Plan    She has history of hypertension.  I resume her outpatient medication.  Held labetalol as I started her on metoprolol.  I started her on IV hydralazine 10 mg as needed with parameters for blood pressure control.     Hyperkalemia- (present on admission)   Assessment & Plan    resolved     Pulmonary infiltrate   Assessment & Plan    Noted on CXR  She aidan any respiratory symptoms so I have discontinued  her abx     Paroxysmal atrial fibrillation (HCC)   Assessment & Plan    She has a history of AVNRT status post ablation.  cont metoprolol 25 mg twice daily and IV metoprolol IV 5 mg PRN  Back to NSR  I have started her on coumadin and placed AC clinic referral   [id number]: 2  Recommendations:   Total Score 0 = Low risk of thromboembolic stroke. Recommend aspirin 81 to 325 mg daily.   Total Score 1 = Intermediate risk of thromboembolic stroke. Recommend aspirin (81 to 325 mg daily) or warfarin (INR 2.0-3.0) depending on patient preference.   Total Score 2 or more = Elevated risk of thromboembolic stroke. Recommend warfarin (INR 2.0-3.0) unless contraindicated.             VTE prophylaxis: Heparin

## 2019-06-23 NOTE — CARE PLAN
Problem: Infection  Goal: Will remain free from infection    Intervention: Assess signs and symptoms of infection  Continue to assess daily labs and vial signs.      Problem: Knowledge Deficit  Goal: Knowledge of disease process/condition, treatment plan, diagnostic tests, and medications will improve    Intervention: Assess knowledge level of disease process/condition, treatment plan, diagnostic tests, and medications  RN will assess knowledge of disease process and provide education as appropriate.

## 2019-06-23 NOTE — PROGRESS NOTES
Hospital Medicine Daily Progress Note    Date of Service  6/23/2019    Chief Complaint  54 y.o. female admitted 6/20/2019 with hyperkalemia    Hospital Course    54-year-old female with past medical history of ESRD on HD on MWF, AVNRT status post ablation, hypertension presented to hospital for elective laparoscopic cholecystectomy and was found to have a potassium of 6.9 along with A. fib with RVR.  Nephrology was consulted and she underwent emergent dialysis.      Interval Problem Update  She is nauseated this morning and had an episode of emesis  States her abdominal pain is not well controlled  States oxycodone works better than norco for her  Denies lightheadedness or dizziness    Consultants/Specialty  Nephrology  Surgery    Code Status  Full code    Disposition  Dc home tmw    Review of Systems  Review of Systems   Constitutional: Negative for chills and fever.   Cardiovascular: Negative for chest pain and palpitations.   Gastrointestinal: Positive for abdominal pain, nausea and vomiting.   Genitourinary: Negative for dysuria and urgency.   Neurological: Negative for dizziness and headaches.        Physical Exam  Temp:  [36.2 °C (97.2 °F)-37 °C (98.6 °F)] 36.8 °C (98.2 °F)  Pulse:  [70-88] 84  Resp:  [12-22] 18  BP: (125-192)/() 184/92  SpO2:  [92 %-99 %] 97 %    Physical Exam   Constitutional: She is oriented to person, place, and time. She appears well-developed and well-nourished.   HENT:   Head: Normocephalic and atraumatic.   Eyes: Conjunctivae are normal. Right eye exhibits no discharge. Left eye exhibits no discharge.   Cardiovascular: Normal rate and regular rhythm.    Murmur heard.  Pulmonary/Chest: Effort normal. No respiratory distress.   Abdominal: Soft. She exhibits no distension. There is tenderness (RUQ).   laproscopic scars +   Musculoskeletal: She exhibits no edema.   Neurological: She is alert and oriented to person, place, and time.   Skin: Skin is warm and dry.   Nursing note and  vitals reviewed.      Fluids    Intake/Output Summary (Last 24 hours) at 06/23/19 1054  Last data filed at 06/23/19 0600   Gross per 24 hour   Intake              280 ml   Output                0 ml   Net              280 ml       Laboratory  Recent Labs      06/21/19   0202  06/23/19   0237   WBC  6.5  10.6   RBC  2.89*  3.16*   HEMOGLOBIN  8.5*  9.6*   HEMATOCRIT  27.4*  29.3*   MCV  94.8  92.7   MCH  29.4  30.4   MCHC  31.0*  32.8*   RDW  51.1*  49.5   PLATELETCT  171  233   MPV  9.4  9.4     Recent Labs      06/21/19   0202  06/22/19   0243  06/23/19   0237   SODIUM  138  137  138   POTASSIUM  5.0  4.5  5.6*   CHLORIDE  96  98  95*   CO2  32  28  28   GLUCOSE  119*  96  117*   BUN  40*  31*  58*   CREATININE  5.27*  4.87*  7.35*   CALCIUM  9.7  10.5  10.5     Recent Labs      06/22/19   1437  06/23/19   0237   INR  1.05  1.12               Imaging  DX-CHEST-LIMITED (1 VIEW)   Final Result         1.  Bilaterally base opacity suggesting infiltrate.   2.  Trace left pleural effusion   3.  Perihilar interstitial prominence and bronchial wall cuffing, appearance suggests changes of underlying bronchial inflammation, consider bronchitis.      DX-PORTABLE FLUORO > 1 HOUR    (Results Pending)        Assessment/Plan  ESRD on hemodialysis (HCC)- (present on admission)   Assessment & Plan    She has history of end-stage renal disease and she is on hemodialysis Monday Wednesday and Friday.  She expressed that sometimes she misses her hemodialysis.  Nephrology was consulted and she underwent hemodialysis due to hyperkalemia.  Appreciate nephrology recommendations.     HTN (hypertension)- (present on admission)   Assessment & Plan    She has history of hypertension.  I resume her outpatient medication.  Held labetalol as I started her on metoprolol.  I started her on IV hydralazine 10 mg as needed with parameters for blood pressure control.     Hyperkalemia- (present on admission)   Assessment & Plan    resolved     S/P  cholecystectomy   Assessment & Plan    On 6/22  Pain not well controlled along with n/v  norco changed to oxycodone  She became nauseated with tramadol     Pulmonary infiltrate   Assessment & Plan    Noted on CXR  She aidan any respiratory symptoms so I have discontinued her abx     Paroxysmal atrial fibrillation (HCC)   Assessment & Plan    She has a history of AVNRT status post ablation.  cont metoprolol 25 mg twice daily and IV metoprolol IV 5 mg PRN  Back to NSR  I have started her on coumadin and placed AC clinic referral   [id number]: 2  Recommendations:   Total Score 0 = Low risk of thromboembolic stroke. Recommend aspirin 81 to 325 mg daily.   Total Score 1 = Intermediate risk of thromboembolic stroke. Recommend aspirin (81 to 325 mg daily) or warfarin (INR 2.0-3.0) depending on patient preference.   Total Score 2 or more = Elevated risk of thromboembolic stroke. Recommend warfarin (INR 2.0-3.0) unless contraindicated.             VTE prophylaxis: Heparin

## 2019-06-23 NOTE — PROGRESS NOTES
[facility] Progress Note               Author: Rupinder KRISTEL Shields Date & Time created: 6/23/2019  10:05 AM     Interval History:  HPI  The patient is a very pleasant 54-year-old female  known to us from outpatient dialysis care.  She receives her maintenance hemodialysis on a Monday, Wednesday, and Friday schedule at [facility].  She has a left upper extremity AV fistula.  Her last treatment was Monday, described as a complete treatment.  She was admitted to same day surgery earlier today by Dr. Davenport for an elective laparoscopic   cholecystectomy.  Preoperative labs, however, came back finding of potassium of 6.9.  It was repeated and came back at 7.2.  The surgery was canceled and she is admitted for treatment of her hyperkalemia.  She tells me that other than some vague upper abdominal pain, she is feeling in her usual state of health.  She has had no nausea or vomiting.  She has chronic diarrhea.  She thinks she may be eating foods that are high in potassium and did skip her dialysis on Wednesday.  She has a history of persistently elevated potassium.  She denies any shortness of breath, no chest discomfort, no palpitations, no blurry vision, no change in the mental status, no lower extremity edema.  We have been asked to see regarding her renal failure.     Interval Problem Update  6/21/19--No events, urgently dialyzed yesterday for K 7.2, improved today, seen on dialysis this am, no new complaints, BP elevated this am, noted to have paroxysmal AFib last night and then converted to NSR ~2100, remains in NSR  6/22/19--Pt seen in PACU, will be going back to floor now.  Had Lap Crystal this morning.  Had HD yesterday with 2 L UF. Pt may go home today, but likely tomorrow.  6/23/19--Pt eating breakfast.  Surgery went well yesterday and she has some post-op pain, but no nausea. She will likely go home today.  Due for HD tomorrow.    Review of Systems:  ROS  Constitutional:  Positive for malaise/fatigue. Negative for chills and fever.   HENT: Negative.    Eyes: Negative.    Respiratory: Negative.    Cardiovascular: Negative.    Gastrointestinal: Positive for abdominal tenderness, but no guarding.  Negative for N/V/D.   Genitourinary: Negative.    Musculoskeletal: Positive for R shoulder pain.  Skin: Negative.    Neurological: Negative for dizziness, focal weakness, weakness and headaches.   Endo/Heme/Allergies: Negative.    Psychiatric/Behavioral: Negative.      Physical Exam:  Physical Exam  Constitutional: She is oriented to person, place, and time. She appears well-developed and well-nourished. No distress.   Appears chronically ill   HENT:   Head: Normocephalic and atraumatic.   Mouth/Throat: No oropharyngeal exudate.   Eyes: Pupils are equal, round, and reactive to light. Conjunctivae and EOM are normal. No scleral icterus.   Neck: Normal range of motion. Neck supple. No thyromegaly present.   Cardiovascular: Normal rate and regular rhythm.    No murmur heard.  Pulmonary/Chest: Effort normal and breath sounds normal. No respiratory distress. She has no wheezes.   Abdominal: Soft. Bowel sounds are normal. She exhibits no distension. There is tenderness.  Lap incisions C/D/I  Musculoskeletal: Normal range of motion. She exhibits no edema, tenderness or deformity.   Neurological: She is alert and oriented to person, place, and time. She exhibits normal muscle tone.   Skin: Skin is warm and dry. No erythema.   Psychiatric: She has a normal mood and affect. Her behavior is normal.   Nursing note and vitals reviewed.    Labs:      Recent Labs      06/20/19 1933 06/23/19   0237   TROPONINI  0.04  0.02     Recent Labs      06/20/19   1933  06/21/19   0202  06/22/19   0243  06/23/19   0237   SODIUM  137  138  137  138   POTASSIUM  4.2  5.0  4.5  5.6*   CHLORIDE  97  96  98  95*   CO2  28  32  28  28   BUN  23*  40*  31*  58*   CREATININE  4.17*  5.27*  4.87*  7.35*   MAGNESIUM  2.0   --     --   2.3   PHOSPHORUS   --   5.0*   --   5.3*   CALCIUM  9.7  9.7  10.5  10.5     Recent Labs      19   0202  19   0243  19   0237   ALTSGPT  10   --    --    ASTSGOT  9*   --    --    ALKPHOSPHAT  61   --    --    TBILIRUBIN  1.1   --    --    GLUCOSE  119*  96  117*     Recent Labs      19   0202  19   1437  19   0237   RBC  2.89*   --   3.16*   HEMOGLOBIN  8.5*   --   9.6*   HEMATOCRIT  27.4*   --   29.3*   PLATELETCT  171   --   233   PROTHROMBTM   --   13.9  14.7*   INR   --   1.05  1.12     Recent Labs      19   0202  19   0237   WBC  6.5  10.6   NEUTSPOLYS  69.50  84.70*   LYMPHOCYTES  20.20*  8.40*   MONOCYTES  5.60  6.40   EOSINOPHILS  3.60  0.00   BASOPHILS  0.80  0.10   ASTSGOT  9*   --    ALTSGPT  10   --    ALKPHOSPHAT  61   --    TBILIRUBIN  1.1   --      Hemodynamics:  Temp (24hrs), Av.5 °C (97.7 °F), Min:36.2 °C (97.2 °F), Max:37 °C (98.6 °F)  Temperature: 36.8 °C (98.2 °F)  Pulse  Av.1  Min: 63  Max: 138Heart Rate (Monitored): 67  Blood Pressure: (!) 184/92 (RN notified), NIBP: 136/73     Respiratory:    Respiration: 18, Pulse Oximetry: 97 %           Fluids:    Intake/Output Summary (Last 24 hours) at 19 1005  Last data filed at 19 0600   Gross per 24 hour   Intake              530 ml   Output               25 ml   Net              505 ml     Weight: 56.4 kg (124 lb 5.4 oz)  GI/Nutrition:  Orders Placed This Encounter   Procedures   • Diet Order Renal     Standing Status:   Standing     Number of Occurrences:   1     Order Specific Question:   Diet:     Answer:   Renal [8]     Medical Decision Making, by Problem:  Active Hospital Problems    Diagnosis   • ESRD on hemodialysis (HCC) [N18.6, Z99.2]   • HTN (hypertension) [I10]   • Hyperkalemia [E87.5]   • Paroxysmal atrial fibrillation (HCC) [I48.0]   • Pulmonary infiltrate [R91.8]     ASSESSMENT:  1.  ESRD--on maintenance hemodialysis on a Monday, Wednesday, and Friday schedule.   She missed her last o/p treatment, presented with hyperkalemia  2.  Hyperkalemia--resolved with urgent HD  3.  Cough, recently quit smoking.  4.  Upper abdominal pain, had elective laparoscopic cholecystectomy yesterday (6/22) with Dr. Davenport.  5.  Anemia secondary to chronic kidney disease.  6.  Renal osteodystrophy.  7.  Chronic hypertension, poor control.  8.  History of supraventricular tachycardia, status post ablation.  --AFib overnight 6/20 and then converted to NSR    Plan:  1.  No HD today, resume q MWF HD  2.  Discussed importance of compliant with outpt dialysis  3.  Low K diet  4.  Will start Valtessa as outpatient  5.  Monitor for AFib  6.  Dr. Davenport following - had lap yamileth yesterday (6/22)  7.  Continue current BP meds  8.  Fluid removal with HD  9.  Increase metoprolol if BP remains elevated after HD  10. Epogen with HD  11. No dietary protein restrictions     **OK to discharge from renal standpoint if no further issues with cardiac arrhythmia. Pt to follow her regular outpt MWF dialysis schedule       Quality-Core Measures

## 2019-06-24 VITALS
HEART RATE: 83 BPM | TEMPERATURE: 97.6 F | RESPIRATION RATE: 17 BRPM | BODY MASS INDEX: 23.29 KG/M2 | DIASTOLIC BLOOD PRESSURE: 76 MMHG | HEIGHT: 62 IN | SYSTOLIC BLOOD PRESSURE: 121 MMHG | WEIGHT: 126.54 LBS | OXYGEN SATURATION: 97 %

## 2019-06-24 LAB
ALBUMIN SERPL BCP-MCNC: 3.6 G/DL (ref 3.2–4.9)
BASOPHILS # BLD AUTO: 0.3 % (ref 0–1.8)
BASOPHILS # BLD: 0.03 K/UL (ref 0–0.12)
BUN SERPL-MCNC: 79 MG/DL (ref 8–22)
CALCIUM SERPL-MCNC: 11 MG/DL (ref 8.5–10.5)
CHLORIDE SERPL-SCNC: 87 MMOL/L (ref 96–112)
CO2 SERPL-SCNC: 33 MMOL/L (ref 20–33)
CREAT SERPL-MCNC: 9.5 MG/DL (ref 0.5–1.4)
EOSINOPHIL # BLD AUTO: 0.4 K/UL (ref 0–0.51)
EOSINOPHIL NFR BLD: 4.3 % (ref 0–6.9)
ERYTHROCYTE [DISTWIDTH] IN BLOOD BY AUTOMATED COUNT: 51.8 FL (ref 35.9–50)
GLUCOSE SERPL-MCNC: 108 MG/DL (ref 65–99)
HCT VFR BLD AUTO: 27 % (ref 37–47)
HGB BLD-MCNC: 8.6 G/DL (ref 12–16)
IMM GRANULOCYTES # BLD AUTO: 0.03 K/UL (ref 0–0.11)
IMM GRANULOCYTES NFR BLD AUTO: 0.3 % (ref 0–0.9)
INR PPP: 2.11 (ref 0.87–1.13)
LYMPHOCYTES # BLD AUTO: 1.75 K/UL (ref 1–4.8)
LYMPHOCYTES NFR BLD: 18.7 % (ref 22–41)
MCH RBC QN AUTO: 30.4 PG (ref 27–33)
MCHC RBC AUTO-ENTMCNC: 31.9 G/DL (ref 33.6–35)
MCV RBC AUTO: 95.4 FL (ref 81.4–97.8)
MONOCYTES # BLD AUTO: 0.81 K/UL (ref 0–0.85)
MONOCYTES NFR BLD AUTO: 8.7 % (ref 0–13.4)
NEUTROPHILS # BLD AUTO: 6.32 K/UL (ref 2–7.15)
NEUTROPHILS NFR BLD: 67.7 % (ref 44–72)
NRBC # BLD AUTO: 0 K/UL
NRBC BLD-RTO: 0 /100 WBC
PATHOLOGY CONSULT NOTE: NORMAL
PHOSPHATE SERPL-MCNC: 8 MG/DL (ref 2.5–4.5)
PLATELET # BLD AUTO: 195 K/UL (ref 164–446)
PMV BLD AUTO: 8.8 FL (ref 9–12.9)
POTASSIUM SERPL-SCNC: 5.4 MMOL/L (ref 3.6–5.5)
PROTHROMBIN TIME: 24.3 SEC (ref 12–14.6)
RBC # BLD AUTO: 2.83 M/UL (ref 4.2–5.4)
SODIUM SERPL-SCNC: 138 MMOL/L (ref 135–145)
WBC # BLD AUTO: 9.3 K/UL (ref 4.8–10.8)

## 2019-06-24 PROCEDURE — A9270 NON-COVERED ITEM OR SERVICE: HCPCS | Performed by: HOSPITALIST

## 2019-06-24 PROCEDURE — 85025 COMPLETE CBC W/AUTO DIFF WBC: CPT

## 2019-06-24 PROCEDURE — 99239 HOSP IP/OBS DSCHRG MGMT >30: CPT | Performed by: HOSPITALIST

## 2019-06-24 PROCEDURE — 700102 HCHG RX REV CODE 250 W/ 637 OVERRIDE(OP): Performed by: HOSPITALIST

## 2019-06-24 PROCEDURE — 90935 HEMODIALYSIS ONE EVALUATION: CPT

## 2019-06-24 PROCEDURE — A9270 NON-COVERED ITEM OR SERVICE: HCPCS | Performed by: INTERNAL MEDICINE

## 2019-06-24 PROCEDURE — 85610 PROTHROMBIN TIME: CPT

## 2019-06-24 PROCEDURE — 700102 HCHG RX REV CODE 250 W/ 637 OVERRIDE(OP): Performed by: INTERNAL MEDICINE

## 2019-06-24 PROCEDURE — 36415 COLL VENOUS BLD VENIPUNCTURE: CPT

## 2019-06-24 PROCEDURE — 80069 RENAL FUNCTION PANEL: CPT

## 2019-06-24 PROCEDURE — 5A1D70Z PERFORMANCE OF URINARY FILTRATION, INTERMITTENT, LESS THAN 6 HOURS PER DAY: ICD-10-PCS | Performed by: INTERNAL MEDICINE

## 2019-06-24 RX ORDER — WARFARIN SODIUM 2.5 MG/1
2.5 TABLET ORAL
Status: DISCONTINUED | OUTPATIENT
Start: 2019-06-25 | End: 2019-06-24 | Stop reason: HOSPADM

## 2019-06-24 RX ORDER — WARFARIN SODIUM 2.5 MG/1
2.5 TABLET ORAL
Qty: 15 TAB | Refills: 0 | Status: SHIPPED | OUTPATIENT
Start: 2019-06-25 | End: 2019-11-28

## 2019-06-24 RX ADMIN — GABAPENTIN 100 MG: 100 CAPSULE ORAL at 06:00

## 2019-06-24 RX ADMIN — METOPROLOL TARTRATE 25 MG: 25 TABLET, FILM COATED ORAL at 05:57

## 2019-06-24 RX ADMIN — ANTACID TABLETS 500 MG: 500 TABLET, CHEWABLE ORAL at 11:20

## 2019-06-24 RX ADMIN — AMLODIPINE BESYLATE 10 MG: 10 TABLET ORAL at 06:00

## 2019-06-24 RX ADMIN — SEVELAMER CARBONATE 800 MG: 800 TABLET, FILM COATED ORAL at 06:00

## 2019-06-24 RX ADMIN — SENNOSIDES AND DOCUSATE SODIUM 2 TABLET: 8.6; 5 TABLET ORAL at 06:00

## 2019-06-24 RX ADMIN — HYDRALAZINE HYDROCHLORIDE 50 MG: 50 TABLET ORAL at 06:00

## 2019-06-24 RX ADMIN — ANTACID TABLETS 500 MG: 500 TABLET, CHEWABLE ORAL at 06:00

## 2019-06-24 NOTE — PROGRESS NOTES
Discharge instructions given to pt, pt verbalized understanding. PIV removed. Discussed follow up appts, new meds. Pt wheeled down in wheelchair by RN with family.

## 2019-06-24 NOTE — DISCHARGE SUMMARY
Discharge Summary    CHIEF COMPLAINT ON ADMISSION  No chief complaint on file.      Reason for Admission  CHOLELITHIASIS ABDOMINAL PAIN      Admission Date  6/20/2019    CODE STATUS  Full Code    HPI & HOSPITAL COURSE    54-year-old female with past medical history of ESRD on HD on MWF, AVNRT status post ablation, hypertension presented to hospital for elective laparoscopic cholecystectomy and was found to have a potassium of 6.9 along with A. fib with RVR.  Nephrology was consulted and she underwent emergent dialysis which resulted in her hyperkalemia.  She underwent her laparoscopic cholecystectomy on 6/22.  Due to patient having significant nausea and vomiting she was monitored in hospital for an additional day.  Her abdominal pain is well controlled and she is tolerating p.o. intake well now.    Therefore, she is discharged in good and stable condition to home with close outpatient follow-up.    The patient met 2-midnight criteria for an inpatient stay at the time of discharge.    Discharge Date  6/24/2019    FOLLOW UP ITEMS POST DISCHARGE  None    DISCHARGE DIAGNOSES  Active Problems:    ESRD on hemodialysis (HCC) POA: Yes    Hyperkalemia POA: Yes    HTN (hypertension) POA: Yes    Paroxysmal atrial fibrillation (HCC) POA: Unknown    Pulmonary infiltrate POA: Unknown    S/P cholecystectomy POA: Unknown  Resolved Problems:    * No resolved hospital problems. *      FOLLOW UP  Future Appointments  Date Time Provider Department Center   6/25/2019 1:00 PM [facility] EXAM 1 [facility] None   8/15/2019 1:15 PM Gracie Cardozo M.D. [facility] None     Jimbo Davenport M.D.  [address] [address]  [phone]    Schedule an appointment as soon as possible for a visit in 2 weeks  For Progress Check      MEDICATIONS ON DISCHARGE     Medication List      START taking these medications      Instructions   docusate sodium 100 MG Caps  Commonly known as:  COLACE   Doctor's comments:  Take this for 5 days after  surgery.  Stop taking this if you have loose stool  Take 1 Cap by mouth 2 times a day.  Dose:  100 mg     HYDROcodone-acetaminophen 5-325 MG Tabs per tablet  Commonly known as:  NORCO   Doctor's comments:  **Note to pharmacist: patient was on this medication in the hospital after surgery**  No driving while on pain medications  Take 1-2 Tabs by mouth every 8 hours as needed (as needed for pain) for up to 3 days.  Dose:  1-2 Tab     metoprolol 25 MG Tabs  Commonly known as:  LOPRESSOR   Take 1 Tab by mouth 2 Times a Day.  Dose:  25 mg     ondansetron 4 MG Tabs tablet  Commonly known as:  ZOFRAN   Take 1 Tab by mouth every four hours as needed for Nausea/Vomiting.  Dose:  4 mg     warfarin 2.5 MG Tabs  Start taking on:  6/25/2019  Commonly known as:  COUMADIN   Take 1 Tab by mouth COUMADIN-DAILY.  Dose:  2.5 mg        CONTINUE taking these medications      Instructions   albuterol 108 (90 Base) MCG/ACT Aers inhalation aerosol   Inhale 1 Puff by mouth 2 times a day as needed for Shortness of Breath.  Dose:  1 Puff     amLODIPine 10 MG Tabs  Commonly known as:  NORVASC   Take 10 mg by mouth every day.  Dose:  10 mg     gabapentin 100 MG Caps  Commonly known as:  NEURONTIN   Take 1 Cap by mouth 2 Times a Day.  Dose:  100 mg     hydrALAZINE 25 MG Tabs  Commonly known as:  APRESOLINE   Take 1 Tab by mouth every 8 hours.  Dose:  25 mg     sevelamer carbonate 800 MG Tabs tablet  Commonly known as:  RENVELA   Take 800 mg by mouth 2 Times a Day.  Dose:  800 mg     terazosin 2 MG Caps  Commonly known as:  HYTRIN   Take 2 mg by mouth every evening.  Dose:  2 mg        STOP taking these medications    labetalol 200 MG Tabs  Commonly known as:  NORMODYNE            Allergies  Allergies   Allergen Reactions   • Sulfa Drugs Hives     [id number] rash swelling itching       DIET  Orders Placed This Encounter   Procedures   • Diet Order Renal     Standing Status:   Standing     Number of Occurrences:   1     Order Specific Question:    Diet:     Answer:   Renal [8]       ACTIVITY  As tolerated.  Weight bearing as tolerated    CONSULTATIONS  Nephrology  General surgery    PROCEDURES  Laparoscopic cholecystectomy    LABORATORY  Lab Results   Component Value Date    SODIUM 138 06/24/2019    POTASSIUM 5.4 06/24/2019    CHLORIDE 87 (L) 06/24/2019    CO2 33 06/24/2019    GLUCOSE 108 (H) 06/24/2019    BUN 79 (HH) 06/24/2019    CREATININE 9.50 (HH) 06/24/2019    CREATININE 2.2 (H) 05/06/2009        Lab Results   Component Value Date    WBC 9.3 06/24/2019    HEMOGLOBIN 8.6 (L) 06/24/2019    HEMATOCRIT 27.0 (L) 06/24/2019    PLATELETCT 195 06/24/2019        Total time of the discharge process exceeds 31 minutes.

## 2019-06-24 NOTE — PROGRESS NOTES
Inpatient Anticoagulation Service Note    Date: 6/24/2019    Reason for Anticoagulation: Atrial Fibrillation   Target INR: 2.0 to 3.0  [id number] VASc Score: 2  HAS-BLED Score: 2   Hemoglobin Value: (!) 8.6  Hematocrit Value: (!) 27    INR from last 7 days     Date/Time INR Value    06/24/19 0204 (!)  2.11    06/23/19 0237  1.12    06/22/19 1437  1.05        Dose from last 7 days     Date/Time Dose (mg)    06/24/19 1334  0    06/23/19 1517  6    06/22/19 1536  6        Significant Interactions: Not Applicable  Bridge Therapy: No      HPI: New start warfarin for pAF ([id number] VASc 2, low risk). No bridge therapy.  Assessment: INR is now therapeutic. Expect warfarin ~ 5 days to achieve full anticoagulation - therapeutic INR extremely early on in therapy.   Plan: Instruct patient to hold warfarin tonight, then start warfarin 2.5mg pending further instructions from [facility]. Customized dosing regimen yet to be determined for patient. Plan for outpatient follow-up with [facility].      Education Material Provided?: No      Pharmacist suggested discharge dosing: Warfarin 2.5mg po daily.      Taisha Borden, PharmD

## 2019-06-24 NOTE — DISCHARGE PLANNING
NINA spoke with Dr. Balderas and she wants to make sure that pt has been scheduled for the anticoagulation clinic. NINA called and spoke with Kimmy and she states that pt has been scheduled for tomorrow at 1300.

## 2019-06-24 NOTE — DISCHARGE INSTRUCTIONS
Discharge Instructions per Oscar Balderas M.D.    Start taking blood thinner, coumadin from tomorrow (6/25)    DIET: renal diet    ACTIVITY: as tolerated, no heavy lifting more than 10 lbs for 5 days    DIAGNOSIS: hyperkalemia    Return to ER if symptoms worsen.              Discharge Instructions    Discharged to home by car with friend. Discharged via wheelchair, hospital escort: Yes.  Special equipment needed: Not Applicable    Be sure to schedule a follow-up appointment with your primary care doctor or any specialists as instructed.     Discharge Plan:   Diet Plan: Discussed  Activity Level: Discussed  Confirmed Follow up Appointment: Appointment Scheduled  Confirmed Symptoms Management: Discussed  Medication Reconciliation Updated: Yes  Influenza Vaccine Indication: Not indicated: Previously immunized this influenza season and > 8 years of age    I understand that a diet low in cholesterol, fat, and sodium is recommended for good health. Unless I have been given specific instructions below for another diet, I accept this instruction as my diet prescription.   Other diet: renal    Special Instructions: None    · Is patient discharged on Warfarin / Coumadin?   No     Depression / Suicide Risk    As you are discharged from this [facility] facility, it is important to learn how to keep safe from harming yourself.    Recognize the warning signs:  · Abrupt changes in personality, positive or negative- including increase in energy   · Giving away possessions  · Change in eating patterns- significant weight changes-  positive or negative  · Change in sleeping patterns- unable to sleep or sleeping all the time   · Unwillingness or inability to communicate  · Depression  · Unusual sadness, discouragement and loneliness  · Talk of wanting to die  · Neglect of personal appearance   · Rebelliousness- reckless behavior  · Withdrawal from people/activities they love  · Confusion- inability to concentrate     If you or a  loved one observes any of these behaviors or has concerns about self-harm, here's what you can do:  · Talk about it- your feelings and reasons for harming yourself  · Remove any means that you might use to hurt yourself (examples: pills, rope, extension cords, firearm)  · Get professional help from the community (Mental Health, Substance Abuse, psychological counseling)  · Do not be alone:Call your Safe Contact- someone whom you trust who will be there for you.  · Call your local CRISIS HOTLINE 769-6635 or 143-497-9887  · Call your local Children's Mobile Crisis Response Team [place] (344) 391-8106 or www.iCoolhunt  · Call the toll free National Suicide Prevention Hotlines   · National Suicide Prevention Lifeline 331-510-JHQU (4012)  · [employer] [facility] 800-SUICIDE (123-4824)      Discharge Instructions per Oscar Balderas M.D.      DIET: renal diet    ACTIVITY: as tolerated    DIAGNOSIS: hyperkalemia    Return to ER if as needed      Discharge Instructions: Laparoscopic Cholecystectomy  ==========================================     1. DIET:  You may wish to stay with a bland diet for the first few days after surgery but then you can resume your regular diet as soon as you feel ready.     2. ACTIVITIES: After discharge from the hospital, you may resume routine activities including walking and going u/down stairs. However, no strenuous activity and no lifting greater than 15 pounds for two weeks.     3. DRIVING: You may drive whenever you are off pain medications and are able to perform the activities needed to drive, i.e. turning, bending, twisting, etc.     4. BATHING: OK to shower starting one day after surgery but do not soak the incisions in tub/pool for 1 week.  The incisions are covered with skin glue which is waterproof.  It will start to peel off in 5-7 days which is normal.     5. BOWEL FUNCTION: A few patients, after this operation, will develop either frequent or loose stools after  meals. This usually corrects itself after a few days to a few weeks. If this occurs, do not worry; it is common and will resolve. You could also experience constipation due to pain medication and decreased activity level. If you feel this is occurring, please take an over-the-counter laxative (Milk of Magnesia, Ex-Lax, Senokot, etc.) until the problem has resolved.     6. PAIN MEDICATION: You will be given a prescription for pain medication at discharge. Please take these as directed. It is important to remember not to take medications on an empty stomach as this may cause nausea.  You can transition from the prescription-strength pain medication to over-the-counter medications as your pain improves, such as tylenol, ibuprofen, or Aleve.     7.CALL IF YOU HAVE: (1) Fevers to more than 101.5 F, (2) Unusual chest or leg pain, (3) Drainage or fluid from incision that may be foul smelling, increased tenderness or soreness at the wound or the wound edges are no longer together, redness or swelling at the incision site. Please do not hesitate to call with any other questions or concerns that arise.      8. APPOINTMENT: Contact our office at [phone] to schedule a follow-up appointment about 2 weeks following your procedure.     Office address:  Jimbo Davenport M.D. - [facility]  [address] [place] [id number]  [phone]             Laparoscopic Cholecystectomy, Care After  Refer to this sheet in the next few weeks. These instructions provide you with information on caring for yourself after your procedure. Your health care provider may also give you more specific instructions. Your treatment has been planned according to current medical practices, but problems sometimes occur. Call your health care provider if you have any problems or questions after your procedure.  WHAT TO EXPECT AFTER THE PROCEDURE  After your procedure, it is typical to have the following:  · Pain at your incision sites.  You will be given pain medicines to control the pain.  · Mild nausea or vomiting. This should improve after the first 24 hours.  · Bloating and possibly shoulder pain from the gas used during the procedure. This will improve after the first 24 hours.  HOME CARE INSTRUCTIONS   · Change bandages (dressings) as directed by your health care provider.  · Keep the wound dry and clean. You may wash the wound gently with soap and water. Gently blot or dab the area dry.  · Do not take baths or use swimming pools or hot tubs for 2 weeks or until your health care provider approves.  · Only take over-the-counter or prescription medicines as directed by your health care provider.  · Continue your normal diet as directed by your health care provider.  · Do not lift anything heavier than 10 pounds (4.5 kg) until your health care provider approves.  · Do not play contact sports for 1 week or until your health care provider approves.  SEEK MEDICAL CARE IF:   · You have redness, swelling, or increasing pain in the wound.  · You notice yellowish-white fluid (pus) coming from the wound.  · You have drainage from the wound that lasts longer than 1 day.  · You notice a bad smell coming from the wound or dressing.  · Your surgical cuts (incisions) break open.  SEEK IMMEDIATE MEDICAL CARE IF:   · You develop a rash.  · You have difficulty breathing.  · You have chest pain.  · You have a fever.  · You have increasing pain in the shoulders (shoulder strap areas).  · You have dizzy episodes or faint while standing.  · You have severe abdominal pain.  · You feel sick to your stomach (nauseous) or throw up (vomit) and this lasts for more than 1 day.     This information is not intended to replace advice given to you by your health care provider. Make sure you discuss any questions you have with your health care provider.     Document Released: 12/18/2006 Document Revised: 10/08/2014 Document Reviewed: 07/30/2014  [employer] Patient  Education ©2016 [employer]    Discharge Instructions    Discharged to home by car with relative. Discharged via wheelchair, hospital escort: Refused.  Special equipment needed: Not Applicable    Be sure to schedule a follow-up appointment with your primary care doctor or any specialists as instructed.     Discharge Plan:   Influenza Vaccine Indication: Not indicated: Previously immunized this influenza season and > 8 years of age    I understand that a diet low in cholesterol, fat, and sodium is recommended for good health. Unless I have been given specific instructions below for another diet, I accept this instruction as my diet prescription.   Other diet:     Special Instructions: None    · Is patient discharged on Warfarin / Coumadin?   Yes    You are receiving the drug warfarin. Please understand the importance of monitoring warfarin with scheduled PT/INR blood draws.  Follow-up with the [facility] in one week for INR lab..    IMPORTANT: HOW TO USE THIS INFORMATION:  This is a summary and does NOT have all possible information about this product. This information does not assure that this product is safe, effective, or appropriate for you. This information is not individual medical advice and does not substitute for the advice of your health care professional. Always ask your health care professional for complete information about this product and your specific health needs.      WARFARIN - ORAL (WARF-uh-rin)      COMMON BRAND NAME(S): Coumadin      WARNING:  Warfarin can cause very serious (possibly fatal) bleeding. This is more likely to occur when you first start taking this medication or if you take too much warfarin. To decrease your risk for bleeding, your doctor or other health care provider will monitor you closely and check your lab results (INR test) to make sure you are not taking too much warfarin. Keep all medical and laboratory appointments. Tell your doctor right away if you notice any  "signs of serious bleeding. See also Side Effects section.      USES:  This medication is used to treat blood clots (such as in deep vein thrombosis-DVT or pulmonary embolus-PE) and/or to prevent new clots from forming in your body. Preventing harmful blood clots helps to reduce the risk of a stroke or heart attack. Conditions that increase your risk of developing blood clots include a certain type of irregular heart rhythm (atrial fibrillation), heart valve replacement, recent heart attack, and certain surgeries (such as hip/knee replacement). Warfarin is commonly called a \"blood thinner,\" but the more correct term is \"anticoagulant.\" It helps to keep blood flowing smoothly in your body by decreasing the amount of certain substances (clotting proteins) in your blood.      HOW TO USE:  Read the Medication Guide provided by your pharmacist before you start taking warfarin and each time you get a refill. If you have any questions, ask your doctor or pharmacist. Take this medication by mouth with or without food as directed by your doctor or other health care professional, usually once a day. It is very important to take it exactly as directed. Do not increase the dose, take it more frequently, or stop using it unless directed by your doctor. Dosage is based on your medical condition, laboratory tests (such as INR), and response to treatment. Your doctor or other health care provider will monitor you closely while you are taking this medication to determine the right dose for you. Use this medication regularly to get the most benefit from it. To help you remember, take it at the same time each day. It is important to eat a balanced, consistent diet while taking warfarin. Some foods can affect how warfarin works in your body and may affect your treatment and dose. Avoid sudden large increases or decreases in your intake of foods high in vitamin K (such as broccoli, cauliflower, cabbage, brussels sprouts, kale, spinach, " and other green leafy vegetables, liver, green tea, certain vitamin supplements). If you are trying to lose weight, check with your doctor before you try to go on a diet. Cranberry products may also affect how your warfarin works. Limit the amount of cranberry juice (16 ounces/480 milliliters a day) or other cranberry products you may drink or eat.      SIDE EFFECTS:  Nausea, loss of appetite, or stomach/abdominal pain may occur. If any of these effects persist or worsen, tell your doctor or pharmacist promptly. Remember that your doctor has prescribed this medication because he or she has judged that the benefit to you is greater than the risk of side effects. Many people using this medication do not have serious side effects. This medication can cause serious bleeding if it affects your blood clotting proteins too much (shown by unusually high INR lab results). Even if your doctor stops your medication, this risk of bleeding can continue for up to a week. Tell your doctor right away if you have any signs of serious bleeding, including: unusual pain/swelling/discomfort, unusual/easy bruising, prolonged bleeding from cuts or gums, persistent/frequent nosebleeds, unusually heavy/prolonged menstrual flow, pink/dark urine, coughing up blood, vomit that is bloody or looks like coffee grounds, severe headache, dizziness/fainting, unusual or persistent tiredness/weakness, bloody/black/tarry stools, chest pain, shortness of breath, difficulty swallowing. Tell your doctor right away if any of these unlikely but serious side effects occur: persistent nausea/vomiting, severe stomach/abdominal pain, yellowing eyes/skin. This drug rarely has caused very serious (possibly fatal) problems if its effects lead to small blood clots (usually at the beginning of treatment). This can lead to severe skin/tissue damage that may require surgery or amputation if left untreated. Patients with certain blood conditions (protein C or S  deficiency) may be at greater risk. Get medical help right away if any of these rare but serious side effects occur: painful/red/purplish patches on the skin (such as on the toe, breast, abdomen), change in the amount of urine, vision changes, confusion, slurred speech, weakness on one side of the body. A very serious allergic reaction to this drug is rare. However, get medical help right away if you notice any symptoms of a serious allergic reaction, including: rash, itching/swelling (especially of the face/tongue/throat), severe dizziness, trouble breathing. This is not a complete list of possible side effects. If you notice other effects not listed above, contact your doctor or pharmacist. In the [place] - Call your doctor for medical advice about side effects. You may report side effects to FDA at 1-248-DRH-6882. In [place] - Call your doctor for medical advice about side effects. You may report side effects to [facility] [place] at 1-880.750.7279.      PRECAUTIONS:  Before taking warfarin, tell your doctor or pharmacist if you are allergic to it; or if you have any other allergies. This product may contain inactive ingredients, which can cause allergic reactions or other problems. Talk to your pharmacist for more details. Before using this medication, tell your doctor or pharmacist your medical history, especially of: blood disorders (such as anemia, hemophilia), bleeding problems (such as bleeding of the stomach/intestines, bleeding in the brain), blood vessel disorders (such as aneurysms), recent major injury/surgery, liver disease, alcohol use, mental/mood disorders (including memory problems), frequent falls/injuries. It is important that all your doctors and dentists know that you take warfarin. Before having surgery or any medical/dental procedures, tell your doctor or dentist that you are taking this medication and about all the products you use (including prescription drugs, nonprescription drugs, and herbal  products). Avoid getting injections into the muscles. If you must have an injection into a muscle (for example, a flu shot), it should be given in the arm. This way, it will be easier to check for bleeding and/or apply pressure bandages. This medication may cause stomach bleeding. Daily use of alcohol while using this medicine will increase your risk for stomach bleeding and may also affect how this medication works. Limit or avoid alcoholic beverages. If you have not been eating well, if you have an illness or infection that causes fever, vomiting, or diarrhea for more than 2 days, or if you start using any antibiotic medications, contact your doctor or pharmacist immediately because these conditions can affect how warfarin works. This medication can cause heavy bleeding. To lower the chance of getting cut, bruised, or injured, use great caution with sharp objects like safety razors and nail cutters. Use an electric razor when shaving and a soft toothbrush when brushing your teeth. Avoid activities such as contact sports. If you fall or injure yourself, especially if you hit your head, call your doctor immediately. Your doctor may need to check you. The Food & Drug Administration has stated that generic warfarin products are interchangeable. However, consult your doctor or pharmacist before switching warfarin products. Be careful not to take more than one medication that contains warfarin unless specifically directed by the doctor or health care provider who is monitoring your warfarin treatment. Older adults may be at greater risk for bleeding while using this drug. This medication is not recommended for use during pregnancy because of serious (possibly fatal) harm to an unborn baby. Discuss the use of reliable forms of birth control with your doctor. If you become pregnant or think you may be pregnant, tell your doctor immediately. If you are planning pregnancy, discuss a plan for managing your condition with  "your doctor before you become pregnant. Your doctor may switch the type of medication you use during pregnancy. Very small amounts of this medication may pass into breast milk but is unlikely to harm a nursing infant. Consult your doctor before breast-feeding.      DRUG INTERACTIONS:  Drug interactions may change how your medications work or increase your risk for serious side effects. This document does not contain all possible drug interactions. Keep a list of all the products you use (including prescription/nonprescription drugs and herbal products) and share it with your doctor and pharmacist. Do not start, stop, or change the dosage of any medicines without your doctor's approval. Warfarin interacts with many prescription, nonprescription, vitamin, and herbal products. This includes medications that are applied to the skin or inside the vagina or rectum. The interactions with warfarin usually result in an increase or decrease in the \"blood-thinning\" (anticoagulant) effect. Your doctor or other health care professional should closely monitor you to prevent serious bleeding or clotting problems. While taking warfarin, it is very important to tell your doctor or pharmacist of any changes in medications, vitamins, or herbal products that you are taking. Some products that may interact with this drug include: capecitabine, imatinib, mifepristone. Aspirin, aspirin-like drugs (salicylates), and nonsteroidal anti-inflammatory drugs (NSAIDs such as ibuprofen, naproxen, celecoxib) may have effects similar to warfarin. These drugs may increase the risk of bleeding problems if taken during treatment with warfarin. Carefully check all prescription/nonprescription product labels (including drugs applied to the skin such as pain-relieving creams) since the products may contain NSAIDs or salicylates. Talk to your doctor about using a different medication (such as acetaminophen) to treat pain/fever. Low-dose aspirin and related " drugs (such as clopidogrel, ticlopidine) should be continued if prescribed by your doctor for specific medical reasons such as heart attack or stroke prevention. Consult your doctor or pharmacist for more details. Many herbal products interact with warfarin. Tell your doctor before taking any herbal products, especially bromelains, coenzyme Q10, cranberry, danshen, dong quai, fenugreek, garlic, ginkgo biloba, ginseng, and Carolynn's wort, among others. This medication may interfere with a certain laboratory test to measure theophylline levels, possibly causing false test results. Make sure laboratory personnel and all your doctors know you use this drug.      OVERDOSE:  If overdose is suspected, contact a poison control center or emergency room immediately. [place] residents can call the [place] National Poison Hotline at 1-960.743.7087. [place] residents can call a provincial poison control center. Symptoms of overdose may include: bloody/black/tarry stools, pink/dark urine, unusual/prolonged bleeding.      NOTES:  Do not share this medication with others. Laboratory and/or medical tests (such as INR, complete blood count) must be performed periodically to monitor your progress or check for side effects. Consult your doctor for more details.      MISSED DOSE:  For the best possible benefit, do not miss any doses. If you do miss a dose and remember on the same day, take it as soon as you remember. If you remember on the next day, skip the missed dose and resume your usual dosing schedule. Do not double the dose to catch up because this could increase your risk for bleeding. Keep a record of missed doses to give to your doctor or pharmacist. Contact your doctor or pharmacist if you miss 2 or more doses in a row.      STORAGE:  Store at room temperature away from light and moisture. Do not store in the bathroom. Keep all medications away from children and pets. Do not flush medications down the toilet or pour them into a drain  unless instructed to do so. Properly discard this product when it is  or no longer needed. Consult your pharmacist or local waste disposal company for more details about how to safely discard your product.      MEDICAL ALERT:  Your condition and medication can cause complications in a medical emergency. For information about enrolling in MedicAlert, call 1-104.121.4982 ([place]) or 1-381.466.4395 ([place]).      Information last revised 2010 Copyright(c) 2010 [employer]             Depression / Suicide Risk    As you are discharged from this [facility] facility, it is important to learn how to keep safe from harming yourself.    Recognize the warning signs:  · Abrupt changes in personality, positive or negative- including increase in energy   · Giving away possessions  · Change in eating patterns- significant weight changes-  positive or negative  · Change in sleeping patterns- unable to sleep or sleeping all the time   · Unwillingness or inability to communicate  · Depression  · Unusual sadness, discouragement and loneliness  · Talk of wanting to die  · Neglect of personal appearance   · Rebelliousness- reckless behavior  · Withdrawal from people/activities they love  · Confusion- inability to concentrate     If you or a loved one observes any of these behaviors or has concerns about self-harm, here's what you can do:  · Talk about it- your feelings and reasons for harming yourself  · Remove any means that you might use to hurt yourself (examples: pills, rope, extension cords, firearm)  · Get professional help from the community (Mental Health, Substance Abuse, psychological counseling)  · Do not be alone:Call your Safe Contact- someone whom you trust who will be there for you.  · Call your local CRISIS HOTLINE 292-5078 or 890-573-4838  · Call your local Children's Mobile Crisis Response Team [place] (014) 054-3775 or www.Baby Blendy  · Call the toll free National Suicide Prevention  Hotlines   · National Suicide Prevention Lifeline 717-526-KWRC (0193)  · [facility] 800-SUICIDE (741-9493)    Atrial Fibrillation  Introduction  Atrial fibrillation is a type of heartbeat that is irregular or fast (rapid). If you have this condition, your heart keeps quivering in a weird (chaotic) way. This condition can make it so your heart cannot pump blood normally. Having this condition gives a person more risk for stroke, heart failure, and other heart problems. There are different types of atrial fibrillation. Talk with your doctor to learn about the type that you have.  Follow these instructions at home:  · Take over-the-counter and prescription medicines only as told by your doctor.  · If your doctor prescribed a blood-thinning medicine, take it exactly as told. Taking too much of it can cause bleeding. If you do not take enough of it, you will not have the protection that you need against stroke and other problems.  · Do not use any tobacco products. These include cigarettes, chewing tobacco, and e-cigarettes. If you need help quitting, ask your doctor.  · If you have apnea (obstructive sleep apnea), manage it as told by your doctor.  · Do not drink alcohol.  · Do not drink beverages that have caffeine. These include coffee, soda, and tea.  · Maintain a healthy weight. Do not use diet pills unless your doctor says they are safe for you. Diet pills may make heart problems worse.  · Follow diet instructions as told by your doctor.  · Exercise regularly as told by your doctor.  · Keep all follow-up visits as told by your doctor. This is important.  Contact a doctor if:  · You notice a change in the speed, rhythm, or strength of your heartbeat.  · You are taking a blood-thinning medicine and you notice more bruising.  · You get tired more easily when you move or exercise.  Get help right away if:  · You have pain in your chest or your belly (abdomen).  · You have sweating or weakness.  · You  feel sick to your stomach (nauseous).  · You notice blood in your throw up (vomit), poop (stool), or pee (urine).  · You are short of breath.  · You suddenly have swollen feet and ankles.  · You feel dizzy.  · Your suddenly get weak or numb in your face, arms, or legs, especially if it happens on one side of your body.  · You have trouble talking, trouble understanding, or both.  · Your face or your eyelid droops on one side.  These symptoms may be an emergency. Do not wait to see if the symptoms will go away. Get medical help right away. Call your local emergency services (911 in the [place] Do not drive yourself to the hospital.   This information is not intended to replace advice given to you by your health care provider. Make sure you discuss any questions you have with your health care provider.  Document Released: 09/26/2009 Document Revised: 05/25/2017 Document Reviewed: 04/13/2016  © 2017 [employer]vier          Warfarin   Warfarin is a blood thinner (anticoagulant) medicine. It is used to thin the blood so blood clots do not form. This medicine may cause very bad bleeding. You may also bruise easily while on this medicine. You may also bleed a little longer if you cut yourself.   Before taking warfarin, tell your doctor if:  · You take any other medicine for your heart or blood pressure.  · You are pregnant.  · You plan to get pregnant.  · You are breastfeeding.  · You are allergic to any medicine.  HOME CARE  · Have your blood checked as told by your doctor. Do not miss visits. Blood tests will include the PT and INR tests. These tests measure how long it takes for a clot to form in a blood sample. The test results help your doctor change your dose of warfarin if needed. If you miss your blood test visits, you could have bad bleeding or blood clots.  · Take warfarin exactly as told by your doctor. If you do not, bleeding or blood clots could lead to lasting injury, pain, or disability.  · Take your medicine at  the same time every day. Do not miss a dose.  · Tell your doctor about all medicine you take. This includes medicines, vitamins, natural products, or dietary pills (supplements). Certain medicines are not safe to take while taking warfarin. Taking other medicines while taking warfarin can affect your PT and INR test results.  · Do not start or stop any medicine unless you have been told to do so by your doctor.  · Do not take anything that has aspirin in it unless your doctor says it is okay.  · Eat the same amount of vitamin K foods every day. Vitamin K foods can change how warfarin works and your PT and INR test results.    · High vitamin K foods: Beef liver. Pork liver. Green tea. Broccoli. [place] sprouts. Cauliflower. Chickpeas. Kale. Spinach. Turnip greens.  · Medium vitamin K foods: Chicken liver. Pork tenderloin. Cheddar cheese. Rolled oats. Coffee. Asparagus. Cabbage. Iceberg lettuce.  · Low vitamin K foods: Apples. Butter. Bananas. Skim or 1% milk. Canned pears. White bread. Strawberries. Corn. Tomatoes. Green beans. Eggs. Potatoes. [place]. Pumpkin. Chicken breasts. Ground beef. Oil (except soybean oil).  · Avoid making major changes to your normal diet. Talk to your doctor first before changing your normal diet.  · Do not drink alcohol.  · Tell your doctors and dentists that you are taking warfarin at the beginning of every visit.  GET HELP RIGHT AWAY IF:  · You miss a dose of warfarin. Do not take 2 doses at the same time.  · You have a skin rash.  · You have heavy or unusual bleeding.  · There is blood in your pee (urine) or poop (stool).  · You have side effects from medicine that do not get better after a few days.  MAKE SURE YOU:  · Understand these instructions.  · Will watch your condition.  · Will get help right away if you are not doing well or get worse.  Document Released: 01/20/2012 Document Revised: 06/18/2013 Document Reviewed: 01/20/2012  ExitCare® Patient Information ©2014 [facility].      Laparoscopic Cholecystectomy, Care After  Refer to this sheet in the next few weeks. These instructions provide you with information on caring for yourself after your procedure. Your health care provider may also give you more specific instructions. Your treatment has been planned according to current medical practices, but problems sometimes occur. Call your health care provider if you have any problems or questions after your procedure.  WHAT TO EXPECT AFTER THE PROCEDURE  After your procedure, it is typical to have the following:  · Pain at your incision sites. You will be given pain medicines to control the pain.  · Mild nausea or vomiting. This should improve after the first 24 hours.  · Bloating and possibly shoulder pain from the gas used during the procedure. This will improve after the first 24 hours.  HOME CARE INSTRUCTIONS   · Change bandages (dressings) as directed by your health care provider.  · Keep the wound dry and clean. You may wash the wound gently with soap and water. Gently blot or dab the area dry.  · Do not take baths or use swimming pools or hot tubs for 2 weeks or until your health care provider approves.  · Only take over-the-counter or prescription medicines as directed by your health care provider.  · Continue your normal diet as directed by your health care provider.  · Do not lift anything heavier than 10 pounds (4.5 kg) until your health care provider approves.  · Do not play contact sports for 1 week or until your health care provider approves.  SEEK MEDICAL CARE IF:   · You have redness, swelling, or increasing pain in the wound.  · You notice yellowish-white fluid (pus) coming from the wound.  · You have drainage from the wound that lasts longer than 1 day.  · You notice a bad smell coming from the wound or dressing.  · Your surgical cuts (incisions) break open.  SEEK IMMEDIATE MEDICAL CARE IF:   · You develop a rash.  · You have difficulty breathing.  · You have chest  pain.  · You have a fever.  · You have increasing pain in the shoulders (shoulder strap areas).  · You have dizzy episodes or faint while standing.  · You have severe abdominal pain.  · You feel sick to your stomach (nauseous) or throw up (vomit) and this lasts for more than 1 day.     This information is not intended to replace advice given to you by your health care provider. Make sure you discuss any questions you have with your health care provider.     Document Released: 12/18/2006 Document Revised: 10/08/2014 Document Reviewed: 07/30/2014  [employer] Interactive Patient Education ©2016 [employer]

## 2019-06-24 NOTE — PROGRESS NOTES
[facility] Progress Note               Author: Rupinder Shields Date & Time created: 6/24/2019  11:03 AM     Interval History:  HPI  The patient is a very pleasant 54-year-old female  known to us from outpatient dialysis care.  She receives her maintenance hemodialysis on a Monday, Wednesday, and Friday schedule at [facility].  She has a left upper extremity AV fistula.  Her last treatment was Monday, described as a complete treatment.  She was admitted to same day surgery earlier today by Dr. Davenport for an elective laparoscopic   cholecystectomy.  Preoperative labs, however, came back finding of potassium of 6.9.  It was repeated and came back at 7.2.  The surgery was canceled and she is admitted for treatment of her hyperkalemia.  She tells me that other than some vague upper abdominal pain, she is feeling in her usual state of health.  She has had no nausea or vomiting.  She has chronic diarrhea.  She thinks she may be eating foods that are high in potassium and did skip her dialysis on Wednesday.  She has a history of persistently elevated potassium.  She denies any shortness of breath, no chest discomfort, no palpitations, no blurry vision, no change in the mental status, no lower extremity edema.  We have been asked to see regarding her renal failure.     Interval Problem Update  6/21/19--No events, urgently dialyzed yesterday for K 7.2, improved today, seen on dialysis this am, no new complaints, BP elevated this am, noted to have paroxysmal AFib last night and then converted to NSR ~2100, remains in NSR  6/22/19--Pt seen in PACU, will be going back to floor now.  Had Lap Crystal this morning.  Had HD yesterday with 2 L UF. Pt may go home today, but likely tomorrow.  6/23/19--Pt eating breakfast.  Surgery went well yesterday and she has some post-op pain, but no nausea. She will likely go home today.  Due for HD tomorrow.  6/24/19--NAEO, SOD, slept through  treatment    Review of Systems:  ROS  Constitutional: Positive for malaise/fatigue. Negative for chills and fever.   HENT: Negative.    Eyes: Negative.    Respiratory: Negative.    Cardiovascular: Negative.    Gastrointestinal: Positive for abdominal tenderness, but no guarding.  Negative for N/V/D.   Genitourinary: Negative.    Musculoskeletal: Positive for R shoulder pain.  Skin: Negative.    Neurological: Negative for dizziness, focal weakness, weakness and headaches.   Endo/Heme/Allergies: Negative.    Psychiatric/Behavioral: Negative.      Physical Exam:  Physical Exam  Constitutional: She is oriented to person, place, and time. She appears well-developed and well-nourished. No distress.   Appears chronically ill   HENT:   Head: Normocephalic and atraumatic.   Mouth/Throat: No oropharyngeal exudate.   Eyes: Pupils are equal, round, and reactive to light. Conjunctivae and EOM are normal. No scleral icterus.   Neck: Normal range of motion. Neck supple. No thyromegaly present.   Cardiovascular: Normal rate and regular rhythm.    No murmur heard.  Pulmonary/Chest: Effort normal and breath sounds normal. No respiratory distress. She has no wheezes.   Abdominal: Soft. Bowel sounds are normal. She exhibits no distension. There is tenderness.  Lap incisions C/D/I  Musculoskeletal: Normal range of motion. She exhibits no edema, tenderness or deformity.   Neurological: She is alert and oriented to person, place, and time. She exhibits normal muscle tone.   Skin: Skin is warm and dry. No erythema.   Psychiatric: She has a normal mood and affect. Her behavior is normal.   Nursing note and vitals reviewed.    Labs:      Recent Labs      06/23/19   0237   TROPONINI  0.02     Recent Labs      06/22/19   0243  06/23/19   0237  06/24/19   0204   SODIUM  137  138  138   POTASSIUM  4.5  5.6*  5.4   CHLORIDE  98  95*  87*   CO2  28  28  33   BUN  31*  58*  79*   CREATININE  4.87*  7.35*  9.50*   MAGNESIUM   --   2.3   --     PHOSPHORUS   --   5.3*  8.0*   CALCIUM  10.5  10.5  11.0*     Recent Labs      19   0243  19   0237  19   0204   GLUCOSE  96  117*  108*     Recent Labs      19   1437  19   0237  19   0204   RBC   --   3.16*  2.83*   HEMOGLOBIN   --   9.6*  8.6*   HEMATOCRIT   --   29.3*  27.0*   PLATELETCT   --   233  195   PROTHROMBTM  13.9  14.7*  24.3*   INR  1.05  1.12  2.11*     Recent Labs      19   0237  19   0204   WBC  10.6  9.3   NEUTSPOLYS  84.70*  67.70   LYMPHOCYTES  8.40*  18.70*   MONOCYTES  6.40  8.70   EOSINOPHILS  0.00  4.30   BASOPHILS  0.10  0.30     Hemodynamics:  Temp (24hrs), Av.6 °C (97.9 °F), Min:36.4 °C (97.6 °F), Max:36.8 °C (98.2 °F)  Temperature: 36.7 °C (98 °F)  Pulse  Av.7  Min: 63  Max: 138   Blood Pressure: 148/83     Respiratory:    Respiration: 17, Pulse Oximetry: 92 %           Fluids:    Intake/Output Summary (Last 24 hours) at 19 1005  Last data filed at 19 0600   Gross per 24 hour   Intake              530 ml   Output               25 ml   Net              505 ml     Weight: 57.4 kg (126 lb 8.7 oz)  GI/Nutrition:  Orders Placed This Encounter   Procedures   • Diet Order Renal     Standing Status:   Standing     Number of Occurrences:   1     Order Specific Question:   Diet:     Answer:   Renal [8]     Medical Decision Making, by Problem:  Active Hospital Problems    Diagnosis   • ESRD on hemodialysis (HCC) [N18.6, Z99.2]   • HTN (hypertension) [I10]   • Hyperkalemia [E87.5]   • Paroxysmal atrial fibrillation (HCC) [I48.0]   • Pulmonary infiltrate [R91.8]     ASSESSMENT:  1.  ESRD  --on maintenance hemodialysis on a Monday, Wednesday, and Friday schedule.  She missed her last o/p treatment, presented with hyperkalemia  2.  Hyperkalemia  --resolved with urgent HD  3.  Cough, recently quit smoking.   4.  Upper abdominal pain, had elective laparoscopic cholecystectomy yesterday () with Dr. Davenport.  5.  Anemia secondary to  chronic kidney disease.  6.  Renal osteodystrophy.  7.  Chronic hypertension, poor control.  8.  History of supraventricular tachycardia, status post ablation.  --AFib overnight 6/20 and then converted to NSR    Plan:  1.  MWF iHD  2.  Discussed importance of compliance with outpt dialysis  3.  Low K diet   4.  Will start Valtessa as outpatient  5.  Monitor for AFib  6.  Dr. Davenport following - had lap yamileth (6/22)  7.  Continue current BP meds  8.  Fluid removal with HD  9.  Increase metoprolol if BP remains elevated after HD  10. Epogen with HD  11. No dietary protein restrictions     **OK to discharge from renal standpoint if no further issues with cardiac arrhythmia. Pt to follow her regular outpt MWF dialysis schedule     Quality-Core Measures

## 2019-06-24 NOTE — PROGRESS NOTES
3hr HD started @ 0727 and completed @ 1028,tx well tolerated,VSS,net UF = 1800ml.LUAAVF + B/T,cannulation sites covered with DD,CDI,report given to Molly SANTOS.

## 2019-06-26 ENCOUNTER — TELEPHONE (OUTPATIENT)
Dept: VASCULAR LAB | Facility: MEDICAL CENTER | Age: 54
End: 2019-06-26

## 2019-06-26 NOTE — TELEPHONE ENCOUNTER
[facility]    Pt missed initial appointment yesterday, LM for pt to call clinic and establish care.    Arcadio Melendez, PharmD

## 2019-07-01 ENCOUNTER — TELEPHONE (OUTPATIENT)
Dept: VASCULAR LAB | Facility: MEDICAL CENTER | Age: 54
End: 2019-07-01

## 2019-07-01 NOTE — TELEPHONE ENCOUNTER
Left VM for pt regarding referral to anticoagulation from Dr. Balderas for warfarin. Asked pt to please call back to establish care.     Insurance: medicaid  PCP: non [facility]   Location: [address]       Pilar A Gurries, PharmD

## 2019-07-03 ENCOUNTER — TELEPHONE (OUTPATIENT)
Dept: VASCULAR LAB | Facility: MEDICAL CENTER | Age: 54
End: 2019-07-03

## 2019-07-03 NOTE — TELEPHONE ENCOUNTER
[facility]     for pt to call clinic and establish care.  Sent letter of compliance.    Arcadio Melendez, PharmD

## 2019-07-03 NOTE — LETTER
July 3, 2019        Isabelle Coyle  [address]  [place] [address]        Dear Isabelle Coyle ,    We have been unsuccessful in our attempts to contact you regarding your Anticoagulation Service appointments. Warfarin is a potent blood-thinning agent that requires monitoring to ensure that the dosage is correct for your body.  If it isn't, you could develop serious, sometimes life-threatening bleeding problems or life-threatening blood clots or stroke could result.     It is extremely important that you have your lab work drawn as soon as possible.  We are open Monday-Friday 8 am until 5 pm.  You may reach our Service at [phone].     To monitor you effectively, we need to be able to communicate with you.  This is a requirement to be followed by our Service.  If we are unable to contact you on three separate occasions, you will be discharged from the Anticoagulation Service.     If you repeatedly fail to keep your lab appointments, you are at risk of being discharged from the Service.     Sincerely,    Antony Calohun, PharmD, [facility]  Pharmacy Clinical Supervisor  [facility]  Outpatient Anticoagulation Service

## 2019-07-09 RX ORDER — GABAPENTIN 100 MG/1
100 CAPSULE ORAL 2 TIMES DAILY
Qty: 180 CAP | Refills: 0 | Status: SHIPPED | OUTPATIENT
Start: 2019-07-09 | End: 2019-07-11 | Stop reason: SDUPTHER

## 2019-07-11 DIAGNOSIS — Z89.511 HX OF RIGHT BKA (HCC): ICD-10-CM

## 2019-07-11 RX ORDER — GABAPENTIN 100 MG/1
100 CAPSULE ORAL 2 TIMES DAILY
Qty: 180 CAP | Refills: 0 | Status: SHIPPED
Start: 2019-07-11 | End: 2020-03-04

## 2019-07-17 ENCOUNTER — TELEPHONE (OUTPATIENT)
Dept: VASCULAR LAB | Facility: MEDICAL CENTER | Age: 54
End: 2019-07-17

## 2019-07-17 NOTE — LETTER
July 17, 2019    Isabelle Coyle  [address]  [place] [address]      Dear Isabelle Coyle ,    We have been unsuccessful in our attempts to contact you regarding your Anticoagulation Service referral.  Anticoagulants are medications that can cause potential harmful side effects when not monitored properly. Without proper monitoring there is potential for serious, sometimes life-threatening bleeding problems or life-threatening blood clots or stroke could result.    Please contact our clinic so we may assist you.  We are open Monday-Friday 8 am until 5 pm.  You may reach our Service at [phone].    To monitor your anticoagulant effectively, we need to be able to communicate with you.  This is a requirement to be followed by our Service.  Please contact the clinic as soon as possible to establish care and provide your current contact information.  Thank you.        Sincerely,        Antony Calhoun, PharmD, [facility]  Pharmacy Clinical Supervisor  [facility]  Outpatient Anticoagulation Service

## 2019-07-17 NOTE — TELEPHONE ENCOUNTER
Left VM for pt regarding referral to anticoagulation from Dr. Balderas for warfarin. Asked pt to please call back to establish care.      Unable to establish care, will send letter and FYI to referring MD.    Insurance: medicaid  PCP: non [facility]   Location: [address]         Pilar A Gurries, TashiD

## 2019-07-24 ENCOUNTER — HOSPITAL ENCOUNTER (EMERGENCY)
Facility: MEDICAL CENTER | Age: 54
End: 2019-07-24
Payer: MEDICAID

## 2019-07-24 VITALS
TEMPERATURE: 98.3 F | HEART RATE: 85 BPM | SYSTOLIC BLOOD PRESSURE: 148 MMHG | WEIGHT: 133.82 LBS | BODY MASS INDEX: 24.63 KG/M2 | HEIGHT: 62 IN | RESPIRATION RATE: 16 BRPM | OXYGEN SATURATION: 93 % | DIASTOLIC BLOOD PRESSURE: 80 MMHG

## 2019-07-24 LAB — EKG IMPRESSION: NORMAL

## 2019-07-24 PROCEDURE — 302449 STATCHG TRIAGE ONLY (STATISTIC)

## 2019-07-24 PROCEDURE — 93005 ELECTROCARDIOGRAM TRACING: CPT

## 2019-07-24 NOTE — ED TRIAGE NOTES
"Ambulatory to triage with   Chief Complaint   Patient presents with   • Palpitations     States hx of SVT with ablation. Episode of palpations and \"feeling like she was in SVT\" PTA. States she feels better at this time.   "

## 2019-09-18 NOTE — ED PROVIDER NOTES
ED Provider Note    Scribed for Naveed Healy M.D. by Kenan Arceo. 12/27/2017, 7:47 PM.    Primary care provider: Juan Moreno M.D.  Means of arrival: Ambulance  History obtained from: Patient  History limited by: None     CHIEF COMPLAINT  Chief Complaint   Patient presents with   • Supraventricular Tachycardia (SVT)     HPI  Isabelle Coyle is a 52 y.o. Female with a past medical history significant for supraventricular tachycardia and end stage renal disease currently on dialysis who presents to the Emergency Department with heart palpitations.   The patient was in the process of receiving her dialysis infusion at [facility] (Mon Weds Fri) when she had an onset of heart palpitations at 7:15 PM tonight which have been constant. The patient describes associated symptoms of dizziness, mid chest pain, and shortness of breath associated with her palpitations. She describes her episode of heart palpitations as feeling identical to her history of supraventricular tachycardia, and [facility] then called EMS for transport to the ED. The patient states her previous episodes of SVT in the past have usually resolved spontaneously after approximately one hour.     The patient has a past medical history significant for hypertension and hyperlipidemia.   She denies any recent fever, nausea, vomiting, abdominal pain, diarrhea, focal weakness.     REVIEW OF SYSTEMS  Pertinent positives include heart palpitations, chest pain, shortness of breath, dizziness. Pertinent negatives include no fever, nausea, vomiting, abdominal pain, diarrhea, focal weakness. As above, all other systems reviewed and are negative.   See HPI for further details.     PAST MEDICAL HISTORY   has a past medical history of Anemia; Anemia associated with chronic renal failure (11/5/2009); Dental disorder (8-25-17); Dialysis patient (CMS-HCC) (8-25-17); Dysrhythmia; ESRD (end stage renal disease) (CMS-HCC) (8-25-17); Glomerulonephritis,  chronic (11/5/2009); Heart burn; High cholesterol; HTN (hypertension) (11/5/2009); Port catheter in place (8-25-17); SVT (supraventricular tachycardia) (CMS-[facility]); and SVT (supraventricular tachycardia) (CMS-[facility]).    SURGICAL HISTORY   has a past surgical history that includes amputation, below the knee; capitation payment (insurance); appendectomy laparoscopic (5/4/2009); enlarge breast with implant; av fistula creation (Left, 6/20/2017); other; femur orif (10/9/08); iliac bone graft (10/9/08); and av fistula creation (Left, 8/28/2017).    SOCIAL HISTORY  Social History   Substance Use Topics   • Smoking status: Former Smoker     Packs/day: 0.25     Types: Cigarettes     Quit date: 7/6/2017   • Smokeless tobacco: Never Used      Comment: 5 cigs/day   • Alcohol use No      Comment: pt vapes      History   Drug Use No     FAMILY HISTORY  Family History   Problem Relation Age of Onset   • Heart Disease         CURRENT MEDICATIONS  Home Medications    **Home medications have not yet been reviewed for this encounter**       ALLERGIES  Allergies   Allergen Reactions   • Sulfa Drugs Hives     [id number] rash swelling itching     PHYSICAL EXAM  VITAL SIGNS: /76   Pulse (!) 215   Resp 18   LMP 06/25/2007   SpO2 100%   Vitals reviewed.  Constitutional: Alert in mild distress.  HENT: No signs of trauma, Bilateral external ears normal, Nose normal.   Eyes: Pupils are equal and reactive, Conjunctiva normal, Non-icteric.   Neck: Normal range of motion, No tenderness, Supple, No stridor.   Lymphatic: No lymphadenopathy noted.   Cardiovascular: tachycardic rate and regular rhythm, no murmurs.   Thorax & Lungs: Port in right chest without surrounding erythema or edema. Normal breath sounds, No respiratory distress, No wheezing, No chest tenderness.   Abdomen: Bowel sounds normal, Soft, No tenderness, No peritoneal signs, No masses, No pulsatile masses.   Skin: Warm, well perfused, Dry, no erythema.   Back: No bony  tenderness, No CVA tenderness.   Extremities: Brisk 2s capillary refill in in left lower extremity. Prosthetic in place for BKA on right lower extremity. Distal pulses are intact in the left lower extremity. Fistula in left upper extremity.  Musculoskeletal: Good range of motion in all major joints. Right BKA with prosthetic in place.  Neurologic: Alert , Normal motor function, Normal sensory function, No focal deficits noted.   Psychiatric: Affect normal, Judgment normal, Mood normal.     DIAGNOSTIC STUDIES / PROCEDURES    LABS  Labs Reviewed   CBC WITH DIFFERENTIAL   COMP METABOLIC PANEL   MAGNESIUM   PHOSPHORUS   TROPONIN      All labs reviewed by me.    EKG Interpretation:  Interpreted by me    12 Lead EKG interpreted by me to show:  Normal sinus rhythm  Rate 206  Axis: Normal  Intervals: Normal  ST and T wave abnormalities likely rate related  My impression of this EKG: SVT    RADIOLOGY  DX-CHEST-2 VIEWS    (Results Pending)     The radiologist's interpretation of all radiological studies have been reviewed by me.          Cardioversion Procedure Note    Indication: supraventricular tachycardia    Consent: The patient was counseled regarding the procedure, its indications, risks, potential complications and alternatives, and any questions were answered. Consent was obtained to proceed.    Pre-Medication: propofol 60 mg intravenously    Procedure: The patient was placed in the supine position and the chest area was exposed. The cardioversion pads were applied in the standard manner and configuration.    Attempt #1: The defibrillator was set on the synchronous mode and charged to 120 joules. A charge was then delivered which resulted in no change in rhythm.     Attempt #2: The defibrillator was then set on the synchronous mode and charged to 200 joules. A second charge was then delivered which resulted in no change in rhythm.    The patient tolerated the procedure well.    Complications: None       COURSE &  MEDICAL DECISION MAKING  Nursing notes, VS, PMSFHx reviewed in chart.  Differential diagnoses include but not limited to: Electrolyte abnormality, STEMI, ACS, fluid overload, valvular pathology, ectopic focus, sepsis.      7:50 PM Patient seen and examined at bedside. Patient arrives with supraventricular tachycardia. Patient arrives warm and well perfused, normal blood pressure, strong peripheral pulses, alert and oriented. Has gone into SVT multiple times in the past and each time is resolved with what sounds like adenosine. Denies any history of cardioversion.  The patient was treated with two doses of 6 mg and 12 mg Adenosine, which were not succesful at converting patient back to normal sinus rhythm. I then attempted synchronized cardioversion initially with 120J which was unsuccessful and then with 200J, which did not resolve her SVT. After this did not resolve her SVT, a diltiazem bolus was given which ultimately resolved her SVT. A diltiazem drip was ordered. Repeat EKG reveals sinus tachycardia with rate of 107. Intervals are normal. No suggestion of infarction or ischemia.    8:14 PM Cardiology was then paged for consult.    8:24 PM Spoke with Dr. Carrasco, Cardiology, about the patient's condition.       8:46 PM Recheck: Patient re-evaluated at beside. Dr. Carrasco is now at patient's bedside speaking with the patient. The patient's supraventricular tachycardia has resolved at this time. Dr. Carrasco. He recommends not initiating a diltiazem drip and instead admitting the patient for EP consultation tomorrow for likely ablation. Does not recommend initiation of additional medications at this time. Patient will likely require inpatient dialysis as she did not complete her session today secondary to her SVT.    9:12 PM Spoke with Dr. Gay, [facility], about the patient's condition.        Disposition:  Patient will be admitted to the hospital under the care of Dr. Gay ([facility]) in guarded  condition.     FINAL IMPRESSION  1. SVT (supraventricular tachycardia) (CMS-[facility])          Kenan LANE (Scribe), am scribing for, and in the presence of, Naveed Healy M.D..    Electronically signed by: Kenan Arceo (Scribe), 12/27/2017    Naveed LANE M.D. personally performed the services described in this documentation, as scribed by Kenan Arceo in my presence, and it is both accurate and complete.    The note accurately reflects work and decisions made by me.  Naveed Healy  12/27/2017  9:47 PM    intermittent

## 2019-11-28 ENCOUNTER — HOSPITAL ENCOUNTER (OUTPATIENT)
Facility: MEDICAL CENTER | Age: 54
End: 2019-11-30
Attending: EMERGENCY MEDICINE | Admitting: HOSPITALIST
Payer: MEDICAID

## 2019-11-28 ENCOUNTER — APPOINTMENT (OUTPATIENT)
Dept: RADIOLOGY | Facility: MEDICAL CENTER | Age: 54
End: 2019-11-28
Attending: EMERGENCY MEDICINE
Payer: MEDICAID

## 2019-11-28 DIAGNOSIS — N18.6 ESRD NEEDING DIALYSIS (HCC): ICD-10-CM

## 2019-11-28 DIAGNOSIS — Z99.2 ESRD NEEDING DIALYSIS (HCC): ICD-10-CM

## 2019-11-28 DIAGNOSIS — S42.031A CLOSED DISPLACED FRACTURE OF ACROMIAL END OF RIGHT CLAVICLE, INITIAL ENCOUNTER: ICD-10-CM

## 2019-11-28 DIAGNOSIS — E87.5 HYPERKALEMIA: ICD-10-CM

## 2019-11-28 PROBLEM — S42.034A CLOSED NONDISPLACED FRACTURE OF ACROMIAL END OF RIGHT CLAVICLE: Status: ACTIVE | Noted: 2019-11-28

## 2019-11-28 PROBLEM — D69.6 THROMBOCYTOPENIA (HCC): Status: ACTIVE | Noted: 2019-11-28

## 2019-11-28 LAB
ANION GAP SERPL CALC-SCNC: 13 MMOL/L (ref 0–11.9)
BASOPHILS # BLD AUTO: 0.4 % (ref 0–1.8)
BASOPHILS # BLD: 0.04 K/UL (ref 0–0.12)
BUN SERPL-MCNC: 66 MG/DL (ref 8–22)
CALCIUM SERPL-MCNC: 9.6 MG/DL (ref 8.5–10.5)
CHLORIDE SERPL-SCNC: 98 MMOL/L (ref 96–112)
CO2 SERPL-SCNC: 28 MMOL/L (ref 20–33)
CREAT SERPL-MCNC: 9.01 MG/DL (ref 0.5–1.4)
EOSINOPHIL # BLD AUTO: 0.19 K/UL (ref 0–0.51)
EOSINOPHIL NFR BLD: 2.1 % (ref 0–6.9)
ERYTHROCYTE [DISTWIDTH] IN BLOOD BY AUTOMATED COUNT: 54.5 FL (ref 35.9–50)
GLUCOSE SERPL-MCNC: 111 MG/DL (ref 65–99)
HAV IGM SERPL QL IA: NEGATIVE
HBV CORE IGM SER QL: NEGATIVE
HBV SURFACE AB SERPL IA-ACNC: >1000 MIU/ML (ref 0–10)
HBV SURFACE AG SER QL: NEGATIVE
HCT VFR BLD AUTO: 32 % (ref 37–47)
HCV AB SER QL: NEGATIVE
HGB BLD-MCNC: 10.1 G/DL (ref 12–16)
IMM GRANULOCYTES # BLD AUTO: 0.04 K/UL (ref 0–0.11)
IMM GRANULOCYTES NFR BLD AUTO: 0.4 % (ref 0–0.9)
INR PPP: 1.14 (ref 0.87–1.13)
LYMPHOCYTES # BLD AUTO: 0.58 K/UL (ref 1–4.8)
LYMPHOCYTES NFR BLD: 6.3 % (ref 22–41)
MCH RBC QN AUTO: 30.2 PG (ref 27–33)
MCHC RBC AUTO-ENTMCNC: 31.6 G/DL (ref 33.6–35)
MCV RBC AUTO: 95.8 FL (ref 81.4–97.8)
MONOCYTES # BLD AUTO: 0.24 K/UL (ref 0–0.85)
MONOCYTES NFR BLD AUTO: 2.6 % (ref 0–13.4)
NEUTROPHILS # BLD AUTO: 8.15 K/UL (ref 2–7.15)
NEUTROPHILS NFR BLD: 88.2 % (ref 44–72)
NRBC # BLD AUTO: 0 K/UL
NRBC BLD-RTO: 0 /100 WBC
PLATELET # BLD AUTO: 86 K/UL (ref 164–446)
PMV BLD AUTO: 10.4 FL (ref 9–12.9)
POTASSIUM SERPL-SCNC: 6.1 MMOL/L (ref 3.6–5.5)
PROTHROMBIN TIME: 14.8 SEC (ref 12–14.6)
RBC # BLD AUTO: 3.34 M/UL (ref 4.2–5.4)
SODIUM SERPL-SCNC: 139 MMOL/L (ref 135–145)
WBC # BLD AUTO: 9.2 K/UL (ref 4.8–10.8)

## 2019-11-28 PROCEDURE — A9270 NON-COVERED ITEM OR SERVICE: HCPCS | Performed by: EMERGENCY MEDICINE

## 2019-11-28 PROCEDURE — 700101 HCHG RX REV CODE 250: Performed by: HOSPITALIST

## 2019-11-28 PROCEDURE — 90935 HEMODIALYSIS ONE EVALUATION: CPT

## 2019-11-28 PROCEDURE — A9270 NON-COVERED ITEM OR SERVICE: HCPCS | Performed by: STUDENT IN AN ORGANIZED HEALTH CARE EDUCATION/TRAINING PROGRAM

## 2019-11-28 PROCEDURE — 96374 THER/PROPH/DIAG INJ IV PUSH: CPT | Mod: XU

## 2019-11-28 PROCEDURE — 700111 HCHG RX REV CODE 636 W/ 250 OVERRIDE (IP): Performed by: HOSPITALIST

## 2019-11-28 PROCEDURE — 80074 ACUTE HEPATITIS PANEL: CPT

## 2019-11-28 PROCEDURE — 700102 HCHG RX REV CODE 250 W/ 637 OVERRIDE(OP): Performed by: STUDENT IN AN ORGANIZED HEALTH CARE EDUCATION/TRAINING PROGRAM

## 2019-11-28 PROCEDURE — 73030 X-RAY EXAM OF SHOULDER: CPT | Mod: RT

## 2019-11-28 PROCEDURE — G0378 HOSPITAL OBSERVATION PER HR: HCPCS

## 2019-11-28 PROCEDURE — 36415 COLL VENOUS BLD VENIPUNCTURE: CPT

## 2019-11-28 PROCEDURE — 86706 HEP B SURFACE ANTIBODY: CPT

## 2019-11-28 PROCEDURE — 70450 CT HEAD/BRAIN W/O DYE: CPT

## 2019-11-28 PROCEDURE — 99285 EMERGENCY DEPT VISIT HI MDM: CPT

## 2019-11-28 PROCEDURE — A9270 NON-COVERED ITEM OR SERVICE: HCPCS | Performed by: HOSPITALIST

## 2019-11-28 PROCEDURE — 700111 HCHG RX REV CODE 636 W/ 250 OVERRIDE (IP): Performed by: STUDENT IN AN ORGANIZED HEALTH CARE EDUCATION/TRAINING PROGRAM

## 2019-11-28 PROCEDURE — 85610 PROTHROMBIN TIME: CPT

## 2019-11-28 PROCEDURE — 99220 PR INITIAL OBSERVATION CARE,LEVL III: CPT | Performed by: HOSPITALIST

## 2019-11-28 PROCEDURE — 96375 TX/PRO/DX INJ NEW DRUG ADDON: CPT | Mod: XU

## 2019-11-28 PROCEDURE — 85025 COMPLETE CBC W/AUTO DIFF WBC: CPT

## 2019-11-28 PROCEDURE — 700102 HCHG RX REV CODE 250 W/ 637 OVERRIDE(OP): Performed by: HOSPITALIST

## 2019-11-28 PROCEDURE — 80048 BASIC METABOLIC PNL TOTAL CA: CPT

## 2019-11-28 PROCEDURE — 700102 HCHG RX REV CODE 250 W/ 637 OVERRIDE(OP): Performed by: EMERGENCY MEDICINE

## 2019-11-28 PROCEDURE — 73000 X-RAY EXAM OF COLLAR BONE: CPT | Mod: RT

## 2019-11-28 PROCEDURE — 71101 X-RAY EXAM UNILAT RIBS/CHEST: CPT | Mod: RT

## 2019-11-28 RX ORDER — PROMETHAZINE HYDROCHLORIDE 25 MG/1
12.5-25 SUPPOSITORY RECTAL EVERY 4 HOURS PRN
Status: DISCONTINUED | OUTPATIENT
Start: 2019-11-28 | End: 2019-11-30 | Stop reason: HOSPADM

## 2019-11-28 RX ORDER — ALBUTEROL SULFATE 90 UG/1
1 AEROSOL, METERED RESPIRATORY (INHALATION) 2 TIMES DAILY PRN
Status: DISCONTINUED | OUTPATIENT
Start: 2019-11-28 | End: 2019-11-30 | Stop reason: HOSPADM

## 2019-11-28 RX ORDER — TERAZOSIN 2 MG/1
2 CAPSULE ORAL NIGHTLY
Status: DISCONTINUED | OUTPATIENT
Start: 2019-11-28 | End: 2019-11-30 | Stop reason: HOSPADM

## 2019-11-28 RX ORDER — SEVELAMER CARBONATE 800 MG/1
800 TABLET, FILM COATED ORAL
Status: DISCONTINUED | OUTPATIENT
Start: 2019-11-28 | End: 2019-11-30 | Stop reason: HOSPADM

## 2019-11-28 RX ORDER — TERAZOSIN 5 MG/1
5 CAPSULE ORAL NIGHTLY
COMMUNITY
End: 2020-03-04

## 2019-11-28 RX ORDER — BISACODYL 10 MG
10 SUPPOSITORY, RECTAL RECTAL
Status: DISCONTINUED | OUTPATIENT
Start: 2019-11-28 | End: 2019-11-30 | Stop reason: HOSPADM

## 2019-11-28 RX ORDER — AMLODIPINE BESYLATE 5 MG/1
10 TABLET ORAL DAILY
Status: DISCONTINUED | OUTPATIENT
Start: 2019-11-28 | End: 2019-11-30 | Stop reason: HOSPADM

## 2019-11-28 RX ORDER — GABAPENTIN 100 MG/1
100 CAPSULE ORAL 2 TIMES DAILY
Status: DISCONTINUED | OUTPATIENT
Start: 2019-11-28 | End: 2019-11-30 | Stop reason: HOSPADM

## 2019-11-28 RX ORDER — ONDANSETRON 2 MG/ML
4 INJECTION INTRAMUSCULAR; INTRAVENOUS EVERY 4 HOURS PRN
Status: DISCONTINUED | OUTPATIENT
Start: 2019-11-28 | End: 2019-11-30 | Stop reason: HOSPADM

## 2019-11-28 RX ORDER — HYDROMORPHONE HYDROCHLORIDE 1 MG/ML
0.25 INJECTION, SOLUTION INTRAMUSCULAR; INTRAVENOUS; SUBCUTANEOUS
Status: DISCONTINUED | OUTPATIENT
Start: 2019-11-28 | End: 2019-11-30 | Stop reason: HOSPADM

## 2019-11-28 RX ORDER — HYDRALAZINE HYDROCHLORIDE 25 MG/1
25 TABLET, FILM COATED ORAL EVERY 8 HOURS
Status: DISCONTINUED | OUTPATIENT
Start: 2019-11-28 | End: 2019-11-30 | Stop reason: HOSPADM

## 2019-11-28 RX ORDER — AMOXICILLIN 250 MG
2 CAPSULE ORAL 2 TIMES DAILY
Status: DISCONTINUED | OUTPATIENT
Start: 2019-11-28 | End: 2019-11-30 | Stop reason: HOSPADM

## 2019-11-28 RX ORDER — HYDRALAZINE HYDROCHLORIDE 20 MG/ML
10 INJECTION INTRAMUSCULAR; INTRAVENOUS EVERY 4 HOURS PRN
Status: DISCONTINUED | OUTPATIENT
Start: 2019-11-28 | End: 2019-11-30 | Stop reason: HOSPADM

## 2019-11-28 RX ORDER — OXYCODONE HYDROCHLORIDE 5 MG/1
5 TABLET ORAL
Status: DISCONTINUED | OUTPATIENT
Start: 2019-11-28 | End: 2019-11-30 | Stop reason: HOSPADM

## 2019-11-28 RX ORDER — PROCHLORPERAZINE EDISYLATE 5 MG/ML
5-10 INJECTION INTRAMUSCULAR; INTRAVENOUS EVERY 4 HOURS PRN
Status: DISCONTINUED | OUTPATIENT
Start: 2019-11-28 | End: 2019-11-30 | Stop reason: HOSPADM

## 2019-11-28 RX ORDER — POLYETHYLENE GLYCOL 3350 17 G/17G
1 POWDER, FOR SOLUTION ORAL
Status: DISCONTINUED | OUTPATIENT
Start: 2019-11-28 | End: 2019-11-30 | Stop reason: HOSPADM

## 2019-11-28 RX ORDER — OXYCODONE HYDROCHLORIDE 5 MG/1
2.5 TABLET ORAL
Status: DISCONTINUED | OUTPATIENT
Start: 2019-11-28 | End: 2019-11-30 | Stop reason: HOSPADM

## 2019-11-28 RX ORDER — LABETALOL HYDROCHLORIDE 5 MG/ML
10 INJECTION, SOLUTION INTRAVENOUS EVERY 4 HOURS PRN
Status: DISCONTINUED | OUTPATIENT
Start: 2019-11-28 | End: 2019-11-30 | Stop reason: HOSPADM

## 2019-11-28 RX ORDER — ACETAMINOPHEN 325 MG/1
650 TABLET ORAL EVERY 6 HOURS PRN
Status: DISCONTINUED | OUTPATIENT
Start: 2019-11-28 | End: 2019-11-30 | Stop reason: HOSPADM

## 2019-11-28 RX ORDER — ACETAMINOPHEN 500 MG
1000 TABLET ORAL 3 TIMES DAILY
Status: DISCONTINUED | OUTPATIENT
Start: 2019-11-28 | End: 2019-11-30 | Stop reason: HOSPADM

## 2019-11-28 RX ORDER — ONDANSETRON 4 MG/1
4 TABLET, ORALLY DISINTEGRATING ORAL EVERY 4 HOURS PRN
Status: DISCONTINUED | OUTPATIENT
Start: 2019-11-28 | End: 2019-11-30 | Stop reason: HOSPADM

## 2019-11-28 RX ORDER — NICOTINE 21 MG/24HR
14 PATCH, TRANSDERMAL 24 HOURS TRANSDERMAL
Status: DISCONTINUED | OUTPATIENT
Start: 2019-11-28 | End: 2019-11-30 | Stop reason: HOSPADM

## 2019-11-28 RX ORDER — PROMETHAZINE HYDROCHLORIDE 25 MG/1
12.5-25 TABLET ORAL EVERY 4 HOURS PRN
Status: DISCONTINUED | OUTPATIENT
Start: 2019-11-28 | End: 2019-11-30 | Stop reason: HOSPADM

## 2019-11-28 RX ORDER — WARFARIN SODIUM 2.5 MG/1
2.5 TABLET ORAL
Status: COMPLETED | OUTPATIENT
Start: 2019-11-28 | End: 2019-11-28

## 2019-11-28 RX ORDER — LABETALOL 200 MG/1
200 TABLET, FILM COATED ORAL 2 TIMES DAILY
Status: ON HOLD | COMMUNITY
End: 2020-01-08

## 2019-11-28 RX ORDER — OXYCODONE HYDROCHLORIDE AND ACETAMINOPHEN 5; 325 MG/1; MG/1
2 TABLET ORAL ONCE
Status: COMPLETED | OUTPATIENT
Start: 2019-11-28 | End: 2019-11-28

## 2019-11-28 RX ADMIN — OXYCODONE HYDROCHLORIDE AND ACETAMINOPHEN 2 TABLET: 5; 325 TABLET ORAL at 09:12

## 2019-11-28 RX ADMIN — HYDROMORPHONE HYDROCHLORIDE 0.25 MG: 1 INJECTION, SOLUTION INTRAMUSCULAR; INTRAVENOUS; SUBCUTANEOUS at 17:21

## 2019-11-28 RX ADMIN — LABETALOL HYDROCHLORIDE 10 MG: 5 INJECTION INTRAVENOUS at 17:00

## 2019-11-28 RX ADMIN — NICOTINE 14 MG: 14 PATCH TRANSDERMAL at 14:40

## 2019-11-28 RX ADMIN — ALBUTEROL SULFATE 1 PUFF: 90 AEROSOL, METERED RESPIRATORY (INHALATION) at 21:22

## 2019-11-28 RX ADMIN — WARFARIN SODIUM 2.5 MG: 2.5 TABLET ORAL at 17:04

## 2019-11-28 RX ADMIN — POLYETHYLENE GLYCOL 3350 1 PACKET: 17 POWDER, FOR SOLUTION ORAL at 17:17

## 2019-11-28 RX ADMIN — HYDRALAZINE HYDROCHLORIDE 10 MG: 20 INJECTION INTRAMUSCULAR; INTRAVENOUS at 17:21

## 2019-11-28 RX ADMIN — AMLODIPINE BESYLATE 10 MG: 5 TABLET ORAL at 14:40

## 2019-11-28 RX ADMIN — HYDRALAZINE HYDROCHLORIDE 25 MG: 25 TABLET, FILM COATED ORAL at 20:46

## 2019-11-28 RX ADMIN — SENNOSIDES AND DOCUSATE SODIUM 2 TABLET: 8.6; 5 TABLET ORAL at 17:02

## 2019-11-28 RX ADMIN — ACETAMINOPHEN 1000 MG: 500 TABLET ORAL at 20:46

## 2019-11-28 RX ADMIN — METOPROLOL TARTRATE 25 MG: 25 TABLET, FILM COATED ORAL at 17:03

## 2019-11-28 RX ADMIN — OXYCODONE HYDROCHLORIDE 5 MG: 5 TABLET ORAL at 14:40

## 2019-11-28 RX ADMIN — HYDRALAZINE HYDROCHLORIDE 25 MG: 25 TABLET, FILM COATED ORAL at 14:39

## 2019-11-28 RX ADMIN — OXYCODONE HYDROCHLORIDE 5 MG: 5 TABLET ORAL at 20:47

## 2019-11-28 RX ADMIN — GABAPENTIN 100 MG: 100 CAPSULE ORAL at 17:02

## 2019-11-28 RX ADMIN — TERAZOSIN HYDROCHLORIDE 2 MG: 2 CAPSULE ORAL at 20:46

## 2019-11-28 RX ADMIN — SEVELAMER CARBONATE 800 MG: 800 TABLET, FILM COATED ORAL at 17:03

## 2019-11-28 ASSESSMENT — LIFESTYLE VARIABLES
AVERAGE NUMBER OF DAYS PER WEEK YOU HAVE A DRINK CONTAINING ALCOHOL: 0
EVER HAD A DRINK FIRST THING IN THE MORNING TO STEADY YOUR NERVES TO GET RID OF A HANGOVER: NO
HAVE YOU EVER FELT YOU SHOULD CUT DOWN ON YOUR DRINKING: NO
TOTAL SCORE: 0
CONSUMPTION TOTAL: NEGATIVE
EVER FELT BAD OR GUILTY ABOUT YOUR DRINKING: NO
EVER_SMOKED: YES
HOW MANY TIMES IN THE PAST YEAR HAVE YOU HAD 5 OR MORE DRINKS IN A DAY: 0
DO YOU DRINK ALCOHOL: NO
HAVE PEOPLE ANNOYED YOU BY CRITICIZING YOUR DRINKING: NO
ALCOHOL_USE: NO
ON A TYPICAL DAY WHEN YOU DRINK ALCOHOL HOW MANY DRINKS DO YOU HAVE: 0
DOES PATIENT WANT TO STOP DRINKING: NO
TOTAL SCORE: 0
TOTAL SCORE: 0

## 2019-11-28 ASSESSMENT — COGNITIVE AND FUNCTIONAL STATUS - GENERAL
MOBILITY SCORE: 7
EATING MEALS: A LITTLE
WALKING IN HOSPITAL ROOM: TOTAL
HELP NEEDED FOR BATHING: A LOT
TOILETING: TOTAL
PERSONAL GROOMING: A LOT
SUGGESTED CMS G CODE MODIFIER MOBILITY: CM
MOVING TO AND FROM BED TO CHAIR: UNABLE
TURNING FROM BACK TO SIDE WHILE IN FLAT BAD: A LOT
SUGGESTED CMS G CODE MODIFIER DAILY ACTIVITY: CL
DAILY ACTIVITIY SCORE: 10
CLIMB 3 TO 5 STEPS WITH RAILING: TOTAL
MOVING FROM LYING ON BACK TO SITTING ON SIDE OF FLAT BED: UNABLE
DRESSING REGULAR UPPER BODY CLOTHING: TOTAL
STANDING UP FROM CHAIR USING ARMS: TOTAL
DRESSING REGULAR LOWER BODY CLOTHING: TOTAL

## 2019-11-28 ASSESSMENT — CHA2DS2 SCORE
HYPERTENSION: YES
CHF OR LEFT VENTRICULAR DYSFUNCTION: NO
AGE 65 TO 74: NO
DIABETES: NO
SEX: FEMALE
VASCULAR DISEASE: NO
PRIOR STROKE OR TIA OR THROMBOEMBOLISM: NO
CHA2DS2 VASC SCORE: 2
AGE 75 OR GREATER: NO

## 2019-11-28 ASSESSMENT — PATIENT HEALTH QUESTIONNAIRE - PHQ9
SUM OF ALL RESPONSES TO PHQ9 QUESTIONS 1 AND 2: 0
1. LITTLE INTEREST OR PLEASURE IN DOING THINGS: NOT AT ALL
2. FEELING DOWN, DEPRESSED, IRRITABLE, OR HOPELESS: NOT AT ALL

## 2019-11-28 NOTE — PROGRESS NOTES
Patient was admitted by Hospitalist, later patient found to be [facility] patient. Patient transferred to the [facility] service.

## 2019-11-28 NOTE — ASSESSMENT & PLAN NOTE
Counseled patient on importance of smoking cessation reviewed available options to help her quit  We will start her on nicotine replacement therapy  Total time spent counseling patient smoking cessation was 5 minutes

## 2019-11-28 NOTE — ED TRIAGE NOTES
Pt here BIBA from home reporting she felt out of her bed this morning, complaining of right shoulder pain  No other complaints  No bruising, no deformity, no abrasions   Pt noted to have full range of motion and mobility of RUE, without hesitation or limit from pain     Vital Signs stable   A&Ox4  Skin wpd  GCS 15  Respirations even and unlabored   No acute distress noted at this time   Triage completed, awaiting ED MD evaluation

## 2019-11-28 NOTE — H&P
Hospital Medicine History & Physical Note    Date of Service  11/28/2019    Primary Care Physician  Pcp Unknown    Consultants  Nephrology    Code Status  Full code    Chief Complaint  Right shoulder pain after falling out of bed    History of Presenting Illness  54 y.o. female who presented 11/28/2019 with right shoulder pain moderate to severe after rolling out of bed and landing on her right side.  She reports that she rolled out of bed during her sleep she has a history of right BKA she was having moderate to severe right shoulder pain sharp worse with movement radiating to her right lateral shoulder and right anterior chest.  She reports that she had her ahead during the fall but denies any headache or loss of consciousness.  She has a history of end-stage renal disease followed by Dr. Junior and receives hemodialysis on Monday Wednesday Friday.  She missed her hemodialysis yesterday as her right could not get to her place due to the snow.    Review of Systems  Review of Systems   All other systems reviewed and are negative.      Past Medical History   has a past medical history of Anemia, Anemia associated with chronic renal failure (11/5/2009), Dental disorder (8-25-17), Dialysis patient ([facility]) (8-25-17), Dysrhythmia, ESRD (end stage renal disease) ([facility]) (8-25-17), Glomerulonephritis, chronic (11/5/2009), Heart burn, High cholesterol, HTN (hypertension) (11/5/2009), Port catheter in place (8-25-17), SVT (supraventricular tachycardia) ([facility]), and SVT (supraventricular tachycardia) ([facility]).    Surgical History   has a past surgical history that includes amputation, below the knee; capitation payment (insurance); appendectomy laparoscopic (5/4/2009); pr enlarge breast with implant; av fistula creation (Left, 6/20/2017); other; femur orif (10/9/08); iliac bone graft (10/9/08); av fistula creation (Left, 8/28/2017); av fistula creation (Left, 4/27/2018); and yamileth by laparoscopy (6/22/2019).     Family  History  family history includes Heart Disease in her unknown relative.     Social History   reports that she quit smoking about 7 months ago. Her smoking use included cigarettes. She has a 5.00 pack-year smoking history. She has never used smokeless tobacco. She reports that she does not drink alcohol or use drugs.    Allergies  Allergies   Allergen Reactions   • Sulfa Drugs Hives     [id number] rash swelling itching       Medications  Prior to Admission Medications   Prescriptions Last Dose Informant Patient Reported? Taking?   albuterol 108 (90 Base) MCG/ACT Aero Soln inhalation aerosol PRN at PRN Patient Yes No   Sig: Inhale 1 Puff by mouth 2 times a day as needed for Shortness of Breath.   amLODIPine (NORVASC) 10 MG Tab 11/28/2019 at AM Patient Yes No   Sig: Take 10 mg by mouth every day.   gabapentin (NEURONTIN) 100 MG Cap 11/28/2019 at AM Patient No No   Sig: Take 1 Cap by mouth 2 Times a Day.   hydrALAZINE (APRESOLINE) 25 MG Tab 11/28/2019 at AM Patient No No   Sig: Take 1 Tab by mouth every 8 hours.   labetalol (NORMODYNE) 200 MG Tab 11/28/2019 at AM Patient Yes Yes   Sig: Take 200 mg by mouth 2 times a day.   metoprolol (LOPRESSOR) 25 MG Tab 11/28/2019 at AM Patient No No   Sig: Take 1 Tab by mouth 2 Times a Day.   sevelamer carbonate (RENVELA) 800 MG Tab tablet 11/28/2019 at AM Patient Yes No   Sig: Take 800 mg by mouth 3 times a day, with meals.   terazosin (HYTRIN) 5 MG Cap 11/27/2019 at PM Patient Yes Yes   Sig: Take 5 mg by mouth every evening.      Facility-Administered Medications: None       Physical Exam  Temp:  [36.6 °C (97.8 °F)] 36.6 °C (97.8 °F)  Pulse:  [68-71] 68  Resp:  [16] 16  BP: (146-192)/(72-80) 149/74  SpO2:  [85 %-99 %] 95 %    Physical Exam  Vitals signs and nursing note reviewed.   Constitutional:       General: She is not in acute distress.  HENT:      Head: Normocephalic and atraumatic.      Nose: Nose normal.      Mouth/Throat:      Pharynx: No oropharyngeal exudate or posterior  oropharyngeal erythema.   Eyes:      General:         Right eye: No discharge.         Left eye: No discharge.   Neck:      Musculoskeletal: Neck supple.   Cardiovascular:      Rate and Rhythm: Normal rate and regular rhythm.      Heart sounds: No murmur. No friction rub. No gallop.    Pulmonary:      Effort: Pulmonary effort is normal. No respiratory distress.      Breath sounds: No stridor. No rhonchi or rales.   Chest:      Chest wall: No tenderness.   Abdominal:      General: Bowel sounds are normal. There is no distension.      Palpations: Abdomen is soft. There is no mass.      Tenderness: There is no tenderness. There is no guarding.   Musculoskeletal:         General: Tenderness (Over her right clavicle) present. No swelling.      Comments: Left upper extremity AV fistula with palpable thrill    Status post right BKA   Skin:     General: Skin is warm and dry.      Coloration: Skin is not cyanotic.      Nails: There is no clubbing.     Neurological:      General: No focal deficit present.      Mental Status: She is alert and oriented to person, place, and time.      Cranial Nerves: No cranial nerve deficit.      Motor: No weakness.   Psychiatric:         Mood and Affect: Mood normal.         Behavior: Behavior normal.         Thought Content: Thought content normal.         Judgment: Judgment normal.         Laboratory:  Recent Labs     11/28/19  1008   WBC 9.2   RBC 3.34*   HEMOGLOBIN 10.1*   HEMATOCRIT 32.0*   MCV 95.8   MCH 30.2   MCHC 31.6*   RDW 54.5*   PLATELETCT 86*   MPV 10.4     Recent Labs     11/28/19  1008   SODIUM 139   POTASSIUM 6.1*   CHLORIDE 98   CO2 28   GLUCOSE 111*   BUN 66*   CREATININE 9.01*   CALCIUM 9.6     Recent Labs     11/28/19  1008   GLUCOSE 111*         No results for input(s): NTPROBNP in the last 72 hours.      No results for input(s): TROPONINT in the last 72 hours.    Urinalysis:    No results found     Imaging:  [id number] (WITH 1-VIEW CXR) RIGHT   Final Result       No evidence of rib fracture.      Cardiomegaly with perihilar opacification and pleural fluid suggesting congestive heart failure.      Lateral right clavicle fracture.      DX-SHOULDER 2+ RIGHT   Final Result      Lateral right clavicle fracture.               INTERPRETING LOCATION:  [address], [place], [address]      DX-CLAVICLE RIGHT   Final Result      Lateral right clavicle fracture.      CT-HEAD W/O   Final Result      1.  Cerebral atrophy.      2.  White matter lucencies most consistent with small vessel ischemic change versus demyelination or gliosis.      3.  Otherwise, Head CT without contrast with no acute findings. No evidence of acute cerebral infarction, hemorrhage or mass lesion.            Assessment/Plan:  I anticipate this patient is appropriate for observation status at this time.    ESRD on hemodialysis (HCC)- (present on admission)  Assessment & Plan  Reports compliance with hemodialysis and renal diet  Nephrology consultation patient will need hemodialysis today    HTN (hypertension)- (present on admission)  Assessment & Plan  Continue amlodipine hydralazine metoprolol and terazosin  Monitor blood pressure and adjust medications accordingly    Hyperkalemia- (present on admission)  Assessment & Plan  Secondary to missing hemodialysis  Telemetry monitoring  Hemodialysis per nephrology    Thrombocytopenia (HCC)  Assessment & Plan  Acute on chronic    Recheck CBC in a.m. if platelet counts trending down consider further work-up otherwise she will need follow-up and work-up as outpatient    Closed nondisplaced fracture of acromial end of right clavicle  Assessment & Plan  Pain management  Shoulder sling  PT eval      Paroxysmal atrial fibrillation (HCC)- (present on admission)  Assessment & Plan  Continue metoprolol and anticoagulation  Check INR and adjust warfarin dose accordingly    Anemia due to chronic kidney disease- (present on admission)  Assessment & Plan  Monitor H/H    Tobacco use-  (present on admission)  Assessment & Plan  Counseled patient on importance of smoking cessation reviewed available options to help her quit  We will start her on nicotine replacement therapy  Total time spent counseling patient smoking cessation was 5 minutes      Addendum:  After evaluating the patient and reviewing her records I noticed that she is established with [facility] internal medicine I spoke with Dr. Hernandez and [facility] service will assume care of the patient as of 11/29/2019      VTE prophylaxis: warfarin

## 2019-11-28 NOTE — ED PROVIDER NOTES
ED Provider Note    Scribed for Elliot Palencia M.D. by Luc Cobb. 11/28/2019  9:01 AM    Primary care provider: Juan Moreno M.D.  Means of arrival: Ambulance  History obtained from: Patient  History limited by: None    CHIEF COMPLAINT  Chief Complaint   Patient presents with   • Shoulder Pain       HPI  Isabelle Coyle is a 54 y.o. female who presents to the Emergency Department for evaluation of right shoulder pain onset this morning. She fell out of bed this morning while she was sleeping and landed on her right side. She notes she hit her head, but denies loss of consciousness. The patient reports associated headache, radiating pain to her neck, radiating pain in her clavicle, and radiating pain in the right side of her back. Negative for pain in her right wrist and right elbow. No alleviating or exacerbating factors identified by the patient. The patient has a history of anemia and hypertension.     REVIEW OF SYSTEMS  Pertinent positives include Right shoulder pain, headache, radiating pain to her neck, radiating pain in her clavicle, and radiating pain in the right side of her back.   Pertinent negatives include no pain in her right wrist and right elbow.    All other systems reviewed and negative. See HPI for further details.     PAST MEDICAL HISTORY   has a past medical history of Anemia, Anemia associated with chronic renal failure (11/5/2009), Dental disorder (8-25-17), Dialysis patient ([facility]) (8-25-17), Dysrhythmia, ESRD (end stage renal disease) ([facility]) (8-25-17), Glomerulonephritis, chronic (11/5/2009), Heart burn, High cholesterol, HTN (hypertension) (11/5/2009), Port catheter in place (8-25-17), SVT (supraventricular tachycardia) ([facility]), and SVT (supraventricular tachycardia) ([facility]).    SURGICAL HISTORY   has a past surgical history that includes amputation, below the knee; capitation payment (insurance); appendectomy laparoscopic (5/4/2009); enlarge breast with implant; av fistula  "creation (Left, 2017); other; femur orif (10/9/08); iliac bone graft (10/9/08); av fistula creation (Left, 2017); av fistula creation (Left, 2018); and yamileth by laparoscopy (2019).    SOCIAL HISTORY  Social History     Tobacco Use   • Smoking status: Former Smoker     Packs/day: 0.25     Years: 20.00     Pack years: 5.00     Types: Cigarettes     Last attempt to quit: 2019     Years since quittin.5   • Smokeless tobacco: Never Used   • Tobacco comment: 5 cigs/day   Substance Use Topics   • Alcohol use: No   • Drug use: No      Social History     Substance and Sexual Activity   Drug Use No       FAMILY HISTORY  Family History   Problem Relation Age of Onset   • Heart Disease Unknown        CURRENT MEDICATIONS  Home Medications     Reviewed by Monique Paiz, Pharmacy Intern (Pharmacy Intern) on 19 at 1149  Med List Status: Complete   Medication Last Dose Status   albuterol 108 (90 Base) MCG/ACT Aero Soln inhalation aerosol PRN Active   amLODIPine (NORVASC) 10 MG Tab 2019 Active   gabapentin (NEURONTIN) 100 MG Cap 2019 Active   hydrALAZINE (APRESOLINE) 25 MG Tab 2019 Active   labetalol (NORMODYNE) 200 MG Tab 2019 Active   metoprolol (LOPRESSOR) 25 MG Tab 2019 Active   sevelamer carbonate (RENVELA) 800 MG Tab tablet 2019 Active   terazosin (HYTRIN) 5 MG Cap 2019 Active                ALLERGIES  Allergies   Allergen Reactions   • Sulfa Drugs Hives     TGH=[id number] rash swelling itching       PHYSICAL EXAM  VITAL SIGNS: BP (!) 192/80   Pulse 70   Temp 36.6 °C (97.8 °F) (Temporal)   Resp 16   Ht 1.575 m (5' 2\")   Wt 59 kg (130 lb)   LMP 2007   BMI 23.78 kg/m²     Nursing note and vitals reviewed.  Constitutional: Chronically-ill appearing. No distress.   HENT: Head is normocephalic and atraumatic. Oropharynx is clear and moist without exudate or erythema.   Eyes: Pupils are equal, round, and reactive to light. Conjunctiva are normal. "   Cardiovascular: Normal rate and regular rhythm. No murmur heard. Normal radial pulses. Left AV fistula.  Pulmonary/Chest: Breath sounds normal. No wheezes or rales.   Abdominal: Soft and non-tender. No distention    Musculoskeletal: Extremities exhibit normal range of motion without edema or tenderness. Tenderness and swelling of distal right clavical and right lateral chest wall.  Neurological: Awake, alert and oriented to person, place, and time. No focal deficits noted.  Skin: Skin is warm and dry. No rash.   Psychiatric: Normal mood and affect. Appropriate for clinical situation.  : Uterine fundus is palpable about two cm below the umbilicus.     DIAGNOSTIC STUDIES / PROCEDURES    LABS  Results for orders placed or performed during the hospital encounter of 11/28/19   CBC WITH DIFFERENTIAL   Result Value Ref Range    WBC 9.2 4.8 - 10.8 K/uL    RBC 3.34 (L) 4.20 - 5.40 M/uL    Hemoglobin 10.1 (L) 12.0 - 16.0 g/dL    Hematocrit 32.0 (L) 37.0 - 47.0 %    MCV 95.8 81.4 - 97.8 fL    MCH 30.2 27.0 - 33.0 pg    MCHC 31.6 (L) 33.6 - 35.0 g/dL    RDW 54.5 (H) 35.9 - 50.0 fL    Platelet Count 86 (L) 164 - 446 K/uL    MPV 10.4 9.0 - 12.9 fL    Neutrophils-Polys 88.20 (H) 44.00 - 72.00 %    Lymphocytes 6.30 (L) 22.00 - 41.00 %    Monocytes 2.60 0.00 - 13.40 %    Eosinophils 2.10 0.00 - 6.90 %    Basophils 0.40 0.00 - 1.80 %    Immature Granulocytes 0.40 0.00 - 0.90 %    Nucleated RBC 0.00 /100 WBC    Neutrophils (Absolute) 8.15 (H) 2.00 - 7.15 K/uL    Lymphs (Absolute) 0.58 (L) 1.00 - 4.80 K/uL    Monos (Absolute) 0.24 0.00 - 0.85 K/uL    Eos (Absolute) 0.19 0.00 - 0.51 K/uL    Baso (Absolute) 0.04 0.00 - 0.12 K/uL    Immature Granulocytes (abs) 0.04 0.00 - 0.11 K/uL    NRBC (Absolute) 0.00 K/uL   BASIC METABOLIC PANEL   Result Value Ref Range    Sodium 139 135 - 145 mmol/L    Potassium 6.1 (H) 3.6 - 5.5 mmol/L    Chloride 98 96 - 112 mmol/L    Co2 28 20 - 33 mmol/L    Glucose 111 (H) 65 - 99 mg/dL    Bun 66 (H) 8 - 22  mg/dL    Creatinine 9.01 (HH) 0.50 - 1.40 mg/dL    Calcium 9.6 8.5 - 10.5 mg/dL    Anion Gap 13.0 (H) 0.0 - 11.9   ESTIMATED GFR   Result Value Ref Range    GFR If African American 5 (A) >60 mL/min/1.73 m 2    GFR If Non African American 5 (A) >60 mL/min/1.73 m 2     All labs reviewed by me.    RADIOLOGY  [id number] (WITH 1-VIEW CXR) RIGHT   Final Result      No evidence of rib fracture.      Cardiomegaly with perihilar opacification and pleural fluid suggesting congestive heart failure.      Lateral right clavicle fracture.      DX-SHOULDER 2+ RIGHT   Final Result      Lateral right clavicle fracture.               INTERPRETING LOCATION:  [address] [facility], [place], [address]      DX-CLAVICLE RIGHT   Final Result      Lateral right clavicle fracture.      CT-HEAD W/O   Final Result      1.  Cerebral atrophy.      2.  White matter lucencies most consistent with small vessel ischemic change versus demyelination or gliosis.      3.  Otherwise, Head CT without contrast with no acute findings. No evidence of acute cerebral infarction, hemorrhage or mass lesion.        The radiologist's interpretation of all radiological studies have been reviewed by me.    COURSE & MEDICAL DECISION MAKING  Nursing notes, VS, PMSFHx reviewed in chart.     Review of past medical records shows the patient's last ED visit was on 07/24/19 for heart palpitations.     9:01 AM - Patient seen and examined at bedside. Patient will be treated with Percocet 5-325 mg.  Ordered DX Right Shoulder, DX Right Unilateral Ribs, CT Head, DX Right Clavicle, CBC with differential, and BMP to evaluate her symptoms. She will undergo trauma evaluation. I will await lab and radiology results before determining whether interventions are necessary. The patient is agreeable and understanding of my plan of care.       10:30 AM - Independent review of labs and radiology shows right clavicle fracture.     10:45 AM - Upon repeat evaluation, the patient is resting in bed  with stable vitals. I informed the patient of a right clavicle fracture and encouraged that she be hospitalized at this time. The patient agrees to be evaluated for hospitalization.     11:41 AM - I spoke with Dr. Junior (Nephrology) who suggests dialysis.     11:42 AM - I spoke with Dr. Shirley Rodriguez who agrees to evaluate the patient for hospitalization.     11:44 AM - Paged and spoke with Dr. Bettencourt (Nephrology) who agrees with plan for admission and plan for dialysis.     12:00 PM - Patient will be hospitalized by Dr. Shirley Rodriguez (Hospitalist). Care of patient has been transferred at this time.     DISPOSITION:  Patient will be hospitalized by Dr. Shirley Rodriguez (Hospitalist) in guarded condition.      FINAL IMPRESSION  1. Closed displaced fracture of acromial end of right clavicle, initial encounter    2. ESRD needing dialysis (HCC)    3. Hyperkalemia          Luc LANE (Carly), am scribing for, and in the presence of, Elliot Palencia M.D..    Electronically signed by: Luc Cobb (Scribe), 11/28/2019    Elliot LANE M.D. personally performed the services described in this documentation, as scribed by Luc Cobb in my presence, and it is both accurate and complete. C.     The note accurately reflects work and decisions made by me.  Elliot Palencia  11/28/2019  12:44 PM

## 2019-11-28 NOTE — ED NOTES
Hand off report given to Enzo RN   Patient chart and current status reviewed with oncoming RN and all questions answered    Awaiting Flex RN to retreive patient for monitored

## 2019-11-28 NOTE — CARE PLAN
Down to dialysis at 1230.  Oxycodone IR given at 1440 for right shoulder pain effective.  Back from dialysis at 1630.  BP's in the 190's systolic.  Administered prn hydralazine, labetalol, scheduled metoprolol, dilaudid for pain.   Systolic BP now in 140's and pain under control.    Monitor Report:    Sinus Rhythm 69-71    0.16/0.10/0.40      Sinus Escape Beat at 1526 assymptomatic

## 2019-11-28 NOTE — ASSESSMENT & PLAN NOTE
Continue metoprolol and anticoagulation  Check INR and adjust warfarin dose accordingly   [de-identified] : well developed  male, NAD [de-identified] : bilateral neck excision sites all healed, clean, no evidence of infection  [de-identified] : right hand with no evidence of trauma, arthritic PIP and DIP joints especially 3rd digit, FROM with intact sensory exam, negative Tinel/Phalen test [de-identified] : bilateral axilla with well healed excision sites, clean, no evidence of infection, multiple remaining raised, nontender, pedunculated skin lesion c/w skin tags,\par bilateral groins with multiple tan, nontender, pedunculated skin lesions c/w skin tags

## 2019-11-28 NOTE — ASSESSMENT & PLAN NOTE
Continue amlodipine hydralazine metoprolol and terazosin  Monitor blood pressure and adjust medications accordingly

## 2019-11-28 NOTE — ASSESSMENT & PLAN NOTE
Acute on chronic    Recheck CBC in a.m. if platelet counts trending down consider further work-up otherwise she will need follow-up and work-up as outpatient

## 2019-11-28 NOTE — ED NOTES
Hourly rounding completed  Patient is up to date on current plan of care  Patient is resting comfortably in bed   Patient denies any need for use of the restroom, denies need for repositioning, denies need for any comfort care at this time       ED MD bedside

## 2019-11-28 NOTE — ED NOTES
Med rec complete per pt at bedside with list (returned)  Allergies reviewed  No oral ABX within last 14 days

## 2019-11-28 NOTE — ED NOTES
Handoff to Adama SANTOS who is completing monitored transportation to the floor     This RN's primary care of patient terminated at this time

## 2019-11-28 NOTE — PROGRESS NOTES
[facility] Services Progress Note     Hemodialysis treatment ordered today per Dr. Martinez x 3 hours.   Treatment initiated at 1300, ended at 1600.      BP elevated throughout treatment, pt also reported right shoulder pain; BP and pain medications given per Primary RN. See paper flow sheet for details.      Net UF 2,000 mL.      Needles removed from access site. Dressings applied and sites held x 10 minutes; verified no bleeding. Positive bruit/thrill post tx. Staff RN to monitor AVF for breakthrough bleeding. Should breakthrough bleeding occur, staff RN to apply pressure to access sites until bleeding resolved. Notify Dialysis and Nephrologist for follow-up.     Report given to Primary RN.

## 2019-11-28 NOTE — ED NOTES
Artis from Lab called with critical result of creatinine of 9.01 at 1101. Critical lab result read back to Artis.   Dr. Palencia notified of critical lab result at 1102.  Critical lab result read back by Dr. Palencia.

## 2019-11-28 NOTE — CARE PLAN
2 RN Skin Check    2 RN skin check complete.   Devices in place: Nasal Cannula.  Skin assessed under devices: yes.  Confirmed pressure ulcers found on: none.  New potential pressure ulcers noted on none. Wound consult placed No.  The following interventions in place Pillows.    Surgical incisions on bilateral posterior hip for bone graft for right lower extremity.  RLE amputation ([facility] down shin).

## 2019-11-28 NOTE — ED NOTES
[employer] [employer]     ED MD bedside     Hourly rounding completed  Patient is up to date on current plan of care  Patient is resting comfortably in bed   Patient denies any need for use of the restroom, denies need for repositioning, denies need for any comfort care at this time        Subjective:      Beata Gomes is a 29 y.o. female who presents with Follow-Up (Myoclonic seizure disorder )          HPI  Here with father today.    Tapered off the Carbamezapine intercurrently, last dose was mid-February.  Up to the full dose of Depakote.    Did have spells of bilateral arm jerks that have not been noticed since the end of February.    Feels tired in general, like she gets tired more than she should be.  Mild mood swings.  Depression is a struggle-- she feels like she is just existing.  Sometimes she has a hard time getting out of bed.  Sleep: sometimes sleeps too much.    Wellbutrin-- very sensitive feelings.      Labs have been collected and told she is healthy.    5/2019 INTERPRETATION:  This is a normal routine EEG recording in the awake state.    Clinical correlation is recommended.    Seasonale birth control-- menses every 3 months.  PNV  Vit D  Current Outpatient Prescriptions   Medication Sig Dispense Refill   • metoprolol (LOPRESSOR) 25 MG Tab Take 25 mg by mouth 2 Times a Day.     • buPROPion (WELLBUTRIN) 75 MG Tab Take 75 mg by mouth every day.     • divalproex (DEPAKOTE) 500 MG Tablet Delayed Response Take 1 Tab by mouth 2 Times a Day. 60 Tab 5   • carBAMazepine SR (TEGRETOL XR) 400 MG TABLET SR 12 HR Take 1 Tab by mouth 3 times a day. (Patient not taking: Reported on 6/19/2019) 90 Tab 0   • LORazepam (ATIVAN) 1 MG Tab Take 1/2-1 tablet PO PRN breakthrough seizures.  Do not exceed more than 2 tablets in 24 hours unless directed otherwise by physician. 20 Tab 0     No current facility-administered medications for this visit.          Review of Systems   Constitutional: Negative.  Weight loss: weight gain.   HENT: Negative for hearing loss, nosebleeds and sore throat.         No recent head injury.   Eyes: Negative for double vision.        No new loss of vision.   Respiratory: Negative for cough.         No recent lung infections.   Cardiovascular: Negative for chest pain.  "  Gastrointestinal: Negative for abdominal pain, diarrhea, nausea and vomiting.   Genitourinary: Negative.    Musculoskeletal: Negative.    Skin: Negative.    Neurological: Negative for seizures and headaches.   Endo/Heme/Allergies:        No history of endocrine dysfunction.  No new problems.   Psychiatric/Behavioral: Positive for depression. The patient is not nervous/anxious.         No recent mood changes.          Objective:     /72   Pulse 76   Temp 36.6 °C (97.9 °F) (Temporal)   Ht 1.727 m (5' 8\")   Wt 123.8 kg (273 lb)   SpO2 97%   BMI 41.51 kg/m²      Physical Exam   Constitutional: She is oriented to person, place, and time. She appears well-developed and well-nourished. No distress.   obese   HENT:   Head: Normocephalic and atraumatic.   Eyes: EOM are normal.   Neck: Normal range of motion.   Cardiovascular: Normal rate and regular rhythm.    Pulmonary/Chest: Effort normal.   Neurological: She is alert and oriented to person, place, and time. She exhibits normal muscle tone. Gait normal.   No observable changes in neurologic status.  See initial new patient examination for details.    Skin: Skin is warm.   Psychiatric: She has a normal mood and affect.               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    Prescribed CBZ for many years.  Intercurrently, tapered off of carbamezipine and onto valproic acid knowing the risk versus benefit.      Last myoclonic jerks were in February 2019.  She feels much better in general however has had excessive weight gain.     History of mood disorder, depression, and no longer treated with Wellbutrin.     Jerks primarily triggered by lack of sleep.  Last concern for GTC was October 2018.    Cleared to start the process for a learner's permit.    Referral placed to psychiatry to evaluation and treatment of depression.    Counseled regarding her weight gain and lack of satiety with " Depakote.    Continue PNV and folic acid daily.    Return for follow-up in 6 months.      EDUCATION AND COUNSELING:  -Education was provided to the patient and/or family regarding diagnosis and prognosis. The chronic and unpredictable nature of the condition was discussed. There is increased risk for additional events, which may carry potential for significant injuries and death.    -We reviewed the current antiepileptic regimen. Potential side effects of antiepileptics were discussed at length, including but no limited to: hypersensitivity reactions (rash and others, some of which can be fatal), visual field changes (some of which may be irreversible), glaucoma, diplopia, kidney stones, osteopenia/osteoporosis/bone fractures, hyperthermia/anhydrosis, tremors, ataxia, dizziness, fatigue, increased risk for falls, cardiac arrhythmias/syncope, gastrointestinal (hepatitis, pancreatitis, gastritis, ulcers), gingival hypertrophy, drowsiness, sedation, anxiety/nervousness, increased risk for suicide, increased risk for depression, and psychosis. We reviewed drug-drug interactions and their potential effect on seizure control and medication side effects.    -Patient/family educated on SUDEP (Sudden Death in Epilepsy). Counseling was provided on the importance of strict medication and follow up compliance. The patient understands the risks associated with non-adherence with the medical plan as outlined, including but not limited to an increased risk for breakthrough seizures, which may contribute to injuries, disability, status epilepticus, and even death.    -Counseling was also provided on potential effects of alcohol and other drugs, which may lower seizure threshold and/or affect the metabolism of antiepileptic drugs. I recommend avoidance of alcohol and illegal drugs.  -Recommend proper hydration, regular exercise, proper sleep hygiene (7-8 hrs of overnight sleep, avoid sleep deprivation).    -I have made the patient  aware of mandatory reporting required by the law in the [place] [facility] regarding episodes of seizures, loss of consciousness, and/or alteration of awareness. The patient and family are responsible for reporting events to the DMV, instructions were provided. The patient verbalized understanding and states he has not been driving. Other seizure precautions were discussed at length, including no diving, no skydiving, no unsupervised swimming, no Jacuzzi or bathing in bathtubs.    -He is ok to return to work but cannot operate any heavy machinery, he verbalized understanding.     Pt agrees with plan.

## 2019-11-28 NOTE — PROGRESS NOTES
Inpatient Anticoagulation Service Note    Date: 11/28/2019    Reason for Anticoagulation: Atrial Fibrillation   Target INR: 2.0 to 3.0  [id number] VASc Score: 2  HAS-BLED Score: 3   Hemoglobin Value: (!) 10.1  Hematocrit Value: (!) 32    INR from last 7 days     Date/Time INR Value    11/28/19 1008  (!) 1.14        Dose from last 7 days     Date/Time Dose (mg)    11/28/19 1355  2.5        Significant Interactions: Not Applicable  Bridge Therapy: No   Reversal Agent Administered: Not Applicable    Comments: Patient historically was given two doses of Warfarin 6 mg and INR jumped from 1.05 to 2.11 when initiating therapy back in June 2019. Patient was referred to the [facility] in July 2019, but did not establish care with the clinic. There are no documented overt s/s of bleeding. H&H are low, but normal for the patient per historal labs. Platelets are low at 86. The patient did have a documented fall onto her right shoulder. Plan to give Warfarin 2.5 mg today given previous response to Warfarin and check an INR in the morning.     Plan:  Warfarin 2.5 mg  Education Material Provided?: No    Pharmacist suggested discharge dosing:   Warfarin 2.5 mg daily and follow up with the Anticoagulation Clinic within 5 days of discharge     Kiarra Caraballo, PharmD

## 2019-11-29 LAB
ANION GAP SERPL CALC-SCNC: 8 MMOL/L (ref 0–11.9)
BASOPHILS # BLD AUTO: 0.7 % (ref 0–1.8)
BASOPHILS # BLD: 0.05 K/UL (ref 0–0.12)
BUN SERPL-MCNC: 31 MG/DL (ref 8–22)
CALCIUM SERPL-MCNC: 9.2 MG/DL (ref 8.5–10.5)
CHLORIDE SERPL-SCNC: 100 MMOL/L (ref 96–112)
CO2 SERPL-SCNC: 30 MMOL/L (ref 20–33)
CREAT SERPL-MCNC: 5.97 MG/DL (ref 0.5–1.4)
EOSINOPHIL # BLD AUTO: 0.37 K/UL (ref 0–0.51)
EOSINOPHIL NFR BLD: 5.1 % (ref 0–6.9)
ERYTHROCYTE [DISTWIDTH] IN BLOOD BY AUTOMATED COUNT: 56.3 FL (ref 35.9–50)
GLUCOSE SERPL-MCNC: 94 MG/DL (ref 65–99)
HCT VFR BLD AUTO: 30.6 % (ref 37–47)
HGB BLD-MCNC: 9.4 G/DL (ref 12–16)
IMM GRANULOCYTES # BLD AUTO: 0.02 K/UL (ref 0–0.11)
IMM GRANULOCYTES NFR BLD AUTO: 0.3 % (ref 0–0.9)
INR PPP: 1.14 (ref 0.87–1.13)
LYMPHOCYTES # BLD AUTO: 1.32 K/UL (ref 1–4.8)
LYMPHOCYTES NFR BLD: 18.2 % (ref 22–41)
MAGNESIUM SERPL-MCNC: 2.2 MG/DL (ref 1.5–2.5)
MCH RBC QN AUTO: 30.3 PG (ref 27–33)
MCHC RBC AUTO-ENTMCNC: 30.7 G/DL (ref 33.6–35)
MCV RBC AUTO: 98.7 FL (ref 81.4–97.8)
MONOCYTES # BLD AUTO: 0.36 K/UL (ref 0–0.85)
MONOCYTES NFR BLD AUTO: 5 % (ref 0–13.4)
NEUTROPHILS # BLD AUTO: 5.15 K/UL (ref 2–7.15)
NEUTROPHILS NFR BLD: 70.7 % (ref 44–72)
NRBC # BLD AUTO: 0 K/UL
NRBC BLD-RTO: 0 /100 WBC
PHOSPHATE SERPL-MCNC: 4.3 MG/DL (ref 2.5–4.5)
PLATELET # BLD AUTO: 105 K/UL (ref 164–446)
PMV BLD AUTO: 10.7 FL (ref 9–12.9)
POTASSIUM SERPL-SCNC: 5.1 MMOL/L (ref 3.6–5.5)
PROTHROMBIN TIME: 14.9 SEC (ref 12–14.6)
RBC # BLD AUTO: 3.1 M/UL (ref 4.2–5.4)
SODIUM SERPL-SCNC: 138 MMOL/L (ref 135–145)
WBC # BLD AUTO: 7.3 K/UL (ref 4.8–10.8)

## 2019-11-29 PROCEDURE — 700102 HCHG RX REV CODE 250 W/ 637 OVERRIDE(OP): Performed by: HOSPITALIST

## 2019-11-29 PROCEDURE — 99226 PR SUBSEQUENT OBSERVATION CARE,LEVEL III: CPT | Mod: GC | Performed by: HOSPITALIST

## 2019-11-29 PROCEDURE — 700102 HCHG RX REV CODE 250 W/ 637 OVERRIDE(OP): Performed by: STUDENT IN AN ORGANIZED HEALTH CARE EDUCATION/TRAINING PROGRAM

## 2019-11-29 PROCEDURE — 80048 BASIC METABOLIC PNL TOTAL CA: CPT

## 2019-11-29 PROCEDURE — A9270 NON-COVERED ITEM OR SERVICE: HCPCS | Performed by: HOSPITALIST

## 2019-11-29 PROCEDURE — 84100 ASSAY OF PHOSPHORUS: CPT

## 2019-11-29 PROCEDURE — A9270 NON-COVERED ITEM OR SERVICE: HCPCS | Performed by: STUDENT IN AN ORGANIZED HEALTH CARE EDUCATION/TRAINING PROGRAM

## 2019-11-29 PROCEDURE — 90935 HEMODIALYSIS ONE EVALUATION: CPT

## 2019-11-29 PROCEDURE — G0378 HOSPITAL OBSERVATION PER HR: HCPCS

## 2019-11-29 PROCEDURE — 96376 TX/PRO/DX INJ SAME DRUG ADON: CPT | Mod: XU

## 2019-11-29 PROCEDURE — 83735 ASSAY OF MAGNESIUM: CPT

## 2019-11-29 PROCEDURE — 85025 COMPLETE CBC W/AUTO DIFF WBC: CPT

## 2019-11-29 PROCEDURE — 36415 COLL VENOUS BLD VENIPUNCTURE: CPT

## 2019-11-29 PROCEDURE — 85610 PROTHROMBIN TIME: CPT

## 2019-11-29 RX ORDER — WARFARIN SODIUM 2.5 MG/1
2.5 TABLET ORAL DAILY
Status: DISCONTINUED | OUTPATIENT
Start: 2019-11-29 | End: 2019-11-30

## 2019-11-29 RX ADMIN — METOPROLOL TARTRATE 25 MG: 25 TABLET, FILM COATED ORAL at 17:40

## 2019-11-29 RX ADMIN — LABETALOL HYDROCHLORIDE 10 MG: 5 INJECTION INTRAVENOUS at 17:39

## 2019-11-29 RX ADMIN — SENNOSIDES AND DOCUSATE SODIUM 2 TABLET: 8.6; 5 TABLET ORAL at 04:51

## 2019-11-29 RX ADMIN — ACETAMINOPHEN 1000 MG: 500 TABLET ORAL at 04:51

## 2019-11-29 RX ADMIN — GABAPENTIN 100 MG: 100 CAPSULE ORAL at 04:51

## 2019-11-29 RX ADMIN — ACETAMINOPHEN 1000 MG: 500 TABLET ORAL at 17:40

## 2019-11-29 RX ADMIN — HYDRALAZINE HYDROCHLORIDE 25 MG: 25 TABLET, FILM COATED ORAL at 21:04

## 2019-11-29 RX ADMIN — TERAZOSIN HYDROCHLORIDE 2 MG: 2 CAPSULE ORAL at 21:04

## 2019-11-29 RX ADMIN — SEVELAMER CARBONATE 800 MG: 800 TABLET, FILM COATED ORAL at 17:40

## 2019-11-29 RX ADMIN — SEVELAMER CARBONATE 800 MG: 800 TABLET, FILM COATED ORAL at 12:32

## 2019-11-29 RX ADMIN — SENNOSIDES AND DOCUSATE SODIUM 2 TABLET: 8.6; 5 TABLET ORAL at 17:40

## 2019-11-29 RX ADMIN — HYDRALAZINE HYDROCHLORIDE 25 MG: 25 TABLET, FILM COATED ORAL at 12:32

## 2019-11-29 RX ADMIN — SEVELAMER CARBONATE 800 MG: 800 TABLET, FILM COATED ORAL at 07:21

## 2019-11-29 RX ADMIN — WARFARIN SODIUM 2.5 MG: 2.5 TABLET ORAL at 17:40

## 2019-11-29 RX ADMIN — OXYCODONE HYDROCHLORIDE 5 MG: 5 TABLET ORAL at 12:32

## 2019-11-29 RX ADMIN — ACETAMINOPHEN 1000 MG: 500 TABLET ORAL at 12:32

## 2019-11-29 RX ADMIN — GABAPENTIN 100 MG: 100 CAPSULE ORAL at 17:40

## 2019-11-29 RX ADMIN — OXYCODONE HYDROCHLORIDE 5 MG: 5 TABLET ORAL at 07:21

## 2019-11-29 RX ADMIN — AMLODIPINE BESYLATE 10 MG: 5 TABLET ORAL at 04:51

## 2019-11-29 RX ADMIN — HYDRALAZINE HYDROCHLORIDE 25 MG: 25 TABLET, FILM COATED ORAL at 04:51

## 2019-11-29 RX ADMIN — NICOTINE 14 MG: 14 PATCH TRANSDERMAL at 04:52

## 2019-11-29 RX ADMIN — METOPROLOL TARTRATE 25 MG: 25 TABLET, FILM COATED ORAL at 04:51

## 2019-11-29 RX ADMIN — OXYCODONE HYDROCHLORIDE 5 MG: 5 TABLET ORAL at 01:42

## 2019-11-29 ASSESSMENT — ENCOUNTER SYMPTOMS
BACK PAIN: 0
SEIZURES: 0
BLURRED VISION: 0
NAUSEA: 0
HEMOPTYSIS: 0
SINUS PAIN: 0
NECK PAIN: 0
DOUBLE VISION: 0
EYE DISCHARGE: 0
ABDOMINAL PAIN: 0
SPUTUM PRODUCTION: 0
NERVOUS/ANXIOUS: 0
CHILLS: 0
WEIGHT LOSS: 0
WEAKNESS: 1
FEVER: 0
FOCAL WEAKNESS: 0
ORTHOPNEA: 0
CLAUDICATION: 0
HALLUCINATIONS: 0
PHOTOPHOBIA: 0

## 2019-11-29 ASSESSMENT — LIFESTYLE VARIABLES: SUBSTANCE_ABUSE: 0

## 2019-11-29 NOTE — NON-PROVIDER
Internal Medicine Medical Student Note  Note Author: Gulshan Butler, Student    Name Isabelle Coyle     1965   Age/Sex 54 y.o. female   MRN [id number]   Code Status Full code       Reason for interval visit  (Principal Problem)   Right clavicle fracture    Interval Problem Daily Status Update  (problem status, last 24 hours, new history, new data )   Patient is currently stable and in no acute distress. She is overall doing better after pain meds and hemodialysis. Her pain is currently at a 5/10 controlled with Percocet. She reports the pain radiates to the shoulder blade, but overall is manageable. She has no problems with I/O's.    ROS:  General: Denies fever, fatigue, or weight changes  HEENT: Denies sore throat, vision changes, or runny nose  Cardio: Denies palpitations. Reports chest pain in the infraclavicular area which worsens with cough, movement, or palpation.  Respiratory: Denies shortness of breath. Reports occasional cough.  GI: Denies abdominal pain, diarrhea, or constipation  : Denies flank pain or hematuria  MSK: Denies weakness or swelling  Neuro: Denies numbness or lightheadedness. Reports a headache in the occipital region due to fall.  Skin: Denies itchiness or rash  Psych: Denies suicidal or homicidal ideation    Physical Exam       Vitals:    19 2047 19 2355 19 0142 19 0409   BP:  (!) 162/86  (!) 164/86   Pulse:  77  75   Resp: 18 20 18 18   Temp:  36.3 °C (97.4 °F)  36.6 °C (97.9 °F)   TempSrc:  Temporal  Temporal   SpO2:  97%  95%   Weight:       Height:         Body mass index is 25 kg/m². Weight: 62 kg (136 lb 11 oz)  Oxygen Therapy:  Pulse Oximetry: 95 %, O2 (LPM): 2.5, O2 Delivery: Nasal Cannula    Physical Exam    General: Normocephalic, well appearing and alert  HEENT: PERRLA, EOMI, no thyromegaly, no scleral icterus, no LAD  Cardio: RRR, normal S1 and S2, no rubs or gallops. Faint systolic ejection murmur on left upper sternal border.  Respiratory:  Clear to auscultation, no crackles or wheezing  GI: Normal bowel sounds, abdomen is non-distended, non-tender, without guarding or rebound tenderness  Extremities: Peripheral pulses 2+ on left, no edema. 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  strength 5/5 on right hand. Right shoulder is in a sling.  Neuro: CN II-XII grossly intact, no focal deficits. No parasthesias in right hand.   Skin: No rash    Assessment/Plan     #Right clavicle nondisplaced fracture  -Shoulder sling  -Pain management (acetaminophen prn)  -PT eval    #ESRD  #Hyperkalemia  #Alkalosis  -Electrolyte levels have improved  -Dialysis schedule per nephro consult    #HTN  -Elevated BP this AM with 164/86  -Continue hydralazine    #A-fib  -Continue metoprolol and warfarin  -Routine INRs    #Murmur  -Follow up with PCP and echo

## 2019-11-29 NOTE — PROGRESS NOTES
Internal Medicine Interval Note  Note Author: Collette Harrington M.D.     Name Isabelle Coyle     1965   Age/Sex 54 y.o. female   MRN [id number]   Code Status full     After 5PM or if no immediate response to page, please call for cross-coverage  Attending/Team: Harmony/Agustín See Patient List for primary contact information  Call [phone] to page    1st Call - Day Intern (R1):   Len 2nd Call - Day Sr. Resident (R2/R3):   David         Reason for interval visit  (Principal Problem)   Clavicle fracture and pre/post hemodialysis      Interval Problem Daily Status Update  (24 hours, problem oriented, brief subjective history, new lab/imaging data pertinent to that problem)   D/c tomorrow potentially as patient today still in distress and having another round of hemodialysis  Clavicular fracture 5/10 pain  Echo outpatient  If PT clears tomorrow patient can be discharged    Review of Systems   Constitutional: Positive for malaise/fatigue. Negative for chills, fever and weight loss.   HENT: Negative for ear pain, hearing loss, nosebleeds, sinus pain and tinnitus.    Eyes: Negative for blurred vision, double vision, photophobia and discharge.   Respiratory: Negative for hemoptysis and sputum production.    Cardiovascular: Negative for orthopnea, claudication and leg swelling.   Gastrointestinal: Negative for abdominal pain and nausea.   Genitourinary: Negative for frequency and urgency.   Musculoskeletal: Positive for joint pain. Negative for back pain and neck pain.   Skin: Negative for itching and rash.   Neurological: Positive for weakness. Negative for focal weakness and seizures.   Psychiatric/Behavioral: Negative for hallucinations and substance abuse. The patient is not nervous/anxious.    All other systems reviewed and are negative.      Disposition/Barriers to discharge:   Access to hemodialysis    Consultants/Specialty  Nephrology    PCP: Pcp Unknown      Quality Measures  Quality-Core  Measures   Reviewed items::  Medications reviewed, Radiology images reviewed and Labs reviewed  DVT prophylaxis pharmacological::  Warfarin (Coumadin)  Ulcer Prophylaxis::  No  Assessed for rehabilitation services:  Patient was assess for and/or received rehabilitation services during this hospitalization          Physical Exam       Vitals:    11/29/19 0142 11/29/19 0409 11/29/19 0711 11/29/19 0741   BP:  (!) 164/86  140/79   Pulse:  75  72   Resp: 18 18  18   Temp:  36.6 °C (97.9 °F)  36.7 °C (98 °F)   TempSrc:  Temporal  Temporal   SpO2:  95% 93% 92%   Weight:       Height:         Body mass index is 25 kg/m². Weight: 62 kg (136 lb 11 oz)  Oxygen Therapy:  Pulse Oximetry: 92 %, O2 (LPM): 0, O2 Delivery: None (Room Air)    Physical Exam   Constitutional: She is oriented to person, place, and time. She appears distressed.   HENT:   Head: Normocephalic and atraumatic.   Eyes: Pupils are equal, round, and reactive to light. Conjunctivae are normal.   Neck: No tracheal deviation present. No thyromegaly present.   Cardiovascular:   Murmur heard.  Pulmonary/Chest: No respiratory distress. She has no wheezes.   Abdominal: There is no tenderness. There is no rebound.   Genitourinary: Rectum:      Guaiac result negative.      No vaginal discharge.     Musculoskeletal:         General: No tenderness, deformity or edema.      Comments: Pt has fistula with + thrill on left UE. Right arm tenderness at the sight of the clavicle and to her right neck.    Neurological: She is alert and oriented to person, place, and time.   Skin: No rash noted. She is not diaphoretic. No erythema.   Psychiatric: Memory, affect and judgment normal.             Assessment/Plan     ESRD on hemodialysis (HCC)- (present on admission)  Assessment & Plan  Pt hemodialysis 11/28  Follow Nephrology consultation     HTN (hypertension)- (present on admission)  Assessment & Plan  Continue amlodipine hydralazine metoprolol and terazosin  Monitor blood pressure  and adjust medications accordingly    Hyperkalemia- (present on admission)  Assessment & Plan  Secondary to missing hemodialysis  Telemetry monitoring  Foll and replete electrolytes    Thrombocytopenia (HCC)  Assessment & Plan  Acute on chronic    Recheck CBC in a.m. if platelet counts trending down consider further work-up otherwise she will need follow-up and work-up as outpatient    Closed nondisplaced fracture of acromial end of right clavicle  Assessment & Plan  Pain management  Shoulder sling  PT eval      Paroxysmal atrial fibrillation (HCC)- (present on admission)  Assessment & Plan  Continue metoprolol and anticoagulation  Check INR and adjust warfarin dose accordingly    Anemia due to chronic kidney disease- (present on admission)  Assessment & Plan  Monitor H/H    Tobacco use- (present on admission)  Assessment & Plan  Counseled patient on importance of smoking cessation reviewed available options to help her quit  We will start her on nicotine replacement therapy  Total time spent counseling patient smoking cessation was 5 minutes

## 2019-11-29 NOTE — DISCHARGE PLANNING
"Patient is a 54-year old women who lives alone in [place], [place]. Patient has a S/O, \"Lalo\" who does not live with her. Patient receives SS disability. Disabled due to leg amputation, and kidney disease.     Patient has no PCP listed, reports she has gone to [facility], but has not seen PCP in awhile. SW called [facility] to set up appointment, but they are closed today. Reports she does see Dr. Rendon and Dr. Serrano for kidney. Patient uses a prosthetic for her L leg and is ambulatory. At home she uses wheelchair on occasion. No home O2. No AD on file, reports she is full code.     Patient reports history of drug and alcohol use, \"a long time ago.\" Reports no issues currently. Patient reports no issues with MH. Upon D/C, patient reports family/friends can come get her.     No other questions at this time. SW to continue to follow for D/C needs.     PLAN: No anticipated D/C needs identified at this time.     Care Transition Team Assessment    Information Source  Orientation : Oriented x 4  Information Given By: Patient  Who is responsible for making decisions for patient? : Patient    Readmission Evaluation  Is this a readmission?: No    Elopement Risk  Legal Hold: No  Ambulatory or Self Mobile in Wheelchair: No-Not an Elopement Risk  Elopement Risk: Not at Risk for Elopement    Interdisciplinary Discharge Planning  Patient or legal guardian wants to designate a caregiver (see row info): No    Discharge Preparedness  What is your plan after discharge?: Home with help  What are your discharge supports?: Partner  Prior Functional Level: Ambulatory, Independent with Activities of Daily Living, Independent with Medication Management, Uses Wheelchair, Other (Comments)(Uses Prosthetic)  Difficulity with ADLs: None  Difficulity with IADLs: None    Functional Assesment  Prior Functional Level: Ambulatory, Independent with Activities of Daily Living, Independent with Medication Management, Uses Wheelchair, " "Other (Comments)(Uses Prosthetic)    Finances  Financial Barriers to Discharge: No  Prescription Coverage: Yes    Vision / Hearing Impairment  Vision Impairment : No  Hearing Impairment : No         Advance Directive  Advance Directive?: None    Domestic Abuse  Have you ever been the victim of abuse or violence?: No  Physical Abuse or Sexual Abuse: No  Verbal Abuse or Emotional Abuse: No  Possible Abuse Reported to:: Not Applicable    Psychological Assessment  History of Substance Abuse: Other (comment)(\"A long time ago\")  Substance Abuse Comments: \"A long time ago\"  History of Psychiatric Problems: No  Non-compliant with Treatment: No  Newly Diagnosed Illness: Yes    Discharge Risks or Barriers  Discharge risks or barriers?: No    Anticipated Discharge Information  Anticipated discharge disposition: Home  Discharge Address: [address] [place] [address] Stan Ramos [address] [address]  Discharge Contact Phone Number: [phone]        "

## 2019-11-29 NOTE — PROGRESS NOTES
Inpatient Anticoagulation Service Note    Date: 11/29/2019    Reason for Anticoagulation: Atrial Fibrillation   Target INR: 2.0 to 3.0  [id number] VASc Score: 2  HAS-BLED Score: 3   Hemoglobin Value: (!) 9.4  Hematocrit Value: (!) 30.6    INR from last 7 days     Date/Time INR Value    11/29/19 0338  (!) 1.14    11/28/19 1008  (!) 1.14        Dose from last 7 days     Date/Time Dose (mg)    11/29/19 1055  2.5    11/28/19 1355  2.5        Significant Interactions: Not Applicable  Bridge Therapy: No     Comments: Pt started on warfarin yesterday. Per previous Pharmacy warfarin note, the pt had been given warfarin 6 mg x 2 days in a row and the INR increased from 1.05 to 2.11 back in June 2019. Pt was discharged in July, but did not establish care with the [facility] clinic. Will continue warfarin 2.5 mg daily and trend the INR.     Education Material Provided?: No  Pharmacist suggested discharge dosing: TBD, warfarin 2.5 mg daily and follow INR within 3 days of discharge     Moy Tay, PharmD

## 2019-11-29 NOTE — PROGRESS NOTES
Spiritual Care Note    Patient Information     Patient's Name: Isabelle Coyle   MRN: [id number]    YOB: 1965   Age and Gender: 54 y.o. female   Service Area: Dialysis   Room (and Bed): [place] [address]   Ethnicity or Nationality:     Primary Language: English   Spiritism/Spiritual preference: Confucianism   Place of Residence: [place], [place]   Family/Friends/Others Present: No   Clinical Team Present: Yes, nurse   Medical Diagnosis(-es)/Procedure(s): Shoulder pain/dialysis   Code Status: Full Code    Date of Admission: 11/28/2019   Length of Stay: 0 days        Spiritual Care Provider Information:  Name of Spiritual Care Provider: Pricilla Davenport  Title of Spiritual Care Provider: Associate   Phone Number: [phone]  E-mail:  [email or web address]  Total time : 10 minutes    Spiritual Screen Results:    Gen Nursing  Spiritual Screen  Is your spiritual health or inner well-being important to you as you cope with your medical condition?: Yes  Would you like to receive a visit from our Spiritual Care team or your own Synagogue or spiritual leader?: No  Was spiritual care education provided to the patient?: Declined     Palliative Care  PC Spiritism/Spiritual Screening  Was spiritual care education provided to the patient?: Declined      Encounter/Request Information  Encounter/Request Type   Visited With: Patient  Nature of the Visit: Initial  General Visit: Yes  Referral From/ Origin of Request: MICHELLE grayson    Religous Needs/Values  Spiritism Needs Visit  Spiritism Needs: Prayer  Ritual Needs Visit  Ritual Needs: Monona    Spiritual Assessment     Spiritual Care Encounters    Observations/Symptoms: Accepting, Thankfulness    Interaction/Conversation: Pt requested prayer for herself and her mother, and was also grateful for a blessing from the .    Assessment: Need    Need: Seeking Spiritual Assistance and Support    Interventions: Prayer, blessing, conversation.    Outcomes: Connectedness  with the Holy/with God, Spiritual Comfort    Plan: Visit Upon Request    Notes:

## 2019-11-29 NOTE — PROGRESS NOTES
Hemodialysis ordered by Dr. Martinez. Treatment started at 1148 and ended at 1448. Pt stable, vss, no c/o post tx. See flow sheets for details. Net UF 2.0 L. Reported to LEANN Mckeon RN. Lt ua avf dressing clean, dry, intact.
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
## 2019-11-30 ENCOUNTER — PATIENT OUTREACH (OUTPATIENT)
Dept: HEALTH INFORMATION MANAGEMENT | Facility: OTHER | Age: 54
End: 2019-11-30

## 2019-11-30 VITALS
HEART RATE: 72 BPM | HEIGHT: 62 IN | SYSTOLIC BLOOD PRESSURE: 155 MMHG | BODY MASS INDEX: 25.15 KG/M2 | TEMPERATURE: 98.3 F | DIASTOLIC BLOOD PRESSURE: 76 MMHG | RESPIRATION RATE: 16 BRPM | WEIGHT: 136.69 LBS | OXYGEN SATURATION: 98 %

## 2019-11-30 LAB
ALBUMIN SERPL BCP-MCNC: 3.8 G/DL (ref 3.2–4.9)
ALBUMIN/GLOB SERPL: 1.8 G/DL
ALP SERPL-CCNC: 58 U/L (ref 30–99)
ALT SERPL-CCNC: 6 U/L (ref 2–50)
ANION GAP SERPL CALC-SCNC: 12 MMOL/L (ref 0–11.9)
ANISOCYTOSIS BLD QL SMEAR: ABNORMAL
AST SERPL-CCNC: 10 U/L (ref 12–45)
BASOPHILS # BLD AUTO: 1.1 % (ref 0–1.8)
BASOPHILS # BLD: 0.07 K/UL (ref 0–0.12)
BILIRUB SERPL-MCNC: 0.8 MG/DL (ref 0.1–1.5)
BUN SERPL-MCNC: 25 MG/DL (ref 8–22)
CALCIUM SERPL-MCNC: 9.2 MG/DL (ref 8.5–10.5)
CHLORIDE SERPL-SCNC: 100 MMOL/L (ref 96–112)
CO2 SERPL-SCNC: 26 MMOL/L (ref 20–33)
COMMENT 1642: NORMAL
CREAT SERPL-MCNC: 4.98 MG/DL (ref 0.5–1.4)
EOSINOPHIL # BLD AUTO: 0.46 K/UL (ref 0–0.51)
EOSINOPHIL NFR BLD: 7.4 % (ref 0–6.9)
ERYTHROCYTE [DISTWIDTH] IN BLOOD BY AUTOMATED COUNT: 59.8 FL (ref 35.9–50)
GLOBULIN SER CALC-MCNC: 2.1 G/DL (ref 1.9–3.5)
GLUCOSE SERPL-MCNC: 132 MG/DL (ref 65–99)
HCT VFR BLD AUTO: 34.6 % (ref 37–47)
HGB BLD-MCNC: 10.2 G/DL (ref 12–16)
IMM GRANULOCYTES # BLD AUTO: 0.01 K/UL (ref 0–0.11)
IMM GRANULOCYTES NFR BLD AUTO: 0.2 % (ref 0–0.9)
INR PPP: 1.17 (ref 0.87–1.13)
LYMPHOCYTES # BLD AUTO: 1.32 K/UL (ref 1–4.8)
LYMPHOCYTES NFR BLD: 21.3 % (ref 22–41)
MCH RBC QN AUTO: 30.4 PG (ref 27–33)
MCHC RBC AUTO-ENTMCNC: 29.5 G/DL (ref 33.6–35)
MCV RBC AUTO: 103 FL (ref 81.4–97.8)
MONOCYTES # BLD AUTO: 0.33 K/UL (ref 0–0.85)
MONOCYTES NFR BLD AUTO: 5.3 % (ref 0–13.4)
MORPHOLOGY BLD-IMP: NORMAL
NEUTROPHILS # BLD AUTO: 4.01 K/UL (ref 2–7.15)
NEUTROPHILS NFR BLD: 64.7 % (ref 44–72)
NRBC # BLD AUTO: 0 K/UL
NRBC BLD-RTO: 0 /100 WBC
OVALOCYTES BLD QL SMEAR: NORMAL
PHOSPHATE SERPL-MCNC: 3.6 MG/DL (ref 2.5–4.5)
PLATELET # BLD AUTO: 81 K/UL (ref 164–446)
PLATELET BLD QL SMEAR: NORMAL
PMV BLD AUTO: 11.2 FL (ref 9–12.9)
POIKILOCYTOSIS BLD QL SMEAR: NORMAL
POTASSIUM SERPL-SCNC: 4.2 MMOL/L (ref 3.6–5.5)
PROT SERPL-MCNC: 5.9 G/DL (ref 6–8.2)
PROTHROMBIN TIME: 15.2 SEC (ref 12–14.6)
RBC # BLD AUTO: 3.36 M/UL (ref 4.2–5.4)
RBC BLD AUTO: PRESENT
SODIUM SERPL-SCNC: 138 MMOL/L (ref 135–145)
WBC # BLD AUTO: 6.2 K/UL (ref 4.8–10.8)

## 2019-11-30 PROCEDURE — 700102 HCHG RX REV CODE 250 W/ 637 OVERRIDE(OP): Performed by: HOSPITALIST

## 2019-11-30 PROCEDURE — 85025 COMPLETE CBC W/AUTO DIFF WBC: CPT

## 2019-11-30 PROCEDURE — A9270 NON-COVERED ITEM OR SERVICE: HCPCS | Performed by: STUDENT IN AN ORGANIZED HEALTH CARE EDUCATION/TRAINING PROGRAM

## 2019-11-30 PROCEDURE — 97161 PT EVAL LOW COMPLEX 20 MIN: CPT

## 2019-11-30 PROCEDURE — 80053 COMPREHEN METABOLIC PANEL: CPT

## 2019-11-30 PROCEDURE — 85610 PROTHROMBIN TIME: CPT

## 2019-11-30 PROCEDURE — A9270 NON-COVERED ITEM OR SERVICE: HCPCS | Performed by: HOSPITALIST

## 2019-11-30 PROCEDURE — 84100 ASSAY OF PHOSPHORUS: CPT

## 2019-11-30 PROCEDURE — 700102 HCHG RX REV CODE 250 W/ 637 OVERRIDE(OP): Performed by: STUDENT IN AN ORGANIZED HEALTH CARE EDUCATION/TRAINING PROGRAM

## 2019-11-30 PROCEDURE — 99217 PR OBSERVATION CARE DISCHARGE: CPT | Mod: GC | Performed by: HOSPITALIST

## 2019-11-30 PROCEDURE — G0378 HOSPITAL OBSERVATION PER HR: HCPCS

## 2019-11-30 PROCEDURE — 36415 COLL VENOUS BLD VENIPUNCTURE: CPT

## 2019-11-30 RX ORDER — WARFARIN SODIUM 4 MG/1
4 TABLET ORAL DAILY
Qty: 7 TAB | Refills: 0 | Status: SHIPPED | OUTPATIENT
Start: 2019-11-30 | End: 2019-11-30

## 2019-11-30 RX ORDER — WARFARIN SODIUM 4 MG/1
4 TABLET ORAL DAILY
Status: DISCONTINUED | OUTPATIENT
Start: 2019-11-30 | End: 2019-11-30 | Stop reason: HOSPADM

## 2019-11-30 RX ORDER — ACETAMINOPHEN 325 MG/1
650 TABLET ORAL EVERY 6 HOURS PRN
Qty: 30 TAB | Refills: 0 | Status: SHIPPED
Start: 2019-11-30 | End: 2020-01-03

## 2019-11-30 RX ADMIN — OXYCODONE HYDROCHLORIDE 5 MG: 5 TABLET ORAL at 08:48

## 2019-11-30 RX ADMIN — ACETAMINOPHEN 1000 MG: 500 TABLET ORAL at 05:51

## 2019-11-30 RX ADMIN — SEVELAMER CARBONATE 800 MG: 800 TABLET, FILM COATED ORAL at 08:48

## 2019-11-30 RX ADMIN — AMLODIPINE BESYLATE 10 MG: 5 TABLET ORAL at 05:51

## 2019-11-30 RX ADMIN — SENNOSIDES AND DOCUSATE SODIUM 2 TABLET: 8.6; 5 TABLET ORAL at 05:50

## 2019-11-30 RX ADMIN — NICOTINE 14 MG: 14 PATCH TRANSDERMAL at 05:50

## 2019-11-30 RX ADMIN — METOPROLOL TARTRATE 25 MG: 25 TABLET, FILM COATED ORAL at 05:51

## 2019-11-30 RX ADMIN — OXYCODONE HYDROCHLORIDE 5 MG: 5 TABLET ORAL at 00:24

## 2019-11-30 RX ADMIN — ALBUTEROL SULFATE 1 PUFF: 90 AEROSOL, METERED RESPIRATORY (INHALATION) at 06:16

## 2019-11-30 RX ADMIN — ACETAMINOPHEN 1000 MG: 500 TABLET ORAL at 12:08

## 2019-11-30 RX ADMIN — GABAPENTIN 100 MG: 100 CAPSULE ORAL at 05:51

## 2019-11-30 RX ADMIN — HYDRALAZINE HYDROCHLORIDE 25 MG: 25 TABLET, FILM COATED ORAL at 05:51

## 2019-11-30 RX ADMIN — SEVELAMER CARBONATE 800 MG: 800 TABLET, FILM COATED ORAL at 12:08

## 2019-11-30 ASSESSMENT — ENCOUNTER SYMPTOMS
EYE DISCHARGE: 0
CHILLS: 0
WEIGHT LOSS: 0
FEVER: 0
HEMOPTYSIS: 0
SINUS PAIN: 0
BLURRED VISION: 0
GASTROINTESTINAL NEGATIVE: 1
NERVOUS/ANXIOUS: 0
ABDOMINAL PAIN: 0
NAUSEA: 0
BACK PAIN: 0
CARDIOVASCULAR NEGATIVE: 1
SEIZURES: 0
DOUBLE VISION: 0
PHOTOPHOBIA: 0
CLAUDICATION: 0
FOCAL WEAKNESS: 0
SPUTUM PRODUCTION: 0
NECK PAIN: 0
CONSTITUTIONAL NEGATIVE: 1
ORTHOPNEA: 0
WEAKNESS: 1
HALLUCINATIONS: 0
RESPIRATORY NEGATIVE: 1
EYES NEGATIVE: 1

## 2019-11-30 ASSESSMENT — GAIT ASSESSMENTS
DISTANCE (FEET): 250
GAIT LEVEL OF ASSIST: SUPERVISED
DEVIATION: TRENDELENBERG

## 2019-11-30 ASSESSMENT — COGNITIVE AND FUNCTIONAL STATUS - GENERAL
TURNING FROM BACK TO SIDE WHILE IN FLAT BAD: A LITTLE
MOBILITY SCORE: 22
CLIMB 3 TO 5 STEPS WITH RAILING: A LITTLE
SUGGESTED CMS G CODE MODIFIER MOBILITY: CJ

## 2019-11-30 ASSESSMENT — LIFESTYLE VARIABLES: SUBSTANCE_ABUSE: 0
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
## 2019-11-30 NOTE — PROGRESS NOTES
Pt resting in bed, no s/s distress noted, BP elevated at this time, will medicate as ordered. Call light within reach, bed in lowest position and locked, will continue to monitor.

## 2019-11-30 NOTE — PROGRESS NOTES
Nephrology Daily Progress Note    Date of Service  11/30/2019    Chief Complaint  54 y.o. female admitted 11/28/2019 with fractured right collarbone.    Interval Problem Update  11/28/19 - Renal consult for ESRD.  Dialyzed.  11/29/19 - Feels OK.  Dialyzed.  11/30/19 - Feels OK.    Review of Systems  Review of Systems   Constitutional: Negative.    HENT: Negative.    Eyes: Negative.    Respiratory: Negative.    Cardiovascular: Negative.    Gastrointestinal: Negative.    Skin: Negative.         Physical Exam  Temp:  [36.2 °C (97.1 °F)-37.2 °C (98.9 °F)] 36.6 °C (97.8 °F)  Pulse:  [71-87] 78  Resp:  [16-18] 16  BP: (145-181)/(81-91) 145/86  SpO2:  [90 %-98 %] 98 %    Physical Exam  HENT:      Head: Normocephalic and atraumatic.      Nose: Nose normal.      Mouth/Throat:      Mouth: Mucous membranes are moist.   Eyes:      Extraocular Movements: Extraocular movements intact.      Pupils: Pupils are equal, round, and reactive to light.   Neck:      Musculoskeletal: Normal range of motion.   Cardiovascular:      Rate and Rhythm: Normal rate and regular rhythm.      Pulses: Normal pulses.      Heart sounds: Normal heart sounds.   Pulmonary:      Effort: Pulmonary effort is normal.      Breath sounds: Normal breath sounds.   Abdominal:      General: Abdomen is flat.      Palpations: Abdomen is soft.   Musculoskeletal: Normal range of motion.   Skin:     General: Skin is warm.   Neurological:      General: No focal deficit present.      Mental Status: She is alert.   Psychiatric:         Mood and Affect: Mood normal.         Thought Content: Thought content normal.         Judgment: Judgment normal.         Fluids    Intake/Output Summary (Last 24 hours) at 11/30/2019 1146  Last data filed at 11/29/2019 1448  Gross per 24 hour   Intake 980 ml   Output 2500 ml   Net -1520 ml       Laboratory  Recent Labs     11/28/19  1008 11/29/19  0338 11/30/19  0331   WBC 9.2 7.3 6.2   RBC 3.34* 3.10* 3.36*   HEMOGLOBIN 10.1* 9.4* 10.2*    HEMATOCRIT 32.0* 30.6* 34.6*   MCV 95.8 98.7* 103.0*   MCH 30.2 30.3 30.4   MCHC 31.6* 30.7* 29.5*   RDW 54.5* 56.3* 59.8*   PLATELETCT 86* 105* 81*   MPV 10.4 10.7 11.2     Recent Labs     11/28/19  1008 11/29/19  0338 11/30/19  0331   SODIUM 139 138 138   POTASSIUM 6.1* 5.1 4.2   CHLORIDE 98 100 100   CO2 28 30 26   GLUCOSE 111* 94 132*   BUN 66* 31* 25*   CREATININE 9.01* 5.97* 4.98*   CALCIUM 9.6 9.2 9.2     Recent Labs     11/28/19  1008 11/29/19  0338 11/30/19  0331   INR 1.14* 1.14* 1.17*     No results for input(s): NTPROBNP in the last 72 hours.           IMPRESSION:  1.  End-stage renal failure, on chronic maintenance hemodialysis.  2.  Hypertension.  3.  Hyperkalemia. Resolved.  4.  Close nondisplaced fracture, lateral right clavicle.  5.  History of paroxysmal atrial fibrillation.     Plan:  No dalysis today.

## 2019-11-30 NOTE — DISCHARGE PLANNING
RN came in requested cab voucher. [facility] does not have one, contacted house SW and all care coordination team here by email to request assistance.   Guill[facility] NEW responded, will bring cab voucher.

## 2019-11-30 NOTE — DISCHARGE SUMMARY
Discharge Summary    CHIEF COMPLAINT ON ADMISSION  Chief Complaint   Patient presents with   • Shoulder Pain       Reason for Admission  EMS     Admission Date  11/28/2019    CODE STATUS  Full Code    HPI & HOSPITAL COURSE  Patient is a 54 year old female who presented on the 28th of November, 2019, after falling off of her bed onto her right shoulder. Pt has ESRD and is on hemodialysis, at time of admission she had skipped her last hemodialysis appointment because of the weather conditions. Initially had electrolyte imbalances including  Hyperkalemia of 6.1, with BUN/Creatinine of 66/9.01 and GFR of 9.  During ED course was discovered that patient had a right clavicular fracture. Ordered patient a sling, placed on her pain medication during the hospital stay including hydrocodone and acetaminophen PRN. Patient [facility]th and [facility]th [facility], overseen by nephrology. Patient's potassium now WNL and phosphorous 3.5. Of note is that patient has consistently had extremely elevated blood pressure in the 160s-180s systolic, for which we have administered hydralazine INJ 10 mg, ih addition to her home BP regime: Hydralazine tablet 25mg , metoprolol 25 mg, and amlodipine 10mg. At day of discharge patient is not in acute distress, feels well, and I have spoken to her about PCP follow up for the blood pressure control. Patient states that she has an orthopedic surgeon she plans to personally call (Dr. Freeman of [place], [place]) because of a positive past experience on her lower extremity. She also has established nephrology care that she will be seeking follow up with.        Therefore, she is discharged in good and stable condition to home with close outpatient follow-up.    The patient met 2-midnight criteria for an inpatient stay at the time of discharge.    Discharge Date  11/30/19    FOLLOW UP ITEMS POST DISCHARGE  PCP follow up, orthopedic surgery, and nephrology    DISCHARGE  DIAGNOSES  Active Problems:    ESRD on hemodialysis (HCC) POA: Yes    Hyperkalemia POA: Yes    HTN (hypertension) POA: Yes    Paroxysmal atrial fibrillation (HCC) POA: Yes    Closed nondisplaced fracture of acromial end of right clavicle POA: Yes    Thrombocytopenia (HCC) POA: Yes    Tobacco use POA: Yes    Anemia due to chronic kidney disease POA: Yes  Resolved Problems:  Initial post-dialysis imbalances corrected      FOLLOW UP  No future appointments.  [facility]  [address]  [facility] [address]  [phone]    [facility]  unable to call office to schedule appointment with you Primary Care Physician due to weekend.Please call to schedule your appointment for a hospital follow up . Thank you       MEDICATIONS ON DISCHARGE     Medication List      START taking these medications      Instructions   acetaminophen 325 MG Tabs  Commonly known as:  TYLENOL   Take 2 Tabs by mouth every 6 hours as needed (Mild Pain; (Pain scale 1-3); Temp greater than 100.5 F).  Dose:  650 mg        CONTINUE taking these medications      Instructions   albuterol 108 (90 Base) MCG/ACT Aers inhalation aerosol   Inhale 1 Puff by mouth 2 times a day as needed for Shortness of Breath.  Dose:  1 Puff     amLODIPine 10 MG Tabs  Commonly known as:  NORVASC   Take 10 mg by mouth every day.  Dose:  10 mg     gabapentin 100 MG Caps  Commonly known as:  NEURONTIN   Take 1 Cap by mouth 2 Times a Day.  Dose:  100 mg     hydrALAZINE 25 MG Tabs  Commonly known as:  APRESOLINE   Take 1 Tab by mouth every 8 hours.  Dose:  25 mg     labetalol 200 MG Tabs  Commonly known as:  NORMODYNE   Take 200 mg by mouth 2 times a day.  Dose:  200 mg     metoprolol 25 MG Tabs  Commonly known as:  LOPRESSOR   Take 1 Tab by mouth 2 Times a Day.  Dose:  25 mg     sevelamer carbonate 800 MG Tabs tablet  Commonly known as:  RENVELA   Take 800 mg by mouth 3 times a day, with meals.  Dose:  800 mg     terazosin 5 MG Caps  Commonly known as:  HYTRIN    Take 5 mg by mouth every evening.  Dose:  5 mg            Allergies  Allergies   Allergen Reactions   • Sulfa Drugs Hives     [id number] rash swelling itching       DIET  Orders Placed This Encounter   Procedures   • Diet Order Renal     Standing Status:   Standing     Number of Occurrences:   1     Order Specific Question:   Diet:     Answer:   Renal [8]       ACTIVITY  As tolerated.  Weight bearing as tolerated    CONSULTATIONS  Nephrology    PROCEDURES  Hemodialysis    LABORATORY  Lab Results   Component Value Date    SODIUM 138 2019    POTASSIUM 4.2 2019    CHLORIDE 100 2019    CO2 26 2019    GLUCOSE 132 (H) 2019    BUN 25 (H) 2019    CREATININE 4.98 (H) 2019    CREATININE 2.2 (H) 2009        Lab Results   Component Value Date    WBC 6.2 2019    HEMOGLOBIN 10.2 (L) 2019    HEMATOCRIT 34.6 (L) 2019    PLATELETCT 81 (L) 2019                  Internal Medicine Discharge Summary  Note Author: Collette Harrington M.D.       Name Isabelle Coyle     1965   Age/Sex 54 y.o. female   MRN [id number]         Admit Date:  2019       Discharge Date:   19    Service:   [facility] Internal Medicine Red Team  Attending Physician(s):   Harmony  Senior Resident(s):   David  John Resident(s):   Len  PCP: Pcp Unknown      Primary Diagnosis:   ESRD on hemodialysis    Secondary Diagnoses:                Active Problems:    ESRD on hemodialysis (HCC) POA: Yes    Hyperkalemia POA: Yes    HTN (hypertension) POA: Yes    Paroxysmal atrial fibrillation (HCC) POA: Yes    Closed nondisplaced fracture of acromial end of right clavicle POA: Yes    Thrombocytopenia (HCC) POA: Yes    Tobacco use POA: Yes    Anemia due to chronic kidney disease POA: Yes  Resolved Problems:   electrolyte imbalances from initial state post-skipping her hemodialysis      Hospital Summary (Brief Narrative):       Patient is a 54 year old female who presented on the   November, 2019, after falling off of her bed onto her right shoulder. Pt has ESRD and is on hemodialysis, at time of admission she had skipped her last hemodialysis appointment because of the weather conditions. Initially had electrolyte imbalances including  Hyperkalemia of 6.1, with BUN/Creatinine of 66/9.01 and GFR of 9.  During ED course was discovered that patient had a right clavicular fracture. Ordered patient a sling, placed on her pain medication during the hospital stay including hydrocodone and acetaminophen PRN. Patient underwent hemodialysis on the 28th and 29th under [facility] care, overseen by nephrology. Patient's potassium now WNL and phosphorous 3.5. Of note is that patient has consistently had extremely elevated blood pressure in the 160s-180s systolic, for which we have administered hydralazine INJ 10 mg, ih addition to her home BP regime: Hydralazine tablet 25mg , metoprolol 25 mg, and amlodipine 10mg. At day of discharge patient is not in acute distress, feels well, and I have spoken to her about PCP follow up for the blood pressure control. Patient states that she has an orthopedic surgeon she plans to personally call (Dr. Freeman of [place], [place]) because of a positive past experience on her lower extremity. She also has established nephrology care that she will be seeking follow up with.     Patient /Hospital Summary (Details -- Problem Oriented) :          ESRD on hemodialysis (HCC)  Assessment & Plan  Pt hemodialysis 11/28  Follow Nephrology consultation     HTN (hypertension)  Assessment & Plan  Continue amlodipine hydralazine metoprolol and terazosin  Monitor blood pressure and adjust medications accordingly    Hyperkalemia  Assessment & Plan  Secondary to missing hemodialysis  Telemetry monitoring  Foll and replete electrolytes    Thrombocytopenia (HCC)  Assessment & Plan  Acute on chronic    Recheck CBC in a.m. if platelet counts trending down consider further work-up otherwise she will  need follow-up and work-up as outpatient    Closed nondisplaced fracture of acromial end of right clavicle  Assessment & Plan  Pain management  Shoulder sling  PT eval      Paroxysmal atrial fibrillation (HCC)  Assessment & Plan  Continue metoprolol and anticoagulation  Check INR and adjust warfarin dose accordingly    Anemia due to chronic kidney disease  Assessment & Plan  Monitor H/H    Tobacco use  Assessment & Plan  Counseled patient on importance of smoking cessation reviewed available options to help her quit  We will start her on nicotine replacement therapy  Total time spent counseling patient smoking cessation was 5 minutes        Consultants:     Nephrology    Procedures:        Hemodialysis    Imaging/ Testing:      CT- shoulder    Discharge Medications:         Medication Reconciliation: Completed       Medication List      START taking these medications      Instructions   acetaminophen 325 MG Tabs  Commonly known as:  TYLENOL   Take 2 Tabs by mouth every 6 hours as needed (Mild Pain; (Pain scale 1-3); Temp greater than 100.5 F).  Dose:  650 mg        CONTINUE taking these medications      Instructions   albuterol 108 (90 Base) MCG/ACT Aers inhalation aerosol   Inhale 1 Puff by mouth 2 times a day as needed for Shortness of Breath.  Dose:  1 Puff     amLODIPine 10 MG Tabs  Commonly known as:  NORVASC   Take 10 mg by mouth every day.  Dose:  10 mg     gabapentin 100 MG Caps  Commonly known as:  NEURONTIN   Take 1 Cap by mouth 2 Times a Day.  Dose:  100 mg     hydrALAZINE 25 MG Tabs  Commonly known as:  APRESOLINE   Take 1 Tab by mouth every 8 hours.  Dose:  25 mg     labetalol 200 MG Tabs  Commonly known as:  NORMODYNE   Take 200 mg by mouth 2 times a day.  Dose:  200 mg     metoprolol 25 MG Tabs  Commonly known as:  LOPRESSOR   Take 1 Tab by mouth 2 Times a Day.  Dose:  25 mg     sevelamer carbonate 800 MG Tabs tablet  Commonly known as:  RENVELA   Take 800 mg by mouth 3 times a day, with meals.  Dose:   800 mg     terazosin 5 MG Caps  Commonly known as:  HYTRIN   Take 5 mg by mouth every evening.  Dose:  5 mg            Can use .DISCHARGEMEDSLIST if going to another facility         Disposition:   discharge    Diet:   Full, normal    Activity:   Ambulates slowly     Instructions:      Follow up with PCP, orthopedic surgery, and nephrology. As above, patient states that she has an orthopedic surgeon she plans to personally call (Dr. Freeman of [place], [place]) because of a positive past experience on her lower extremitiy. She also has established nephrology care that she will be seeking follow up with.   The patient was instructed to return to the ER in the event of worsening symptoms. I have counseled the patient on the importance of compliance and the patient has agreed to proceed with all medical recommendations and follow up plan indicated above.   The patient understands that all medications come with benefits and risks. Risks may include permanent injury or death and these risks can be minimized with close reassessment and monitoring.        Primary Care Provider:    [facility] (called scheduling at extension [phone] and they stated this is where she has a provider but can not make appointment at the moment as it is the weekend and the office is closed)  Discharge summary faxed to primary care provider:  Completed  Copy of discharge summary given to the patient: Deferred      Follow up appointment details :      No future appointments.  [facility]  [address]  [facility] [address]  [phone]    [facility]  unable to call office to schedule appointment with you Primary Care Physician due to weekend.Please call to schedule your appointment for a hospital follow up . Thank you         Pending Studies:            Time spent on discharge day patient visit, preparing discharge paperwork and arranging for patient follow up.    Summary of follow up issues:   Hemodialysis  Orthopedic surgery for  right clavicular fracture    Discharge Time (Minutes) :    11:00am  Hospital Course Type: Inpatient Stay < 2 midnights, patient recovered more rapidly than anticipated      Condition on Discharge    ______________________________________________________________________    Interval history/exam for day of discharge:     Patient reports to feel well, c/o right clavicular tenderness but subsided from admission, ambulating with PT at time prior to morning encounter, has requested a sling which has been provided to her now (weight bearing as tolerated), after hemodialysis labs as below.       Most Recent Labs:    Lab Results   Component Value Date/Time    WBC 6.2 11/30/2019 03:31 AM    RBC 3.36 (L) 11/30/2019 03:31 AM    HEMOGLOBIN 10.2 (L) 11/30/2019 03:31 AM    HEMATOCRIT 34.6 (L) 11/30/2019 03:31 AM    .0 (H) 11/30/2019 03:31 AM    MCH 30.4 11/30/2019 03:31 AM    MCHC 29.5 (L) 11/30/2019 03:31 AM    MPV 11.2 11/30/2019 03:31 AM    NEUTSPOLYS 64.70 11/30/2019 03:31 AM    LYMPHOCYTES 21.30 (L) 11/30/2019 03:31 AM    MONOCYTES 5.30 11/30/2019 03:31 AM    EOSINOPHILS 7.40 (H) 11/30/2019 03:31 AM    BASOPHILS 1.10 11/30/2019 03:31 AM    HYPOCHROMIA 1+ 10/04/2013 03:24 PM    ANISOCYTOSIS 1+ 11/30/2019 03:31 AM      Lab Results   Component Value Date/Time    SODIUM 138 11/30/2019 03:31 AM    POTASSIUM 4.2 11/30/2019 03:31 AM    CHLORIDE 100 11/30/2019 03:31 AM    CO2 26 11/30/2019 03:31 AM    GLUCOSE 132 (H) 11/30/2019 03:31 AM    BUN 25 (H) 11/30/2019 03:31 AM    CREATININE 4.98 (H) 11/30/2019 03:31 AM    CREATININE 2.2 (H) 05/06/2009 03:10 AM      Lab Results   Component Value Date/Time    ALTSGPT 6 11/30/2019 03:31 AM    ASTSGOT 10 (L) 11/30/2019 03:31 AM    ALKPHOSPHAT 58 11/30/2019 03:31 AM    TBILIRUBIN 0.8 11/30/2019 03:31 AM    DBILIRUBIN <0.1 06/20/2017 01:19 PM    LIPASE 87 (H) 05/08/2019 11:43 PM    ALBUMIN 3.8 11/30/2019 03:31 AM    GLOBULIN 2.1 11/30/2019 03:31 AM    INR 1.17 (H) 11/30/2019 03:31 AM      Lab Results   Component Value Date/Time    PROTHROMBTM 15.2 (H) 11/30/2019 03:31 AM    INR 1.17 (H) 11/30/2019 03:31 AM

## 2019-11-30 NOTE — PROGRESS NOTES
Patient discharged home via private car with significant other. IV removed. Tele monitor removed. Nurse verified discharge requirements regarding warfarin. Nurse spoke with [facility], who stated patient will not be going home on warfarin. Information relayed to patient. Patient also requesting pain medication upon discharge, nurse informed MD, no new pain medication ordered. Patient verbally upset. Patient was very anxious and irritable, wanting to leave. Nurse went over and gave AVS to patient. Patient verbalized understanding but unreceptive at times.

## 2019-11-30 NOTE — DISCHARGE INSTRUCTIONS
Regular Primary Care Physician (PCP) appointment recommended:     [facility]  [facility]  [address] [place] [address]  [phone]    Discharge Instructions    Discharged to home by car with relative. Discharged via walking, hospital escort: Yes.  Special equipment needed: Not Applicable    Be sure to schedule a follow-up appointment with your primary care doctor or any specialists as instructed.     Discharge Plan:   Smoking Cessation Offered: Patient Counseled(5 cigarettes a day.)    I understand that a diet low in cholesterol, fat, and sodium is recommended for good health. Unless I have been given specific instructions below for another diet, I accept this instruction as my diet prescription.   Other diet: Renal    Special Instructions: None    · Is patient discharged on Warfarin / Coumadin?   No     Depression / Suicide Risk    As you are discharged from this [facility] facility, it is important to learn how to keep safe from harming yourself.    Recognize the warning signs:  · Abrupt changes in personality, positive or negative- including increase in energy   · Giving away possessions  · Change in eating patterns- significant weight changes-  positive or negative  · Change in sleeping patterns- unable to sleep or sleeping all the time   · Unwillingness or inability to communicate  · Depression  · Unusual sadness, discouragement and loneliness  · Talk of wanting to die  · Neglect of personal appearance   · Rebelliousness- reckless behavior  · Withdrawal from people/activities they love  · Confusion- inability to concentrate     If you or a loved one observes any of these behaviors or has concerns about self-harm, here's what you can do:  · Talk about it- your feelings and reasons for harming yourself  · Remove any means that you might use to hurt yourself (examples: pills, rope, extension cords, firearm)  · Get professional help from the community (Mental Health, Substance  Abuse, psychological counseling)  · Do not be alone:Call your Safe Contact- someone whom you trust who will be there for you.  · Call your local CRISIS HOTLINE 114-9701 or 100-276-0629  · Call your local Children's Mobile Crisis Response Team [place] (208) 672-7191 or www.Nitch  · Call the toll free National Suicide Prevention Hotlines   · National Suicide Prevention Lifeline 703-859-EPXV (4050)  · [employer] Hope Line Network 800-SUICIDE (466-3152)      What You Need to Know About Warfarin  Warfarin is a blood thinner (anticoagulant). Anticoagulants help to prevent the formation of blood clots. They also help to stop the growth of blood clots.  Who should use warfarin?  Warfarin is prescribed for people who are at risk for developing harmful blood clots, such as people who have:  · Surgically implanted mechanical heart valves.  · Irregular heart rhythms (atrial fibrillation).  · Certain clotting disorders.  · A history of harmful blood clotting in the past. This includes people who have had:  ¨ A stroke.  ¨ Blood clot in the lungs (pulmonary embolism, or PE).  ¨ Blood clot in the legs (deep vein thrombosis, or DVT).  · An existing blood clot.  How is warfarin taken?     Warfarin is a medicine that you take by mouth (orally). Warfarin tablets come in different strengths. Each tablet strength is a different color, with the amount of warfarin printed on the tablet. If you get a new prescription filled and the color of your tablet is different than usual, tell your pharmacist or health care provider immediately.  What blood tests do I need while taking warfarin?  The goal of warfarin therapy is to lessen the clotting tendency of blood, but not to prevent clotting completely. Your health care provider will monitor the anticoagulation effect of warfarin closely and will adjust your dose as needed.  Warfarin is a medicine that needs to be closely monitored, so it is very important to keep all lab visits and  follow-up visits with your health care provider. While taking warfarin, you will need to have blood tests (prothrombin tests, or PT tests) regularly to measure your blood clotting time. This type of test can be done with a finger stick or a blood draw.  What does the INR test result mean?  The PT test results will be reported as the International Normalized Ratio (INR). The INR tells your health care provider whether your dosage of warfarin needs to be changed. The longer it takes your blood to clot, the higher the INR.  Your health care provider will tell you your target INR range. If your INR is not in your target range, your health care provider may adjust your dosage.  · If your INR is above your target range, there is a risk of bleeding. Your dosage of warfarin may need to be decreased.  · If your INR is below your target range, there is a risk of clotting. Your dosage of warfarin may need to be increased.  How often is the INR test needed?  · When you first start warfarin, you will usually have your INR checked every few days.  · You may need to have INR tests done more than once a week until you are taking the correct dosage of warfarin.  · After you have reached your target INR, your INR will be tested less often. However, you will need to have your INR checked at least once every 4-6 weeks for the entire time you are taking warfarin.  What are the side effects of warfarin?  Too much warfarin can cause bleeding (hemorrhage) in any part of the body, such as:  · Bleeding from the gums.  · Unexplained bruises.  · Bruises that get larger.  · Blood in the urine.  · Bloody or dark stools.  · Bleeding in the brain (hemorrhagic stroke).  · A nosebleed that is not easily stopped.  · Coughing up blood.  · Vomiting blood.  Warfarin use may also cause:  · Skin rash or irritations  · Nausea that does not go away.  · Severe pain in the back or joints.  · Painful toes that turn blue or purple (purple toe  syndrome).  · Painful ulcers that do not go away (skin necrosis).  What are the signs and symptoms of a blood clot?  Too little warfarin can increase the risk of blood clots in your legs, lungs, or arms.  Signs and symptoms of a DVT in your leg or arm may include:  · Pain or swelling in your leg or arm.  · Skin that is red or warm to the touch on your arm or leg.  Signs and symptoms of a pulmonary embolism may include:  · Shortness of breath or difficulty breathing.  · Chest pain.  · Unexplained fever.  What are the signs and symptoms of a stroke?  If you are taking too much or too little warfarin, you can have a stroke. Signs and symptoms of a stroke may include:  · Weakness or numbness of your face, arm, or leg, especially on one side of your body.  · Confusion or trouble thinking clearly.  · Difficulty seeing with one or both eyes.  · Difficulty walking or moving your arms or legs.  · Dizziness.  · Loss of balance or coordination.  · Trouble speaking, trouble understanding speech, or both (aphasia).  · Sudden, severe headache with no known cause.  · Partial or total loss of consciousness.  What precautions do I need to take while using warfarin?    · Take warfarin exactly as told by your health care provider. Doing this helps you avoid bleeding or blood clots that could result in serious injury, pain, or disability.  · Take your medicine at the same time every day. If you forget to take your dose of warfarin, take it as soon as you remember that day. If you do not remember on that day, do not take an extra dose the next day.  · Contact your health care provider if you miss or take an extra dose. Do not change your dosage on your own to make up for missed or extra doses.  · Wear or carry identification that says that you are taking warfarin.  · Make sure that all health care providers, including your dentist, know you are taking warfarin.  · If you need surgery, talk with your health care provider about whether  you should stop taking warfarin before your surgery.  · Avoid situations that cause bleeding. You may bleed more easily while taking warfarin. To limit bleeding, take the following actions:  ¨ Use a softer toothbrush.  ¨ Floss with waxed floss, not unwaxed floss.  ¨ Shave with an electric razor, not with a blade.  ¨ Limit your use of sharp objects.  ¨ Avoid potentially harmful activities, such as contact sports.  What do I need to know about warfarin and pregnancy or breastfeeding?  · Warfarin is not recommended during the first trimester of pregnancy due to an increased risk of birth defects. In certain situations, a woman may take warfarin after her first trimester of pregnancy.  · If you are taking warfarin and you become pregnant or plan to become pregnant, contact your health care provider right away.  · If you plan to breastfeed while taking warfarin, talk with your health care provider first.  What do I need to know about warfarin and alcohol or drug use?  · Avoid drinking alcohol, or limit alcohol intake to no more than 1 drink a day for nonpregnant women and 2 drinks a day for men. One drink equals 12 oz of beer, 5 oz of wine, or 1½ oz of hard liquor.  ¨ If you change the amount of alcohol that you drink, tell your health care provider. Your warfarin dosage may need to be changed.  · Avoid tobacco products, such as cigarettes, chewing tobacco, and e-cigarettes. If you need help quitting, ask your health care provider.  ¨ If you change the amount of nicotine or tobacco that you use, tell your health care provider. Your warfarin dosage may need to be changed.  · Avoid street drugs while taking warfarin. The effects of street drugs on warfarin are not known.  What do I need to know about warfarin and other medicines or supplements?  · Many prescription and over-the-counter medicines can interfere with warfarin. Talk with your health care provider or your pharmacist before starting or stopping any new  medicines. This includes over-the-counter vitamins, dietary supplements, herbal medicines, and pain medicines. Your warfarin dosage may need to be adjusted.  · Some common over-the-counter medicines that may increase the risk of bleeding while taking warfarin include:  ¨ Acetaminophen.  ¨ Aspirin.  ¨ NSAIDs, such as ibuprofen or naproxen.  ¨ Vitamin E.  What do I need to know about warfarin and my diet?  · It is important to maintain a normal, balanced diet while taking warfarin. Avoid major changes in your diet. If you are going to change your diet, talk with your health care provider before making changes.  · Your health care provider may recommend that you work with a diet and nutrition specialist (dietitian).  · Vitamin K decreases the effect of warfarin, and it is found in many foods. Eat a consistent amount of foods that contain vitamin K. For example, you may decide to eat 2 vitamin K-containing foods each day.  Most foods that are high in vitamin K are green and leafy. Common foods that contain high amounts of vitamin K include:  · Kale, raw or cooked.  · Spinach, raw or cooked.  · Collards, raw or cooked.  · Swiss chard, raw or cooked.  · Mustard greens, raw or cooked.  · Turnip greens, raw or cooked.  · Parsley, raw.  · Broccoli, cooked.  · Noodles, eggs, and spinach, enriched.  · [place] sprouts, raw or cooked.  · Beet greens, raw or cooked.  · Endive, raw.  · Cabbage, cooked.  · Asparagus, cooked.  Foods that contain moderate amounts of vitamin K include:  · Broccoli, raw.  · Cabbage, raw.  · Bok avinash, cooked.  · Green leaf lettuce, raw  · Prunes, stewed.  · Pickles.  · Kiwi.  · Edamame, cooked.  · Lacho lettuce, raw.  · Avocado.  · Tuna, canned in oil.  · Okra, cooked.  · Black-eyed peas, cooked.  · Green beans, cooked or raw.  · Blueberries, raw.  · Blackberries, raw.  · Peas, cooked or raw.  Contact a health care provider if:  · You miss a dose.  · You take an extra dose.  · You plan to have any  kind of surgery or procedure.  · You are unable to take your medicine due to nausea, vomiting, or diarrhea.  · You have any major changes in your diet or you plan to make any major changes in your diet.  · You start or stop any over-the-counter medicine, prescription medicine, or dietary supplement.  · You become pregnant, plan to become pregnant, or think you may be pregnant.  · You have menstrual periods that are heavier than usual.  · You have unusual bruising.  Get help right away if:  · You develop symptoms of an allergic reaction, such as:  ¨ Swelling of the lips, face, tongue, mouth, or throat.  ¨ Rash.  ¨ Itching.  ¨ Itchy, red, swollen areas of skin (hives).  ¨ Trouble breathing.  ¨ Chest tightness.  · You have:  ¨ Signs or symptoms of a stroke.  ¨ Signs or symptoms of a blood clot.  ¨ A fall or have an accident, especially if you hit your head.  ¨ Blood in your urine. Your urine may look reddish, pinkish, or tea-colored.  ¨ Blood in your stool. Your stool may be black or bright red.  ¨ Bleeding that does not stop after applying pressure to the area for 30 minutes.  ¨ Severe pain in your joints or back.  ¨ Purple or blue toes.  ¨ Skin ulcers that do not go away.  · You vomit blood or cough up blood. The blood may be bright red, or it may look like coffee grounds.  These symptoms may represent a serious problem that is an emergency. Do not wait to see if the symptoms will go away. Get medical help right away. Call your local emergency services (911 in the [place] Do not drive yourself to the hospital.   Summary  · Warfarin needs to be closely monitored with blood tests. It is very important to keep all lab visits and follow-up visits with your health care provider.  · Make sure that you know your target INR range and your warfarin dosage.  · Wear or carry identification that says that you are taking warfarin.  · Take warfarin at the same time every day. Call your health care provider if you miss a dose or if  you take an extra dose. Do not change the dosage of warfarin on your own.  · Know the signs and symptoms of blood clots, bleeding, and a stroke. Know when to get emergency medical help.  · Tell all health care providers who care for you that you are taking warfarin.  · Talk with your health care provider or your pharmacist before starting or stopping any new medicines.  · Monitor how much vitamin K you eat every day. Try to eat the same amount every day.  This information is not intended to replace advice given to you by your health care provider. Make sure you discuss any questions you have with your health care provider.  Document Released: 12/18/2006 Document Revised: 08/29/2017 Document Reviewed: 03/15/2017  [employer] Interactive Patient Education © 2017 [employer]      You are receiving the drug warfarin. Please understand the importance of monitoring warfarin with scheduled PT/INR blood draws.  Follow-up with a call to your personal Doctor's office in 3 days to schedule a PT/INR. .    IMPORTANT: HOW TO USE THIS INFORMATION:  This is a summary and does NOT have all possible information about this product. This information does not assure that this product is safe, effective, or appropriate for you. This information is not individual medical advice and does not substitute for the advice of your health care professional. Always ask your health care professional for complete information about this product and your specific health needs.      WARFARIN - ORAL (WARF-uh-rin)      COMMON BRAND NAME(S): Coumadin      WARNING:  Warfarin can cause very serious (possibly fatal) bleeding. This is more likely to occur when you first start taking this medication or if you take too much warfarin. To decrease your risk for bleeding, your doctor or other health care provider will monitor you closely and check your lab results (INR test) to make sure you are not taking too much warfarin. Keep all medical and laboratory  "appointments. Tell your doctor right away if you notice any signs of serious bleeding. See also Side Effects section.      USES:  This medication is used to treat blood clots (such as in deep vein thrombosis-DVT or pulmonary embolus-PE) and/or to prevent new clots from forming in your body. Preventing harmful blood clots helps to reduce the risk of a stroke or heart attack. Conditions that increase your risk of developing blood clots include a certain type of irregular heart rhythm (atrial fibrillation), heart valve replacement, recent heart attack, and certain surgeries (such as hip/knee replacement). Warfarin is commonly called a \"blood thinner,\" but the more correct term is \"anticoagulant.\" It helps to keep blood flowing smoothly in your body by decreasing the amount of certain substances (clotting proteins) in your blood.      HOW TO USE:  Read the Medication Guide provided by your pharmacist before you start taking warfarin and each time you get a refill. If you have any questions, ask your doctor or pharmacist. Take this medication by mouth with or without food as directed by your doctor or other health care professional, usually once a day. It is very important to take it exactly as directed. Do not increase the dose, take it more frequently, or stop using it unless directed by your doctor. Dosage is based on your medical condition, laboratory tests (such as INR), and response to treatment. Your doctor or other health care provider will monitor you closely while you are taking this medication to determine the right dose for you. Use this medication regularly to get the most benefit from it. To help you remember, take it at the same time each day. It is important to eat a balanced, consistent diet while taking warfarin. Some foods can affect how warfarin works in your body and may affect your treatment and dose. Avoid sudden large increases or decreases in your intake of foods high in vitamin K (such as " broccoli, cauliflower, cabbage, brussels sprouts, kale, spinach, and other green leafy vegetables, liver, green tea, certain vitamin supplements). If you are trying to lose weight, check with your doctor before you try to go on a diet. Cranberry products may also affect how your warfarin works. Limit the amount of cranberry juice (16 ounces/480 milliliters a day) or other cranberry products you may drink or eat.      SIDE EFFECTS:  Nausea, loss of appetite, or stomach/abdominal pain may occur. If any of these effects persist or worsen, tell your doctor or pharmacist promptly. Remember that your doctor has prescribed this medication because he or she has judged that the benefit to you is greater than the risk of side effects. Many people using this medication do not have serious side effects. This medication can cause serious bleeding if it affects your blood clotting proteins too much (shown by unusually high INR lab results). Even if your doctor stops your medication, this risk of bleeding can continue for up to a week. Tell your doctor right away if you have any signs of serious bleeding, including: unusual pain/swelling/discomfort, unusual/easy bruising, prolonged bleeding from cuts or gums, persistent/frequent nosebleeds, unusually heavy/prolonged menstrual flow, pink/dark urine, coughing up blood, vomit that is bloody or looks like coffee grounds, severe headache, dizziness/fainting, unusual or persistent tiredness/weakness, bloody/black/tarry stools, chest pain, shortness of breath, difficulty swallowing. Tell your doctor right away if any of these unlikely but serious side effects occur: persistent nausea/vomiting, severe stomach/abdominal pain, yellowing eyes/skin. This drug rarely has caused very serious (possibly fatal) problems if its effects lead to small blood clots (usually at the beginning of treatment). This can lead to severe skin/tissue damage that may require surgery or amputation if left  untreated. Patients with certain blood conditions (protein C or S deficiency) may be at greater risk. Get medical help right away if any of these rare but serious side effects occur: painful/red/purplish patches on the skin (such as on the toe, breast, abdomen), change in the amount of urine, vision changes, confusion, slurred speech, weakness on one side of the body. A very serious allergic reaction to this drug is rare. However, get medical help right away if you notice any symptoms of a serious allergic reaction, including: rash, itching/swelling (especially of the face/tongue/throat), severe dizziness, trouble breathing. This is not a complete list of possible side effects. If you notice other effects not listed above, contact your doctor or pharmacist. In the [place] - Call your doctor for medical advice about side effects. You may report side effects to FDA at 2-444-NHO-3113. In [place] - Call your doctor for medical advice about side effects. You may report side effects to [facility] [place] at 1-148.446.1448.      PRECAUTIONS:  Before taking warfarin, tell your doctor or pharmacist if you are allergic to it; or if you have any other allergies. This product may contain inactive ingredients, which can cause allergic reactions or other problems. Talk to your pharmacist for more details. Before using this medication, tell your doctor or pharmacist your medical history, especially of: blood disorders (such as anemia, hemophilia), bleeding problems (such as bleeding of the stomach/intestines, bleeding in the brain), blood vessel disorders (such as aneurysms), recent major injury/surgery, liver disease, alcohol use, mental/mood disorders (including memory problems), frequent falls/injuries. It is important that all your doctors and dentists know that you take warfarin. Before having surgery or any medical/dental procedures, tell your doctor or dentist that you are taking this medication and about all the products you use  (including prescription drugs, nonprescription drugs, and herbal products). Avoid getting injections into the muscles. If you must have an injection into a muscle (for example, a flu shot), it should be given in the arm. This way, it will be easier to check for bleeding and/or apply pressure bandages. This medication may cause stomach bleeding. Daily use of alcohol while using this medicine will increase your risk for stomach bleeding and may also affect how this medication works. Limit or avoid alcoholic beverages. If you have not been eating well, if you have an illness or infection that causes fever, vomiting, or diarrhea for more than 2 days, or if you start using any antibiotic medications, contact your doctor or pharmacist immediately because these conditions can affect how warfarin works. This medication can cause heavy bleeding. To lower the chance of getting cut, bruised, or injured, use great caution with sharp objects like safety razors and nail cutters. Use an electric razor when shaving and a soft toothbrush when brushing your teeth. Avoid activities such as contact sports. If you fall or injure yourself, especially if you hit your head, call your doctor immediately. Your doctor may need to check you. The Food & Drug Administration has stated that generic warfarin products are interchangeable. However, consult your doctor or pharmacist before switching warfarin products. Be careful not to take more than one medication that contains warfarin unless specifically directed by the doctor or health care provider who is monitoring your warfarin treatment. Older adults may be at greater risk for bleeding while using this drug. This medication is not recommended for use during pregnancy because of serious (possibly fatal) harm to an unborn baby. Discuss the use of reliable forms of birth control with your doctor. If you become pregnant or think you may be pregnant, tell your doctor immediately. If you are  "planning pregnancy, discuss a plan for managing your condition with your doctor before you become pregnant. Your doctor may switch the type of medication you use during pregnancy. Very small amounts of this medication may pass into breast milk but is unlikely to harm a nursing infant. Consult your doctor before breast-feeding.      DRUG INTERACTIONS:  Drug interactions may change how your medications work or increase your risk for serious side effects. This document does not contain all possible drug interactions. Keep a list of all the products you use (including prescription/nonprescription drugs and herbal products) and share it with your doctor and pharmacist. Do not start, stop, or change the dosage of any medicines without your doctor's approval. Warfarin interacts with many prescription, nonprescription, vitamin, and herbal products. This includes medications that are applied to the skin or inside the vagina or rectum. The interactions with warfarin usually result in an increase or decrease in the \"blood-thinning\" (anticoagulant) effect. Your doctor or other health care professional should closely monitor you to prevent serious bleeding or clotting problems. While taking warfarin, it is very important to tell your doctor or pharmacist of any changes in medications, vitamins, or herbal products that you are taking. Some products that may interact with this drug include: capecitabine, imatinib, mifepristone. Aspirin, aspirin-like drugs (salicylates), and nonsteroidal anti-inflammatory drugs (NSAIDs such as ibuprofen, naproxen, celecoxib) may have effects similar to warfarin. These drugs may increase the risk of bleeding problems if taken during treatment with warfarin. Carefully check all prescription/nonprescription product labels (including drugs applied to the skin such as pain-relieving creams) since the products may contain NSAIDs or salicylates. Talk to your doctor about using a different medication (such " as acetaminophen) to treat pain/fever. Low-dose aspirin and related drugs (such as clopidogrel, ticlopidine) should be continued if prescribed by your doctor for specific medical reasons such as heart attack or stroke prevention. Consult your doctor or pharmacist for more details. Many herbal products interact with warfarin. Tell your doctor before taking any herbal products, especially bromelains, coenzyme Q10, cranberry, danshen, dong quai, fenugreek, garlic, ginkgo biloba, ginseng, and Carolynn's wort, among others. This medication may interfere with a certain laboratory test to measure theophylline levels, possibly causing false test results. Make sure laboratory personnel and all your doctors know you use this drug.      OVERDOSE:  If overdose is suspected, contact a poison control center or emergency room immediately. [place] residents can call the [place] National Poison Hotline at 1-118.476.6639. [place] residents can call a provincial poison control center. Symptoms of overdose may include: bloody/black/tarry stools, pink/dark urine, unusual/prolonged bleeding.      NOTES:  Do not share this medication with others. Laboratory and/or medical tests (such as INR, complete blood count) must be performed periodically to monitor your progress or check for side effects. Consult your doctor for more details.      MISSED DOSE:  For the best possible benefit, do not miss any doses. If you do miss a dose and remember on the same day, take it as soon as you remember. If you remember on the next day, skip the missed dose and resume your usual dosing schedule. Do not double the dose to catch up because this could increase your risk for bleeding. Keep a record of missed doses to give to your doctor or pharmacist. Contact your doctor or pharmacist if you miss 2 or more doses in a row.      STORAGE:  Store at room temperature away from light and moisture. Do not store in the bathroom. Keep all medications away from children and pets.  Do not flush medications down the toilet or pour them into a drain unless instructed to do so. Properly discard this product when it is  or no longer needed. Consult your pharmacist or local waste disposal company for more details about how to safely discard your product.      MEDICAL ALERT:  Your condition and medication can cause complications in a medical emergency. For information about enrolling in MedicAlert, call 1-855.136.3530 ([place]) or [phone] ([place]).      Information last revised 2010 Copyright(c) 2010 [employer]

## 2019-11-30 NOTE — PROGRESS NOTES
Assumed care of patient. Patient up and ambulating in hallway using prosthetic with physical therapy. Patient expresses no needs at this time. Tele monitor on, bed in low position, call light within reach.

## 2019-11-30 NOTE — PROGRESS NOTES
Inpatient Anticoagulation Service Note    Date: 11/30/2019    Reason for Anticoagulation: Atrial Fibrillation   Target INR: 2.0 to 3.0  [id number] VASc Score: 2  HAS-BLED Score: 3   Hemoglobin Value: (!) 10.2  Hematocrit Value: (!) 34.6    INR from last 7 days     Date/Time INR Value    11/30/19 0331  (!) 1.17    11/29/19 0338  (!) 1.14    11/28/19 1008  (!) 1.14        Dose from last 7 days     Date/Time Dose (mg)    11/30/19 1127  4    11/29/19 1055  2.5    11/28/19 1355  2.5        Average Dose (mg): 4  Significant Interactions: Not Applicable  Bridge Therapy: No     Comments: INR with very slight increase from 1.14 to 1.17 after 3 doses of warfarin 2.5 mg. Will increase warfarin to 4 mg daily and keep trending the INR. Per previous Pharmacy warfarin note, the pt had been given warfarin 6 mg x 2 days in a row and the INR increased from 1.05 to 2.11 back in June 2019. Pt was discharged in July, but did not establish care with the [facility]  cl[facility].    Education Material Provided?: No  Pharmacist suggested discharge dosing: TBD, warfarin 4 mg daily for now. Pt will need a follow up INR within 3 days of discharge     Moy Tay, PharmD

## 2019-11-30 NOTE — PROGRESS NOTES
Internal Medicine Interval Note  Note Author: Collette Harrington M.D.     Name Isabelle Coyle     1965   Age/Sex 54 y.o. female   MRN [id number]   Code Status full     After 5PM or if no immediate response to page, please call for cross-coverage  Attending/Team: Harmony/Agustín See Patient List for primary contact information  Call [phone] to page    1st Call - Day Intern (R1):   Len 2nd Call - Day Sr. Resident (R2/R3):   David         Reason for interval visit  (Principal Problem)   Clavicle fracture and post hemodialysis      Interval Problem Daily Status Update  (24 hours, problem oriented, brief subjective history, new lab/imaging data pertinent to that problem)   Patient feeling improved at the level of her right clavicle, states that she is walking and better than yesterday after dialysis,   Discussed with her that she will be discharged and need to f/u with orthopedic surgery, nephrology, and PCP , the latter of which is to explore advancing her blood pressure control. PT in the room and informed of the plan.  Provided patient with new sling at her request.       Review of Systems   Constitutional: Positive for malaise/fatigue. Negative for chills, fever and weight loss.   HENT: Negative for ear pain, hearing loss, nosebleeds, sinus pain and tinnitus.    Eyes: Negative for blurred vision, double vision, photophobia and discharge.   Respiratory: Negative for hemoptysis and sputum production.    Cardiovascular: Negative for orthopnea, claudication and leg swelling.   Gastrointestinal: Negative for abdominal pain and nausea.   Genitourinary: Negative for frequency and urgency.   Musculoskeletal: Positive for joint pain. Negative for back pain and neck pain.   Skin: Negative for itching and rash.   Neurological: Positive for weakness. Negative for focal weakness and seizures.   Psychiatric/Behavioral: Negative for hallucinations and substance abuse. The patient is not nervous/anxious.    All  other systems reviewed and are negative.      Disposition/Barriers to discharge:   Access to hemodialysis    Consultants/Specialty  Nephrology    PCP: Pcp Unknown      Quality Measures  Quality-Core Measures   Reviewed items::  Medications reviewed, Radiology images reviewed and Labs reviewed  DVT prophylaxis pharmacological::  Warfarin (Coumadin)  Ulcer Prophylaxis::  No  Assessed for rehabilitation services:  Patient was assess for and/or received rehabilitation services during this hospitalization          Physical Exam       Vitals:    11/29/19 2020 11/30/19 0000 11/30/19 0450 11/30/19 0848   BP: (!) 170/90 (!) 169/88 (!) 169/88    Pulse: 81 87 83    Resp: 16 18 16 16   Temp: 36.6 °C (97.9 °F) 36.8 °C (98.2 °F) 37.2 °C (98.9 °F)    TempSrc: Temporal Temporal Temporal    SpO2: 91% 97% 97%    Weight: 62 kg (136 lb 11 oz)      Height:         Body mass index is 25 kg/m². Weight: 62 kg (136 lb 11 oz)  Oxygen Therapy:  Pulse Oximetry: 97 %, O2 (LPM): 2, O2 Delivery: Silicone Nasal Cannula    Physical Exam   Constitutional: She is oriented to person, place, and time. She appears distressed.   HENT:   Head: Normocephalic and atraumatic.   Eyes: Pupils are equal, round, and reactive to light. Conjunctivae are normal.   Neck: No tracheal deviation present. No thyromegaly present.   Cardiovascular:   Murmur heard.  Pulmonary/Chest: No respiratory distress. She has no wheezes.   Abdominal: There is no tenderness. There is no rebound.   Genitourinary: Rectum:      Guaiac result negative.      No vaginal discharge.     Musculoskeletal:         General: No tenderness, deformity or edema.      Comments: Pt has fistula with + thrill on left UE. Right arm tenderness at the sight of the clavicle and to her right neck.    Neurological: She is alert and oriented to person, place, and time.   Skin: No rash noted. She is not diaphoretic. No erythema.   Psychiatric: Memory, affect and judgment normal.             Assessment/Plan      ESRD on hemodialysis (HCC)- (present on admission)  Assessment & Plan  Pt hemodialysis 11/28  Follow Nephrology consultation     HTN (hypertension)- (present on admission)  Assessment & Plan  Continue amlodipine hydralazine metoprolol and terazosin  Monitor blood pressure and adjust medications accordingly    Hyperkalemia- (present on admission)  Assessment & Plan  Secondary to missing hemodialysis  Telemetry monitoring  Foll and replete electrolytes    Thrombocytopenia (HCC)  Assessment & Plan  Acute on chronic    Recheck CBC in a.m. if platelet counts trending down consider further work-up otherwise she will need follow-up and work-up as outpatient    Closed nondisplaced fracture of acromial end of right clavicle  Assessment & Plan  Pain management  Shoulder sling  PT eval      Paroxysmal atrial fibrillation (HCC)- (present on admission)  Assessment & Plan  Continue metoprolol and anticoagulation  Check INR and adjust warfarin dose accordingly    Anemia due to chronic kidney disease- (present on admission)  Assessment & Plan  Monitor H/H    Tobacco use- (present on admission)  Assessment & Plan  Counseled patient on importance of smoking cessation reviewed available options to help her quit  We will start her on nicotine replacement therapy  Total time spent counseling patient smoking cessation was 5 minutes

## 2019-11-30 NOTE — PROGRESS NOTES
End of shift summary    Pt resting in bed, no acute changes overnight, no s/s distress noted this AM, will continue to monitor.

## 2019-12-06 ENCOUNTER — HOSPITAL ENCOUNTER (EMERGENCY)
Facility: MEDICAL CENTER | Age: 54
End: 2019-12-06
Attending: EMERGENCY MEDICINE
Payer: MEDICAID

## 2019-12-06 ENCOUNTER — APPOINTMENT (OUTPATIENT)
Dept: RADIOLOGY | Facility: MEDICAL CENTER | Age: 54
End: 2019-12-06
Attending: EMERGENCY MEDICINE
Payer: MEDICAID

## 2019-12-06 VITALS
WEIGHT: 141.09 LBS | OXYGEN SATURATION: 91 % | HEART RATE: 72 BPM | HEIGHT: 62 IN | RESPIRATION RATE: 18 BRPM | SYSTOLIC BLOOD PRESSURE: 177 MMHG | TEMPERATURE: 97.5 F | DIASTOLIC BLOOD PRESSURE: 76 MMHG | BODY MASS INDEX: 25.96 KG/M2

## 2019-12-06 DIAGNOSIS — S42.001D CLOSED DISPLACED FRACTURE OF RIGHT CLAVICLE WITH ROUTINE HEALING, SUBSEQUENT ENCOUNTER: ICD-10-CM

## 2019-12-06 DIAGNOSIS — R00.2 PALPITATIONS: ICD-10-CM

## 2019-12-06 DIAGNOSIS — S22.41XD CLOSED FRACTURE OF MULTIPLE RIBS OF RIGHT SIDE WITH ROUTINE HEALING, SUBSEQUENT ENCOUNTER: ICD-10-CM

## 2019-12-06 LAB
ALBUMIN SERPL BCP-MCNC: 3.9 G/DL (ref 3.2–4.9)
ALBUMIN/GLOB SERPL: 1.4 G/DL
ALP SERPL-CCNC: 73 U/L (ref 30–99)
ALT SERPL-CCNC: 11 U/L (ref 2–50)
ANION GAP SERPL CALC-SCNC: 18 MMOL/L (ref 0–11.9)
AST SERPL-CCNC: 34 U/L (ref 12–45)
BASOPHILS # BLD AUTO: 0.7 % (ref 0–1.8)
BASOPHILS # BLD: 0.04 K/UL (ref 0–0.12)
BILIRUB SERPL-MCNC: 0.5 MG/DL (ref 0.1–1.5)
BUN SERPL-MCNC: 32 MG/DL (ref 8–22)
CALCIUM SERPL-MCNC: 9 MG/DL (ref 8.5–10.5)
CHLORIDE SERPL-SCNC: 95 MMOL/L (ref 96–112)
CO2 SERPL-SCNC: 23 MMOL/L (ref 20–33)
CREAT SERPL-MCNC: 5.42 MG/DL (ref 0.5–1.4)
EKG IMPRESSION: NORMAL
EOSINOPHIL # BLD AUTO: 0.36 K/UL (ref 0–0.51)
EOSINOPHIL NFR BLD: 6.6 % (ref 0–6.9)
ERYTHROCYTE [DISTWIDTH] IN BLOOD BY AUTOMATED COUNT: 61 FL (ref 35.9–50)
GLOBULIN SER CALC-MCNC: 2.7 G/DL (ref 1.9–3.5)
GLUCOSE SERPL-MCNC: 89 MG/DL (ref 65–99)
HCT VFR BLD AUTO: 31.7 % (ref 37–47)
HGB BLD-MCNC: 9.9 G/DL (ref 12–16)
IMM GRANULOCYTES # BLD AUTO: 0.01 K/UL (ref 0–0.11)
IMM GRANULOCYTES NFR BLD AUTO: 0.2 % (ref 0–0.9)
LYMPHOCYTES # BLD AUTO: 1.35 K/UL (ref 1–4.8)
LYMPHOCYTES NFR BLD: 24.6 % (ref 22–41)
MCH RBC QN AUTO: 30.4 PG (ref 27–33)
MCHC RBC AUTO-ENTMCNC: 31.2 G/DL (ref 33.6–35)
MCV RBC AUTO: 97.2 FL (ref 81.4–97.8)
MONOCYTES # BLD AUTO: 0.39 K/UL (ref 0–0.85)
MONOCYTES NFR BLD AUTO: 7.1 % (ref 0–13.4)
NEUTROPHILS # BLD AUTO: 3.34 K/UL (ref 2–7.15)
NEUTROPHILS NFR BLD: 60.8 % (ref 44–72)
NRBC # BLD AUTO: 0 K/UL
NRBC BLD-RTO: 0 /100 WBC
PLATELET # BLD AUTO: 112 K/UL (ref 164–446)
PMV BLD AUTO: 10.3 FL (ref 9–12.9)
POTASSIUM SERPL-SCNC: 5 MMOL/L (ref 3.6–5.5)
PROT SERPL-MCNC: 6.6 G/DL (ref 6–8.2)
RBC # BLD AUTO: 3.26 M/UL (ref 4.2–5.4)
SODIUM SERPL-SCNC: 136 MMOL/L (ref 135–145)
TROPONIN T SERPL-MCNC: 37 NG/L (ref 6–19)
WBC # BLD AUTO: 5.5 K/UL (ref 4.8–10.8)

## 2019-12-06 PROCEDURE — 85025 COMPLETE CBC W/AUTO DIFF WBC: CPT

## 2019-12-06 PROCEDURE — 700102 HCHG RX REV CODE 250 W/ 637 OVERRIDE(OP): Performed by: EMERGENCY MEDICINE

## 2019-12-06 PROCEDURE — 99284 EMERGENCY DEPT VISIT MOD MDM: CPT

## 2019-12-06 PROCEDURE — 80053 COMPREHEN METABOLIC PANEL: CPT

## 2019-12-06 PROCEDURE — 93005 ELECTROCARDIOGRAM TRACING: CPT | Performed by: EMERGENCY MEDICINE

## 2019-12-06 PROCEDURE — 71045 X-RAY EXAM CHEST 1 VIEW: CPT

## 2019-12-06 PROCEDURE — 84484 ASSAY OF TROPONIN QUANT: CPT

## 2019-12-06 PROCEDURE — A9270 NON-COVERED ITEM OR SERVICE: HCPCS | Performed by: EMERGENCY MEDICINE

## 2019-12-06 RX ORDER — HYDROCODONE BITARTRATE AND ACETAMINOPHEN 5; 325 MG/1; MG/1
1 TABLET ORAL ONCE
Status: COMPLETED | OUTPATIENT
Start: 2019-12-06 | End: 2019-12-06

## 2019-12-06 RX ORDER — HYDROCODONE BITARTRATE AND ACETAMINOPHEN 5; 325 MG/1; MG/1
1 TABLET ORAL EVERY 6 HOURS PRN
Qty: 16 TAB | Refills: 0 | Status: SHIPPED | OUTPATIENT
Start: 2019-12-06 | End: 2019-12-11

## 2019-12-06 RX ADMIN — HYDROCODONE BITARTRATE AND ACETAMINOPHEN 1 TABLET: 5; 325 TABLET ORAL at 14:09

## 2019-12-06 NOTE — ED PROVIDER NOTES
"ED Provider Note    CHIEF COMPLAINT  Chief Complaint   Patient presents with   • Palpitations     palipations during diaysis after removal of 1800cc of total of 2400cc run       HPI  Isabelle Coyle is a 54 y.o. female who presents after palpitations while getting dialysis.  Patient normally gets dialysis Monday Wednesday and Friday, however missed both Monday and Wednesday she states secondary to pain.  Patient has history of fall with clavicle and rib fractures on the right.  She states this occurred 3 weeks ago.  At times pain is prohibitive of taking a full deep breath, states there is a sudden sharp pain.  Pain is at the original location of the clavicle.  She was worried about the possibility of pneumonia, presents for evaluation.  No fever.  Patient with irregular heartbeat upon arrival.    REVIEW OF SYSTEMS    Constitutional: Denies fever.  Briefly dizzy during dialysis today  Respiratory: No shortness of breath  Cardiac: No exertional chest pain  Gastrointestinal: No abdominal pain  Musculoskeletal: Right anterior chest wall pain from clavicle and rib fractures  Neurologic: Denies headache  Renal: Dialysis       All other systems are negative.       PAST MEDICAL HISTORY  Past Medical History:   Diagnosis Date   • Anemia    • Anemia associated with chronic renal failure 11/5/2009   • Dental disorder 8-25-17    \"Full Upper & Partial Lower Dentures\"   • Dialysis patient ([facility]) 8-25-17    [facility] M,W,F   • Dysrhythmia     \"SVT\"   • ESRD (end stage renal disease) ([facility]) 8-25-17    Dialysis MWF@ [facility]   • Glomerulonephritis, chronic 11/5/2009   • Heart burn    • High cholesterol    • HTN (hypertension) 11/5/2009    2017 states well controlled   • Port catheter in place 8-25-17    For Dialysis   • SVT (supraventricular tachycardia) ([facility])    • SVT (supraventricular tachycardia) ([facility])        FAMILY HISTORY  Family History   Problem Relation Age of Onset   • Heart Disease Other        SOCIAL " HISTORY  Social History     Socioeconomic History   • Marital status:      Spouse name: Not on file   • Number of children: Not on file   • Years of education: Not on file   • Highest education level: Not on file   Occupational History   • Not on file   Social Needs   • Financial resource strain: Not on file   • Food insecurity:     Worry: Not on file     Inability: Not on file   • Transportation needs:     Medical: Not on file     Non-medical: Not on file   Tobacco Use   • Smoking status: Former Smoker     Packs/day: 0.25     Years: 20.00     Pack years: 5.00     Types: Cigarettes     Last attempt to quit: 2019     Years since quittin.6   • Smokeless tobacco: Never Used   • Tobacco comment: 5 cigs/day   Substance and Sexual Activity   • Alcohol use: No   • Drug use: No   • Sexual activity: Not on file   Lifestyle   • Physical activity:     Days per week: Not on file     Minutes per session: Not on file   • Stress: Not on file   Relationships   • Social connections:     Talks on phone: Not on file     Gets together: Not on file     Attends Shinto service: Not on file     Active member of club or organization: Not on file     Attends meetings of clubs or organizations: Not on file     Relationship status: Not on file   • Intimate partner violence:     Fear of current or ex partner: Not on file     Emotionally abused: Not on file     Physically abused: Not on file     Forced sexual activity: Not on file   Other Topics Concern   • Not on file   Social History Narrative   • Not on file       SURGICAL HISTORY  Past Surgical History:   Procedure Laterality Date   • MEÑO BY LAPAROSCOPY  2019    Procedure: CHOLECYSTECTOMY, LAPAROSCOPIC With GRAMS;  Surgeon: Jimbo Davenport M.D.;  Location: SURGERY [facility];  Service: General   • AV FISTULA CREATION Left 2018    Procedure: Left Brachial artery Repair;  Surgeon: Jimbo Davenport M.D.;  Location: SURGERY [facility];  Service:  "Vascular   • AV FISTULA CREATION Left 8/28/2017    Procedure: AV FISTULA CREATION  UPPER EXTREMITY -BRACHIAL BASALIC;  Surgeon: Stella Rodriguez M.D.;  Location: SURGERY [facility];  Service: General   • AV FISTULA CREATION Left 6/20/2017    Procedure: AV FISTULA CREATION- LEFT;  Surgeon: Jimbo Davenport M.D.;  Location: SURGERY [facility];  Service:    • APPENDECTOMY LAPAROSCOPIC  5/4/2009    Performed by BANDAR TIMMONS at SURGERY [facility]   • FEMUR ORIF  10/9/08    Performed by STELLA GATES at SURGERY [facility]   • ILIAC BONE GRAFT  10/9/08    Performed by STELLA GATES at SURGERY [facility]   • AMPUTATION, BELOW THE KNEE     • CAPITATION PAYMENT (INSURANCE)     • OTHER      BELOW KNEE AMPUTATION RIGHT   • PB ENLARGE BREAST WITH IMPLANT         CURRENT MEDICATIONS  Home Medications     Reviewed by Faisal Mondragon R.N. (Registered Nurse) on 12/06/19 at 1129  Med List Status: Not Addressed   Medication Last Dose Status   acetaminophen (TYLENOL) 325 MG Tab  Active   albuterol 108 (90 Base) MCG/ACT Aero Soln inhalation aerosol  Active   amLODIPine (NORVASC) 10 MG Tab  Active   gabapentin (NEURONTIN) 100 MG Cap  Active   hydrALAZINE (APRESOLINE) 25 MG Tab  Active   labetalol (NORMODYNE) 200 MG Tab  Active   metoprolol (LOPRESSOR) 25 MG Tab  Active   sevelamer carbonate (RENVELA) 800 MG Tab tablet  Active   terazosin (HYTRIN) 5 MG Cap  Active                ALLERGIES  Allergies   Allergen Reactions   • Sulfa Drugs Hives     [id number] rash swelling itching       PHYSICAL EXAM  VITAL SIGNS: BP (!) 178/80   Pulse 75   Temp 36.4 °C (97.5 °F) (Temporal)   Resp 19   Ht 1.575 m (5' 2\")   Wt 64 kg (141 lb 1.5 oz)   LMP 06/25/2007   SpO2 98%   BMI 25.81 kg/m²   Constitutional:  Non-toxic appearance.  No distress  HENT: No facial swelling  Eyes: Anicteric, no conjunctivitis.     Cardiovascular: Normal heart rate, irregular rhythm  Pulmonary:  No wheezing, No rales. "   Gastrointestinal: Soft, No tenderness, No distention  Skin: Warm, Dry, No cyanosis.   Neurologic: Speech is clear, follows commands, facial expression is symmetrical.   strength equal  Psychiatric: Patient is calm and cooperative, flat affect  Musculoskeletal: No flank tenderness    EKG/Labs  Results for orders placed or performed during the hospital encounter of 12/06/19   CBC WITH DIFFERENTIAL   Result Value Ref Range    WBC 5.5 4.8 - 10.8 K/uL    RBC 3.26 (L) 4.20 - 5.40 M/uL    Hemoglobin 9.9 (L) 12.0 - 16.0 g/dL    Hematocrit 31.7 (L) 37.0 - 47.0 %    MCV 97.2 81.4 - 97.8 fL    MCH 30.4 27.0 - 33.0 pg    MCHC 31.2 (L) 33.6 - 35.0 g/dL    RDW 61.0 (H) 35.9 - 50.0 fL    Platelet Count 112 (L) 164 - 446 K/uL    MPV 10.3 9.0 - 12.9 fL    Neutrophils-Polys 60.80 44.00 - 72.00 %    Lymphocytes 24.60 22.00 - 41.00 %    Monocytes 7.10 0.00 - 13.40 %    Eosinophils 6.60 0.00 - 6.90 %    Basophils 0.70 0.00 - 1.80 %    Immature Granulocytes 0.20 0.00 - 0.90 %    Nucleated RBC 0.00 /100 WBC    Neutrophils (Absolute) 3.34 2.00 - 7.15 K/uL    Lymphs (Absolute) 1.35 1.00 - 4.80 K/uL    Monos (Absolute) 0.39 0.00 - 0.85 K/uL    Eos (Absolute) 0.36 0.00 - 0.51 K/uL    Baso (Absolute) 0.04 0.00 - 0.12 K/uL    Immature Granulocytes (abs) 0.01 0.00 - 0.11 K/uL    NRBC (Absolute) 0.00 K/uL   COMP METABOLIC PANEL   Result Value Ref Range    Sodium 136 135 - 145 mmol/L    Potassium 5.0 3.6 - 5.5 mmol/L    Chloride 95 (L) 96 - 112 mmol/L    Co2 23 20 - 33 mmol/L    Anion Gap 18.0 (H) 0.0 - 11.9    Glucose 89 65 - 99 mg/dL    Bun 32 (H) 8 - 22 mg/dL    Creatinine 5.42 (HH) 0.50 - 1.40 mg/dL    Calcium 9.0 8.5 - 10.5 mg/dL    AST(SGOT) 34 12 - 45 U/L    ALT(SGPT) 11 2 - 50 U/L    Alkaline Phosphatase 73 30 - 99 U/L    Total Bilirubin 0.5 0.1 - 1.5 mg/dL    Albumin 3.9 3.2 - 4.9 g/dL    Total Protein 6.6 6.0 - 8.2 g/dL    Globulin 2.7 1.9 - 3.5 g/dL    A-G Ratio 1.4 g/dL   TROPONIN   Result Value Ref Range    Troponin T 37 (H) 6 -  19 ng/L   ESTIMATED GFR   Result Value Ref Range    GFR If African American 10 (A) >60 mL/min/1.73 m 2    GFR If Non  8 (A) >60 mL/min/1.73 m 2   EKG   Result Value Ref Range    Report       [facility]    Test Date:  2019  Pt Name:    MELISSA BARDALES                 Department: ER  MRN:        [id number]                      Room:       [facility]  Gender:     Female                       Technician: [id number]  :        1965                   Requested By:BANDAR WAYNE  Order #:    [id number]                    Reading MD: BANDAR WAYNE MD    Measurements  Intervals                                Axis  Rate:       110                          P:  FL:                                      QRS:        41  QRSD:       94                           T:          81  QT:         360  QTc:        488    Interpretive Statements  ATRIAL FIBRILLATION  PROBABLE LVH WITH SECONDARY REPOL ABNRM  BORDERLINE PROLONGED QT INTERVAL  Compared to ECG 2019 16:11:47  Sinus rhythm no longer present  No evidence of acute ischemia  Electronically Signed On 2019 14:10:54 PST by BANDAR WAYNE MD           RADIOLOGY/PROCEDURES  DX-CHEST-PORTABLE (1 VIEW)   Final Result      1.  Acute to subacute fractures of right anterolateral ribs 3 and 4. No pneumothorax.   2.  Unchanged right distal clavicular fracture.   3.  Small opacities in the right lung periphery suggesting the rib fractures, possibly small contusions.   4.  Trace bilateral pleural effusions.   5.  Stable cardiomegaly.            COURSE & MEDICAL DECISION MAKING  Pertinent Labs & Imaging studies reviewed. (See chart for details)  Patient with healing fractures of ribs 3 and 4 on the right with clavicle fracture.  No evidence of acute pneumonia.  Patient was given hydrocodone for pain control at her request.  Patient subsequently spontaneously reverted to sinus rhythm.  Mild elevation in troponin likely secondary  to renal failure.  Patient received hydrocodone for pain control.  She currently does not have pain medication.  Patient is prescribed hydrocodone 1 tablet every 6 hours as needed for pain control.  She is advised to obtain primary care doctor for any further pain medication needs and is discharged in stable condition.    FINAL IMPRESSION     1. Palpitations     2. Closed displaced fracture of right clavicle with routine healing, subsequent encounter  HYDROcodone-acetaminophen (NORCO) 5-325 MG Tab per tablet   3. Closed fracture of multiple ribs of right side with routine healing, subsequent encounter  HYDROcodone-acetaminophen (NORCO) 5-325 MG Tab per tablet     In prescribing controlled substances to this patient, I certify that I have obtained and reviewed the medical history of Isabelle Coyle. I have also made a good kevin effort to obtain applicable records from other providers who have treated the patient and records did not demonstrate any increased risk of substance abuse that would prevent me from prescribing controlled substances.     I have conducted a physical exam and documented it. I have reviewed Ms. Coyle’s prescription history as maintained by the [place] Prescription Monitoring Program.     I have assessed the patient’s risk for abuse, dependency, and addiction using the validated Opioid Risk Tool available at https://www.mdcalc.com/xptpgy-qrxd-gdnq-ort-narcotic-abuse.     Given the above, I believe the benefits of controlled substance therapy outweigh the risks. The reasons for prescribing controlled substances include non-narcotic, oral analgesic alternatives have been inadequate for pain control. Accordingly, I have discussed the risk and benefits, treatment plan, and alternative therapies with the patient.                   Electronically signed by: Michi Dawson, 12/6/2019 1:36 PM

## 2019-12-06 NOTE — DISCHARGE INSTRUCTIONS
Obtain primary care doctor.  Follow-up with dialysis on Monday as scheduled.  Return if worse or for any concerns

## 2019-12-06 NOTE — DISCHARGE PLANNING
Outpatient Dialysis Note    Confirmed patient is active at:    [facility]  [address] [place] [address]       Schedule: Monday, Wednesday, Friday  Time: 6:40 am    Spoke with Abby at facility who confirmed. Patient had full HD treatment this morning and 1.7 kg was pulled.      Alexia Ayoub- Dialysis Coordinator  [facility] # [phone]

## 2020-01-03 ENCOUNTER — APPOINTMENT (OUTPATIENT)
Dept: RADIOLOGY | Facility: MEDICAL CENTER | Age: 55
DRG: 189 | End: 2020-01-03
Attending: EMERGENCY MEDICINE
Payer: MEDICAID

## 2020-01-03 ENCOUNTER — HOSPITAL ENCOUNTER (INPATIENT)
Facility: MEDICAL CENTER | Age: 55
LOS: 5 days | DRG: 189 | End: 2020-01-08
Attending: EMERGENCY MEDICINE | Admitting: INTERNAL MEDICINE
Payer: MEDICAID

## 2020-01-03 ENCOUNTER — APPOINTMENT (OUTPATIENT)
Dept: CARDIOLOGY | Facility: MEDICAL CENTER | Age: 55
DRG: 189 | End: 2020-01-03
Attending: STUDENT IN AN ORGANIZED HEALTH CARE EDUCATION/TRAINING PROGRAM
Payer: MEDICAID

## 2020-01-03 ENCOUNTER — APPOINTMENT (OUTPATIENT)
Dept: RADIOLOGY | Facility: MEDICAL CENTER | Age: 55
DRG: 189 | End: 2020-01-03
Payer: MEDICAID

## 2020-01-03 DIAGNOSIS — F17.200 SMOKING: ICD-10-CM

## 2020-01-03 DIAGNOSIS — J18.9 PNEUMONIA DUE TO INFECTIOUS ORGANISM, UNSPECIFIED LATERALITY, UNSPECIFIED PART OF LUNG: ICD-10-CM

## 2020-01-03 DIAGNOSIS — J44.1 ACUTE EXACERBATION OF CHRONIC OBSTRUCTIVE PULMONARY DISEASE (COPD) (HCC): ICD-10-CM

## 2020-01-03 PROBLEM — R06.02 SHORTNESS OF BREATH: Status: ACTIVE | Noted: 2020-01-03

## 2020-01-03 PROBLEM — Z86.79 H/O PAROXYSMAL SUPRAVENTRICULAR TACHYCARDIA: Status: ACTIVE | Noted: 2020-01-03

## 2020-01-03 LAB
ALBUMIN SERPL BCP-MCNC: 3.8 G/DL (ref 3.2–4.9)
ALBUMIN/GLOB SERPL: 1.5 G/DL
ALP SERPL-CCNC: 58 U/L (ref 30–99)
ALT SERPL-CCNC: 16 U/L (ref 2–50)
ANION GAP SERPL CALC-SCNC: 16 MMOL/L (ref 0–11.9)
ANISOCYTOSIS BLD QL SMEAR: ABNORMAL
AST SERPL-CCNC: 16 U/L (ref 12–45)
BASOPHILS # BLD AUTO: 0.5 % (ref 0–1.8)
BASOPHILS # BLD: 0.02 K/UL (ref 0–0.12)
BILIRUB SERPL-MCNC: 0.8 MG/DL (ref 0.1–1.5)
BUN SERPL-MCNC: 67 MG/DL (ref 8–22)
CALCIUM SERPL-MCNC: 8.5 MG/DL (ref 8.5–10.5)
CHLORIDE SERPL-SCNC: 95 MMOL/L (ref 96–112)
CO2 SERPL-SCNC: 29 MMOL/L (ref 20–33)
COMMENT 1642: NORMAL
CREAT SERPL-MCNC: 9.8 MG/DL (ref 0.5–1.4)
EKG IMPRESSION: NORMAL
EOSINOPHIL # BLD AUTO: 0.11 K/UL (ref 0–0.51)
EOSINOPHIL NFR BLD: 2.6 % (ref 0–6.9)
ERYTHROCYTE [DISTWIDTH] IN BLOOD BY AUTOMATED COUNT: 69 FL (ref 35.9–50)
GLOBULIN SER CALC-MCNC: 2.6 G/DL (ref 1.9–3.5)
GLUCOSE SERPL-MCNC: 89 MG/DL (ref 65–99)
HCT VFR BLD AUTO: 32 % (ref 37–47)
HGB BLD-MCNC: 10.3 G/DL (ref 12–16)
IMM GRANULOCYTES # BLD AUTO: 0.01 K/UL (ref 0–0.11)
IMM GRANULOCYTES NFR BLD AUTO: 0.2 % (ref 0–0.9)
LACTATE BLD-SCNC: 1.4 MMOL/L (ref 0.5–2)
LV EJECT FRACT  99904: 50
LV EJECT FRACT MOD 2C 99903: 47.03
LV EJECT FRACT MOD 4C 99902: 42.16
LV EJECT FRACT MOD BP 99901: 44.71
LYMPHOCYTES # BLD AUTO: 0.73 K/UL (ref 1–4.8)
LYMPHOCYTES NFR BLD: 17.4 % (ref 22–41)
MAGNESIUM SERPL-MCNC: 2.3 MG/DL (ref 1.5–2.5)
MCH RBC QN AUTO: 31.6 PG (ref 27–33)
MCHC RBC AUTO-ENTMCNC: 32.2 G/DL (ref 33.6–35)
MCV RBC AUTO: 98.2 FL (ref 81.4–97.8)
MICROCYTES BLD QL SMEAR: ABNORMAL
MONOCYTES # BLD AUTO: 0.26 K/UL (ref 0–0.85)
MONOCYTES NFR BLD AUTO: 6.2 % (ref 0–13.4)
MORPHOLOGY BLD-IMP: NORMAL
NEUTROPHILS # BLD AUTO: 3.07 K/UL (ref 2–7.15)
NEUTROPHILS NFR BLD: 73.1 % (ref 44–72)
NRBC # BLD AUTO: 0 K/UL
NRBC BLD-RTO: 0 /100 WBC
OVALOCYTES BLD QL SMEAR: NORMAL
PLATELET # BLD AUTO: 79 K/UL (ref 164–446)
PLATELET BLD QL SMEAR: NORMAL
PMV BLD AUTO: 11.1 FL (ref 9–12.9)
POIKILOCYTOSIS BLD QL SMEAR: NORMAL
POTASSIUM SERPL-SCNC: 4.6 MMOL/L (ref 3.6–5.5)
PROCALCITONIN SERPL-MCNC: 1.65 NG/ML
PROT SERPL-MCNC: 6.4 G/DL (ref 6–8.2)
RBC # BLD AUTO: 3.26 M/UL (ref 4.2–5.4)
RBC BLD AUTO: PRESENT
SODIUM SERPL-SCNC: 140 MMOL/L (ref 135–145)
WBC # BLD AUTO: 4.2 K/UL (ref 4.8–10.8)

## 2020-01-03 PROCEDURE — 96365 THER/PROPH/DIAG IV INF INIT: CPT

## 2020-01-03 PROCEDURE — 99223 1ST HOSP IP/OBS HIGH 75: CPT | Mod: AI,GC | Performed by: INTERNAL MEDICINE

## 2020-01-03 PROCEDURE — 700105 HCHG RX REV CODE 258: Performed by: EMERGENCY MEDICINE

## 2020-01-03 PROCEDURE — 94640 AIRWAY INHALATION TREATMENT: CPT

## 2020-01-03 PROCEDURE — 83735 ASSAY OF MAGNESIUM: CPT

## 2020-01-03 PROCEDURE — 700111 HCHG RX REV CODE 636 W/ 250 OVERRIDE (IP): Performed by: STUDENT IN AN ORGANIZED HEALTH CARE EDUCATION/TRAINING PROGRAM

## 2020-01-03 PROCEDURE — 36415 COLL VENOUS BLD VENIPUNCTURE: CPT

## 2020-01-03 PROCEDURE — 700111 HCHG RX REV CODE 636 W/ 250 OVERRIDE (IP): Performed by: EMERGENCY MEDICINE

## 2020-01-03 PROCEDURE — 93005 ELECTROCARDIOGRAM TRACING: CPT

## 2020-01-03 PROCEDURE — 700102 HCHG RX REV CODE 250 W/ 637 OVERRIDE(OP): Performed by: STUDENT IN AN ORGANIZED HEALTH CARE EDUCATION/TRAINING PROGRAM

## 2020-01-03 PROCEDURE — 84145 PROCALCITONIN (PCT): CPT

## 2020-01-03 PROCEDURE — 99285 EMERGENCY DEPT VISIT HI MDM: CPT

## 2020-01-03 PROCEDURE — 93005 ELECTROCARDIOGRAM TRACING: CPT | Performed by: EMERGENCY MEDICINE

## 2020-01-03 PROCEDURE — 80053 COMPREHEN METABOLIC PANEL: CPT

## 2020-01-03 PROCEDURE — 700101 HCHG RX REV CODE 250: Performed by: EMERGENCY MEDICINE

## 2020-01-03 PROCEDURE — 85025 COMPLETE CBC W/AUTO DIFF WBC: CPT

## 2020-01-03 PROCEDURE — 93306 TTE W/DOPPLER COMPLETE: CPT

## 2020-01-03 PROCEDURE — 93306 TTE W/DOPPLER COMPLETE: CPT | Mod: 26 | Performed by: INTERNAL MEDICINE

## 2020-01-03 PROCEDURE — 71045 X-RAY EXAM CHEST 1 VIEW: CPT

## 2020-01-03 PROCEDURE — 90935 HEMODIALYSIS ONE EVALUATION: CPT

## 2020-01-03 PROCEDURE — A9270 NON-COVERED ITEM OR SERVICE: HCPCS | Performed by: STUDENT IN AN ORGANIZED HEALTH CARE EDUCATION/TRAINING PROGRAM

## 2020-01-03 PROCEDURE — 87040 BLOOD CULTURE FOR BACTERIA: CPT | Mod: 91

## 2020-01-03 PROCEDURE — 83605 ASSAY OF LACTIC ACID: CPT

## 2020-01-03 PROCEDURE — 770020 HCHG ROOM/CARE - TELE (206)

## 2020-01-03 PROCEDURE — 700101 HCHG RX REV CODE 250: Performed by: STUDENT IN AN ORGANIZED HEALTH CARE EDUCATION/TRAINING PROGRAM

## 2020-01-03 RX ORDER — LABETALOL 100 MG/1
200 TABLET, FILM COATED ORAL 2 TIMES DAILY
Status: DISCONTINUED | OUTPATIENT
Start: 2020-01-03 | End: 2020-01-03

## 2020-01-03 RX ORDER — IPRATROPIUM BROMIDE AND ALBUTEROL SULFATE 2.5; .5 MG/3ML; MG/3ML
3 SOLUTION RESPIRATORY (INHALATION)
Status: DISCONTINUED | OUTPATIENT
Start: 2020-01-03 | End: 2020-01-04

## 2020-01-03 RX ORDER — HYDRALAZINE HYDROCHLORIDE 25 MG/1
25 TABLET, FILM COATED ORAL 3 TIMES DAILY PRN
COMMUNITY
End: 2020-03-04

## 2020-01-03 RX ORDER — TERAZOSIN 5 MG/1
5 CAPSULE ORAL NIGHTLY
Status: DISCONTINUED | OUTPATIENT
Start: 2020-01-03 | End: 2020-01-08 | Stop reason: HOSPADM

## 2020-01-03 RX ORDER — DOXYCYCLINE 100 MG/1
100 TABLET ORAL EVERY 12 HOURS
Status: DISCONTINUED | OUTPATIENT
Start: 2020-01-04 | End: 2020-01-03

## 2020-01-03 RX ORDER — PREDNISONE 20 MG/1
60 TABLET ORAL ONCE
Status: COMPLETED | OUTPATIENT
Start: 2020-01-03 | End: 2020-01-03

## 2020-01-03 RX ORDER — HYDRALAZINE HYDROCHLORIDE 20 MG/ML
10 INJECTION INTRAMUSCULAR; INTRAVENOUS EVERY 4 HOURS PRN
Status: DISCONTINUED | OUTPATIENT
Start: 2020-01-03 | End: 2020-01-08 | Stop reason: HOSPADM

## 2020-01-03 RX ORDER — SEVELAMER CARBONATE 800 MG/1
800 TABLET, FILM COATED ORAL
Status: DISCONTINUED | OUTPATIENT
Start: 2020-01-03 | End: 2020-01-08 | Stop reason: HOSPADM

## 2020-01-03 RX ORDER — NICOTINE 21 MG/24HR
14 PATCH, TRANSDERMAL 24 HOURS TRANSDERMAL
Status: DISCONTINUED | OUTPATIENT
Start: 2020-01-03 | End: 2020-01-08 | Stop reason: HOSPADM

## 2020-01-03 RX ORDER — ALBUTEROL SULFATE 90 UG/1
2 AEROSOL, METERED RESPIRATORY (INHALATION)
Status: DISCONTINUED | OUTPATIENT
Start: 2020-01-03 | End: 2020-01-08 | Stop reason: HOSPADM

## 2020-01-03 RX ORDER — AMOXICILLIN 250 MG
2 CAPSULE ORAL 2 TIMES DAILY
Status: DISCONTINUED | OUTPATIENT
Start: 2020-01-03 | End: 2020-01-03

## 2020-01-03 RX ORDER — POLYETHYLENE GLYCOL 3350 17 G/17G
1 POWDER, FOR SOLUTION ORAL
Status: DISCONTINUED | OUTPATIENT
Start: 2020-01-03 | End: 2020-01-03

## 2020-01-03 RX ORDER — BISACODYL 10 MG
10 SUPPOSITORY, RECTAL RECTAL
Status: DISCONTINUED | OUTPATIENT
Start: 2020-01-03 | End: 2020-01-03

## 2020-01-03 RX ORDER — AZITHROMYCIN 500 MG/1
500 INJECTION, POWDER, LYOPHILIZED, FOR SOLUTION INTRAVENOUS ONCE
Status: DISCONTINUED | OUTPATIENT
Start: 2020-01-03 | End: 2020-01-03

## 2020-01-03 RX ORDER — DOXYCYCLINE 100 MG/1
100 TABLET ORAL EVERY 12 HOURS
Status: DISCONTINUED | OUTPATIENT
Start: 2020-01-03 | End: 2020-01-05

## 2020-01-03 RX ORDER — PREDNISONE 20 MG/1
40 TABLET ORAL DAILY
Status: COMPLETED | OUTPATIENT
Start: 2020-01-04 | End: 2020-01-07

## 2020-01-03 RX ORDER — BUDESONIDE AND FORMOTEROL FUMARATE DIHYDRATE 80; 4.5 UG/1; UG/1
2 AEROSOL RESPIRATORY (INHALATION)
Status: DISCONTINUED | OUTPATIENT
Start: 2020-01-03 | End: 2020-01-05

## 2020-01-03 RX ORDER — AMLODIPINE BESYLATE 10 MG/1
10 TABLET ORAL DAILY
Status: DISCONTINUED | OUTPATIENT
Start: 2020-01-04 | End: 2020-01-07

## 2020-01-03 RX ORDER — HEPARIN SODIUM 1000 [USP'U]/ML
INJECTION, SOLUTION INTRAVENOUS; SUBCUTANEOUS
Status: DISPENSED
Start: 2020-01-03 | End: 2020-01-04

## 2020-01-03 RX ORDER — GABAPENTIN 100 MG/1
100 CAPSULE ORAL 2 TIMES DAILY
Status: DISCONTINUED | OUTPATIENT
Start: 2020-01-03 | End: 2020-01-08 | Stop reason: HOSPADM

## 2020-01-03 RX ORDER — HEPARIN SODIUM 1000 [USP'U]/ML
1700 INJECTION, SOLUTION INTRAVENOUS; SUBCUTANEOUS
Status: DISCONTINUED | OUTPATIENT
Start: 2020-01-03 | End: 2020-01-08 | Stop reason: HOSPADM

## 2020-01-03 RX ORDER — HYDRALAZINE HYDROCHLORIDE 25 MG/1
25 TABLET, FILM COATED ORAL 3 TIMES DAILY PRN
Status: DISCONTINUED | OUTPATIENT
Start: 2020-01-03 | End: 2020-01-03

## 2020-01-03 RX ORDER — HYDRALAZINE HYDROCHLORIDE 25 MG/1
25 TABLET, FILM COATED ORAL EVERY 8 HOURS
Status: DISCONTINUED | OUTPATIENT
Start: 2020-01-03 | End: 2020-01-08

## 2020-01-03 RX ORDER — DOXYCYCLINE 100 MG/1
100 TABLET ORAL EVERY 12 HOURS
Status: DISCONTINUED | OUTPATIENT
Start: 2020-01-03 | End: 2020-01-03

## 2020-01-03 RX ORDER — HEPARIN SODIUM 5000 [USP'U]/ML
5000 INJECTION, SOLUTION INTRAVENOUS; SUBCUTANEOUS EVERY 8 HOURS
Status: DISCONTINUED | OUTPATIENT
Start: 2020-01-03 | End: 2020-01-08 | Stop reason: HOSPADM

## 2020-01-03 RX ADMIN — NICOTINE 14 MG: 14 PATCH TRANSDERMAL at 18:25

## 2020-01-03 RX ADMIN — METOPROLOL TARTRATE 25 MG: 25 TABLET, FILM COATED ORAL at 18:25

## 2020-01-03 RX ADMIN — ALBUTEROL SULFATE 2.5 MG: 2.5 SOLUTION RESPIRATORY (INHALATION) at 13:59

## 2020-01-03 RX ADMIN — CEFTRIAXONE SODIUM 1 G: 1 INJECTION, POWDER, FOR SOLUTION INTRAMUSCULAR; INTRAVENOUS at 14:45

## 2020-01-03 RX ADMIN — HYDRALAZINE HYDROCHLORIDE 10 MG: 20 INJECTION INTRAMUSCULAR; INTRAVENOUS at 22:04

## 2020-01-03 RX ADMIN — IPRATROPIUM BROMIDE AND ALBUTEROL SULFATE 3 ML: .5; 3 SOLUTION RESPIRATORY (INHALATION) at 16:32

## 2020-01-03 RX ADMIN — HYDRALAZINE HYDROCHLORIDE 25 MG: 25 TABLET, FILM COATED ORAL at 18:25

## 2020-01-03 RX ADMIN — SEVELAMER CARBONATE 800 MG: 800 TABLET, FILM COATED ORAL at 18:24

## 2020-01-03 RX ADMIN — PREDNISONE 60 MG: 20 TABLET ORAL at 13:48

## 2020-01-03 RX ADMIN — GABAPENTIN 100 MG: 100 CAPSULE ORAL at 18:25

## 2020-01-03 ASSESSMENT — ENCOUNTER SYMPTOMS
EYE REDNESS: 0
HEMOPTYSIS: 0
HEARTBURN: 0
COUGH: 1
DIAPHORESIS: 0
PHOTOPHOBIA: 0
DIZZINESS: 0
HEADACHES: 0
MYALGIAS: 0
ORTHOPNEA: 1
EYE PAIN: 0
TINGLING: 0
NAUSEA: 0
BACK PAIN: 0
BLURRED VISION: 0
SENSORY CHANGE: 0
CONSTIPATION: 0
SEIZURES: 0
DIARRHEA: 1
CLAUDICATION: 0
BLOOD IN STOOL: 0
DEPRESSION: 0
WEIGHT LOSS: 0
FEVER: 0
FALLS: 0
WHEEZING: 1
PALPITATIONS: 0
PND: 0
VOMITING: 0
TREMORS: 0
DOUBLE VISION: 0
EYE DISCHARGE: 0
SPUTUM PRODUCTION: 1
SHORTNESS OF BREATH: 1
NECK PAIN: 0
ABDOMINAL PAIN: 0
FLANK PAIN: 0
FOCAL WEAKNESS: 0
SPEECH CHANGE: 0
CHILLS: 0

## 2020-01-03 ASSESSMENT — LIFESTYLE VARIABLES
EVER_SMOKED: YES
ON A TYPICAL DAY WHEN YOU DRINK ALCOHOL HOW MANY DRINKS DO YOU HAVE: 0
TOTAL SCORE: 0
HOW MANY TIMES IN THE PAST YEAR HAVE YOU HAD 5 OR MORE DRINKS IN A DAY: 0
CONSUMPTION TOTAL: NEGATIVE
AVERAGE NUMBER OF DAYS PER WEEK YOU HAVE A DRINK CONTAINING ALCOHOL: 0
ALCOHOL_USE: NO
EVER HAD A DRINK FIRST THING IN THE MORNING TO STEADY YOUR NERVES TO GET RID OF A HANGOVER: NO
TOTAL SCORE: 0
HAVE YOU EVER FELT YOU SHOULD CUT DOWN ON YOUR DRINKING: NO
EVER FELT BAD OR GUILTY ABOUT YOUR DRINKING: NO
HAVE PEOPLE ANNOYED YOU BY CRITICIZING YOUR DRINKING: NO
TOTAL SCORE: 0
EVER_SMOKED: YES

## 2020-01-03 ASSESSMENT — COGNITIVE AND FUNCTIONAL STATUS - GENERAL
HELP NEEDED FOR BATHING: A LITTLE
SUGGESTED CMS G CODE MODIFIER MOBILITY: CI
TOILETING: A LITTLE
SUGGESTED CMS G CODE MODIFIER DAILY ACTIVITY: CJ
DRESSING REGULAR LOWER BODY CLOTHING: A LITTLE
CLIMB 3 TO 5 STEPS WITH RAILING: A LITTLE
DAILY ACTIVITIY SCORE: 21
MOBILITY SCORE: 23

## 2020-01-03 ASSESSMENT — COPD QUESTIONNAIRES
DO YOU EVER COUGH UP ANY MUCUS OR PHLEGM?: YES, A FEW DAYS A WEEK OR MONTH
DURING THE PAST 4 WEEKS HOW MUCH DID YOU FEEL SHORT OF BREATH: SOME OF THE TIME
HAVE YOU SMOKED AT LEAST 100 CIGARETTES IN YOUR ENTIRE LIFE: YES
COPD SCREENING SCORE: 6

## 2020-01-03 NOTE — ED NOTES
Pt remains in restroom at this this. This RN verbally confirmed pt was in restroom, pt aware she has a ready room in the ED
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
## 2020-01-03 NOTE — DISCHARGE PLANNING
Outpatient Dialysis Note    Confirmed patient is active at:    [facility]  [address] [place] [address]       Schedule: Monday, Wednesday, Friday  Time: 6:40 am    Spoke with Abby at facility who confirmed. Patient missed HD this morning.    Forwarded records for review.      Alexia Ayoub- Dialysis Coordinator  [facility] # [phone]

## 2020-01-03 NOTE — ED NOTES
Sob and wheezing x 1 month. Dialysis pt, missed today, taking prescribed medications at home as directed.
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
## 2020-01-03 NOTE — ED TRIAGE NOTES
Pt ambs to triage, placed on 4 L NC by triage tech for low sats 84% on RA  Chief Complaint   Patient presents with   • Cough     x 1 month   • Shortness of Breath     increasing   Also reports diarrhea x 2 days. Sepsis of 3.  Denies fevers

## 2020-01-04 PROBLEM — D64.9 ANEMIA: Status: ACTIVE | Noted: 2020-01-04

## 2020-01-04 LAB
25(OH)D3 SERPL-MCNC: 62 NG/ML (ref 30–100)
ALBUMIN SERPL BCP-MCNC: 4 G/DL (ref 3.2–4.9)
ALBUMIN/GLOB SERPL: 1.5 G/DL
ALP SERPL-CCNC: 55 U/L (ref 30–99)
ALT SERPL-CCNC: 14 U/L (ref 2–50)
ANION GAP SERPL CALC-SCNC: 14 MMOL/L (ref 0–11.9)
ANISOCYTOSIS BLD QL SMEAR: ABNORMAL
AST SERPL-CCNC: 17 U/L (ref 12–45)
BASOPHILS # BLD AUTO: 0 % (ref 0–1.8)
BASOPHILS # BLD: 0 K/UL (ref 0–0.12)
BILIRUB SERPL-MCNC: 0.8 MG/DL (ref 0.1–1.5)
BUN SERPL-MCNC: 29 MG/DL (ref 8–22)
CALCIUM SERPL-MCNC: 8.9 MG/DL (ref 8.5–10.5)
CHLORIDE SERPL-SCNC: 100 MMOL/L (ref 96–112)
CO2 SERPL-SCNC: 29 MMOL/L (ref 20–33)
CREAT SERPL-MCNC: 5.21 MG/DL (ref 0.5–1.4)
EOSINOPHIL # BLD AUTO: 0 K/UL (ref 0–0.51)
EOSINOPHIL NFR BLD: 0 % (ref 0–6.9)
ERYTHROCYTE [DISTWIDTH] IN BLOOD BY AUTOMATED COUNT: 67.4 FL (ref 35.9–50)
FERRITIN SERPL-MCNC: 720.3 NG/ML (ref 10–291)
FLUAV RNA SPEC QL NAA+PROBE: NEGATIVE
FLUBV RNA SPEC QL NAA+PROBE: NEGATIVE
FOLATE SERPL-MCNC: >24 NG/ML
GLOBULIN SER CALC-MCNC: 2.7 G/DL (ref 1.9–3.5)
GLUCOSE SERPL-MCNC: 156 MG/DL (ref 65–99)
HCT VFR BLD AUTO: 34.1 % (ref 37–47)
HGB BLD-MCNC: 10.5 G/DL (ref 12–16)
HGB RETIC QN AUTO: 27.1 PG/CELL (ref 29–35)
HYPOCHROMIA BLD QL SMEAR: ABNORMAL
IMM RETICS NFR: 11 % (ref 9.3–17.4)
IRON SATN MFR SERPL: 10 % (ref 15–55)
IRON SERPL-MCNC: 25 UG/DL (ref 40–170)
LYMPHOCYTES # BLD AUTO: 0.31 K/UL (ref 1–4.8)
LYMPHOCYTES NFR BLD: 13.3 % (ref 22–41)
MANUAL DIFF BLD: NORMAL
MCH RBC QN AUTO: 30.3 PG (ref 27–33)
MCHC RBC AUTO-ENTMCNC: 30.8 G/DL (ref 33.6–35)
MCV RBC AUTO: 98.3 FL (ref 81.4–97.8)
MONOCYTES # BLD AUTO: 0.06 K/UL (ref 0–0.85)
MONOCYTES NFR BLD AUTO: 2.6 % (ref 0–13.4)
MORPHOLOGY BLD-IMP: NORMAL
NEUTROPHILS # BLD AUTO: 1.93 K/UL (ref 2–7.15)
NEUTROPHILS NFR BLD: 82.3 % (ref 44–72)
NEUTS BAND NFR BLD MANUAL: 1.8 % (ref 0–10)
NRBC # BLD AUTO: 0 K/UL
NRBC BLD-RTO: 0 /100 WBC
PHOSPHATE SERPL-MCNC: 5 MG/DL (ref 2.5–4.5)
PLATELET # BLD AUTO: 74 K/UL (ref 164–446)
PLATELET BLD QL SMEAR: NORMAL
PMV BLD AUTO: 9.9 FL (ref 9–12.9)
POTASSIUM SERPL-SCNC: 4.4 MMOL/L (ref 3.6–5.5)
PROT SERPL-MCNC: 6.7 G/DL (ref 6–8.2)
RBC # BLD AUTO: 3.47 M/UL (ref 4.2–5.4)
RBC BLD AUTO: PRESENT
RETICS # AUTO: 0.08 M/UL (ref 0.04–0.06)
RETICS/RBC NFR: 2.4 % (ref 0.8–2.1)
SODIUM SERPL-SCNC: 143 MMOL/L (ref 135–145)
TIBC SERPL-MCNC: 249 UG/DL (ref 250–450)
VIT B12 SERPL-MCNC: 1093 PG/ML (ref 211–911)
WBC # BLD AUTO: 2.3 K/UL (ref 4.8–10.8)

## 2020-01-04 PROCEDURE — 82746 ASSAY OF FOLIC ACID SERUM: CPT

## 2020-01-04 PROCEDURE — A9270 NON-COVERED ITEM OR SERVICE: HCPCS | Performed by: NURSE PRACTITIONER

## 2020-01-04 PROCEDURE — 84100 ASSAY OF PHOSPHORUS: CPT

## 2020-01-04 PROCEDURE — 94760 N-INVAS EAR/PLS OXIMETRY 1: CPT

## 2020-01-04 PROCEDURE — 94669 MECHANICAL CHEST WALL OSCILL: CPT

## 2020-01-04 PROCEDURE — 99407 BEHAV CHNG SMOKING > 10 MIN: CPT

## 2020-01-04 PROCEDURE — 82728 ASSAY OF FERRITIN: CPT

## 2020-01-04 PROCEDURE — 700102 HCHG RX REV CODE 250 W/ 637 OVERRIDE(OP): Performed by: NURSE PRACTITIONER

## 2020-01-04 PROCEDURE — 99233 SBSQ HOSP IP/OBS HIGH 50: CPT | Mod: GC | Performed by: INTERNAL MEDICINE

## 2020-01-04 PROCEDURE — 700105 HCHG RX REV CODE 258: Performed by: STUDENT IN AN ORGANIZED HEALTH CARE EDUCATION/TRAINING PROGRAM

## 2020-01-04 PROCEDURE — 80053 COMPREHEN METABOLIC PANEL: CPT

## 2020-01-04 PROCEDURE — 85046 RETICYTE/HGB CONCENTRATE: CPT

## 2020-01-04 PROCEDURE — 94640 AIRWAY INHALATION TREATMENT: CPT

## 2020-01-04 PROCEDURE — 85007 BL SMEAR W/DIFF WBC COUNT: CPT

## 2020-01-04 PROCEDURE — 700102 HCHG RX REV CODE 250 W/ 637 OVERRIDE(OP): Performed by: STUDENT IN AN ORGANIZED HEALTH CARE EDUCATION/TRAINING PROGRAM

## 2020-01-04 PROCEDURE — 87631 RESP VIRUS 3-5 TARGETS: CPT

## 2020-01-04 PROCEDURE — 82607 VITAMIN B-12: CPT

## 2020-01-04 PROCEDURE — 87502 INFLUENZA DNA AMP PROBE: CPT

## 2020-01-04 PROCEDURE — 82306 VITAMIN D 25 HYDROXY: CPT

## 2020-01-04 PROCEDURE — A9270 NON-COVERED ITEM OR SERVICE: HCPCS | Performed by: FAMILY MEDICINE

## 2020-01-04 PROCEDURE — A9270 NON-COVERED ITEM OR SERVICE: HCPCS | Performed by: STUDENT IN AN ORGANIZED HEALTH CARE EDUCATION/TRAINING PROGRAM

## 2020-01-04 PROCEDURE — 700111 HCHG RX REV CODE 636 W/ 250 OVERRIDE (IP): Performed by: STUDENT IN AN ORGANIZED HEALTH CARE EDUCATION/TRAINING PROGRAM

## 2020-01-04 PROCEDURE — 770020 HCHG ROOM/CARE - TELE (206)

## 2020-01-04 PROCEDURE — 94668 MNPJ CHEST WALL SBSQ: CPT

## 2020-01-04 PROCEDURE — 700101 HCHG RX REV CODE 250: Performed by: STUDENT IN AN ORGANIZED HEALTH CARE EDUCATION/TRAINING PROGRAM

## 2020-01-04 PROCEDURE — 85027 COMPLETE CBC AUTOMATED: CPT

## 2020-01-04 PROCEDURE — 83540 ASSAY OF IRON: CPT

## 2020-01-04 PROCEDURE — 83550 IRON BINDING TEST: CPT

## 2020-01-04 PROCEDURE — 36415 COLL VENOUS BLD VENIPUNCTURE: CPT

## 2020-01-04 PROCEDURE — 86713 LEGIONELLA ANTIBODY: CPT

## 2020-01-04 PROCEDURE — 700102 HCHG RX REV CODE 250 W/ 637 OVERRIDE(OP): Performed by: FAMILY MEDICINE

## 2020-01-04 RX ORDER — ACETYLCYSTEINE 200 MG/ML
3 SOLUTION ORAL; RESPIRATORY (INHALATION)
Status: DISCONTINUED | OUTPATIENT
Start: 2020-01-04 | End: 2020-01-05

## 2020-01-04 RX ORDER — CALCIUM CARBONATE 500 MG/1
500 TABLET, CHEWABLE ORAL DAILY
Status: DISCONTINUED | OUTPATIENT
Start: 2020-01-04 | End: 2020-01-08 | Stop reason: HOSPADM

## 2020-01-04 RX ORDER — FERROUS SULFATE 325(65) MG
325 TABLET ORAL 2 TIMES DAILY WITH MEALS
Status: DISCONTINUED | OUTPATIENT
Start: 2020-01-04 | End: 2020-01-08 | Stop reason: HOSPADM

## 2020-01-04 RX ORDER — IPRATROPIUM BROMIDE AND ALBUTEROL SULFATE 2.5; .5 MG/3ML; MG/3ML
3 SOLUTION RESPIRATORY (INHALATION)
Status: DISCONTINUED | OUTPATIENT
Start: 2020-01-05 | End: 2020-01-05

## 2020-01-04 RX ORDER — IBUPROFEN 200 MG
500 CAPSULE ORAL ONCE
Status: ACTIVE | OUTPATIENT
Start: 2020-01-04 | End: 2020-01-05

## 2020-01-04 RX ORDER — GUAIFENESIN 600 MG/1
600 TABLET, EXTENDED RELEASE ORAL EVERY 12 HOURS
Status: DISCONTINUED | OUTPATIENT
Start: 2020-01-04 | End: 2020-01-08 | Stop reason: HOSPADM

## 2020-01-04 RX ADMIN — IPRATROPIUM BROMIDE AND ALBUTEROL SULFATE 3 ML: .5; 3 SOLUTION RESPIRATORY (INHALATION) at 16:50

## 2020-01-04 RX ADMIN — FERROUS SULFATE TAB 325 MG (65 MG ELEMENTAL FE) 325 MG: 325 (65 FE) TAB at 11:26

## 2020-01-04 RX ADMIN — PREDNISONE 40 MG: 20 TABLET ORAL at 05:53

## 2020-01-04 RX ADMIN — IPRATROPIUM BROMIDE AND ALBUTEROL SULFATE 3 ML: .5; 3 SOLUTION RESPIRATORY (INHALATION) at 11:46

## 2020-01-04 RX ADMIN — METOPROLOL TARTRATE 25 MG: 25 TABLET, FILM COATED ORAL at 17:14

## 2020-01-04 RX ADMIN — SEVELAMER CARBONATE 800 MG: 800 TABLET, FILM COATED ORAL at 09:01

## 2020-01-04 RX ADMIN — IPRATROPIUM BROMIDE AND ALBUTEROL SULFATE 3 ML: .5; 3 SOLUTION RESPIRATORY (INHALATION) at 08:17

## 2020-01-04 RX ADMIN — BUDESONIDE AND FORMOTEROL FUMARATE DIHYDRATE 2 PUFF: 80; 4.5 AEROSOL RESPIRATORY (INHALATION) at 19:25

## 2020-01-04 RX ADMIN — ANTACID TABLETS 500 MG: 500 TABLET, CHEWABLE ORAL at 20:12

## 2020-01-04 RX ADMIN — BUDESONIDE AND FORMOTEROL FUMARATE DIHYDRATE 2 PUFF: 80; 4.5 AEROSOL RESPIRATORY (INHALATION) at 09:06

## 2020-01-04 RX ADMIN — HYDRALAZINE HYDROCHLORIDE 25 MG: 25 TABLET, FILM COATED ORAL at 00:00

## 2020-01-04 RX ADMIN — AMLODIPINE BESYLATE 10 MG: 10 TABLET ORAL at 05:53

## 2020-01-04 RX ADMIN — GABAPENTIN 100 MG: 100 CAPSULE ORAL at 17:13

## 2020-01-04 RX ADMIN — TERAZOSIN HYDROCHLORIDE ANHYDROUS 5 MG: 5 CAPSULE ORAL at 20:13

## 2020-01-04 RX ADMIN — HYDRALAZINE HYDROCHLORIDE 25 MG: 25 TABLET, FILM COATED ORAL at 22:55

## 2020-01-04 RX ADMIN — METOPROLOL TARTRATE 25 MG: 25 TABLET, FILM COATED ORAL at 05:53

## 2020-01-04 RX ADMIN — DOXYCYCLINE 100 MG: 100 TABLET, FILM COATED ORAL at 05:53

## 2020-01-04 RX ADMIN — HYDRALAZINE HYDROCHLORIDE 25 MG: 25 TABLET, FILM COATED ORAL at 17:15

## 2020-01-04 RX ADMIN — NICOTINE 14 MG: 14 PATCH TRANSDERMAL at 05:54

## 2020-01-04 RX ADMIN — GUAIFENESIN 600 MG: 600 TABLET, EXTENDED RELEASE ORAL at 09:01

## 2020-01-04 RX ADMIN — CEFTRIAXONE SODIUM 1 G: 1 INJECTION, POWDER, FOR SOLUTION INTRAMUSCULAR; INTRAVENOUS at 05:59

## 2020-01-04 RX ADMIN — GUAIFENESIN 600 MG: 600 TABLET, EXTENDED RELEASE ORAL at 20:13

## 2020-01-04 RX ADMIN — IPRATROPIUM BROMIDE AND ALBUTEROL SULFATE 3 ML: .5; 3 SOLUTION RESPIRATORY (INHALATION) at 03:54

## 2020-01-04 RX ADMIN — TERAZOSIN HYDROCHLORIDE ANHYDROUS 5 MG: 5 CAPSULE ORAL at 00:00

## 2020-01-04 RX ADMIN — HEPARIN SODIUM 5000 UNITS: 5000 INJECTION, SOLUTION INTRAVENOUS; SUBCUTANEOUS at 20:12

## 2020-01-04 RX ADMIN — GABAPENTIN 100 MG: 100 CAPSULE ORAL at 05:53

## 2020-01-04 RX ADMIN — GUAIFENESIN 200 MG: 100 SOLUTION ORAL at 03:13

## 2020-01-04 RX ADMIN — HEPARIN SODIUM 5000 UNITS: 5000 INJECTION, SOLUTION INTRAVENOUS; SUBCUTANEOUS at 14:51

## 2020-01-04 RX ADMIN — HYDRALAZINE HYDROCHLORIDE 25 MG: 25 TABLET, FILM COATED ORAL at 05:53

## 2020-01-04 RX ADMIN — SEVELAMER CARBONATE 800 MG: 800 TABLET, FILM COATED ORAL at 17:14

## 2020-01-04 RX ADMIN — SEVELAMER CARBONATE 800 MG: 800 TABLET, FILM COATED ORAL at 11:26

## 2020-01-04 RX ADMIN — IPRATROPIUM BROMIDE AND ALBUTEROL SULFATE 3 ML: .5; 3 SOLUTION RESPIRATORY (INHALATION) at 19:25

## 2020-01-04 RX ADMIN — DOXYCYCLINE 100 MG: 100 TABLET, FILM COATED ORAL at 17:13

## 2020-01-04 ASSESSMENT — ENCOUNTER SYMPTOMS
COUGH: 1
VOMITING: 0
DIZZINESS: 0
HEARTBURN: 0
EYES NEGATIVE: 1
FLANK PAIN: 0
DIAPHORESIS: 0
EYE PAIN: 0
HEMOPTYSIS: 0
ABDOMINAL PAIN: 0
PHOTOPHOBIA: 0
BLURRED VISION: 0
EYE DISCHARGE: 0
NAUSEA: 0
NEUROLOGICAL NEGATIVE: 1
SHORTNESS OF BREATH: 1
FEVER: 0
EYE REDNESS: 0
STRIDOR: 0
CARDIOVASCULAR NEGATIVE: 1
MUSCULOSKELETAL NEGATIVE: 1
TREMORS: 0
GASTROINTESTINAL NEGATIVE: 1
BLOOD IN STOOL: 0
SEIZURES: 0
SPUTUM PRODUCTION: 1
ORTHOPNEA: 0
SORE THROAT: 0
PND: 0
TINGLING: 0
HEADACHES: 0
FEVER: 1
PALPITATIONS: 0
DIARRHEA: 0
WEIGHT LOSS: 0
WHEEZING: 1
CLAUDICATION: 0
PSYCHIATRIC NEGATIVE: 1
DOUBLE VISION: 0
MYALGIAS: 0
DEPRESSION: 0
CHILLS: 0

## 2020-01-04 NOTE — PROGRESS NOTES
2 RN skin check complete with TOM Jimenez.   Devices in place - nasal cannula.  Skin assessed under devices - yes.  Confirmed pressure ulcers found on - N/A.  New potential pressure ulcers noted on N/A.   Wound consult placed - No.  The following interventions in place - N/A    Ears red and blanching  General bruising BLUE  RBKA  No other breakdown noted.

## 2020-01-04 NOTE — ASSESSMENT & PLAN NOTE
Likely secondary to her ESRD(Anemia of chronic disease)  However mcv is mildly elevated  ferritin is 720, Vitamin B12 of 1039, normal Folate  Will continue to monitor  Nephrology on board

## 2020-01-04 NOTE — CARE PLAN
Problem: Communication  Goal: The ability to communicate needs accurately and effectively will improve  Outcome: PROGRESSING AS EXPECTED  Note:   Pt communicates needs effectively and calls for assistance appropriately.     Problem: Safety  Goal: Will remain free from falls  Outcome: PROGRESSING AS EXPECTED  Note:   Pt verbalizes understanding of fall precautions and calls for assistance appropriately.

## 2020-01-04 NOTE — ASSESSMENT & PLAN NOTE
-Poorly controlled hypertension, likely secondary to non adherence to meds or HD.  - Medications adjusted for better control.  - Patient advised to follow up with PCP/nephrology for further adjustments

## 2020-01-04 NOTE — H&P
Internal Medicine Admitting History and Physical    Note Author: Jael Andrea M.D.       Name Isabelle Coyle     1965   Age/Sex 54 y.o. female   MRN [id number]   Code Status Full Code     After 5PM or if no immediate response to page, please call for cross-coverage  Attending/Team: Dr. Bonilla/Agustín See Patient List for primary contact information  Call [phone] to page    1st Call - Day Intern (R1):   Dr. Andrea 2nd Call - Day Sr. Resident (R2/R3):   Dr. Westfall       Chief Complaint:   Shortness of breath    HPI:  54 years old female with PMH of HTN, Tobacco abuse ESRD on MWF dialysis, hx of clavicular fracture, right BKA in  presented to the ED with progressive shortness of breath over the past couple weeks exacerbates with exertion associated with orthopnea and lower limb swelling. The patient also reported increase of cough with production of whitish-grayish sputum with wheezes.  She also added that she has non watery diarrhea over the past few days, she denied blood or sputum with the stool    The patient denied chest pain, palpitation, fever, chills, abdominal pain, nausea, vomiting, headaches, recent Abx use, recent travel or sick contact.    She stated that her last HD session was on Tuesday(which was adjusted due to holiday), she was scheduled for the HD today, however she felt very week and short of breath which prompted her to ED presentation.    On presentation to the ED, the patient's vitals were remarkable for elevation of BP(systolic 150s-170s) and hypoxemic of 84 % on room air. Blood work up revealed hb of 10.3, MCB of 98.2, wbc of 4.2. Creatinine of 9.8, Bun of 67. CXR done and showed possible developing left mid/lower lung pnuemonia          Review of Systems   Constitutional: Positive for malaise/fatigue. Negative for chills, diaphoresis, fever and weight loss.   HENT: Negative for ear discharge, ear pain, hearing loss, nosebleeds and tinnitus.    Eyes: Negative for blurred  "vision, double vision, photophobia, pain, discharge and redness.   Respiratory: Positive for cough, sputum production, shortness of breath and wheezing. Negative for hemoptysis.    Cardiovascular: Positive for orthopnea and leg swelling. Negative for chest pain, palpitations, claudication and PND.   Gastrointestinal: Positive for diarrhea. Negative for abdominal pain, blood in stool, constipation, heartburn, melena, nausea and vomiting.   Genitourinary: Negative for dysuria, flank pain, frequency, hematuria and urgency.   Musculoskeletal: Negative for back pain, falls, joint pain, myalgias and neck pain.        Clavicular pain   Skin: Negative for itching and rash.   Neurological: Negative for dizziness, tingling, tremors, sensory change, speech change, focal weakness, seizures and headaches.   Psychiatric/Behavioral: Negative for depression.             Past Medical History (Chronic medical problem, known complications and current treatment)    Past Medical History:   Diagnosis Date   • Anemia    • Anemia associated with chronic renal failure 11/5/2009   • Dental disorder 8-25-17    \"Full Upper & Partial Lower Dentures\"   • Dialysis patient (Formerly [facility]) 8-25-17    [facility] M,W,F   • Dysrhythmia     \"SVT\"   • ESRD (end stage renal disease) (Formerly [facility]) 8-25-17    Dialysis MWF@ [facility]   • Glomerulonephritis, chronic 11/5/2009   • Heart burn    • High cholesterol    • HTN (hypertension) 11/5/2009    2017 states well controlled   • Port catheter in place 8-25-17    For Dialysis   • SVT (supraventricular tachycardia) (Formerly [facility])    • SVT (supraventricular tachycardia) (Formerly [facility])          Past Surgical History:  Past Surgical History:   Procedure Laterality Date   • MEÑO BY LAPAROSCOPY  6/22/2019    Procedure: CHOLECYSTECTOMY, LAPAROSCOPIC With GRAMS;  Surgeon: Jimbo Davenport M.D.;  Location: SURGERY [facility];  Service: General   • AV FISTULA CREATION Left 4/27/2018    Procedure: Left Brachial artery Repair;  " Surgeon: Jimbo Davenport M.D.;  Location: SURGERY [facility];  Service: Vascular   • AV FISTULA CREATION Left 8/28/2017    Procedure: AV FISTULA CREATION  UPPER EXTREMITY -BRACHIAL BASALIC;  Surgeon: Stella Rodriguez M.D.;  Location: SURGERY [facility];  Service: General   • AV FISTULA CREATION Left 6/20/2017    Procedure: AV FISTULA CREATION- LEFT;  Surgeon: Jimbo Davenport M.D.;  Location: SURGERY [facility];  Service:    • APPENDECTOMY LAPAROSCOPIC  5/4/2009    Performed by BANDAR TIMMONS at SURGERY [facility]   • FEMUR ORIF  10/9/08    Performed by STELLA GATES at [facility]   • ILIAC BONE GRAFT  10/9/08    Performed by STELLA GATES at [facility]   • AMPUTATION, BELOW THE KNEE     • CAPITATION PAYMENT (INSURANCE)     • OTHER      BELOW KNEE AMPUTATION RIGHT   • PB ENLARGE BREAST WITH IMPLANT         Current Outpatient Medications:  Home Medications     Reviewed by Rona Rapp (Pharmacy Tech) on 01/03/20 at 1432  Med List Status: Complete   Medication Last Dose Status   albuterol 108 (90 Base) MCG/ACT Aero Soln inhalation aerosol PRN Active   amLODIPine (NORVASC) 10 MG Tab 1/3/2020 Active   gabapentin (NEURONTIN) 100 MG Cap 1/3/2020 Active   hydrALAZINE (APRESOLINE) 25 MG Tab 1/3/2020 Active   labetalol (NORMODYNE) 200 MG Tab 1/3/2020 Active   metoprolol (LOPRESSOR) 25 MG Tab 1/3/2020 Active   sevelamer carbonate (RENVELA) 800 MG Tab tablet 1/2/2020 Active   terazosin (HYTRIN) 5 MG Cap 1/2/2020 Active                Medication Allergy/Sensitivities:  Allergies   Allergen Reactions   • Sulfa Drugs Hives     DHD=8530 rash swelling itching         Family History (mandatory)   Family History   Problem Relation Age of Onset   • Heart Disease Other        Social History (mandatory)   Social History     Socioeconomic History   • Marital status:      Spouse name: Not on file   • Number of children: Not on file   • Years of education: Not on  file   • Highest education level: Not on file   Occupational History   • Not on file   Social Needs   • Financial resource strain: Not on file   • Food insecurity:     Worry: Not on file     Inability: Not on file   • Transportation needs:     Medical: Not on file     Non-medical: Not on file   Tobacco Use   • Smoking status: Current Some Day Smoker     Packs/day: 0.25     Years: 20.00     Pack years: 5.00     Types: Cigarettes     Last attempt to quit: 2019     Years since quittin.6   • Smokeless tobacco: Never Used   • Tobacco comment: 5 cigs/day   Substance and Sexual Activity   • Alcohol use: No   • Drug use: No   • Sexual activity: Not on file   Lifestyle   • Physical activity:     Days per week: Not on file     Minutes per session: Not on file   • Stress: Not on file   Relationships   • Social connections:     Talks on phone: Not on file     Gets together: Not on file     Attends [employer] service: Not on file     Active member of club or organization: Not on file     Attends meetings of clubs or organizations: Not on file     Relationship status: Not on file   • Intimate partner violence:     Fear of current or ex partner: Not on file     Emotionally abused: Not on file     Physically abused: Not on file     Forced sexual activity: Not on file   Other Topics Concern   • Not on file   Social History Narrative   • Not on file         Physical Exam     Vitals:    20 1515 20 1630 20 1634 20 1645   BP: (!) 168/86 (!) 176/91  149/100   Pulse: 72 73 73 73   Resp:   20    Temp:       TempSrc:       SpO2: 100% 100% 100% 100%   Weight:       Height:         Body mass index is 24.56 kg/m².  O2 therapy: Pulse Oximetry: 100 %, O2 (LPM): 2    Physical Exam   Constitutional: She is oriented to person, place, and time and well-developed, well-nourished, and in no distress. No distress.   HENT:   Head: Normocephalic and atraumatic.   Right Ear: External ear normal.   Left Ear: External ear  normal.   Mouth/Throat: Oropharynx is clear and moist. No oropharyngeal exudate.   Mild elevation of JVD   Eyes: Pupils are equal, round, and reactive to light. Conjunctivae and EOM are normal. Right eye exhibits no discharge. Left eye exhibits no discharge. No scleral icterus.   Neck: Normal range of motion. Neck supple. JVD present. No tracheal deviation present. No thyromegaly present.   Cardiovascular: Normal rate. Exam reveals no gallop and no friction rub.   No murmur heard.  Pulmonary/Chest: No stridor. She is in respiratory distress. She has wheezes. She has rales. She exhibits no tenderness.   Decreased breath sounds b/l   Abdominal: Soft. Bowel sounds are normal. She exhibits no distension and no mass. There is no tenderness. There is no rebound and no guarding.   Musculoskeletal: Normal range of motion.         General: No tenderness, deformity or edema.      Comments: Right BKA with prosthetic leg   Lymphadenopathy:     She has no cervical adenopathy.   Neurological: She is alert and oriented to person, place, and time.   Skin: Skin is warm and dry. She is not diaphoretic.   Psychiatric: Affect normal.         Data Review       Old Records Request:   Completed  Current Records review/summary: Completed    Lab Data Review:  Recent Results (from the past 24 hour(s))   EKG (NOW)    Collection Time: 20 12:04 PM   Result Value Ref Range    Report       [facility] Emergency Dept[facility]    Test Date:  2020  Pt Name:    MELISSA BARDALES                 Department: ER  MRN:        [id number]                      Room:  Gender:     Female                       Technician: [id number]  :        1965                   Requested By:ER TRIAGE PROTOCOL  Order #:    [id number]                    Reading MD: JOSIAH TATUM MD    Measurements  Intervals                                Axis  Rate:       75                           P:          54  AZ:         152                          QRS:         48  QRSD:       97                           T:          56  QT:         440  QTc:        492    Interpretive Statements  Twelve-lead EKG shows a normal sinus rhythm with a ventricular to 75, QRS has    large S waves anteriorly that is unchanged from prior, no ST segment  elevation  nor depression, normal T waves.  Electronically Signed On 1-3-2020 14:34:47 PST by JOSIAH TATUM MD     Lactic acid (lactate)    Collection Time: 01/03/20 12:32 PM   Result Value Ref Range    Lactic Acid 1.4 0.5 - 2.0 mmol/L   CBC WITH DIFFERENTIAL    Collection Time: 01/03/20 12:32 PM   Result Value Ref Range    WBC 4.2 (L) 4.8 - 10.8 K/uL    RBC 3.26 (L) 4.20 - 5.40 M/uL    Hemoglobin 10.3 (L) 12.0 - 16.0 g/dL    Hematocrit 32.0 (L) 37.0 - 47.0 %    MCV 98.2 (H) 81.4 - 97.8 fL    MCH 31.6 27.0 - 33.0 pg    MCHC 32.2 (L) 33.6 - 35.0 g/dL    RDW 69.0 (H) 35.9 - 50.0 fL    Platelet Count 79 (L) 164 - 446 K/uL    MPV 11.1 9.0 - 12.9 fL    Neutrophils-Polys 73.10 (H) 44.00 - 72.00 %    Lymphocytes 17.40 (L) 22.00 - 41.00 %    Monocytes 6.20 0.00 - 13.40 %    Eosinophils 2.60 0.00 - 6.90 %    Basophils 0.50 0.00 - 1.80 %    Immature Granulocytes 0.20 0.00 - 0.90 %    Nucleated RBC 0.00 /100 WBC    Neutrophils (Absolute) 3.07 2.00 - 7.15 K/uL    Lymphs (Absolute) 0.73 (L) 1.00 - 4.80 K/uL    Monos (Absolute) 0.26 0.00 - 0.85 K/uL    Eos (Absolute) 0.11 0.00 - 0.51 K/uL    Baso (Absolute) 0.02 0.00 - 0.12 K/uL    Immature Granulocytes (abs) 0.01 0.00 - 0.11 K/uL    NRBC (Absolute) 0.00 K/uL    Anisocytosis 1+     Microcytosis 1+    Comp Metabolic Panel    Collection Time: 01/03/20 12:32 PM   Result Value Ref Range    Sodium 140 135 - 145 mmol/L    Potassium 4.6 3.6 - 5.5 mmol/L    Chloride 95 (L) 96 - 112 mmol/L    Co2 29 20 - 33 mmol/L    Anion Gap 16.0 (H) 0.0 - 11.9    Glucose 89 65 - 99 mg/dL    Bun 67 (H) 8 - 22 mg/dL    Creatinine 9.80 (HH) 0.50 - 1.40 mg/dL    Calcium 8.5 8.5 - 10.5 mg/dL    AST(SGOT) 16 12 - 45 U/L    ALT(SGPT) 16  2 - 50 U/L    Alkaline Phosphatase 58 30 - 99 U/L    Total Bilirubin 0.8 0.1 - 1.5 mg/dL    Albumin 3.8 3.2 - 4.9 g/dL    Total Protein 6.4 6.0 - 8.2 g/dL    Globulin 2.6 1.9 - 3.5 g/dL    A-G Ratio 1.5 g/dL   ESTIMATED GFR    Collection Time: 01/03/20 12:32 PM   Result Value Ref Range    GFR If African American 5 (A) >60 mL/min/1.73 m 2    GFR If Non African American 4 (A) >60 mL/min/1.73 m 2   PERIPHERAL SMEAR REVIEW    Collection Time: 01/03/20 12:32 PM   Result Value Ref Range    Peripheral Smear Review see below    PLATELET ESTIMATE    Collection Time: 01/03/20 12:32 PM   Result Value Ref Range    Plt Estimation Decreased    MORPHOLOGY    Collection Time: 01/03/20 12:32 PM   Result Value Ref Range    RBC Morphology Present     Poikilocytosis 1+     Ovalocytes 1+    DIFFERENTIAL COMMENT    Collection Time: 01/03/20 12:32 PM   Result Value Ref Range    Comments-Diff see below    MAGNESIUM    Collection Time: 01/03/20 12:32 PM   Result Value Ref Range    Magnesium 2.3 1.5 - 2.5 mg/dL   PROCALCITONIN    Collection Time: 01/03/20 12:32 PM   Result Value Ref Range    Procalcitonin 1.65 (H) <0.25 ng/mL       Imaging/Procedures Review:    Independant Imaging Review: Completed  EC-ECHOCARDIOGRAM COMPLETE W/O CONT         DX-CHEST-PORTABLE (1 VIEW)   Final Result      Recent right clavicle fracture.   Interval clearing right upper lobe contusions.   Possible developing left mid/lower lung pneumonia.               EKG:   EKG Independent Review: Completed  QTc:492, HR: 75, Normal Sinus Rhythm, no ST/T changes    Records reviewed and summarized in current documentation :  Yes  [employer] teaching service handout given to patient:  No         Assessment/Plan     * Shortness of breath  Assessment & Plan  multifactorial  Volume overload from missing HD, possible COPD exacerbation and possible acute pneumonia.  She will get HD session today, will treat empirically for CAP and COPD exacerbation   Will continue to monitor    Community  acquired pneumonia- (present on admission)  Assessment & Plan  Increase shortness of breath, cough and increase sputum  B/l wheezes and crackles   CXR right middle and lower lobe infiltrate suggestive of pneumonia  Pro-calcitonine  Will initiate Abx, f/i with blood cultures  Continue to monitor      Probable COPD with acute exacerbation (HCC)- (present on admission)  Assessment & Plan  The patient doesn't have official diagnosis of COPD,  No PFT on file  She presented with increase cough, sputum with hx of heavy tobacco abuse for many years.  Diffuse bilateral wheezes on both lung fields  CXR revealed possible middle and lower lobe pneumonia  Will start empiric treatement for COPD exacerbation with Duo-nebs and prednisone 40 mg  Ceftriaxone and doxycycline for possible pneumonia  Incentive spirometry  Admit to Tele  Continue to monitor      Acute respiratory failure with hypoxia (HCC)  Assessment & Plan  - Patient presents with acute hypoxic respiratory failure likely secondary to pneumonia based on clinical and  CXR findings.   - could be also secondary to volume overload due to missing HD  - will initiate ABx after blood cultures  - follow up with Blood cultures results  - on oxygen supplementation   Will continue to monitor     Hypertension  Assessment & Plan  -Poorly controlled hypertension, likely secondary to non adherence to meds or HD.  - will continue amlodipine, hydralazine and metoprolol and terazosin  Will hold labetalol for now due to concern of COPD exacerbation  - will continue to monitor on Tele    ESRD on hemodialysis (HCC)- (present on admission)  Assessment & Plan   history of ESRD  and she is on HD MWF  last HD session was on Tuesday(which was adjusted due to holiday), she was scheduled for the HD today, however she felt very week and short of breath which prompted her to ED presentation.  Nephrology was consulted and she will be having HD today.   Will continue to follow    H/O paroxysmal  supraventricular tachycardia  Assessment & Plan  S/p ablation in 2018  Currently on sinus rhythm  Stable        Anticipated Hospital stay:  >2 midnights        Quality Measures  Quality-Core Measures   Reviewed items::  Labs reviewed, EKG reviewed, Radiology images reviewed and Medications reviewed  Murdock catheter::  No Murdock  DVT prophylaxis pharmacological::  Heparin    PCP: Pcp Not In Computer

## 2020-01-04 NOTE — PROGRESS NOTES
Nephrology Daily Progress Note    Date of Service  1/4/2020    Chief Complaint  The patient is a 54-year-old female with history   of end-stage renal disease, on hemodialysis Monday, Wednesday, and Friday at   [facility], history of hypertension, anemia of chronic kidney disease,   renal osteodystrophy, who presents to the hospital with shortness of breath   and missed her normal Monday, Wednesday, and Friday dialysis.  She is presumed  to have pneumonia with some element of pulmonary edema, hence, nephrology was  asked to see her for her ESRD and dialysis management and shortness of   breath.       Interval Problem Update  1/3- Consult done  1/4- HD early this AM with 2L net UF, pt reports ongoing hacking cough and subjective fever    Review of Systems  Review of Systems   Constitutional: Positive for fever and malaise/fatigue.   HENT: Negative.    Eyes: Negative.    Respiratory: Positive for cough, shortness of breath and wheezing.    Cardiovascular: Negative.    Gastrointestinal: Negative.    Genitourinary: Negative.    Musculoskeletal: Negative.    Skin: Negative.    Neurological: Negative.    Endo/Heme/Allergies: Negative.    Psychiatric/Behavioral: Negative.         Physical Exam  Temp:  [36.2 °C (97.2 °F)-37.2 °C (99 °F)] 36.2 °C (97.2 °F)  Pulse:  [70-83] 83  Resp:  [16-22] 16  BP: (125-191)/() 142/65  SpO2:  [93 %-100 %] 93 %    Physical Exam  Constitutional:       Appearance: She is ill-appearing.   HENT:      Head: Normocephalic and atraumatic.      Nose: Nose normal.      Mouth/Throat:      Pharynx: Oropharynx is clear.   Eyes:      Extraocular Movements: Extraocular movements intact.      Pupils: Pupils are equal, round, and reactive to light.   Neck:      Musculoskeletal: Normal range of motion.   Cardiovascular:      Rate and Rhythm: Normal rate and regular rhythm.      Pulses: Normal pulses.      Heart sounds: Normal heart sounds.      Comments: L AVF +T/B   Pulmonary:      Breath sounds:  Wheezing and rales present.   Abdominal:      General: Abdomen is flat. Bowel sounds are normal.      Palpations: Abdomen is soft.   Musculoskeletal: Normal range of motion.   Skin:     General: Skin is warm and dry.   Neurological:      General: No focal deficit present.      Mental Status: She is alert and oriented to person, place, and time.   Psychiatric:         Mood and Affect: Mood normal.         Behavior: Behavior normal.         Fluids    Intake/Output Summary (Last 24 hours) at 1/4/2020 1048  Last data filed at 1/4/2020 0003  Gross per 24 hour   Intake 600 ml   Output 2500 ml   Net -1900 ml       Laboratory  Recent Labs     01/03/20  1232 01/04/20  0216   WBC 4.2* 2.3*   RBC 3.26* 3.47*   HEMOGLOBIN 10.3* 10.5*   HEMATOCRIT 32.0* 34.1*   MCV 98.2* 98.3*   MCH 31.6 30.3   MCHC 32.2* 30.8*   RDW 69.0* 67.4*   PLATELETCT 79* 74*   MPV 11.1 9.9     Recent Labs     01/03/20  1232 01/04/20  0216   SODIUM 140 143   POTASSIUM 4.6 4.4   CHLORIDE 95* 100   CO2 29 29   GLUCOSE 89 156*   BUN 67* 29*   CREATININE 9.80* 5.21*   CALCIUM 8.5 8.9         No results for input(s): NTPROBNP in the last 72 hours.        Assessment/Plan  1.  End-stage renal disease, on hemodialysis Monday, Wednesday, and Friday.    2.  Renal osteodystrophy.  On sevelamer.   3.  Anemia of chronic kidney disease. At goal Hgb 10-11. Iron deficient.   4.  Chronic obstructive pulmonary disease.    5.  Possible pneumonia/aeCOPD.  Empiric ATB/RT protocol in effect.   6.  Pulmonary edema.    7.  Hypertension. If BP uncontrolled, can consider changing metoprolol to carvedilol as metoprolol dialyzable.   8.  Leukopenia      PLAN:    - HD qMWF, next treatment MON   - Continue phos binders WM  - UF with HD as tolerated  - Avoid nephrotoxins, dose meds for ESRD  - Renal diet  - PO Fe WM  - ATB per primary svc        Thank you

## 2020-01-04 NOTE — PROGRESS NOTES
[facility] Progress Note     Hemodialysis treatment ordered today per Dr. Garcia x 3.5 hours.   Treatment initiated at 2033, ended at 0003.      BP elevated during treatment, PRN hydralazine IV given by Primary RN; pt denied complaints. see paper flow sheet for details.      Net UF 2,000 mL.      Needles removed from access site. Dressings applied and sites held x 10 minutes; verified no bleeding. Positive bruit/thrill post tx. Staff RN to monitor AVF for breakthrough bleeding. Should breakthrough bleeding occur, staff RN to apply pressure to access sites until bleeding resolved. Notify Dialysis and Nephrologist for follow-up.     Report given to Farhad Corral RN.

## 2020-01-04 NOTE — ASSESSMENT & PLAN NOTE
History of ESRD  and she is on HD [facility]  Nephrology on board for regular dialysis

## 2020-01-04 NOTE — PROGRESS NOTES
Paged Dr Leal regarding pt's White Blood Cell count of 2.3. White blood cell count read back by Dr. Leal. Pt also complains of sough at this time. Per Dr Leal, OK to order Guaifenesin 10 mL PO every 4 hours for cough.

## 2020-01-04 NOTE — RESPIRATORY CARE
"COPD EDUCATION by COPD CLINICAL EDUCATOR  1/4/2020  at  10:31 AM by Katina Lo     Patient interviewed by COPD education team.  Patient declines to participate in full program and she denies having COPD.  Short intervention has been conducted. Patient educated on what COPD is, that 90% of people who smoke and who are over the age of 40 have COPD. Discussed her recent admits and the probability she has COPD, although she does not have a formal diagnosis.  A comprehensive packet including information about COPD, treatments, and smoking cessation given.     COPD EDUCATION by COPD CLINICAL EDUCATOR  1/4/2020 at 10:31 AM by Katina Lo     Patient reviewed by COPD education team. Smoking Cessation Intervention and education completed, 11 minutes spent on smoking cessation education with patient    Provided smoking cessation packet with \"Tips to Quit\" and flyer for \"Free Smoking Cessation Classes\".     "

## 2020-01-04 NOTE — ASSESSMENT & PLAN NOTE
Multifactorial, (improved)  Volume overload from missing HD, possible COPD exacerbation and possible acute pneumonia.  HD as scheduled  On empiric treatment for CAP and COPD exacerbation   Will continue to monitor
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
## 2020-01-04 NOTE — PROGRESS NOTES
Internal Medicine Interval Note  Note Author: Jael Andrea M.D.      Name Isabelle Coyle     1965    Age/Sex 54 y.o. /female    MRN [id number]    Code Status Full Code     After 5PM or if no immediate response to page, please call for cross-coverage  Attending/Team: Dr. Bonilla/Agustín See Patient List for primary contact information  Call [phone] to page    1st Call - Day Intern (R1):   Dr. Andrea 2nd Call - Day Sr. Resident (R2/R3):   Dr. Westfall         Reason for interval visit  (Principal Problem)    shortness of breath    Interval Problem Daily Status Update  (24 hours, problem oriented, brief subjective history, new lab/imaging data pertinent to that problem)   The patient is doing the same  C/o pleuritic chest pain on the left side, more with coughing and deep breath.    Review of Systems   Constitutional: Negative for chills, diaphoresis, fever, malaise/fatigue and weight loss.   HENT: Negative for ear discharge, ear pain, hearing loss, sore throat and tinnitus.    Eyes: Negative for blurred vision, double vision, photophobia, pain, discharge and redness.   Respiratory: Positive for cough, sputum production, shortness of breath and wheezing. Negative for hemoptysis and stridor.         Pleuritic chest pain   Cardiovascular: Positive for leg swelling. Negative for chest pain, palpitations, orthopnea, claudication and PND.   Gastrointestinal: Negative for abdominal pain, blood in stool, diarrhea, heartburn, melena, nausea and vomiting.   Genitourinary: Negative for flank pain.        The patient is anuric    Musculoskeletal: Negative for myalgias.   Neurological: Negative for dizziness, tingling, tremors, seizures and headaches.   Psychiatric/Behavioral: Negative for depression and suicidal ideas.            Consultants/Specialty  Nephrology        Quality Measures  Quality-Core Measures   Reviewed items::  EKG reviewed, Labs reviewed, Medications reviewed and Radiology images  reviewed  Murdock catheter::  No Murdock  DVT prophylaxis pharmacological::  Heparin           Physical Exam   Physical Exam   Constitutional: She is oriented to person, place, and time and well-developed, well-nourished, and in no distress. No distress.   HENT:   Head: Normocephalic and atraumatic.   Right Ear: External ear normal.   Left Ear: External ear normal.   Nose: Nose normal.   Mouth/Throat: Oropharynx is clear and moist. No oropharyngeal exudate.   Eyes: Pupils are equal, round, and reactive to light. Conjunctivae and EOM are normal. Right eye exhibits no discharge. Left eye exhibits no discharge. No scleral icterus.   Neck: Normal range of motion. Neck supple. No JVD present. No tracheal deviation present. No thyromegaly present.   Cardiovascular: Normal rate, regular rhythm, normal heart sounds and intact distal pulses. Exam reveals no gallop and no friction rub.   No murmur heard.  Systolic murmur    Pulmonary/Chest: No stridor. She is in respiratory distress. She has wheezes. She has rales. She exhibits tenderness.   Abdominal: She exhibits no distension and no mass. There is no tenderness. There is no rebound and no guarding.   Musculoskeletal:         General: Deformity and edema present.      Comments: Right BKA with prosthetic leg    Lymphadenopathy:     She has no cervical adenopathy.   Neurological: She is alert and oriented to person, place, and time.   Skin: Skin is warm. She is not diaphoretic.   Psychiatric: Memory and affect normal.      Vitals:    01/04/20 0300 01/04/20 0458 01/04/20 0819 01/04/20 0834   BP:  125/84  142/65   Pulse: 80 80 82 83   Resp: 20 16 16 16   Temp:  36.9 °C (98.4 °F)  36.2 °C (97.2 °F)   TempSrc:  Temporal  Temporal   SpO2: 95% 96% 98% 93%   Weight:       Height:          Body mass index is 24.56 kg/m².   Pulse Oximetry: 93 %, O2 (LPM): 2, O2 Delivery: Silicone Nasal Cannula      Recent Labs     01/03/20  1232 01/04/20  0216   WBC 4.2* 2.3*   RBC 3.26* 3.47*    HEMOGLOBIN 10.3* 10.5*   HEMATOCRIT 32.0* 34.1*   MCV 98.2* 98.3*   MCH 31.6 30.3   RDW 69.0* 67.4*   PLATELETCT 79* 74*   MPV 11.1 9.9   NEUTSPOLYS 73.10* 82.30*   LYMPHOCYTES 17.40* 13.30*   MONOCYTES 6.20 2.60   EOSINOPHILS 2.60 0.00   BASOPHILS 0.50 0.00   RBCMORPHOLO Present Present      Recent Labs     01/03/20  1232 01/04/20  0216   SODIUM 140 143   POTASSIUM 4.6 4.4   CHLORIDE 95* 100   CO2 29 29   GLUCOSE 89 156*   BUN 67* 29*      No current facility-administered medications on file prior to encounter.      Current Outpatient Medications on File Prior to Encounter   Medication Sig Dispense Refill   • hydrALAZINE (APRESOLINE) 25 MG Tab Take 25 mg by mouth 3 times a day as needed.     • terazosin (HYTRIN) 5 MG Cap Take 5 mg by mouth every evening.     • labetalol (NORMODYNE) 200 MG Tab Take 200 mg by mouth 2 times a day.     • gabapentin (NEURONTIN) 100 MG Cap Take 1 Cap by mouth 2 Times a Day. 180 Cap 0   • metoprolol (LOPRESSOR) 25 MG Tab Take 1 Tab by mouth 2 Times a Day. 60 Tab 0   • amLODIPine (NORVASC) 10 MG Tab Take 10 mg by mouth every day.     • albuterol 108 (90 Base) MCG/ACT Aero Soln inhalation aerosol Inhale 1 Puff by mouth 2 times a day as needed for Shortness of Breath.     • sevelamer carbonate (RENVELA) 800 MG Tab tablet Take 800 mg by mouth 3 times a day, with meals.        Home Medications     Reviewed by Rona Rapp (Pharmacy Tech) on 01/03/20 at 1432  Med List Status: Complete   Medication Last Dose Status   albuterol 108 (90 Base) MCG/ACT Aero Soln inhalation aerosol PRN Active   amLODIPine (NORVASC) 10 MG Tab 1/3/2020 Active   gabapentin (NEURONTIN) 100 MG Cap 1/3/2020 Active   hydrALAZINE (APRESOLINE) 25 MG Tab 1/3/2020 Active   labetalol (NORMODYNE) 200 MG Tab 1/3/2020 Active   metoprolol (LOPRESSOR) 25 MG Tab 1/3/2020 Active   sevelamer carbonate (RENVELA) 800 MG Tab tablet 1/2/2020 Active   terazosin (HYTRIN) 5 MG Cap 1/2/2020 Active                 Assessment/Plan   *  Shortness of breath  Assessment & Plan  multifactorial  Volume overload from missing HD, possible COPD exacerbation and possible acute pneumonia.  HD done yesterday   On empiric treatment for CAP and COPD exacerbation   Will continue to monitor    Community acquired pneumonia- (present on admission)  Assessment & Plan  Increase shortness of breath, cough and increase sputum  B/l wheezes and crackles   CXR right middle and lower lobe infiltrate suggestive of pneumonia  Pro-calcitonine  On C3 plus doxycycline  Continue to monitor      Probable COPD with acute exacerbation (HCC)- (present on admission)  Assessment & Plan  The patient doesn't have official diagnosis of COPD,  No PFT on file  She presented with increase cough, sputum with hx of heavy tobacco abuse for many years.  Diffuse bilateral wheezes on both lung fields  CXR revealed possible middle and lower lobe pneumonia  Will start empiric treatement for COPD exacerbation with Duo-nebs and prednisone 40 mg  Ceftriaxone and doxycycline for possible pneumonia  Incentive spirometry  Mucomyst also added to help with secretions  Continue to monitor      Acute respiratory failure with hypoxia (HCC)  Assessment & Plan  - Patient presents with acute hypoxic respiratory failure likely secondary to pneumonia based on clinical and  CXR findings.   - could be also secondary to volume overload due to missing HD  - will initiate ABx after blood cultures  - follow up with Blood cultures results  - on oxygen supplementation   Will continue to monitor     Hypertension  Assessment & Plan  -Poorly controlled hypertension, likely secondary to non adherence to meds or HD.  - will continue amlodipine, hydralazine and metoprolol and terazosin  Will hold labetalol for now due to concern of COPD exacerbation  - will continue to monitor on Tele    ESRD on hemodialysis (HCC)- (present on admission)  Assessment & Plan   history of ESRD  and she is on HD MWF  last HD session was on  Tuesday(which was adjusted due to holiday), she was scheduled for the HD today, however she felt very week and short of breath which prompted her to ED presentation.  Nephrology on board, she had HD session yesterday    Anemia  Assessment & Plan  Likely secondary to her ESRD(Anemia of chronic disease)  However mcv is mildly elevated  ferritin is 720, Vitamin B12 of 1039, normal Folate  Will continue to monitor  Nephrology on board      H/O paroxysmal supraventricular tachycardia  Assessment & Plan  S/p ablation in 2018  Currently on sinus rhythm  Stable

## 2020-01-05 LAB
ALBUMIN SERPL BCP-MCNC: 3.7 G/DL (ref 3.2–4.9)
ALBUMIN/GLOB SERPL: 1.4 G/DL
ALP SERPL-CCNC: 52 U/L (ref 30–99)
ALT SERPL-CCNC: 10 U/L (ref 2–50)
ANION GAP SERPL CALC-SCNC: 15 MMOL/L (ref 0–11.9)
AST SERPL-CCNC: 14 U/L (ref 12–45)
BASOPHILS # BLD AUTO: 0.6 % (ref 0–1.8)
BASOPHILS # BLD: 0.03 K/UL (ref 0–0.12)
BILIRUB SERPL-MCNC: 0.7 MG/DL (ref 0.1–1.5)
BUN SERPL-MCNC: 54 MG/DL (ref 8–22)
CALCIUM SERPL-MCNC: 9.3 MG/DL (ref 8.5–10.5)
CHLORIDE SERPL-SCNC: 97 MMOL/L (ref 96–112)
CO2 SERPL-SCNC: 30 MMOL/L (ref 20–33)
CREAT SERPL-MCNC: 7.27 MG/DL (ref 0.5–1.4)
EOSINOPHIL # BLD AUTO: 0 K/UL (ref 0–0.51)
EOSINOPHIL NFR BLD: 0 % (ref 0–6.9)
ERYTHROCYTE [DISTWIDTH] IN BLOOD BY AUTOMATED COUNT: 68.2 FL (ref 35.9–50)
FLUAV RNA SPEC QL NAA+PROBE: NEGATIVE
FLUBV RNA SPEC QL NAA+PROBE: NEGATIVE
GLOBULIN SER CALC-MCNC: 2.7 G/DL (ref 1.9–3.5)
GLUCOSE SERPL-MCNC: 107 MG/DL (ref 65–99)
HCT VFR BLD AUTO: 33.6 % (ref 37–47)
HGB BLD-MCNC: 10.1 G/DL (ref 12–16)
IMM GRANULOCYTES # BLD AUTO: 0.01 K/UL (ref 0–0.11)
IMM GRANULOCYTES NFR BLD AUTO: 0.2 % (ref 0–0.9)
LYMPHOCYTES # BLD AUTO: 0.99 K/UL (ref 1–4.8)
LYMPHOCYTES NFR BLD: 18.6 % (ref 22–41)
MCH RBC QN AUTO: 29.9 PG (ref 27–33)
MCHC RBC AUTO-ENTMCNC: 30.1 G/DL (ref 33.6–35)
MCV RBC AUTO: 99.4 FL (ref 81.4–97.8)
MONOCYTES # BLD AUTO: 0.26 K/UL (ref 0–0.85)
MONOCYTES NFR BLD AUTO: 4.9 % (ref 0–13.4)
NEUTROPHILS # BLD AUTO: 4.02 K/UL (ref 2–7.15)
NEUTROPHILS NFR BLD: 75.7 % (ref 44–72)
NRBC # BLD AUTO: 0 K/UL
NRBC BLD-RTO: 0 /100 WBC
PLATELET # BLD AUTO: 82 K/UL (ref 164–446)
PMV BLD AUTO: 11.3 FL (ref 9–12.9)
POTASSIUM SERPL-SCNC: 4.3 MMOL/L (ref 3.6–5.5)
PROT SERPL-MCNC: 6.4 G/DL (ref 6–8.2)
RBC # BLD AUTO: 3.38 M/UL (ref 4.2–5.4)
RSV RNA SPEC QL NAA+PROBE: POSITIVE
SODIUM SERPL-SCNC: 142 MMOL/L (ref 135–145)
WBC # BLD AUTO: 5.3 K/UL (ref 4.8–10.8)

## 2020-01-05 PROCEDURE — 700102 HCHG RX REV CODE 250 W/ 637 OVERRIDE(OP): Performed by: STUDENT IN AN ORGANIZED HEALTH CARE EDUCATION/TRAINING PROGRAM

## 2020-01-05 PROCEDURE — 700101 HCHG RX REV CODE 250: Performed by: INTERNAL MEDICINE

## 2020-01-05 PROCEDURE — 93005 ELECTROCARDIOGRAM TRACING: CPT | Performed by: INTERNAL MEDICINE

## 2020-01-05 PROCEDURE — 700111 HCHG RX REV CODE 636 W/ 250 OVERRIDE (IP): Performed by: STUDENT IN AN ORGANIZED HEALTH CARE EDUCATION/TRAINING PROGRAM

## 2020-01-05 PROCEDURE — 85025 COMPLETE CBC W/AUTO DIFF WBC: CPT

## 2020-01-05 PROCEDURE — 99233 SBSQ HOSP IP/OBS HIGH 50: CPT | Mod: GC | Performed by: INTERNAL MEDICINE

## 2020-01-05 PROCEDURE — 94760 N-INVAS EAR/PLS OXIMETRY 1: CPT

## 2020-01-05 PROCEDURE — 94668 MNPJ CHEST WALL SBSQ: CPT

## 2020-01-05 PROCEDURE — A9270 NON-COVERED ITEM OR SERVICE: HCPCS | Performed by: STUDENT IN AN ORGANIZED HEALTH CARE EDUCATION/TRAINING PROGRAM

## 2020-01-05 PROCEDURE — 94667 MNPJ CHEST WALL 1ST: CPT

## 2020-01-05 PROCEDURE — 93010 ELECTROCARDIOGRAM REPORT: CPT | Performed by: INTERNAL MEDICINE

## 2020-01-05 PROCEDURE — A9270 NON-COVERED ITEM OR SERVICE: HCPCS | Performed by: NURSE PRACTITIONER

## 2020-01-05 PROCEDURE — 700102 HCHG RX REV CODE 250 W/ 637 OVERRIDE(OP): Performed by: NURSE PRACTITIONER

## 2020-01-05 PROCEDURE — 80053 COMPREHEN METABOLIC PANEL: CPT

## 2020-01-05 PROCEDURE — 36415 COLL VENOUS BLD VENIPUNCTURE: CPT

## 2020-01-05 PROCEDURE — 700105 HCHG RX REV CODE 258: Performed by: STUDENT IN AN ORGANIZED HEALTH CARE EDUCATION/TRAINING PROGRAM

## 2020-01-05 PROCEDURE — 94640 AIRWAY INHALATION TREATMENT: CPT

## 2020-01-05 PROCEDURE — 770020 HCHG ROOM/CARE - TELE (206)

## 2020-01-05 RX ORDER — IPRATROPIUM BROMIDE AND ALBUTEROL SULFATE 2.5; .5 MG/3ML; MG/3ML
3 SOLUTION RESPIRATORY (INHALATION)
Status: DISCONTINUED | OUTPATIENT
Start: 2020-01-05 | End: 2020-01-06

## 2020-01-05 RX ORDER — PROCHLORPERAZINE EDISYLATE 5 MG/ML
10 INJECTION INTRAMUSCULAR; INTRAVENOUS EVERY 6 HOURS PRN
Status: DISCONTINUED | OUTPATIENT
Start: 2020-01-05 | End: 2020-01-08 | Stop reason: HOSPADM

## 2020-01-05 RX ORDER — BUDESONIDE AND FORMOTEROL FUMARATE DIHYDRATE 80; 4.5 UG/1; UG/1
2 AEROSOL RESPIRATORY (INHALATION) 2 TIMES DAILY
Status: DISCONTINUED | OUTPATIENT
Start: 2020-01-06 | End: 2020-01-08 | Stop reason: HOSPADM

## 2020-01-05 RX ORDER — OMEPRAZOLE 20 MG/1
20 CAPSULE, DELAYED RELEASE ORAL DAILY
Status: DISCONTINUED | OUTPATIENT
Start: 2020-01-05 | End: 2020-01-08 | Stop reason: HOSPADM

## 2020-01-05 RX ADMIN — AMLODIPINE BESYLATE 10 MG: 10 TABLET ORAL at 05:14

## 2020-01-05 RX ADMIN — METOPROLOL TARTRATE 25 MG: 25 TABLET, FILM COATED ORAL at 05:14

## 2020-01-05 RX ADMIN — PREDNISONE 40 MG: 20 TABLET ORAL at 05:14

## 2020-01-05 RX ADMIN — DOXYCYCLINE 100 MG: 100 TABLET, FILM COATED ORAL at 05:14

## 2020-01-05 RX ADMIN — GUAIFENESIN 600 MG: 600 TABLET, EXTENDED RELEASE ORAL at 05:14

## 2020-01-05 RX ADMIN — METOPROLOL TARTRATE 25 MG: 25 TABLET, FILM COATED ORAL at 18:37

## 2020-01-05 RX ADMIN — HYDRALAZINE HYDROCHLORIDE 25 MG: 25 TABLET, FILM COATED ORAL at 21:53

## 2020-01-05 RX ADMIN — GABAPENTIN 100 MG: 100 CAPSULE ORAL at 18:37

## 2020-01-05 RX ADMIN — SEVELAMER CARBONATE 800 MG: 800 TABLET, FILM COATED ORAL at 08:47

## 2020-01-05 RX ADMIN — IPRATROPIUM BROMIDE AND ALBUTEROL SULFATE 3 ML: .5; 3 SOLUTION RESPIRATORY (INHALATION) at 14:28

## 2020-01-05 RX ADMIN — HYDRALAZINE HYDROCHLORIDE 25 MG: 25 TABLET, FILM COATED ORAL at 05:14

## 2020-01-05 RX ADMIN — IPRATROPIUM BROMIDE AND ALBUTEROL SULFATE 3 ML: .5; 3 SOLUTION RESPIRATORY (INHALATION) at 20:37

## 2020-01-05 RX ADMIN — SEVELAMER CARBONATE 800 MG: 800 TABLET, FILM COATED ORAL at 18:37

## 2020-01-05 RX ADMIN — FERROUS SULFATE TAB 325 MG (65 MG ELEMENTAL FE) 325 MG: 325 (65 FE) TAB at 18:37

## 2020-01-05 RX ADMIN — CEFTRIAXONE SODIUM 1 G: 1 INJECTION, POWDER, FOR SOLUTION INTRAMUSCULAR; INTRAVENOUS at 05:12

## 2020-01-05 RX ADMIN — TERAZOSIN HYDROCHLORIDE ANHYDROUS 5 MG: 5 CAPSULE ORAL at 21:53

## 2020-01-05 RX ADMIN — BUDESONIDE AND FORMOTEROL FUMARATE DIHYDRATE 2 PUFF: 80; 4.5 AEROSOL RESPIRATORY (INHALATION) at 20:37

## 2020-01-05 RX ADMIN — SEVELAMER CARBONATE 800 MG: 800 TABLET, FILM COATED ORAL at 12:37

## 2020-01-05 RX ADMIN — HEPARIN SODIUM 5000 UNITS: 5000 INJECTION, SOLUTION INTRAVENOUS; SUBCUTANEOUS at 14:48

## 2020-01-05 RX ADMIN — OMEPRAZOLE 20 MG: 20 CAPSULE, DELAYED RELEASE ORAL at 12:36

## 2020-01-05 RX ADMIN — HEPARIN SODIUM 5000 UNITS: 5000 INJECTION, SOLUTION INTRAVENOUS; SUBCUTANEOUS at 21:54

## 2020-01-05 RX ADMIN — HYDRALAZINE HYDROCHLORIDE 10 MG: 20 INJECTION INTRAMUSCULAR; INTRAVENOUS at 01:23

## 2020-01-05 RX ADMIN — GABAPENTIN 100 MG: 100 CAPSULE ORAL at 05:14

## 2020-01-05 RX ADMIN — GUAIFENESIN 600 MG: 600 TABLET, EXTENDED RELEASE ORAL at 18:37

## 2020-01-05 RX ADMIN — HEPARIN SODIUM 5000 UNITS: 5000 INJECTION, SOLUTION INTRAVENOUS; SUBCUTANEOUS at 05:13

## 2020-01-05 RX ADMIN — PROCHLORPERAZINE EDISYLATE 10 MG: 5 INJECTION INTRAMUSCULAR; INTRAVENOUS at 12:41

## 2020-01-05 RX ADMIN — LIDOCAINE HYDROCHLORIDE 30 ML: 20 SOLUTION OROPHARYNGEAL at 02:27

## 2020-01-05 RX ADMIN — FERROUS SULFATE TAB 325 MG (65 MG ELEMENTAL FE) 325 MG: 325 (65 FE) TAB at 08:47

## 2020-01-05 RX ADMIN — NICOTINE 14 MG: 14 PATCH TRANSDERMAL at 05:14

## 2020-01-05 RX ADMIN — HYDRALAZINE HYDROCHLORIDE 10 MG: 20 INJECTION INTRAMUSCULAR; INTRAVENOUS at 08:50

## 2020-01-05 RX ADMIN — HYDRALAZINE HYDROCHLORIDE 25 MG: 25 TABLET, FILM COATED ORAL at 14:48

## 2020-01-05 ASSESSMENT — ENCOUNTER SYMPTOMS
SHORTNESS OF BREATH: 1
EYE REDNESS: 0
MUSCULOSKELETAL NEGATIVE: 1
FLANK PAIN: 0
FEVER: 0
HEADACHES: 0
DOUBLE VISION: 0
DIARRHEA: 1
FEVER: 1
DEPRESSION: 0
EYE PAIN: 0
EYES NEGATIVE: 1
PSYCHIATRIC NEGATIVE: 1
BLOOD IN STOOL: 0
CARDIOVASCULAR NEGATIVE: 1
PND: 0
CLAUDICATION: 0
SPUTUM PRODUCTION: 1
DIAPHORESIS: 0
STRIDOR: 0
HEMOPTYSIS: 0
TREMORS: 0
VOMITING: 1
NAUSEA: 1
DIZZINESS: 0
WHEEZING: 1
COUGH: 1
NEUROLOGICAL NEGATIVE: 1
TINGLING: 0
BLURRED VISION: 0
PALPITATIONS: 0
EYE DISCHARGE: 0
SEIZURES: 0
ABDOMINAL PAIN: 0
HEARTBURN: 0
SORE THROAT: 0
MYALGIAS: 0
WEIGHT LOSS: 0
CHILLS: 0
PHOTOPHOBIA: 0
ORTHOPNEA: 0

## 2020-01-05 NOTE — PROGRESS NOTES
Internal Medicine Interval Note  Note Author: Jael Andrea M.D.      Name Isabelle Coyle     1965    Age/Sex 54 y.o. /female    MRN [id number]    Code Status Full Code     After 5PM or if no immediate response to page, please call for cross-coverage  Attending/Team: Dr. Bonilla/Agustín See Patient List for primary contact information  Call [phone] to page    1st Call - Day Intern (R1):   Dr. Andrea 2nd Call - Day Sr. Resident (R2/R3):   Dr. Westfall         Reason for interval visit  (Principal Problem)    shortness of breath    Interval Problem Daily Status Update  (24 hours, problem oriented, brief subjective history, new lab/imaging data pertinent to that problem)   The patient is having nausea this morning, with some epigastric discomfort  She also c/o food being stuck while swallowing.  She reported also one loose watery stool episode this morning. But she denied blood in stool  She still have cough, wheezes and pleuritic chest pain      Review of Systems   Constitutional: Negative for chills, diaphoresis, fever, malaise/fatigue and weight loss.   HENT: Negative for ear discharge, ear pain, hearing loss, sore throat and tinnitus.    Eyes: Negative for blurred vision, double vision, photophobia, pain, discharge and redness.   Respiratory: Positive for cough, sputum production, shortness of breath and wheezing. Negative for hemoptysis and stridor.         Pleuritic chest pain   Cardiovascular: Positive for leg swelling. Negative for chest pain, palpitations, orthopnea, claudication and PND.   Gastrointestinal: Positive for diarrhea, nausea and vomiting. Negative for abdominal pain, blood in stool, heartburn and melena.   Genitourinary: Negative for flank pain.        The patient is anuric    Musculoskeletal: Negative for myalgias.   Neurological: Negative for dizziness, tingling, tremors, seizures and headaches.   Psychiatric/Behavioral: Negative for depression and suicidal ideas.             Consultants/Specialty  Nephrology         Quality Measures  Quality-Core Measures   Reviewed items::  EKG reviewed, Labs reviewed, Medications reviewed and Radiology images reviewed  Murdock catheter::  No Murdock  DVT prophylaxis pharmacological::  Heparin           Physical Exam   Physical Exam   Constitutional: She is oriented to person, place, and time and well-developed, well-nourished, and in no distress. No distress.   HENT:   Head: Normocephalic and atraumatic.   Right Ear: External ear normal.   Left Ear: External ear normal.   Nose: Nose normal.   Mouth/Throat: Oropharynx is clear and moist. No oropharyngeal exudate.   Eyes: Pupils are equal, round, and reactive to light. Conjunctivae and EOM are normal. Right eye exhibits no discharge. Left eye exhibits no discharge. No scleral icterus.   Neck: Normal range of motion. Neck supple. No JVD present. No tracheal deviation present. No thyromegaly present.   Cardiovascular: Normal rate, regular rhythm, normal heart sounds and intact distal pulses. Exam reveals no gallop and no friction rub.   No murmur heard.  Systolic murmur    Pulmonary/Chest: No stridor. She is in respiratory distress. She has wheezes. She has rales. She exhibits tenderness.   Abdominal: She exhibits no distension and no mass. There is tenderness. There is no rebound and no guarding.   Epigastric tenderness    Musculoskeletal:         General: Deformity and edema present.      Comments: Right BKA with prosthetic leg    Lymphadenopathy:     She has no cervical adenopathy.   Neurological: She is alert and oriented to person, place, and time.   Skin: Skin is warm. She is not diaphoretic.   Psychiatric: Memory and affect normal.      Vitals:    01/05/20 0000 01/05/20 0403 01/05/20 0844 01/05/20 1008   BP: (!) 181/95 149/90 (!) 182/105 (!) (P) 166/89   Pulse: 79 85 87 (P) 85   Resp: 16 16 16    Temp: 37.1 °C (98.7 °F) 37.3 °C (99.1 °F) 36.9 °C (98.4 °F)    TempSrc: Temporal Temporal Temporal     SpO2: 96% 97% 94%    Weight:       Height:          Body mass index is 24.56 kg/m².   Pulse Oximetry: 94 %, O2 (LPM): 1, O2 Delivery: Silicone Nasal Cannula      Recent Labs     01/03/20  1232 01/04/20  0216 01/05/20  0236   WBC 4.2* 2.3* 5.3   RBC 3.26* 3.47* 3.38*   HEMOGLOBIN 10.3* 10.5* 10.1*   HEMATOCRIT 32.0* 34.1* 33.6*   MCV 98.2* 98.3* 99.4*   MCH 31.6 30.3 29.9   RDW 69.0* 67.4* 68.2*   PLATELETCT 79* 74* 82*   MPV 11.1 9.9 11.3   NEUTSPOLYS 73.10* 82.30* 75.70*   LYMPHOCYTES 17.40* 13.30* 18.60*   MONOCYTES 6.20 2.60 4.90   EOSINOPHILS 2.60 0.00 0.00   BASOPHILS 0.50 0.00 0.60   RBCMORPHOLO Present Present  --       Recent Labs     01/03/20  1232 01/04/20  0216 01/05/20  0236   SODIUM 140 143 142   POTASSIUM 4.6 4.4 4.3   CHLORIDE 95* 100 97   CO2 29 29 30   GLUCOSE 89 156* 107*   BUN 67* 29* 54*      No current facility-administered medications on file prior to encounter.      Current Outpatient Medications on File Prior to Encounter   Medication Sig Dispense Refill   • hydrALAZINE (APRESOLINE) 25 MG Tab Take 25 mg by mouth 3 times a day as needed.     • terazosin (HYTRIN) 5 MG Cap Take 5 mg by mouth every evening.     • labetalol (NORMODYNE) 200 MG Tab Take 200 mg by mouth 2 times a day.     • gabapentin (NEURONTIN) 100 MG Cap Take 1 Cap by mouth 2 Times a Day. 180 Cap 0   • metoprolol (LOPRESSOR) 25 MG Tab Take 1 Tab by mouth 2 Times a Day. 60 Tab 0   • amLODIPine (NORVASC) 10 MG Tab Take 10 mg by mouth every day.     • albuterol 108 (90 Base) MCG/ACT Aero Soln inhalation aerosol Inhale 1 Puff by mouth 2 times a day as needed for Shortness of Breath.     • sevelamer carbonate (RENVELA) 800 MG Tab tablet Take 800 mg by mouth 3 times a day, with meals.        Home Medications     Reviewed by Rona Rapp (Pharmacy Tech) on 01/03/20 at 1432  Med List Status: Complete   Medication Last Dose Status   albuterol 108 (90 Base) MCG/ACT Aero Soln inhalation aerosol PRN Active   amLODIPine (NORVASC) 10 MG  Tab 1/3/2020 Active   gabapentin (NEURONTIN) 100 MG Cap 1/3/2020 Active   hydrALAZINE (APRESOLINE) 25 MG Tab 1/3/2020 Active   labetalol (NORMODYNE) 200 MG Tab 1/3/2020 Active   metoprolol (LOPRESSOR) 25 MG Tab 1/3/2020 Active   sevelamer carbonate (RENVELA) 800 MG Tab tablet 1/2/2020 Active   terazosin (HYTRIN) 5 MG Cap 1/2/2020 Active                 Assessment/Plan   * Shortness of breath  Assessment & Plan  multifactorial  Volume overload from missing HD, possible COPD exacerbation and possible acute pneumonia.  HD done yesterday   On empiric treatment for CAP and COPD exacerbation   Will continue to monitor    Community acquired pneumonia- (present on admission)  Assessment & Plan  Increase shortness of breath, cough and increase sputum  B/l wheezes and crackles   CXR right middle and lower lobe infiltrate suggestive of pneumonia  Pro-calcitonine  On C3 plus doxycycline  Continue to monitor      Probable COPD with acute exacerbation (HCC)- (present on admission)  Assessment & Plan  The patient doesn't have official diagnosis of COPD,  No PFT on file  She presented with increase cough, sputum with hx of heavy tobacco abuse for many years.  Diffuse bilateral wheezes on both lung fields  CXR revealed possible middle and lower lobe pneumonia  Will start empiric treatement for COPD exacerbation with Duo-nebs and prednisone 40 mg  Ceftriaxone and doxycycline for possible pneumonia  Incentive spirometry  Mucomyst also added to help with secretions  Continue to monitor      Acute respiratory failure with hypoxia (HCC)  Assessment & Plan  - Patient presents with acute hypoxic respiratory failure likely secondary to pneumonia based on clinical and  CXR findings.   - could be also secondary to volume overload due to missing HD  - will initiate ABx after blood cultures  - follow up with Blood cultures results  - on oxygen supplementation   Will continue to monitor     Hypertension  Assessment & Plan  -Poorly controlled  hypertension, likely secondary to non adherence to meds or HD.  - will continue amlodipine, hydralazine and metoprolol and terazosin  Will hold labetalol for now due to concern of COPD exacerbation  - will need to change metoprolol to Coreg if BP still uncontrolled  - will continue to monitor on Tele, BP is currently stable.    ESRD on hemodialysis (HCC)- (present on admission)  Assessment & Plan  History of ESRD  and she is on HD MWF  last HD session was on Tuesday(which was adjusted due to holiday), she was scheduled for the HD today, however she felt very week and short of breath which prompted her to ED presentation.  Nephrology on board, she had HD since admission, next HD on 1/5/2020.    Anemia  Assessment & Plan  Likely secondary to her ESRD(Anemia of chronic disease)  However mcv is mildly elevated  ferritin is 720, Vitamin B12 of 1039, normal Folate  Will continue to monitor  Nephrology on board      Vomiting  Assessment & Plan  The patient does complain of GERD symptoms, Nausea and inability to swallow.  She does have one episode of emesis and loose watery stool.  Mild epigastric tenderness elicited on abdominal examination  LFTs are normal  Will give PRN nausea meds and Omeprazole  Will D/C doxycyline as Doxycyline is irritable on gastric and esophageal mucosa  Could be secondary to uremia as well(uremic gastritis), will continue to follow scheduled HD tomorrow.    H/O paroxysmal supraventricular tachycardia  Assessment & Plan  S/p ablation in 2018  Currently on sinus rhythm  Stable

## 2020-01-05 NOTE — PROGRESS NOTES
Monitor Summary:    SR 80-84  [facility], [facility]  .18/.08/.36

## 2020-01-05 NOTE — PROGRESS NOTES
Nephrology Daily Progress Note    Date of Service  1/5/2020    Chief Complaint  The patient is a 54-year-old female with history   of end-stage renal disease, on hemodialysis Monday, Wednesday, and Friday at   [facility], history of hypertension, anemia of chronic kidney disease,   renal osteodystrophy, who presents to the hospital with shortness of breath   and missed her normal Monday, Wednesday, and Friday dialysis.  She is presumed  to have pneumonia with some element of pulmonary edema, hence, nephrology was  asked to see her for her ESRD and dialysis management and shortness of   breath.       Interval Problem Update  1/3- Consult done  1/4- HD early this AM with 2L net UF, pt reports ongoing hacking cough and subjective fever  1/5- Pt sitting up reporting vomiting and acid reflux, + cough    Review of Systems  Review of Systems   Constitutional: Positive for fever and malaise/fatigue.   HENT: Negative.    Eyes: Negative.    Respiratory: Positive for cough.    Cardiovascular: Negative.    Gastrointestinal: Positive for nausea and vomiting.   Genitourinary: Negative.    Musculoskeletal: Negative.    Skin: Negative.    Neurological: Negative.    Endo/Heme/Allergies: Negative.    Psychiatric/Behavioral: Negative.         Physical Exam  Temp:  [36.2 °C (97.1 °F)-37.3 °C (99.1 °F)] 36.9 °C (98.4 °F)  Pulse:  [77-88] 87  Resp:  [15-18] 16  BP: (145-182)/() 182/105  SpO2:  [94 %-98 %] 94 %    Physical Exam  Constitutional:       Appearance: She is ill-appearing.   HENT:      Head: Normocephalic and atraumatic.      Nose: Nose normal.      Mouth/Throat:      Pharynx: Oropharynx is clear.   Eyes:      Extraocular Movements: Extraocular movements intact.      Pupils: Pupils are equal, round, and reactive to light.   Neck:      Musculoskeletal: Normal range of motion.   Cardiovascular:      Rate and Rhythm: Normal rate and regular rhythm.      Pulses: Normal pulses.      Heart sounds: Normal heart sounds.       Comments: L AVF +T/B   Pulmonary:      Breath sounds: Wheezing and rales present.   Abdominal:      General: Abdomen is flat. Bowel sounds are normal.      Palpations: Abdomen is soft.   Musculoskeletal: Normal range of motion.      Comments: RLE prosthetic   Skin:     General: Skin is warm and dry.   Neurological:      General: No focal deficit present.      Mental Status: She is alert and oriented to person, place, and time.   Psychiatric:         Mood and Affect: Mood normal.         Behavior: Behavior normal.         Fluids    Intake/Output Summary (Last 24 hours) at 1/5/2020 0939  Last data filed at 1/4/2020 1800  Gross per 24 hour   Intake 280 ml   Output --   Net 280 ml       Laboratory  Recent Labs     01/03/20  1232 01/04/20  0216 01/05/20  0236   WBC 4.2* 2.3* 5.3   RBC 3.26* 3.47* 3.38*   HEMOGLOBIN 10.3* 10.5* 10.1*   HEMATOCRIT 32.0* 34.1* 33.6*   MCV 98.2* 98.3* 99.4*   MCH 31.6 30.3 29.9   MCHC 32.2* 30.8* 30.1*   RDW 69.0* 67.4* 68.2*   PLATELETCT 79* 74* 82*   MPV 11.1 9.9 11.3     Recent Labs     01/03/20  1232 01/04/20  0216 01/05/20  0236   SODIUM 140 143 142   POTASSIUM 4.6 4.4 4.3   CHLORIDE 95* 100 97   CO2 29 29 30   GLUCOSE 89 156* 107*   BUN 67* 29* 54*   CREATININE 9.80* 5.21* 7.27*   CALCIUM 8.5 8.9 9.3         No results for input(s): NTPROBNP in the last 72 hours.        Assessment/Plan  1.  End-stage renal disease, on hemodialysis Monday, Wednesday, and Friday.    2.  Renal osteodystrophy.  On sevelamer.   3.  Anemia of chronic kidney disease. At goal Hgb 10-11. Iron deficient.   4.  Chronic obstructive pulmonary disease.    5.  Possible pneumonia/aeCOPD.  Empiric ATB/RT protocol in effect.   6.  Pulmonary edema.    7.  Hypertension. If BP uncontrolled, can consider changing metoprolol to carvedilol as metoprolol dialyzable.   8.  Leukopenia      PLAN:    - HD qMWF, next treatment MON   - Continue phos binders WM  - UF with HD as tolerated  - Avoid nephrotoxins, dose meds for ESRD  -  Renal diet  - PO Fe WM  - ATB per primary svc        Thank you,

## 2020-01-05 NOTE — CARE PLAN
Problem: Bronchoconstriction:  Goal: Improve in air movement and diminished wheezing  Outcome: PROGRESSING AS EXPECTED   QID Duo w/ flutter valve  BID Symbicort

## 2020-01-05 NOTE — PROGRESS NOTES
Pt complaining of tightness in throat and inability to swallow. Pt states that it feels like the food got stuck and then came back up.  [facility] paged and notified of pt's difficulty with swallowing.    1400: Pt states that this is something that has happened to her before and can take hours to resolve.

## 2020-01-06 LAB
ALBUMIN SERPL BCP-MCNC: 3.4 G/DL (ref 3.2–4.9)
ALBUMIN/GLOB SERPL: 1.4 G/DL
ALP SERPL-CCNC: 49 U/L (ref 30–99)
ALT SERPL-CCNC: 8 U/L (ref 2–50)
ANION GAP SERPL CALC-SCNC: 15 MMOL/L (ref 0–11.9)
ANISOCYTOSIS BLD QL SMEAR: ABNORMAL
AST SERPL-CCNC: 12 U/L (ref 12–45)
BASOPHILS # BLD AUTO: 0.2 % (ref 0–1.8)
BASOPHILS # BLD: 0.01 K/UL (ref 0–0.12)
BILIRUB SERPL-MCNC: 0.5 MG/DL (ref 0.1–1.5)
BUN SERPL-MCNC: 74 MG/DL (ref 8–22)
BURR CELLS BLD QL SMEAR: NORMAL
C DIFF DNA SPEC QL NAA+PROBE: NEGATIVE
C DIFF TOX GENS STL QL NAA+PROBE: NEGATIVE
CALCIUM SERPL-MCNC: 8.9 MG/DL (ref 8.5–10.5)
CHLORIDE SERPL-SCNC: 99 MMOL/L (ref 96–112)
CO2 SERPL-SCNC: 26 MMOL/L (ref 20–33)
COMMENT 1642: NORMAL
CREAT SERPL-MCNC: 9.05 MG/DL (ref 0.5–1.4)
DACRYOCYTES BLD QL SMEAR: NORMAL
EKG IMPRESSION: NORMAL
EOSINOPHIL # BLD AUTO: 0.01 K/UL (ref 0–0.51)
EOSINOPHIL NFR BLD: 0.2 % (ref 0–6.9)
ERYTHROCYTE [DISTWIDTH] IN BLOOD BY AUTOMATED COUNT: 65.4 FL (ref 35.9–50)
GIANT PLATELETS BLD QL SMEAR: NORMAL
GLOBULIN SER CALC-MCNC: 2.5 G/DL (ref 1.9–3.5)
GLUCOSE SERPL-MCNC: 95 MG/DL (ref 65–99)
HCT VFR BLD AUTO: 32.7 % (ref 37–47)
HGB BLD-MCNC: 9.9 G/DL (ref 12–16)
IMM GRANULOCYTES # BLD AUTO: 0.02 K/UL (ref 0–0.11)
IMM GRANULOCYTES NFR BLD AUTO: 0.4 % (ref 0–0.9)
LYMPHOCYTES # BLD AUTO: 0.95 K/UL (ref 1–4.8)
LYMPHOCYTES NFR BLD: 20.3 % (ref 22–41)
MACROCYTES BLD QL SMEAR: ABNORMAL
MCH RBC QN AUTO: 29.9 PG (ref 27–33)
MCHC RBC AUTO-ENTMCNC: 30.3 G/DL (ref 33.6–35)
MCV RBC AUTO: 98.8 FL (ref 81.4–97.8)
MONOCYTES # BLD AUTO: 0.31 K/UL (ref 0–0.85)
MONOCYTES NFR BLD AUTO: 6.6 % (ref 0–13.4)
MORPHOLOGY BLD-IMP: NORMAL
NEUTROPHILS # BLD AUTO: 3.38 K/UL (ref 2–7.15)
NEUTROPHILS NFR BLD: 72.3 % (ref 44–72)
NRBC # BLD AUTO: 0 K/UL
NRBC BLD-RTO: 0 /100 WBC
OVALOCYTES BLD QL SMEAR: NORMAL
PLATELET # BLD AUTO: 80 K/UL (ref 164–446)
PLATELET BLD QL SMEAR: NORMAL
PMV BLD AUTO: 11.9 FL (ref 9–12.9)
POIKILOCYTOSIS BLD QL SMEAR: NORMAL
POLYCHROMASIA BLD QL SMEAR: NORMAL
POTASSIUM SERPL-SCNC: 5.7 MMOL/L (ref 3.6–5.5)
PROT SERPL-MCNC: 5.9 G/DL (ref 6–8.2)
RBC # BLD AUTO: 3.31 M/UL (ref 4.2–5.4)
RBC BLD AUTO: PRESENT
SCHISTOCYTES BLD QL SMEAR: NORMAL
SODIUM SERPL-SCNC: 140 MMOL/L (ref 135–145)
WBC # BLD AUTO: 4.7 K/UL (ref 4.8–10.8)
WBC STL QL MICRO: NORMAL

## 2020-01-06 PROCEDURE — A9270 NON-COVERED ITEM OR SERVICE: HCPCS | Performed by: NURSE PRACTITIONER

## 2020-01-06 PROCEDURE — 80053 COMPREHEN METABOLIC PANEL: CPT

## 2020-01-06 PROCEDURE — A9270 NON-COVERED ITEM OR SERVICE: HCPCS | Performed by: STUDENT IN AN ORGANIZED HEALTH CARE EDUCATION/TRAINING PROGRAM

## 2020-01-06 PROCEDURE — 700105 HCHG RX REV CODE 258: Performed by: STUDENT IN AN ORGANIZED HEALTH CARE EDUCATION/TRAINING PROGRAM

## 2020-01-06 PROCEDURE — 94669 MECHANICAL CHEST WALL OSCILL: CPT

## 2020-01-06 PROCEDURE — 700102 HCHG RX REV CODE 250 W/ 637 OVERRIDE(OP): Performed by: STUDENT IN AN ORGANIZED HEALTH CARE EDUCATION/TRAINING PROGRAM

## 2020-01-06 PROCEDURE — 99233 SBSQ HOSP IP/OBS HIGH 50: CPT | Mod: GC | Performed by: INTERNAL MEDICINE

## 2020-01-06 PROCEDURE — 700111 HCHG RX REV CODE 636 W/ 250 OVERRIDE (IP): Performed by: STUDENT IN AN ORGANIZED HEALTH CARE EDUCATION/TRAINING PROGRAM

## 2020-01-06 PROCEDURE — 89055 LEUKOCYTE ASSESSMENT FECAL: CPT

## 2020-01-06 PROCEDURE — 90935 HEMODIALYSIS ONE EVALUATION: CPT

## 2020-01-06 PROCEDURE — 700102 HCHG RX REV CODE 250 W/ 637 OVERRIDE(OP): Performed by: NURSE PRACTITIONER

## 2020-01-06 PROCEDURE — 85025 COMPLETE CBC W/AUTO DIFF WBC: CPT

## 2020-01-06 PROCEDURE — A9270 NON-COVERED ITEM OR SERVICE: HCPCS | Performed by: INTERNAL MEDICINE

## 2020-01-06 PROCEDURE — 36415 COLL VENOUS BLD VENIPUNCTURE: CPT

## 2020-01-06 PROCEDURE — 700102 HCHG RX REV CODE 250 W/ 637 OVERRIDE(OP): Performed by: INTERNAL MEDICINE

## 2020-01-06 PROCEDURE — 94640 AIRWAY INHALATION TREATMENT: CPT

## 2020-01-06 PROCEDURE — 94668 MNPJ CHEST WALL SBSQ: CPT

## 2020-01-06 PROCEDURE — 770020 HCHG ROOM/CARE - TELE (206)

## 2020-01-06 PROCEDURE — 700101 HCHG RX REV CODE 250: Performed by: STUDENT IN AN ORGANIZED HEALTH CARE EDUCATION/TRAINING PROGRAM

## 2020-01-06 PROCEDURE — 87493 C DIFF AMPLIFIED PROBE: CPT

## 2020-01-06 RX ORDER — CARVEDILOL 6.25 MG/1
6.25 TABLET ORAL 2 TIMES DAILY WITH MEALS
Status: DISCONTINUED | OUTPATIENT
Start: 2020-01-06 | End: 2020-01-07

## 2020-01-06 RX ORDER — IPRATROPIUM BROMIDE AND ALBUTEROL SULFATE 2.5; .5 MG/3ML; MG/3ML
3 SOLUTION RESPIRATORY (INHALATION)
Status: DISCONTINUED | OUTPATIENT
Start: 2020-01-06 | End: 2020-01-08

## 2020-01-06 RX ADMIN — PREDNISONE 40 MG: 20 TABLET ORAL at 05:17

## 2020-01-06 RX ADMIN — CEFTRIAXONE SODIUM 1 G: 1 INJECTION, POWDER, FOR SOLUTION INTRAMUSCULAR; INTRAVENOUS at 05:18

## 2020-01-06 RX ADMIN — HYDRALAZINE HYDROCHLORIDE 25 MG: 25 TABLET, FILM COATED ORAL at 13:58

## 2020-01-06 RX ADMIN — BUDESONIDE AND FORMOTEROL FUMARATE DIHYDRATE 2 PUFF: 80; 4.5 AEROSOL RESPIRATORY (INHALATION) at 05:18

## 2020-01-06 RX ADMIN — AMLODIPINE BESYLATE 10 MG: 10 TABLET ORAL at 05:16

## 2020-01-06 RX ADMIN — IPRATROPIUM BROMIDE AND ALBUTEROL SULFATE 3 ML: .5; 3 SOLUTION RESPIRATORY (INHALATION) at 19:31

## 2020-01-06 RX ADMIN — HYDRALAZINE HYDROCHLORIDE 10 MG: 20 INJECTION INTRAMUSCULAR; INTRAVENOUS at 19:46

## 2020-01-06 RX ADMIN — HYDRALAZINE HYDROCHLORIDE 25 MG: 25 TABLET, FILM COATED ORAL at 05:17

## 2020-01-06 RX ADMIN — FERROUS SULFATE TAB 325 MG (65 MG ELEMENTAL FE) 325 MG: 325 (65 FE) TAB at 10:25

## 2020-01-06 RX ADMIN — GABAPENTIN 100 MG: 100 CAPSULE ORAL at 05:17

## 2020-01-06 RX ADMIN — CARVEDILOL 6.25 MG: 6.25 TABLET, FILM COATED ORAL at 17:33

## 2020-01-06 RX ADMIN — METOPROLOL TARTRATE 25 MG: 25 TABLET, FILM COATED ORAL at 05:17

## 2020-01-06 RX ADMIN — ANTACID TABLETS 500 MG: 500 TABLET, CHEWABLE ORAL at 06:00

## 2020-01-06 RX ADMIN — SEVELAMER CARBONATE 800 MG: 800 TABLET, FILM COATED ORAL at 13:58

## 2020-01-06 RX ADMIN — OMEPRAZOLE 20 MG: 20 CAPSULE, DELAYED RELEASE ORAL at 05:17

## 2020-01-06 RX ADMIN — HYDRALAZINE HYDROCHLORIDE 25 MG: 25 TABLET, FILM COATED ORAL at 21:34

## 2020-01-06 RX ADMIN — HEPARIN SODIUM 5000 UNITS: 5000 INJECTION, SOLUTION INTRAVENOUS; SUBCUTANEOUS at 05:17

## 2020-01-06 RX ADMIN — TERAZOSIN HYDROCHLORIDE ANHYDROUS 5 MG: 5 CAPSULE ORAL at 21:35

## 2020-01-06 RX ADMIN — GUAIFENESIN 600 MG: 600 TABLET, EXTENDED RELEASE ORAL at 05:17

## 2020-01-06 RX ADMIN — HEPARIN SODIUM 5000 UNITS: 5000 INJECTION, SOLUTION INTRAVENOUS; SUBCUTANEOUS at 13:58

## 2020-01-06 RX ADMIN — BUDESONIDE AND FORMOTEROL FUMARATE DIHYDRATE 2 PUFF: 80; 4.5 AEROSOL RESPIRATORY (INHALATION) at 17:36

## 2020-01-06 RX ADMIN — SEVELAMER CARBONATE 800 MG: 800 TABLET, FILM COATED ORAL at 17:33

## 2020-01-06 RX ADMIN — FERROUS SULFATE TAB 325 MG (65 MG ELEMENTAL FE) 325 MG: 325 (65 FE) TAB at 17:33

## 2020-01-06 RX ADMIN — NICOTINE 14 MG: 14 PATCH TRANSDERMAL at 05:19

## 2020-01-06 RX ADMIN — GUAIFENESIN 600 MG: 600 TABLET, EXTENDED RELEASE ORAL at 17:33

## 2020-01-06 RX ADMIN — GABAPENTIN 100 MG: 100 CAPSULE ORAL at 17:33

## 2020-01-06 RX ADMIN — SEVELAMER CARBONATE 800 MG: 800 TABLET, FILM COATED ORAL at 10:25

## 2020-01-06 ASSESSMENT — ENCOUNTER SYMPTOMS
MUSCULOSKELETAL NEGATIVE: 1
ABDOMINAL PAIN: 0
TINGLING: 0
STRIDOR: 0
SHORTNESS OF BREATH: 1
WEIGHT LOSS: 0
CARDIOVASCULAR NEGATIVE: 1
DIARRHEA: 1
SORE THROAT: 0
FLANK PAIN: 0
EYE REDNESS: 0
DIAPHORESIS: 0
WHEEZING: 1
PSYCHIATRIC NEGATIVE: 1
EYES NEGATIVE: 1
PALPITATIONS: 0
HEMOPTYSIS: 0
HEARTBURN: 0
FEVER: 1
PHOTOPHOBIA: 0
NAUSEA: 1
EYE PAIN: 0
VOMITING: 1
HEADACHES: 0
COUGH: 1
DOUBLE VISION: 0
FEVER: 0
SPUTUM PRODUCTION: 1
TREMORS: 0
ORTHOPNEA: 0
BLURRED VISION: 0
CHILLS: 0
NEUROLOGICAL NEGATIVE: 1
DEPRESSION: 0
EYE DISCHARGE: 0
DIZZINESS: 0
CLAUDICATION: 0
BLOOD IN STOOL: 0
PND: 0
SEIZURES: 0
MYALGIAS: 0

## 2020-01-06 NOTE — CARE PLAN
Problem: Bronchoconstriction:  Goal: Improve in air movement and diminished wheezing  Intervention: Implement inhaled treatments  Note:   Duo QID  Symbicort

## 2020-01-06 NOTE — PROGRESS NOTES
Nephrology Daily Progress Note    Date of Service  1/6/2020     Author: Fariha JEAN-BAPTISTE, CNN-NP  Collaborating Physician: Dr. Tello     Chief Complaint  The patient is a 54-year-old female with history   of end-stage renal disease, on hemodialysis Monday, Wednesday, and Friday at   [facility], history of hypertension, anemia of chronic kidney disease,   renal osteodystrophy, who presents to the hospital with shortness of breath   and missed her normal Monday, Wednesday, and Friday dialysis.  She is presumed  to have pneumonia with some element of pulmonary edema, hence, nephrology was  asked to see her for her ESRD and dialysis management and shortness of   breath.       Interval Problem Update  1/3- Consult done  1/4- HD early this AM with 2L net UF, pt reports ongoing hacking cough and subjective fever  1/5- Pt sitting up reporting vomiting and acid reflux, + cough  1/6 - sitting up in bed, continues with cough and fatigue, for HD today     Review of Systems  Review of Systems   Constitutional: Positive for fever and malaise/fatigue.   HENT: Negative.    Eyes: Negative.    Respiratory: Positive for cough.    Cardiovascular: Negative.    Gastrointestinal: Positive for nausea and vomiting.   Genitourinary: Negative.    Musculoskeletal: Negative.    Skin: Negative.    Neurological: Negative.    Endo/Heme/Allergies: Negative.    Psychiatric/Behavioral: Negative.         Physical Exam  Temp:  [36.4 °C (97.5 °F)-37.1 °C (98.8 °F)] 36.4 °C (97.5 °F)  Pulse:  [77-97] 77  Resp:  [16-20] 16  BP: (162-175)/() 170/97  SpO2:  [90 %-98 %] 94 %    Physical Exam  Constitutional:       Appearance: She is ill-appearing.   HENT:      Head: Normocephalic and atraumatic.      Nose: Nose normal.      Mouth/Throat:      Pharynx: Oropharynx is clear.   Eyes:      Extraocular Movements: Extraocular movements intact.      Pupils: Pupils are equal, round, and reactive to light.   Neck:      Musculoskeletal: Normal range of  motion.   Cardiovascular:      Rate and Rhythm: Normal rate and regular rhythm.      Pulses: Normal pulses.      Heart sounds: Normal heart sounds.      Comments: L AVF +T/B   Pulmonary:      Breath sounds: Wheezing and rales present.   Abdominal:      General: Abdomen is flat. Bowel sounds are normal.      Palpations: Abdomen is soft.   Musculoskeletal: Normal range of motion.      Comments: RLE prosthetic   Skin:     General: Skin is warm and dry.   Neurological:      General: No focal deficit present.      Mental Status: She is alert and oriented to person, place, and time.   Psychiatric:         Mood and Affect: Mood normal.         Behavior: Behavior normal.         Fluids    Intake/Output Summary (Last 24 hours) at 1/6/2020 1159  Last data filed at 1/5/2020 1300  Gross per 24 hour   Intake 240 ml   Output --   Net 240 ml       Laboratory  Recent Labs     01/04/20  0216 01/05/20  0236 01/06/20 0228   WBC 2.3* 5.3 4.7*   RBC 3.47* 3.38* 3.31*   HEMOGLOBIN 10.5* 10.1* 9.9*   HEMATOCRIT 34.1* 33.6* 32.7*   MCV 98.3* 99.4* 98.8*   MCH 30.3 29.9 29.9   MCHC 30.8* 30.1* 30.3*   RDW 67.4* 68.2* 65.4*   PLATELETCT 74* 82* 80*   MPV 9.9 11.3 11.9     Recent Labs     01/04/20  0216 01/05/20  0236 01/06/20 0228   SODIUM 143 142 140   POTASSIUM 4.4 4.3 5.7*   CHLORIDE 100 97 99   CO2 29 30 26   GLUCOSE 156* 107* 95   BUN 29* 54* 74*   CREATININE 5.21* 7.27* 9.05*   CALCIUM 8.9 9.3 8.9         No results for input(s): NTPROBNP in the last 72 hours.        Assessment/Plan  1.  End-stage renal disease, on hemodialysis Monday, Wednesday, and Friday.  2.  Renal osteodystrophy.  On sevelamer.   3.  Anemia of chronic kidney disease. At goal Hgb 10-11. Iron deficient.   4.  Chronic obstructive pulmonary disease.    5.  Possible pneumonia/aeCOPD.  Empiric ATB/RT protocol in effect.   6.  Pulmonary edema   UF with HD as tolerated   7.  Hypertension. If BP uncontrolled, can consider changing metoprolol to carvedilol as metoprolol  dialyzable.   8.  Hyperkalemia, mild   Manage with HD     PLAN:    - HD qMWF, next treatment (Mon)  - Continue phos binders WM  - UF with HD as tolerated  - Avoid nephrotoxins, dose meds for ESRD  - Renal diet  - PO Fe WM  - ATB per primary svc   - Okay to transition to outpatient hemodialysis when medically stable        Thank you,

## 2020-01-06 NOTE — ASSESSMENT & PLAN NOTE
She is having hx of PAF not on anticoagulation  Another discussion also made about anticoagulation, she opted not to be on Anticoagulation

## 2020-01-06 NOTE — PROGRESS NOTES
Monitor room called this RN, patient has apparently converted to AFib. STAT EKG ordered for further workup. Patient sitting at edge of bed, talking, asymptomatic. Patient denies any prior history of AFib

## 2020-01-06 NOTE — PROGRESS NOTES
Internal Medicine Interval Note  Note Author: Jael Andrea M.D.      Name Isabelle Coyle     1965    Age/Sex 54 y.o. /female    MRN [id number]    Code Status Full Code     After 5PM or if no immediate response to page, please call for cross-coverage  Attending/Team: Dr. Bonilla/Agustín See Patient List for primary contact information  Call [phone] to page    1st Call - Day Intern (R1):   Dr. Andrea 2nd Call - Day Sr. Resident (R2/R3):   Dr. Westfall         Reason for interval visit  (Principal Problem)    shortness of breath    Interval Problem Daily Status Update  (24 hours, problem oriented, brief subjective history, new lab/imaging data pertinent to that problem)   The patient is having nausea this morning, with some epigastric discomfort  She also c/o food being stuck while swallowing.  She reported also one loose watery stool episode this morning. But she denied blood in stool  She still have cough, wheezes and pleuritic chest pain      Review of Systems   Constitutional: Negative for chills, diaphoresis, fever, malaise/fatigue and weight loss.   HENT: Negative for ear discharge, ear pain, hearing loss, sore throat and tinnitus.    Eyes: Negative for blurred vision, double vision, photophobia, pain, discharge and redness.   Respiratory: Positive for cough, sputum production, shortness of breath and wheezing. Negative for hemoptysis and stridor.         Pleuritic chest pain   Cardiovascular: Positive for leg swelling. Negative for chest pain, palpitations, orthopnea, claudication and PND.   Gastrointestinal: Positive for diarrhea, nausea and vomiting. Negative for abdominal pain, blood in stool, heartburn and melena.   Genitourinary: Negative for flank pain.        The patient is anuric    Musculoskeletal: Negative for myalgias.   Neurological: Negative for dizziness, tingling, tremors, seizures and headaches.   Psychiatric/Behavioral: Negative for depression and suicidal ideas.             Consultants/Specialty  Nephrology         Quality Measures  Quality-Core Measures   Reviewed items::  EKG reviewed, Labs reviewed, Medications reviewed and Radiology images reviewed  Murdock catheter::  No Murdock  DVT prophylaxis pharmacological::  Heparin           Physical Exam   Physical Exam   Constitutional: She is oriented to person, place, and time and well-developed, well-nourished, and in no distress. No distress.   HENT:   Head: Normocephalic and atraumatic.   Right Ear: External ear normal.   Left Ear: External ear normal.   Nose: Nose normal.   Mouth/Throat: Oropharynx is clear and moist. No oropharyngeal exudate.   Eyes: Pupils are equal, round, and reactive to light. Conjunctivae and EOM are normal. Right eye exhibits no discharge. Left eye exhibits no discharge. No scleral icterus.   Neck: Normal range of motion. Neck supple. No JVD present. No tracheal deviation present. No thyromegaly present.   Cardiovascular: Normal rate, regular rhythm, normal heart sounds and intact distal pulses. Exam reveals no gallop and no friction rub.   No murmur heard.  Systolic murmur    Pulmonary/Chest: No stridor. She is in respiratory distress. She has wheezes. She has rales. She exhibits tenderness.   Abdominal: She exhibits no distension and no mass. There is tenderness. There is no rebound and no guarding.   Epigastric tenderness    Musculoskeletal:         General: Deformity and edema present.      Comments: Right BKA with prosthetic leg    Lymphadenopathy:     She has no cervical adenopathy.   Neurological: She is alert and oriented to person, place, and time.   Skin: Skin is warm. She is not diaphoretic.   Psychiatric: Memory and affect normal.      Vitals:    01/05/20 2037 01/06/20 0043 01/06/20 0404 01/06/20 0828   BP:  (!) 163/97 (!) 162/94 (!) 170/97   Pulse: 83 97 78 77   Resp: 16 16 16 16   Temp:  36.6 °C (97.9 °F) 36.6 °C (97.8 °F) 36.4 °C (97.5 °F)   TempSrc:  Temporal Temporal Temporal   SpO2: 93%  90% 98% 94%   Weight:       Height:          Body mass index is 24.15 kg/m².   Pulse Oximetry: 94 %, O2 (LPM): 105, O2 Delivery: Silicone Nasal Cannula      Recent Labs     01/03/20  1232 01/04/20  0216 01/05/20  0236 01/06/20  0228   WBC 4.2* 2.3* 5.3 4.7*   RBC 3.26* 3.47* 3.38* 3.31*   HEMOGLOBIN 10.3* 10.5* 10.1* 9.9*   HEMATOCRIT 32.0* 34.1* 33.6* 32.7*   MCV 98.2* 98.3* 99.4* 98.8*   MCH 31.6 30.3 29.9 29.9   RDW 69.0* 67.4* 68.2* 65.4*   PLATELETCT 79* 74* 82* 80*   MPV 11.1 9.9 11.3 11.9   NEUTSPOLYS 73.10* 82.30* 75.70* 72.30*   LYMPHOCYTES 17.40* 13.30* 18.60* 20.30*   MONOCYTES 6.20 2.60 4.90 6.60   EOSINOPHILS 2.60 0.00 0.00 0.20   BASOPHILS 0.50 0.00 0.60 0.20   RBCMORPHOLO Present Present  --  Present      Recent Labs     01/04/20  0216 01/05/20  0236 01/06/20  0228   SODIUM 143 142 140   POTASSIUM 4.4 4.3 5.7*   CHLORIDE 100 97 99   CO2 29 30 26   GLUCOSE 156* 107* 95   BUN 29* 54* 74*      No current facility-administered medications on file prior to encounter.      Current Outpatient Medications on File Prior to Encounter   Medication Sig Dispense Refill   • hydrALAZINE (APRESOLINE) 25 MG Tab Take 25 mg by mouth 3 times a day as needed.     • terazosin (HYTRIN) 5 MG Cap Take 5 mg by mouth every evening.     • labetalol (NORMODYNE) 200 MG Tab Take 200 mg by mouth 2 times a day.     • gabapentin (NEURONTIN) 100 MG Cap Take 1 Cap by mouth 2 Times a Day. 180 Cap 0   • metoprolol (LOPRESSOR) 25 MG Tab Take 1 Tab by mouth 2 Times a Day. 60 Tab 0   • amLODIPine (NORVASC) 10 MG Tab Take 10 mg by mouth every day.     • albuterol 108 (90 Base) MCG/ACT Aero Soln inhalation aerosol Inhale 1 Puff by mouth 2 times a day as needed for Shortness of Breath.     • sevelamer carbonate (RENVELA) 800 MG Tab tablet Take 800 mg by mouth 3 times a day, with meals.        Home Medications     Reviewed by Rona Rapp (Pharmacy Tech) on 01/03/20 at 1432  Med List Status: Complete   Medication Last Dose Status    albuterol 108 (90 Base) MCG/ACT Aero Soln inhalation aerosol PRN Active   amLODIPine (NORVASC) 10 MG Tab 1/3/2020 Active   gabapentin (NEURONTIN) 100 MG Cap 1/3/2020 Active   hydrALAZINE (APRESOLINE) 25 MG Tab 1/3/2020 Active   labetalol (NORMODYNE) 200 MG Tab 1/3/2020 Active   metoprolol (LOPRESSOR) 25 MG Tab 1/3/2020 Active   sevelamer carbonate (RENVELA) 800 MG Tab tablet 1/2/2020 Active   terazosin (HYTRIN) 5 MG Cap 1/2/2020 Active                 Assessment/Plan   * Shortness of breath  Assessment & Plan  multifactorial  Volume overload from missing HD, possible COPD exacerbation and possible acute pneumonia.  HD done yesterday   On empiric treatment for CAP and COPD exacerbation   Will continue to monitor    Community acquired pneumonia- (present on admission)  Assessment & Plan  Increase shortness of breath, cough and increase sputum  Positive RSV on nasal swap  B/l wheezes and crackles   CXR right middle and lower lobe infiltrate suggestive of pneumonia  Pro-calcitonin is elevated  On C3 plus doxycycline  Continue to monitor      Probable COPD with acute exacerbation (HCC)- (present on admission)  Assessment & Plan  The patient doesn't have official diagnosis of COPD,  No PFT on file  She presented with increase cough, sputum with hx of heavy tobacco abuse for many years.  Diffuse bilateral wheezes on both lung fields  CXR revealed possible middle and lower lobe pneumonia  Will start empiric treatement for COPD exacerbation with Duo-nebs and prednisone 40 mg  Ceftriaxone and doxycycline for possible pneumonia  Incentive spirometry  Mucomyst also added to help with secretions  Continue to monitor      Acute respiratory failure with hypoxia (HCC)  Assessment & Plan  - Patient presents with acute hypoxic respiratory failure likely secondary to pneumonia based on clinical and  CXR findings.   - could be also secondary to volume overload due to missing HD  - will initiate ABx after blood cultures  - follow up  with Blood cultures results  - on oxygen supplementation   Will continue to monitor     Hypertension  Assessment & Plan  -Poorly controlled hypertension, likely secondary to non adherence to meds or HD.  - will continue amlodipine, hydralazine and metoprolol and terazosin  Will hold labetalol for now due to concern of COPD exacerbation  - metoprolol changed to Coreg  - will continue to monitor on Tele, BP is currently stable.    ESRD on hemodialysis (HCC)- (present on admission)  Assessment & Plan  History of ESRD  and she is on HD MWF  last HD session was on Tuesday(which was adjusted due to holiday), she was scheduled for the HD today, however she felt very week and short of breath which prompted her to ED presentation.  Nephrology on board, she had HD since admission, she will be having HD session today.    Anemia  Assessment & Plan  Likely secondary to her ESRD(Anemia of chronic disease)  However mcv is mildly elevated  ferritin is 720, Vitamin B12 of 1039, normal Folate  Will continue to monitor  Nephrology on board      Paroxysmal atrial fibrillation (HCC)- (present on admission)  Assessment & Plan  She is having hx of PAF  She opted to use     Vomiting  Assessment & Plan  Improved  She denied any vomiting, GERD or GI upset today.  Will continue to monitor.    H/O paroxysmal supraventricular tachycardia  Assessment & Plan  S/p ablation in 2018  Currently on sinus rhythm  Stable

## 2020-01-06 NOTE — PROGRESS NOTES
"EKG confirms AFib, HR in 140s at this time. Patient denies chest pain but does have a \"flutter\" feeling with some increased work of breathing at rest. [facility] Red team paged, they are aware. Giving scheduled PO Metoprolol at this time, continue to monitor   "

## 2020-01-06 NOTE — DISCHARGE PLANNING
Patient is eligible for Medicaid Meds to Beds at discharge if they have coverage with [place] Medicaid, Medicaid FFS, Medicaid HMO ([facility]), or [place]. This service is provided through the [facility] if orders are received prior to 4 p.m. Monday through Friday, excluding holidays. Please call [phone] prior to discharge.

## 2020-01-07 LAB
ALBUMIN SERPL BCP-MCNC: 3.6 G/DL (ref 3.2–4.9)
ALBUMIN/GLOB SERPL: 1.3 G/DL
ALP SERPL-CCNC: 51 U/L (ref 30–99)
ALT SERPL-CCNC: 9 U/L (ref 2–50)
ANION GAP SERPL CALC-SCNC: 12 MMOL/L (ref 0–11.9)
ANISOCYTOSIS BLD QL SMEAR: ABNORMAL
AST SERPL-CCNC: 18 U/L (ref 12–45)
BASOPHILS # BLD AUTO: 0.9 % (ref 0–1.8)
BASOPHILS # BLD: 0.05 K/UL (ref 0–0.12)
BILIRUB SERPL-MCNC: 0.6 MG/DL (ref 0.1–1.5)
BUN SERPL-MCNC: 35 MG/DL (ref 8–22)
BURR CELLS BLD QL SMEAR: NORMAL
CALCIUM SERPL-MCNC: 9.1 MG/DL (ref 8.5–10.5)
CHLORIDE SERPL-SCNC: 103 MMOL/L (ref 96–112)
CO2 SERPL-SCNC: 25 MMOL/L (ref 20–33)
CREAT SERPL-MCNC: 5.43 MG/DL (ref 0.5–1.4)
EOSINOPHIL # BLD AUTO: 0 K/UL (ref 0–0.51)
EOSINOPHIL NFR BLD: 0 % (ref 0–6.9)
ERYTHROCYTE [DISTWIDTH] IN BLOOD BY AUTOMATED COUNT: 65.1 FL (ref 35.9–50)
GLOBULIN SER CALC-MCNC: 2.7 G/DL (ref 1.9–3.5)
GLUCOSE SERPL-MCNC: 99 MG/DL (ref 65–99)
HCT VFR BLD AUTO: 32.9 % (ref 37–47)
HGB BLD-MCNC: 10.1 G/DL (ref 12–16)
LYMPHOCYTES # BLD AUTO: 1.15 K/UL (ref 1–4.8)
LYMPHOCYTES NFR BLD: 22.1 % (ref 22–41)
MACROCYTES BLD QL SMEAR: ABNORMAL
MANUAL DIFF BLD: NORMAL
MCH RBC QN AUTO: 30.2 PG (ref 27–33)
MCHC RBC AUTO-ENTMCNC: 30.7 G/DL (ref 33.6–35)
MCV RBC AUTO: 98.5 FL (ref 81.4–97.8)
MONOCYTES # BLD AUTO: 0.09 K/UL (ref 0–0.85)
MONOCYTES NFR BLD AUTO: 1.8 % (ref 0–13.4)
MORPHOLOGY BLD-IMP: NORMAL
NEUTROPHILS # BLD AUTO: 3.91 K/UL (ref 2–7.15)
NEUTROPHILS NFR BLD: 75.2 % (ref 44–72)
NRBC # BLD AUTO: 0 K/UL
NRBC BLD-RTO: 0 /100 WBC
OVALOCYTES BLD QL SMEAR: NORMAL
PLATELET # BLD AUTO: 93 K/UL (ref 164–446)
PLATELET BLD QL SMEAR: NORMAL
PMV BLD AUTO: 10.7 FL (ref 9–12.9)
POIKILOCYTOSIS BLD QL SMEAR: NORMAL
POTASSIUM SERPL-SCNC: 4.4 MMOL/L (ref 3.6–5.5)
PROT SERPL-MCNC: 6.3 G/DL (ref 6–8.2)
RBC # BLD AUTO: 3.34 M/UL (ref 4.2–5.4)
RBC BLD AUTO: PRESENT
SODIUM SERPL-SCNC: 140 MMOL/L (ref 135–145)
WBC # BLD AUTO: 5.2 K/UL (ref 4.8–10.8)

## 2020-01-07 PROCEDURE — A9270 NON-COVERED ITEM OR SERVICE: HCPCS | Performed by: NURSE PRACTITIONER

## 2020-01-07 PROCEDURE — 700102 HCHG RX REV CODE 250 W/ 637 OVERRIDE(OP): Performed by: STUDENT IN AN ORGANIZED HEALTH CARE EDUCATION/TRAINING PROGRAM

## 2020-01-07 PROCEDURE — 770020 HCHG ROOM/CARE - TELE (206)

## 2020-01-07 PROCEDURE — 700105 HCHG RX REV CODE 258: Performed by: STUDENT IN AN ORGANIZED HEALTH CARE EDUCATION/TRAINING PROGRAM

## 2020-01-07 PROCEDURE — 99232 SBSQ HOSP IP/OBS MODERATE 35: CPT | Mod: GC | Performed by: INTERNAL MEDICINE

## 2020-01-07 PROCEDURE — A9270 NON-COVERED ITEM OR SERVICE: HCPCS | Performed by: STUDENT IN AN ORGANIZED HEALTH CARE EDUCATION/TRAINING PROGRAM

## 2020-01-07 PROCEDURE — 85027 COMPLETE CBC AUTOMATED: CPT

## 2020-01-07 PROCEDURE — 85007 BL SMEAR W/DIFF WBC COUNT: CPT

## 2020-01-07 PROCEDURE — 700102 HCHG RX REV CODE 250 W/ 637 OVERRIDE(OP): Performed by: NURSE PRACTITIONER

## 2020-01-07 PROCEDURE — 94640 AIRWAY INHALATION TREATMENT: CPT

## 2020-01-07 PROCEDURE — 94669 MECHANICAL CHEST WALL OSCILL: CPT

## 2020-01-07 PROCEDURE — 94668 MNPJ CHEST WALL SBSQ: CPT

## 2020-01-07 PROCEDURE — 80053 COMPREHEN METABOLIC PANEL: CPT

## 2020-01-07 PROCEDURE — 700111 HCHG RX REV CODE 636 W/ 250 OVERRIDE (IP): Performed by: STUDENT IN AN ORGANIZED HEALTH CARE EDUCATION/TRAINING PROGRAM

## 2020-01-07 PROCEDURE — 36415 COLL VENOUS BLD VENIPUNCTURE: CPT

## 2020-01-07 PROCEDURE — 700101 HCHG RX REV CODE 250: Performed by: STUDENT IN AN ORGANIZED HEALTH CARE EDUCATION/TRAINING PROGRAM

## 2020-01-07 PROCEDURE — 700105 HCHG RX REV CODE 258

## 2020-01-07 PROCEDURE — 94760 N-INVAS EAR/PLS OXIMETRY 1: CPT

## 2020-01-07 RX ORDER — SODIUM CHLORIDE 9 MG/ML
INJECTION, SOLUTION INTRAVENOUS
Status: COMPLETED
Start: 2020-01-07 | End: 2020-01-07

## 2020-01-07 RX ORDER — NIFEDIPINE 30 MG/1
30 TABLET, EXTENDED RELEASE ORAL
Status: DISCONTINUED | OUTPATIENT
Start: 2020-01-07 | End: 2020-01-08

## 2020-01-07 RX ORDER — CARVEDILOL 12.5 MG/1
12.5 TABLET ORAL 2 TIMES DAILY WITH MEALS
Status: DISCONTINUED | OUTPATIENT
Start: 2020-01-07 | End: 2020-01-08

## 2020-01-07 RX ADMIN — HEPARIN SODIUM 5000 UNITS: 5000 INJECTION, SOLUTION INTRAVENOUS; SUBCUTANEOUS at 21:56

## 2020-01-07 RX ADMIN — HYDRALAZINE HYDROCHLORIDE 10 MG: 20 INJECTION INTRAMUSCULAR; INTRAVENOUS at 20:21

## 2020-01-07 RX ADMIN — IPRATROPIUM BROMIDE AND ALBUTEROL SULFATE 3 ML: .5; 3 SOLUTION RESPIRATORY (INHALATION) at 19:13

## 2020-01-07 RX ADMIN — GUAIFENESIN 600 MG: 600 TABLET, EXTENDED RELEASE ORAL at 17:19

## 2020-01-07 RX ADMIN — CARVEDILOL 6.25 MG: 6.25 TABLET, FILM COATED ORAL at 05:03

## 2020-01-07 RX ADMIN — SEVELAMER CARBONATE 800 MG: 800 TABLET, FILM COATED ORAL at 05:04

## 2020-01-07 RX ADMIN — PREDNISONE 40 MG: 20 TABLET ORAL at 05:02

## 2020-01-07 RX ADMIN — GUAIFENESIN 600 MG: 600 TABLET, EXTENDED RELEASE ORAL at 05:02

## 2020-01-07 RX ADMIN — BUDESONIDE AND FORMOTEROL FUMARATE DIHYDRATE 2 PUFF: 80; 4.5 AEROSOL RESPIRATORY (INHALATION) at 17:20

## 2020-01-07 RX ADMIN — NIFEDIPINE 30 MG: 30 TABLET, FILM COATED, EXTENDED RELEASE ORAL at 11:01

## 2020-01-07 RX ADMIN — SODIUM CHLORIDE: 9 INJECTION, SOLUTION INTRAVENOUS at 05:15

## 2020-01-07 RX ADMIN — IPRATROPIUM BROMIDE AND ALBUTEROL SULFATE 3 ML: .5; 3 SOLUTION RESPIRATORY (INHALATION) at 08:07

## 2020-01-07 RX ADMIN — OMEPRAZOLE 20 MG: 20 CAPSULE, DELAYED RELEASE ORAL at 05:02

## 2020-01-07 RX ADMIN — ANTACID TABLETS 500 MG: 500 TABLET, CHEWABLE ORAL at 05:02

## 2020-01-07 RX ADMIN — NICOTINE 14 MG: 14 PATCH TRANSDERMAL at 05:03

## 2020-01-07 RX ADMIN — FERROUS SULFATE TAB 325 MG (65 MG ELEMENTAL FE) 325 MG: 325 (65 FE) TAB at 05:15

## 2020-01-07 RX ADMIN — HYDRALAZINE HYDROCHLORIDE 25 MG: 25 TABLET, FILM COATED ORAL at 05:02

## 2020-01-07 RX ADMIN — HYDRALAZINE HYDROCHLORIDE 10 MG: 20 INJECTION INTRAMUSCULAR; INTRAVENOUS at 01:40

## 2020-01-07 RX ADMIN — GABAPENTIN 100 MG: 100 CAPSULE ORAL at 17:19

## 2020-01-07 RX ADMIN — CEFTRIAXONE SODIUM 1 G: 1 INJECTION, POWDER, FOR SOLUTION INTRAMUSCULAR; INTRAVENOUS at 05:03

## 2020-01-07 RX ADMIN — HYDRALAZINE HYDROCHLORIDE 10 MG: 20 INJECTION INTRAMUSCULAR; INTRAVENOUS at 02:59

## 2020-01-07 RX ADMIN — SEVELAMER CARBONATE 800 MG: 800 TABLET, FILM COATED ORAL at 17:19

## 2020-01-07 RX ADMIN — AMLODIPINE BESYLATE 10 MG: 10 TABLET ORAL at 05:02

## 2020-01-07 RX ADMIN — HEPARIN SODIUM 5000 UNITS: 5000 INJECTION, SOLUTION INTRAVENOUS; SUBCUTANEOUS at 14:07

## 2020-01-07 RX ADMIN — GABAPENTIN 100 MG: 100 CAPSULE ORAL at 05:02

## 2020-01-07 RX ADMIN — HYDRALAZINE HYDROCHLORIDE 25 MG: 25 TABLET, FILM COATED ORAL at 14:07

## 2020-01-07 RX ADMIN — BUDESONIDE AND FORMOTEROL FUMARATE DIHYDRATE 2 PUFF: 80; 4.5 AEROSOL RESPIRATORY (INHALATION) at 06:25

## 2020-01-07 RX ADMIN — HYDRALAZINE HYDROCHLORIDE 25 MG: 25 TABLET, FILM COATED ORAL at 21:56

## 2020-01-07 RX ADMIN — TERAZOSIN HYDROCHLORIDE ANHYDROUS 5 MG: 5 CAPSULE ORAL at 20:21

## 2020-01-07 RX ADMIN — CARVEDILOL 12.5 MG: 12.5 TABLET, FILM COATED ORAL at 17:19

## 2020-01-07 RX ADMIN — FERROUS SULFATE TAB 325 MG (65 MG ELEMENTAL FE) 325 MG: 325 (65 FE) TAB at 17:19

## 2020-01-07 RX ADMIN — IPRATROPIUM BROMIDE AND ALBUTEROL SULFATE 3 ML: .5; 3 SOLUTION RESPIRATORY (INHALATION) at 14:50

## 2020-01-07 RX ADMIN — SEVELAMER CARBONATE 800 MG: 800 TABLET, FILM COATED ORAL at 11:45

## 2020-01-07 ASSESSMENT — ENCOUNTER SYMPTOMS
ORTHOPNEA: 0
STRIDOR: 0
TREMORS: 0
FEVER: 0
EYES NEGATIVE: 1
MYALGIAS: 0
DIARRHEA: 1
NAUSEA: 1
DIAPHORESIS: 0
EYE DISCHARGE: 0
NAUSEA: 0
CLAUDICATION: 0
VOMITING: 0
TINGLING: 0
DIZZINESS: 0
BACK PAIN: 0
SORE THROAT: 0
DIARRHEA: 0
MUSCULOSKELETAL NEGATIVE: 1
PHOTOPHOBIA: 0
WEIGHT LOSS: 0
BLOOD IN STOOL: 0
SHORTNESS OF BREATH: 1
EYE REDNESS: 0
HEADACHES: 0
FLANK PAIN: 0
PALPITATIONS: 0
DEPRESSION: 0
HEMOPTYSIS: 0
CONSTIPATION: 0
EYE PAIN: 0
WHEEZING: 1
NEUROLOGICAL NEGATIVE: 1
VOMITING: 1
HEARTBURN: 0
SEIZURES: 0
SPUTUM PRODUCTION: 1
ORTHOPNEA: 1
ABDOMINAL PAIN: 0
CARDIOVASCULAR NEGATIVE: 1
COUGH: 1
CHILLS: 0
PND: 0
BLURRED VISION: 0
DOUBLE VISION: 0
PSYCHIATRIC NEGATIVE: 1

## 2020-01-07 NOTE — NON-PROVIDER
Internal Medicine Interval Note  Note Author: Concepción Marrero, Student      Name Isabelle Coyle     1965    Age/Sex 54 y.o. /female    MRN [id number]    Code Status full     After 5PM or if no immediate response to page, please call for cross-coverage  Attending/Team: red See Patient List for primary contact information  Call [phone] to page    1st Call - Day Intern (R1):    2nd Call - Day Sr. Resident (R2/R3):            Reason for interval visit  (Principal Problem)   COPD exacerbation and possible CAP      Interval Problem Daily Status Update  (24 hours, problem oriented, brief subjective history, new lab/imaging data pertinent to that problem)       Review of Systems   Constitutional: Negative for chills, diaphoresis, fever, malaise/fatigue and weight loss.   Respiratory: Positive for cough, sputum production, shortness of breath and wheezing. Negative for hemoptysis.    Cardiovascular: Positive for orthopnea. Negative for chest pain, palpitations and leg swelling.   Gastrointestinal: Negative for abdominal pain, blood in stool, constipation, diarrhea, heartburn, nausea and vomiting.   Genitourinary: Negative for dysuria and hematuria.   Musculoskeletal: Negative for back pain, joint pain and myalgias.   Skin: Negative.  Negative for itching and rash.   Neurological: Negative for dizziness, tingling and headaches.        Disposition/Barriers to discharge:   Stable condition prior to discharge      Consultants/Specialty  Nephrology and RT          Quality Measures  Quality-Core Measures         Physical Exam   Physical Exam   Constitutional: She is oriented to person, place, and time and well-developed, well-nourished, and in no distress.   HENT:   Head: Normocephalic and atraumatic.   Eyes: Pupils are equal, round, and reactive to light.   Neck: Neck supple.   Cardiovascular: Normal rate, regular rhythm and intact distal pulses.   Pulmonary/Chest: She has wheezes.   Bilateral wheezing    Abdominal: Soft. Bowel sounds are normal. She exhibits no distension and no mass. There is no tenderness. There is no rebound and no guarding.   Musculoskeletal: Normal range of motion.   Neurological: She is alert and oriented to person, place, and time.   Skin: Skin is warm and dry.   Nursing note and vitals reviewed.     Vitals:    01/07/20 0137 01/07/20 0258 01/07/20 0415 01/07/20 0807   BP: (!) 193/104 (!) 180/102 (!) 176/88    Pulse: 82  91 90   Resp: 17  18 18   Temp: 37.3 °C (99.1 °F)  36.7 °C (98.1 °F)    TempSrc: Temporal  Temporal    SpO2: 95%  96% 93%   Weight:   58.1 kg (128 lb 1.4 oz)    Height:          Body mass index is 23.43 kg/m².   Pulse Oximetry: 93 %, O2 (LPM): 3, O2 Delivery: Silicone Nasal Cannula      Recent Labs     01/05/20  0236 01/06/20 0228 01/07/20 0222   WBC 5.3 4.7* 5.2   RBC 3.38* 3.31* 3.34*   HEMOGLOBIN 10.1* 9.9* 10.1*   HEMATOCRIT 33.6* 32.7* 32.9*   MCV 99.4* 98.8* 98.5*   MCH 29.9 29.9 30.2   RDW 68.2* 65.4* 65.1*   PLATELETCT 82* 80* 93*   MPV 11.3 11.9 10.7   NEUTSPOLYS 75.70* 72.30* 75.20*   LYMPHOCYTES 18.60* 20.30* 22.10   MONOCYTES 4.90 6.60 1.80   EOSINOPHILS 0.00 0.20 0.00   BASOPHILS 0.60 0.20 0.90   RBCMORPHOLO  --  Present Present      Recent Labs     01/05/20  0236 01/06/20 0228 01/07/20 0222   SODIUM 142 140 140   POTASSIUM 4.3 5.7* 4.4   CHLORIDE 97 99 103   CO2 30 26 25   GLUCOSE 107* 95 99   BUN 54* 74* 35*      carvedilol, 12.5 mg, Oral, BID WITH MEALS  ipratropium-albuterol, 3 mL, Nebulization, Q6HRS (RT)  omeprazole, 20 mg, Oral, DAILY  budesonide-formoterol, 2 Puff, Inhalation, BID  guaiFENesin ER, 600 mg, Oral, Q12HRS  ferrous sulfate, 325 mg, Oral, BID WITH MEALS  calcium carbonate, 500 mg, Oral, DAILY  nicotine, 14 mg, Transdermal, Daily-0600  gabapentin, 100 mg, Oral, BID  amLODIPine, 10 mg, Oral, DAILY  terazosin, 5 mg, Oral, Nightly  sevelamer carbonate, 800 mg, Oral, TID WITH MEALS  heparin, 5,000 Units, Subcutaneous, Q8HRS  hydrALAZINE, 25 mg,  Oral, Q8HRS  Pharmacy, , Other, PHARMACY TO DOSE       Home Medications     Reviewed by Rona Rapp (Pharmacy Tech) on 01/03/20 at 1432  Med List Status: Complete   Medication Last Dose Status   albuterol 108 (90 Base) MCG/ACT Aero Soln inhalation aerosol PRN Active   amLODIPine (NORVASC) 10 MG Tab 1/3/2020 Active   gabapentin (NEURONTIN) 100 MG Cap 1/3/2020 Active   hydrALAZINE (APRESOLINE) 25 MG Tab 1/3/2020 Active   labetalol (NORMODYNE) 200 MG Tab 1/3/2020 Active   metoprolol (LOPRESSOR) 25 MG Tab 1/3/2020 Active   sevelamer carbonate (RENVELA) 800 MG Tab tablet 1/2/2020 Active   terazosin (HYTRIN) 5 MG Cap 1/2/2020 Active                 Assessment/Plan   * Shortness of breath  Assessment & Plan  Continue ipratropium-albuterol, 3 mL, Nebulization, Q6HRS and budesonide-formoterol, 2 Puff, Inhalation, BID  RT on board  Will continue to monitor    Community acquired pneumonia- (present on admission)  Assessment & Plan  Increase shortness of breath, cough and increase sputum  B/l wheezes   Consider ordering a repeat chest-ray to reevaluate   Continue to monitor      Probable COPD with acute exacerbation (HCC)- (present on admission)  Assessment & Plan  No official diagnosis of COPD,  Consider ordering PFTs  Diffuse bilateral wheezes bilaterally  CXR revealed possible middle and lower lobe pneumonia  Encourage Incentive spirometry  Continue Guaifenesin to help with secretions  Continue to monitor      Acute respiratory failure with hypoxia (HCC)  Assessment & Plan  on oxygen supplementation, 3L via nasal cannula   Will continue to monitor     Hypertension  Assessment & Plan  -Uncontrolled   - Will continue amlodipine, hydralazine, carvedilol,  and terazosin  - will continue to monitor on Tele,     ESRD on hemodialysis (HCC)- (present on admission)  Assessment & Plan  History of ESRD, on HD MWF  Nephrology on board  Anemia  Assessment & Plan  Likely secondary to her ESRD(Anemia of chronic disease)  Continue  Ferrous sulfate  RBC 3.34, Hemoglobin 10.1, Hematocrit 32.9, MCV 98.5, MCH 30.2  Will continue to monitor  Nephrology on board      Paroxysmal atrial fibrillation (HCC)- (present on admission)  Assessment & Plan  Stable condition, continue to monitor      Vomiting  Assessment & Plan  Improved  She denied any vomiting, GERD or GI upset today.  Will continue to monitor.    H/O paroxysmal supraventricular tachycardia  Assessment & Plan  S/p ablation in 2018  Currently on sinus rhythm  Stable condition, continue to monitor

## 2020-01-07 NOTE — PROGRESS NOTES
[facility] Progress Note    Hemodialysis treatment ordered today per Dr. Tello x 3.5 hours.   Pt requested tx time of 3.25 hrs   Treatment initiated at 1820, ended at 2135.     Patient hypertensive throughout tx; medicated x2 per primary RN with little effect; see paper flow sheet for details.     Net UF 2000 mL.     Needles removed from access site. Dressings applied and sites held x 20 minutes; verified no bleeding. Positive bruit/thrill post tx. Staff RN to monitor AVF for breakthrough bleeding. Should breakthrough bleeding occur, staff RN to apply pressure to access sites until bleeding resolved. Notify Dialysis and Nephrologist for follow-up.    Report given to Primary RN.

## 2020-01-07 NOTE — CARE PLAN
Respiratory Update    Treatment modality: Duo  Frequency: Q6 (MD ORDER)    Pt tolerating current treatments well with no adverse reactions.

## 2020-01-07 NOTE — DISCHARGE PLANNING
Care Transition Team Assessment     RN NINA met with patient at bedside. Patient lives with a roommate and is independent with all ADL's and IADL's and drives self to dialysis and appointments.   Patient does not have an advance directive and refusing booklet at this time.   She is very eager to discharge home. Discharge needs currently unknown.    Information Source  Orientation : Oriented x 4  Information Given By: Patient  Who is responsible for making decisions for patient? : Patient    Readmission Evaluation  Is this a readmission?: Yes - unplanned readmission  Why do you think you were readmitted?: SOB  Did you understand your discharge instructions?: Yes    Elopement Risk  Legal Hold: No  Ambulatory or Self Mobile in Wheelchair: Yes  Disoriented: No  Psychiatric Symptoms: None  History of Wandering: No  Elopement this Admit: No  Vocalizing Wanting to Leave: No  Displays Behaviors, Body Language Wanting to Leave: No-Not at Risk for Elopement  Elopement Risk: Not at Risk for Elopement    Interdisciplinary Discharge Planning  Does Admitting Nurse Feel This Could be a Complex Discharge?: No  Lives with - Patient's Self Care Capacity: Unrelated Adult  Patient or legal guardian wants to designate a caregiver (see row info): No  Do You Take your Prescribed Medications Regularly: Yes  Able to Return to Previous ADL's: Yes  Mobility Issues: No  Prior Services: None  Patient Expects to be Discharged to:: home  Assistance Needed: Unknown at this Time    Discharge Preparedness  What is your plan after discharge?: Other (comment)(home)  Prior Functional Level: Ambulatory, Drives Self, Independent with Activities of Daily Living, Independent with Medication Management    Functional Assesment  Prior Functional Level: Ambulatory, Drives Self, Independent with Activities of Daily Living, Independent with Medication Management    Finances  Financial Barriers to Discharge: No  Prescription Coverage: Yes    Vision / Hearing  Impairment  Vision Impairment : Yes  Right Eye Vision: Impaired, Wears Glasses  Left Eye Vision: Impaired, Wears Glasses  Hearing Impairment : No    Advance Directive  Advance Directive?: None  Advance Directive offered?: AD Booklet refused    Domestic Abuse  Have you ever been the victim of abuse or violence?: No  Physical Abuse or Sexual Abuse: No  Verbal Abuse or Emotional Abuse: No  Possible Abuse Reported to:: Not Applicable    Discharge Risks or Barriers  Patient risk factors: Readmission    Anticipated Discharge Information  Anticipated discharge disposition: Home  Discharge Address: [address] [place] [id number]  Discharge Contact Phone Number: [phone]

## 2020-01-07 NOTE — PROGRESS NOTES
Assumed care of patient at bedside report from NOC RN. Updated on POC. Patient currently A & O x 4; on 3 L O2 nasal cannula; up self; without complaints of acute pain. Call light within reach. Whiteboard updated. Fall precautions in place. Bed locked and in lowest position. All questions answered. No other needs indicated at this time.

## 2020-01-07 NOTE — CARE PLAN
Problem: Communication  Goal: The ability to communicate needs accurately and effectively will improve  Outcome: PROGRESSING AS EXPECTED     Problem: Venous Thromboembolism (VTW)/Deep Vein Thrombosis (DVT) Prevention:  Goal: Patient will participate in Venous Thrombosis (VTE)/Deep Vein Thrombosis (DVT)Prevention Measures  Outcome: PROGRESSING AS EXPECTED     Problem: Knowledge Deficit  Goal: Knowledge of the prescribed therapeutic regimen will improve  Outcome: PROGRESSING AS EXPECTED     Problem: Discharge Barriers/Planning  Goal: Patient's continuum of care needs will be met  Outcome: PROGRESSING AS EXPECTED     Problem: Pain Management  Goal: Pain level will decrease to patient's comfort goal  Outcome: PROGRESSING AS EXPECTED     Problem: Mobility  Goal: Risk for activity intolerance will decrease  Outcome: PROGRESSING AS EXPECTED

## 2020-01-07 NOTE — PROGRESS NOTES
Nephrology Daily Progress Note    Date of Service  1/7/2020     Author: Riri JEAN-BAPTISTE  Collaborating Physician: Dr. Tello     Chief Complaint  The patient is a 54-year-old female with history   of end-stage renal disease, on hemodialysis Monday, Wednesday, and Friday at   [facility], history of hypertension, anemia of chronic kidney disease,   renal osteodystrophy, who presents to the hospital with shortness of breath   and missed her normal Monday, Wednesday, and Friday dialysis.  She is presumed  to have pneumonia with some element of pulmonary edema, hence, nephrology was  asked to see her for her ESRD and dialysis management and shortness of   breath.       Interval Problem Update  1/3- Consult done  1/4- HD early this AM with 2L net UF, pt reports ongoing hacking cough and subjective fever  1/5- Pt sitting up reporting vomiting and acid reflux, + cough  1/6 - sitting up in bed, continues with cough and fatigue, for HD today   1/7 - Sitting up at bedside, feeling better today, HD went well yesterday.  BP remains elevated.      Review of Systems  Review of Systems   Constitutional: Positive for malaise/fatigue. Negative for fever.   HENT: Negative.    Eyes: Negative.    Respiratory: Positive for cough.    Cardiovascular: Negative.    Gastrointestinal: Negative for nausea and vomiting.   Genitourinary: Negative.    Musculoskeletal: Negative.    Skin: Negative.    Neurological: Negative.    Endo/Heme/Allergies: Negative.    Psychiatric/Behavioral: Negative.         Physical Exam  Temp:  [36.2 °C (97.1 °F)-37.5 °C (99.5 °F)] 36.2 °C (97.1 °F)  Pulse:  [73-91] 85  Resp:  [16-18] 18  BP: (168-200)/() 176/104  SpO2:  [90 %-98 %] 90 %    Physical Exam  Constitutional:       Appearance: She is ill-appearing.   HENT:      Head: Normocephalic and atraumatic.      Nose: Nose normal.      Mouth/Throat:      Pharynx: Oropharynx is clear.   Eyes:      Extraocular Movements: Extraocular movements intact.       Pupils: Pupils are equal, round, and reactive to light.   Neck:      Musculoskeletal: Normal range of motion.   Cardiovascular:      Rate and Rhythm: Normal rate and regular rhythm.      Pulses: Normal pulses.      Heart sounds: Normal heart sounds.      Comments: L AVF +T/B   Pulmonary:      Breath sounds: Wheezing and rales present.   Abdominal:      General: Abdomen is flat. Bowel sounds are normal.      Palpations: Abdomen is soft.   Musculoskeletal: Normal range of motion.      Comments: RLE prosthetic   Skin:     General: Skin is warm and dry.   Neurological:      General: No focal deficit present.      Mental Status: She is alert and oriented to person, place, and time.   Psychiatric:         Mood and Affect: Mood normal.         Behavior: Behavior normal.         Fluids    Intake/Output Summary (Last 24 hours) at 1/7/2020 1143  Last data filed at 1/7/2020 0910  Gross per 24 hour   Intake 1340 ml   Output 2500 ml   Net -1160 ml       Laboratory  Recent Labs     01/05/20  0236 01/06/20 0228 01/07/20 0222   WBC 5.3 4.7* 5.2   RBC 3.38* 3.31* 3.34*   HEMOGLOBIN 10.1* 9.9* 10.1*   HEMATOCRIT 33.6* 32.7* 32.9*   MCV 99.4* 98.8* 98.5*   MCH 29.9 29.9 30.2   MCHC 30.1* 30.3* 30.7*   RDW 68.2* 65.4* 65.1*   PLATELETCT 82* 80* 93*   MPV 11.3 11.9 10.7     Recent Labs     01/05/20  0236 01/06/20 0228 01/07/20 0222   SODIUM 142 140 140   POTASSIUM 4.3 5.7* 4.4   CHLORIDE 97 99 103   CO2 30 26 25   GLUCOSE 107* 95 99   BUN 54* 74* 35*   CREATININE 7.27* 9.05* 5.43*   CALCIUM 9.3 8.9 9.1         No results for input(s): NTPROBNP in the last 72 hours.        Assessment/Plan  1.  End-stage renal disease, on hemodialysis Monday, Wednesday, and Friday.  2.  Renal osteodystrophy.  On sevelamer.   3.  Anemia of chronic kidney disease. At goal Hgb 10-11. Iron deficient.   4.  Chronic obstructive pulmonary disease.    5.  Possible pneumonia/aeCOPD.  Empiric ATB/RT protocol in effect.   6.  Pulmonary edema   UF with HD as  tolerated   7.  Hypertension. Uncontrolled    Consider increasing carvedilol to 25mg bid for uncontrolled hypertension   8.  Hyperkalemia, mild   Manage with HD     PLAN:    - HD qMWF, next treatment (Wed)  - Continue phos binders WM  - UF with HD as tolerated  - Avoid nephrotoxins, dose meds for ESRD  - Consider increasing Carvedilol to 25 mg bid   - Renal diet  - PO Fe WM  - ATB per primary svc   - Okay to transition to outpatient hemodialysis when medically stable        Thank you,

## 2020-01-07 NOTE — PROGRESS NOTES
Received bedside report from TOM Mansfield. POC discussed. First assessment completed, call light within reach. Fall precautions within place. Patient currently receiving dialysis. Will continue to monitor.

## 2020-01-07 NOTE — PROGRESS NOTES
Patient A+Ox3, on 3L 02 sitting at edge of bed eating breakfast. Had BM today, stool sample sent. Ambulating to BR with standby assist, steady gait with prosthesis. Denies pain, no nausea. On telemetry. Using call bell approp, bed locked and in lowest position, whiteboard updated , reviewed plan of care with patient

## 2020-01-08 ENCOUNTER — PATIENT OUTREACH (OUTPATIENT)
Dept: HEALTH INFORMATION MANAGEMENT | Facility: OTHER | Age: 55
End: 2020-01-08

## 2020-01-08 VITALS
DIASTOLIC BLOOD PRESSURE: 93 MMHG | HEART RATE: 78 BPM | HEIGHT: 62 IN | OXYGEN SATURATION: 100 % | WEIGHT: 134.04 LBS | RESPIRATION RATE: 19 BRPM | BODY MASS INDEX: 24.67 KG/M2 | TEMPERATURE: 98.7 F | SYSTOLIC BLOOD PRESSURE: 173 MMHG

## 2020-01-08 LAB
ALBUMIN SERPL BCP-MCNC: 3.5 G/DL (ref 3.2–4.9)
ALBUMIN/GLOB SERPL: 1.3 G/DL
ALP SERPL-CCNC: 57 U/L (ref 30–99)
ALT SERPL-CCNC: 14 U/L (ref 2–50)
ANION GAP SERPL CALC-SCNC: 13 MMOL/L (ref 0–11.9)
ANISOCYTOSIS BLD QL SMEAR: ABNORMAL
AST SERPL-CCNC: 14 U/L (ref 12–45)
BACTERIA BLD CULT: NORMAL
BACTERIA BLD CULT: NORMAL
BASOPHILS # BLD AUTO: 0 % (ref 0–1.8)
BASOPHILS # BLD: 0 K/UL (ref 0–0.12)
BILIRUB SERPL-MCNC: 0.6 MG/DL (ref 0.1–1.5)
BUN SERPL-MCNC: 57 MG/DL (ref 8–22)
CALCIUM SERPL-MCNC: 9.2 MG/DL (ref 8.5–10.5)
CHLORIDE SERPL-SCNC: 101 MMOL/L (ref 96–112)
CO2 SERPL-SCNC: 25 MMOL/L (ref 20–33)
CREAT SERPL-MCNC: 6.85 MG/DL (ref 0.5–1.4)
EOSINOPHIL # BLD AUTO: 0.05 K/UL (ref 0–0.51)
EOSINOPHIL NFR BLD: 0.9 % (ref 0–6.9)
ERYTHROCYTE [DISTWIDTH] IN BLOOD BY AUTOMATED COUNT: 64.6 FL (ref 35.9–50)
GLOBULIN SER CALC-MCNC: 2.6 G/DL (ref 1.9–3.5)
GLUCOSE SERPL-MCNC: 94 MG/DL (ref 65–99)
HCT VFR BLD AUTO: 33.4 % (ref 37–47)
HGB BLD-MCNC: 10.1 G/DL (ref 12–16)
L PNEUMO IGG TITR SER IF: ABNORMAL {TITER}
LYMPHOCYTES # BLD AUTO: 0.89 K/UL (ref 1–4.8)
LYMPHOCYTES NFR BLD: 15.6 % (ref 22–41)
MACROCYTES BLD QL SMEAR: ABNORMAL
MANUAL DIFF BLD: NORMAL
MCH RBC QN AUTO: 29.6 PG (ref 27–33)
MCHC RBC AUTO-ENTMCNC: 30.2 G/DL (ref 33.6–35)
MCV RBC AUTO: 97.9 FL (ref 81.4–97.8)
MONOCYTES # BLD AUTO: 0.15 K/UL (ref 0–0.85)
MONOCYTES NFR BLD AUTO: 2.6 % (ref 0–13.4)
MORPHOLOGY BLD-IMP: NORMAL
NEUTROPHILS # BLD AUTO: 4.61 K/UL (ref 2–7.15)
NEUTROPHILS NFR BLD: 80.9 % (ref 44–72)
NRBC # BLD AUTO: 0 K/UL
NRBC BLD-RTO: 0 /100 WBC
OVALOCYTES BLD QL SMEAR: NORMAL
PLATELET # BLD AUTO: 124 K/UL (ref 164–446)
PLATELET BLD QL SMEAR: NORMAL
PMV BLD AUTO: 10.7 FL (ref 9–12.9)
POIKILOCYTOSIS BLD QL SMEAR: NORMAL
POTASSIUM SERPL-SCNC: 4.5 MMOL/L (ref 3.6–5.5)
PROT SERPL-MCNC: 6.1 G/DL (ref 6–8.2)
RBC # BLD AUTO: 3.41 M/UL (ref 4.2–5.4)
RBC BLD AUTO: PRESENT
SIGNIFICANT IND 70042: NORMAL
SIGNIFICANT IND 70042: NORMAL
SITE SITE: NORMAL
SITE SITE: NORMAL
SODIUM SERPL-SCNC: 139 MMOL/L (ref 135–145)
SOURCE SOURCE: NORMAL
SOURCE SOURCE: NORMAL
WBC # BLD AUTO: 5.7 K/UL (ref 4.8–10.8)

## 2020-01-08 PROCEDURE — 700101 HCHG RX REV CODE 250: Performed by: STUDENT IN AN ORGANIZED HEALTH CARE EDUCATION/TRAINING PROGRAM

## 2020-01-08 PROCEDURE — 700102 HCHG RX REV CODE 250 W/ 637 OVERRIDE(OP): Performed by: STUDENT IN AN ORGANIZED HEALTH CARE EDUCATION/TRAINING PROGRAM

## 2020-01-08 PROCEDURE — A9270 NON-COVERED ITEM OR SERVICE: HCPCS | Performed by: STUDENT IN AN ORGANIZED HEALTH CARE EDUCATION/TRAINING PROGRAM

## 2020-01-08 PROCEDURE — 36415 COLL VENOUS BLD VENIPUNCTURE: CPT

## 2020-01-08 PROCEDURE — 700105 HCHG RX REV CODE 258: Performed by: STUDENT IN AN ORGANIZED HEALTH CARE EDUCATION/TRAINING PROGRAM

## 2020-01-08 PROCEDURE — 700111 HCHG RX REV CODE 636 W/ 250 OVERRIDE (IP): Performed by: STUDENT IN AN ORGANIZED HEALTH CARE EDUCATION/TRAINING PROGRAM

## 2020-01-08 PROCEDURE — 94640 AIRWAY INHALATION TREATMENT: CPT

## 2020-01-08 PROCEDURE — 700102 HCHG RX REV CODE 250 W/ 637 OVERRIDE(OP): Performed by: NURSE PRACTITIONER

## 2020-01-08 PROCEDURE — 700102 HCHG RX REV CODE 250 W/ 637 OVERRIDE(OP): Performed by: INTERNAL MEDICINE

## 2020-01-08 PROCEDURE — 99238 HOSP IP/OBS DSCHRG MGMT 30/<: CPT | Mod: GC | Performed by: INTERNAL MEDICINE

## 2020-01-08 PROCEDURE — 90935 HEMODIALYSIS ONE EVALUATION: CPT

## 2020-01-08 PROCEDURE — 85027 COMPLETE CBC AUTOMATED: CPT

## 2020-01-08 PROCEDURE — 85007 BL SMEAR W/DIFF WBC COUNT: CPT

## 2020-01-08 PROCEDURE — 700111 HCHG RX REV CODE 636 W/ 250 OVERRIDE (IP): Performed by: INTERNAL MEDICINE

## 2020-01-08 PROCEDURE — 80053 COMPREHEN METABOLIC PANEL: CPT

## 2020-01-08 PROCEDURE — A9270 NON-COVERED ITEM OR SERVICE: HCPCS | Performed by: NURSE PRACTITIONER

## 2020-01-08 PROCEDURE — A9270 NON-COVERED ITEM OR SERVICE: HCPCS | Performed by: INTERNAL MEDICINE

## 2020-01-08 RX ORDER — IPRATROPIUM BROMIDE AND ALBUTEROL SULFATE 2.5; .5 MG/3ML; MG/3ML
3 SOLUTION RESPIRATORY (INHALATION)
Status: DISCONTINUED | OUTPATIENT
Start: 2020-01-08 | End: 2020-01-08 | Stop reason: HOSPADM

## 2020-01-08 RX ORDER — BUDESONIDE AND FORMOTEROL FUMARATE DIHYDRATE 80; 4.5 UG/1; UG/1
2 AEROSOL RESPIRATORY (INHALATION) 2 TIMES DAILY
Qty: 1 INHALER | Refills: 0 | Status: SHIPPED | OUTPATIENT
Start: 2020-01-08 | End: 2020-10-01

## 2020-01-08 RX ORDER — CARVEDILOL 25 MG/1
25 TABLET ORAL 2 TIMES DAILY
Qty: 30 TAB | Refills: 0 | Status: SHIPPED
Start: 2020-01-08 | End: 2020-01-08

## 2020-01-08 RX ORDER — NIFEDIPINE 30 MG
60 TABLET, EXTENDED RELEASE ORAL DAILY
Qty: 30 TAB | Refills: 0 | Status: SHIPPED
Start: 2020-01-09 | End: 2020-01-08

## 2020-01-08 RX ORDER — NIFEDIPINE 30 MG
30 TABLET, EXTENDED RELEASE ORAL DAILY
Qty: 30 TAB | Refills: 0 | Status: SHIPPED
Start: 2020-01-09 | End: 2020-01-08

## 2020-01-08 RX ORDER — ALBUTEROL SULFATE 90 UG/1
2 AEROSOL, METERED RESPIRATORY (INHALATION) EVERY 4 HOURS PRN
Qty: 8.5 G | Refills: 0 | Status: SHIPPED | OUTPATIENT
Start: 2020-01-08 | End: 2020-01-08

## 2020-01-08 RX ORDER — HYDRALAZINE HYDROCHLORIDE 50 MG/1
50 TABLET, FILM COATED ORAL EVERY 8 HOURS
Status: DISCONTINUED | OUTPATIENT
Start: 2020-01-08 | End: 2020-01-08 | Stop reason: HOSPADM

## 2020-01-08 RX ORDER — ALBUTEROL SULFATE 90 UG/1
2 AEROSOL, METERED RESPIRATORY (INHALATION) EVERY 4 HOURS PRN
Qty: 8.5 G | Refills: 0 | Status: SHIPPED
Start: 2020-01-08 | End: 2020-03-04

## 2020-01-08 RX ORDER — CARVEDILOL 25 MG/1
25 TABLET ORAL 2 TIMES DAILY
Status: DISCONTINUED | OUTPATIENT
Start: 2020-01-08 | End: 2020-01-08 | Stop reason: HOSPADM

## 2020-01-08 RX ORDER — NIFEDIPINE 30 MG
60 TABLET, EXTENDED RELEASE ORAL DAILY
Qty: 60 TAB | Refills: 0 | Status: SHIPPED
Start: 2020-01-09 | End: 2020-03-04

## 2020-01-08 RX ORDER — CARVEDILOL 25 MG/1
25 TABLET ORAL 2 TIMES DAILY
Qty: 60 TAB | Refills: 0 | Status: SHIPPED
Start: 2020-01-08 | End: 2020-03-04

## 2020-01-08 RX ORDER — CARVEDILOL 25 MG/1
25 TABLET ORAL 2 TIMES DAILY
Qty: 30 TAB | Refills: 0 | Status: SHIPPED | OUTPATIENT
Start: 2020-01-08 | End: 2020-01-08

## 2020-01-08 RX ORDER — NIFEDIPINE 30 MG
30 TABLET, EXTENDED RELEASE ORAL DAILY
Qty: 30 TAB | Refills: 0 | Status: SHIPPED | OUTPATIENT
Start: 2020-01-09 | End: 2020-01-08

## 2020-01-08 RX ORDER — NIFEDIPINE 60 MG/1
60 TABLET, EXTENDED RELEASE ORAL
Status: DISCONTINUED | OUTPATIENT
Start: 2020-01-09 | End: 2020-01-08 | Stop reason: HOSPADM

## 2020-01-08 RX ADMIN — SEVELAMER CARBONATE 800 MG: 800 TABLET, FILM COATED ORAL at 11:30

## 2020-01-08 RX ADMIN — CEFTRIAXONE SODIUM 1 G: 1 INJECTION, POWDER, FOR SOLUTION INTRAMUSCULAR; INTRAVENOUS at 05:20

## 2020-01-08 RX ADMIN — GUAIFENESIN 600 MG: 600 TABLET, EXTENDED RELEASE ORAL at 05:20

## 2020-01-08 RX ADMIN — HYDRALAZINE HYDROCHLORIDE 50 MG: 50 TABLET, FILM COATED ORAL at 14:18

## 2020-01-08 RX ADMIN — FERROUS SULFATE TAB 325 MG (65 MG ELEMENTAL FE) 325 MG: 325 (65 FE) TAB at 05:20

## 2020-01-08 RX ADMIN — NIFEDIPINE 30 MG: 30 TABLET, FILM COATED, EXTENDED RELEASE ORAL at 05:26

## 2020-01-08 RX ADMIN — ANTACID TABLETS 500 MG: 500 TABLET, CHEWABLE ORAL at 05:20

## 2020-01-08 RX ADMIN — GABAPENTIN 100 MG: 100 CAPSULE ORAL at 05:21

## 2020-01-08 RX ADMIN — SEVELAMER CARBONATE 800 MG: 800 TABLET, FILM COATED ORAL at 05:20

## 2020-01-08 RX ADMIN — OMEPRAZOLE 20 MG: 20 CAPSULE, DELAYED RELEASE ORAL at 05:20

## 2020-01-08 RX ADMIN — HYDRALAZINE HYDROCHLORIDE 25 MG: 25 TABLET, FILM COATED ORAL at 05:20

## 2020-01-08 RX ADMIN — NICOTINE 14 MG: 14 PATCH TRANSDERMAL at 05:24

## 2020-01-08 RX ADMIN — HEPARIN SODIUM 1700 UNITS: 1000 INJECTION, SOLUTION INTRAVENOUS; SUBCUTANEOUS at 11:15

## 2020-01-08 RX ADMIN — BUDESONIDE AND FORMOTEROL FUMARATE DIHYDRATE 2 PUFF: 80; 4.5 AEROSOL RESPIRATORY (INHALATION) at 05:20

## 2020-01-08 RX ADMIN — IPRATROPIUM BROMIDE AND ALBUTEROL SULFATE 3 ML: .5; 3 SOLUTION RESPIRATORY (INHALATION) at 08:02

## 2020-01-08 RX ADMIN — CARVEDILOL 12.5 MG: 12.5 TABLET, FILM COATED ORAL at 05:21

## 2020-01-08 ASSESSMENT — ENCOUNTER SYMPTOMS
VOMITING: 0
PSYCHIATRIC NEGATIVE: 1
MUSCULOSKELETAL NEGATIVE: 1
COUGH: 1
FEVER: 0
EYES NEGATIVE: 1
CARDIOVASCULAR NEGATIVE: 1
NAUSEA: 0
NEUROLOGICAL NEGATIVE: 1

## 2020-01-08 NOTE — PROGRESS NOTES
Nephrology Daily Progress Note    Date of Service  1/8/2020     Author: Riri JEAN-BAPTISTE  Collaborating Physician: Dr. Tello     Chief Complaint  The patient is a 54-year-old female with history   of end-stage renal disease, on hemodialysis Monday, Wednesday, and Friday at   [facility], history of hypertension, anemia of chronic kidney disease,   renal osteodystrophy, who presents to the hospital with shortness of breath   and missed her normal Monday, Wednesday, and Friday dialysis.  She is presumed  to have pneumonia with some element of pulmonary edema, hence, nephrology was  asked to see her for her ESRD and dialysis management and shortness of   breath.       Interval Problem Update  1/3- Consult done  1/4- HD early this AM with 2L net UF, pt reports ongoing hacking cough and subjective fever  1/5- Pt sitting up reporting vomiting and acid reflux, + cough  1/6 - sitting up in bed, continues with cough and fatigue, for HD today   1/7 - Sitting up at bedside, feeling better today, HD went well yesterday.  BP remains elevated.    1/8/20: Pt seen and examined, noted high BP readings - due for HD today, pending discharge noted today     Review of Systems  Review of Systems   Constitutional: Positive for malaise/fatigue. Negative for fever.   HENT: Negative.    Eyes: Negative.    Respiratory: Positive for cough.    Cardiovascular: Negative.    Gastrointestinal: Negative for nausea and vomiting.   Genitourinary: Negative.    Musculoskeletal: Negative.    Skin: Negative.    Neurological: Negative.    Endo/Heme/Allergies: Negative.    Psychiatric/Behavioral: Negative.         Physical Exam  Temp:  [36.7 °C (98.1 °F)-37.3 °C (99.1 °F)] 37.2 °C (98.9 °F)  Pulse:  [78-91] 78  Resp:  [16-18] 18  BP: (166-185)/() 170/98  SpO2:  [90 %-100 %] 100 %    Physical Exam  Constitutional:       Appearance: She is ill-appearing.   HENT:      Head: Normocephalic and atraumatic.      Nose: Nose normal.      Mouth/Throat:       Pharynx: Oropharynx is clear.   Eyes:      Extraocular Movements: Extraocular movements intact.      Pupils: Pupils are equal, round, and reactive to light.   Neck:      Musculoskeletal: Normal range of motion.   Cardiovascular:      Rate and Rhythm: Normal rate and regular rhythm.      Pulses: Normal pulses.      Heart sounds: Normal heart sounds.      Comments: L AVF +T/B   Pulmonary:      Breath sounds: Wheezing and rales present.   Abdominal:      General: Abdomen is flat. Bowel sounds are normal.      Palpations: Abdomen is soft.   Musculoskeletal: Normal range of motion.      Comments: RLE prosthetic   Skin:     General: Skin is warm and dry.   Neurological:      General: No focal deficit present.      Mental Status: She is alert and oriented to person, place, and time.   Psychiatric:         Mood and Affect: Mood normal.         Behavior: Behavior normal.         Fluids    Intake/Output Summary (Last 24 hours) at 1/8/2020 1328  Last data filed at 1/8/2020 0803  Gross per 24 hour   Intake 800 ml   Output --   Net 800 ml       Laboratory  Recent Labs     01/06/20 0228 01/07/20 0222 01/08/20  0240   WBC 4.7* 5.2 5.7   RBC 3.31* 3.34* 3.41*   HEMOGLOBIN 9.9* 10.1* 10.1*   HEMATOCRIT 32.7* 32.9* 33.4*   MCV 98.8* 98.5* 97.9*   MCH 29.9 30.2 29.6   MCHC 30.3* 30.7* 30.2*   RDW 65.4* 65.1* 64.6*   PLATELETCT 80* 93* 124*   MPV 11.9 10.7 10.7     Recent Labs     01/06/20 0228 01/07/20 0222 01/08/20  0240   SODIUM 140 140 139   POTASSIUM 5.7* 4.4 4.5   CHLORIDE 99 103 101   CO2 26 25 25   GLUCOSE 95 99 94   BUN 74* 35* 57*   CREATININE 9.05* 5.43* 6.85*   CALCIUM 8.9 9.1 9.2         No results for input(s): NTPROBNP in the last 72 hours.        Assessment/Plan  1.  End-stage renal disease, on hemodialysis Monday, Wednesday, and Friday.  2.  Renal osteodystrophy.  On sevelamer.   3.  Anemia of chronic kidney disease. At goal Hgb 10-11. Iron deficient.   4.  Chronic obstructive pulmonary disease.    5.   Possible pneumonia/aeCOPD.  Empiric ATB/RT protocol in effect.   6.  Pulmonary edema   UF with HD as tolerated   7.  Hypertension. Uncontrolled    Consider increasing carvedilol to 25mg bid for uncontrolled hypertension   8.  Hyperkalemia, mild   Manage with HD     PLAN:    - HD today and cont qMWF; will aim for more UF on HD to help with BP  - Will increase dose of PO hydralazine and PO Procardia to help control uncontrolled HTN   - Continue phos binders WM  - UF with HD as tolerated  - Avoid nephrotoxins, dose meds for ESRD  - Renal diet    - Okay to transition to outpatient hemodialysis when medically stable and bP better        Thank you,

## 2020-01-08 NOTE — DISCHARGE PLANNING
Medicaid Meds-to-Beds: Discharge prescription orders listed below delivered to patient's bedside and placed in patient drawer. RN notified. Patient counseled.     Reviewed proper inhaler technique including to rinse mouth after each use of Symbicort. Informed patient to stop labetalol and metoprolol as she is now starting carvedilol. Informed patient that nifedipine will replace amlodipine. Provided patient with community [facility] management brochure and my contact information for additional medication questions/ concerns.    Updated RX for nifedipine and carvedilol delivered to patient bedside and given to TOM Sumner. Patient counseled on indication, dose, and directions.      Coyle Isabelle Ribera   Home Medication Instructions [id number]    Printed on:01/08/20 4194   Medication Information                      albuterol 108 (90 Base) MCG/ACT Aero Soln inhalation aerosol  Inhale 2 Puffs by mouth every four hours as needed for Shortness of Breath.             budesonide-formoterol (SYMBICORT) 80-4.5 MCG/ACT Aerosol  Inhale 2 Puffs by mouth 2 Times a Day.             carvedilol (COREG) 25 MG Tab  Take 1 Tab by mouth 2 Times a Day.             NIFEdipine (ADALAT CC) 30 MG CR tablet  Take 2 Tabs by mouth every day.               Sera Salinas, PharmD
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
## 2020-01-08 NOTE — PROGRESS NOTES
Received phone call back from [facility]. Medications have been adjusted; please see MAR. Will continue to monitor BP readings.

## 2020-01-08 NOTE — PROGRESS NOTES
Monitor Summary  Rhythm: SR/ST  Rate:  (went up to 150's during ambulation).   Ectopy: None   .18 / .08 / .36

## 2020-01-08 NOTE — DISCHARGE INSTRUCTIONS
Discharge Instructions    Discharged to home by car with relative. Discharged via wheelchair, hospital escort: Yes.  Special equipment needed: Not Applicable    Be sure to schedule a follow-up appointment with your primary care doctor or any specialists as instructed.     Discharge Plan:   Diet Plan: Discussed  Activity Level: Discussed  Smoking Cessation Offered: Patient Refused  Confirmed Follow up Appointment: Patient to Call and Schedule Appointment  Confirmed Symptoms Management: Discussed  Medication Reconciliation Updated: Yes  Influenza Vaccine Indication: Not indicated: Previously immunized this influenza season and > 8 years of age    I understand that a diet low in cholesterol, fat, and sodium is recommended for good health. Unless I have been given specific instructions below for another diet, I accept this instruction as my diet prescription.   Other diet: heart healthy     Special Instructions: None    · Is patient discharged on Warfarin / Coumadin?   No     Depression / Suicide Risk    As you are discharged from this [facility] facility, it is important to learn how to keep safe from harming yourself.    Recognize the warning signs:  · Abrupt changes in personality, positive or negative- including increase in energy   · Giving away possessions  · Change in eating patterns- significant weight changes-  positive or negative  · Change in sleeping patterns- unable to sleep or sleeping all the time   · Unwillingness or inability to communicate  · Depression  · Unusual sadness, discouragement and loneliness  · Talk of wanting to die  · Neglect of personal appearance   · Rebelliousness- reckless behavior  · Withdrawal from people/activities they love  · Confusion- inability to concentrate     If you or a loved one observes any of these behaviors or has concerns about self-harm, here's what you can do:  · Talk about it- your feelings and reasons for harming yourself  · Remove any means that you might use  to hurt yourself (examples: pills, rope, extension cords, firearm)  · Get professional help from the community (Mental Health, Substance Abuse, psychological counseling)  · Do not be alone:Call your Safe Contact- someone whom you trust who will be there for you.  · Call your local CRISIS HOTLINE 378-6017 or 987-925-9381  · Call your local Children's Mobile Crisis Response Team [place] (873) 475-1930 or www.streamit  · Call the toll free National Suicide Prevention Hotlines   · National Suicide Prevention Lifeline 961-847-DUYD (2078)  · [place] Line Network 800-SUICIDE (885-4747)      Hypertension  Hypertension is another name for high blood pressure. High blood pressure forces your heart to work harder to pump blood. A blood pressure reading has two numbers, which includes a higher number over a lower number (example: 110/72).  Follow these instructions at home:  · Have your blood pressure rechecked by your doctor.  · Only take medicine as told by your doctor. Follow the directions carefully. The medicine does not work as well if you skip doses. Skipping doses also puts you at risk for problems.  · Do not smoke.  · Monitor your blood pressure at home as told by your doctor.  Contact a doctor if:  · You think you are having a reaction to the medicine you are taking.  · You have repeat headaches or feel dizzy.  · You have puffiness (swelling) in your ankles.  · You have trouble with your vision.  Get help right away if:  · You get a very bad headache and are confused.  · You feel weak, numb, or faint.  · You get chest or belly (abdominal) pain.  · You throw up (vomit).  · You cannot breathe very well.  This information is not intended to replace advice given to you by your health care provider. Make sure you discuss any questions you have with your health care provider.  Document Released: 06/05/2009 Document Revised: 05/25/2017 Document Reviewed: 10/10/2014  [employer] Interactive Patient Education  © 2017 [employer]  Respiratory Syncytial Virus, Adult  Respiratory syncytial virus (RSV) is a common viral infection. It is caused by a virus that is similar to viruses that cause cold and flu symptoms. RSV can affect the nose, throat, and upper air passages (upper respiratory system) and the windpipe and lungs (lower respiratory system). If RSV affects the air passages in your lungs, you have bronchiolitis. If RSV affects your lungs, you have pneumonia.  RSV spreads from person to person (is contagious) through droplets from coughs and sneezes (respiratory secretions). RSV is rarely serious when it occurs in adults.  What are the causes?  An RSV infection is caused by the respiratory syncytial virus. When a sick person coughs or sneezes, there are particles of the virus in the droplets. You can get sick if you breathe in (inhale) those droplets. The virus can also survive for a while in droplets that land on surfaces. If you touch a contaminated surface and then touch your face, the virus can enter your body through your mouth, nose, or eyes. The virus also spreads through kissing, close contact, and shared eating or drinking utensils.  What increases the risk?  You may have a higher risk for RSV infection if:  · You are 65 or older.  · You have a long-term (chronic) lung condition, such as COPD.  · You have a weakened disease-fighting system (immune system).  · You have Down syndrome.  · You have heart disease.  · You work in a hospital or other health care facility.  · You live in a long-term health care facility.  What are the signs or symptoms?  Symptoms of RSV include:  · Runny nose.  · Coughing. You may have a cough that brings up mucus (productive cough).  · Sneezing.  · Fever.  · Decreased appetite.  · Breathing loudly (wheezing).  · Shortness of breath.  · Fluid buildup in the lungs (respiratory distress).  How is this diagnosed?  This condition may be diagnosed based on:  · Your symptoms.  · Your  medical history.  · A physical exam.  · A chest X-ray to rule out pneumonia.  · Blood tests or tests of mucus from your lungs (sputum). These tests may be done if you are older.  · A test of your respiratory secretions.  How is this treated?  In most cases, the RSV infection will go away after 1-2 weeks of caring for yourself at home. If you have severe RSV and you develop pneumonia, you may need to be treated in the hospital with oxygen, antibiotic medicine, and medicines to open your breathing tubes (bronchodilators).  Follow these instructions at home:  · Take over-the-counter and prescription medicines only as told by your health care provider.  · Drink enough fluid to keep your urine clear or pale yellow.  · Rest at home until your symptoms go away.  · Eat a healthy diet.  · Do not use any products that contain nicotine or tobacco, such as cigarettes and e-cigarettes. If you need help quitting, ask your health care provider.  · Keep all follow-up visits as told by your health care provider. This is important.  How is this prevented?  To prevent catching and spreading the RSV virus:  · Wash your hands often with soap and water. If soap and water are not available, use an alcohol-based hand . If you have not cleaned your hands, do not touch your face.  · If you have cold-like or flu-like symptoms, stay home.  · Cover your nose and mouth when you cough or sneeze.  · Avoid large groups of people.  · Keep a safe distance from people who are coughing and sneezing.  Contact a health care provider if:  · Your symptoms get worse.  · Your symptoms have not improved after 2 weeks.  · You have a fever.  · You have continuing (persistent) sweatiness, hot flashes, or chills.  · You cough up much more mucus than usual.  · You cough up blood.  · You feel very tired (are lethargic).  · You become confused.  · You have respiratory distress that gets worse.  This information is not intended to replace advice given to you  by your health care provider. Make sure you discuss any questions you have with your health care provider.  Document Released: 05/30/2017 Document Revised: 07/07/2017 Document Reviewed: 05/30/2017  [employer] Interactive Patient Education © 2017 [employer]      Dialysis Diet  Dialysis is a treatment that you may undergo if you have significant damage to your kidneys. Dialysis replaces some of the work that the kidneys do. One of the jobs that it takes over is removing wastes, salt, and extra water from your blood. This helps to keep the amount of potassium and other nutrients in your blood at healthy levels.  When you need dialysis, it is important to pay careful attention to your diet. Between dialysis sessions, certain nutrients can build up in your blood and cause you to get sick. Vitamins and minerals are an important part of a healthy diet and should not be avoided entirely. However, it is commonly recommended that you limit your intake of potassium, phosphorus, and sodium. It may also be necessary to restrict other nutrients, such as carbohydrates or fat, if you have other health conditions. Your health care provider or dietitian can help you to determine the amount of these nutrients that is right for you.  WHAT IS MY PLAN?  Your dietitian will help you to design a meal plan that is specific to your needs. Generally, meal plans include:  · Grains, 6-11 servings per day. One serving is equal to 1 slice of bread or ½ cup of cooked rice or pasta.  · Low-potassium vegetables, 2-3 servings per day. One serving is equal to ½ cup.  · Low-potassium fruits, 2-3 servings per day. One serving is equal to ½ cup.  · Meats and other protein sources, 8-11 oz per day.  · Dairy, ½ cup per day.  Your dietitian will provide you with specific instructions about the amount of fluids you can have each day.  WHAT DO I NEED TO KNOW ABOUT A DIALYSIS DIET?  · Limit your intake of potassium. Potassium is found in milk, fruits, and  vegetables.  · Limit your intake of phosphorus. Phosphorus is found in milk, cheese, beans, nuts, and carbonated beverages. Avoid whole-grain and high-fiber foods because they contain high amounts of phosphorus.  · Limit your intake of sodium. Foods that are high in sodium include processed and cured meats, ready-made frozen meals, canned vegetables, and salty snack foods. Do not use salt substitutes because they contain potassium.  · If you were instructed to restrict your fluid intake, follow your health care provider's specific instructions. You may be told to:  ¨ Write down what you drink and any foods you eat that are made mostly from water, such as gelatin and soups.  ¨ Drink from small cups to help control how much you drink.  · Ask your health care provider if you should regularly take an over-the-counter medicine that binds phosphorus, such as antacid products that contain calcium carbonate.  · Take vitamin and mineral supplements only as directed by your health care provider.  · Eat high-quality proteins, such as meat, poultry, fish, and eggs. Limit low-quality plant-based proteins, such as nuts and beans.  · Cut potatoes into small pieces and boil them in unsalted water before you eat them. This can help to remove some potassium from the potato.  · Drain all fluid from cooked vegetables and canned fruits before eating them.  WHAT FOODS CAN I EAT?  Grains  White bread. White rice. Cooked cereal. Unsalted popcorn. Tortillas. Pasta.  Vegetables  Fresh or frozen broccoli, carrots, and green beans. Cabbage. Cauliflower. Celery. Cucumbers. Eggplant. Radishes. Zucchini.  Fruits  Apples. Fresh or frozen berries. Fresh or canned pears, peaches, and pineapple. Grapes. Plums.  Meats and Other Protein Sources  Fresh or frozen beef, pork, chicken, and fish. Eggs. Low-sodium canned tuna or salmon.  Dairy  Cream cheese. Heavy cream. Ricotta cheese.  Beverages  Apple cider. Cranberry juice. Grape juice. Lemonade. Black  coffee.  Condiments  Herbs. Spices. Jam and jelly. Honey.  Sweets and Desserts  Sherbet. Cakes. Cookies.  Fats and Oils  Olive oil, canola oil, and safflower oil.  Other  Non-dairy creamer. Non-dairy whipped topping. Homemade broth without salt.  The items listed above may not be a complete list of recommended foods or beverages. Contact your dietitian for more options.   WHAT FOODS ARE NOT RECOMMENDED?  Grains  Whole-grain bread. Whole-grain pasta. High-fiber cereal.  Vegetables  Potatoes. Beets. Tomatoes. Winter squash and pumpkin. Asparagus. Spinach. Parsnips.  Fruits  Star fruit. Bananas. Oranges. Kiwi. Nectarines. Prunes. Melon. Dried fruit. Avocado.  Meats and Other Protein Sources  Canned, smoked, and cured meats. Packaged luncheon meat. Sardines. Nuts and seeds. Peanut butter. Beans and legumes.  Dairy  Milk. Buttermilk. Yogurt. Cheese and cottage cheese. Processed cheese spreads.  Beverages  Orange juice. Prune juice. Carbonated soft drinks.  Condiments  Salt. Salt substitutes. Soy sauce.  Sweets and Desserts  Ice cream. Chocolate. Candied nuts.  Fats and Oils  Butter. Margarine.  Other  Ready-made frozen meals. Canned soups.  The items listed above may not be a complete list of foods and beverages to avoid. Contact your dietitian for more information.     This information is not intended to replace advice given to you by your health care provider. Make sure you discuss any questions you have with your health care provider.     Document Released: 09/14/2005 Document Revised: 01/08/2016 Document Reviewed: 07/21/2015  [employer] Interactive Patient Education ©2016 [employer]  '  Dialysis  Care After  Dialysis is a treatment that removes the toxic wastes from the body when the kidneys fail to do this on their own. There are 2 types of dialysis:  · Hemodialysis. Blood is pumped from the body through a filter (dialyzer). The blood is cleaned of waste products and returned to the body. Hemodialysis is performed  "in a dialysis center for 3 to 4 hours, 3 times a week. Dialysis is performed through an arteriovenous (AV) access, which provides access to the blood vessel. The main advantage of hemodialysis is that no training is required of the patient. Disadvantages include potential AV access failure and lack of freedom, as you must stay relatively near a dialysis center.   · Peritoneal dialysis. The body's own lining (membrane) is used as a filter. A fluid drains in and out of the abdomen to get rid of the body's toxic wastes. Advantages are that it can be taught to the patient and can be done at home with careful technique. It allows more freedom and less discomfort. Disadvantages include potential inflammation inside the abdomen (peritonitis) and membrane failure.   HOME CARE INSTRUCTIONS  · If you have an AV access:   · Check for a \"thrill\" at your access site every day. This is a vibrating or buzzing feeling when you place your fingers over the access site. This means the access is working. If you do not feel a \"thrill,\" the access needs to be repaired.   · Never wear tight clothing or jewelry around the access.   · Avoid sleeping on the access. This can decrease circulation and can cause the access to clot.   · Keep the bandage (dressing) on for a couple hours after your treatment or as told by your caregiver. Avoid getting your dressing wet. If the dressing comes off, cover the access site with a 4x4 gauze dressing and tape securely.   · Keep your access site clean to prevent infection.   · Keep some 4x4 gauze dressings at home in case your access starts to bleed. You may have received a medicine that can cause bleeding called heparin with your treatment.   · Control your blood pressure. High blood pressure (hypertension) damages your heart and vessels. It is important to exercise as much as possible.   · Keep your cholesterol under control. Exercise regularly or as directed.   SEEK MEDICAL CARE IF:  · You do not feel a " "\"thrill\" in your AV access.   · You have a fever, chills, sweats, or weakness.   · You have a small amount of bleeding at your access site.   · Your blood pressure is too high.   SEEK IMMEDIATE MEDICAL CARE IF:  · You have sudden chest pain or trouble breathing.   · You have bleeding that cannot be stopped or controlled with direct pressure.   MAKE SURE YOU:  · Understand these instructions.   · Will watch your condition.   · Will get help right away if you are not doing well or get worse.   Document Released: 07/10/2006 Document Revised: 03/11/2013 Document Reviewed: 01/24/2012  [employer]® Patient Information ©2013 [employer].    Oxygen Use at Home  Oxygen can be prescribed for home use. The prescription will show the flow rate. This is how much oxygen is to be used per minute. This will be listed in liters per minute (LPM or L/M). A liter is a metric measurement of volume.  You will use oxygen therapy as directed. It can be used while exercising, sleeping, or at rest. You may need oxygen continuously. Your health care provider may order a blood oxygen test (arterial blood gas or pulse oximetry test) that will show what your oxygen level is. Your health care provider will use these measurements to learn about your needs and follow your progress.  Home oxygen therapy is commonly used on patients with various lung (pulmonary) related conditions. Some of these conditions include:  · Asthma.  · Lung cancer.  · Pneumonia.  · Emphysema.  · Chronic bronchitis.  · Cystic fibrosis.  · Other lung diseases.  · Pulmonary fibrosis.  · Occupational lung disease.  · Heart failure.  · Chronic obstructive pulmonary disease (COPD).  3 COMMON WAYS OF PROVIDING OXYGEN THERAPY  · Gas: The gas form of oxygen is put into variously sized cylinders or tanks. The cylinders or oxygen tanks contain compressed oxygen. The cylinder is equipped with a regulator that controls the flow rate. Because the flow of oxygen out of the cylinder is " constant, an oxygen conserving device may be attached to the system to avoid waste. This device releases the gas only when you inhale and cuts it off when you exhale. Oxygen can be provided in a small cylinder that can be carried with you. Large tanks are heavy and are only for stationary use. After use, empty tanks must be exchanged for full tanks.  · Liquid: The liquid form of oxygen is put into a container similar to a thermos. When released, the liquid converts to a gas and you breathe it in just like the compressed gas. This storage method takes up less space than the compressed gas cylinder, and you can transfer the liquid to a small, portable vessel at home. Liquid oxygen is more expensive than the compressed gas, and the vessel vents when not in use. An oxygen conserving device may be built into the vessel to conserve the oxygen. Liquid oxygen is very cold, around 297° below zero.  · Oxygen concentrator: This medical device filters oxygen from room air and gives almost 100% oxygen to the patient. Oxygen concentrators are powered by electricity. Benefits of this system are:  ¨ It does not need to be resupplied.  ¨ It is not as costly as liquid oxygen.  ¨ Extra tubing permits the user to move around easier.  There are several types of small, portable oxygen systems available which can help you remain active and mobile. You must have a cylinder of oxygen as a backup in the event of a power failure. Advise your electric power company that you are on oxygen therapy in order to get priority service when there is a power failure.  OXYGEN DELIVERY DEVICES  There are 3 common ways to deliver oxygen to your body.  · Nasal cannula. This is a 2-pronged device inserted in the nostrils that is connected to tubing carrying the oxygen. The tubing can rest on the ears or be attached to the frame of eyeglasses.  · Mask. People who need a high flow of oxygen generally use a mask.  · Transtracheal catheter. Transtracheal oxygen  "therapy requires the insertion of a small, flexible tube (catheter) in the windpipe (trachea). This catheter is held in place by a necklace. Since transtracheal oxygen bypasses the mouth, nose, and throat, a humidifier is absolutely required at flow rates of 1 LPM or greater.  OXYGEN USE SAFETY TIPS  · Never smoke while using oxygen. Oxygen does not burn or explode, but flammable materials will burn faster in the presence of oxygen.  · Keep a fire extinguisher close by. Let your fire department know that you have oxygen in your home.  · Warn visitors not to smoke near you when you are using oxygen. Put up \"no smoking\" signs in your home where you most often use the oxygen.  · When you go to a restaurant with your portable oxygen source, ask to be seated in the nonsmoking section.  · Stay at least 5 feet away from gas stoves, candles, lighted fireplaces, or other heat sources.  · Do not use materials that burn easily (flammable) while using your oxygen.  · If you use an oxygen cylinder, make sure it is secured to some fixed object or in a stand. If you use liquid oxygen, make sure the vessel is kept upright to keep the oxygen from pouring out. Liquid oxygen is so cold it can hurt your skin.  · If you use an oxygen concentrator, call your electric company so you will be given priority service if your power goes out. Avoid using extension cords, if possible.  · Regularly test your smoke detectors at home to make sure they work. If you receive care in your home from a nurse or other health care provider, he or she may also check to make sure your smoke detectors work.  GUIDELINES FOR CLEANING YOUR EQUIPMENT  · Wash the nasal prongs with a liquid soap. Thoroughly rinse them once or twice a week.  · Replace the prongs every 2 to 4 weeks. If you have an infection (cold, pneumonia) change them when you are well.  · Your health care provider will give you instructions on how to clean your transtracheal catheter.  · The " humidifier bottle should be washed with soap and warm water and rinsed thoroughly between each refill. Air-dry the bottle before filling it with sterile or distilled water. The bottle and its top should be disinfected after they are cleaned.  · If you use an oxygen concentrator, unplug the unit. Then wipe down the cabinet with a damp cloth and dry it daily. The air filter should be cleaned at least twice a week.  · Follow your home medical equipment and service company's directions for cleaning the compressor filter.  HOME CARE INSTRUCTIONS   · Do not change the flow of oxygen unless directed by your health care provider.  · Do not use alcohol or other sedating drugs unless instructed. They slow your breathing rate.  · Do not use materials that burn easily (flammable) while using your oxygen.  · Always keep a spare tank of oxygen. Plan ahead for holidays when you may not be able to get a prescription filled.  · Use water-based lubricants on your lips or nostrils. Do not use an oil-based product like petroleum jelly.  · To prevent your cheeks or the skin behind your ears from becoming irritated, tuck some gauze under the tubing.  · If you have persistent redness under your nose, call your health care provider.  · When you no longer need oxygen, your doctor will have the oxygen discontinued. Oxygen is not addicting or habit forming.  · Use the oxygen as instructed. Too much oxygen can be harmful and too little will not give you the benefit you need.  · Shortness of breath is not always from a lack of oxygen. If your oxygen level is not the cause of your shortness of breath, taking oxygen will not help.  SEEK MEDICAL CARE IF:   · You have frequent headaches.  · You have shortness of breath or a lasting cough.  · You have anxiety.  · You are confused.  · You are drowsy or sleepy all the time.  · You develop an illness which aggravates your breathing.  · You cannot exercise.  · You are restless.  · You have blue lips or  fingernails.  · You have difficult or irregular breathing and it is getting worse.  · You have a fever.     This information is not intended to replace advice given to you by your health care provider. Make sure you discuss any questions you have with your health care provider.     Document Released: 03/09/2005 Document Revised: 01/08/2016 Document Reviewed: 07/30/2014  [employer] Interactive Patient Education ©2016 [employer]    Fall Prevention in the Home  Introduction  Falls can cause injuries. They can happen to people of all ages. There are many things you can do to make your home safe and to help prevent falls.  What can I do on the outside of my home?  · Regularly fix the edges of walkways and driveways and fix any cracks.  · Remove anything that might make you trip as you walk through a door, such as a raised step or threshold.  · Trim any bushes or trees on the path to your home.  · Use bright outdoor lighting.  · Clear any walking paths of anything that might make someone trip, such as rocks or tools.  · Regularly check to see if handrails are loose or broken. Make sure that both sides of any steps have handrails.  · Any raised decks and porches should have guardrails on the edges.  · Have any leaves, snow, or ice cleared regularly.  · Use sand or salt on walking paths during winter.  · Clean up any spills in your garage right away. This includes oil or grease spills.  What can I do in the bathroom?  · Use night lights.  · Install grab bars by the toilet and in the tub and shower. Do not use towel bars as grab bars.  · Use non-skid mats or decals in the tub or shower.  · If you need to sit down in the shower, use a plastic, non-slip stool.  · Keep the floor dry. Clean up any water that spills on the floor as soon as it happens.  · Remove soap buildup in the tub or shower regularly.  · Attach bath mats securely with double-sided non-slip rug tape.  · Do not have throw rugs and other things on the floor  that can make you trip.  What can I do in the bedroom?  · Use night lights.  · Make sure that you have a light by your bed that is easy to reach.  · Do not use any sheets or blankets that are too big for your bed. They should not hang down onto the floor.  · Have a firm chair that has side arms. You can use this for support while you get dressed.  · Do not have throw rugs and other things on the floor that can make you trip.  What can I do in the kitchen?  · Clean up any spills right away.  · Avoid walking on wet floors.  · Keep items that you use a lot in easy-to-reach places.  · If you need to reach something above you, use a strong step stool that has a grab bar.  · Keep electrical cords out of the way.  · Do not use floor polish or wax that makes floors slippery. If you must use wax, use non-skid floor wax.  · Do not have throw rugs and other things on the floor that can make you trip.  What can I do with my stairs?  · Do not leave any items on the stairs.  · Make sure that there are handrails on both sides of the stairs and use them. Fix handrails that are broken or loose. Make sure that handrails are as long as the stairways.  · Check any carpeting to make sure that it is firmly attached to the stairs. Fix any carpet that is loose or worn.  · Avoid having throw rugs at the top or bottom of the stairs. If you do have throw rugs, attach them to the floor with carpet tape.  · Make sure that you have a light switch at the top of the stairs and the bottom of the stairs. If you do not have them, ask someone to add them for you.  What else can I do to help prevent falls?  · Wear shoes that:  ¨ Do not have high heels.  ¨ Have rubber bottoms.  ¨ Are comfortable and fit you well.  ¨ Are closed at the toe. Do not wear sandals.  · If you use a stepladder:  ¨ Make sure that it is fully opened. Do not climb a closed stepladder.  ¨ Make sure that both sides of the stepladder are locked into place.  ¨ Ask someone to hold it  for you, if possible.  · Clearly rylan and make sure that you can see:  ¨ Any grab bars or handrails.  ¨ First and last steps.  ¨ Where the edge of each step is.  · Use tools that help you move around (mobility aids) if they are needed. These include:  ¨ Canes.  ¨ Walkers.  ¨ Scooters.  ¨ Crutches.  · Turn on the lights when you go into a dark area. Replace any light bulbs as soon as they burn out.  · Set up your furniture so you have a clear path. Avoid moving your furniture around.  · If any of your floors are uneven, fix them.  · If there are any pets around you, be aware of where they are.  · Review your medicines with your doctor. Some medicines can make you feel dizzy. This can increase your chance of falling.  Ask your doctor what other things that you can do to help prevent falls.  This information is not intended to replace advice given to you by your health care provider. Make sure you discuss any questions you have with your health care provider.  Document Released: 10/14/2010 Document Revised: 05/25/2017 Document Reviewed: 01/22/2016  © 2017 Elsevier      Clavicle Fracture  The clavicle, also called the collarbone, is the long bone that connects your shoulder to your rib cage. You can feel your collarbone at the top of your shoulders and rib cage. A clavicle fracture is a broken clavicle. It is a common injury that can happen at any age.  What are the causes?  Common causes of a clavicle fracture include:  · A direct blow to your shoulder.  · A car accident.  · A fall, especially if you try to break your fall with an outstretched arm.  What increases the risk?  You may be at increased risk if:  · You are younger than 25 years or older than 75 years. Most clavicle fractures happen to people who are younger than 25 years.  · You are a male.  · You play contact sports.  What are the signs or symptoms?  A fractured clavicle is painful. It also makes it hard to move your arm. Other signs and symptoms may  include:  · A shoulder that drops downward and forward.  · Pain when trying to lift your shoulder.  · Bruising, swelling, and tenderness over your clavicle.  · A grinding noise when you try to move your shoulder.  · A bump over your clavicle.  How is this diagnosed?  Your health care provider can usually diagnose a clavicle fracture by asking about your injury and examining your shoulder and clavicle. He or she may take an X-ray to determine the position of your clavicle.  How is this treated?  Treatment depends on the position of your clavicle after the fracture:  · If the broken ends of the bone are not out of place, your health care provider may put your arm in a sling or wrap a support bandage around your chest (figure-of-eight wrap).  · If the broken ends of the bone are out of place, you may need surgery. Surgery may involve placing screws, pins, or plates to keep your clavicle stable while it heals. Healing may take about 3 months.  When your health care provider thinks your fracture has healed enough, you may have to do physical therapy to regain normal movement and build up your arm strength.  Follow these instructions at home:  · Apply ice to the injured area:  ¨ Put ice in a plastic bag.  ¨ Place a towel between your skin and the bag.  ¨ Leave the ice on for 20 minutes, 2-3 times a day.  · If you have a wrap or splint:  ¨ Wear it all the time, and remove it only to take a bath or shower.  ¨ When you bathe or shower, keep your shoulder in the same position as when the sling or wrap is on.  ¨ Do not lift your arm.  · If you have a figure-of-eight wrap:  ¨ Another person must tighten it every day.  ¨ It should be tight enough to hold your shoulders back.  ¨ Allow enough room to place your index finger between your body and the strap.  ¨ Loosen the wrap immediately if you feel numbness or tingling in your hands.  · Only take medicines as directed by your health care provider.  · Avoid activities that make the  injury or pain worse for 4-6 weeks after surgery.  · Keep all follow-up appointments.  Contact a health care provider if:  Your medicine is not helping to relieve pain and swelling.  Get help right away if:  Your arm is numb, cold, or pale, even when the splint is loose.  This information is not intended to replace advice given to you by your health care provider. Make sure you discuss any questions you have with your health care provider.  Document Released: 09/27/2006 Document Revised: 05/25/2017 Document Reviewed: 11/10/2014  [employer] Interactive Patient Education © 2017 [employer]

## 2020-01-08 NOTE — PROGRESS NOTES
Internal Medicine Interval Note  Note Author: Jael Andrea M.D.      Name Isabelle Coyle     1965    Age/Sex 54 y.o. /female    MRN [id number]    Code Status Full Code     After 5PM or if no immediate response to page, please call for cross-coverage  Attending/Team: Dr. Bonilla/Agustín See Patient List for primary contact information  Call [phone] to page    1st Call - Day Intern (R1):   Dr. Andrea 2nd Call - Day Sr. Resident (R2/R3):   Dr. Westfall         Reason for interval visit  (Principal Problem)    shortness of breath    Interval Problem Daily Status Update  (24 hours, problem oriented, brief subjective history, new lab/imaging data pertinent to that problem)   No acute overnight issues.  The patient is doing much better, afebrile  She still c/o cough and wheezes  The patient's oxygen requirements also are improving, she is maintaining oxygen above 90% on room air, but still needs 2 L of oxygen with ambulation.  For possible discharge tomorrow.        Review of Systems   Constitutional: Negative for chills, diaphoresis, fever, malaise/fatigue and weight loss.   HENT: Negative for ear discharge, ear pain, hearing loss, sore throat and tinnitus.    Eyes: Negative for blurred vision, double vision, photophobia, pain, discharge and redness.   Respiratory: Positive for cough, sputum production, shortness of breath and wheezing. Negative for hemoptysis and stridor.         Pleuritic chest pain   Cardiovascular: Positive for leg swelling. Negative for chest pain, palpitations, orthopnea, claudication and PND.   Gastrointestinal: Positive for diarrhea, nausea and vomiting. Negative for abdominal pain, blood in stool, heartburn and melena.   Genitourinary: Negative for flank pain.        The patient is anuric    Musculoskeletal: Negative for myalgias.   Neurological: Negative for dizziness, tingling, tremors, seizures and headaches.   Psychiatric/Behavioral: Negative for depression and suicidal  ideas.            Consultants/Specialty  Nephrology         Quality Measures  Quality-Core Measures   Reviewed items::  EKG reviewed, Labs reviewed, Medications reviewed and Radiology images reviewed  Murdock catheter::  No Murdock  DVT prophylaxis pharmacological::  Heparin           Physical Exam   Physical Exam   Constitutional: She is oriented to person, place, and time and well-developed, well-nourished, and in no distress. No distress.   HENT:   Head: Normocephalic and atraumatic.   Right Ear: External ear normal.   Left Ear: External ear normal.   Nose: Nose normal.   Mouth/Throat: Oropharynx is clear and moist. No oropharyngeal exudate.   Eyes: Pupils are equal, round, and reactive to light. Conjunctivae and EOM are normal. Right eye exhibits no discharge. Left eye exhibits no discharge. No scleral icterus.   Neck: Normal range of motion. Neck supple. No JVD present. No tracheal deviation present. No thyromegaly present.   Cardiovascular: Normal rate, regular rhythm, normal heart sounds and intact distal pulses. Exam reveals no gallop and no friction rub.   No murmur heard.  Systolic murmur    Pulmonary/Chest: No stridor. She is in respiratory distress. She has wheezes. She has rales. She exhibits tenderness.   Abdominal: She exhibits no distension and no mass. There is tenderness. There is no rebound and no guarding.   Epigastric tenderness    Musculoskeletal:         General: Deformity and edema present.      Comments: Right BKA with prosthetic leg    Lymphadenopathy:     She has no cervical adenopathy.   Neurological: She is alert and oriented to person, place, and time.   Skin: Skin is warm. She is not diaphoretic.   Psychiatric: Memory and affect normal.      Vitals:    01/07/20 0910 01/07/20 1101 01/07/20 1141 01/07/20 1450   BP: (!) 179/103 (!) 181/104 (!) 176/104    Pulse: 86 85 85 88   Resp: 18 18  18   Temp: 36.2 °C (97.1 °F) 36.2 °C (97.1 °F)     TempSrc: Temporal Temporal     SpO2: 94% 90%  90%      Weight:       Height:          Body mass index is 23.43 kg/m².   Pulse Oximetry: 90 %, O2 (LPM): 0, FiO2%: 21 %, O2 Delivery: None (Room Air)      Recent Labs     01/05/20 0236 01/06/20 0228 01/07/20 0222   WBC 5.3 4.7* 5.2   RBC 3.38* 3.31* 3.34*   HEMOGLOBIN 10.1* 9.9* 10.1*   HEMATOCRIT 33.6* 32.7* 32.9*   MCV 99.4* 98.8* 98.5*   MCH 29.9 29.9 30.2   RDW 68.2* 65.4* 65.1*   PLATELETCT 82* 80* 93*   MPV 11.3 11.9 10.7   NEUTSPOLYS 75.70* 72.30* 75.20*   LYMPHOCYTES 18.60* 20.30* 22.10   MONOCYTES 4.90 6.60 1.80   EOSINOPHILS 0.00 0.20 0.00   BASOPHILS 0.60 0.20 0.90   RBCMORPHOLO  --  Present Present      Recent Labs     01/05/20 0236 01/06/20 0228 01/07/20 0222   SODIUM 142 140 140   POTASSIUM 4.3 5.7* 4.4   CHLORIDE 97 99 103   CO2 30 26 25   GLUCOSE 107* 95 99   BUN 54* 74* 35*      No current facility-administered medications on file prior to encounter.      Current Outpatient Medications on File Prior to Encounter   Medication Sig Dispense Refill   • hydrALAZINE (APRESOLINE) 25 MG Tab Take 25 mg by mouth 3 times a day as needed.     • terazosin (HYTRIN) 5 MG Cap Take 5 mg by mouth every evening.     • labetalol (NORMODYNE) 200 MG Tab Take 200 mg by mouth 2 times a day.     • gabapentin (NEURONTIN) 100 MG Cap Take 1 Cap by mouth 2 Times a Day. 180 Cap 0   • metoprolol (LOPRESSOR) 25 MG Tab Take 1 Tab by mouth 2 Times a Day. 60 Tab 0   • amLODIPine (NORVASC) 10 MG Tab Take 10 mg by mouth every day.     • albuterol 108 (90 Base) MCG/ACT Aero Soln inhalation aerosol Inhale 1 Puff by mouth 2 times a day as needed for Shortness of Breath.     • sevelamer carbonate (RENVELA) 800 MG Tab tablet Take 800 mg by mouth 3 times a day, with meals.        Home Medications     Reviewed by Rona Rapp (Pharmacy Tech) on 01/03/20 at 1432  Med List Status: Complete   Medication Last Dose Status   albuterol 108 (90 Base) MCG/ACT Aero Soln inhalation aerosol PRN Active   amLODIPine (NORVASC) 10 MG Tab 1/3/2020  Active   gabapentin (NEURONTIN) 100 MG Cap 1/3/2020 Active   hydrALAZINE (APRESOLINE) 25 MG Tab 1/3/2020 Active   labetalol (NORMODYNE) 200 MG Tab 1/3/2020 Active   metoprolol (LOPRESSOR) 25 MG Tab 1/3/2020 Active   sevelamer carbonate (RENVELA) 800 MG Tab tablet 1/2/2020 Active   terazosin (HYTRIN) 5 MG Cap 1/2/2020 Active                 Assessment/Plan   * Shortness of breath  Assessment & Plan  Multifactorial, (improved)  Volume overload from missing HD, possible COPD exacerbation and possible acute pneumonia.  HD as scheduled  On empiric treatment for CAP and COPD exacerbation   Will continue to monitor    Community acquired pneumonia- (present on admission)  Assessment & Plan  Increase shortness of breath, cough and increase sputum  Positive RSV on nasal swap  B/l wheezes and crackles   CXR right middle and lower lobe infiltrate suggestive of pneumonia  Pro-calcitonin is elevated  On C3 plus doxycycline  Continue to monitor      Probable COPD with acute exacerbation (HCC)- (present on admission)  Assessment & Plan  The patient doesn't have official diagnosis of COPD,  No PFT on file  She presented with increase cough, sputum with hx of heavy tobacco abuse for many years.  Diffuse bilateral wheezes on both lung fields  CXR revealed possible middle and lower lobe pneumonia  Will start empiric treatement for COPD exacerbation with Duo-nebs and prednisone 40 mg  Ceftriaxone and doxycycline for possible pneumonia started initially, doxycycline was later D/C'ed due to intolerance.  Incentive spirometry  Mucomyst also added to help with secretions  Continue to monitor      Acute respiratory failure with hypoxia (HCC)  Assessment & Plan  - Patient presents with acute hypoxic respiratory failure likely secondary to pneumonia based on clinical and  CXR findings.   - could be also secondary to volume overload due to missing HD  - on C3 plus   - follow up with Blood cultures results  - The patient is doing much better,  afebrile  The patient's oxygen requirements also are improving, she is maintaining oxygen above 90% on room air, but still needs 2 L of oxygen with ambulation.  Will continue to monitor     Hypertension  Assessment & Plan  -Poorly controlled hypertension, likely secondary to non adherence to meds or HD.  - will continue amlodipine, hydralazine and metoprolol and terazosin  Will hold labetalol for now due to concern of COPD exacerbation  - metoprolol changed to Coreg  - amlodipine change to nifedipine as well  - will continue to monitor on Tele    ESRD on hemodialysis (HCC)- (present on admission)  Assessment & Plan  History of ESRD  and she is on HD MWF  last HD session was on Tuesday(which was adjusted due to holiday), she was scheduled for the HD today, however she felt very week and short of breath which prompted her to ED presentation.  Nephrology on board for Dialysis.    Anemia  Assessment & Plan  Likely secondary to her ESRD(Anemia of chronic disease)  However mcv is mildly elevated  ferritin is 720, Vitamin B12 of 1039, normal Folate  Will continue to monitor  Nephrology on board      Paroxysmal atrial fibrillation (HCC)- (present on admission)  Assessment & Plan  She is having hx of PAF not on anticoagulation  Another discussion also made about anticoagulation, she opted not to be on Anticoagulation      Vomiting  Assessment & Plan  Improved  She denied any vomiting, GERD or GI upset today.  Will continue to monitor.    H/O paroxysmal supraventricular tachycardia  Assessment & Plan  S/p ablation in 2018  Currently on sinus rhythm  Stable      Tobacco abuse  Assessment & Plan  On nicotine replacement.  Counseled patient on importance of smoking cessation

## 2020-01-08 NOTE — CARE PLAN
Problem: Bronchoconstriction:  Goal: Improve in air movement and diminished wheezing  Outcome: PROGRESSING AS EXPECTED   DUO Q6

## 2020-01-08 NOTE — PROGRESS NOTES
[facility] Progress Note     Hemodialysis treatment ordered today per Dr. Tello x 3.5 hours.   Treatment initiated at 1119, ended at 1449.      Patient tolerated treatment well; see paper flow sheet for details.      Net UF 2,500 mL.      Needles removed from access site. Dressings applied and sites held x 10 minutes; verified no bleeding. Positive bruit/thrill post tx. Staff RN to monitor AVF for breakthrough bleeding. Should breakthrough bleeding occur, staff RN to apply pressure to access sites until bleeding resolved. Notify Dialysis and Nephrologist for follow-up.     Report given to Primary RN.

## 2020-01-10 ENCOUNTER — PATIENT OUTREACH (OUTPATIENT)
Dept: HEALTH INFORMATION MANAGEMENT | Facility: OTHER | Age: 55
End: 2020-01-10

## 2020-01-14 NOTE — PROGRESS NOTES
NELSY Butler outbound TC to Lalo in regards to pt. 1/14/20.  Lalo reports that Isabelle is not doing well since leaving the hospital and to please text a message to him since he didn't have time to speak.   CHW sent Lalo a message for Isabelle.     Plan:  NELSY Butler will call Lalo 1/14/20 if Isabelle does not reach out to [facility] before then.

## 2020-01-15 NOTE — PROGRESS NOTES
CHW oubound TC to Lalo 1/15/20.  Lalo reports providing Isabelle with CCM message and suggested CHW text Isabelle.    CHW sent Isabelle a Text message 1/15/20.

## 2020-03-04 ENCOUNTER — HOSPITAL ENCOUNTER (INPATIENT)
Facility: MEDICAL CENTER | Age: 55
LOS: 1 days | DRG: 682 | End: 2020-03-05
Attending: EMERGENCY MEDICINE | Admitting: HOSPITALIST
Payer: MEDICAID

## 2020-03-04 ENCOUNTER — APPOINTMENT (OUTPATIENT)
Dept: RADIOLOGY | Facility: MEDICAL CENTER | Age: 55
DRG: 682 | End: 2020-03-04
Attending: EMERGENCY MEDICINE
Payer: MEDICAID

## 2020-03-04 DIAGNOSIS — J10.1 INFLUENZA A: ICD-10-CM

## 2020-03-04 DIAGNOSIS — N18.6 ESRD (END STAGE RENAL DISEASE) (HCC): ICD-10-CM

## 2020-03-04 DIAGNOSIS — J96.01 ACUTE RESPIRATORY FAILURE WITH HYPOXIA (HCC): ICD-10-CM

## 2020-03-04 PROBLEM — D61.818 PANCYTOPENIA (HCC): Status: ACTIVE | Noted: 2020-03-04

## 2020-03-04 LAB
ALBUMIN SERPL BCP-MCNC: 3.4 G/DL (ref 3.2–4.9)
ALBUMIN/GLOB SERPL: 1.4 G/DL
ALP SERPL-CCNC: 55 U/L (ref 30–99)
ALT SERPL-CCNC: 14 U/L (ref 2–50)
ANION GAP SERPL CALC-SCNC: 12 MMOL/L (ref 0–11.9)
AST SERPL-CCNC: 19 U/L (ref 12–45)
BASOPHILS # BLD AUTO: 0.5 % (ref 0–1.8)
BASOPHILS # BLD: 0.02 K/UL (ref 0–0.12)
BILIRUB SERPL-MCNC: 0.7 MG/DL (ref 0.1–1.5)
BUN SERPL-MCNC: 47 MG/DL (ref 8–22)
CALCIUM SERPL-MCNC: 8.3 MG/DL (ref 8.5–10.5)
CHLORIDE SERPL-SCNC: 96 MMOL/L (ref 96–112)
CO2 SERPL-SCNC: 32 MMOL/L (ref 20–33)
CREAT SERPL-MCNC: 8.04 MG/DL (ref 0.5–1.4)
EKG IMPRESSION: NORMAL
EOSINOPHIL # BLD AUTO: 0.13 K/UL (ref 0–0.51)
EOSINOPHIL NFR BLD: 3.3 % (ref 0–6.9)
ERYTHROCYTE [DISTWIDTH] IN BLOOD BY AUTOMATED COUNT: 59.1 FL (ref 35.9–50)
FLUAV RNA SPEC QL NAA+PROBE: POSITIVE
FLUBV RNA SPEC QL NAA+PROBE: NEGATIVE
GLOBULIN SER CALC-MCNC: 2.5 G/DL (ref 1.9–3.5)
GLUCOSE SERPL-MCNC: 132 MG/DL (ref 65–99)
HCT VFR BLD AUTO: 26.8 % (ref 37–47)
HGB BLD-MCNC: 8.6 G/DL (ref 12–16)
IMM GRANULOCYTES # BLD AUTO: 0.02 K/UL (ref 0–0.11)
IMM GRANULOCYTES NFR BLD AUTO: 0.5 % (ref 0–0.9)
LACTATE BLD-SCNC: 1.6 MMOL/L (ref 0.5–2)
LYMPHOCYTES # BLD AUTO: 0.67 K/UL (ref 1–4.8)
LYMPHOCYTES NFR BLD: 17.2 % (ref 22–41)
MAGNESIUM SERPL-MCNC: 2 MG/DL (ref 1.5–2.5)
MCH RBC QN AUTO: 31.6 PG (ref 27–33)
MCHC RBC AUTO-ENTMCNC: 32.1 G/DL (ref 33.6–35)
MCV RBC AUTO: 98.5 FL (ref 81.4–97.8)
MONOCYTES # BLD AUTO: 0.34 K/UL (ref 0–0.85)
MONOCYTES NFR BLD AUTO: 8.7 % (ref 0–13.4)
NEUTROPHILS # BLD AUTO: 2.71 K/UL (ref 2–7.15)
NEUTROPHILS NFR BLD: 69.8 % (ref 44–72)
NRBC # BLD AUTO: 0 K/UL
NRBC BLD-RTO: 0 /100 WBC
PLATELET # BLD AUTO: 93 K/UL (ref 164–446)
PMV BLD AUTO: 10.4 FL (ref 9–12.9)
POTASSIUM SERPL-SCNC: 5.2 MMOL/L (ref 3.6–5.5)
PROT SERPL-MCNC: 5.9 G/DL (ref 6–8.2)
RBC # BLD AUTO: 2.72 M/UL (ref 4.2–5.4)
SODIUM SERPL-SCNC: 140 MMOL/L (ref 135–145)
WBC # BLD AUTO: 3.9 K/UL (ref 4.8–10.8)

## 2020-03-04 PROCEDURE — 700102 HCHG RX REV CODE 250 W/ 637 OVERRIDE(OP): Performed by: EMERGENCY MEDICINE

## 2020-03-04 PROCEDURE — 99223 1ST HOSP IP/OBS HIGH 75: CPT | Mod: AI,GC | Performed by: HOSPITALIST

## 2020-03-04 PROCEDURE — 99285 EMERGENCY DEPT VISIT HI MDM: CPT

## 2020-03-04 PROCEDURE — A9270 NON-COVERED ITEM OR SERVICE: HCPCS | Performed by: STUDENT IN AN ORGANIZED HEALTH CARE EDUCATION/TRAINING PROGRAM

## 2020-03-04 PROCEDURE — 85025 COMPLETE CBC W/AUTO DIFF WBC: CPT

## 2020-03-04 PROCEDURE — 80053 COMPREHEN METABOLIC PANEL: CPT

## 2020-03-04 PROCEDURE — 90935 HEMODIALYSIS ONE EVALUATION: CPT

## 2020-03-04 PROCEDURE — 93005 ELECTROCARDIOGRAM TRACING: CPT | Performed by: EMERGENCY MEDICINE

## 2020-03-04 PROCEDURE — 71045 X-RAY EXAM CHEST 1 VIEW: CPT

## 2020-03-04 PROCEDURE — 83605 ASSAY OF LACTIC ACID: CPT

## 2020-03-04 PROCEDURE — 87502 INFLUENZA DNA AMP PROBE: CPT

## 2020-03-04 PROCEDURE — 700102 HCHG RX REV CODE 250 W/ 637 OVERRIDE(OP): Performed by: STUDENT IN AN ORGANIZED HEALTH CARE EDUCATION/TRAINING PROGRAM

## 2020-03-04 PROCEDURE — 770001 HCHG ROOM/CARE - MED/SURG/GYN PRIV*

## 2020-03-04 PROCEDURE — 87040 BLOOD CULTURE FOR BACTERIA: CPT

## 2020-03-04 PROCEDURE — 83735 ASSAY OF MAGNESIUM: CPT

## 2020-03-04 PROCEDURE — 5A1D70Z PERFORMANCE OF URINARY FILTRATION, INTERMITTENT, LESS THAN 6 HOURS PER DAY: ICD-10-PCS | Performed by: INTERNAL MEDICINE

## 2020-03-04 PROCEDURE — 700111 HCHG RX REV CODE 636 W/ 250 OVERRIDE (IP): Performed by: STUDENT IN AN ORGANIZED HEALTH CARE EDUCATION/TRAINING PROGRAM

## 2020-03-04 PROCEDURE — A9270 NON-COVERED ITEM OR SERVICE: HCPCS | Performed by: EMERGENCY MEDICINE

## 2020-03-04 RX ORDER — HYDRALAZINE HYDROCHLORIDE 50 MG/1
50 TABLET, FILM COATED ORAL 2 TIMES DAILY
Status: DISCONTINUED | OUTPATIENT
Start: 2020-03-04 | End: 2020-03-05 | Stop reason: HOSPADM

## 2020-03-04 RX ORDER — GABAPENTIN 100 MG/1
100 CAPSULE ORAL 2 TIMES DAILY
COMMUNITY
End: 2021-01-17

## 2020-03-04 RX ORDER — HEPARIN SODIUM 1000 [USP'U]/ML
1500 INJECTION, SOLUTION INTRAVENOUS; SUBCUTANEOUS PRN
Status: DISCONTINUED | OUTPATIENT
Start: 2020-03-04 | End: 2020-03-05 | Stop reason: HOSPADM

## 2020-03-04 RX ORDER — FUROSEMIDE 40 MG/1
40 TABLET ORAL DAILY
Status: ON HOLD | COMMUNITY
End: 2020-03-05

## 2020-03-04 RX ORDER — TERAZOSIN 5 MG/1
5 CAPSULE ORAL NIGHTLY
Status: DISCONTINUED | OUTPATIENT
Start: 2020-03-04 | End: 2020-03-05 | Stop reason: HOSPADM

## 2020-03-04 RX ORDER — LABETALOL 200 MG/1
200 TABLET, FILM COATED ORAL 2 TIMES DAILY
Status: ON HOLD | COMMUNITY
End: 2020-03-05

## 2020-03-04 RX ORDER — POLYETHYLENE GLYCOL 3350 17 G/17G
1 POWDER, FOR SOLUTION ORAL
Status: DISCONTINUED | OUTPATIENT
Start: 2020-03-04 | End: 2020-03-05 | Stop reason: HOSPADM

## 2020-03-04 RX ORDER — GUAIFENESIN/DEXTROMETHORPHAN 100-10MG/5
10 SYRUP ORAL EVERY 6 HOURS PRN
Status: DISCONTINUED | OUTPATIENT
Start: 2020-03-04 | End: 2020-03-05 | Stop reason: HOSPADM

## 2020-03-04 RX ORDER — FUROSEMIDE 40 MG/1
40 TABLET ORAL DAILY
Status: DISCONTINUED | OUTPATIENT
Start: 2020-03-05 | End: 2020-03-05 | Stop reason: HOSPADM

## 2020-03-04 RX ORDER — HYDRALAZINE HYDROCHLORIDE 50 MG/1
50 TABLET, FILM COATED ORAL 2 TIMES DAILY
COMMUNITY

## 2020-03-04 RX ORDER — BISACODYL 10 MG
10 SUPPOSITORY, RECTAL RECTAL
Status: DISCONTINUED | OUTPATIENT
Start: 2020-03-04 | End: 2020-03-05 | Stop reason: HOSPADM

## 2020-03-04 RX ORDER — LABETALOL 100 MG/1
200 TABLET, FILM COATED ORAL 2 TIMES DAILY
Status: DISCONTINUED | OUTPATIENT
Start: 2020-03-04 | End: 2020-03-04

## 2020-03-04 RX ORDER — AMLODIPINE BESYLATE 10 MG/1
10 TABLET ORAL DAILY
Status: DISCONTINUED | OUTPATIENT
Start: 2020-03-05 | End: 2020-03-05 | Stop reason: HOSPADM

## 2020-03-04 RX ORDER — ACETAMINOPHEN 325 MG/1
650 TABLET ORAL EVERY 6 HOURS PRN
Status: DISCONTINUED | OUTPATIENT
Start: 2020-03-04 | End: 2020-03-05 | Stop reason: HOSPADM

## 2020-03-04 RX ORDER — AMOXICILLIN 250 MG
2 CAPSULE ORAL 2 TIMES DAILY
Status: DISCONTINUED | OUTPATIENT
Start: 2020-03-04 | End: 2020-03-05 | Stop reason: HOSPADM

## 2020-03-04 RX ORDER — CARVEDILOL 6.25 MG/1
25 TABLET ORAL 2 TIMES DAILY WITH MEALS
Status: DISCONTINUED | OUTPATIENT
Start: 2020-03-04 | End: 2020-03-05 | Stop reason: HOSPADM

## 2020-03-04 RX ORDER — OSELTAMIVIR PHOSPHATE 30 MG/1
30 CAPSULE ORAL
Status: DISCONTINUED | OUTPATIENT
Start: 2020-03-04 | End: 2020-03-05 | Stop reason: HOSPADM

## 2020-03-04 RX ORDER — HEPARIN SODIUM 5000 [USP'U]/ML
5000 INJECTION, SOLUTION INTRAVENOUS; SUBCUTANEOUS EVERY 12 HOURS
Status: DISCONTINUED | OUTPATIENT
Start: 2020-03-04 | End: 2020-03-05 | Stop reason: HOSPADM

## 2020-03-04 RX ORDER — OSELTAMIVIR PHOSPHATE 30 MG/1
30 CAPSULE ORAL ONCE
Status: COMPLETED | OUTPATIENT
Start: 2020-03-04 | End: 2020-03-04

## 2020-03-04 RX ORDER — TERAZOSIN 5 MG/1
5 CAPSULE ORAL DAILY
Status: ON HOLD | COMMUNITY
End: 2021-01-23

## 2020-03-04 RX ORDER — NICOTINE 21 MG/24HR
14 PATCH, TRANSDERMAL 24 HOURS TRANSDERMAL
Status: DISCONTINUED | OUTPATIENT
Start: 2020-03-04 | End: 2020-03-05 | Stop reason: HOSPADM

## 2020-03-04 RX ORDER — BUDESONIDE AND FORMOTEROL FUMARATE DIHYDRATE 80; 4.5 UG/1; UG/1
2 AEROSOL RESPIRATORY (INHALATION) 2 TIMES DAILY
Status: DISCONTINUED | OUTPATIENT
Start: 2020-03-04 | End: 2020-03-05 | Stop reason: HOSPADM

## 2020-03-04 RX ORDER — AMLODIPINE BESYLATE 10 MG/1
10 TABLET ORAL DAILY
Status: ON HOLD | COMMUNITY
End: 2021-01-23

## 2020-03-04 RX ORDER — GABAPENTIN 100 MG/1
100 CAPSULE ORAL 2 TIMES DAILY
Status: DISCONTINUED | OUTPATIENT
Start: 2020-03-04 | End: 2020-03-05 | Stop reason: HOSPADM

## 2020-03-04 RX ADMIN — HYDRALAZINE HYDROCHLORIDE 50 MG: 50 TABLET, FILM COATED ORAL at 17:20

## 2020-03-04 RX ADMIN — TERAZOSIN HYDROCHLORIDE ANHYDROUS 5 MG: 5 CAPSULE ORAL at 21:33

## 2020-03-04 RX ADMIN — GABAPENTIN 100 MG: 100 CAPSULE ORAL at 17:20

## 2020-03-04 RX ADMIN — OSELTAMIVIR PHOSPHATE 30 MG: 30 CAPSULE ORAL at 12:58

## 2020-03-04 RX ADMIN — CARVEDILOL 25 MG: 6.25 TABLET, FILM COATED ORAL at 17:20

## 2020-03-04 RX ADMIN — BUDESONIDE AND FORMOTEROL FUMARATE DIHYDRATE 2 PUFF: 80; 4.5 AEROSOL RESPIRATORY (INHALATION) at 22:03

## 2020-03-04 RX ADMIN — HEPARIN SODIUM 5000 UNITS: 5000 INJECTION, SOLUTION INTRAVENOUS; SUBCUTANEOUS at 17:20

## 2020-03-04 ASSESSMENT — LIFESTYLE VARIABLES
HAVE YOU EVER FELT YOU SHOULD CUT DOWN ON YOUR DRINKING: NO
CONSUMPTION TOTAL: INCOMPLETE
EVER FELT BAD OR GUILTY ABOUT YOUR DRINKING: NO
EVER_SMOKED: YES
TOTAL SCORE: 0
EVER FELT BAD OR GUILTY ABOUT YOUR DRINKING: NO
TOTAL SCORE: 0
HAVE PEOPLE ANNOYED YOU BY CRITICIZING YOUR DRINKING: NO
HAVE PEOPLE ANNOYED YOU BY CRITICIZING YOUR DRINKING: NO
HAVE YOU EVER FELT YOU SHOULD CUT DOWN ON YOUR DRINKING: NO
ALCOHOL_USE: NO
ON A TYPICAL DAY WHEN YOU DRINK ALCOHOL HOW MANY DRINKS DO YOU HAVE: 0
TOTAL SCORE: 0
HOW MANY TIMES IN THE PAST YEAR HAVE YOU HAD 5 OR MORE DRINKS IN A DAY: 0
TOTAL SCORE: 0
EVER HAD A DRINK FIRST THING IN THE MORNING TO STEADY YOUR NERVES TO GET RID OF A HANGOVER: NO
CONSUMPTION TOTAL: NEGATIVE
EVER HAD A DRINK FIRST THING IN THE MORNING TO STEADY YOUR NERVES TO GET RID OF A HANGOVER: NO
AVERAGE NUMBER OF DAYS PER WEEK YOU HAVE A DRINK CONTAINING ALCOHOL: 0
TOTAL SCORE: 0
ALCOHOL_USE: NO
SUBSTANCE_ABUSE: 0
TOTAL SCORE: 0

## 2020-03-04 ASSESSMENT — COGNITIVE AND FUNCTIONAL STATUS - GENERAL
SUGGESTED CMS G CODE MODIFIER DAILY ACTIVITY: CH
DAILY ACTIVITIY SCORE: 24
SUGGESTED CMS G CODE MODIFIER MOBILITY: CH
MOBILITY SCORE: 24

## 2020-03-04 ASSESSMENT — ENCOUNTER SYMPTOMS
MYALGIAS: 1
DIARRHEA: 0
HEARTBURN: 1
FEVER: 1
ABDOMINAL PAIN: 1
EYE REDNESS: 0
HEMOPTYSIS: 0
SHORTNESS OF BREATH: 1
VOMITING: 0
WHEEZING: 0
EYE DISCHARGE: 0
NAUSEA: 0
SINUS PAIN: 0
CHILLS: 1
PALPITATIONS: 0
WEIGHT LOSS: 0
ORTHOPNEA: 1
STRIDOR: 0
CONSTIPATION: 0
EYE PAIN: 0
PND: 0
SORE THROAT: 0
SPUTUM PRODUCTION: 1
BLOOD IN STOOL: 0
HEADACHES: 1
WEAKNESS: 0
COUGH: 1

## 2020-03-04 ASSESSMENT — COPD QUESTIONNAIRES
COPD SCREENING SCORE: 4
HAVE YOU SMOKED AT LEAST 100 CIGARETTES IN YOUR ENTIRE LIFE: YES
DURING THE PAST 4 WEEKS HOW MUCH DID YOU FEEL SHORT OF BREATH: NONE/LITTLE OF THE TIME
IN THE PAST 12 MONTHS DO YOU DO LESS THAN YOU USED TO BECAUSE OF YOUR BREATHING PROBLEMS: DISAGREE/UNSURE
DO YOU EVER COUGH UP ANY MUCUS OR PHLEGM?: YES, A FEW DAYS A WEEK OR MONTH

## 2020-03-04 ASSESSMENT — FIBROSIS 4 INDEX
FIB4 SCORE: 3.06
FIB4 SCORE: 1.69
FIB4 SCORE: 3.06
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
## 2020-03-04 NOTE — ED NOTES
Med Rec Updated and Complete per Pt at bedside and Home Pharmacy  Allergies Reviewed  No PO ABX last 14 days.    Pt denies OTC medication at this time.

## 2020-03-04 NOTE — H&P
"History & Physical Note    Date of Admission: 3/4/2020  Admission Status: Inpatient  [facility] Team: [facility] IM Blue Team  Attending: Bunny Miller M.D.   Senior Resident: Dr. Birch   Intern: Dr. Hong  Contact Number: [phone]    Chief Complaint: productive cough of one week duration associated with fevers and body aches and missed dialysis      History of Present Illness (HPI):    54 y.o. female pmx of SVT, anemia of chronic disease, ESRD MWF at [place] dialysis with left upper extremity fistula, GERD, HTN, and tobacco dependence presented to ED with a productive cough with grey mucus, non bloody, of one week duration associated with fever Tmax 103F, chills, runny nose with yellow discharge, body aches, and watery eyes. She also reports orthopnea 2-3 pillows, but this sounds more chronic. Pt also complains of a frontal headache, which improves with tylenol. She was recently admitted for one week in January for pneumonia. Pt missed dialysis twice last week (MW)  because she felt too weak to go and had diarrhea and may have not had transportation. She did have dialysis last Friday and this Monday. Patient also complains of persistent dyspnea. She denies sore throat and chest pain, aside from chest soreness from coughing. Patient is a current smoker and smokes around 5 cigarettes a day. Pt lives with a roommate. She is not currently working.     ED: 98.8F, P73, RR20, 124/65 89RA. Cbc shows pancytopenia wbc 3.9, Hgb 8.9, plt 93. K 5.2, BUN/Cr 47/8.04 GFR 5, Influenza A positive, CXR, \"bilateral costophrenic angle blunting suggestive of small pleural effusions. Mild basilar atelectasis. Underlying infection is possible. Stable enlargement of the cardiomediastinal silhouette.\" EKG: \"SINUS RHYTHM NONSPECIFIC T ABNORMALITIES, ANT-LAT LEADS rate 71, Qtc 479.      Review of Systems:   Review of Systems   Constitutional: Positive for chills, fever and malaise/fatigue.   HENT: Negative for congestion, ear discharge, ear " pain, nosebleeds, sinus pain and sore throat.    Eyes: Negative for pain, discharge and redness.   Respiratory: Positive for cough, sputum production and shortness of breath. Negative for hemoptysis, wheezing and stridor.    Cardiovascular: Positive for orthopnea and leg swelling. Negative for chest pain, palpitations and PND.   Gastrointestinal: Positive for abdominal pain and heartburn. Negative for blood in stool, constipation, diarrhea, melena, nausea and vomiting.   Genitourinary: Negative for dysuria, frequency, hematuria and urgency.   Musculoskeletal: Positive for myalgias.   Skin: Negative for rash.   Neurological: Positive for headaches. Negative for weakness.   Psychiatric/Behavioral: Negative for substance abuse.       When reviewing histories, add date and indication in the history section whenever possible.  Past Medical History:   Past Medical History was reviewed with patient.   has a past medical history of Anemia, Anemia associated with chronic renal failure (11/5/2009), Arrhythmia, Dental disorder (8-25-17), Dialysis patient ([facility]) (8-25-17), Dysrhythmia, ESRD (end stage renal disease) ([facility]) (8-25-17), GERD (gastroesophageal reflux disease), Glomerulonephritis, chronic (11/5/2009), Head ache, Heart burn, Heart murmur, High cholesterol, HTN (hypertension) (11/5/2009), Hypertension, Port catheter in place (8-25-17), SVT (supraventricular tachycardia) ([facility]), and SVT (supraventricular tachycardia) ([facility]). She also has no past medical history of Diabetes ([facility]).    Past Surgical History: Past Surgical History was reviewed with patient.   has a past surgical history that includes amputation, below the knee; capitation payment (insurance); appendectomy laparoscopic (5/4/2009); pr enlarge breast with implant; av fistula creation (Left, 6/20/2017); other; femur orif (10/9/08); iliac bone graft (10/9/08); av fistula creation (Left, 8/28/2017); av fistula creation (Left, 4/27/2018); and yamileth by laparoscopy  (6/22/2019).    Medications: Medications have been reviewed with patient.  Prior to Admission Medications   Prescriptions Last Dose Informant Patient Reported? Taking?   amLODIPine (NORVASC) 10 MG Tab 3/4/2020 at 0730 Patient Yes Yes   Sig: Take 10 mg by mouth every day.   budesonide-formoterol (SYMBICORT) 80-4.5 MCG/ACT Aerosol 3/4/2020 at 0730 Patient No No   Sig: Inhale 2 Puffs by mouth 2 Times a Day.   furosemide (LASIX) 40 MG Tab 3/4/2020 at 0730 Patient Yes Yes   Sig: Take 40 mg by mouth every day.   gabapentin (NEURONTIN) 100 MG Cap 3/4/2020 at 0730 Patient Yes Yes   Sig: Take 100 mg by mouth 2 Times a Day.   hydrALAZINE (APRESOLINE) 50 MG Tab 3/4/2020 at 0730 Patient Yes Yes   Sig: Take 50 mg by mouth 2 Times a Day.   labetalol (NORMODYNE) 200 MG Tab 3/4/2020 at 0730 Patient Yes Yes   Sig: Take 200 mg by mouth 2 times a day.   metoprolol (LOPRESSOR) 25 MG Tab 3/4/2020 at 0730 Patient Yes Yes   Sig: Take 25 mg by mouth 2 times a day.   terazosin (HYTRIN) 5 MG Cap 3/3/2020 at 1930 Patient Yes Yes   Sig: Take 5 mg by mouth every evening.      Facility-Administered Medications: None        Allergies: Allergies have been reviewed with patient.  Allergies   Allergen Reactions   • Sulfa Drugs Hives     [id number] rash swelling itching       Family History:   family history includes Heart Disease in an other family member.     Social History:   Tobacco: 5 cigs per day for 20 years    Alcohol: denied   Recreational drugs (illegal and prescription):  denied   Employment: not currently   Activity Level: below average   Living situation:  With a roommate in [place]   Recent travel:  n/a  Primary Care Provider: reviewed Pcp Pt States None  Other (stressors, spirituality, exposures):  n/a  Physical Exam:   Vitals:  Temp:  [37.1 °C (98.8 °F)] 37.1 °C (98.8 °F)  Pulse:  [] 70  Resp:  [16-20] 16  BP: (124-136)/(65-72) 136/72  SpO2:  [95 %-98 %] 95 %    Physical Exam  Constitutional:       General: She is not in acute  distress.     Appearance: Normal appearance. She is normal weight. She is not toxic-appearing.   HENT:      Head: Normocephalic and atraumatic.      Right Ear: Tympanic membrane, ear canal and external ear normal.      Left Ear: Tympanic membrane, ear canal and external ear normal.      Nose: Rhinorrhea present. No congestion.      Mouth/Throat:      Mouth: Mucous membranes are dry.      Pharynx: Oropharynx is clear.   Eyes:      General: No scleral icterus.     Extraocular Movements: Extraocular movements intact.      Conjunctiva/sclera: Conjunctivae normal.      Pupils: Pupils are equal, round, and reactive to light.   Neck:      Musculoskeletal: Normal range of motion and neck supple.   Cardiovascular:      Rate and Rhythm: Normal rate and regular rhythm.      Pulses: Normal pulses.      Heart sounds: Murmur (holosystolic murmur ) present. No friction rub. No gallop.    Pulmonary:      Effort: No respiratory distress.      Breath sounds: No stridor. Rales (bibasilar ) present. No wheezing or rhonchi.      Comments: Tachypnea   Abdominal:      General: Abdomen is flat. Bowel sounds are normal. There is no distension.      Palpations: Abdomen is soft.      Tenderness: There is abdominal tenderness (epigastric ). There is no guarding or rebound.   Musculoskeletal: Normal range of motion.         General: Swelling present. No deformity.      Left lower leg: Edema (2+ ) present.      Comments: Right below knee amputation    Skin:     General: Skin is warm.      Findings: No bruising, erythema, lesion or rash.   Neurological:      General: No focal deficit present.      Mental Status: She is alert and oriented to person, place, and time.      Cranial Nerves: No cranial nerve deficit.      Sensory: No sensory deficit.      Motor: No weakness.   Psychiatric:         Mood and Affect: Mood normal.         Behavior: Behavior normal.         Thought Content: Thought content normal.         Judgment: Judgment normal.          Labs:   Recent Labs     03/04/20  1055   WBC 3.9*   RBC 2.72*   HEMOGLOBIN 8.6*   HEMATOCRIT 26.8*   MCV 98.5*   MCH 31.6   RDW 59.1*   PLATELETCT 93*   MPV 10.4   NEUTSPOLYS 69.80   LYMPHOCYTES 17.20*   MONOCYTES 8.70   EOSINOPHILS 3.30   BASOPHILS 0.50     Recent Labs     03/04/20  1015   SODIUM 140   POTASSIUM 5.2   CHLORIDE 96   CO2 32   GLUCOSE 132*   BUN 47*         EKG: Per my read, SR, non specific T wave changes in the anterior-lateral leads, rate 71, Qtc 470     Imaging:   DX-CHEST-PORTABLE (1 VIEW)   Final Result      1.  Bilateral costophrenic angle blunting suggestive of small pleural effusions.   2.  Mild basilar atelectasis. Underlying infection is possible.   3.  Stable enlargement of the cardiomediastinal silhouette.            Previous Data Review: reviewed    Influenza A- (present on admission)  Assessment & Plan  Positive influenza A test 3/4/20    Plan:  Started on Tamiflu with rec of 30mg daily or one 30mg dose after dialysis for 5 days, pt's GFR 5  Tylenol 650mg Q6 prn   Supportive care   Respiratory therapy   IS       Volume overload- (present on admission)  Assessment & Plan  Pt has peripheral edema and bibasilar crackles and has been sleeping 2-3 pillows at home. She missed 2 dialysis sessions last week. She takes lasix daily.  Pt makes small amounts of urine     Plan:  Dialysis today   Home lasix   Fluid restriction, strict I&Os, daily weights     Hypertension  Assessment & Plan  Admission bp 124/65 P 73     Pt is on several bp medications at home, will resume with parameters     ESRD on hemodialysis (HCC)- (present on admission)  Assessment & Plan  ESRD MWF at [place] dialysis with left upper extremity fistula   Follows with dr. Junior for nephrology   Pt missed dialysis twice last week (MW)  because she felt too weak to go and had diarrhea and may have not had transportation. She did have dialysis last Friday and this Monday.  She is volume overloaded   K  5.2    Plan:  Nephrology has been contacted and will arrange for dialysis today   Monitor volume status  Daily weights, 2000cc fluid restriction, renal diet, strict I&O's       Supraventricular tachycardia, paroxysmal (CMS-HCC)- (present on admission)  Assessment & Plan  Today's EKG shows sinus rhythm  non specific T wave changes in the anterior-lateral leads, rate 71, Qtc 470     Plan:  Monitor for cardiac complaints, tachycardia, and palpitations       Pancytopenia (HCC)- (present on admission)  Assessment & Plan  wbc 3.9, Hgb 8.9, plt 93, appears chronic   HIV neg in 2017     Anemia due to chronic kidney disease- (present on admission)  Assessment & Plan  Hgb 8.6 candidate for EPO replacement     Plan:  Appreciate nephrology recs     Tobacco abuse- (present on admission)  Assessment & Plan  Active smoker 1/4PPD for 20 years     Plan:  Nicotine replacement   Smoking cessation counseling     Hx of right BKA (CMS-HCC)- (present on admission)  Assessment & Plan  Pt uses a prosthetic

## 2020-03-04 NOTE — ASSESSMENT & PLAN NOTE
Admission bp 124/65 P 73     Pt is on several bp medications at home, will resume with parameters

## 2020-03-04 NOTE — ASSESSMENT & PLAN NOTE
Positive influenza A test 3/4/20    Plan:  Started on Tamiflu with rec of 30mg daily or one 30mg dose after dialysis for 5 days, pt's GFR 5  Tylenol 650mg Q6 prn   Supportive care   Respiratory therapy   IS

## 2020-03-04 NOTE — ASSESSMENT & PLAN NOTE
ESRD MWF at [place] dialysis with left upper extremity fistula   Follows with dr. Junior for nephrology   Pt missed dialysis twice last week (MW)  because she felt too weak to go and had diarrhea and may have not had transportation. She did have dialysis last Friday and this Monday.  She is volume overloaded   K 5.2    Plan:  Nephrology has been contacted and will arrange for dialysis today   Monitor volume status  Daily weights, 2000cc fluid restriction, renal diet, strict I&O's

## 2020-03-04 NOTE — ED TRIAGE NOTES
Chief Complaint   Patient presents with   • Cough     Dialysis pt qm-W-F.  Unable to get ride to dialysis today.  Productive cough x 1 week.  Fever, body acjes , Denies sore throat or N/V.  Is Short of breath

## 2020-03-04 NOTE — ASSESSMENT & PLAN NOTE
Pt has peripheral edema and bibasilar crackles and has been sleeping 2-3 pillows at home. She missed 2 dialysis sessions last week. She takes lasix daily.  Pt makes small amounts of urine     Plan:  Dialysis today   Home lasix   Fluid restriction, strict I&Os, daily weights

## 2020-03-04 NOTE — ED NOTES
Lab called with critical result of Positive Flu A. Critical lab result read back to Ozzy SANTOS.   Dr. Miller notified of critical lab result.

## 2020-03-04 NOTE — ED PROVIDER NOTES
"ED Provider Note    Scribed for Bunny Miller M.D. by Abelardo Redman. 3/4/2020  10:01 AM    Primary care provider: Pcp Not In Computer  Means of arrival: Walk-in  History obtained from: Patient  History limited by: None    CHIEF COMPLAINT  Chief Complaint   Patient presents with   • Cough     Dialysis pt qm-W-F.  Unable to get ride to dialysis today.  Productive cough x 1 week.  Fever, body acjes , Denies sore throat or N/V.  Is Short of breath       HPI  Isabelle Coyle is a 54 y.o. female who presents to the Emergency Department acute, moderate productive cough onset 1 week ago. Patient states that she has been sick for the past week and her cough has been keeping her up at night. There are no known alleviating or exacerbating factors. She has an associated fever, chills, sweating, shortness of breath, and a headache. Tmax at home was 103 °F, per patient. Patient rates her headache as a 3/10 but is unable to describe it's quality. She denies any sore throat or rhinorrhea. Patient is a current smoker and smokes around 5 cigarettes a day.     Patient additionally notes that she is in need of dialysis, as her \"bus as quit\". Her last dialysis was Monday, and she undergoes M-W-F dialysis. She has associated swelling to her legs. Patient only received 1 dialysis treatment due to \"sickness\".       REVIEW OF SYSTEMS  Pertinent positives include cough, fever, chills, sweating, shortness of breath, and headahce. Pertinent negatives include no sore throat or rhinorrhea.  All other systems reviewed and negative.    PAST MEDICAL HISTORY   has a past medical history of Anemia, Anemia associated with chronic renal failure (11/5/2009), Arrhythmia, Dental disorder (8-25-17), Dialysis patient ([facility]) (8-25-17), Dysrhythmia, ESRD (end stage renal disease) ([facility]) (8-25-17), GERD (gastroesophageal reflux disease), Glomerulonephritis, chronic (11/5/2009), Head ache, Heart burn, Heart murmur, High cholesterol, HTN (hypertension) " (2009), Hypertension, Port catheter in place (17), SVT (supraventricular tachycardia) (HCC), and SVT (supraventricular tachycardia) (HCC).    SURGICAL HISTORY   has a past surgical history that includes amputation, below the knee; capitation payment (insurance); appendectomy laparoscopic (2009); enlarge breast with implant; av fistula creation (Left, 2017); other; femur orif (10/9/08); iliac bone graft (10/9/08); av fistula creation (Left, 2017); av fistula creation (Left, 2018); and yamileth by laparoscopy (2019).    SOCIAL HISTORY  Social History     Tobacco Use   • Smoking status: Current Some Day Smoker     Packs/day: 0.25     Years: 20.00     Pack years: 5.00     Types: Cigarettes     Last attempt to quit: 2019     Years since quittin.8   • Smokeless tobacco: Never Used   • Tobacco comment: 5 cigs/day   Substance Use Topics   • Alcohol use: No   • Drug use: No      Social History     Substance and Sexual Activity   Drug Use No       FAMILY HISTORY  Family History   Problem Relation Age of Onset   • Heart Disease Other        CURRENT MEDICATIONS  Home Medications     Reviewed by Rona Morocho (Pharmacy Tech) on 20 at 1108  Med List Status: Complete   Medication Last Dose Status   amLODIPine (NORVASC) 10 MG Tab 3/4/2020 Active   budesonide-formoterol (SYMBICORT) 80-4.5 MCG/ACT Aerosol 3/4/2020 Active   furosemide (LASIX) 40 MG Tab 3/4/2020 Active   gabapentin (NEURONTIN) 100 MG Cap 3/4/2020 Active   hydrALAZINE (APRESOLINE) 50 MG Tab 3/4/2020 Active   labetalol (NORMODYNE) 200 MG Tab 3/4/2020 Active   metoprolol (LOPRESSOR) 25 MG Tab 3/4/2020 Active   terazosin (HYTRIN) 5 MG Cap 3/3/2020 Active                ALLERGIES  Allergies   Allergen Reactions   • Sulfa Drugs Hives     [id number] rash swelling itching       PHYSICAL EXAM  VITAL SIGNS: /65   Pulse 73   Temp 37.1 °C (98.8 °F) (Temporal)   Resp 20   Wt 66.4 kg (146 lb 6.2 oz)   LMP 2007    SpO2 96% Comment: 89% RA  BMI 26.77 kg/m²     Constitutional: Well developed, Well nourished, mild distress, Non-toxic appearance.   HENT: Normocephalic, Atraumatic, Bilateral external ears normal, Oropharynx moist, No oral exudates.   Eyes: PERRLA, EOMI, Conjunctiva normal, No discharge.   Neck: No tenderness, Supple, No stridor.   Lymphatic: No lymphadenopathy noted.   Cardiovascular: Normal heart rate, Normal rhythm. Left brachial AV fistula with good thrill.  Thorax & Lungs: Mild respiratory distress with crackles at bilateral bases.   Abdomen: Soft, No tenderness, No masses, No pulsatile masses.   Skin: Warm, Dry, No erythema, No rash.   Extremities: Right BKA, LLE with 2+ pitting edema.   Musculoskeletal: No tenderness to palpation or major deformities noted.  Intact distal pulses  Neurologic: Awake, alert. Moves all extremities spontaneously.  Psychiatric: Affect normal, Judgment normal, Mood normal.       LABS  Results for orders placed or performed during the hospital encounter of 03/04/20   LACTIC ACID   Result Value Ref Range    Lactic Acid 1.6 0.5 - 2.0 mmol/L   COMP METABOLIC PANEL   Result Value Ref Range    Sodium 140 135 - 145 mmol/L    Potassium 5.2 3.6 - 5.5 mmol/L    Chloride 96 96 - 112 mmol/L    Co2 32 20 - 33 mmol/L    Anion Gap 12.0 (H) 0.0 - 11.9    Glucose 132 (H) 65 - 99 mg/dL    Bun 47 (H) 8 - 22 mg/dL    Creatinine 8.04 (HH) 0.50 - 1.40 mg/dL    Calcium 8.3 (L) 8.5 - 10.5 mg/dL    AST(SGOT) 19 12 - 45 U/L    ALT(SGPT) 14 2 - 50 U/L    Alkaline Phosphatase 55 30 - 99 U/L    Total Bilirubin 0.7 0.1 - 1.5 mg/dL    Albumin 3.4 3.2 - 4.9 g/dL    Total Protein 5.9 (L) 6.0 - 8.2 g/dL    Globulin 2.5 1.9 - 3.5 g/dL    A-G Ratio 1.4 g/dL   Influenza A/B By PCR (Adult - Flu Only)   Result Value Ref Range    Influenza virus A RNA POSITIVE (A) Negative    Influenza virus B, PCR Negative Negative   CBC WITH DIFFERENTIAL   Result Value Ref Range    WBC 3.9 (L) 4.8 - 10.8 K/uL    RBC 2.72 (L) 4.20 -  5.40 M/uL    Hemoglobin 8.6 (L) 12.0 - 16.0 g/dL    Hematocrit 26.8 (L) 37.0 - 47.0 %    MCV 98.5 (H) 81.4 - 97.8 fL    MCH 31.6 27.0 - 33.0 pg    MCHC 32.1 (L) 33.6 - 35.0 g/dL    RDW 59.1 (H) 35.9 - 50.0 fL    Platelet Count 93 (L) 164 - 446 K/uL    MPV 10.4 9.0 - 12.9 fL    Neutrophils-Polys 69.80 44.00 - 72.00 %    Lymphocytes 17.20 (L) 22.00 - 41.00 %    Monocytes 8.70 0.00 - 13.40 %    Eosinophils 3.30 0.00 - 6.90 %    Basophils 0.50 0.00 - 1.80 %    Immature Granulocytes 0.50 0.00 - 0.90 %    Nucleated RBC 0.00 /100 WBC    Neutrophils (Absolute) 2.71 2.00 - 7.15 K/uL    Lymphs (Absolute) 0.67 (L) 1.00 - 4.80 K/uL    Monos (Absolute) 0.34 0.00 - 0.85 K/uL    Eos (Absolute) 0.13 0.00 - 0.51 K/uL    Baso (Absolute) 0.02 0.00 - 0.12 K/uL    Immature Granulocytes (abs) 0.02 0.00 - 0.11 K/uL    NRBC (Absolute) 0.00 K/uL   ESTIMATED GFR   Result Value Ref Range    GFR If  6 (A) >60 mL/min/1.73 m 2    GFR If Non African American 5 (A) >60 mL/min/1.73 m 2   Magnesium   Result Value Ref Range    Magnesium 2.0 1.5 - 2.5 mg/dL   EKG   Result Value Ref Range    Report       [facility] Emergency Dept.    Test Date:  2020  Pt Name:    MELISSA BARDALES                 Department: ER  MRN:        [id number]                      Room:       BL 21  Gender:     Female                       Technician: [id number]  :        1965                   Requested By:ALFIE GARCIA  Order #:    [id number]                    Reading MD: ALFIE GARCIA MD    Measurements  Intervals                                Axis  Rate:       71                           P:          55  KY:         160                          QRS:        65  QRSD:       88                           T:          71  QT:         440  QTc:        479    Interpretive Statements  SINUS RHYTHM  NONSPECIFIC T ABNORMALITIES, ANT-LAT LEADS  Compared to ECG 2020 17:39:37  T-wave abnormality now present  Atrial fibrillation no  longer present  Left ventricular hypertrophy no longer present  Electronically Signed On 3-4-2020 11:28:00 PST by BUNNY GARCIA MD          RADIOLOGY  DX-CHEST-PORTABLE (1 VIEW)   Final Result      1.  Bilateral costophrenic angle blunting suggestive of small pleural effusions.   2.  Mild basilar atelectasis. Underlying infection is possible.   3.  Stable enlargement of the cardiomediastinal silhouette.        The radiologist's interpretation of all radiological studies have been reviewed by me.      COURSE & MEDICAL DECISION MAKING  Pertinent Labs & Imaging studies reviewed. (See chart for details)    10:01 AM - Patient seen and examined at bedside. Ordered DX-chest, lactic acid (every 2 hours), influenza A/B by PCR, CBC with diff, CMP, urinalysis, urine culture, and blood cultre to evaluate her symptoms. The differential diagnoses include but are not limited to: Pneumonia, influenza, and hyperkalemia.     11:27 AM - Patient treated with Tamiflu 75 mg per positive flu A.     11:31 AM - I discussed the patient's case and the above findings with [facility] Internal Medicine Resident who agrees to evaluate the patient for hospitalization.     11:33 AM - Nephrology paged.     11:51 AM - I discussed the patient's case and the above findings with Dr. Cadena (Nephrologist) who agrees to consult on the patient.    Decision Making:  Patient is coming in secondary to cough.  Influenza A positive.  The patient has end-stage renal disease, is hypoxic, discussed the case with nephrology who will dialyze the patient, discussed the case with the hospitalist for hospitalization.    DISPOSITION:  Patient will be hospitalized by [facility] Internal Medicine Resident in guarded condition.    FINAL IMPRESSION  1. Influenza A    2. Acute respiratory failure with hypoxia (HCC)    3. ESRD (end stage renal disease) (HCC)          IAbelardo (Scribe), am scribing for, and in the presence of, Bunny Garcia M.D..    Electronically  signed by: Abelardo Redman (Scribe), 3/4/2020    I, Bunny Miller M.D. personally performed the services described in this documentation, as scribed by Abelardo Redman in my presence, and it is both accurate and complete. C.    The note accurately reflects work and decisions made by me.  Bunny Miller M.D.  3/4/2020  5:36 PM

## 2020-03-04 NOTE — ED NOTES
Patient observed resting in gurney, presumably sleeping, no s/s of discomfort or distress at this time. Unlabored breathing & visible chest rise noted. Patient repositions self occasionally. Bed in lowest position, room & floor clear.

## 2020-03-04 NOTE — DISCHARGE PLANNING
Outpatient Dialysis Note    Confirmed patient is active at:    [facility]  [address] [place] [address]       Schedule: Monday, Wednesday, Friday  Time: 6:40 am    Spoke with Abby at facility who confirmed.     Patient missed HD yesterday due to transportation. HD clinic can take the patient today, 3/4/2020, and patient needs to be there by 12:15 pm.        Alexia Ayoub- Dialysis Coordinator  [facility] # [phone]

## 2020-03-04 NOTE — ED NOTES
Patient observed resting in gurney, presumably sleeping, no s/s of discomfort or distress at this time. Unlabored breathing & visible chest rise noted. Patient repositions self occasionally. Bed in lowest position, room & floor clear. Pt in line of sight from RN station.

## 2020-03-04 NOTE — ASSESSMENT & PLAN NOTE
Today's EKG shows sinus rhythm  non specific T wave changes in the anterior-lateral leads, rate 71, Qtc 470     Plan:  Monitor for cardiac complaints, tachycardia, and palpitations

## 2020-03-05 ENCOUNTER — PATIENT OUTREACH (OUTPATIENT)
Dept: HEALTH INFORMATION MANAGEMENT | Facility: OTHER | Age: 55
End: 2020-03-05

## 2020-03-05 VITALS
HEART RATE: 71 BPM | WEIGHT: 146.39 LBS | DIASTOLIC BLOOD PRESSURE: 66 MMHG | SYSTOLIC BLOOD PRESSURE: 127 MMHG | TEMPERATURE: 98.3 F | OXYGEN SATURATION: 90 % | BODY MASS INDEX: 26.77 KG/M2 | RESPIRATION RATE: 16 BRPM

## 2020-03-05 LAB
25(OH)D3 SERPL-MCNC: 54 NG/ML (ref 30–100)
ALBUMIN SERPL BCP-MCNC: 3.7 G/DL (ref 3.2–4.9)
ALBUMIN/GLOB SERPL: 1.4 G/DL
ALP SERPL-CCNC: 58 U/L (ref 30–99)
ALT SERPL-CCNC: 10 U/L (ref 2–50)
ANION GAP SERPL CALC-SCNC: 7 MMOL/L (ref 0–11.9)
APPEARANCE UR: ABNORMAL
AST SERPL-CCNC: 18 U/L (ref 12–45)
BACTERIA #/AREA URNS HPF: ABNORMAL /HPF
BASOPHILS # BLD AUTO: 0.3 % (ref 0–1.8)
BASOPHILS # BLD: 0.01 K/UL (ref 0–0.12)
BILIRUB SERPL-MCNC: 0.7 MG/DL (ref 0.1–1.5)
BILIRUB UR QL STRIP.AUTO: NEGATIVE
BUN SERPL-MCNC: 28 MG/DL (ref 8–22)
CALCIUM SERPL-MCNC: 8.2 MG/DL (ref 8.5–10.5)
CHLORIDE SERPL-SCNC: 98 MMOL/L (ref 96–112)
CO2 SERPL-SCNC: 32 MMOL/L (ref 20–33)
COLOR UR: YELLOW
CREAT SERPL-MCNC: 5.38 MG/DL (ref 0.5–1.4)
EOSINOPHIL # BLD AUTO: 0.45 K/UL (ref 0–0.51)
EOSINOPHIL NFR BLD: 12.7 % (ref 0–6.9)
EPI CELLS #/AREA URNS HPF: ABNORMAL /HPF
ERYTHROCYTE [DISTWIDTH] IN BLOOD BY AUTOMATED COUNT: 59 FL (ref 35.9–50)
FERRITIN SERPL-MCNC: 733 NG/ML (ref 10–291)
GLOBULIN SER CALC-MCNC: 2.6 G/DL (ref 1.9–3.5)
GLUCOSE SERPL-MCNC: 87 MG/DL (ref 65–99)
GLUCOSE UR STRIP.AUTO-MCNC: 100 MG/DL
HCT VFR BLD AUTO: 27.8 % (ref 37–47)
HGB BLD-MCNC: 8.8 G/DL (ref 12–16)
HYALINE CASTS #/AREA URNS LPF: ABNORMAL /LPF
IMM GRANULOCYTES # BLD AUTO: 0.01 K/UL (ref 0–0.11)
IMM GRANULOCYTES NFR BLD AUTO: 0.3 % (ref 0–0.9)
IRON SATN MFR SERPL: 16 % (ref 15–55)
IRON SERPL-MCNC: 41 UG/DL (ref 40–170)
KETONES UR STRIP.AUTO-MCNC: NEGATIVE MG/DL
LEUKOCYTE ESTERASE UR QL STRIP.AUTO: NEGATIVE
LYMPHOCYTES # BLD AUTO: 0.79 K/UL (ref 1–4.8)
LYMPHOCYTES NFR BLD: 22.3 % (ref 22–41)
MAGNESIUM SERPL-MCNC: 1.9 MG/DL (ref 1.5–2.5)
MCH RBC QN AUTO: 31.5 PG (ref 27–33)
MCHC RBC AUTO-ENTMCNC: 31.7 G/DL (ref 33.6–35)
MCV RBC AUTO: 99.6 FL (ref 81.4–97.8)
MICRO URNS: ABNORMAL
MONOCYTES # BLD AUTO: 0.22 K/UL (ref 0–0.85)
MONOCYTES NFR BLD AUTO: 6.2 % (ref 0–13.4)
NEUTROPHILS # BLD AUTO: 2.07 K/UL (ref 2–7.15)
NEUTROPHILS NFR BLD: 58.2 % (ref 44–72)
NITRITE UR QL STRIP.AUTO: NEGATIVE
NRBC # BLD AUTO: 0 K/UL
NRBC BLD-RTO: 0 /100 WBC
PH UR STRIP.AUTO: >=9 [PH] (ref 5–8)
PHOSPHATE SERPL-MCNC: 4.4 MG/DL (ref 2.5–4.5)
PLATELET # BLD AUTO: 89 K/UL (ref 164–446)
PMV BLD AUTO: 10.3 FL (ref 9–12.9)
POTASSIUM SERPL-SCNC: 4.4 MMOL/L (ref 3.6–5.5)
PROT SERPL-MCNC: 6.3 G/DL (ref 6–8.2)
PROT UR QL SSA: 300 MG/DL
PTH-INTACT SERPL-MCNC: 146.6 PG/ML (ref 14–72)
RBC # BLD AUTO: 2.79 M/UL (ref 4.2–5.4)
RBC # URNS HPF: ABNORMAL /HPF
RBC UR QL AUTO: NEGATIVE
SODIUM SERPL-SCNC: 137 MMOL/L (ref 135–145)
SP GR UR STRIP.AUTO: 1.01
TIBC SERPL-MCNC: 256 UG/DL (ref 250–450)
URATE SERPL-MCNC: 3.2 MG/DL (ref 1.9–8.2)
UROBILINOGEN UR STRIP.AUTO-MCNC: 0.2 MG/DL
WBC # BLD AUTO: 3.6 K/UL (ref 4.8–10.8)
WBC #/AREA URNS HPF: ABNORMAL /HPF

## 2020-03-05 PROCEDURE — 83540 ASSAY OF IRON: CPT

## 2020-03-05 PROCEDURE — 36415 COLL VENOUS BLD VENIPUNCTURE: CPT

## 2020-03-05 PROCEDURE — 84100 ASSAY OF PHOSPHORUS: CPT

## 2020-03-05 PROCEDURE — 85025 COMPLETE CBC W/AUTO DIFF WBC: CPT

## 2020-03-05 PROCEDURE — 83550 IRON BINDING TEST: CPT

## 2020-03-05 PROCEDURE — 83970 ASSAY OF PARATHORMONE: CPT

## 2020-03-05 PROCEDURE — 82728 ASSAY OF FERRITIN: CPT

## 2020-03-05 PROCEDURE — 81001 URINALYSIS AUTO W/SCOPE: CPT

## 2020-03-05 PROCEDURE — A9270 NON-COVERED ITEM OR SERVICE: HCPCS | Performed by: STUDENT IN AN ORGANIZED HEALTH CARE EDUCATION/TRAINING PROGRAM

## 2020-03-05 PROCEDURE — 83735 ASSAY OF MAGNESIUM: CPT

## 2020-03-05 PROCEDURE — 700102 HCHG RX REV CODE 250 W/ 637 OVERRIDE(OP): Performed by: STUDENT IN AN ORGANIZED HEALTH CARE EDUCATION/TRAINING PROGRAM

## 2020-03-05 PROCEDURE — 80053 COMPREHEN METABOLIC PANEL: CPT

## 2020-03-05 PROCEDURE — 82306 VITAMIN D 25 HYDROXY: CPT

## 2020-03-05 PROCEDURE — 84550 ASSAY OF BLOOD/URIC ACID: CPT

## 2020-03-05 PROCEDURE — 99239 HOSP IP/OBS DSCHRG MGMT >30: CPT | Mod: GC | Performed by: HOSPITALIST

## 2020-03-05 PROCEDURE — 87086 URINE CULTURE/COLONY COUNT: CPT

## 2020-03-05 PROCEDURE — 99406 BEHAV CHNG SMOKING 3-10 MIN: CPT

## 2020-03-05 RX ORDER — GUAIFENESIN/DEXTROMETHORPHAN 100-10MG/5
10 SYRUP ORAL EVERY 6 HOURS PRN
Qty: 840 ML | Refills: 0 | Status: SHIPPED | OUTPATIENT
Start: 2020-03-05 | End: 2020-10-01

## 2020-03-05 RX ORDER — CARVEDILOL 25 MG/1
25 TABLET ORAL 2 TIMES DAILY WITH MEALS
Qty: 60 TAB | Refills: 0 | Status: SHIPPED | OUTPATIENT
Start: 2020-03-05 | End: 2020-10-01

## 2020-03-05 RX ORDER — NICOTINE 21 MG/24HR
1 PATCH, TRANSDERMAL 24 HOURS TRANSDERMAL EVERY 24 HOURS
Qty: 30 PATCH | Refills: 0 | Status: SHIPPED | OUTPATIENT
Start: 2020-03-05 | End: 2020-10-01

## 2020-03-05 RX ORDER — OSELTAMIVIR PHOSPHATE 30 MG/1
CAPSULE ORAL
Qty: 3 CAP | Refills: 0 | OUTPATIENT
Start: 2020-03-05 | End: 2020-10-01

## 2020-03-05 RX ORDER — ACETAMINOPHEN 325 MG/1
650 TABLET ORAL EVERY 6 HOURS PRN
Qty: 30 TAB | Refills: 0 | Status: SHIPPED | OUTPATIENT
Start: 2020-03-05 | End: 2020-10-01

## 2020-03-05 RX ORDER — OSELTAMIVIR PHOSPHATE 30 MG/1
30 CAPSULE ORAL DAILY
Qty: 3 CAP | Refills: 0 | Status: SHIPPED | OUTPATIENT
Start: 2020-03-05 | End: 2020-03-05

## 2020-03-05 RX ORDER — FUROSEMIDE 40 MG/1
40 TABLET ORAL DAILY
Qty: 30 TAB | Refills: 0 | Status: SHIPPED | OUTPATIENT
Start: 2020-03-05 | End: 2020-10-01

## 2020-03-05 RX ADMIN — AMLODIPINE BESYLATE 10 MG: 10 TABLET ORAL at 06:18

## 2020-03-05 RX ADMIN — BUDESONIDE AND FORMOTEROL FUMARATE DIHYDRATE 2 PUFF: 80; 4.5 AEROSOL RESPIRATORY (INHALATION) at 06:20

## 2020-03-05 RX ADMIN — FUROSEMIDE 40 MG: 40 TABLET ORAL at 06:18

## 2020-03-05 RX ADMIN — HYDRALAZINE HYDROCHLORIDE 50 MG: 50 TABLET, FILM COATED ORAL at 06:18

## 2020-03-05 RX ADMIN — CARVEDILOL 25 MG: 6.25 TABLET, FILM COATED ORAL at 09:01

## 2020-03-05 RX ADMIN — NICOTINE 14 MG: 14 PATCH TRANSDERMAL at 06:18

## 2020-03-05 RX ADMIN — GABAPENTIN 100 MG: 100 CAPSULE ORAL at 06:18

## 2020-03-05 RX ADMIN — SENNOSIDES AND DOCUSATE SODIUM 2 TABLET: 8.6; 5 TABLET ORAL at 06:18

## 2020-03-05 ASSESSMENT — ENCOUNTER SYMPTOMS
EYE DISCHARGE: 0
VOMITING: 0
STRIDOR: 0
PALPITATIONS: 0
HEADACHES: 0
PHOTOPHOBIA: 0
DOUBLE VISION: 0
MYALGIAS: 1
HEARTBURN: 0
TREMORS: 0
ORTHOPNEA: 1
SPUTUM PRODUCTION: 1
FOCAL WEAKNESS: 0
ABDOMINAL PAIN: 0
SPUTUM PRODUCTION: 0
DIZZINESS: 0
ORTHOPNEA: 0
COUGH: 1
BACK PAIN: 0
MEMORY LOSS: 0
BLOOD IN STOOL: 0
NERVOUS/ANXIOUS: 0
CLAUDICATION: 0
WEAKNESS: 0
NAUSEA: 0
CONSTIPATION: 0
EYE PAIN: 0
BLURRED VISION: 0
SINUS PAIN: 0
SPEECH CHANGE: 0
NECK PAIN: 0
HEMOPTYSIS: 0
DEPRESSION: 0
CHILLS: 0
SENSORY CHANGE: 0
WEIGHT LOSS: 0
SHORTNESS OF BREATH: 0
DIARRHEA: 0
DIAPHORESIS: 0
FEVER: 0
ABDOMINAL PAIN: 1
SORE THROAT: 0
WHEEZING: 0

## 2020-03-05 NOTE — DISCHARGE SUMMARY
I saw and examined the patient and reviewed the findings and plan of care by the resident physician Dr. Nunez .  I agree with the examination and assessment/plan as listed in resident note except as noted below in the attending comments.    Additional attending comments:  Medically stable for DC with meds, follow up (including dialysis) as noted. The patient improved much more quickly than originally anticipated. See orders.    I agree with discharge documentation which reflects my direct input.  Overall personal time spent on discharge of this patient is 35 minutes.    DEANN Spain MD, FACP, [facility]  Attending Physician  Professor of Medicine & Academic Hospitalist    Discharge Summary    CHIEF COMPLAINT ON ADMISSION  Chief Complaint   Patient presents with   • Cough     Dialysis pt qm-W-F.  Unable to get ride to dialysis today.  Productive cough x 1 week.  Fever, body acjes , Denies sore throat or N/V.  Is Short of breath       Reason for Admission  Cough     Admission Date  3/4/2020    CODE STATUS  Full Code    HPI & HOSPITAL COURSE  This is a 54 y.o. female with past medical history including end-stage liver disease (on HD M/W/F) who presented with productive cough, fever/body aches and multiple episodes of missed dialysis.  She was found to be Influenza A positive and was admitted for influenza A management and hemodialysis.  Patient had mild fluid overload.  Nephrology consulted and patient was hemodialyzed per her usual schedule.  After discussion with Nephrology, oseltamavir was started.  By the following day patient was at her baseline, eating, and feeling well overall.  She was discharged to home with PCP follow-up.  She is also to follow-up with her regular hemodialysis as scheduled.  Patient was given information re: taxi ride service for her HD appointments.  Patient to follow up with PCP, Dr. Hong, next week in [facility] (see below).     Discharge Date  3/5/20    FOLLOW UP ITEMS  POST DISCHARGE  PCP - follow up for post hospital follow; check for resolution of flu symptoms.    Nephrology - follow up with HD as scheduled on M/W/F.        DISCHARGE DIAGNOSES  Active Problems:    Supraventricular tachycardia, paroxysmal (CMS-HCC) POA: Yes    ESRD on hemodialysis (HCC) POA: Yes    Volume overload POA: Yes    Influenza A POA: Yes    Pancytopenia (HCC) POA: Yes    Hx of right BKA (CMS-HCC) POA: Yes    Tobacco abuse POA: Yes    Anemia due to chronic kidney disease POA: Yes  Resolved Problems:    * No resolved hospital problems. *      FOLLOW UP  Moises Hong M.D.  [address] [facility] [address]  [facility] [address]  [phone]      [facility] [address] [facility]       MEDICATIONS ON DISCHARGE     Medication List      START taking these medications      Instructions   acetaminophen 325 MG Tabs  Commonly known as:  TYLENOL   Take 2 Tabs by mouth every 6 hours as needed (Mild Pain; (Pain scale 1-3); Temp greater than 100.5 F).  Dose:  650 mg     carvedilol 25 MG Tabs  Commonly known as:  COREG   Take 1 Tab by mouth 2 times a day, with meals.  Dose:  25 mg     guaiFENesin dextromethorphan 100-10 MG/5ML Syrp syrup  Commonly known as:  ROBITUSSIN DM   Take 10 mL by mouth every 6 hours as needed for Cough.  Dose:  10 mL     nicotine 14 MG/24HR Pt24  Commonly known as:  NICODERM   Apply 1 Patch to skin as directed every 24 hours.  Dose:  1 Patch     oseltamivir 30 MG Caps  Commonly known as:  TAMIFLU   Take 1 Cap by mouth every day.  Dose:  30 mg        CONTINUE taking these medications      Instructions   amLODIPine 10 MG Tabs  Commonly known as:  NORVASC   Take 10 mg by mouth every day.  Dose:  10 mg     budesonide-formoterol 80-4.5 MCG/ACT Aero  Commonly known as:  SYMBICORT   Inhale 2 Puffs by mouth 2 Times a Day.  Dose:  2 Puff     furosemide 40 MG Tabs  Commonly known as:  LASIX   Take 40 mg by mouth every day.  Dose:  40 mg     gabapentin 100 MG Caps  Commonly known as:  NEURONTIN   Take 100 mg by  mouth 2 Times a Day.  Dose:  100 mg     hydrALAZINE 50 MG Tabs  Commonly known as:  APRESOLINE   Take 50 mg by mouth 2 Times a Day.  Dose:  50 mg     terazosin 5 MG Caps  Commonly known as:  HYTRIN   Take 5 mg by mouth every evening.  Dose:  5 mg        STOP taking these medications    labetalol 200 MG Tabs  Commonly known as:  NORMODYNE     metoprolol 25 MG Tabs  Commonly known as:  LOPRESSOR            Allergies  Allergies   Allergen Reactions   • Sulfa Drugs Hives     TTV=3545 rash swelling itching       DIET  Orders Placed This Encounter   Procedures   • Diet Order Renal     Standing Status:   Standing     Number of Occurrences:   1     Order Specific Question:   Diet:     Answer:   Renal [8]       LABORATORY  Lab Results   Component Value Date    SODIUM 137 03/05/2020    POTASSIUM 4.4 03/05/2020    CHLORIDE 98 03/05/2020    CO2 32 03/05/2020    GLUCOSE 87 03/05/2020    BUN 28 (H) 03/05/2020    CREATININE 5.38 (HH) 03/05/2020    CREATININE 2.2 (H) 05/06/2009        Lab Results   Component Value Date    WBC 3.6 (L) 03/05/2020    HEMOGLOBIN 8.8 (L) 03/05/2020    HEMATOCRIT 27.8 (L) 03/05/2020    PLATELETCT 89 (L) 03/05/2020        Total time of the discharge: 37 minutes.

## 2020-03-05 NOTE — DISCHARGE PLANNING
Medicaid Meds-to-Beds: Discharge prescription orders listed below delivered to patient's bedside. RN notified. Patient counseled.     Discussed tamiflu dosing with Dr. Hong and order was updated to take 30 mg after each dialysis session. Patient reports receiving dialysis on Mondays, Wednesdays, and Fridays.       Isabelle Coyle   Home Medication Instructions ZOYA:[id number]    Printed on:03/05/20 9293   Medication Information                      acetaminophen (TYLENOL) 325 MG Tab  Take 2 Tabs by mouth every 6 hours as needed (Mild Pain; (Pain scale 1-3); Temp greater than 100.5 F).             carvedilol (COREG) 25 MG Tab  Take 1 Tab by mouth 2 times a day, with meals.             guaiFENesin dextromethorphan (ROBITUSSIN DM) 100-10 MG/5ML Syrup syrup  Take 10 mL by mouth every 6 hours as needed for Cough.             nicotine (NICODERM) 14 MG/24HR PATCH 24 HR  Apply 1 Patch to skin as directed every 24 hours.             oseltamivir (TAMIFLU) 30 MG Cap  Take 1 capsule by mouth after each dialysis session for 3 doses               Sera Salinas, PharmD

## 2020-03-05 NOTE — PROGRESS NOTES
"2 RN skin check complete with RN Tena  Devices in place: prosthetic leg to RLE  Skin assessed under devices: yes  Confirmed pressure ulcers found: N/A  New potential pressure ulcers noted: N/A  The follow interventions in place: moisturizer, bed alarm placed and on, pillow for support. Mepilex refused.    Dialysis fistula LUE. Dressing is CDI.  Sacrum pink, intact and blanching. Stump on RLE is intact, pink and blanching. Prosthetic removed at this time by patient \"to air out\" stump.  Elbows, ears, knees and heels pinl, intact and blanching.  Scattered redness [pt states \"from itching\"] on LUE. Scattered bruising on LLE.  "

## 2020-03-05 NOTE — NON-PROVIDER
[facility] [place]GRESS NOTE     Note Author: Courtney Stevenson, Student  Attending: Dr. Spain  Senior Resident: Dr. Birch  John Resident: Dr. Hong  PATIENT: Isabelle Coyle; [id number]; 1965    ID: 54 y.o. female admitted for cough for 1 week    Interval Events:   No acute events overnight   Patient received dialysis from 5610-8578 w/ net uf 2000ml  Patient reports she is feeling better but still a little ill  Her cough and myalgias have improved and isn't as productive    Subjective:     Review of Systems   Constitutional: Negative for chills, diaphoresis, fever, malaise/fatigue and weight loss.   HENT: Negative for congestion, ear discharge, ear pain, hearing loss, nosebleeds, sinus pain and sore throat.    Eyes: Negative for blurred vision, pain and discharge.   Respiratory: Positive for cough. Negative for hemoptysis, sputum production, shortness of breath, wheezing and stridor.    Cardiovascular: Positive for orthopnea. Negative for chest pain, palpitations, claudication and leg swelling.   Gastrointestinal: Positive for abdominal pain. Negative for blood in stool, constipation, diarrhea, melena, nausea and vomiting.   Genitourinary: Negative for dysuria.   Musculoskeletal: Positive for myalgias.   Neurological: Negative for dizziness, tremors, sensory change, weakness and headaches.         OBJECTIVE:  Temp:  [36.1 °C (97 °F)-37 °C (98.6 °F)] 36.8 °C (98.3 °F)  Pulse:  [67-72] 71  Resp:  [16-18] 16  BP: (126-153)/(61-80) 127/66  SpO2:  [90 %-99 %] 90 %    Intake/Output Summary (Last 24 hours) at 3/5/2020 1348  Last data filed at 3/5/2020 0638  Gross per 24 hour   Intake 480 ml   Output 10 ml   Net 470 ml         Physical Exam   Constitutional: She is oriented to person, place, and time. Vital signs are normal.   HENT:   Head: Normocephalic and atraumatic.   Eyes: Pupils are equal, round, and reactive to light. Conjunctivae and EOM are normal.   Neck: Trachea normal and normal range of  motion. Neck supple. No JVD present.   Cardiovascular: Normal rate, regular rhythm and normal pulses.   Holosystolic murmur   Pulmonary/Chest: Effort normal. No accessory muscle usage. No respiratory distress. She has decreased breath sounds. She has wheezes in the right lower field and the left lower field. She has rhonchi in the right lower field and the left lower field. She has rales.   Abdominal: Bowel sounds are normal. She exhibits distension. There is abdominal tenderness in the epigastric area. There is no rigidity, no rebound, no guarding and no CVA tenderness.   Musculoskeletal: Normal range of motion.   Neurological: She is alert and oriented to person, place, and time. She has normal strength, normal reflexes and intact cranial nerves.   Skin: Skin is warm, dry and intact.       LABS:  Recent Labs     03/04/20  1055 03/05/20  0645   WBC 3.9* 3.6*   RBC 2.72* 2.79*   HEMOGLOBIN 8.6* 8.8*   HEMATOCRIT 26.8* 27.8*   MCV 98.5* 99.6*   MCH 31.6 31.5   RDW 59.1* 59.0*   PLATELETCT 93* 89*   MPV 10.4 10.3   NEUTSPOLYS 69.80 58.20   LYMPHOCYTES 17.20* 22.30   MONOCYTES 8.70 6.20   EOSINOPHILS 3.30 12.70*   BASOPHILS 0.50 0.30     Recent Labs     03/04/20  1015 03/05/20  0645   SODIUM 140 137   POTASSIUM 5.2 4.4   CHLORIDE 96 98   CO2 32 32   BUN 47* 28*   CREATININE 8.04* 5.38*   CALCIUM 8.3* 8.2*   MAGNESIUM 2.0 1.9   PHOSPHORUS  --  4.4   ALBUMIN 3.4 3.7     Estimated GFR/CRCL = CrCl cannot be calculated (Unknown ideal weight.).  Recent Labs     03/04/20  1015 03/05/20  0645   GLUCOSE 132* 87     Recent Labs     03/04/20  1015 03/05/20  0645   ASTSGOT 19 18   ALTSGPT 14 10   TBILIRUBIN 0.7 0.7   ALKPHOSPHAT 55 58   GLOBULIN 2.5 2.6             No results for input(s): INR, APTT, FIBRINOGEN in the last 72 hours.    Invalid input(s): DIMER    MICROBIOLOGY:   Blood Culture   Date Value Ref Range Status   04/12/2019 No growth after 5 days of incubation.  Final     Blood Culture Hold   Date Value Ref Range Status     12/09/2018 Collected  Final        IMAGING:   DX-CHEST-PORTABLE (1 VIEW)   Final Result      1.  Bilateral costophrenic angle blunting suggestive of small pleural effusions.   2.  Mild basilar atelectasis. Underlying infection is possible.   3.  Stable enlargement of the cardiomediastinal silhouette.          MEDS:  Current Facility-Administered Medications   Medication Last Dose   • senna-docusate (PERICOLACE or SENOKOT S) 8.6-50 MG per tablet 2 Tab 2 Tab at 03/05/20 0618    And   • polyethylene glycol/lytes (MIRALAX) PACKET 1 Packet      And   • magnesium hydroxide (MILK OF MAGNESIA) suspension 30 mL      And   • bisacodyl (DULCOLAX) suppository 10 mg     • Respiratory Therapy Consult     • heparin injection 5,000 Units 5,000 Units at 03/04/20 1720   • acetaminophen (TYLENOL) tablet 650 mg     • guaiFENesin dextromethorphan (ROBITUSSIN DM) 100-10 MG/5ML syrup 10 mL     • nicotine (NICODERM) 14 MG/24HR 14 mg 14 mg at 03/05/20 0618    And   • nicotine polacrilex (NICORETTE) 2 MG piece 2 mg     • amLODIPine (NORVASC) tablet 10 mg 10 mg at 03/05/20 0618   • budesonide-formoterol (SYMBICORT) 80-4.5 MCG/ACT inhaler 2 Puff 2 Puff at 03/05/20 0620   • furosemide (LASIX) tablet 40 mg 40 mg at 03/05/20 0618   • gabapentin (NEURONTIN) capsule 100 mg 100 mg at 03/05/20 0618   • hydrALAZINE (APRESOLINE) tablet 50 mg 50 mg at 03/05/20 0618   • terazosin (HYTRIN) capsule 5 mg 5 mg at 03/04/20 2133   • oseltamivir (TAMIFLU) capsule 30 mg     • epoetin raina (EPOGEN/PROCRIT) injection 10,000 Units     • carvedilol (COREG) tablet 25 mg 25 mg at 03/05/20 0901   • heparin injection 1,500 Units         PROBLEM LIST:  Patient Active Problem List   Diagnosis   • Glomerulonephritis, chronic   • Macrocytic anemia   • Hyperlipidemia   • Menopausal syndrome   • Supraventricular tachycardia, paroxysmal (CMS-HCC)   • Hx of right BKA (CMS-HCC)   • Alcohol abuse   • ESRD on hemodialysis (HCC)   • Vitamin D deficiency   • Secondary  hyperparathyroidism of renal origin (HCC)   • Hyperphosphatemia   • Hypocalcemia   • Health care maintenance   • Tobacco abuse   • Hyperkalemia   • SVT (supraventricular tachycardia) (HCC)   • Hyponatremia   • Elevated brain natriuretic peptide (BNP) level   • Hypertensive urgency   • Postoperative pain   • GERD (gastroesophageal reflux disease)   • Hypertension   • Diarrhea   • Acute respiratory failure with hypoxia (HCC)   • Acute pulmonary edema (HCC)   • Acute uremia   • Volume overload   • Probable COPD with acute exacerbation (HCC)   • Anemia due to chronic kidney disease   • Community acquired pneumonia   • Noncompliance   • Metabolic encephalopathy   • Vomiting   • Calculus of gallbladder without cholecystitis without obstruction   • Paroxysmal atrial fibrillation (HCC)   • Pulmonary infiltrate   • S/P cholecystectomy   • Closed nondisplaced fracture of acromial end of right clavicle   • Thrombocytopenia (HCC)   • H/O paroxysmal supraventricular tachycardia   • Shortness of breath   • Anemia   • Influenza A   • Pancytopenia (HCC)       ASSESSMENT/PLAN: 54 y.o. female admitted for cough for 1 week  #Influenza A  Cough, fever, chills,  myalgias, rhinorrhea, SOB  Tested positive for A, not B  Patient improved overnight, cough and myalgias present but better, absent fever, chills, SOB, sputum, HA, rhinorrhea  -Oseltamivir 30mg administered in ED, guidelines rec ESRD Cr<10 dose 30mg immediately and then 30mg post dialysis for 5 days  -Supportive care  -IS  -Respiratory therapy  -Tylenol 650mg q6 prn  -Patient stable to discharge after social work consult for help scheduling transportation to dialysis appointments  -Will f/u with Dr. Hong outpatient in [facility] clinic, appointment scheduled for March 11 at 10:30     #Volume overload  Patient sleeps on 2-3 pillows at home and has bibasilar crackles  Missed 2 dialysis appointments recently  Lasix 40 daily home med  Rarely makes urine  -Nephrology on board, rec's  appreciated  -Received dialysis overnight  -Continue home lasix  -Fluid restriction, strict I&Os, daily weights     #HTN  -Continue home meds     #ESRD on hemodialysis  Missed 2 recent dialysis appointments  L upper extremity fistula intact with prominent thrill  [facility]  Follows nephrology with Dr. Junior  Volume overloaded  K 5.2  -Scheduled to get dialysis overnight  -CTM volume status   -Continue home lasix  -Fluid restriction, strict I&Os, daily weights, renal diet     #Pancytopenia  WBC 3.9, Hgb 8.9, plt 93  HIV neg in 2017     #Tobacco use  5 cig/d on/off since high school  -Nicotine replacement  -RT cessation counseling      Courtney Stevenson, MS3

## 2020-03-05 NOTE — ED NOTES
Pt. Being transferred to floor at this time, pt. [place] [facility] called and made aware patient was on the way. Pt. Alert and oriented at time of transport.

## 2020-03-05 NOTE — PROGRESS NOTES
Patient started dialysis. RN Ely in the room.  Pt is sitting up in bed, AxOx4 and watching TV, at ease and states 0/10 pain.

## 2020-03-05 NOTE — PROGRESS NOTES
Hd treatment ordered by Dr Junior started at 2235 and ended at 0135 with net uf of 2000 ml as bp tolerated. Dr Richardson was notified that the pt refused heparin bolus and hourly due to access bleeding post treatment. Hypertensive entire treatment. Left AVF patent.  See flow sheet for details.
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
## 2020-03-05 NOTE — DISCHARGE INSTRUCTIONS
Discharge Instructions    Discharged to home by car with self. Discharged via wheelchair, hospital escort: Yes.  Special equipment needed: Not Applicable    Be sure to schedule a follow-up appointment with your primary care doctor or any specialists as instructed.     Discharge Plan:   Smoking Cessation Offered: Patient Refused  Influenza Vaccine Indication: Indicated: 9 to 64 years of age    I understand that a diet low in cholesterol, fat, and sodium is recommended for good health. Unless I have been given specific instructions below for another diet, I accept this instruction as my diet prescription.   Other diet: reg    Special Instructions: None    · Is patient discharged on Warfarin / Coumadin?   No     Depression / Suicide Risk    As you are discharged from this [facility] facility, it is important to learn how to keep safe from harming yourself.    Recognize the warning signs:  · Abrupt changes in personality, positive or negative- including increase in energy   · Giving away possessions  · Change in eating patterns- significant weight changes-  positive or negative  · Change in sleeping patterns- unable to sleep or sleeping all the time   · Unwillingness or inability to communicate  · Depression  · Unusual sadness, discouragement and loneliness  · Talk of wanting to die  · Neglect of personal appearance   · Rebelliousness- reckless behavior  · Withdrawal from people/activities they love  · Confusion- inability to concentrate     If you or a loved one observes any of these behaviors or has concerns about self-harm, here's what you can do:  · Talk about it- your feelings and reasons for harming yourself  · Remove any means that you might use to hurt yourself (examples: pills, rope, extension cords, firearm)  · Get professional help from the community (Mental Health, Substance Abuse, psychological counseling)  · Do not be alone:Call your Safe Contact- someone whom you trust who will be there for  you.  · Call your local CRISIS HOTLINE 363-0869 or 774-754-9360  · Call your local Children's Mobile Crisis Response Team [place] (757) 865-9437 or www.Jarvam  · Call the toll free National Suicide Prevention Hotlines   · National Suicide Prevention Lifeline 973-415-MHHR (9039)  · National Hope Line Network 800-SUICIDE (908-6869)          Influenza, Adult  Influenza, more commonly known as “the flu,” is a viral infection that primarily affects the respiratory tract. The respiratory tract includes organs that help you breathe, such as the lungs, nose, and throat. The flu causes many common cold symptoms, as well as a high fever and body aches.  The flu spreads easily from person to person (is contagious). Getting a flu shot (influenza vaccination) every year is the best way to prevent influenza.  What are the causes?  Influenza is caused by a virus. You can catch the virus by:  · Breathing in droplets from an infected person's cough or sneeze.  · Touching something that was recently contaminated with the virus and then touching your mouth, nose, or eyes.  What increases the risk?  The following factors may make you more likely to get the flu:  · Not cleaning your hands frequently with soap and water or alcohol-based hand .  · Having close contact with many people during cold and flu season.  · Touching your mouth, eyes, or nose without washing or sanitizing your hands first.  · Not drinking enough fluids or not eating a healthy diet.  · Not getting enough sleep or exercise.  · Being under a high amount of stress.  · Not getting a yearly (annual) flu shot.  You may be at a higher risk of complications from the flu, such as a severe lung infection (pneumonia), if you:  · Are over the age of 65.  · Are pregnant.  · Have a weakened disease-fighting system (immune system). You may have a weakened immune system if you:  ¨ Have HIV or AIDS.  ¨ Are undergoing chemotherapy.  ¨ Are taking medicines  that reduce the activity of (suppress) the immune system.  · Have a long-term (chronic) illness, such as heart disease, kidney disease, diabetes, or lung disease.  · Have a liver disorder.  · Are obese.  · Have anemia.  What are the signs or symptoms?  Symptoms of this condition typically last 4-10 days and may include:  · Fever.  · Chills.  · Headache, body aches, or muscle aches.  · Sore throat.  · Cough.  · Runny or congested nose.  · Chest discomfort and cough.  · Poor appetite.  · Weakness or tiredness (fatigue).  · Dizziness.  · Nausea or vomiting.  How is this diagnosed?  This condition may be diagnosed based on your medical history and a physical exam. Your health care provider may do a nose or throat swab test to confirm the diagnosis.  How is this treated?  If influenza is detected early, you can be treated with antiviral medicine that can reduce the length of your illness and the severity of your symptoms. This medicine may be given by mouth (orally) or through an IV tube that is inserted in one of your veins.  The goal of treatment is to relieve symptoms by taking care of yourself at home. This may include taking over-the-counter medicines, drinking plenty of fluids, and adding humidity to the air in your home.  In some cases, influenza goes away on its own. Severe influenza or complications from influenza may be treated in a hospital.  Follow these instructions at home:  · Take over-the-counter and prescription medicines only as told by your health care provider.  · Use a cool mist humidifier to add humidity to the air in your home. This can make breathing easier.  · Rest as needed.  · Drink enough fluid to keep your urine clear or pale yellow.  · Cover your mouth and nose when you cough or sneeze.  · Wash your hands with soap and water often, especially after you cough or sneeze. If soap and water are not available, use hand .  · Stay home from work or school as told by your health care  provider. Unless you are visiting your health care provider, try to avoid leaving home until your fever has been gone for 24 hours without the use of medicine.  · Keep all follow-up visits as told by your health care provider. This is important.  How is this prevented?  · Getting an annual flu shot is the best way to avoid getting the flu. You may get the flu shot in late summer, fall, or winter. Ask your health care provider when you should get your flu shot.  · Wash your hands often or use hand  often.  · Avoid contact with people who are sick during cold and flu season.  · Eat a healthy diet, drink plenty of fluids, get enough sleep, and exercise regularly.  Contact a health care provider if:  · You develop new symptoms.  · You have:  ¨ Chest pain.  ¨ Diarrhea.  ¨ A fever.  · Your cough gets worse.  · You produce more mucus.  · You feel nauseous or you vomit.  Get help right away if:  · You develop shortness of breath or difficulty breathing.  · Your skin or nails turn a bluish color.  · You have severe pain or stiffness in your neck.  · You develop a sudden headache or sudden pain in your face or ear.  · You cannot stop vomiting.  This information is not intended to replace advice given to you by your health care provider. Make sure you discuss any questions you have with your health care provider.  Document Released: 12/15/2001 Document Revised: 05/25/2017 Document Reviewed: 10/11/2016  [employer] Interactive Patient Education © 2017 [employer]                Dialysis  Dialysis is a procedure that replaces some of the work healthy kidneys do. It is done when you lose about 85-90% of your kidney function. It may also be done earlier if your symptoms may be improved by dialysis. During dialysis, wastes, salt, and extra water are removed from the blood, and the levels of certain chemicals in the blood (such as potassium) are maintained. Dialysis is done in sessions. Dialysis sessions are continued until  the kidneys get better. If the kidneys cannot get better, such as in end-stage kidney disease, dialysis is continued for life or until you receive a new kidney (kidney transplant). There are two types of dialysis: hemodialysis and peritoneal dialysis.  What is hemodialysis?  Hemodialysis is a type of dialysis in which a machine called a dialyzer is used to filter the blood. Before beginning hemodialysis, you will have surgery to create a site where blood can be removed from the body and returned to the body (vascular access). There are three types of vascular accesses:  · Arteriovenous fistula. To create this type of access, an artery is connected to a vein (usually in the arm). A fistula takes 1-6 months to develop after surgery. If it develops properly, it usually lasts longer than the other types of vascular accesses. It is also less likely to become infected and cause blood clots.  · Arteriovenous graft. To create this type of access, an artery and a vein in the arm are connected with a tube. A graft may be used within 2-3 weeks of surgery.  · A venous catheter. To create this type of access, a thin, flexible tube (catheter) is placed in a large vein in your neck, chest, or groin. A catheter may be used right away. It is usually used as a temporary access when dialysis needs to begin immediately.  During hemodialysis, blood leaves the body through your access. It travels through a tube to the dialyzer, where it is filtered. The blood then returns to your body through another tube.  Hemodialysis is usually performed by a health care provider at a hospital or dialysis center three times a week. Visits last about 3-4 hours. It may also be performed with the help of another person at home with training.  What is peritoneal dialysis?  Peritoneal dialysis is a type of dialysis in which the thin lining of the abdomen (peritoneum) is used as a filter. Before beginning peritoneal dialysis, you will have surgery to place  a catheter in your abdomen. The catheter will be used to transfer a fluid called dialysate to and from your abdomen. At the start of a session, your abdomen is filled with dialysate. During the session, wastes, salt, and extra water in the blood pass through the peritoneum and into the dialysate. The dialysate is drained from the body at the end of the session. The process of filling and draining the dialysate is called an exchange. Exchanges are repeated until you have used up all the dialysate for the day.  Peritoneal dialysis may be performed by you at home or at almost any other location. It is done every day. You may need up to five exchanges a day. The amount of time the dialysate is in your body between exchanges is called a dwell. The dwell depends on the number of exchanges needed and the characteristics of the peritoneum. It usually varies from 1.5-3 hours. You may go about your day normally between exchanges. Alternately, the exchanges may be done at night while you sleep, using a machine called a cycler.  Which type of dialysis should I choose?  Both hemodialysis and peritoneal dialysis have advantages and disadvantages. Talk to your health care provider about which type of dialysis would be best for you. Your lifestyle and preferences should be considered along with your medical condition. In some cases, only one type of dialysis may be an option.  Advantages of hemodialysis  · It is done less often than peritoneal dialysis.  · Someone else can do the dialysis for you.  · If you go to a dialysis center, your health care provider will be able to recognize any problems right away.  · If you go to a dialysis center, you can interact with others who are having dialysis. This can provide you with emotional support.  Disadvantages of hemodialysis  · Hemodialysis may cause cramps and low blood pressure. It may leave you feeling tired on the days you have the treatment.  · If you go to a dialysis center, you  will need to make weekly appointments and work around the center’s schedule.  · You will need to take extra care when traveling. If you go to a dialysis center, you will need to make special arrangements to visit a dialysis center near your destination. If you are having treatments at home, you will need to take the dialyzer with you to your destination.  · You will need to avoid more foods than you would need to avoid on peritoneal dialysis.  Advantages of peritoneal dialysis  · It is less likely than hemodialysis to cause cramps and low blood pressure.  · You may do exchanges on your own wherever you are, including when you travel.  · You do not need to avoid as many foods as you do on hemodialysis.  Disadvantages of peritoneal dialysis  · It is done more often than hemodialysis.  · Performing peritoneal dialysis requires you to have dexterity of the hands. You must also be able to lift bags.  · You will have to learn sterilization techniques. You will need to practice them every day to reduce the risk of infection.  What changes will I need to make to my diet during dialysis?  Both hemodialysis and peritoneal dialysis require you to make some changes to your diet. For example, you will need to limit your intake of foods high in the minerals phosphorus and potassium. You will also need to limit your fluid intake. Your dietitian can help you plan meals. A good meal plan can improve your dialysis and your health.  What should I expect when beginning dialysis?  Adjusting to the dialysis treatment, schedule, and diet can take some time. You may need to stop working and may not be able to do some of the things you normally do. You may feel anxious or depressed when beginning dialysis. Eventually, many people feel better overall because of dialysis. Some people are able to return to work after making some changes, such as reducing work intensity.  Where can I find more information?  · National Kidney Foundation:  "www.kidney.org  · American Association of Kidney Patients: www.aakp.org  · American Kidney Fund: www.kidneyfund.org  This information is not intended to replace advice given to you by your health care provider. Make sure you discuss any questions you have with your health care provider.  Document Released: 03/09/2004 Document Revised: 05/25/2017 Document Reviewed: 02/11/2014  [employer] Interactive Patient Education © 2017 [employer]      Dialysis  Care After  Dialysis is a treatment that removes the toxic wastes from the body when the kidneys fail to do this on their own. There are 2 types of dialysis:  · Hemodialysis. Blood is pumped from the body through a filter (dialyzer). The blood is cleaned of waste products and returned to the body. Hemodialysis is performed in a dialysis center for 3 to 4 hours, 3 times a week. Dialysis is performed through an arteriovenous (AV) access, which provides access to the blood vessel. The main advantage of hemodialysis is that no training is required of the patient. Disadvantages include potential AV access failure and lack of freedom, as you must stay relatively near a dialysis center.   · Peritoneal dialysis. The body's own lining (membrane) is used as a filter. A fluid drains in and out of the abdomen to get rid of the body's toxic wastes. Advantages are that it can be taught to the patient and can be done at home with careful technique. It allows more freedom and less discomfort. Disadvantages include potential inflammation inside the abdomen (peritonitis) and membrane failure.   HOME CARE INSTRUCTIONS  · If you have an AV access:   · Check for a \"thrill\" at your access site every day. This is a vibrating or buzzing feeling when you place your fingers over the access site. This means the access is working. If you do not feel a \"thrill,\" the access needs to be repaired.   · Never wear tight clothing or jewelry around the access.   · Avoid sleeping on the access. This can " "decrease circulation and can cause the access to clot.   · Keep the bandage (dressing) on for a couple hours after your treatment or as told by your caregiver. Avoid getting your dressing wet. If the dressing comes off, cover the access site with a 4x4 gauze dressing and tape securely.   · Keep your access site clean to prevent infection.   · Keep some 4x4 gauze dressings at home in case your access starts to bleed. You may have received a medicine that can cause bleeding called heparin with your treatment.   · Control your blood pressure. High blood pressure (hypertension) damages your heart and vessels. It is important to exercise as much as possible.   · Keep your cholesterol under control. Exercise regularly or as directed.   SEEK MEDICAL CARE IF:  · You do not feel a \"thrill\" in your AV access.   · You have a fever, chills, sweats, or weakness.   · You have a small amount of bleeding at your access site.   · Your blood pressure is too high.   SEEK IMMEDIATE MEDICAL CARE IF:  · You have sudden chest pain or trouble breathing.   · You have bleeding that cannot be stopped or controlled with direct pressure.   MAKE SURE YOU:  · Understand these instructions.   · Will watch your condition.   · Will get help right away if you are not doing well or get worse.   Document Released: 07/10/2006 Document Revised: 03/11/2013 Document Reviewed: 01/24/2012  [employer]® Patient Information ©2013 [employer].    "

## 2020-03-05 NOTE — PROGRESS NOTES
Pt transferred to floor at 1645, droplet precautions in place  Pt is Influenza A positive. She denies pain or discomfort  Hx: R foot BKA, has prosthetic in place  Pt scheduled for dialysis this evening at the bedside.   Will continue to monitor

## 2020-03-05 NOTE — CARE PLAN
Problem: Respiratory:  Goal: Respiratory status will improve  Outcome: PROGRESSING SLOWER THAN EXPECTED  Pt is on 2 L oxygen via nasal cannula.     Problem: Fluid Volume:  Goal: Will maintain balanced intake and output  Outcome: PROGRESSING SLOWER THAN EXPECTED  Pt is a dialysis pt and gets dialysis M, W and F.     Problem: Communication  Goal: The ability to communicate needs accurately and effectively will improve  Outcome: PROGRESSING AS EXPECTED     Problem: Safety  Goal: Will remain free from injury  Outcome: PROGRESSING AS EXPECTED  Goal: Will remain free from falls  Outcome: PROGRESSING AS EXPECTED  Emmett fall precautions in place and bed alarm on.     Problem: Infection  Goal: Will remain free from infection  Outcome: PROGRESSING AS EXPECTED     Problem: Venous Thromboembolism (VTW)/Deep Vein Thrombosis (DVT) Prevention:  Goal: Patient will participate in Venous Thrombosis (VTE)/Deep Vein Thrombosis (DVT)Prevention Measures  Outcome: PROGRESSING AS EXPECTED   Heparin in use and pt repositions self, including sitting up and at EOB by self.
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
## 2020-03-05 NOTE — NON-PROVIDER
Internal Medicine Admitting History and Physical    Note Author: Courtney Stevenson, Student       Name Isabelle Coyle     1965   Age/Sex 54 y.o. female   MRN [id number]   Code Status Full     Chief Complaint:   Cough for 1 week    HPI:  Ms. Isabelle Coyle is a 53 yo female with a PMH of ESRD on dialysis, HTN, SVT, chronic tobacco use, PAF, GERD who presented to ED for 1 week history of cough productive of non-bloody grey sputum, fever up to 103F, chills,  SOB, orthopnea with 2-3 pillows (possibly chronic), rhinorrhea w/ yellow mucus, body aches, HA and watery, non-itchy eyes. She denies sore throat, N/V, diarrhea, hematochezia, melena, constipation, dysuria (rarely produces urine), or home O2 use. She has been on dialysis for a few years and recently missed Last MW due to possible illness vs troubles getting a ride but attended F and this M. She has an upper left arm fistula, follows Dr. Junior for nephrology and gets dialysis at [facility]. In January, she was admitted for 1 week for pneumonia. She had an echo then that showed EF 50%. She currently smokes 5 cig/d and has smoked on/off since .    In ED  -Vitals: T 98.8F, P 73, /65, O2 89% RA  -CXR: Bilateral costophrenic angle blunting suggestive of small pleural effusions. Mild basilar atelectasis. Underlying infection is possible. Stable enlargement of the cardiomediastinal silhouette.  -CBC: pancytopenia, WBC 3.9, Hgb 8.9, plt 93  -Cmp: gluc 132, BUN 47, Cr 8.04, K 5.2, GFR 5  -Positive influenza A, given 30mg oseltamivir  -EKG unremarkable  -Nephrology paged    Review of Systems   Constitutional: Positive for chills and fever. Negative for weight loss.   Respiratory: Positive for cough, sputum production and shortness of breath. Negative for hemoptysis and wheezing.    Cardiovascular: Positive for orthopnea. Negative for chest pain, palpitations and leg swelling.   Gastrointestinal: Positive for abdominal pain and heartburn. Negative  for blood in stool, constipation, diarrhea, melena, nausea and vomiting.        Tenderness over bilateral hips secondary to increased edema   Genitourinary: Negative for dysuria.   Musculoskeletal: Positive for myalgias.   Neurological: Positive for headaches.             Past Medical History (Chronic medical problem, known complications and current treatment)    Active Ambulatory Problems     Diagnosis Date Noted   • Glomerulonephritis, chronic 11/05/2009   • Macrocytic anemia 11/05/2009   • Hyperlipidemia 09/10/2010   • Menopausal syndrome 02/08/2011   • Supraventricular tachycardia, paroxysmal (CMS-HCC) 01/25/2015   • Hx of right BKA (CMS-HCC) 01/25/2015   • Alcohol abuse 01/25/2015   • ESRD on hemodialysis ([facility]) 06/16/2017   • Vitamin D deficiency 06/17/2017   • Secondary hyperparathyroidism of renal origin ([facility]) 06/17/2017   • Hyperphosphatemia 06/17/2017   • Hypocalcemia 06/17/2017   • Health care maintenance 07/18/2017   • Tobacco abuse 07/18/2017   • Hyperkalemia 12/28/2017   • SVT (supraventricular tachycardia) ([facility]) 01/07/2018   • Hyponatremia 01/08/2018   • Elevated brain natriuretic peptide (BNP) level 01/08/2018   • Hypertensive urgency 04/27/2018   • Postoperative pain 04/28/2018   • GERD (gastroesophageal reflux disease) 06/13/2018   • Hypertension 06/16/2018   • Diarrhea 06/16/2018   • Acute respiratory failure with hypoxia ([facility]) 07/30/2018   • Acute pulmonary edema ([facility]) 09/27/2018   • Acute uremia 12/10/2018   • Volume overload 12/10/2018   • Probable COPD with acute exacerbation ([facility]) 04/12/2019   • Anemia due to chronic kidney disease 04/12/2019   • Community acquired pneumonia 04/14/2019   • Noncompliance 04/25/2019   • Metabolic encephalopathy 04/25/2019   • Vomiting 04/26/2019   • Calculus of gallbladder without cholecystitis without obstruction 05/15/2019   • Paroxysmal atrial fibrillation ([facility]) 06/20/2019   • Pulmonary infiltrate 06/20/2019   • S/P cholecystectomy 06/23/2019   • Closed  nondisplaced fracture of acromial end of right clavicle 11/28/2019   • Thrombocytopenia ([facility]) 11/28/2019   • H/O paroxysmal supraventricular tachycardia 01/03/2020   • Shortness of breath 01/03/2020   • Anemia 01/04/2020     Resolved Ambulatory Problems     Diagnosis Date Noted   • Severe uncontrolled hypertension 11/05/2009   • Chest pain 01/24/2015   • Elevated troponin 01/25/2015   • CKD3 01/25/2015   • N&V (nausea and vomiting) 01/25/2015   • Alcohol withdrawal ([facility]) 01/25/2015   • Elevated troponin 06/17/2017   • High anion gap metabolic acidosis 06/17/2017   • Encounter for screening colonoscopy 07/18/2017   • End stage renal disease ([facility]) 08/28/2017   • Supraventricular tachycardia ([facility]) 12/27/2017   • Low glucose level 12/28/2017   • Hypoglycemia 12/29/2017   • Pseudoaneurysm of brachial artery, left (CMS-[facility]) 04/27/2018   • Hematoma of arm, left, initial encounter 04/27/2018     Past Medical History:   Diagnosis Date   • Anemia associated with chronic renal failure 11/5/2009   • Arrhythmia    • Dental disorder 8-25-17   • Dialysis patient ([facility]) 8-25-17   • Dysrhythmia    • ESRD (end stage renal disease) ([facility]) 8-25-17   • Head ache    • Heart burn    • Heart murmur    • High cholesterol    • HTN (hypertension) 11/5/2009   • Port catheter in place 8-25-17         Past Surgical History:  Past Surgical History:   Procedure Laterality Date   • MÑEO BY LAPAROSCOPY  6/22/2019    Procedure: CHOLECYSTECTOMY, LAPAROSCOPIC With GRAMS;  Surgeon: Jimbo Davenport M.D.;  Location: SURGERY [facility];  Service: General   • AV FISTULA CREATION Left 4/27/2018    Procedure: Left Brachial artery Repair;  Surgeon: Jimbo Davenport M.D.;  Location: SURGERY [facility];  Service: Vascular   • AV FISTULA CREATION Left 8/28/2017    Procedure: AV FISTULA CREATION  UPPER EXTREMITY -BRACHIAL BASALIC;  Surgeon: Glen Rodriguez M.D.;  Location: SURGERY [facility];  Service: General   • AV FISTULA CREATION Left  6/20/2017    Procedure: AV FISTULA CREATION- LEFT;  Surgeon: Jimbo Davenport M.D.;  Location: SURGERY [facility];  Service:    • APPENDECTOMY LAPAROSCOPIC  5/4/2009    Performed by BANDAR TIMMONS at SURGERY [facility]   • FEMUR ORIF  10/9/08    Performed by STELLA GATES at SURGERY [facility]   • ILIAC BONE GRAFT  10/9/08    Performed by STELLA GATES at SURGERY [facility]   • AMPUTATION, BELOW THE KNEE     • CAPITATION PAYMENT (INSURANCE)     • OTHER      BELOW KNEE AMPUTATION RIGHT   • PB ENLARGE BREAST WITH IMPLANT         Current Outpatient Medications:  Home Medications     Reviewed by Rona Morocho (Pharmacy Tech) on 03/04/20 at 1108  Med List Status: Complete   Medication Last Dose Status   amLODIPine (NORVASC) 10 MG Tab 3/4/2020 Active   budesonide-formoterol (SYMBICORT) 80-4.5 MCG/ACT Aerosol 3/4/2020 Active   furosemide (LASIX) 40 MG Tab 3/4/2020 Active   gabapentin (NEURONTIN) 100 MG Cap 3/4/2020 Active   hydrALAZINE (APRESOLINE) 50 MG Tab 3/4/2020 Active   labetalol (NORMODYNE) 200 MG Tab 3/4/2020 Active   metoprolol (LOPRESSOR) 25 MG Tab 3/4/2020 Active   terazosin (HYTRIN) 5 MG Cap 3/3/2020 Active                Medication Allergy/Sensitivities:  Allergies   Allergen Reactions   • Sulfa Drugs Hives     [id number] rash swelling itching         Family History (mandatory)   Family History   Problem Relation Age of Onset   • Heart Disease Other        Social History (mandatory)   Social History     Socioeconomic History   • Marital status:      Spouse name: Not on file   • Number of children: Not on file   • Years of education: Not on file   • Highest education level: Not on file   Occupational History   • Not on file   Social Needs   • Financial resource strain: Not on file   • Food insecurity     Worry: Not on file     Inability: Not on file   • Transportation needs     Medical: Not on file     Non-medical: Not on file   Tobacco Use   • Smoking status:  Current Some Day Smoker     Packs/day: 0.25     Years: 20.00     Pack years: 5.00     Types: Cigarettes     Last attempt to quit: 2019     Years since quittin.8   • Smokeless tobacco: Never Used   • Tobacco comment: 5 cigs/day   Substance and Sexual Activity   • Alcohol use: No   • Drug use: No   • Sexual activity: Not on file   Lifestyle   • Physical activity     Days per week: Not on file     Minutes per session: Not on file   • Stress: Not on file   Relationships   • Social connections     Talks on phone: Not on file     Gets together: Not on file     Attends Oriental orthodox service: Not on file     Active member of club or organization: Not on file     Attends meetings of clubs or organizations: Not on file     Relationship status: Not on file   • Intimate partner violence     Fear of current or ex partner: Not on file     Emotionally abused: Not on file     Physically abused: Not on file     Forced sexual activity: Not on file   Other Topics Concern   • Not on file   Social History Narrative   • Not on file     Living situation: lives with roommate  PCP : Pcp Pt States None    Physical Exam     Vitals:    20 1300 20 1301 20 1401 20 1501   BP:  144/77 126/61 137/70   Pulse: 70 70 69 70   Resp: 16      Temp:       TempSrc:       SpO2: 95% 94% 91% 94%   Weight:         Body mass index is 26.77 kg/m².  O2 therapy: Pulse Oximetry: 94 %, O2 (LPM): 2    Physical Exam   Constitutional: She is oriented to person, place, and time.   HENT:   Head: Normocephalic and atraumatic.   Right Ear: External ear normal.   Left Ear: External ear normal.   Mouth/Throat: Oropharynx is clear and moist. No oropharyngeal exudate.   Eyes: Pupils are equal, round, and reactive to light. Conjunctivae and EOM are normal. Right eye exhibits no discharge. Left eye exhibits no discharge.   Neck: Normal range of motion. Neck supple. No JVD present.   Cardiovascular: Normal rate and regular rhythm. Exam reveals no  gallop and no friction rub.   Holosystolic murmur   Pulmonary/Chest: Effort normal.   Crackles in bilateral lung bases   Abdominal: Soft. Bowel sounds are normal. She exhibits distension. She exhibits no mass. There is abdominal tenderness in the epigastric area. There is no rigidity, no rebound and no guarding.   Musculoskeletal: Normal range of motion.      Left lower leg: Edema present.      Comments: Right BKA  Left upper extremity fistula intact with prominent thrill   Neurological: She is alert and oriented to person, place, and time. She has normal motor skills, normal sensation, normal strength, normal reflexes and intact cranial nerves. No cranial nerve deficit.   Skin: Skin is warm, dry and intact.         Data Review       Old Records Request:   Deferred  Current Records review/summary: Deferred    Lab Data Review:  Recent Results (from the past 24 hour(s))   LACTIC ACID    Collection Time: 03/04/20 10:15 AM   Result Value Ref Range    Lactic Acid 1.6 0.5 - 2.0 mmol/L   COMP METABOLIC PANEL    Collection Time: 03/04/20 10:15 AM   Result Value Ref Range    Sodium 140 135 - 145 mmol/L    Potassium 5.2 3.6 - 5.5 mmol/L    Chloride 96 96 - 112 mmol/L    Co2 32 20 - 33 mmol/L    Anion Gap 12.0 (H) 0.0 - 11.9    Glucose 132 (H) 65 - 99 mg/dL    Bun 47 (H) 8 - 22 mg/dL    Creatinine 8.04 (HH) 0.50 - 1.40 mg/dL    Calcium 8.3 (L) 8.5 - 10.5 mg/dL    AST(SGOT) 19 12 - 45 U/L    ALT(SGPT) 14 2 - 50 U/L    Alkaline Phosphatase 55 30 - 99 U/L    Total Bilirubin 0.7 0.1 - 1.5 mg/dL    Albumin 3.4 3.2 - 4.9 g/dL    Total Protein 5.9 (L) 6.0 - 8.2 g/dL    Globulin 2.5 1.9 - 3.5 g/dL    A-G Ratio 1.4 g/dL   ESTIMATED GFR    Collection Time: 03/04/20 10:15 AM   Result Value Ref Range    GFR If  6 (A) >60 mL/min/1.73 m 2    GFR If Non African American 5 (A) >60 mL/min/1.73 m 2   Magnesium    Collection Time: 03/04/20 10:15 AM   Result Value Ref Range    Magnesium 2.0 1.5 - 2.5 mg/dL   Influenza A/B By PCR  (Adult - Flu Only)    Collection Time: 20 10:23 AM   Result Value Ref Range    Influenza virus A RNA POSITIVE (A) Negative    Influenza virus B, PCR Negative Negative   EKG    Collection Time: 20 10:43 AM   Result Value Ref Range    Report       [facility] Emergency Dept.    Test Date:  2020  Pt Name:    MELISSA BARDALES                 Department: ER  MRN:        [id number]                      Room:        21  Gender:     Female                       Technician: [id number]  :        1965                   Requested By:ALFIE GARCIA  Order #:    [id number]                    Reading MD: ALFIE GARCIA MD    Measurements  Intervals                                Axis  Rate:       71                           P:          55  MA:         160                          QRS:        65  QRSD:       88                           T:          71  QT:         440  QTc:        479    Interpretive Statements  SINUS RHYTHM  NONSPECIFIC T ABNORMALITIES, ANT-LAT LEADS  Compared to ECG 2020 17:39:37  T-wave abnormality now present  Atrial fibrillation no longer present  Left ventricular hypertrophy no longer present  Electronically Signed On 3-4-2020 11:28:00 PST by ALFIE GARCIA MD     CBC WITH DIFFERENTIAL    Collection Time: 20 10:55 AM   Result Value Ref Range    WBC 3.9 (L) 4.8 - 10.8 K/uL    RBC 2.72 (L) 4.20 - 5.40 M/uL    Hemoglobin 8.6 (L) 12.0 - 16.0 g/dL    Hematocrit 26.8 (L) 37.0 - 47.0 %    MCV 98.5 (H) 81.4 - 97.8 fL    MCH 31.6 27.0 - 33.0 pg    MCHC 32.1 (L) 33.6 - 35.0 g/dL    RDW 59.1 (H) 35.9 - 50.0 fL    Platelet Count 93 (L) 164 - 446 K/uL    MPV 10.4 9.0 - 12.9 fL    Neutrophils-Polys 69.80 44.00 - 72.00 %    Lymphocytes 17.20 (L) 22.00 - 41.00 %    Monocytes 8.70 0.00 - 13.40 %    Eosinophils 3.30 0.00 - 6.90 %    Basophils 0.50 0.00 - 1.80 %    Immature Granulocytes 0.50 0.00 - 0.90 %    Nucleated RBC 0.00 /100 WBC    Neutrophils (Absolute) 2.71  2.00 - 7.15 K/uL    Lymphs (Absolute) 0.67 (L) 1.00 - 4.80 K/uL    Monos (Absolute) 0.34 0.00 - 0.85 K/uL    Eos (Absolute) 0.13 0.00 - 0.51 K/uL    Baso (Absolute) 0.02 0.00 - 0.12 K/uL    Immature Granulocytes (abs) 0.02 0.00 - 0.11 K/uL    NRBC (Absolute) 0.00 K/uL       Imaging/Procedures Review:    Independant Imaging Review: Deferred  DX-CHEST-PORTABLE (1 VIEW)   Final Result      1.  Bilateral costophrenic angle blunting suggestive of small pleural effusions.   2.  Mild basilar atelectasis. Underlying infection is possible.   3.  Stable enlargement of the cardiomediastinal silhouette.              Assessment/Plan   #Influenza A  Cough, fever, chills,  myalgias, rhinorrhea, SOB  Tested positive for A, not B  -Oseltamivir 30mg administered in ED, guidelines rec ESRD Cr<10 dose 30mg immediately and then 30mg post dialysis for 5 days  -Supportive care  -IS  -Respiratory therapy  -Tylenol 650mg q6 prn    #Volume overload  Patient sleeps on 2-3 pillows at home and has bibasilar crackles  Missed 2 dialysis appointments recently  Lasix 40 daily home med  Rarely makes urine  -Nephrology on board  -Will dialyze today  -Continue home lasix  -Fluid restriction, strict I&Os, daily weights    #HTN  -Continue home meds    #ESRD on hemodialysis  Missed 2 recent dialysis appointments  L upper extremity fistula intact with prominent thrill  [facility]  Follows nephrology with Dr. Junior  Volume overloaded  K 5.2  -Scheduled to get dialysis today  -CTM volume status   -Continue home lasix  -Fluid restriction, strict I&Os, daily weights, renal diet    #Pancytopenia  WBC 3.9, Hgb 8.9, plt 93  HIV neg in 2017    #Tobacco use  5 cig/d on/off since high school  -Nicotine replacement  -RT cessation counseling

## 2020-03-05 NOTE — RESPIRATORY CARE
" COPD EDUCATION by COPD CLINICAL EDUCATOR  3/5/2020 at 10:22 AM by Lisa Verma, RRT     Patient reviewed by COPD education team. Patient does not have a history or diagnosis of COPD. Patient is a smoker, smoking cessation education done. A comprehensive packet with tips to quit and information on outpatient classes given to patient. Smoking Cessation Intervention and education completed, 15 minutes spent on smoking cessation education with patient    Provided smoking cessation packet with \"Tips to Quit\" and flyer for \"Free Smoking Cessation Classes\".     "

## 2020-03-05 NOTE — DISCHARGE PLANNING
Patient is eligible for Medicaid Meds to Beds at discharge if they have coverage with [employer], Medicaid FFS, Medicaid HMO ([facility]), or [place]. This service is provided through the [facility] if orders are received prior to 4 p.m. Monday through Friday, excluding holidays. Please call [phone] prior to discharge.

## 2020-03-07 LAB
BACTERIA UR CULT: NORMAL
SIGNIFICANT IND 70042: NORMAL
SITE SITE: NORMAL
SOURCE SOURCE: NORMAL

## 2020-03-17 ENCOUNTER — PATIENT OUTREACH (OUTPATIENT)
Dept: HEALTH INFORMATION MANAGEMENT | Facility: OTHER | Age: 55
End: 2020-03-17

## 2020-03-19 NOTE — PROGRESS NOTES
NELSY Butler unable to make contact with patient d/c'ed from [facility] 3/19/20.

## 2020-05-03 ENCOUNTER — HOSPITAL ENCOUNTER (EMERGENCY)
Facility: MEDICAL CENTER | Age: 55
End: 2020-05-03
Attending: EMERGENCY MEDICINE
Payer: MEDICAID

## 2020-05-03 ENCOUNTER — APPOINTMENT (OUTPATIENT)
Dept: RADIOLOGY | Facility: MEDICAL CENTER | Age: 55
End: 2020-05-03
Attending: EMERGENCY MEDICINE
Payer: MEDICAID

## 2020-05-03 VITALS
RESPIRATION RATE: 20 BRPM | SYSTOLIC BLOOD PRESSURE: 170 MMHG | WEIGHT: 146 LBS | HEIGHT: 62 IN | TEMPERATURE: 98.6 F | HEART RATE: 81 BPM | OXYGEN SATURATION: 99 % | BODY MASS INDEX: 26.87 KG/M2 | DIASTOLIC BLOOD PRESSURE: 80 MMHG

## 2020-05-03 DIAGNOSIS — S80.01XA CONTUSION OF RIGHT KNEE, INITIAL ENCOUNTER: ICD-10-CM

## 2020-05-03 PROCEDURE — 73552 X-RAY EXAM OF FEMUR 2/>: CPT | Mod: RT

## 2020-05-03 PROCEDURE — A9270 NON-COVERED ITEM OR SERVICE: HCPCS | Performed by: EMERGENCY MEDICINE

## 2020-05-03 PROCEDURE — 99284 EMERGENCY DEPT VISIT MOD MDM: CPT

## 2020-05-03 PROCEDURE — 700102 HCHG RX REV CODE 250 W/ 637 OVERRIDE(OP): Performed by: EMERGENCY MEDICINE

## 2020-05-03 RX ORDER — HYDROCODONE BITARTRATE AND ACETAMINOPHEN 5; 325 MG/1; MG/1
1 TABLET ORAL ONCE
Status: COMPLETED | OUTPATIENT
Start: 2020-05-03 | End: 2020-05-03

## 2020-05-03 RX ADMIN — HYDROCODONE BITARTRATE AND ACETAMINOPHEN 1 TABLET: 5; 325 TABLET ORAL at 15:55

## 2020-05-03 ASSESSMENT — LIFESTYLE VARIABLES
TOTAL SCORE: 0
AVERAGE NUMBER OF DAYS PER WEEK YOU HAVE A DRINK CONTAINING ALCOHOL: 0
EVER HAD A DRINK FIRST THING IN THE MORNING TO STEADY YOUR NERVES TO GET RID OF A HANGOVER: NO
HAVE PEOPLE ANNOYED YOU BY CRITICIZING YOUR DRINKING: NO
TOTAL SCORE: 0
DOES PATIENT WANT TO STOP DRINKING: NO
HAVE YOU EVER FELT YOU SHOULD CUT DOWN ON YOUR DRINKING: NO
TOTAL SCORE: 0
DO YOU DRINK ALCOHOL: NO
CONSUMPTION TOTAL: NEGATIVE
ON A TYPICAL DAY WHEN YOU DRINK ALCOHOL HOW MANY DRINKS DO YOU HAVE: 0
HOW MANY TIMES IN THE PAST YEAR HAVE YOU HAD 5 OR MORE DRINKS IN A DAY: 0
EVER FELT BAD OR GUILTY ABOUT YOUR DRINKING: NO

## 2020-05-03 ASSESSMENT — FIBROSIS 4 INDEX: FIB4 SCORE: 3.58

## 2020-05-03 NOTE — ED PROVIDER NOTES
ED Provider Note    CHIEF COMPLAINT  Chief Complaint   Patient presents with   • T-5000 FALL       HPI  Isabelle Coyle is a 54 y.o. female who presents to the emerge department complaining of right leg discomfort after mechanical fall.  She notes that she has a right BKA and prosthesis.  Today tripped while bringing in the trash can falling on her right side causing immediate pain.  She also notes that she had a fall earlier in the [place].  She tripped over a log and caused a contusion to her left leg which has some persistent bruising and discomfort there also.  With today's fall she did not hit her head.  No loss conscious.  No neck or back pain.  Pain is moderate at rest and more severe with movement.  She notes pain diffusely from her hip to her knee.  No significant pain to stump itself.    REVIEW OF SYSTEMS  See [facility] for further details. All other systems are negative.     PAST MEDICAL HISTORY   has a past medical history of Anemia, Anemia associated with chronic renal failure (2009), Arrhythmia, Dental disorder (17), Dialysis patient ([facility]) (17), Dysrhythmia, ESRD (end stage renal disease) ([facility]) (17), GERD (gastroesophageal reflux disease), Glomerulonephritis, chronic (2009), Head ache, Heart burn, Heart murmur, High cholesterol, HTN (hypertension) (2009), Hypertension, Port catheter in place (17), SVT (supraventricular tachycardia) ([facility]), and SVT (supraventricular tachycardia) ([facility]).    SOCIAL HISTORY  Social History     Tobacco Use   • Smoking status: Current Some Day Smoker     Packs/day: 0.25     Years: 20.00     Pack years: 5.00     Types: Cigarettes     Last attempt to quit: 2019     Years since quittin.0   • Smokeless tobacco: Never Used   • Tobacco comment: 5 cigs/day   Substance and Sexual Activity   • Alcohol use: No   • Drug use: No   • Sexual activity: Not on file       SURGICAL HISTORY   has a past surgical history that  "includes amputation, below the knee; capitation payment (insurance); appendectomy laparoscopic (5/4/2009); enlarge breast with implant; av fistula creation (Left, 6/20/2017); other; femur orif (10/9/08); iliac bone graft (10/9/08); av fistula creation (Left, 8/28/2017); av fistula creation (Left, 4/27/2018); and yamileth by laparoscopy (6/22/2019).    CURRENT MEDICATIONS  Home Medications    **Home medications have not yet been reviewed for this encounter**         ALLERGIES  Allergies   Allergen Reactions   • Sulfa Drugs Hives     [id number] rash swelling itching       PHYSICAL EXAM  VITAL SIGNS: BP (!) 170/80   Pulse 81   Temp 37 °C (98.6 °F) (Oral)   Resp 20   Ht 1.575 m (5' 2\")   Wt 66.2 kg (146 lb)   LMP 06/25/2007   SpO2 99%   BMI 26.70 kg/m²  @MICHELLE[[id number]::@  Pulse ox interpretation: I interpret this pulse ox as normal.  Constitutional: Alert in no apparent distress.  HENT: Normocephalic, Atraumatic, Bilateral external ears normal. Nose normal.   Eyes: Pupils are equal and reactive. Conjunctiva normal, non-icteric.   Heart: Regular rate and rythm, no murmurs.    Lungs: Clear to auscultation bilaterally.  Right lower extremity: Diffuse minimal tenderness to thigh.  No gross point tenderness along bony palpation.  There is a egg sized soft tissue swelling to the lateral knee.  BKA/stump is well-appearing.    Left lower extremity: Diffusely nontender but significant anterior ecchymosis to the leg with contusion proximally.    Skin: Warm, Dry, No erythema, No rash.   Neurologic: Alert, Grossly non-focal.   Psychiatric: Affect normal, Judgment normal, Mood normal, Appears appropriate and not intoxicated.     DX-FEMUR-2+ RIGHT   Final Result      1.  No radiographic evidence of acute traumatic injury of the right femur.   2.  There is evidence of old trauma involving the distal right femur with sideplate and screw fixation.              COURSE & MEDICAL DECISION MAKING  Pertinent Labs & Imaging studies reviewed. " (See chart for details)  Patient presented the emerge department after mechanical ground-level fall.  History as above.  She does have a hematoma to the lateral aspect of her right knee.  She has however appear to move the hip well.  X-ray did not show any acute fracture dislocation.  Most likely contusion as well as some potential internal derangement of the knee.  I do not see any evidence of acute right hip issues on x-ray imaging either.  Again I do not believe that any further imaging such as CT is necessary currently.  She will follow-up with orthopedics and will return to the ER with any changes or worsening.      The patient will not drink alcohol nor drive with provided medications. The patient will return for worsening symptoms and is stable at the time of discharge. The patient verbalizes understanding and will comply.    FINAL IMPRESSION  1. Contusion of right knee, initial encounter               Electronically signed by: Orlando Mari M.D., 5/3/2020 3:51 PM

## 2020-05-03 NOTE — ED NOTES
DC'd pt from ED  AOx4, Respirations even and unlabored, skin pink, warm and dry, IV DC'd tip intact, dressing applied, pressure held. After care instructions provided and explained. Copies of lab results and procedures provided.  Pt was helped into a wheel chair and wheeled to the front to be picked up Left ED with all belongings in care of self or family.

## 2020-05-03 NOTE — ED TRIAGE NOTES
"Pt presents with GLF while taking out the trash, fell on to left side hand hit first. Pt has prosthetic from BKA. Complaints of right hip pain down to the stump. Pt is moaning and states she is in 10/10 pain.   Past Medical History:   Diagnosis Date   • Anemia    • Anemia associated with chronic renal failure 11/5/2009   • Arrhythmia    • Dental disorder 8-25-17    \"Full Upper & Partial Lower Dentures\"   • Dialysis patient ([facility]) 8-25-17    [facility] M,W,F   • Dysrhythmia     \"SVT\"   • ESRD (end stage renal disease) ([facility]) 8-25-17    Dialysis MWF@ [facility]   • GERD (gastroesophageal reflux disease)    • Glomerulonephritis, chronic 11/5/2009   • Head ache    • Heart burn    • Heart murmur    • High cholesterol    • HTN (hypertension) 11/5/2009    2017 states well controlled   • Hypertension    • Port catheter in place 8-25-17    For Dialysis   • SVT (supraventricular tachycardia) ([facility])    • SVT (supraventricular tachycardia) ([facility])        "

## 2020-08-17 ENCOUNTER — APPOINTMENT (RX ONLY)
Dept: URBAN - METROPOLITAN AREA CLINIC 125 | Facility: CLINIC | Age: 55
Setting detail: DERMATOLOGY
End: 2020-08-17

## 2020-08-17 DIAGNOSIS — L71.8 OTHER ROSACEA: ICD-10-CM

## 2020-08-17 DIAGNOSIS — L85.3 XEROSIS CUTIS: ICD-10-CM

## 2020-08-17 DIAGNOSIS — L81.4 OTHER MELANIN HYPERPIGMENTATION: ICD-10-CM

## 2020-08-17 DIAGNOSIS — D485 NEOPLASM OF UNCERTAIN BEHAVIOR OF SKIN: ICD-10-CM

## 2020-08-17 DIAGNOSIS — D18.0 HEMANGIOMA: ICD-10-CM

## 2020-08-17 DIAGNOSIS — L82.1 OTHER SEBORRHEIC KERATOSIS: ICD-10-CM

## 2020-08-17 DIAGNOSIS — L91.8 OTHER HYPERTROPHIC DISORDERS OF THE SKIN: ICD-10-CM

## 2020-08-17 DIAGNOSIS — D22 MELANOCYTIC NEVI: ICD-10-CM

## 2020-08-17 PROBLEM — D48.5 NEOPLASM OF UNCERTAIN BEHAVIOR OF SKIN: Status: ACTIVE | Noted: 2020-08-17

## 2020-08-17 PROBLEM — D22.5 MELANOCYTIC NEVI OF TRUNK: Status: ACTIVE | Noted: 2020-08-17

## 2020-08-17 PROBLEM — D18.01 HEMANGIOMA OF SKIN AND SUBCUTANEOUS TISSUE: Status: ACTIVE | Noted: 2020-08-17

## 2020-08-17 PROBLEM — D22.39 MELANOCYTIC NEVI OF OTHER PARTS OF FACE: Status: ACTIVE | Noted: 2020-08-17

## 2020-08-17 PROCEDURE — ? OTHER

## 2020-08-17 PROCEDURE — ? TREATMENT REGIMEN

## 2020-08-17 PROCEDURE — ? OBSERVATION AND MEASURE

## 2020-08-17 PROCEDURE — ? PRESCRIPTION

## 2020-08-17 PROCEDURE — 99203 OFFICE O/P NEW LOW 30 MIN: CPT

## 2020-08-17 PROCEDURE — ? COUNSELING

## 2020-08-17 RX ORDER — HYDROCORTISONE 25 MG/G
CREAM TOPICAL
Qty: 1 | Refills: 1 | Status: ERX | COMMUNITY
Start: 2020-08-17

## 2020-08-17 RX ORDER — DOXYCYCLINE HYCLATE 20 MG/1
TABLET, FILM COATED ORAL
Qty: 60 | Refills: 1 | Status: ERX | COMMUNITY
Start: 2020-08-17

## 2020-08-17 RX ADMIN — HYDROCORTISONE: 25 CREAM TOPICAL at 00:00

## 2020-08-17 RX ADMIN — DOXYCYCLINE HYCLATE: 20 TABLET, FILM COATED ORAL at 00:00

## 2020-08-17 ASSESSMENT — LOCATION DETAILED DESCRIPTION DERM
LOCATION DETAILED: SUPERIOR THORACIC SPINE
LOCATION DETAILED: PERIUMBILICAL SKIN
LOCATION DETAILED: UPPER STERNUM
LOCATION DETAILED: LEFT INFERIOR CENTRAL MALAR CHEEK
LOCATION DETAILED: RIGHT MEDIAL BREAST 3-4:00 REGION
LOCATION DETAILED: RIGHT INFERIOR MEDIAL UPPER BACK
LOCATION DETAILED: RIGHT ANTERIOR SHOULDER
LOCATION DETAILED: LEFT INSTEP
LOCATION DETAILED: LEFT MID-UPPER BACK

## 2020-08-17 ASSESSMENT — LOCATION SIMPLE DESCRIPTION DERM
LOCATION SIMPLE: RIGHT SHOULDER
LOCATION SIMPLE: LEFT CHEEK
LOCATION SIMPLE: RIGHT UPPER BACK
LOCATION SIMPLE: ABDOMEN
LOCATION SIMPLE: LEFT PLANTAR SURFACE
LOCATION SIMPLE: RIGHT BREAST
LOCATION SIMPLE: UPPER BACK
LOCATION SIMPLE: LEFT UPPER BACK
LOCATION SIMPLE: CHEST

## 2020-08-17 ASSESSMENT — LOCATION ZONE DERM
LOCATION ZONE: FEET
LOCATION ZONE: FACE
LOCATION ZONE: ARM
LOCATION ZONE: TRUNK

## 2020-08-17 NOTE — PROCEDURE: TREATMENT REGIMEN
Detail Level: Detailed
Initiate Treatment: Doxycycline 2omg bid
Otc Regimen: Gentle cleanser \\nMoisturizer daily
Discontinue Regimen: Minocycline (pt has at home)
Continue Regimen: Hydrocortisone 2.5% cream aaa face bid 1 week on, 1 week off. Stop as resolved
Samples Given: Soolantra pea size amount entire Face QHS\\nCeraVe

## 2020-08-17 NOTE — PROCEDURE: OTHER
Note Text (......Xxx Chief Complaint.): This diagnosis correlates with the
Other (Free Text): LN2 after consent
Detail Level: Zone

## 2020-09-24 ENCOUNTER — APPOINTMENT (RX ONLY)
Dept: URBAN - METROPOLITAN AREA CLINIC 125 | Facility: CLINIC | Age: 55
Setting detail: DERMATOLOGY
End: 2020-09-24

## 2020-09-24 DIAGNOSIS — I83.9 ASYMPTOMATIC VARICOSE VEINS OF LOWER EXTREMITIES: ICD-10-CM

## 2020-09-24 DIAGNOSIS — L71.8 OTHER ROSACEA: ICD-10-CM | Status: IMPROVED

## 2020-09-24 DIAGNOSIS — D18.0 HEMANGIOMA: ICD-10-CM

## 2020-09-24 DIAGNOSIS — Z41.9 ENCOUNTER FOR PROCEDURE FOR PURPOSES OTHER THAN REMEDYING HEALTH STATE, UNSPECIFIED: ICD-10-CM

## 2020-09-24 PROBLEM — D18.01 HEMANGIOMA OF SKIN AND SUBCUTANEOUS TISSUE: Status: ACTIVE | Noted: 2020-09-24

## 2020-09-24 PROBLEM — I83.93 ASYMPTOMATIC VARICOSE VEINS OF BILATERAL LOWER EXTREMITIES: Status: ACTIVE | Noted: 2020-09-24

## 2020-09-24 PROCEDURE — ? RECOMMENDATIONS

## 2020-09-24 PROCEDURE — ? TREATMENT REGIMEN

## 2020-09-24 PROCEDURE — ? COUNSELING

## 2020-09-24 PROCEDURE — ? PRESCRIPTION

## 2020-09-24 PROCEDURE — ? OTHER (COSMETIC)

## 2020-09-24 PROCEDURE — ? BENIGN DESTRUCTION COSMETIC

## 2020-09-24 PROCEDURE — 99213 OFFICE O/P EST LOW 20 MIN: CPT

## 2020-09-24 RX ORDER — IVERMECTIN 10 MG/G
CREAM TOPICAL
Qty: 1 | Refills: 2 | Status: ERX | COMMUNITY
Start: 2020-09-24

## 2020-09-24 RX ADMIN — IVERMECTIN: 10 CREAM TOPICAL at 00:00

## 2020-09-24 ASSESSMENT — LOCATION SIMPLE DESCRIPTION DERM
LOCATION SIMPLE: LEFT INFERIOR EYELID
LOCATION SIMPLE: LEFT CHEEK
LOCATION SIMPLE: RIGHT INFERIOR EYELID
LOCATION SIMPLE: RIGHT PRETIBIAL REGION
LOCATION SIMPLE: LEFT UPPER ARM
LOCATION SIMPLE: LEFT PRETIBIAL REGION

## 2020-09-24 ASSESSMENT — LOCATION DETAILED DESCRIPTION DERM
LOCATION DETAILED: LEFT LATERAL INFERIOR EYELID
LOCATION DETAILED: RIGHT LATERAL INFERIOR EYELID
LOCATION DETAILED: LEFT ANTERIOR DISTAL UPPER ARM
LOCATION DETAILED: LEFT LATERAL PROXIMAL PRETIBIAL REGION
LOCATION DETAILED: LEFT INFERIOR CENTRAL MALAR CHEEK
LOCATION DETAILED: LEFT DISTAL PRETIBIAL REGION
LOCATION DETAILED: RIGHT PROXIMAL PRETIBIAL REGION

## 2020-09-24 ASSESSMENT — LOCATION ZONE DERM
LOCATION ZONE: EYELID
LOCATION ZONE: LEG
LOCATION ZONE: FACE
LOCATION ZONE: ARM

## 2020-09-24 NOTE — PROCEDURE: TREATMENT REGIMEN
Detail Level: Detailed
Otc Regimen: Gentle cleanser \\nMoisturizer daily\\nRecommended after gentle cleanser and moisturizer apply pea size amount to face Qhs \\n-Differin gel Qhs
Discontinue Regimen: Hydrocortisone 2.5% cream aaa face bid 1 week on, 1 week off. Stop as resolved
Continue Regimen: Soolantra cream aaa face qhs alternating nights with OTC differin 0.1% gel \\nDoxycycline 2omg bid
Plan: Prescription filled for Soolantra topical cream today (pt liked samples from previous OV)\\n-If tolerated may increase Soolantra in am and differin in pm\\n-Briefly discussed other options like fillers and Botox

## 2020-09-24 NOTE — PROCEDURE: OTHER (COSMETIC)
Detail Level: Zone
Other (Free Text): Samples given: Elta MD eye gel.  Cons Cerave eye cream or Neutrogena hydroboost also

## 2020-09-24 NOTE — PROCEDURE: RECOMMENDATIONS
Recommendation Preamble: The following recommendations were made during the visit: sclerotherapy
Detail Level: Zone

## 2020-10-01 ENCOUNTER — APPOINTMENT (OUTPATIENT)
Dept: RADIOLOGY | Facility: MEDICAL CENTER | Age: 55
DRG: 280 | End: 2020-10-01
Attending: EMERGENCY MEDICINE
Payer: MEDICAID

## 2020-10-01 ENCOUNTER — HOSPITAL ENCOUNTER (INPATIENT)
Facility: MEDICAL CENTER | Age: 55
LOS: 3 days | DRG: 280 | End: 2020-10-04
Attending: EMERGENCY MEDICINE | Admitting: INTERNAL MEDICINE
Payer: MEDICAID

## 2020-10-01 DIAGNOSIS — R55 SYNCOPE AND COLLAPSE: ICD-10-CM

## 2020-10-01 DIAGNOSIS — R53.81 DEBILITY: ICD-10-CM

## 2020-10-01 DIAGNOSIS — N18.6 ESRD (END STAGE RENAL DISEASE) (HCC): ICD-10-CM

## 2020-10-01 DIAGNOSIS — S32.9XXA CLOSED NONDISPLACED FRACTURE OF PELVIS, UNSPECIFIED PART OF PELVIS, INITIAL ENCOUNTER (HCC): ICD-10-CM

## 2020-10-01 DIAGNOSIS — R79.89 ELEVATED TROPONIN: ICD-10-CM

## 2020-10-01 LAB
ALBUMIN SERPL BCP-MCNC: 3.9 G/DL (ref 3.2–4.9)
ALBUMIN/GLOB SERPL: 1.6 G/DL
ALP SERPL-CCNC: 69 U/L (ref 30–99)
ALT SERPL-CCNC: 11 U/L (ref 2–50)
ANION GAP SERPL CALC-SCNC: 18 MMOL/L (ref 7–16)
AST SERPL-CCNC: 10 U/L (ref 12–45)
BASOPHILS # BLD AUTO: 0.4 % (ref 0–1.8)
BASOPHILS # BLD: 0.03 K/UL (ref 0–0.12)
BILIRUB SERPL-MCNC: 0.5 MG/DL (ref 0.1–1.5)
BUN SERPL-MCNC: 76 MG/DL (ref 8–22)
CALCIUM SERPL-MCNC: 9.3 MG/DL (ref 8.5–10.5)
CHLORIDE SERPL-SCNC: 94 MMOL/L (ref 96–112)
CK SERPL-CCNC: 93 U/L (ref 0–154)
CO2 SERPL-SCNC: 27 MMOL/L (ref 20–33)
CREAT SERPL-MCNC: 9.36 MG/DL (ref 0.5–1.4)
EKG IMPRESSION: NORMAL
EOSINOPHIL # BLD AUTO: 0.18 K/UL (ref 0–0.51)
EOSINOPHIL NFR BLD: 2.6 % (ref 0–6.9)
ERYTHROCYTE [DISTWIDTH] IN BLOOD BY AUTOMATED COUNT: 56.8 FL (ref 35.9–50)
ETHANOL BLD-MCNC: <10.1 MG/DL (ref 0–10.1)
GLOBULIN SER CALC-MCNC: 2.4 G/DL (ref 1.9–3.5)
GLUCOSE SERPL-MCNC: 105 MG/DL (ref 65–99)
HCT VFR BLD AUTO: 30.3 % (ref 37–47)
HGB BLD-MCNC: 9.4 G/DL (ref 12–16)
IMM GRANULOCYTES # BLD AUTO: 0.03 K/UL (ref 0–0.11)
IMM GRANULOCYTES NFR BLD AUTO: 0.4 % (ref 0–0.9)
LIPASE SERPL-CCNC: 27 U/L (ref 11–82)
LYMPHOCYTES # BLD AUTO: 0.75 K/UL (ref 1–4.8)
LYMPHOCYTES NFR BLD: 10.8 % (ref 22–41)
MAGNESIUM SERPL-MCNC: 2.5 MG/DL (ref 1.5–2.5)
MCH RBC QN AUTO: 29.7 PG (ref 27–33)
MCHC RBC AUTO-ENTMCNC: 31 G/DL (ref 33.6–35)
MCV RBC AUTO: 95.9 FL (ref 81.4–97.8)
MONOCYTES # BLD AUTO: 0.35 K/UL (ref 0–0.85)
MONOCYTES NFR BLD AUTO: 5 % (ref 0–13.4)
NEUTROPHILS # BLD AUTO: 5.61 K/UL (ref 2–7.15)
NEUTROPHILS NFR BLD: 80.8 % (ref 44–72)
NRBC # BLD AUTO: 0 K/UL
NRBC BLD-RTO: 0 /100 WBC
PHOSPHATE SERPL-MCNC: 7.1 MG/DL (ref 2.5–4.5)
PLATELET # BLD AUTO: 68 K/UL (ref 164–446)
PMV BLD AUTO: 11.3 FL (ref 9–12.9)
POTASSIUM SERPL-SCNC: 5.5 MMOL/L (ref 3.6–5.5)
PROT SERPL-MCNC: 6.3 G/DL (ref 6–8.2)
RBC # BLD AUTO: 3.16 M/UL (ref 4.2–5.4)
SODIUM SERPL-SCNC: 139 MMOL/L (ref 135–145)
TROPONIN T SERPL-MCNC: 102 NG/L (ref 6–19)
TROPONIN T SERPL-MCNC: 95 NG/L (ref 6–19)
WBC # BLD AUTO: 7 K/UL (ref 4.8–10.8)

## 2020-10-01 PROCEDURE — 72192 CT PELVIS W/O DYE: CPT

## 2020-10-01 PROCEDURE — 82550 ASSAY OF CK (CPK): CPT

## 2020-10-01 PROCEDURE — 99285 EMERGENCY DEPT VISIT HI MDM: CPT

## 2020-10-01 PROCEDURE — 83735 ASSAY OF MAGNESIUM: CPT

## 2020-10-01 PROCEDURE — 93005 ELECTROCARDIOGRAM TRACING: CPT | Performed by: EMERGENCY MEDICINE

## 2020-10-01 PROCEDURE — 73110 X-RAY EXAM OF WRIST: CPT | Mod: RT

## 2020-10-01 PROCEDURE — 36415 COLL VENOUS BLD VENIPUNCTURE: CPT

## 2020-10-01 PROCEDURE — 84484 ASSAY OF TROPONIN QUANT: CPT

## 2020-10-01 PROCEDURE — 96374 THER/PROPH/DIAG INJ IV PUSH: CPT

## 2020-10-01 PROCEDURE — 71045 X-RAY EXAM CHEST 1 VIEW: CPT

## 2020-10-01 PROCEDURE — 72131 CT LUMBAR SPINE W/O DYE: CPT

## 2020-10-01 PROCEDURE — 83690 ASSAY OF LIPASE: CPT

## 2020-10-01 PROCEDURE — 80053 COMPREHEN METABOLIC PANEL: CPT

## 2020-10-01 PROCEDURE — 700111 HCHG RX REV CODE 636 W/ 250 OVERRIDE (IP): Performed by: EMERGENCY MEDICINE

## 2020-10-01 PROCEDURE — 70450 CT HEAD/BRAIN W/O DYE: CPT

## 2020-10-01 PROCEDURE — 85025 COMPLETE CBC W/AUTO DIFF WBC: CPT

## 2020-10-01 PROCEDURE — 770020 HCHG ROOM/CARE - TELE (206)

## 2020-10-01 PROCEDURE — 80307 DRUG TEST PRSMV CHEM ANLYZR: CPT

## 2020-10-01 PROCEDURE — 84100 ASSAY OF PHOSPHORUS: CPT

## 2020-10-01 RX ORDER — POLYETHYLENE GLYCOL 3350 17 G/17G
1 POWDER, FOR SOLUTION ORAL
Status: DISCONTINUED | OUTPATIENT
Start: 2020-10-01 | End: 2020-10-04 | Stop reason: HOSPADM

## 2020-10-01 RX ORDER — HALOPERIDOL 5 MG/ML
2.5 INJECTION INTRAMUSCULAR ONCE
Status: COMPLETED | OUTPATIENT
Start: 2020-10-01 | End: 2020-10-01

## 2020-10-01 RX ORDER — FAMOTIDINE 20 MG/1
20 TABLET, FILM COATED ORAL 2 TIMES DAILY
COMMUNITY
End: 2021-01-19

## 2020-10-01 RX ORDER — CLONIDINE 0.2 MG/24H
1 PATCH, EXTENDED RELEASE TRANSDERMAL
COMMUNITY
End: 2021-03-04

## 2020-10-01 RX ORDER — FUROSEMIDE 40 MG/1
40 TABLET ORAL
COMMUNITY
End: 2021-01-19

## 2020-10-01 RX ORDER — AMOXICILLIN 250 MG
2 CAPSULE ORAL 2 TIMES DAILY
Status: DISCONTINUED | OUTPATIENT
Start: 2020-10-01 | End: 2020-10-04 | Stop reason: HOSPADM

## 2020-10-01 RX ORDER — ACETAMINOPHEN 325 MG/1
650 TABLET ORAL EVERY 6 HOURS PRN
Status: DISCONTINUED | OUTPATIENT
Start: 2020-10-01 | End: 2020-10-04 | Stop reason: HOSPADM

## 2020-10-01 RX ORDER — BISACODYL 10 MG
10 SUPPOSITORY, RECTAL RECTAL
Status: DISCONTINUED | OUTPATIENT
Start: 2020-10-01 | End: 2020-10-04 | Stop reason: HOSPADM

## 2020-10-01 RX ORDER — CARVEDILOL 12.5 MG/1
12.5 TABLET ORAL 2 TIMES DAILY WITH MEALS
Status: ON HOLD | COMMUNITY
End: 2021-01-23

## 2020-10-01 RX ORDER — ACETAMINOPHEN 500 MG
1000 TABLET ORAL EVERY 6 HOURS PRN
COMMUNITY
End: 2020-12-07

## 2020-10-01 RX ADMIN — HALOPERIDOL LACTATE 2.5 MG: 5 INJECTION, SOLUTION INTRAMUSCULAR at 15:56

## 2020-10-01 ASSESSMENT — ENCOUNTER SYMPTOMS
FALLS: 1
FEVER: 0
BACK PAIN: 1
CHILLS: 0
SPEECH CHANGE: 0
FOCAL WEAKNESS: 0
ABDOMINAL PAIN: 0
CONSTIPATION: 0
HEADACHES: 1
BLURRED VISION: 0
SHORTNESS OF BREATH: 0
PALPITATIONS: 0
EYE PAIN: 0
LOSS OF CONSCIOUSNESS: 1
NAUSEA: 0
COUGH: 0
WEAKNESS: 0
VOMITING: 0
DIARRHEA: 0

## 2020-10-01 ASSESSMENT — FIBROSIS 4 INDEX: FIB4 SCORE: 3.65

## 2020-10-01 ASSESSMENT — LIFESTYLE VARIABLES: DO YOU DRINK ALCOHOL: NO

## 2020-10-01 NOTE — ED PROVIDER NOTES
ED Provider Note    CHIEF COMPLAINT  Chief Complaint   Patient presents with   • T-5000 GLF     Pt bib ems from home. Pt had a GLF 2 days ago. Pt complaining of headache and lower back pain now. Denies being on thinners. Pt has a BKA to Right Leg.        HPI    Primary care provider: Moises Hong M.D.  Means of arrival: EMS  History obtained from: Patient  History limited by: Patient is a very poor historian    Isabelle Coyle is a 55 y.o. female who presents with concerns for an episode of syncope and collapse 3 days ago.  Apparently the patient was eating dinner at her kitchen counter and then immediately lost consciousness.  Denies prior episodes.  She reports that she was able to get her bed but since that time is been unable to ambulate and has been laid up in bed for the last 3 days.  She has a history of a right-sided BKA and normally gets around with a wheelchair.  She reports unfortunately that she was unable to get out of bed.  She complains of right-sided low back pain nonradiating.  She also has some mild headache.  No alleviating measures attempted although she thought maybe she took a Tylenol over the last several days.  She has many medical problems including end-stage renal disease on dialysis last session was Monday she missed yesterday because she could not get out of bed.  She was finally able to call 911 and was transported here for further evaluation.    Further history limited because the patient is a very poor historian.    REVIEW OF SYSTEMS  Constitutional: Negative for fever or chills.   Respiratory: Positive for chronic unchanged cough, no dyspnea.  Cardiovascular: Negative for chest pain but positive for an episode of syncope.   Gastrointestinal: Negative for nausea, vomiting, or abdominal pain.   Musculoskeletal: Positive for right-sided low back pain, but positive for right wrist pain.  No extremity deformity.  Skin: Negative for itching or rash.   Neurological: Negative for  "sensory or motor changes.   Further review of systems limited because the patient is a poor historian.      PAST MEDICAL HISTORY   has a past medical history of Anemia, Anemia associated with chronic renal failure (2009), Arrhythmia, Dental disorder (17), Dialysis patient ([facility]) (17), Dysrhythmia, ESRD (end stage renal disease) ([facility]) (17), GERD (gastroesophageal reflux disease), Glomerulonephritis, chronic (2009), Head ache, Heart burn, Heart murmur, High cholesterol, HTN (hypertension) (2009), Hypertension, Port catheter in place (17), SVT (supraventricular tachycardia) ([facility]), and SVT (supraventricular tachycardia) ([facility]).    PAST FAMILY HISTORY  Family History   Problem Relation Age of Onset   • Heart Disease Other        SOCIAL HISTORY  Social History     Tobacco Use   • Smoking status: Current Some Day Smoker     Packs/day: 0.25     Years: 20.00     Pack years: 5.00     Types: Cigarettes     Last attempt to quit: 2019     Years since quittin.4   • Smokeless tobacco: Never Used   • Tobacco comment: 5 cigs/day   Substance and Sexual Activity   • Alcohol use: No   • Drug use: No   • Sexual activity: Not on file       SURGICAL HISTORY   has a past surgical history that includes amputation, below the knee; capitation payment (insurance); appendectomy laparoscopic (2009); enlarge breast with implant; av fistula creation (Left, 2017); other; femur orif (10/9/08); iliac bone graft (10/9/08); av fistula creation (Left, 2017); av fistula creation (Left, 2018); and yamileth by laparoscopy (2019).    CURRENT MEDICATIONS  Acetaminophen, Coreg, Lasix, clonidine, famotidine, gabapentin, amlodipine, Barobur and, hydralazine    ALLERGIES  Allergies   Allergen Reactions   • Sulfa Drugs Hives     [id number] rash swelling itching       PHYSICAL EXAM  VITAL SIGNS: BP (!) 193/92   Pulse 61   Temp 36.6 °C (97.9 °F) (Oral)   Resp (!) 21   Ht 1.575 m (5' 2\")   Wt 66.2 kg " (145 lb 15.1 oz)   LMP 06/25/2007   SpO2 96%   BMI 26.69 kg/m²    Pulse ox interpretation: On 2 L nasal cannula, I interpret this pulse ox as normal.  Constitutional: Chronically ill-appearing sitting up in mild distress.  HEENT: Normocephalic, atraumatic. Posterior pharynx clear, mucous membranes dry.  Eyes:  EOMI. slightly pale conjunctivae.  Neck: Supple, nontender.  Chest/Pulmonary: Diminished to auscultation bilaterally no focal wheezes.  Cardiovascular: Regular rate and rhythm, 4 out of 6 systolic murmur.  Left upper extremity fistula with good palpable thrill.  Abdomen: Soft, nontender; no rebound, guarding, or masses.  Back: No CVA or midline tenderness.  She does have right lumbar paraspinous tenderness.  Musculoskeletal: Clean stump of right BKA amputation, no other extremity deformities.  Neuro: Slightly slurred speech, alert, normal sensation.  Psych: Flat affect, but cooperative.  Skin: Slightly pale very dry.      DIAGNOSTIC STUDIES / PROCEDURES    LABS & EKG  Results for orders placed or performed during the hospital encounter of 10/01/20   CBC WITH DIFFERENTIAL   Result Value Ref Range    WBC 7.0 4.8 - 10.8 K/uL    RBC 3.16 (L) 4.20 - 5.40 M/uL    Hemoglobin 9.4 (L) 12.0 - 16.0 g/dL    Hematocrit 30.3 (L) 37.0 - 47.0 %    MCV 95.9 81.4 - 97.8 fL    MCH 29.7 27.0 - 33.0 pg    MCHC 31.0 (L) 33.6 - 35.0 g/dL    RDW 56.8 (H) 35.9 - 50.0 fL    Platelet Count 68 (L) 164 - 446 K/uL    MPV 11.3 9.0 - 12.9 fL    Neutrophils-Polys 80.80 (H) 44.00 - 72.00 %    Lymphocytes 10.80 (L) 22.00 - 41.00 %    Monocytes 5.00 0.00 - 13.40 %    Eosinophils 2.60 0.00 - 6.90 %    Basophils 0.40 0.00 - 1.80 %    Immature Granulocytes 0.40 0.00 - 0.90 %    Nucleated RBC 0.00 /100 WBC    Neutrophils (Absolute) 5.61 2.00 - 7.15 K/uL    Lymphs (Absolute) 0.75 (L) 1.00 - 4.80 K/uL    Monos (Absolute) 0.35 0.00 - 0.85 K/uL    Eos (Absolute) 0.18 0.00 - 0.51 K/uL    Baso (Absolute) 0.03 0.00 - 0.12 K/uL    Immature Granulocytes  (abs) 0.03 0.00 - 0.11 K/uL    NRBC (Absolute) 0.00 K/uL   COMP METABOLIC PANEL   Result Value Ref Range    Sodium 139 135 - 145 mmol/L    Potassium 5.5 3.6 - 5.5 mmol/L    Chloride 94 (L) 96 - 112 mmol/L    Co2 27 20 - 33 mmol/L    Anion Gap 18.0 (H) 7.0 - 16.0    Glucose 105 (H) 65 - 99 mg/dL    Bun 76 (HH) 8 - 22 mg/dL    Creatinine 9.36 (HH) 0.50 - 1.40 mg/dL    Calcium 9.3 8.5 - 10.5 mg/dL    AST(SGOT) 10 (L) 12 - 45 U/L    ALT(SGPT) 11 2 - 50 U/L    Alkaline Phosphatase 69 30 - 99 U/L    Total Bilirubin 0.5 0.1 - 1.5 mg/dL    Albumin 3.9 3.2 - 4.9 g/dL    Total Protein 6.3 6.0 - 8.2 g/dL    Globulin 2.4 1.9 - 3.5 g/dL    A-G Ratio 1.6 g/dL   LIPASE   Result Value Ref Range    Lipase 27 11 - 82 U/L   TROPONIN   Result Value Ref Range    Troponin T 102 (H) 6 - 19 ng/L   CREATINE KINASE   Result Value Ref Range    CPK Total 93 0 - 154 U/L   MAGNESIUM   Result Value Ref Range    Magnesium 2.5 1.5 - 2.5 mg/dL   PHOSPHORUS   Result Value Ref Range    Phosphorus 7.1 (H) 2.5 - 4.5 mg/dL   DIAGNOSTIC ALCOHOL   Result Value Ref Range    Diagnostic Alcohol <10.1 0.0 - 10.1 mg/dL   ESTIMATED GFR   Result Value Ref Range    GFR If African American 5 (A) >60 mL/min/1.73 m 2    GFR If Non African American 4 (A) >60 mL/min/1.73 m 2   EKG (NOW)   Result Value Ref Range    Report       [facility] Emergency Dept.    Test Date:  2020-10-01  Pt Name:    MELISSA BARDALES                 Department: ER  MRN:        [id number]                      Room:       [facility]  Gender:     Female                       Technician: [id number]  :        1965                   Requested By:FEROZ LUNDBERG  Order #:    [id number]                    Reading MD: Feroz Lundberg MD    Measurements  Intervals                                Axis  Rate:       60                           P:          32  MN:         184                          QRS:        40  QRSD:       92                           T:          93  QT:         484  QTc:         484    Interpretive Statements  SINUS RHYTHM  LEFT ATRIAL ABNORMALITY  LEFT VENTRICULAR HYPERTROPHY  ABNORMAL T, CONSIDER ISCHEMIA, LATERAL LEADS  Compared to ECG 03/04/2020 10:43:29  Atrial abnormality now present  Left ventricular hypertrophy now present  Possible ischemia now present  T-wave abnormality still present  No STEMI, limite d due to motion artifact  Electronically Signed On 10-1-2020 23:30:27 PDT by Feroz Ramirez MD           RADIOLOGY  DX-WRIST-COMPLETE 3+ RIGHT   Final Result      Negative for right wrist fracture or malalignment      CT-LSPINE W/O PLUS RECONS   Final Result      No acute fracture or listhesis in the lumbar spine.      CT-PELVIS W/O PLUS RECONS   Final Result      1.  There is a nondisplaced right inferior obturator ring fracture.   2.  There is a small amount of pelvic ascites with diffuse body wall edema.   3.  Mildly thick-walled bladder possibly due to under distention versus cystitis.      CT-HEAD W/O   Final Result      1.  Cerebral atrophy.      2.  White matter lucencies most consistent with small vessel ischemic change versus demyelination or gliosis.      3.  Otherwise, Head CT without contrast with no acute findings. No evidence of acute cerebral infarction, hemorrhage or mass lesion.      DX-CHEST-PORTABLE (1 VIEW)   Final Result      Cardiomegaly. No focal consolidation or pleural effusions.          COURSE & MEDICAL DECISION MAKING    This is a 55 y.o. female who presents with debility and unable to get out of bed for the last several days preceded by a syncopal episode.  Very medically complex.    Differential Diagnosis includes but is not limited to:  Syncope, seizure, dysrhythmia, intracranial hemorrhage, electrolyte abnormality, renal failure, trauma    ED Course:  This is an unfortunate 55-year-old female chronically ill presenting after an episode of loss of consciousness at home and then unable to get out of bed for the last several days.  She missed her  dialysis session yesterday.  Complaining of a headache and low back pain.  Given age and debilitated appearance plan broad lab and imaging work-up.    Labs concerning for chronic renal disease which is known in this patient no electrolyte abnormality meriting emergent dialysis but per nephrology team was consulted and they will evaluate the patient to arrange inpatient dialysis.  EKG limited but no obvious STEMI she has no chest pain troponin is significantly elevated, plan to trend.  Probably type II strain highly doubt ACS.  Nothing acute on head scan, CBC stable, nondisplaced pelvic fracture orthopedics consulted likely bear weight as tolerated they will provide official consultations in the hospital.  Patient updated hemodynamically stable for transfer to telemetry, pain improving, hospitalist consulted they will kindly admit for further work-up and treatment.    Medications   senna-docusate (PERICOLACE or SENOKOT S) 8.6-50 MG per tablet 2 Tab (has no administration in time range)     And   polyethylene glycol/lytes (MIRALAX) PACKET 1 Packet (has no administration in time range)     And   magnesium hydroxide (MILK OF MAGNESIA) suspension 30 mL (has no administration in time range)     And   bisacodyl (DULCOLAX) suppository 10 mg (has no administration in time range)   acetaminophen (TYLENOL) tablet 650 mg (has no administration in time range)   haloperidol lactate (HALDOL) injection 2.5 mg (2.5 mg Intravenous Given 10/1/20 1556)       FINAL IMPRESSION  1. Syncope and collapse    2. ESRD (end stage renal disease) ([facility])    3. Closed nondisplaced fracture of pelvis, unspecified part of pelvis, initial encounter ([facility])    4. Elevated troponin    5. Debility        -ADMIT-       Pertinent Labs & Imaging studies reviewed and verified by myself, as well as nursing notes and the patient's past medical, family, and social histories (See chart for details).    Portions of this record were made with voice recognition  software.  Despite my review, spelling/grammar/context errors may still remain.  Interpretation of this chart should be taken in this context.    Electronically signed by Feroz Ramirez M.D. on 10/1/2020 at 11:33 PM.

## 2020-10-01 NOTE — ED TRIAGE NOTES
"Isabelle Coyle  Chief Complaint   Patient presents with   • T-5000 GLF     Pt bib ems from home. Pt had a GLF 2 days ago. Pt complaining of headache and lower back pain now. Denies being on thinners. Pt has a BKA to Right Leg.      Pt on 2lpm of 02 due to SPO2 of 89% on RA.     BP (!) 169/88   Pulse 79   Temp 36.6 °C (97.9 °F) (Oral)   Resp 18   Ht 1.575 m (5' 2\")   Wt 66.2 kg (145 lb 15.1 oz)   LMP 06/25/2007   SpO2 98%   BMI 26.69 kg/m²       "

## 2020-10-02 PROBLEM — S32.9XXA: Status: ACTIVE | Noted: 2020-10-02

## 2020-10-02 PROBLEM — R32 URINARY INCONTINENCE: Status: ACTIVE | Noted: 2020-10-02

## 2020-10-02 PROBLEM — I21.4 NSTEMI (NON-ST ELEVATED MYOCARDIAL INFARCTION) (HCC): Status: ACTIVE | Noted: 2020-10-02

## 2020-10-02 PROBLEM — I21.4 NSTEMI (NON-ST ELEVATED MYOCARDIAL INFARCTION) (HCC): Status: RESOLVED | Noted: 2020-10-02 | Resolved: 2020-10-02

## 2020-10-02 LAB
ALBUMIN SERPL BCP-MCNC: 3.6 G/DL (ref 3.2–4.9)
ALBUMIN/GLOB SERPL: 1.6 G/DL
ALP SERPL-CCNC: 60 U/L (ref 30–99)
ALT SERPL-CCNC: 12 U/L (ref 2–50)
ANION GAP SERPL CALC-SCNC: 17 MMOL/L (ref 7–16)
ANION GAP SERPL CALC-SCNC: 17 MMOL/L (ref 7–16)
AST SERPL-CCNC: 8 U/L (ref 12–45)
BASOPHILS # BLD AUTO: 0.4 % (ref 0–1.8)
BASOPHILS # BLD: 0.04 K/UL (ref 0–0.12)
BILIRUB SERPL-MCNC: 0.5 MG/DL (ref 0.1–1.5)
BUN SERPL-MCNC: 80 MG/DL (ref 8–22)
BUN SERPL-MCNC: 82 MG/DL (ref 8–22)
CALCIUM SERPL-MCNC: 9.2 MG/DL (ref 8.5–10.5)
CALCIUM SERPL-MCNC: 9.2 MG/DL (ref 8.5–10.5)
CHLORIDE SERPL-SCNC: 93 MMOL/L (ref 96–112)
CHLORIDE SERPL-SCNC: 95 MMOL/L (ref 96–112)
CHOLEST SERPL-MCNC: 108 MG/DL (ref 100–199)
CO2 SERPL-SCNC: 25 MMOL/L (ref 20–33)
CO2 SERPL-SCNC: 25 MMOL/L (ref 20–33)
CREAT SERPL-MCNC: 10.42 MG/DL (ref 0.5–1.4)
CREAT SERPL-MCNC: 10.8 MG/DL (ref 0.5–1.4)
EKG IMPRESSION: NORMAL
EKG IMPRESSION: NORMAL
EOSINOPHIL # BLD AUTO: 0.18 K/UL (ref 0–0.51)
EOSINOPHIL NFR BLD: 2 % (ref 0–6.9)
ERYTHROCYTE [DISTWIDTH] IN BLOOD BY AUTOMATED COUNT: 56.8 FL (ref 35.9–50)
GLOBULIN SER CALC-MCNC: 2.2 G/DL (ref 1.9–3.5)
GLUCOSE BLD-MCNC: 93 MG/DL (ref 65–99)
GLUCOSE SERPL-MCNC: 89 MG/DL (ref 65–99)
GLUCOSE SERPL-MCNC: 96 MG/DL (ref 65–99)
HCT VFR BLD AUTO: 30.4 % (ref 37–47)
HDLC SERPL-MCNC: 37 MG/DL
HGB BLD-MCNC: 9.5 G/DL (ref 12–16)
IMM GRANULOCYTES # BLD AUTO: 0.04 K/UL (ref 0–0.11)
IMM GRANULOCYTES NFR BLD AUTO: 0.4 % (ref 0–0.9)
LDLC SERPL CALC-MCNC: 58 MG/DL
LYMPHOCYTES # BLD AUTO: 0.79 K/UL (ref 1–4.8)
LYMPHOCYTES NFR BLD: 8.8 % (ref 22–41)
MCH RBC QN AUTO: 30.4 PG (ref 27–33)
MCHC RBC AUTO-ENTMCNC: 31.3 G/DL (ref 33.6–35)
MCV RBC AUTO: 97.4 FL (ref 81.4–97.8)
MONOCYTES # BLD AUTO: 0.33 K/UL (ref 0–0.85)
MONOCYTES NFR BLD AUTO: 3.7 % (ref 0–13.4)
NEUTROPHILS # BLD AUTO: 7.64 K/UL (ref 2–7.15)
NEUTROPHILS NFR BLD: 84.7 % (ref 44–72)
NRBC # BLD AUTO: 0 K/UL
NRBC BLD-RTO: 0 /100 WBC
PHOSPHATE SERPL-MCNC: 7.9 MG/DL (ref 2.5–4.5)
PLATELET # BLD AUTO: 65 K/UL (ref 164–446)
PMV BLD AUTO: 10.9 FL (ref 9–12.9)
POTASSIUM SERPL-SCNC: 6 MMOL/L (ref 3.6–5.5)
POTASSIUM SERPL-SCNC: 6.2 MMOL/L (ref 3.6–5.5)
PROT SERPL-MCNC: 5.8 G/DL (ref 6–8.2)
RBC # BLD AUTO: 3.12 M/UL (ref 4.2–5.4)
SODIUM SERPL-SCNC: 135 MMOL/L (ref 135–145)
SODIUM SERPL-SCNC: 137 MMOL/L (ref 135–145)
TRIGL SERPL-MCNC: 66 MG/DL (ref 0–149)
TROPONIN T SERPL-MCNC: 95 NG/L (ref 6–19)
WBC # BLD AUTO: 9 K/UL (ref 4.8–10.8)

## 2020-10-02 PROCEDURE — 82962 GLUCOSE BLOOD TEST: CPT

## 2020-10-02 PROCEDURE — 80048 BASIC METABOLIC PNL TOTAL CA: CPT

## 2020-10-02 PROCEDURE — 84100 ASSAY OF PHOSPHORUS: CPT

## 2020-10-02 PROCEDURE — 93010 ELECTROCARDIOGRAM REPORT: CPT | Mod: 77 | Performed by: INTERNAL MEDICINE

## 2020-10-02 PROCEDURE — 99223 1ST HOSP IP/OBS HIGH 75: CPT | Mod: GC | Performed by: INTERNAL MEDICINE

## 2020-10-02 PROCEDURE — 700102 HCHG RX REV CODE 250 W/ 637 OVERRIDE(OP): Performed by: STUDENT IN AN ORGANIZED HEALTH CARE EDUCATION/TRAINING PROGRAM

## 2020-10-02 PROCEDURE — 85025 COMPLETE CBC W/AUTO DIFF WBC: CPT

## 2020-10-02 PROCEDURE — 5A1D70Z PERFORMANCE OF URINARY FILTRATION, INTERMITTENT, LESS THAN 6 HOURS PER DAY: ICD-10-PCS | Performed by: INTERNAL MEDICINE

## 2020-10-02 PROCEDURE — 90935 HEMODIALYSIS ONE EVALUATION: CPT

## 2020-10-02 PROCEDURE — 93005 ELECTROCARDIOGRAM TRACING: CPT | Performed by: STUDENT IN AN ORGANIZED HEALTH CARE EDUCATION/TRAINING PROGRAM

## 2020-10-02 PROCEDURE — 93010 ELECTROCARDIOGRAM REPORT: CPT | Performed by: INTERNAL MEDICINE

## 2020-10-02 PROCEDURE — 80061 LIPID PANEL: CPT

## 2020-10-02 PROCEDURE — 700111 HCHG RX REV CODE 636 W/ 250 OVERRIDE (IP): Performed by: STUDENT IN AN ORGANIZED HEALTH CARE EDUCATION/TRAINING PROGRAM

## 2020-10-02 PROCEDURE — 700102 HCHG RX REV CODE 250 W/ 637 OVERRIDE(OP): Performed by: INTERNAL MEDICINE

## 2020-10-02 PROCEDURE — A9270 NON-COVERED ITEM OR SERVICE: HCPCS

## 2020-10-02 PROCEDURE — 770020 HCHG ROOM/CARE - TELE (206)

## 2020-10-02 PROCEDURE — 700102 HCHG RX REV CODE 250 W/ 637 OVERRIDE(OP)

## 2020-10-02 PROCEDURE — 93005 ELECTROCARDIOGRAM TRACING: CPT | Performed by: INTERNAL MEDICINE

## 2020-10-02 PROCEDURE — A9270 NON-COVERED ITEM OR SERVICE: HCPCS | Performed by: INTERNAL MEDICINE

## 2020-10-02 PROCEDURE — 97166 OT EVAL MOD COMPLEX 45 MIN: CPT

## 2020-10-02 PROCEDURE — 700105 HCHG RX REV CODE 258: Performed by: STUDENT IN AN ORGANIZED HEALTH CARE EDUCATION/TRAINING PROGRAM

## 2020-10-02 PROCEDURE — 36415 COLL VENOUS BLD VENIPUNCTURE: CPT

## 2020-10-02 PROCEDURE — 80053 COMPREHEN METABOLIC PANEL: CPT

## 2020-10-02 PROCEDURE — 97161 PT EVAL LOW COMPLEX 20 MIN: CPT

## 2020-10-02 PROCEDURE — 84484 ASSAY OF TROPONIN QUANT: CPT

## 2020-10-02 PROCEDURE — A9270 NON-COVERED ITEM OR SERVICE: HCPCS | Performed by: STUDENT IN AN ORGANIZED HEALTH CARE EDUCATION/TRAINING PROGRAM

## 2020-10-02 PROCEDURE — 700101 HCHG RX REV CODE 250: Performed by: STUDENT IN AN ORGANIZED HEALTH CARE EDUCATION/TRAINING PROGRAM

## 2020-10-02 RX ORDER — CLONIDINE 0.1 MG/24H
1 PATCH, EXTENDED RELEASE TRANSDERMAL
Status: DISCONTINUED | OUTPATIENT
Start: 2020-10-02 | End: 2020-10-04 | Stop reason: HOSPADM

## 2020-10-02 RX ORDER — DEXTROSE MONOHYDRATE 25 G/50ML
50 INJECTION, SOLUTION INTRAVENOUS ONCE
Status: COMPLETED | OUTPATIENT
Start: 2020-10-02 | End: 2020-10-02

## 2020-10-02 RX ORDER — TERAZOSIN 5 MG/1
5 CAPSULE ORAL NIGHTLY
Status: DISCONTINUED | OUTPATIENT
Start: 2020-10-02 | End: 2020-10-04 | Stop reason: HOSPADM

## 2020-10-02 RX ORDER — SEVELAMER CARBONATE 800 MG/1
800 TABLET, FILM COATED ORAL
Status: DISCONTINUED | OUTPATIENT
Start: 2020-10-02 | End: 2020-10-04 | Stop reason: HOSPADM

## 2020-10-02 RX ORDER — CALCIUM CHLORIDE 100 MG/ML
1 INJECTION INTRAVENOUS; INTRAVENTRICULAR ONCE
Status: DISCONTINUED | OUTPATIENT
Start: 2020-10-02 | End: 2020-10-02

## 2020-10-02 RX ORDER — AMLODIPINE BESYLATE 10 MG/1
10 TABLET ORAL DAILY
Status: DISCONTINUED | OUTPATIENT
Start: 2020-10-02 | End: 2020-10-04 | Stop reason: HOSPADM

## 2020-10-02 RX ORDER — CARVEDILOL 12.5 MG/1
12.5 TABLET ORAL 2 TIMES DAILY WITH MEALS
Status: DISCONTINUED | OUTPATIENT
Start: 2020-10-02 | End: 2020-10-04 | Stop reason: HOSPADM

## 2020-10-02 RX ORDER — SEVELAMER CARBONATE 800 MG/1
1600 TABLET, FILM COATED ORAL
Status: DISCONTINUED | OUTPATIENT
Start: 2020-10-02 | End: 2020-10-02

## 2020-10-02 RX ORDER — FAMOTIDINE 20 MG/1
20 TABLET, FILM COATED ORAL
Status: DISCONTINUED | OUTPATIENT
Start: 2020-10-02 | End: 2020-10-04 | Stop reason: HOSPADM

## 2020-10-02 RX ORDER — GABAPENTIN 100 MG/1
100 CAPSULE ORAL 2 TIMES DAILY
Status: DISCONTINUED | OUTPATIENT
Start: 2020-10-02 | End: 2020-10-04 | Stop reason: HOSPADM

## 2020-10-02 RX ORDER — HYDRALAZINE HYDROCHLORIDE 50 MG/1
50 TABLET, FILM COATED ORAL 2 TIMES DAILY
Status: DISCONTINUED | OUTPATIENT
Start: 2020-10-02 | End: 2020-10-04

## 2020-10-02 RX ORDER — FUROSEMIDE 40 MG/1
40 TABLET ORAL
Status: DISCONTINUED | OUTPATIENT
Start: 2020-10-03 | End: 2020-10-04 | Stop reason: HOSPADM

## 2020-10-02 RX ORDER — HYDRALAZINE HYDROCHLORIDE 10 MG/1
10 TABLET, FILM COATED ORAL EVERY 8 HOURS
Status: DISCONTINUED | OUTPATIENT
Start: 2020-10-02 | End: 2020-10-02

## 2020-10-02 RX ADMIN — HYDRALAZINE HYDROCHLORIDE 10 MG: 10 TABLET, FILM COATED ORAL at 00:54

## 2020-10-02 RX ADMIN — TERAZOSIN HYDROCHLORIDE 5 MG: 5 CAPSULE ORAL at 22:15

## 2020-10-02 RX ADMIN — HYDRALAZINE HYDROCHLORIDE 50 MG: 50 TABLET, FILM COATED ORAL at 05:30

## 2020-10-02 RX ADMIN — CALCIUM CHLORIDE 1000 MG: 100 INJECTION, SOLUTION INTRAVENOUS at 08:54

## 2020-10-02 RX ADMIN — ACETAMINOPHEN 650 MG: 325 TABLET, FILM COATED ORAL at 23:57

## 2020-10-02 RX ADMIN — HYDRALAZINE HYDROCHLORIDE 50 MG: 50 TABLET, FILM COATED ORAL at 16:42

## 2020-10-02 RX ADMIN — SEVELAMER CARBONATE 800 MG: 800 TABLET, FILM COATED ORAL at 16:42

## 2020-10-02 RX ADMIN — CLONIDINE 1 PATCH: 0.1 PATCH TRANSDERMAL at 06:46

## 2020-10-02 RX ADMIN — GABAPENTIN 100 MG: 100 CAPSULE ORAL at 16:42

## 2020-10-02 RX ADMIN — INSULIN HUMAN 10 UNITS: 100 INJECTION, SOLUTION PARENTERAL at 08:53

## 2020-10-02 RX ADMIN — CARVEDILOL 12.5 MG: 12.5 TABLET, FILM COATED ORAL at 16:42

## 2020-10-02 RX ADMIN — AMLODIPINE BESYLATE 10 MG: 10 TABLET ORAL at 05:30

## 2020-10-02 RX ADMIN — GABAPENTIN 100 MG: 100 CAPSULE ORAL at 05:30

## 2020-10-02 RX ADMIN — DEXTROSE MONOHYDRATE 50 ML: 25 INJECTION, SOLUTION INTRAVENOUS at 08:54

## 2020-10-02 RX ADMIN — DOCUSATE SODIUM 50 MG AND SENNOSIDES 8.6 MG 2 TABLET: 8.6; 5 TABLET, FILM COATED ORAL at 05:30

## 2020-10-02 RX ADMIN — CARVEDILOL 12.5 MG: 12.5 TABLET, FILM COATED ORAL at 08:54

## 2020-10-02 RX ADMIN — FAMOTIDINE 20 MG: 20 TABLET, FILM COATED ORAL at 22:15

## 2020-10-02 SDOH — ECONOMIC STABILITY: FOOD INSECURITY: WITHIN THE PAST 12 MONTHS, THE FOOD YOU BOUGHT JUST DIDN'T LAST AND YOU DIDN'T HAVE MONEY TO GET MORE.: PATIENT DECLINED

## 2020-10-02 SDOH — ECONOMIC STABILITY: FOOD INSECURITY: WITHIN THE PAST 12 MONTHS, YOU WORRIED THAT YOUR FOOD WOULD RUN OUT BEFORE YOU GOT MONEY TO BUY MORE.: PATIENT DECLINED

## 2020-10-02 SDOH — ECONOMIC STABILITY: TRANSPORTATION INSECURITY
IN THE PAST 12 MONTHS, HAS LACK OF TRANSPORTATION KEPT YOU FROM MEETINGS, WORK, OR FROM GETTING THINGS NEEDED FOR DAILY LIVING?: PATIENT DECLINED

## 2020-10-02 SDOH — ECONOMIC STABILITY: TRANSPORTATION INSECURITY
IN THE PAST 12 MONTHS, HAS THE LACK OF TRANSPORTATION KEPT YOU FROM MEDICAL APPOINTMENTS OR FROM GETTING MEDICATIONS?: PATIENT DECLINED

## 2020-10-02 ASSESSMENT — ENCOUNTER SYMPTOMS
COUGH: 0
CHILLS: 0
INSOMNIA: 0
FOCAL WEAKNESS: 0
HEMOPTYSIS: 0
BLURRED VISION: 0
HEARTBURN: 0
SPUTUM PRODUCTION: 0
DEPRESSION: 0
DIZZINESS: 0
PALPITATIONS: 0
LOSS OF CONSCIOUSNESS: 0
WEAKNESS: 1
VOMITING: 0
NAUSEA: 0
CONSTIPATION: 0
FEVER: 0
SORE THROAT: 0
SHORTNESS OF BREATH: 0
ABDOMINAL PAIN: 0
HEADACHES: 0
DIARRHEA: 0

## 2020-10-02 ASSESSMENT — COGNITIVE AND FUNCTIONAL STATUS - GENERAL
MOBILITY SCORE: 19
DAILY ACTIVITIY SCORE: 20
CLIMB 3 TO 5 STEPS WITH RAILING: A LITTLE
SUGGESTED CMS G CODE MODIFIER DAILY ACTIVITY: CJ
HELP NEEDED FOR BATHING: A LITTLE
DAILY ACTIVITIY SCORE: 21
TOILETING: A LITTLE
MOVING TO AND FROM BED TO CHAIR: A LITTLE
MOBILITY SCORE: 17
TOILETING: A LOT
MOVING TO AND FROM BED TO CHAIR: A LITTLE
STANDING UP FROM CHAIR USING ARMS: A LITTLE
DRESSING REGULAR LOWER BODY CLOTHING: A LITTLE
HELP NEEDED FOR BATHING: A LITTLE
SUGGESTED CMS G CODE MODIFIER MOBILITY: CK
CLIMB 3 TO 5 STEPS WITH RAILING: A LOT
SUGGESTED CMS G CODE MODIFIER DAILY ACTIVITY: CJ
MOVING FROM LYING ON BACK TO SITTING ON SIDE OF FLAT BED: A LITTLE
SUGGESTED CMS G CODE MODIFIER MOBILITY: CK
DRESSING REGULAR LOWER BODY CLOTHING: A LITTLE
MOVING FROM LYING ON BACK TO SITTING ON SIDE OF FLAT BED: A LITTLE
WALKING IN HOSPITAL ROOM: A LITTLE
WALKING IN HOSPITAL ROOM: A LOT
STANDING UP FROM CHAIR USING ARMS: A LITTLE

## 2020-10-02 ASSESSMENT — LIFESTYLE VARIABLES
HAVE YOU EVER FELT YOU SHOULD CUT DOWN ON YOUR DRINKING: NO
CONSUMPTION TOTAL: NEGATIVE
HAVE PEOPLE ANNOYED YOU BY CRITICIZING YOUR DRINKING: NO
ALCOHOL_USE: NO
TOTAL SCORE: 0
TOTAL SCORE: 0
ON A TYPICAL DAY WHEN YOU DRINK ALCOHOL HOW MANY DRINKS DO YOU HAVE: 0
EVER FELT BAD OR GUILTY ABOUT YOUR DRINKING: NO
HOW MANY TIMES IN THE PAST YEAR HAVE YOU HAD 5 OR MORE DRINKS IN A DAY: 0
EVER HAD A DRINK FIRST THING IN THE MORNING TO STEADY YOUR NERVES TO GET RID OF A HANGOVER: NO
AVERAGE NUMBER OF DAYS PER WEEK YOU HAVE A DRINK CONTAINING ALCOHOL: 0
TOTAL SCORE: 0

## 2020-10-02 ASSESSMENT — FIBROSIS 4 INDEX
FIB4 SCORE: 2.03
FIB4 SCORE: 2.03

## 2020-10-02 ASSESSMENT — ACTIVITIES OF DAILY LIVING (ADL): TOILETING: INDEPENDENT

## 2020-10-02 ASSESSMENT — PATIENT HEALTH QUESTIONNAIRE - PHQ9
1. LITTLE INTEREST OR PLEASURE IN DOING THINGS: NOT AT ALL
8. MOVING OR SPEAKING SO SLOWLY THAT OTHER PEOPLE COULD HAVE NOTICED. OR THE OPPOSITE, BEING SO FIGETY OR RESTLESS THAT YOU HAVE BEEN MOVING AROUND A LOT MORE THAN USUAL: NOT AT ALL
SUM OF ALL RESPONSES TO PHQ QUESTIONS 1-9: 1
7. TROUBLE CONCENTRATING ON THINGS, SUCH AS READING THE NEWSPAPER OR WATCHING TELEVISION: NOT AT ALL
2. FEELING DOWN, DEPRESSED, IRRITABLE, OR HOPELESS: SEVERAL DAYS
5. POOR APPETITE OR OVEREATING: NOT AT ALL
4. FEELING TIRED OR HAVING LITTLE ENERGY: NOT AT ALL
3. TROUBLE FALLING OR STAYING ASLEEP OR SLEEPING TOO MUCH: NOT AT ALL
9. THOUGHTS THAT YOU WOULD BE BETTER OFF DEAD, OR OF HURTING YOURSELF: NOT AT ALL
6. FEELING BAD ABOUT YOURSELF - OR THAT YOU ARE A FAILURE OR HAVE LET YOURSELF OR YOUR FAMILY DOWN: NOT AL ALL
SUM OF ALL RESPONSES TO PHQ9 QUESTIONS 1 AND 2: 1

## 2020-10-02 ASSESSMENT — PAIN DESCRIPTION - PAIN TYPE: TYPE: ACUTE PAIN

## 2020-10-02 ASSESSMENT — GAIT ASSESSMENTS: GAIT LEVEL OF ASSIST: UNABLE TO PARTICIPATE

## 2020-10-02 NOTE — ASSESSMENT & PLAN NOTE
Patient presented with troponin of 102 in the setting of abnormal T waves, suggestive of ischemia in lateral leads. Type II N stemi possibility, missed her dialysis leading to lyte imbalances    Plan:  Dialysis as soon as possible and follow lytes.

## 2020-10-02 NOTE — PROGRESS NOTES
Triage Note    Isabelle Coyle is a 55 y.o. female with Hx ESRD on hemodialysis who presented after syncopal episode.  She reports that three days ago, she was eating dinner abruptly lost consciousness.  When she woke up she was very fatigued and made her way to bed, where she stayed for 3 days causing her to miss hemodialysis yesterday.  She has history of BKA and has difficulty with mobility.  She denies cardiac symptoms, but her troponin is elevated at 102, which is high for her.    Discussed with Dr. Ramirez.  I requested [facility], Dr. Espinoza evaluate this patient for admission.

## 2020-10-02 NOTE — PROGRESS NOTES
HEMODIALYSIS NOTES:     HD today x 3 hours per Dr. Cadena. Initiated at 1058 and ended at 1358. Patient tolerated treatment. Please see paper flowsheet for details.    UF Net: 2,000 mL    Blood returned. Applied gauze and held LOR AVF site for 9 minutes. Verified no bleeding. Bruit and thrill present post dialysis. Instructions given to Primary RN that if bleeding occurs on the AVF site, change dressing and held the site with pressure.     Report given to SAIMA Peng RN.

## 2020-10-02 NOTE — PROGRESS NOTES
Daily Progress Note:     Date of Service: 10/2/2020  Primary Team: [facility]IP[facility] White Team   Attending: Candida Bonilla MD   Senior Resident: Dr. LUPE Davis  Intern: Dr. RANULFO Branch  Contact:  [phone]    Chief Complaint:   Ms Coyle 55-year-old female with past medical history of ESRD, dysrhythmia, heart murmur, dyslipidemia, hypertension.  She presented to was after an episode of syncope that resulted in an inferior obturator right fracture, and with hyperkalemia associated with peaked T wave on EKG after having missed her last dialysis appointment.    Subjective:   -When I spoke to her this morning, she states that she feels okay  -She has difficulty moving her right extremity because of a fall  -She has been oliguric secondary to her end-stage renal disease  -She denies ever having diabetes, and she thinks that her renal disease is secondary to hypertension  -She is hemodynamically stable except for slightly elevated blood pressure.    Consultants/Specialty:    Review of Systems:    Review of Systems   Constitutional: Negative for chills and fever.   HENT: Negative for congestion and sore throat.    Respiratory: Negative for cough, hemoptysis, sputum production and shortness of breath.    Cardiovascular: Negative for chest pain and palpitations.   Gastrointestinal: Negative for abdominal pain, constipation, diarrhea, heartburn, nausea and vomiting.   Musculoskeletal: Positive for joint pain (Pain at thr right hip).   Neurological: Positive for weakness (in the right lower extremity). Negative for dizziness, focal weakness and headaches.   Psychiatric/Behavioral: Negative for depression.       Objective Data:   Physical Exam:   Vitals:   Temp:  [36.4 °C (97.5 °F)-36.8 °C (98.2 °F)] 36.4 °C (97.5 °F)  Pulse:  [57-71] 65  Resp:  [16-21] 17  BP: (157-214)/(72-95) 170/84  SpO2:  [87 %-97 %] 97 %   CREATE MACRO BE SURE THAT THIS IS PERTINENT TO YOUR PATIENT!  Physical Exam  Constitutional:       General:  She is not in acute distress.  HENT:      Head: Normocephalic.      Nose: Nose normal. No congestion or rhinorrhea.      Mouth/Throat:      Mouth: Mucous membranes are moist.   Eyes:      Extraocular Movements: Extraocular movements intact.      Pupils: Pupils are equal, round, and reactive to light.   Neck:      Musculoskeletal: No neck rigidity or muscular tenderness.   Cardiovascular:      Rate and Rhythm: Normal rate and regular rhythm.      Heart sounds: Murmur (Systolic murmur) present. No friction rub. No gallop.    Pulmonary:      Effort: Pulmonary effort is normal. No respiratory distress.      Breath sounds: No wheezing.   Abdominal:      General: Abdomen is flat. There is no distension.      Palpations: Abdomen is soft. There is no mass.      Tenderness: There is no abdominal tenderness.   Musculoskeletal:         General: Tenderness (the right hip area) present. No swelling.      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      Capillary Refill: Capillary refill takes 2 to 3 seconds.      Findings: No bruising or erythema.   Neurological:      General: No focal deficit present.      Mental Status: She is alert and oriented to person, place, and time.      Cranial Nerves: No cranial nerve deficit.      Motor: Weakness present.   Psychiatric:         Mood and Affect: Mood normal.     Labs:   Recent Labs     10/01/20  1527 10/02/20  0320   WBC 7.0 9.0   RBC 3.16* 3.12*   HEMOGLOBIN 9.4* 9.5*   HEMATOCRIT 30.3* 30.4*   MCV 95.9 97.4   MCH 29.7 30.4   RDW 56.8* 56.8*   PLATELETCT 68* 65*   MPV 11.3 10.9   NEUTSPOLYS 80.80* 84.70*   LYMPHOCYTES 10.80* 8.80*   MONOCYTES 5.00 3.70   EOSINOPHILS 2.60 2.00   BASOPHILS 0.40 0.40     Recent Labs     10/01/20  1527 10/02/20  0320 10/02/20  0714   SODIUM 139 137 135   POTASSIUM 5.5 6.0* 6.2*   CHLORIDE 94* 95* 93*   CO2 27 25 25   GLUCOSE 105* 96 89   BUN 76* 80* 82*   CPKTOTAL 93  --   --      Recent Labs     10/01/20  1527  10/02/20  0320 10/02/20  0714   ALBUMIN 3.9 3.6  --    TBILIRUBIN 0.5 0.5  --    ALKPHOSPHAT 69 60  --    TOTPROTEIN 6.3 5.8*  --    ALTSGPT 11 12  --    ASTSGOT 10* 8*  --    CREATININE 9.36* 10.42* 10.80*     DX-WRIST-COMPLETE 3+ RIGHT   Final Result      Negative for right wrist fracture or malalignment      CT-LSPINE W/O PLUS RECONS   Final Result      No acute fracture or listhesis in the lumbar spine.      CT-PELVIS W/O PLUS RECONS   Final Result      1.  There is a nondisplaced right inferior obturator ring fracture.   2.  There is a small amount of pelvic ascites with diffuse body wall edema.   3.  Mildly thick-walled bladder possibly due to under distention versus cystitis.      CT-HEAD W/O   Final Result      1.  Cerebral atrophy.      2.  White matter lucencies most consistent with small vessel ischemic change versus demyelination or gliosis.      3.  Otherwise, Head CT without contrast with no acute findings. No evidence of acute cerebral infarction, hemorrhage or mass lesion.      DX-CHEST-PORTABLE (1 VIEW)   Final Result      Cardiomegaly. No focal consolidation or pleural effusions.                Problem Representation:       * Syncope  Assessment & Plan  - Patient has an episode of syncope while standing and eating a meal  - This is likely to be cardiogenic vs vasovagal.   Vasovagal in that she was eating when the event occurred, but she hardly had any warning signs: no nausea, no vomiting, and barely a feeling of lightheadness.  Cardiogenic cause can also be possible because PMH of murmur and CAD and patient had no warning signs for her syncope. But she also denied any chest pain, palpitation or SOB. EKG has showed some mild peaked T wave, but it is likely due to hyperkalemia vs an ischemic event capable of resulting into syncope. In support of possible cardiogenic is that her last Echo in Jan 20 showed grade II diastolic disfunction, moderate aortic insufficiency/mild mitral  stenosis/severetricuspid regurgitation and an EF of 50%    CT was negative  Orthostatics were negative    Plan:   Admit to tele  Cardiac monitor  Continue carvedilol and furosemide  Monitor troponin  Repeat EKG        Fracture of pelvic bone without disruption of posterior arch of pelvic ring ([facility])  Assessment & Plan  CT impression: non displaced right inferior obturator ring fracture, small amount of pelvic ascites, mildly thick-walled bladder possibly d/t under distension, or cystitis    Plan:  - Per ortho report: Nonoperative treatment of ischio rami fracture.  She is weightbearing as tolerated without restrictions. It will go on to heal without any risk of displacement.  - Patient has been able to tolerate with Tylenol PRN  - Will continue to monitor    Hyperkalemia- (present on admission)  Assessment & Plan  -Patient has a potassium of 6.0 with presence of mild peaked T waves on EKG  -Patient has received calcium chloride, insulin, and dextrose.  -We will CMT the BNP level and adjust the level of potassium accordingly  -We will repeat EKG to see resolution of peaked T wave  -We will also trend the troponin to make sure that this is not an ischemic event    ESRD (end stage renal disease) ([facility])  Assessment & Plan  - 8/2017 diagnosed with ESRD  - She has missed her last dialysis on Wednesday with usual service on [facility]   - Nephrology has been consulted for dialysis    Plan:  Dialysis as soon as possible    Hyperphosphatemia- (present on admission)  Assessment & Plan  - Phosphorus at 7.9  - Was given phosphate binder with Sevelamer 800mg TIB  - Will check phosphate in the AM    Urinary incontinence  Assessment & Plan  Continue terazosin    Hypertension- (present on admission)  Assessment & Plan  Patient diagnosed with essential hypertension    Plan:  Continue home meds

## 2020-10-02 NOTE — H&P
History & Physical Note    Date of Admission: 10/2/2020  Admission Status: Hospitalized Hospice  [facility] Team: [facility] Team  Attending: Candida Bonilla M.D.   Senior Resident: Dr. Davis  Intern: Dr. Branch  Contact Number: [phone]    Chief Complaint: syncope and collapse    History of Present Illness (HPI): Mrs. Coyle, 55 YOF, PHM ESRD on dialysis, Dysrhythmia, Heart murmur, DLD, HTN, presented 10/1/2020 with GLF secondary to syncopy. She was eating at a counter in her kitchen when she lost consciousness and an woke up on the ground, there was no witness. She was able to make it back to her bed, but has been unable to leave her bed for 3 days. She has a right BKA and mobility is with a wheelchair. She presents with a headache and  right sided low back pain without radiculopathy. She denies fevers, chills, N/V, change in taste or smell, chest pain, SOB, palpitations, abdominal pain, or change in bowel or bladder function. She receives dialysis MWF and she missed yesterday (Wednesday) as she was bedbound.     ED Course:   Hgb 9.4, Hct 30.3, platelets 68, BUN 76, Cr 9.36, GFR 4  CT impression: non displaced right inferior obturator ring fracture, small amount of pelvic ascites, mildly thick-walled bladder possibly d/t under distension, or cystitis    Review of Systems: Review of Systems   Constitutional: Negative for chills and fever.   HENT: Negative for ear pain, hearing loss and tinnitus.    Eyes: Negative for blurred vision and pain.   Respiratory: Negative for cough and shortness of breath.    Cardiovascular: Negative for chest pain and palpitations.   Gastrointestinal: Negative for abdominal pain, constipation, diarrhea, nausea and vomiting.   Genitourinary:        ESRD urinates very little   Musculoskeletal: Positive for back pain, falls and joint pain.   Neurological: Positive for loss of consciousness and headaches. Negative for speech change, focal weakness and weakness.     Past Medical  History:   Past Medical History was reviewed with patient.   has a past medical history of Anemia, Anemia associated with chronic renal failure (11/5/2009), Anginal syndrome ([facility]), Arrhythmia, Asthma, Back pain, COPD (chronic obstructive pulmonary disease) ([facility]), Dental disorder (8-25-17), Dialysis patient ([facility]) (8-25-17), Dysrhythmia, ESRD (end stage renal disease) ([facility]) (8-25-17), Fall, GERD (gastroesophageal reflux disease), Glomerulonephritis, chronic (11/5/2009), Head ache, Heart burn, Heart murmur, High cholesterol, HTN (hypertension) (11/5/2009), Hypertension, Indigestion, Infectious disease, NSTEMI (non-ST elevated myocardial infarction) ([facility]) (10/2/2020), Pneumonia, Port catheter in place (8-25-17), Psychiatric problem, SVT (supraventricular tachycardia) ([facility]), and SVT (supraventricular tachycardia) ([facility]). She also has no past medical history of Arthritis, At risk for sleep apnea, Blood clotting disorder ([facility]), Bronchitis, Cancer ([facility]), Cataract, Congestive heart failure ([facility]), Diabetes ([facility]), Disorder of thyroid, Glaucoma, Gynecological disorder, Heart valve disease, Hemorrhagic disorder ([facility]), Jaundice, Pacemaker, Rheumatic fever, Seizure ([facility]), Stroke ([facility]), or Urinary incontinence.    Past Surgical History: Past Surgical History was reviewed with patient.   has a past surgical history that includes amputation, below the knee; capitation payment (insurance); appendectomy laparoscopic (5/4/2009); pr enlarge breast with implant; av fistula creation (Left, 6/20/2017); other; femur orif (10/9/08); iliac bone graft (10/9/08); av fistula creation (Left, 8/28/2017); av fistula creation (Left, 4/27/2018); and yamileth by laparoscopy (6/22/2019).    Medications: Medications have been reviewed with patient.  Prior to Admission Medications   Prescriptions Last Dose Informant Patient Reported? Taking?   acetaminophen (TYLENOL) 500 MG Tab 10/1/2020 at 0900 Patient Yes Yes   Sig: Take 1,000 mg by mouth every 6 hours as  needed for Mild Pain.   amLODIPine (NORVASC) 10 MG Tab unknown at unknown Patient's Home Pharmacy Yes No   Sig: Take 10 mg by mouth every day.   carvedilol (COREG) 12.5 MG Tab unknown at unknown Patient's Home Pharmacy Yes Yes   Sig: Take 12.5 mg by mouth 2 times a day, with meals.   cloNIDine (CATAPRES) 0.1 MG/24HR PATCH WEEKLY patch unknown at unknown  Yes Yes   Sig: Apply 1 Patch to skin as directed every 7 days.   famotidine (PEPCID) 20 MG Tab unknown at unknown  Yes Yes   Sig: Take 20 mg by mouth every bedtime.   furosemide (LASIX) 40 MG Tab unknown at unknown Patient's Home Pharmacy Yes Yes   Sig: Take 40 mg by mouth in the morning every Tue, Thurs, Sat, and Sun.   gabapentin (NEURONTIN) 100 MG Cap unknown at unknown Patient's Home Pharmacy Yes No   Sig: Take 100 mg by mouth 2 Times a Day.   hydrALAZINE (APRESOLINE) 50 MG Tab unknown at unknown Patient's Home Pharmacy Yes No   Sig: Take 50 mg by mouth 2 Times a Day.   terazosin (HYTRIN) 5 MG Cap unknown at unknown Patient's Home Pharmacy Yes No   Sig: Take 5 mg by mouth every evening.      Facility-Administered Medications: None        Allergies: Allergies have been reviewed with patient.  Allergies   Allergen Reactions   • Sulfa Drugs Hives     REM=[id number] rash swelling itching       Family History:   family history includes Heart Disease in an other family member.     Social History:   Tobacco: 6 cigarettes/day x 40 years  ~10 pack years  Alcohol: none  Recreational drugs (illegal and prescription): none  Employment: unknown  Activity Level: unknown  Living situation: unknown  Recent travel: unknown  Primary Care Provider: reviewed Pcp Pt States None    Vitals:  Temp:  [36.4 °C (97.6 °F)-36.8 °C (98.2 °F)] 36.4 °C (97.6 °F)  Pulse:  [57-79] 71  Resp:  [16-21] 16  BP: (157-214)/(72-95) 186/89  SpO2:  [87 %-98 %] 94 %    Physical Exam  Constitutional:       General: She is not in acute distress.     Appearance: She is normal weight. She is not toxic-appearing.    HENT:      Head: Normocephalic and atraumatic.      Right Ear: External ear normal.      Left Ear: External ear normal.      Nose: Nose normal. No congestion or rhinorrhea.      Mouth/Throat:      Mouth: Mucous membranes are moist.      Pharynx: Oropharynx is clear. No oropharyngeal exudate or posterior oropharyngeal erythema.   Eyes:      General: No scleral icterus.     Extraocular Movements: Extraocular movements intact.      Conjunctiva/sclera: Conjunctivae normal.      Pupils: Pupils are equal, round, and reactive to light.   Neck:      Musculoskeletal: Normal range of motion. No neck rigidity or muscular tenderness.   Cardiovascular:      Rate and Rhythm: Normal rate and regular rhythm.      Pulses: Normal pulses.      Heart sounds: Murmur (systolic murmur) present. No friction rub. No gallop.    Pulmonary:      Effort: Pulmonary effort is normal.      Breath sounds: Normal breath sounds.   Abdominal:      General: Abdomen is flat. There is no distension.      Palpations: Abdomen is soft.      Tenderness: There is no abdominal tenderness.   Musculoskeletal: Normal range of motion.         General: No swelling or tenderness.      Right lower leg: Edema (trace) present.      Left lower leg: Edema (trace) present.   Skin:     General: Skin is warm.      Coloration: Skin is not jaundiced.      Findings: No erythema or rash.   Neurological:      General: No focal deficit present.      Mental Status: She is alert and oriented to person, place, and time.      Sensory: No sensory deficit.      Motor: No weakness.   Psychiatric:         Mood and Affect: Mood normal.         Behavior: Behavior normal.     Labs:   Recent Labs     10/01/20  1527 10/02/20  0320   WBC 7.0 9.0   RBC 3.16* 3.12*   HEMOGLOBIN 9.4* 9.5*   HEMATOCRIT 30.3* 30.4*   MCV 95.9 97.4   MCH 29.7 30.4   RDW 56.8* 56.8*   PLATELETCT 68* 65*   MPV 11.3 10.9   NEUTSPOLYS 80.80* 84.70*   LYMPHOCYTES 10.80* 8.80*   MONOCYTES 5.00 3.70   EOSINOPHILS 2.60  2.00   BASOPHILS 0.40 0.40     Recent Labs     10/01/20  1527 10/02/20  0320   SODIUM 139 137   POTASSIUM 5.5 6.0*   CHLORIDE 94* 95*   CO2 27 25   GLUCOSE 105* 96   BUN 76* 80*   CPKTOTAL 93  --      Recent Labs     10/01/20  1527 10/02/20  0320   ALBUMIN 3.9 3.6   TBILIRUBIN 0.5 0.5   ALKPHOSPHAT 69 60   TOTPROTEIN 6.3 5.8*   ALTSGPT 11 12   ASTSGOT 10* 8*   CREATININE 9.36* 10.42*       EKG: Per my read, atrial abnormality (new verse prior); ventricular hypertrophy (new verse prior); T wave abnormality, consider ischemia, lateral leads (same as prior)    Imaging:   DX-WRIST-COMPLETE 3+ RIGHT   Final Result      Negative for right wrist fracture or malalignment      CT-LSPINE W/O PLUS RECONS   Final Result      No acute fracture or listhesis in the lumbar spine.      CT-PELVIS W/O PLUS RECONS   Final Result      1.  There is a nondisplaced right inferior obturator ring fracture.   2.  There is a small amount of pelvic ascites with diffuse body wall edema.   3.  Mildly thick-walled bladder possibly due to under distention versus cystitis.      CT-HEAD W/O   Final Result      1.  Cerebral atrophy.      2.  White matter lucencies most consistent with small vessel ischemic change versus demyelination or gliosis.      3.  Otherwise, Head CT without contrast with no acute findings. No evidence of acute cerebral infarction, hemorrhage or mass lesion.      DX-CHEST-PORTABLE (1 VIEW)   Final Result      Cardiomegaly. No focal consolidation or pleural effusions.          Previous Data Review: reviewed      Problem Representation:   Mrs. Coyle, 55 YOF, PHM ESRD on dialysis, Dysrhythmia, Heart murmur, DLD, HTN, presented 10/1/2020 with GLF secondary to syncopy. She was eating at a counter in her kitchen when she lost consciousness and an woke up on the ground,      * Syncope  Assessment & Plan  Patient presented 10/1/2020 with GLF secondary to syncopy. She was eating at a counter in her kitchen when she lost consciousness  and an woke up on the ground. She was able to make it back to her bed, but has been unable to leave her bed for 3 days. DDX vasovagal vs cardiogenic vs orthostatic    CT was negative  Orthostatics were negative    Most likely vasovagal or dehydration in setting of ESRD (however she is eating and drinking)  Plan:   Admit to tele  Cardiac monitor  NPO possible procedure with Ortho (obturator ring fracture)  PT/OT      Fracture of pelvic bone without disruption of posterior arch of pelvic ring ([facility])  Assessment & Plan  CT impression: non displaced right inferior obturator ring fracture, small amount of pelvic ascites, mildly thick-walled bladder possibly d/t under distension, or cystitis    Plan:  Ortho consult  NPO in case of procedure  PT/OT    ESRD (end stage renal disease) ([facility])  Assessment & Plan  8/2017 diagnosed with ESRD, has dialysis MWF, missed yesterday, Wednesday d/t being bedbound after GLF d/t syncopy.    Plan:  Dialysis as soon as possible    NSTEMI (non-ST elevated myocardial infarction) ([facility])  Assessment & Plan  Patient presented with troponin of 102 in the setting of abnormal T waves, suggestive of ischemia in lateral leads. Type II N stemi possibility, missed her dialysis leading to lyte imbalances    Plan:  Dialysis as soon as possible and follow lytes.        Hypertension- (present on admission)  Assessment & Plan  Patient diagnosed with essential hypertension    Plan:  Continue home meds

## 2020-10-02 NOTE — PROGRESS NOTES
Patient reports a ground-level fall onto her buttock.  She has pain in her right buttock following this.    Patient seen and examined pain is essentially isolated to the right buttock consider chronic pain in the knees.    Imaging x-rays show a completely nondisplaced    Nonoperative treatment of ischio rami fracture.  She is weightbearing as tolerated without restrictions.  Orthopedic follow-up as necessary will go on to he al without any risk of displacement.

## 2020-10-02 NOTE — ASSESSMENT & PLAN NOTE
- Patient has an episode of syncope while standing and eating a meal  - This is likely to be cardiogenic vs vasovagal.   Vasovagal in that she was eating when the event occurred, but she hardly had any warning signs: no nausea, no vomiting, and barely a feeling of lightheadness.  Cardiogenic cause can also be possible because PMH of murmur and CAD and patient had no warning signs for her syncope. But she also denied any chest pain, palpitation or SOB. EKG has showed some mild peaked T wave, but it is likely due to hyperkalemia vs an ischemic event capable of resulting into syncope. In support of possible cardiogenic is that her last Echo in Jan 20 showed grade II diastolic disfunction, moderate aortic insufficiency/mild mitral stenosis/severetricuspid regurgitation and an EF of 50%    CT was negative  Orthostatics were negative    F/u with PCP

## 2020-10-02 NOTE — PROGRESS NOTES
"[facility] called for BP of 183/86.  stated \"That's not that bad, retake it in an hour and call me with the results.\" RN asked \"Can we have a PRN?\" and  stated he was out of the building for the day and again to recheck the BP in an hour and call with the results.   "

## 2020-10-02 NOTE — THERAPY
Occupational Therapy   Initial Evaluation     Patient Name: Isabelle Coyle  Age:  55 y.o., Sex:  female  Medical Record #: [id number]  Today's Date: 10/2/2020     Precautions  Precautions: Fall Risk  Comments: R JEFFERSON, dies not have prosthetic with her    Assessment  Patient is 55 y.o. female with a diagnosis of syncope, ESRD.  Additional factors influencing patient status / progress: good family support at home.      Plan    Recommend Occupational Therapy for Evaluation only for the following treatments:  NA.    DC Equipment Recommendations: None  Discharge Recommendations: Anticipate that the patient will have no further occupational therapy needs after discharge from the hospital     Subjective    Pleasant and cooperative throughout. All DME in place, good support from SO at home. No further skilled OT warranted at this time     Objective       10/02/20 1055   Total Time Spent   Total Time Spent (Mins) 43   Charge Group   OT Evaluation OT Evaluation Mod   Initial Contact Note    Initial Contact Note Order Received and Verified, Occupational Therapy Evaluation in Progress with Full Report to Follow.   Prior Living Situation   Prior Services None   Housing / Facility Mobile Home   Steps Into Home 6   Steps In Home 0   Bathroom Set up Bathtub / Shower Combination;Shower Chair   Equipment Owned Wheelchair  (R LE prosthesis)   Lives with - Patient's Self Care Capacity Significant Other   Prior Level of ADL Function   Self Feeding Independent   Grooming / Hygiene Independent   Bathing Independent   Dressing Independent   Toileting Independent   Prior Level of IADL Function   Medication Management Independent   Laundry Independent   Kitchen Mobility Independent   Finances Independent   Home Management Independent   Shopping Independent   Prior Level Of Mobility Independent With Device in Home;Independent With Device in Community   Driving / Transportation Relatives / Others Provide Transportation   History of Falls    History of Falls Yes   Date of Last Fall   (reason for admit)   Precautions   Precautions Fall Risk   Comments R BKDONALD, dies not have prosthetic with her   Vitals   O2 (LPM) 3   O2 Delivery Device Silicone Nasal Cannula   Pain 0 - 10 Group   Therapist Pain Assessment Post Activity Pain Same as Prior to Activity;Nurse Notified;0   Cognition    Cognition / Consciousness WDL   Level of Consciousness Alert   Comments pleasant and cooperative   Passive ROM Upper Body   Passive ROM Upper Body WDL   Active ROM Upper Body   Active ROM Upper Body  WDL   Dominant Hand Right   Strength Upper Body   Upper Body Strength  WDL   Sensation Upper Body   Upper Extremity Sensation  WDL   Upper Body Muscle Tone   Upper Body Muscle Tone  WDL   Coordination Upper Body   Coordination WDL   Balance Assessment   Sitting Balance (Static) Good   Sitting Balance (Dynamic) Good   Standing Balance (Static) Fair   Standing Balance (Dynamic) Fair   Weight Shift Sitting Fair   Weight Shift Standing Absent   Comments transfer only, no prosthetic not available   Bed Mobility    Supine to Sit Supervised   Sit to Supine Supervised   Scooting Supervised   ADL Assessment   Eating Modified Independent   Grooming Supervision;Seated   Bathing Supervision   Upper Body Dressing Supervision   Lower Body Dressing Supervision   Toileting Supervision   How much help from another person does the patient currently need...   Putting on and taking off regular lower body clothing? 3   Bathing (including washing, rinsing, and drying)? 3   Toileting, which includes using a toilet, bedpan, or urinal? 3   Putting on and taking off regular upper body clothing? 4   Taking care of personal grooming such as brushing teeth? 4   Eating meals? 4   6 Clicks Daily Activity Score 21   Functional Mobility   Sit to Stand Supervised   Bed, Chair, Wheelchair Transfer Supervised   Toilet Transfers Minimal Assist   Transfer Method Stand Pivot   Visual Perception   Visual Perception  WDL    Education Group   Role of Occupational Therapist Patient Response Patient;Acceptance;Explanation;Demonstration;Verbal Demonstration;Action Demonstration   Home Safety Patient Response Patient;Acceptance;Explanation;Demonstration;Verbal Demonstration   Anticipated Discharge Equipment and Recommendations   DC Equipment Recommendations None   Discharge Recommendations Anticipate that the patient will have no further occupational therapy needs after discharge from the hospital   Interdisciplinary Plan of Care Collaboration   IDT Collaboration with  Nursing   Patient Position at End of Therapy In Bed   Collaboration Comments report given   Session Information   Date / Session Number  10/2,1/1

## 2020-10-02 NOTE — ED NOTES
Med rec complete via interview with pt at bedside. Pt was uncertain on names and dosing of several of her medications. Clarified with pt home pharmacy. Allergies reviewed. Pt denies antibiotic use in past 14 days.

## 2020-10-02 NOTE — ED NOTES
Report received from TOM Burton. Labs collected and sent. Oxygen turned up to 3L NC from 1L to maintain saturation > 90%. Pt asleep in bed, declining any needs. Updated on POC and verbalized understanding

## 2020-10-02 NOTE — ASSESSMENT & PLAN NOTE
- 8/2017 diagnosed with ESRD  - She has missed her last dialysis on Wednesday with usual service on MWF   - Nephrology has been consulted for dialysis    Plan:  On discharge, patient will continue her MWF dialysis

## 2020-10-02 NOTE — SENIOR ADMIT NOTE
"Senior Admit Note                              Chief complaint: Syncope, Fall     Brief HPI:  Isabelle Coyle is a 55 y.o. female with Hx of End-stage renal disease on dialysis on Mondays Wednesdays and Fridays, right BKA   who presents on 10/2/2020 with complaints of Possible syncopal episode while having dinner resulting in a fall with subsequent pain on the right buttock area.    Patient denied any associated chest pain, shortness of breath or palpitations associated with the syncopal episode.  Patient was subsequently bedridden from pain and hence missed dialysis yesterday..        In the ED, vitals were stable except for Hypertesion  CBC: Normocytic anemia, thrombocytopenia., CMP: End-stage renal disease picture, without significant hyperkalemia, BUN/creatinine of 76/9.36, hyperphosphatemia.  CXR: Chest xray wnl except for cardiomegaly.   EKG: Non specific changes,     CT pelvis showed  :nondisplaced right inferior obturator ring fracture,  small amount of pelvic ascites with diffuse body wall edema and   Mildly thick-walled bladder possibly due to under distention versus cystitis.     Ed physician called nephrology for dialysis and orthopedics for the fracture.   Patient was given haldol at the ED for headache.     On exam:     Vitals:    10/02/20 0330 10/02/20 0401 10/02/20 0436 10/02/20 0443   BP: (!) 187/84 (!) 166/74 (!) 186/89    Pulse: 61 61 71    Resp: 18 17 16    Temp:   36.4 °C (97.6 °F)    TempSrc:   Temporal    SpO2: 91% 92% 94%    Weight:    62.4 kg (137 lb 9.1 oz)   Height:         Body mass index is 25.16 kg/m².  BP (!) 186/89   Pulse 71   Temp 36.4 °C (97.6 °F) (Temporal)   Resp 16   Ht 1.575 m (5' 2\")   Wt 62.4 kg (137 lb 9.1 oz)   LMP 06/25/2007   SpO2 94%   BMI 25.16 kg/m²   O2 therapy: Pulse Oximetry: 94 %, O2 (LPM): 3, O2 Delivery Device: Silicone Nasal Cannula    Gen/Neuro: NAD, Moving all extremities right BKA, no focal deficits,A&Ox3. Lethargic as patient received haldol in " the ED  Heart/Lungs: RRR, no MGR, CTAB  Abdo/Pelvis: Soft NDNT  Skin/Ext: No rashes/erythema. -edema, no cyanosis, not tender, pulses intact, cap refill <2 sec. Tenderness on palpation of right ischial area.     Problem list:     # Syncopal episode.   -Vasovagal vs cardiogenic vs orthostatic  -CT negative   -EPD reports      #Right obturator ring fracture   -From fall     #ESRD   -Missed dialysis   -MWF    #Type 2 NSTEMI  -Likely form missing dialysis    #Hypertension       Plan:   Admit to telemetry   Continuous cardiac monitoring   Nephrology and orthopedics consulted by EDP  Dialysis ASAP.   EKG stat in case of hyperkalemia.   NPO in case orthopedics wants to intervene  SCD as DVT prophylaxis   PT/OT   Will stop trending troponin as it trended down.   Will restart home blood pressure medications.         DVT prophylaxis: SCD  Code status:FULL    For complete details, please refer to H&P by Dr. Elena Espinoza M.D.
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
## 2020-10-02 NOTE — DISCHARGE PLANNING
Outpatient Dialysis Note    Confirmed patient is active at:    [address] [place] [address]    Schedule: Monday, Wednesday,Friday   Time: 06:30am    Spoke with Abby at facility who confirmed.    Patient is seen by Dr. Rodriguez In HD clinic.    Forwarded records for review.    Kiarra Kurtz- Dialysis Coordinator  [facility] # [phone]

## 2020-10-02 NOTE — ASSESSMENT & PLAN NOTE
CT impression: non displaced right inferior obturator ring fracture, small amount of pelvic ascites, mildly thick-walled bladder possibly d/t under distension, or cystitis    Plan:  - Per ortho report: Nonoperative treatment of ischio rami fracture.  She is weightbearing as tolerated without restrictions. It will go on to heal without any risk of displacement.  - Patient has been able to tolerate with Tylenol PRN  - outpatient follow up with PCP

## 2020-10-02 NOTE — THERAPY
Physical Therapy   Initial Evaluation     Patient Name: Isabelle Coyle  Age:  55 y.o., Sex:  female  Medical Record #: [id number]  Today's Date: 10/2/2020     Precautions: Fall Risk (hx of falls (possibly syncope related per chart review)), WBAT with no restrictions per ortho MD    Assessment  Patient is 55 y.o. female presenting acutely s/p fall possibly related to syncope. PMH includes ESRD on HD (M, W, F) and R BKA. Pt with resulting ischio-rami fx. Pt with good fxnl strength and is at her reported baseline with bed mob and fxnl transfers. Pt with no c/o pain with xfer on 3 trials. Unable to assess amb 2/2 pt not having prosthesis here at the hospital. Based on her ability to weight-bear and perform fxnl transfer with SPV, anticipate no difficulty with amb. No further acute PT needs at this time. Patient will not be actively followed for physical therapy services at this time, however may be seen if requested by physician for 1 more visit within 30 days to address any discharge or equipment needs.    Plan    Recommend Physical Therapy for Evaluation only     DC Equipment Recommendations: None  Discharge Recommendations: Anticipate that the patient will have no further physical therapy needs after discharge from the hospital     Objective     10/02/20 1037   Prior Living Situation   Prior Services None   Housing / Facility Mobile Home   Steps Into Home 6   Equipment Owned (R prosthesis)   Lives with - Patient's Self Care Capacity Significant Other   Prior Level of Functional Mobility   Bed Mobility Independent   Transfer Status Independent   Ambulation Independent   Distance Ambulation (Feet) (community distances)   Stairs Independent   Comments Pt reports R BKA from 2003. She is ind without AD when prosthesis is donned. Without prosthesis she is transfer level to  and is ind with  mobility.    Cognition    Comments pleasant and cooperative   Active ROM Lower Body    Active ROM Lower Body  WDL   Comments R  BKA   Strength Lower Body   Lower Body Strength  WDL   Sensation Lower Body   Lower Extremity Sensation   WDL   Balance Assessment   Sitting Balance (Static) Good   Sitting Balance (Dynamic) Good   Standing Balance (Static) Fair   Standing Balance (Dynamic) Fair   Gait Analysis   Gait Level Of Assist Unable to Participate (2/2 no prosthesis available)   Weight Bearing Status R BKA   Bed Mobility    Supine to Sit Supervised (HOB flat)   Sit to Supine Supervised   Functional Mobility   Bed, Chair, Wheelchair Transfer Supervised
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
## 2020-10-02 NOTE — ED NOTES
Report given to TOM Estevez. Pt transferred to T711-2 with RN on monitor and oxygen, all belongings accounted for.

## 2020-10-03 LAB
ALBUMIN SERPL BCP-MCNC: 3.8 G/DL (ref 3.2–4.9)
ALBUMIN/GLOB SERPL: 1.6 G/DL
ALP SERPL-CCNC: 68 U/L (ref 30–99)
ALT SERPL-CCNC: 10 U/L (ref 2–50)
ANION GAP SERPL CALC-SCNC: 13 MMOL/L (ref 7–16)
AST SERPL-CCNC: 8 U/L (ref 12–45)
BASOPHILS # BLD AUTO: 0.6 % (ref 0–1.8)
BASOPHILS # BLD: 0.03 K/UL (ref 0–0.12)
BILIRUB SERPL-MCNC: 0.7 MG/DL (ref 0.1–1.5)
BUN SERPL-MCNC: 53 MG/DL (ref 8–22)
CALCIUM SERPL-MCNC: 9.1 MG/DL (ref 8.5–10.5)
CHLORIDE SERPL-SCNC: 96 MMOL/L (ref 96–112)
CO2 SERPL-SCNC: 28 MMOL/L (ref 20–33)
CREAT SERPL-MCNC: 6.97 MG/DL (ref 0.5–1.4)
EKG IMPRESSION: NORMAL
EOSINOPHIL # BLD AUTO: 0.2 K/UL (ref 0–0.51)
EOSINOPHIL NFR BLD: 3.8 % (ref 0–6.9)
ERYTHROCYTE [DISTWIDTH] IN BLOOD BY AUTOMATED COUNT: 58.5 FL (ref 35.9–50)
FERRITIN SERPL-MCNC: 582 NG/ML (ref 10–291)
GLOBULIN SER CALC-MCNC: 2.4 G/DL (ref 1.9–3.5)
GLUCOSE SERPL-MCNC: 97 MG/DL (ref 65–99)
HCT VFR BLD AUTO: 30.2 % (ref 37–47)
HGB BLD-MCNC: 9.2 G/DL (ref 12–16)
IMM GRANULOCYTES # BLD AUTO: 0.02 K/UL (ref 0–0.11)
IMM GRANULOCYTES NFR BLD AUTO: 0.4 % (ref 0–0.9)
IRON SATN MFR SERPL: 15 % (ref 15–55)
IRON SERPL-MCNC: 34 UG/DL (ref 40–170)
LYMPHOCYTES # BLD AUTO: 1.02 K/UL (ref 1–4.8)
LYMPHOCYTES NFR BLD: 19.6 % (ref 22–41)
MCH RBC QN AUTO: 29.6 PG (ref 27–33)
MCHC RBC AUTO-ENTMCNC: 30.5 G/DL (ref 33.6–35)
MCV RBC AUTO: 97.1 FL (ref 81.4–97.8)
MONOCYTES # BLD AUTO: 0.3 K/UL (ref 0–0.85)
MONOCYTES NFR BLD AUTO: 5.8 % (ref 0–13.4)
NEUTROPHILS # BLD AUTO: 3.63 K/UL (ref 2–7.15)
NEUTROPHILS NFR BLD: 69.8 % (ref 44–72)
NRBC # BLD AUTO: 0 K/UL
NRBC BLD-RTO: 0 /100 WBC
PHOSPHATE SERPL-MCNC: 6.1 MG/DL (ref 2.5–4.5)
PHOSPHATE SERPL-MCNC: 6.1 MG/DL (ref 2.5–4.5)
PLATELET # BLD AUTO: 59 K/UL (ref 164–446)
PMV BLD AUTO: 10.7 FL (ref 9–12.9)
POTASSIUM SERPL-SCNC: 5.5 MMOL/L (ref 3.6–5.5)
PROT SERPL-MCNC: 6.2 G/DL (ref 6–8.2)
RBC # BLD AUTO: 3.11 M/UL (ref 4.2–5.4)
SODIUM SERPL-SCNC: 137 MMOL/L (ref 135–145)
TIBC SERPL-MCNC: 231 UG/DL (ref 250–450)
UIBC SERPL-MCNC: 197 UG/DL (ref 110–370)
WBC # BLD AUTO: 5.2 K/UL (ref 4.8–10.8)

## 2020-10-03 PROCEDURE — 99406 BEHAV CHNG SMOKING 3-10 MIN: CPT

## 2020-10-03 PROCEDURE — 99233 SBSQ HOSP IP/OBS HIGH 50: CPT | Mod: GC | Performed by: INTERNAL MEDICINE

## 2020-10-03 PROCEDURE — 700111 HCHG RX REV CODE 636 W/ 250 OVERRIDE (IP): Performed by: NURSE PRACTITIONER

## 2020-10-03 PROCEDURE — A9270 NON-COVERED ITEM OR SERVICE: HCPCS

## 2020-10-03 PROCEDURE — 83550 IRON BINDING TEST: CPT

## 2020-10-03 PROCEDURE — 85025 COMPLETE CBC W/AUTO DIFF WBC: CPT

## 2020-10-03 PROCEDURE — 700102 HCHG RX REV CODE 250 W/ 637 OVERRIDE(OP)

## 2020-10-03 PROCEDURE — 83540 ASSAY OF IRON: CPT

## 2020-10-03 PROCEDURE — A9270 NON-COVERED ITEM OR SERVICE: HCPCS | Performed by: INTERNAL MEDICINE

## 2020-10-03 PROCEDURE — 700105 HCHG RX REV CODE 258: Performed by: NURSE PRACTITIONER

## 2020-10-03 PROCEDURE — 36415 COLL VENOUS BLD VENIPUNCTURE: CPT

## 2020-10-03 PROCEDURE — 84100 ASSAY OF PHOSPHORUS: CPT

## 2020-10-03 PROCEDURE — 700102 HCHG RX REV CODE 250 W/ 637 OVERRIDE(OP): Performed by: STUDENT IN AN ORGANIZED HEALTH CARE EDUCATION/TRAINING PROGRAM

## 2020-10-03 PROCEDURE — A9270 NON-COVERED ITEM OR SERVICE: HCPCS | Performed by: STUDENT IN AN ORGANIZED HEALTH CARE EDUCATION/TRAINING PROGRAM

## 2020-10-03 PROCEDURE — 93010 ELECTROCARDIOGRAM REPORT: CPT | Performed by: INTERNAL MEDICINE

## 2020-10-03 PROCEDURE — 80053 COMPREHEN METABOLIC PANEL: CPT

## 2020-10-03 PROCEDURE — 700102 HCHG RX REV CODE 250 W/ 637 OVERRIDE(OP): Performed by: INTERNAL MEDICINE

## 2020-10-03 PROCEDURE — 82728 ASSAY OF FERRITIN: CPT

## 2020-10-03 PROCEDURE — 770020 HCHG ROOM/CARE - TELE (206)

## 2020-10-03 RX ORDER — SEVELAMER CARBONATE 800 MG/1
800 TABLET, FILM COATED ORAL
Qty: 90 TAB | Refills: 0 | Status: SHIPPED | OUTPATIENT
Start: 2020-10-03 | End: 2020-11-02

## 2020-10-03 RX ORDER — OXYCODONE HYDROCHLORIDE 5 MG/1
5 TABLET ORAL ONCE
Status: COMPLETED | OUTPATIENT
Start: 2020-10-03 | End: 2020-10-03

## 2020-10-03 RX ADMIN — CARVEDILOL 12.5 MG: 12.5 TABLET, FILM COATED ORAL at 17:23

## 2020-10-03 RX ADMIN — SEVELAMER CARBONATE 800 MG: 800 TABLET, FILM COATED ORAL at 12:33

## 2020-10-03 RX ADMIN — GABAPENTIN 100 MG: 100 CAPSULE ORAL at 06:05

## 2020-10-03 RX ADMIN — ACETAMINOPHEN 650 MG: 325 TABLET, FILM COATED ORAL at 22:54

## 2020-10-03 RX ADMIN — CARVEDILOL 12.5 MG: 12.5 TABLET, FILM COATED ORAL at 06:05

## 2020-10-03 RX ADMIN — OXYCODONE 5 MG: 5 TABLET ORAL at 06:04

## 2020-10-03 RX ADMIN — SEVELAMER CARBONATE 800 MG: 800 TABLET, FILM COATED ORAL at 17:23

## 2020-10-03 RX ADMIN — SEVELAMER CARBONATE 800 MG: 800 TABLET, FILM COATED ORAL at 08:06

## 2020-10-03 RX ADMIN — HYDRALAZINE HYDROCHLORIDE 50 MG: 50 TABLET, FILM COATED ORAL at 17:24

## 2020-10-03 RX ADMIN — FAMOTIDINE 20 MG: 20 TABLET, FILM COATED ORAL at 17:23

## 2020-10-03 RX ADMIN — SODIUM CHLORIDE 125 MG: 9 INJECTION, SOLUTION INTRAVENOUS at 17:23

## 2020-10-03 RX ADMIN — TERAZOSIN HYDROCHLORIDE 5 MG: 5 CAPSULE ORAL at 22:54

## 2020-10-03 RX ADMIN — GABAPENTIN 100 MG: 100 CAPSULE ORAL at 17:23

## 2020-10-03 RX ADMIN — AMLODIPINE BESYLATE 10 MG: 10 TABLET ORAL at 06:05

## 2020-10-03 RX ADMIN — FUROSEMIDE 40 MG: 40 TABLET ORAL at 08:06

## 2020-10-03 RX ADMIN — HYDRALAZINE HYDROCHLORIDE 50 MG: 50 TABLET, FILM COATED ORAL at 06:04

## 2020-10-03 ASSESSMENT — ENCOUNTER SYMPTOMS
HEMOPTYSIS: 0
ABDOMINAL PAIN: 0
LOSS OF CONSCIOUSNESS: 0
SHORTNESS OF BREATH: 0
CONSTIPATION: 0
NEUROLOGICAL NEGATIVE: 1
GASTROINTESTINAL NEGATIVE: 1
MEMORY LOSS: 0
BACK PAIN: 1
RESPIRATORY NEGATIVE: 1
HEADACHES: 0
NAUSEA: 0
FEVER: 0
DEPRESSION: 0
WEAKNESS: 1
COUGH: 0
EYES NEGATIVE: 1
SPUTUM PRODUCTION: 0
SORE THROAT: 0
FOCAL WEAKNESS: 0
BLURRED VISION: 0
DIZZINESS: 0
VOMITING: 0
DIARRHEA: 0
PALPITATIONS: 0
FALLS: 0
CHILLS: 0
CONSTITUTIONAL NEGATIVE: 1
PSYCHIATRIC NEGATIVE: 1
WEAKNESS: 0
CARDIOVASCULAR NEGATIVE: 1
HEARTBURN: 0
FALLS: 1

## 2020-10-03 ASSESSMENT — FIBROSIS 4 INDEX: FIB4 SCORE: 2.44

## 2020-10-03 NOTE — DISCHARGE SUMMARY
Discharge Summary    Date of Admission: 10/1/2020  Date of Discharge: 10/4/20  Discharging Attending: JUAN Gonzalez   Discharging Senior Resident: Dr. Davis  Discharging Intern: Dr. Ly    CHIEF COMPLAINT ON ADMISSION  Chief Complaint   Patient presents with   • T-5000 GLF     Pt bib ems from home. Pt had a GLF 2 days ago. Pt complaining of headache and lower back pain now. Denies being on thinners. Pt has a BKA to Right Leg.        Reason for Admission  Syncope  ESRD, missed HD  Hyperkalemia    Admission Date  10/1/2020    CODE STATUS  Full Code    HPI & HOSPITAL COURSE  Ms Coyle is a 55-year-old female with past medical history of end-stage renal disease on MWF hemodialysis, right BKA presented with history of ground-level fall following possible syncopal episode.  Following fall patient had right-sided low back pain due to which she was bedridden and missed hemodialysis on Wednesday. Patient was hemodynamically stable on admit.  CT head without contrast was negative for acute pathology.  CT pelvis without contrast showed nondisplaced right inferior obturator ring fracture.  CT lumbar spine negative for acute fracture or listhesis.  X-ray right wrist negative for acute fracture or malalignment.  EKG negative for acute ischemic changes.  Labs on admit were consistent with ESRD, not warranting immediate hemodialysis. Nephrology and orthopedics consulted.     Patient developed hyperkalemia with nonspecific T wave changes following day.  Received appropriate medical treatment and underwent hemodialysis on 10/2/2020.  Post dialysis period uneventful.    Orthopedics recommended non-operative treatment for ischio rami fracture.  Advised to continue weightbearing as tolerated without restrictions.  Patient was evaluated by PT and OT and was cleared for discharge without any further recommendations      Patient was monitored under telemetry during hospital stay, no events reported.  Orthostatic vitals  negative.  Echo 1/20: EF 50%, with moderate mitral stenosis, aortic insufficiency and severe tricuspid regurgitation.  No further episodes of syncope during hospital stay    Therefore, she is discharged in good and stable condition to home with close outpatient follow-up.    The patient met 2-midnight criteria for an inpatient stay at the time of discharge.    Discharge Date  10/4/20    FOLLOW UP ITEMS POST DISCHARGE  Follow-up with primary physician in 1 to 2 weeks  Continue hemodialysis with nephrology    DISCHARGE DIAGNOSES  Principal Problem:    Syncope POA: Unknown  Active Problems:    Hyperkalemia POA: Yes    Fracture of pelvic bone without disruption of posterior arch of pelvic ring ([facility]) POA: Unknown    Hyperphosphatemia POA: Yes    ESRD (end stage renal disease) ([facility]) POA: Unknown    Hypertension POA: Yes    Urinary incontinence POA: Unknown  Resolved Problems:    NSTEMI (non-ST elevated myocardial infarction) ([facility]) POA: Unknown      Overview:             FOLLOW UP  No future appointments.  [facility]  [address] [facility] [address]  [phone]  In 2 weeks      Tony Cadena M.D.  [address] [place] [address] [address]  [facility] [address]  [phone]    In 1 day        MEDICATIONS ON DISCHARGE     Medication List      START taking these medications      Instructions   sevelamer carbonate 800 MG Tabs tablet  Commonly known as: RENVELA   Take 1 Tab by mouth 3 times a day, with meals for 30 days.  Dose: 800 mg        CONTINUE taking these medications      Instructions   acetaminophen 500 MG Tabs  Commonly known as: TYLENOL   Take 1,000 mg by mouth every 6 hours as needed for Mild Pain.  Dose: 1,000 mg     amLODIPine 10 MG Tabs  Commonly known as: NORVASC   Take 10 mg by mouth every day.  Dose: 10 mg     carvedilol 12.5 MG Tabs  Commonly known as: COREG   Take 12.5 mg by mouth 2 times a day, with meals.  Dose: 12.5 mg     cloNIDine 0.1 MG/24HR Ptwk patch  Commonly known as: CATAPRES   Apply 1 Patch to  skin as directed every 7 days.  Dose: 1 Patch     famotidine 20 MG Tabs  Commonly known as: PEPCID   Take 20 mg by mouth every bedtime.  Dose: 20 mg     furosemide 40 MG Tabs  Commonly known as: LASIX   Take 40 mg by mouth in the morning every Tue, Thurs, Sat, and Sun.  Dose: 40 mg     gabapentin 100 MG Caps  Commonly known as: NEURONTIN   Take 100 mg by mouth 2 Times a Day.  Dose: 100 mg     hydrALAZINE 50 MG Tabs  Commonly known as: APRESOLINE   Take 50 mg by mouth 2 Times a Day.  Dose: 50 mg     terazosin 5 MG Caps  Commonly known as: HYTRIN   Take 5 mg by mouth every evening.  Dose: 5 mg            Allergies  Allergies   Allergen Reactions   • Sulfa Drugs Hives     [id number] rash swelling itching       DIET  Orders Placed This Encounter   Procedures   • Diet Order Renal     Standing Status:   Standing     Number of Occurrences:   1     Order Specific Question:   Diet:     Answer:   Renal [8]       ACTIVITY  As tolerated.  Weight bearing as tolerated    CONSULTATIONS  Nephrology  Orthopedics    PROCEDURES  Hemodialysis on 10/2/2020      Time spent on discharge: 25 minutes    Please note that this dictation was created using voice recognition software. I have made every reasonable attempt to correct obvious errors, but there may be errors of grammar and possibly content that I did not discover before finalizing the note.

## 2020-10-03 NOTE — PROGRESS NOTES
Pt having left arm pain, grimacing, moaning, ordered stat ekg, hot pack applied, called monitor room no significant changes. Paged [facility]

## 2020-10-03 NOTE — PROGRESS NOTES
Daily Progress Note:     Date of Service: 10/3/2020  Primary Team: [facility] TRIPP White Team   Attending: Candida Bonilla MD   Senior Resident: Dr. LUPE Davis  Intern: Dr. RANULFO Branch  Contact:  [phone]    Chief Complaint:   Ms Coyle 55-year-old female with past medical history of ESRD, dysrhythmia, heart murmur, dyslipidemia, hypertension.  She presented to was after an episode of syncope that resulted in an inferior obturator right fracture, and with hyperkalemia associated with peaked T wave on EKG after having missed her last dialysis appointment.    Subjective:   -Patient states that she is doing well this morning  - Unfortunately, she was informed last night of the passing of her mother, and therefore she would like to go home today  - Overall, she feels better. She has not had any other episode of syncope.  - She had an elevated BP yesterday in the 180s systolic, and it is now back to 160s. Otherwise, her vitals are within expectations.    Consultants/Specialty:    Review of Systems:    Review of Systems   Constitutional: Negative for chills and fever.   HENT: Negative for congestion and sore throat.    Respiratory: Negative for cough, hemoptysis, sputum production and shortness of breath.    Cardiovascular: Negative for chest pain and palpitations.   Gastrointestinal: Negative for abdominal pain, constipation, diarrhea, heartburn, nausea and vomiting.   Musculoskeletal: Positive for joint pain (Pain at thr right hip).   Neurological: Positive for weakness (in the right lower extremity). Negative for dizziness, focal weakness and headaches.   Psychiatric/Behavioral: Negative for depression.       Objective Data:   Physical Exam:   Vitals:   Temp:  [35.9 °C (96.6 °F)-36.8 °C (98.2 °F)] 35.9 °C (96.6 °F)  Pulse:  [60-75] 62  Resp:  [16-22] 18  BP: (169-190)/(77-91) 169/77  SpO2:  [91 %-99 %] 99 %   CREATE MACRO BE SURE THAT THIS IS PERTINENT TO YOUR PATIENT!  Physical Exam  Constitutional:        General: She is not in acute distress.  HENT:      Head: Normocephalic.      Nose: Nose normal. No congestion or rhinorrhea.      Mouth/Throat:      Mouth: Mucous membranes are moist.   Eyes:      Extraocular Movements: Extraocular movements intact.      Pupils: Pupils are equal, round, and reactive to light.   Neck:      Musculoskeletal: No neck rigidity or muscular tenderness.   Cardiovascular:      Rate and Rhythm: Normal rate and regular rhythm.      Heart sounds: Murmur (Systolic murmur) present. No friction rub. No gallop.    Pulmonary:      Effort: Pulmonary effort is normal. No respiratory distress.      Breath sounds: No wheezing.   Abdominal:      General: Abdomen is flat. There is no distension.      Palpations: Abdomen is soft. There is no mass.      Tenderness: There is no abdominal tenderness.   Musculoskeletal:         General: Tenderness (the right hip area) present. No swelling.      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      Capillary Refill: Capillary refill takes 2 to 3 seconds.      Findings: No bruising or erythema.   Neurological:      General: No focal deficit present.      Mental Status: She is alert and oriented to person, place, and time.      Cranial Nerves: No cranial nerve deficit.      Motor: Weakness present.   Psychiatric:         Mood and Affect: Mood normal.     Labs:   Recent Labs     10/01/20  1527 10/02/20  0320 10/03/20  0449   WBC 7.0 9.0 5.2   RBC 3.16* 3.12* 3.11*   HEMOGLOBIN 9.4* 9.5* 9.2*   HEMATOCRIT 30.3* 30.4* 30.2*   MCV 95.9 97.4 97.1   MCH 29.7 30.4 29.6   RDW 56.8* 56.8* 58.5*   PLATELETCT 68* 65* 59*   MPV 11.3 10.9 10.7   NEUTSPOLYS 80.80* 84.70* 69.80   LYMPHOCYTES 10.80* 8.80* 19.60*   MONOCYTES 5.00 3.70 5.80   EOSINOPHILS 2.60 2.00 3.80   BASOPHILS 0.40 0.40 0.60     Recent Labs     10/01/20  1527 10/02/20  0320 10/02/20  0714 10/03/20  0449   SODIUM 139 137 135 137   POTASSIUM 5.5 6.0* 6.2* 5.5   CHLORIDE 94* 95* 93*  96   CO2 27 25 25 28   GLUCOSE 105* 96 89 97   BUN 76* 80* 82* 53*   CPKTOTAL 93  --   --   --      Recent Labs     10/01/20  1527 10/02/20  0320 10/02/20  0714 10/03/20  0449   ALBUMIN 3.9 3.6  --  3.8   TBILIRUBIN 0.5 0.5  --  0.7   ALKPHOSPHAT 69 60  --  68   TOTPROTEIN 6.3 5.8*  --  6.2   ALTSGPT 11 12  --  10   ASTSGOT 10* 8*  --  8*   CREATININE 9.36* 10.42* 10.80* 6.97*     DX-WRIST-COMPLETE 3+ RIGHT   Final Result      Negative for right wrist fracture or malalignment      CT-LSPINE W/O PLUS RECONS   Final Result      No acute fracture or listhesis in the lumbar spine.      CT-PELVIS W/O PLUS RECONS   Final Result      1.  There is a nondisplaced right inferior obturator ring fracture.   2.  There is a small amount of pelvic ascites with diffuse body wall edema.   3.  Mildly thick-walled bladder possibly due to under distention versus cystitis.      CT-HEAD W/O   Final Result      1.  Cerebral atrophy.      2.  White matter lucencies most consistent with small vessel ischemic change versus demyelination or gliosis.      3.  Otherwise, Head CT without contrast with no acute findings. No evidence of acute cerebral infarction, hemorrhage or mass lesion.      DX-CHEST-PORTABLE (1 VIEW)   Final Result      Cardiomegaly. No focal consolidation or pleural effusions.                Problem Representation:       * Syncope  Assessment & Plan  - Patient has an episode of syncope while standing and eating a meal  - This is likely to be cardiogenic vs vasovagal.   Vasovagal in that she was eating when the event occurred, but she hardly had any warning signs: no nausea, no vomiting, and barely a feeling of lightheadness.  Cardiogenic cause can also be possible because PMH of murmur and CAD and patient had no warning signs for her syncope. But she also denied any chest pain, palpitation or SOB. EKG has showed some mild peaked T wave, but it is likely due to hyperkalemia vs an ischemic event capable of resulting into  syncope. In support of possible cardiogenic is that her last Echo in Jan 20 showed grade II diastolic disfunction, moderate aortic insufficiency/mild mitral stenosis/severetricuspid regurgitation and an EF of 50%    CT was negative  Orthostatics were negative    Plan:   - Likelihood is high that the syncope was secondary to vasovagal event. Repeat EKG was unchanged and without signs indication of ischemia. Orthostatic vitals were negative.  - Will continue to monitor        Fracture of pelvic bone without disruption of posterior arch of pelvic ring ([facility])  Assessment & Plan  CT impression: non displaced right inferior obturator ring fracture, small amount of pelvic ascites, mildly thick-walled bladder possibly d/t under distension, or cystitis    Plan:  - Per ortho report: Nonoperative treatment of ischio rami fracture.  She is weightbearing as tolerated without restrictions. It will go on to heal without any risk of displacement.  - Patient has been able to tolerate with Tylenol PRN  - Will continue to monitor    Hyperkalemia- (present on admission)  Assessment & Plan  -Patient has a potassium of 6.0 with presence of mild peaked T waves on EKG on admission and patient has received calcium chloride, insulin, and dextrose.  - today 10/03/20, K at 5.5 and EKG without sign of ischemia  - Patient will continue Hemodialysis to control her electrolytes    ESRD (end stage renal disease) ([facility])  Assessment & Plan  - 8/2017 diagnosed with ESRD  - She has missed her last dialysis on Wednesday with usual service on MWF   - Nephrology has been consulted for dialysis    Plan:  On discharge, patient will continue her MWF dialysis    Hyperphosphatemia- (present on admission)  Assessment & Plan  - Phosphorus at 7.9 on admission and today at 6.1 with sevelaner 800 TID  - will CTM phosphate      Urinary incontinence  Assessment & Plan  Continue terazosin    Hypertension- (present on admission)  Assessment & Plan  Patient diagnosed with  essential hypertension    Plan:  Continue home meds

## 2020-10-03 NOTE — PROGRESS NOTES
· 2 RN skin check complete.  · Devices in place NC.  · Confirmed pressure ulcers found on NA.  · New potential pressure ulcers noted on NA.  Wound consult placed and wound reported.  · The following interventions in place Pt turns self from side, pillows in place for support and positioning, moisturizer.    Pt generalized skin integrity warm CDI.  Right BKA healed.
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
## 2020-10-03 NOTE — RESPIRATORY CARE
" COPD EDUCATION by COPD CLINICAL EDUCATOR  10/3/2020 at 2:47 PM by Rabia Gan, RRT     Patient interviewed by COPD education team. Patient refused COPD program at this time. Smoking Cessation Intervention and education completed, 10 minutes spent on smoking cessation education with patient. Provided smoking cessation packet with \"Tips to Quit\" and brochure for \"Free Smoking Cessation Classes\".        "

## 2020-10-03 NOTE — PROGRESS NOTES
"      Spiritual Care Note    Patient Information     Patient's Name: Isabelle Coyle   MRN: [id number]    YOB: 1965   Age and Gender: 55 y.o. female   Service Area: Telemetry   Room (and Bed): [place] [address]   Ethnicity or Nationality:     Primary Language: English   Presybeterian/Spiritual preference: Rastafari   Place of Residence: [place]   Family/Friends/Others Present: No   Clinical Team Present: No   Medical Diagnosis(-es)/Procedure(s): Syncope   Code Status: Full Code    Date of Admission: 10/1/2020   Length of Stay: 2 days        Spiritual Care Provider Information:  Name of Spiritual Care Provider: Pricilla Davenport  Title of Spiritual Care Provider: Associate   Phone Number: [phone]  E-mail: [email or web address]  Total time : 20 minutes    Spiritual Screen Results:    Gen Nursing  Spiritual Screen  Was spiritual care education provided to the patient?: Yes     Palliative Care  PC Presybeterian/Spiritual Screening  Was spiritual care education provided to the patient?: Yes      Encounter/Request Information  Encounter/Request Type   Visited With: Patient  Nature of the Visit: Initial, On shift  Crisis Visit: Major loss  Referral From/ Origin of Request: Epic nursing    Religous Needs/Values  Presybeterian Needs Visit  Presybeterian Needs: Prayer    Spiritual Assessment     Spiritual Care Encounters    Observations/Symptoms: Sadness, Tearful, Thankfulness    Interaction/Conversation: Pt expects to be d/c tomorrow, and reports that her mother  yesterday, at age 98, in her sleep in a care facility.  \"I thought that's why you were here.\" The pt last saw her mother on [place] and \"feels bad' about that, although she did speak to her mother on the phone. She reports strong family support from her siblings -- \"when someone dies, we all get together' -- and welcomed prayer.  She thanked the  for visiting.    Assessment: Need, Distress    Need: Seeking Spiritual Assistance and " Support    Distress: Grief/Loss/Bereavement    Interventions: Compassionate presence, active listening, prayer.    Outcomes: Ability to Communicate with Truth and Honesty, Emotional Release, Spiritual Comfort    Plan: No Further Visits    Notes:

## 2020-10-04 VITALS
SYSTOLIC BLOOD PRESSURE: 164 MMHG | RESPIRATION RATE: 18 BRPM | BODY MASS INDEX: 25.84 KG/M2 | HEART RATE: 69 BPM | DIASTOLIC BLOOD PRESSURE: 79 MMHG | TEMPERATURE: 97.4 F | HEIGHT: 62 IN | WEIGHT: 140.43 LBS | OXYGEN SATURATION: 99 %

## 2020-10-04 LAB
ALBUMIN SERPL BCP-MCNC: 3.8 G/DL (ref 3.2–4.9)
ALBUMIN/GLOB SERPL: 1.7 G/DL
ALP SERPL-CCNC: 65 U/L (ref 30–99)
ALT SERPL-CCNC: 11 U/L (ref 2–50)
ANION GAP SERPL CALC-SCNC: 15 MMOL/L (ref 7–16)
AST SERPL-CCNC: 8 U/L (ref 12–45)
BILIRUB SERPL-MCNC: 0.6 MG/DL (ref 0.1–1.5)
BUN SERPL-MCNC: 69 MG/DL (ref 8–22)
CALCIUM SERPL-MCNC: 9.4 MG/DL (ref 8.5–10.5)
CHLORIDE SERPL-SCNC: 94 MMOL/L (ref 96–112)
CO2 SERPL-SCNC: 25 MMOL/L (ref 20–33)
CREAT SERPL-MCNC: 8.78 MG/DL (ref 0.5–1.4)
ERYTHROCYTE [DISTWIDTH] IN BLOOD BY AUTOMATED COUNT: 57.3 FL (ref 35.9–50)
GLOBULIN SER CALC-MCNC: 2.3 G/DL (ref 1.9–3.5)
GLUCOSE SERPL-MCNC: 98 MG/DL (ref 65–99)
HCT VFR BLD AUTO: 29.4 % (ref 37–47)
HGB BLD-MCNC: 9.3 G/DL (ref 12–16)
MCH RBC QN AUTO: 30.3 PG (ref 27–33)
MCHC RBC AUTO-ENTMCNC: 31.6 G/DL (ref 33.6–35)
MCV RBC AUTO: 95.8 FL (ref 81.4–97.8)
PHOSPHATE SERPL-MCNC: 7.5 MG/DL (ref 2.5–4.5)
PLATELET # BLD AUTO: 57 K/UL (ref 164–446)
PMV BLD AUTO: 12 FL (ref 9–12.9)
POTASSIUM SERPL-SCNC: 5.7 MMOL/L (ref 3.6–5.5)
PROT SERPL-MCNC: 6.1 G/DL (ref 6–8.2)
RBC # BLD AUTO: 3.07 M/UL (ref 4.2–5.4)
SODIUM SERPL-SCNC: 134 MMOL/L (ref 135–145)
WBC # BLD AUTO: 5.6 K/UL (ref 4.8–10.8)

## 2020-10-04 PROCEDURE — 99238 HOSP IP/OBS DSCHRG MGMT 30/<: CPT | Mod: GC | Performed by: INTERNAL MEDICINE

## 2020-10-04 PROCEDURE — A9270 NON-COVERED ITEM OR SERVICE: HCPCS

## 2020-10-04 PROCEDURE — 80053 COMPREHEN METABOLIC PANEL: CPT

## 2020-10-04 PROCEDURE — 84100 ASSAY OF PHOSPHORUS: CPT

## 2020-10-04 PROCEDURE — 85027 COMPLETE CBC AUTOMATED: CPT

## 2020-10-04 PROCEDURE — 700102 HCHG RX REV CODE 250 W/ 637 OVERRIDE(OP)

## 2020-10-04 PROCEDURE — A9270 NON-COVERED ITEM OR SERVICE: HCPCS | Performed by: INTERNAL MEDICINE

## 2020-10-04 PROCEDURE — 700102 HCHG RX REV CODE 250 W/ 637 OVERRIDE(OP): Performed by: STUDENT IN AN ORGANIZED HEALTH CARE EDUCATION/TRAINING PROGRAM

## 2020-10-04 PROCEDURE — 700102 HCHG RX REV CODE 250 W/ 637 OVERRIDE(OP): Performed by: INTERNAL MEDICINE

## 2020-10-04 PROCEDURE — A9270 NON-COVERED ITEM OR SERVICE: HCPCS | Performed by: STUDENT IN AN ORGANIZED HEALTH CARE EDUCATION/TRAINING PROGRAM

## 2020-10-04 PROCEDURE — 36415 COLL VENOUS BLD VENIPUNCTURE: CPT

## 2020-10-04 RX ORDER — HYDRALAZINE HYDROCHLORIDE 50 MG/1
50 TABLET, FILM COATED ORAL EVERY 8 HOURS
Status: DISCONTINUED | OUTPATIENT
Start: 2020-10-04 | End: 2020-10-04 | Stop reason: HOSPADM

## 2020-10-04 RX ADMIN — AMLODIPINE BESYLATE 10 MG: 10 TABLET ORAL at 05:20

## 2020-10-04 RX ADMIN — GABAPENTIN 100 MG: 100 CAPSULE ORAL at 05:21

## 2020-10-04 RX ADMIN — DOCUSATE SODIUM 50 MG AND SENNOSIDES 8.6 MG 2 TABLET: 8.6; 5 TABLET, FILM COATED ORAL at 05:21

## 2020-10-04 RX ADMIN — SEVELAMER CARBONATE 800 MG: 800 TABLET, FILM COATED ORAL at 10:00

## 2020-10-04 RX ADMIN — HYDRALAZINE HYDROCHLORIDE 50 MG: 50 TABLET, FILM COATED ORAL at 05:21

## 2020-10-04 RX ADMIN — CARVEDILOL 12.5 MG: 12.5 TABLET, FILM COATED ORAL at 10:00

## 2020-10-04 ASSESSMENT — ENCOUNTER SYMPTOMS
RESPIRATORY NEGATIVE: 1
BACK PAIN: 1
CONSTITUTIONAL NEGATIVE: 1
SHORTNESS OF BREATH: 0
DEPRESSION: 0
COUGH: 0
NAUSEA: 0
DIARRHEA: 0
SORE THROAT: 0
PSYCHIATRIC NEGATIVE: 1
ABDOMINAL PAIN: 0
CHILLS: 0
VOMITING: 0
DIZZINESS: 0
HEARTBURN: 0
EYES NEGATIVE: 1
FEVER: 0
CONSTIPATION: 0
FOCAL WEAKNESS: 0
FALLS: 1
SPUTUM PRODUCTION: 0
CARDIOVASCULAR NEGATIVE: 1
PALPITATIONS: 0
HEMOPTYSIS: 0
GASTROINTESTINAL NEGATIVE: 1
HEADACHES: 0
NEUROLOGICAL NEGATIVE: 1

## 2020-10-04 NOTE — PROGRESS NOTES
Received bedside report from RN, pt care assumed, VSS, pt assessment complete. Pt AAOx4, chronic c/o pain at this time. No signs of acute distress noted at this time. POC discussed with pt and verbalizes no questions. Pt denies any additional needs at this time. Bed in lowest position, bed alarm on, pt educated on fall risk and verbalized understanding, call light within reach, hourly rounding initiated. In a sinus rhythm.

## 2020-10-04 NOTE — DISCHARGE INSTRUCTIONS
Discharge Instructions    Discharged to home by car with relative. Discharged via wheelchair, hospital escort: Yes.  Special equipment needed: Wheelchair    Be sure to schedule a follow-up appointment with your primary care doctor or any specialists as instructed.     Discharge Plan:   Influenza Vaccine Indication: Not indicated: Previously immunized this influenza season and > 8 years of age    I understand that a diet low in cholesterol, fat, and sodium is recommended for good health. Unless I have been given specific instructions below for another diet, I accept this instruction as my diet prescription.   Other diet:     Special Instructions: None    · Is patient discharged on Warfarin / Coumadin?   No     Depression / Suicide Risk    As you are discharged from this [facility] facility, it is important to learn how to keep safe from harming yourself.    Recognize the warning signs:  · Abrupt changes in personality, positive or negative- including increase in energy   · Giving away possessions  · Change in eating patterns- significant weight changes-  positive or negative  · Change in sleeping patterns- unable to sleep or sleeping all the time   · Unwillingness or inability to communicate  · Depression  · Unusual sadness, discouragement and loneliness  · Talk of wanting to die  · Neglect of personal appearance   · Rebelliousness- reckless behavior  · Withdrawal from people/activities they love  · Confusion- inability to concentrate     If you or a loved one observes any of these behaviors or has concerns about self-harm, here's what you can do:  · Talk about it- your feelings and reasons for harming yourself  · Remove any means that you might use to hurt yourself (examples: pills, rope, extension cords, firearm)  · Get professional help from the community (Mental Health, Substance Abuse, psychological counseling)  · Do not be alone:Call your Safe Contact- someone whom you trust who will be there for  you.  · Call your local CRISIS HOTLINE 871-5108 or 388-459-5055  · Call your local Children's Mobile Crisis Response Team [place] (761) 368-7452 or www.[employer]  · Call the toll free National Suicide Prevention Hotlines   · National Suicide Prevention Lifeline 031-906-WZVQ (8682)  · National Loop Line Network 800-SUICIDE (852-6885)

## 2020-10-04 NOTE — PROGRESS NOTES
Daily Progress Note:     Date of Service: 10/4/2020  Primary Team: [employer] [facility] White Team   Attending: Candida Bonilla MD   Senior Resident: Dr. LUPE Davis  Intern: Dr. RANULFO Branch  Contact:  [phone]    Chief Complaint:   Ms Coyle 55-year-old female with past medical history of ESRD, dysrhythmia, heart murmur, dyslipidemia, hypertension.  She presented to was after an episode of syncope that resulted in an inferior obturator right fracture, and with hyperkalemia associated with peaked T wave on EKG after having missed her last dialysis appointment.    Subjective:   -NAEON. Pt reports doing well today. Back pain persists, but controlled. Discharge today    Consultants/Specialty:    Review of Systems:    Review of Systems   Constitutional: Negative for chills and fever.   HENT: Negative for congestion and sore throat.    Respiratory: Negative for cough, hemoptysis, sputum production and shortness of breath.    Cardiovascular: Negative for chest pain and palpitations.   Gastrointestinal: Negative for abdominal pain, constipation, diarrhea, heartburn, nausea and vomiting.   Musculoskeletal: Positive for joint pain (Pain at thr right hip).   Neurological: Negative for dizziness, focal weakness and headaches.   Psychiatric/Behavioral: Negative for depression.       Objective Data:   Physical Exam:   Vitals:   Temp:  [36.1 °C (96.9 °F)-36.7 °C (98.1 °F)] 36.3 °C (97.4 °F)  Pulse:  [65-72] 69  Resp:  [17-18] 18  BP: (164-194)/(72-88) 164/79  SpO2:  [93 %-99 %] 99 %   CREATE MACRO BE SURE THAT THIS IS PERTINENT TO YOUR PATIENT!  Physical Exam  Constitutional:       General: She is not in acute distress.  HENT:      Head: Normocephalic.      Nose: Nose normal. No congestion or rhinorrhea.      Mouth/Throat:      Mouth: Mucous membranes are moist.   Eyes:      Extraocular Movements: Extraocular movements intact.      Pupils: Pupils are equal, round, and reactive to light.   Neck:      Musculoskeletal: No neck  rigidity or muscular tenderness.   Cardiovascular:      Rate and Rhythm: Normal rate and regular rhythm.      Heart sounds: Murmur (Systolic murmur) present. No friction rub. No gallop.    Pulmonary:      Effort: Pulmonary effort is normal. No respiratory distress.      Breath sounds: No wheezing.   Abdominal:      General: Abdomen is flat. There is no distension.      Palpations: Abdomen is soft. There is no mass.      Tenderness: There is no abdominal tenderness.   Musculoskeletal:         General: Tenderness (the right hip area) present. No swelling.      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      Capillary Refill: Capillary refill takes 2 to 3 seconds.      Findings: No bruising or erythema.   Neurological:      General: No focal deficit present.      Mental Status: She is alert and oriented to person, place, and time.      Cranial Nerves: No cranial nerve deficit.      Motor: Weakness present.   Psychiatric:         Mood and Affect: Mood normal.     Labs:   Recent Labs     10/01/20  1527 10/02/20  0320 10/03/20  0449 10/04/20  0859   WBC 7.0 9.0 5.2 5.6   RBC 3.16* 3.12* 3.11* 3.07*   HEMOGLOBIN 9.4* 9.5* 9.2* 9.3*   HEMATOCRIT 30.3* 30.4* 30.2* 29.4*   MCV 95.9 97.4 97.1 95.8   MCH 29.7 30.4 29.6 30.3   RDW 56.8* 56.8* 58.5* 57.3*   PLATELETCT 68* 65* 59* 57*   MPV 11.3 10.9 10.7 12.0   NEUTSPOLYS 80.80* 84.70* 69.80  --    LYMPHOCYTES 10.80* 8.80* 19.60*  --    MONOCYTES 5.00 3.70 5.80  --    EOSINOPHILS 2.60 2.00 3.80  --    BASOPHILS 0.40 0.40 0.60  --      Recent Labs     10/01/20  1527  10/02/20  0714 10/03/20  0449 10/04/20  0859   SODIUM 139   < > 135 137 134*   POTASSIUM 5.5   < > 6.2* 5.5 5.7*   CHLORIDE 94*   < > 93* 96 94*   CO2 27   < > 25 28 25   GLUCOSE 105*   < > 89 97 98   BUN 76*   < > 82* 53* 69*   CPKTOTAL 93  --   --   --   --     < > = values in this interval not displayed.     Recent Labs     10/02/20  0320 10/02/20  0714 10/03/20  0447  10/04/20  0859   ALBUMIN 3.6  --  3.8 3.8   TBILIRUBIN 0.5  --  0.7 0.6   ALKPHOSPHAT 60  --  68 65   TOTPROTEIN 5.8*  --  6.2 6.1   ALTSGPT 12  --  10 11   ASTSGOT 8*  --  8* 8*   CREATININE 10.42* 10.80* 6.97* 8.78*     DX-WRIST-COMPLETE 3+ RIGHT   Final Result      Negative for right wrist fracture or malalignment      CT-LSPINE W/O PLUS RECONS   Final Result      No acute fracture or listhesis in the lumbar spine.      CT-PELVIS W/O PLUS RECONS   Final Result      1.  There is a nondisplaced right inferior obturator ring fracture.   2.  There is a small amount of pelvic ascites with diffuse body wall edema.   3.  Mildly thick-walled bladder possibly due to under distention versus cystitis.      CT-HEAD W/O   Final Result      1.  Cerebral atrophy.      2.  White matter lucencies most consistent with small vessel ischemic change versus demyelination or gliosis.      3.  Otherwise, Head CT without contrast with no acute findings. No evidence of acute cerebral infarction, hemorrhage or mass lesion.      DX-CHEST-PORTABLE (1 VIEW)   Final Result      Cardiomegaly. No focal consolidation or pleural effusions.                Problem Representation:       * Syncope  Assessment & Plan  - Patient has an episode of syncope while standing and eating a meal  - This is likely to be cardiogenic vs vasovagal.   Vasovagal in that she was eating when the event occurred, but she hardly had any warning signs: no nausea, no vomiting, and barely a feeling of lightheadness.  Cardiogenic cause can also be possible because PMH of murmur and CAD and patient had no warning signs for her syncope. But she also denied any chest pain, palpitation or SOB. EKG has showed some mild peaked T wave, but it is likely due to hyperkalemia vs an ischemic event capable of resulting into syncope. In support of possible cardiogenic is that her last Echo in Jan 20 showed grade II diastolic disfunction, moderate aortic insufficiency/mild mitral  stenosis/severetricuspid regurgitation and an EF of 50%    CT was negative  Orthostatics were negative    F/u with PCP    Fracture of pelvic bone without disruption of posterior arch of pelvic ring ([facility])  Assessment & Plan  CT impression: non displaced right inferior obturator ring fracture, small amount of pelvic ascites, mildly thick-walled bladder possibly d/t under distension, or cystitis    Plan:  - Per ortho report: Nonoperative treatment of ischio rami fracture.  She is weightbearing as tolerated without restrictions. It will go on to heal without any risk of displacement.  - Patient has been able to tolerate with Tylenol PRN  - outpatient follow up with PCP    Hyperkalemia- (present on admission)  Assessment & Plan  Continue HD outpatient    ESRD (end stage renal disease) ([facility])  Assessment & Plan  - 8/2017 diagnosed with ESRD  - She has missed her last dialysis on Wednesday with usual service on MWF   - Nephrology has been consulted for dialysis    Plan:  On discharge, patient will continue her MWF dialysis    Hyperphosphatemia- (present on admission)  Assessment & Plan  Continue sevelamer      Urinary incontinence  Assessment & Plan  Continue terazosin    Hypertension- (present on admission)  Assessment & Plan  Patient diagnosed with essential hypertension    Plan:  Continue home meds

## 2020-10-04 NOTE — PROGRESS NOTES
12 hr CC   Visit Information Date & Time Provider Department Dept. Phone Encounter #  
 10/24/2017 10:00 AM Vanessa Ash MD [address] [phone] [id number] Follow-up Instructions Return in about 3 months (around 1/24/2018). Your Appointments 10/25/2017  4:00 PM  
Follow Up with Dylan Evans MD  
[address] at [address] [facility]) Appt Note: 2mon f/u; pt aware to provide cpap usage report [address] [facility] [address] Critical access hospital [address] [facility] 630 [facility] [address] [address] [address] [address]  
  
    
 1/22/2018 10:00 AM  
Follow Up with Vanessa Ash MD  
[address] (--) Appt Note: 3 month follow up Seth[address]lestayler [address] [address] [address] [address] [phone]  
  
   
 Jose[address]qu[address]arl[address]ayler [address] [address] [address] [address] Upcoming Health Maintenance Date Due FOBT Q 1 YEAR AGE 50-75 1/22/2015 INFLUENZA AGE 9 TO ADULT 8/1/2017 BREAST CANCER SCRN MAMMOGRAM 12/5/2018 PAP AKA CERVICAL CYTOLOGY 3/1/2020 DTaP/Tdap/Td series (2 - Td) 3/1/2027 Allergies as of 10/24/2017  Review Complete On: 10/24/2017 By: Vanessa Ash MD  
  
 Severity Noted Reaction Type Reactions Hydrochlorothiazide   Side Effect Other (comments) TINGLING IN BACK OF HEAD Norvasc [Amlodipine]  05/24/2010    Other (comments) Tingling in back of head Current Immunizations  Reviewed on 11/3/2016 Name Date Influenza Vaccine 11/3/2015 Influenza Vaccine (Quad) PF 10/24/2017, 11/11/2016 Influenza Vaccine PF 11/22/2013 Influenza Vaccine Split 10/26/2012, 9/26/2011 Tdap 3/1/2017 Not reviewed this visit You Were Diagnosed With   
  
 Codes Comments Hyperlipidemia, unspecified hyperlipidemia type    -  Primary ICD-10-CM: E78.5 ICD-9-CM: 272.4 Prediabetes     ICD-10-CM: R73.03 
ICD-9-CM: 790.29 Essential hypertension     ICD-10-CM: I10 
ICD-9-CM: 401.9 SEFERINO on CPAP     ICD-10-CM: G47.33, Z99.89 ICD-9-CM: 327.23, V46.8 Encounter for immunization     ICD-10-CM: I98 ICD-9-CM: V03.89 Vitals BP Pulse Temp Resp Height(growth percentile) Weight(growth percentile) 136/75 (BP 1 Location: Left arm, BP Patient Position: Sitting) 60 98.2 °F (36.8 °C) (Oral) 16 5' 2\" (1.575 m) 241 lb 4 oz (109.4 kg) BMI OB Status Smoking Status 44.13 kg/m2 Hysterectomy Never Smoker BMI and BSA Data Body Mass Index Body Surface Area  
 44.13 kg/m 2 2.19 m 2 Preferred Pharmacy Pharmacy Name Phone [facility] #[id number] [place], [address] [facility] [place] [phone] Your Updated Medication List  
  
   
This list is accurate as of: 10/24/17 12:03 PM.  Always use your most recent med list.  
  
  
  
  
 citalopram 10 mg tablet Commonly known as:  CELEXA  
TAKE 0.5 TABS BY MOUTH DAILY.***TOO SOON 08/28*** CITRACAL + D PO Take  by mouth daily. losartan 100 mg tablet Commonly known as:  COZAAR  
TAKE 1 TAB BY MOUTH DAILY. MULTIVITAMIN PO Take 1 Tab by mouth daily. pravastatin 40 mg tablet Commonly known as:  PRAVACHOL  
TAKE 1 TAB BY MOUTH NIGHTLY. VITAMIN D3 2,000 unit Cap capsule Generic drug:  Cholecalciferol (Vitamin D3) 1 cap daily We Performed the Following INFLUENZA VIRUS VAC QUAD,SPLIT,PRESV FREE SYRINGE IM [id number] CPT(R)] Follow-up Instructions Return in about 3 months (around 1/24/2018). Patient Instructions Please contact our office if you have any questions about your visit today. Vaccine Information Statement Influenza (Flu) Vaccine (Inactivated or Recombinant): What you need to know Many Vaccine Information Statements are available in [place] and other languages. See www.immunize.org/vis Hojas de Información Sobre Vacunas están disponibles en Español y en muchos otros idiomas. Visite www.immunize.org/vis 1. Why get vaccinated? Influenza (flu) is a contagious disease that spreads around the [place] every year, usually between October and May. Flu is caused by influenza viruses, and is spread mainly by coughing, sneezing, and close contact. Anyone can get flu. Flu strikes suddenly and can last several days. Symptoms vary by age, but can include: 
 fever/chills  sore throat  muscle aches  fatigue  cough  headache  runny or stuffy nose Flu can also lead to pneumonia and blood infections, and cause diarrhea and seizures in children. If you have a medical condition, such as heart or lung disease, flu can make it worse. Flu is more dangerous for some people. Infants and young children, people 72years of age and older, pregnant women, and people with certain health conditions or a weakened immune system are at greatest risk. Each year thousands of people in the [facility] die from flu, and many more are hospitalized. Flu vaccine can: 
 keep you from getting flu, 
 make flu less severe if you do get it, and 
 keep you from spreading flu to your family and other people. 2. Inactivated and recombinant flu vaccines A dose of flu vaccine is recommended every flu season. Children 6 months through 6years of age may need two doses during the same flu season. Everyone else needs only one dose each flu season. Some inactivated flu vaccines contain a very small amount of a mercury-based preservative called thimerosal. Studies have not shown thimerosal in vaccines to be harmful, but flu vaccines that do not contain thimerosal are available. There is no live flu virus in flu shots. They cannot cause the flu. There are many flu viruses, and they are always changing. Each year a new flu vaccine is made to protect against three or four viruses that are likely to cause disease in the upcoming flu season.  But even when the vaccine doesnt exactly match these viruses, it may still provide some protection Flu vaccine cannot prevent: 
 flu that is caused by a virus not covered by the vaccine, or 
 illnesses that look like flu but are not. It takes about 2 weeks for protection to develop after vaccination, and protection lasts through the flu season. 3. Some people should not get this vaccine Tell the person who is giving you the vaccine:  If you have any severe, life-threatening allergies. If you ever had a life-threatening allergic reaction after a dose of flu vaccine, or have a severe allergy to any part of this vaccine, you may be advised not to get vaccinated. Most, but not all, types of flu vaccine contain a small amount of egg protein.  If you ever had Guillain-Barré Syndrome (also called GBS). Some people with a history of GBS should not get this vaccine. This should be discussed with your doctor.  If you are not feeling well. It is usually okay to get flu vaccine when you have a mild illness, but you might be asked to come back when you feel better. 4. Risks of a vaccine reaction With any medicine, including vaccines, there is a chance of reactions. These are usually mild and go away on their own, but serious reactions are also possible. Most people who get a flu shot do not have any problems with it. Minor problems following a flu shot include:  
 soreness, redness, or swelling where the shot was given  hoarseness  sore, red or itchy eyes  cough  fever  aches  headache  itching  fatigue If these problems occur, they usually begin soon after the shot and last 1 or 2 days. More serious problems following a flu shot can include the following:  There may be a small increased risk of Guillain-Barré Syndrome (GBS) after inactivated flu vaccine.   This risk has been estimated at 1 or 2 additional cases per million people vaccinated. This is much lower than the risk of severe complications from flu, which can be prevented by flu vaccine.  Young children who get the flu shot along with pneumococcal vaccine (PCV13) and/or DTaP vaccine at the same time might be slightly more likely to have a seizure caused by fever. Ask your doctor for more information. Tell your doctor if a child who is getting flu vaccine has ever had a seizure. Problems that could happen after any injected vaccine:  People sometimes faint after a medical procedure, including vaccination. Sitting or lying down for about 15 minutes can help prevent fainting, and injuries caused by a fall. Tell your doctor if you feel dizzy, or have vision changes or ringing in the ears.  Some people get severe pain in the shoulder and have difficulty moving the arm where a shot was given. This happens very rarely.  Any medication can cause a severe allergic reaction. Such reactions from a vaccine are very rare, estimated at about 1 in a million doses, and would happen within a few minutes to a few hours after the vaccination. As with any medicine, there is a very remote chance of a vaccine causing a serious injury or death. The safety of vaccines is always being monitored. For more information, visit: www.cdc.gov/vaccinesafety/ 
 
5. What if there is a serious reaction? What should I look for?  Look for anything that concerns you, such as signs of a severe allergic reaction, very high fever, or unusual behavior. Signs of a severe allergic reaction can include hives, swelling of the face and throat, difficulty breathing, a fast heartbeat, dizziness, and weakness  usually within a few minutes to a few hours after the vaccination. What should I do?  
 
 If you think it is a severe allergic reaction or other emergency that cant wait, call 9-1-1 and get the person to the nearest hospital. Otherwise, call your doctor.  Reactions should be reported to the Vaccine Adverse Event Reporting System (VAERS). Your doctor should file this report, or you can do it yourself through  the VAERS web site at www.vaers. hhs.gov, or by calling 5-765.149.1452. VAERS does not give medical advice. 6. The National Vaccine Injury Compensation Program 
 
The [facility] Vaccine Injury Compensation Program (VICP) is a federal program that was created to compensate people who may have been injured by certain vaccines. Persons who believe they may have been injured by a vaccine can learn about the program and about filing a claim by calling 3-120.192.7234 or visiting the [employer] website at www.[facility].gov/vaccinecompensation. There is a time limit to file a claim for compensation. 7. How can I learn more?  Ask your healthcare provider. He or she can give you the vaccine package insert or suggest other sources of information.  Call your local or state health department.  Contact the Centers for Disease Control and Prevention (CDC): 
- Call 0-300.224.2432 (1-800-CDC-INFO) or 
- Visit CDCs website at www.cdc.gov/flu Vaccine Information Statement Inactivated Influenza Vaccine 8/7/2015 
[address] [facility] Sample [address] [facility] and [place] Centers for Disease Control and Prevention Office Use Only Introducing [facility]! [employer] introduces [employer] patient portal. Now you can access parts of your medical record, email your doctor's office, and request medication refills online. 1. In your internet browser, go to [email or web address] 2. Click on the First Time User? Click Here link in the Sign In box. You will see the New Member Sign Up page. 3. Enter your [employer] Access Code exactly as it appears below. You will not need to use this code after youve completed the sign-up process.  If you do not sign up before the expiration date, you must request a new code. 
 
· [employer] Access Code: [email or web address] Expires: 1/22/2018  9:55 AM 
 
4. Enter the last four digits of your Social Security Number (xxxx) and Date of Birth (mm/dd/yyyy) as indicated and click Submit. You will be taken to the next sign-up page. 5. Create a [employer] ID. This will be your [employer] login ID and cannot be changed, so think of one that is secure and easy to remember. 6. Create a [employer] password. You can change your password at any time. 7. Enter your Password Reset Question and Answer. This can be used at a later time if you forget your password. 8. Enter your e-mail address. You will receive e-mail notification when new information is available in [address] 9. Click Sign Up. You can now view and download portions of your medical record. 10. Click the Download Summary menu link to download a portable copy of your medical information. If you have questions, please visit the Frequently Asked Questions section of the [employer] website. Remember, [employer] is NOT to be used for urgent needs. For medical emergencies, dial 911. Now available from your iPhone and Android! Please provide this summary of care documentation to your next provider. Your primary care clinician is listed as CHRISTINE DE LUNA. If you have any questions after today's visit, please call [phone].

## 2020-10-04 NOTE — PROGRESS NOTES
Nephrology Daily Progress Note    Date of Service  10/4/2020    Chief Complaint  A 55-year-old lady with end-stage renal disease, on chronic hemodialysis Monday, Wednesday, and Friday.  The patient was admitted on 10/02. On 09/29, when she was eating dinner at her kitchen table,she lost consciousness without warning.  She fell, hit her head and right hip  area.  She was unable to walk after that and she did not go to dialysis on  09/30. She came to the emergency room on 10/02. She was found to have nondisplaced fracture of the right inferior obturator ring. The patient has been admitted for inpatient evaluation and therapy.  We have been asked to assist in her management.      Interval Problem Update  10/2 - Consult done  10/3 - HD yesterday with 2L net UF, pt says her pain is controlled, she did not notice some LUE access arm pain post HD yesterday, none at present, BP elevated  10/4 - Pt sitting up in bed, denies complaints/concerns, 's-170's    Review of Systems  Review of Systems   Constitutional: Negative.    HENT: Negative.    Eyes: Negative.    Respiratory: Negative.    Cardiovascular: Negative.         LUE access arm pain after HD x 1   Gastrointestinal: Negative.    Genitourinary: Negative.    Musculoskeletal: Positive for back pain and falls.        R hip pain   Skin: Negative.    Neurological: Negative.    Endo/Heme/Allergies: Negative.    Psychiatric/Behavioral: Negative.         Physical Exam  Temp:  [36.1 °C (96.9 °F)-36.7 °C (98.1 °F)] 36.3 °C (97.3 °F)  Pulse:  [61-72] 66  Resp:  [17-18] 18  BP: (134-194)/(72-88) 172/79  SpO2:  [93 %-98 %] 96 %    Physical Exam  Constitutional:       Appearance: She is ill-appearing.   HENT:      Head: Normocephalic and atraumatic.      Nose: Nose normal.      Mouth/Throat:      Mouth: Mucous membranes are moist.   Eyes:      Extraocular Movements: Extraocular movements intact.      Pupils: Pupils are equal, round, and reactive to light.   Neck:       Musculoskeletal: Normal range of motion and neck supple.   Cardiovascular:      Rate and Rhythm: Normal rate and regular rhythm.      Pulses: Normal pulses.      Comments: LUE AVF +T/B  Pulmonary:      Effort: Pulmonary effort is normal.      Breath sounds: Normal breath sounds.   Abdominal:      General: Bowel sounds are normal.      Palpations: Abdomen is soft.   Musculoskeletal:         General: Tenderness (R hip) present.      Right lower leg: No edema.      Left lower leg: No edema.      Comments: R BKA   Skin:     General: Skin is warm and dry.   Neurological:      General: No focal deficit present.      Mental Status: She is alert and oriented to person, place, and time.   Psychiatric:         Mood and Affect: Mood normal.         Behavior: Behavior normal.         Fluids    Intake/Output Summary (Last 24 hours) at 10/4/2020 1013  Last data filed at 10/3/2020 1100  Gross per 24 hour   Intake 240 ml   Output --   Net 240 ml       Laboratory  Recent Labs     10/02/20  0320 10/03/20  0449 10/04/20  0859   WBC 9.0 5.2 5.6   RBC 3.12* 3.11* 3.07*   HEMOGLOBIN 9.5* 9.2* 9.3*   HEMATOCRIT 30.4* 30.2* 29.4*   MCV 97.4 97.1 95.8   MCH 30.4 29.6 30.3   MCHC 31.3* 30.5* 31.6*   RDW 56.8* 58.5* 57.3*   PLATELETCT 65* 59* 57*   MPV 10.9 10.7 12.0     Recent Labs     10/02/20  0714 10/03/20  0449 10/04/20  0859   SODIUM 135 137 134*   POTASSIUM 6.2* 5.5 5.7*   CHLORIDE 93* 96 94*   CO2 25 28 25   GLUCOSE 89 97 98   BUN 82* 53* 69*   CREATININE 10.80* 6.97* 8.78*   CALCIUM 9.2 9.1 9.4         No results for input(s): NTPROBNP in the last 72 hours.  Recent Labs     10/02/20  0320   TRIGLYCERIDE 66   HDL 37*   LDL 58       Imaging  Available labs, imaging and clinical documentation reviewed.     Assessment/Plan  #  End-stage renal disease, chronic hemodialysis Monday, Wednesday, and   Friday.  The patient missed last dialysis because of the fracture.      # Hyperkalemia, secondary to missing dialysis. Improved.     # Chronic  obstructive pulmonary disease, not on home oxygen,  History of   chronic obstructive pulmonary disease exacerbation previously.      # Anemia of chronic kidney disease, below target.  Tsat 15%. Ferritin 582.     # Hypertension, currently not controlled.      # Chronic mild thrombocytopenia. Monitor.    # Status post syncopal episodes.      # Status post right inferior obturator ring fracture. Non-surgical.    # History of supraventricular tachycardia, atrial fibrillation and atrial   flutter, status post ablation with recurrent supraventricular arrhythmias.      # Chronic kidney disease -- Metabolic bone disorder.  Patient has   hyperphosphatemia at this time.  Apparently, she has not been taking her phosphorus   binders.  This is followed at the dialysis unit.      # Dyslipidemia, not on statin.       # SULFA ALLERGY.      # Left upper extremity AV fistula. Reports some pain after HD 10/2. Will have OP HD unit refer to AC for eval.     # Ongoing tobacco use. Cessation advised.     # Past history of alcohol and drug use.      # Status post right below-the-knee amputation.      # History of multiple surgeries.       PLAN:    - Patient was seen by orthopedics.  No surgery is needed.    - Continue iHD qMWF, next treatment MON via L AVF   - WOO with HD, goal Hgb 10-11.   - Iron loading ordered 10/3   - Home phos binders with meals, renal diet   - No dietary protein restrictions  - Continue home medications  - Goal BP <140/90. On 10 mg amlodipine, 12.5 mg BID carvedilol, 0.1 mg/24 hr clonidine patch, 40 mg furosemide and 50 mg BID hydralazine. Increasing hydralazine to TID as it is short acting.   - Furosemide on non HD days  - All of her medicines should be dosed for a GFR less than 10.    - Ok to transition to OP HD once medically cleared.         Thank you,

## 2020-11-13 ENCOUNTER — HOME HEALTH ADMISSION (OUTPATIENT)
Dept: HOME HEALTH SERVICES | Facility: HOME HEALTHCARE | Age: 55
End: 2020-11-13
Payer: MEDICAID

## 2020-12-01 NOTE — CONSULTS
"    Date of Service: 10/02/20    CC: Ground-level fall and right buttock pain    HPI: Isabelle Coyle is a 55 y.o. female who presents with complaints of pain to right buttock and chronic pain to her knees.  This started 3 days ago after a syncopal fall.  The pain is 7/10 and is described as sharp.  The pain is made worse by palpation of the area and made better by rest and immobilization.    PMH:   Past Medical History:   Diagnosis Date   • Anemia    • Anemia associated with chronic renal failure 11/5/2009   • Anginal syndrome ([facility])    • Arrhythmia    • Asthma    • Back pain    • COPD (chronic obstructive pulmonary disease) ([facility])    • Dental disorder 8-25-17    \"Full Upper & Partial Lower Dentures\"   • Dialysis patient ([facility]) 8-25-17    [facility] M,W,F   • Dysrhythmia     \"SVT\"   • ESRD (end stage renal disease) ([facility]) 8-25-17    Dialysis MWF@ [facility]   • Fall    • GERD (gastroesophageal reflux disease)    • Glomerulonephritis, chronic 11/5/2009   • Head ache    • Heart burn    • Heart murmur    • High cholesterol    • HTN (hypertension) 11/5/2009    2017 states well controlled   • Hypertension    • Indigestion    • Infectious disease    • NSTEMI (non-ST elevated myocardial infarction) ([facility]) 10/2/2020   • Pneumonia    • Port catheter in place 8-25-17    For Dialysis   • Psychiatric problem    • SVT (supraventricular tachycardia) ([facility])    • SVT (supraventricular tachycardia) ([facility])    • Urinary incontinence 10/2/2020       PSH:   Past Surgical History:   Procedure Laterality Date   • MEÑO BY LAPAROSCOPY  6/22/2019    Procedure: CHOLECYSTECTOMY, LAPAROSCOPIC With GRAMS;  Surgeon: Jimbo Davenport M.D.;  Location: SURGERY [facility];  Service: General   • AV FISTULA CREATION Left 4/27/2018    Procedure: Left Brachial artery Repair;  Surgeon: Jimbo Davenport M.D.;  Location: SURGERY [facility];  Service: Vascular   • AV FISTULA CREATION Left 8/28/2017    Procedure: AV FISTULA " CREATION  UPPER EXTREMITY -BRACHIAL BASALIC;  Surgeon: Glen Rodriguez M.D.;  Location: SURGERY [facility];  Service: General   • AV FISTULA CREATION Left 2017    Procedure: AV FISTULA CREATION- LEFT;  Surgeon: Jimbo Davenport M.D.;  Location: SURGERY [facility];  Service:    • APPENDECTOMY LAPAROSCOPIC  2009    Performed by BANDAR TIMMONS at SURGERY [facility]   • FEMUR ORIF  10/9/08    Performed by GLEN GATES at SURGERY [facility]   • ILIAC BONE GRAFT  10/9/08    Performed by GLEN GATES at SURGERY [facility]   • AMPUTATION, BELOW THE KNEE     • CAPITATION PAYMENT (INSURANCE)     • OTHER      BELOW KNEE AMPUTATION RIGHT   • PB ENLARGE BREAST WITH IMPLANT         FH:   Family History   Problem Relation Age of Onset   • Heart Disease Other        SH:   Social History     Socioeconomic History   • Marital status:      Spouse name: Not on file   • Number of children: Not on file   • Years of education: Not on file   • Highest education level: Not on file   Occupational History   • Not on file   Social Needs   • Financial resource strain: Not on file   • Food insecurity     Worry: Patient refused     Inability: Patient refused   • Transportation needs     Medical: Patient refused     Non-medical: Patient refused   Tobacco Use   • Smoking status: Current Some Day Smoker     Packs/day: 0.25     Years: 20.00     Pack years: 5.00     Types: Cigarettes     Last attempt to quit: 2019     Years since quittin.6   • Smokeless tobacco: Never Used   • Tobacco comment: 5 cigs/day   Substance and Sexual Activity   • Alcohol use: No   • Drug use: No   • Sexual activity: Not on file   Lifestyle   • Physical activity     Days per week: Not on file     Minutes per session: Not on file   • Stress: Not on file   Relationships   • Social connections     Talks on phone: Not on file     Gets together: Not on file     Attends Congregational service: Not on file     Active member  "of club or organization: Not on file     Attends meetings of clubs or organizations: Not on file     Relationship status: Not on file   • Intimate partner violence     Fear of current or ex partner: Not on file     Emotionally abused: Not on file     Physically abused: Not on file     Forced sexual activity: Not on file   Other Topics Concern   • Not on file   Social History Narrative   • Not on file       ROS: A 10 system review of systems was negative outside what was listed in the HPI    BP (!) 164/79   Pulse 69   Temp 36.3 °C (97.4 °F) (Temporal)   Resp 18   Ht 1.575 m (5' 2\")   Wt 63.7 kg (140 lb 6.9 oz)   SpO2 99%     Physical Exam:  General: AAOx3, NAD, chronically ill appearance  HEENT: normocephalic, atraumatic  Psych: Normal mood and flat affect  Neck: supple, no pain to motion  Chest/Pulmonary: breathing unlabored, no audible wheezing  Cardio: regular heart rate and rhythm  Neuro: sensation grossly intact to BUE and BLE, moving all four extremities  Skin: intact with no full thickness abrasions or lacerations  MSK: Right below-knee amputation.  No tenderness or motion to compression of the iliac wings.  Range of motion of both hips does not show any crepitus or pain.  Her neurovascular exam is at baseline.  Upper extremities are atraumatic.    Imaging and labs: CT scan of her pelvis shows a completely nondisplaced right inferior ramus fracture    Assessment:   1. Syncope and collapse     2. ESRD (end stage renal disease) ([facility])     3. Closed nondisplaced fracture of pelvis, unspecified part of pelvis, initial encounter ([facility])     4. Elevated troponin     5. Debility         We discussed the diagnosis and findings with the patient at length.  Reviewed the recommended nonoperative treatment.  She had a chance to ask questions and all of these were answered to her satisfaction.        Plan:  Weightbearing as tolerated and range of motion as tolerated all extremities  Mobilize with physical and " occupational therapy  Can follow-up with her primary care doctor, no orthopedic follow-up likely will be necessary

## 2020-12-07 ENCOUNTER — NURSE TRIAGE (OUTPATIENT)
Dept: HEALTH INFORMATION MANAGEMENT | Facility: OTHER | Age: 55
End: 2020-12-07

## 2020-12-07 ENCOUNTER — APPOINTMENT (OUTPATIENT)
Dept: RADIOLOGY | Facility: MEDICAL CENTER | Age: 55
End: 2020-12-07
Attending: EMERGENCY MEDICINE
Payer: MEDICAID

## 2020-12-07 ENCOUNTER — HOSPITAL ENCOUNTER (OUTPATIENT)
Facility: MEDICAL CENTER | Age: 55
End: 2020-12-08
Attending: EMERGENCY MEDICINE | Admitting: HOSPITALIST
Payer: MEDICAID

## 2020-12-07 DIAGNOSIS — J81.0 ACUTE PULMONARY EDEMA (HCC): ICD-10-CM

## 2020-12-07 DIAGNOSIS — J96.01 ACUTE RESPIRATORY FAILURE WITH HYPOXIA (HCC): ICD-10-CM

## 2020-12-07 DIAGNOSIS — E87.5 HYPERKALEMIA: ICD-10-CM

## 2020-12-07 DIAGNOSIS — J44.9 CHRONIC OBSTRUCTIVE PULMONARY DISEASE, UNSPECIFIED COPD TYPE (HCC): ICD-10-CM

## 2020-12-07 PROBLEM — Z99.2 ESRD (END STAGE RENAL DISEASE) ON DIALYSIS (HCC): Status: ACTIVE | Noted: 2017-08-28

## 2020-12-07 LAB
ALBUMIN SERPL BCP-MCNC: 4.1 G/DL (ref 3.2–4.9)
ALBUMIN/GLOB SERPL: 1.8 G/DL
ALP SERPL-CCNC: 66 U/L (ref 30–99)
ALT SERPL-CCNC: 10 U/L (ref 2–50)
ANION GAP SERPL CALC-SCNC: 15 MMOL/L (ref 7–16)
ANISOCYTOSIS BLD QL SMEAR: ABNORMAL
AST SERPL-CCNC: 11 U/L (ref 12–45)
BASOPHILS # BLD AUTO: 0.6 % (ref 0–1.8)
BASOPHILS # BLD: 0.03 K/UL (ref 0–0.12)
BILIRUB SERPL-MCNC: 0.9 MG/DL (ref 0.1–1.5)
BLOOD CULTURE HOLD CXBCH: NORMAL
BLOOD CULTURE HOLD CXBCH: NORMAL
BUN SERPL-MCNC: 67 MG/DL (ref 8–22)
CALCIUM SERPL-MCNC: 10.1 MG/DL (ref 8.5–10.5)
CHLORIDE SERPL-SCNC: 97 MMOL/L (ref 96–112)
CO2 SERPL-SCNC: 27 MMOL/L (ref 20–33)
COMMENT 1642: NORMAL
COVID ORDER STATUS COVID19: NORMAL
CREAT SERPL-MCNC: 8.82 MG/DL (ref 0.5–1.4)
CRP SERPL HS-MCNC: 0.34 MG/DL (ref 0–0.75)
D DIMER PPP IA.FEU-MCNC: 2.11 UG/ML (FEU) (ref 0–0.5)
EKG IMPRESSION: NORMAL
EOSINOPHIL # BLD AUTO: 0.13 K/UL (ref 0–0.51)
EOSINOPHIL NFR BLD: 2.5 % (ref 0–6.9)
ERYTHROCYTE [DISTWIDTH] IN BLOOD BY AUTOMATED COUNT: 64.4 FL (ref 35.9–50)
FLUAV RNA SPEC QL NAA+PROBE: NEGATIVE
FLUBV RNA SPEC QL NAA+PROBE: NEGATIVE
GLOBULIN SER CALC-MCNC: 2.3 G/DL (ref 1.9–3.5)
GLUCOSE SERPL-MCNC: 95 MG/DL (ref 65–99)
HAV IGM SERPL QL IA: NORMAL
HBV CORE IGM SER QL: NORMAL
HBV SURFACE AB SERPL IA-ACNC: 80.5 MIU/ML (ref 0–10)
HBV SURFACE AG SER QL: NORMAL
HCT VFR BLD AUTO: 28.1 % (ref 37–47)
HCV AB SER QL: NORMAL
HGB BLD-MCNC: 8.4 G/DL (ref 12–16)
HYPOCHROMIA BLD QL SMEAR: ABNORMAL
IMM GRANULOCYTES # BLD AUTO: 0.03 K/UL (ref 0–0.11)
IMM GRANULOCYTES NFR BLD AUTO: 0.6 % (ref 0–0.9)
LYMPHOCYTES # BLD AUTO: 0.81 K/UL (ref 1–4.8)
LYMPHOCYTES NFR BLD: 15.3 % (ref 22–41)
MACROCYTES BLD QL SMEAR: ABNORMAL
MCH RBC QN AUTO: 30.1 PG (ref 27–33)
MCHC RBC AUTO-ENTMCNC: 29.9 G/DL (ref 33.6–35)
MCV RBC AUTO: 100.7 FL (ref 81.4–97.8)
MONOCYTES # BLD AUTO: 0.23 K/UL (ref 0–0.85)
MONOCYTES NFR BLD AUTO: 4.4 % (ref 0–13.4)
MORPHOLOGY BLD-IMP: NORMAL
NEUTROPHILS # BLD AUTO: 4.05 K/UL (ref 2–7.15)
NEUTROPHILS NFR BLD: 76.6 % (ref 44–72)
NRBC # BLD AUTO: 0 K/UL
NRBC BLD-RTO: 0 /100 WBC
OVALOCYTES BLD QL SMEAR: NORMAL
PLATELET # BLD AUTO: 106 K/UL (ref 164–446)
PLATELET BLD QL SMEAR: NORMAL
PMV BLD AUTO: 10.6 FL (ref 9–12.9)
POIKILOCYTOSIS BLD QL SMEAR: NORMAL
POTASSIUM SERPL-SCNC: 6.2 MMOL/L (ref 3.6–5.5)
PROCALCITONIN SERPL-MCNC: 0.7 NG/ML
PROT SERPL-MCNC: 6.4 G/DL (ref 6–8.2)
RBC # BLD AUTO: 2.79 M/UL (ref 4.2–5.4)
RBC BLD AUTO: PRESENT
RSV RNA SPEC QL NAA+PROBE: NEGATIVE
SARS-COV-2 RNA RESP QL NAA+PROBE: NOTDETECTED
SODIUM SERPL-SCNC: 139 MMOL/L (ref 135–145)
SPECIMEN SOURCE: NORMAL
WBC # BLD AUTO: 5.3 K/UL (ref 4.8–10.8)

## 2020-12-07 PROCEDURE — 700102 HCHG RX REV CODE 250 W/ 637 OVERRIDE(OP): Performed by: HOSPITALIST

## 2020-12-07 PROCEDURE — 83520 IMMUNOASSAY QUANT NOS NONAB: CPT

## 2020-12-07 PROCEDURE — 71045 X-RAY EXAM CHEST 1 VIEW: CPT

## 2020-12-07 PROCEDURE — 93005 ELECTROCARDIOGRAM TRACING: CPT

## 2020-12-07 PROCEDURE — 99285 EMERGENCY DEPT VISIT HI MDM: CPT | Mod: EDC

## 2020-12-07 PROCEDURE — 80074 ACUTE HEPATITIS PANEL: CPT

## 2020-12-07 PROCEDURE — 90935 HEMODIALYSIS ONE EVALUATION: CPT

## 2020-12-07 PROCEDURE — 93005 ELECTROCARDIOGRAM TRACING: CPT | Performed by: STUDENT IN AN ORGANIZED HEALTH CARE EDUCATION/TRAINING PROGRAM

## 2020-12-07 PROCEDURE — 700101 HCHG RX REV CODE 250: Performed by: EMERGENCY MEDICINE

## 2020-12-07 PROCEDURE — G0378 HOSPITAL OBSERVATION PER HR: HCPCS

## 2020-12-07 PROCEDURE — 36415 COLL VENOUS BLD VENIPUNCTURE: CPT

## 2020-12-07 PROCEDURE — 93005 ELECTROCARDIOGRAM TRACING: CPT | Performed by: EMERGENCY MEDICINE

## 2020-12-07 PROCEDURE — 85025 COMPLETE CBC W/AUTO DIFF WBC: CPT

## 2020-12-07 PROCEDURE — 0241U HCHG SARS-COV-2 COVID-19 NFCT DS RESP RNA 4 TRGT MIC: CPT

## 2020-12-07 PROCEDURE — 86140 C-REACTIVE PROTEIN: CPT

## 2020-12-07 PROCEDURE — A9270 NON-COVERED ITEM OR SERVICE: HCPCS | Performed by: HOSPITALIST

## 2020-12-07 PROCEDURE — 96375 TX/PRO/DX INJ NEW DRUG ADDON: CPT | Mod: EDC,XU

## 2020-12-07 PROCEDURE — C9803 HOPD COVID-19 SPEC COLLECT: HCPCS | Performed by: EMERGENCY MEDICINE

## 2020-12-07 PROCEDURE — 86706 HEP B SURFACE ANTIBODY: CPT

## 2020-12-07 PROCEDURE — 99226 PR SUBSEQUENT OBSERVATION CARE,LEVEL III: CPT | Performed by: HOSPITALIST

## 2020-12-07 PROCEDURE — 85379 FIBRIN DEGRADATION QUANT: CPT

## 2020-12-07 PROCEDURE — 80053 COMPREHEN METABOLIC PANEL: CPT

## 2020-12-07 PROCEDURE — 96374 THER/PROPH/DIAG INJ IV PUSH: CPT | Mod: EDC,XU

## 2020-12-07 PROCEDURE — 84145 PROCALCITONIN (PCT): CPT

## 2020-12-07 PROCEDURE — 700102 HCHG RX REV CODE 250 W/ 637 OVERRIDE(OP): Performed by: EMERGENCY MEDICINE

## 2020-12-07 PROCEDURE — 94760 N-INVAS EAR/PLS OXIMETRY 1: CPT | Mod: EDC

## 2020-12-07 RX ORDER — BISACODYL 10 MG
10 SUPPOSITORY, RECTAL RECTAL
Status: DISCONTINUED | OUTPATIENT
Start: 2020-12-07 | End: 2020-12-08 | Stop reason: HOSPADM

## 2020-12-07 RX ORDER — DILTIAZEM HYDROCHLORIDE 120 MG/1
120 CAPSULE, COATED, EXTENDED RELEASE ORAL 2 TIMES DAILY
Status: DISCONTINUED | OUTPATIENT
Start: 2020-12-07 | End: 2020-12-08 | Stop reason: HOSPADM

## 2020-12-07 RX ORDER — HYDRALAZINE HYDROCHLORIDE 25 MG/1
50 TABLET, FILM COATED ORAL 2 TIMES DAILY
Status: DISCONTINUED | OUTPATIENT
Start: 2020-12-07 | End: 2020-12-08 | Stop reason: HOSPADM

## 2020-12-07 RX ORDER — AMOXICILLIN 250 MG
2 CAPSULE ORAL 2 TIMES DAILY
Status: DISCONTINUED | OUTPATIENT
Start: 2020-12-07 | End: 2020-12-08 | Stop reason: HOSPADM

## 2020-12-07 RX ORDER — DEXTROSE MONOHYDRATE 25 G/50ML
50 INJECTION, SOLUTION INTRAVENOUS ONCE
Status: COMPLETED | OUTPATIENT
Start: 2020-12-07 | End: 2020-12-07

## 2020-12-07 RX ORDER — FUROSEMIDE 40 MG/1
40 TABLET ORAL
Status: DISCONTINUED | OUTPATIENT
Start: 2020-12-08 | End: 2020-12-08 | Stop reason: HOSPADM

## 2020-12-07 RX ORDER — ACETAMINOPHEN 325 MG/1
650 TABLET ORAL EVERY 6 HOURS PRN
Status: DISCONTINUED | OUTPATIENT
Start: 2020-12-07 | End: 2020-12-08 | Stop reason: HOSPADM

## 2020-12-07 RX ORDER — CLONIDINE 0.2 MG/24H
1 PATCH, EXTENDED RELEASE TRANSDERMAL
Status: DISCONTINUED | OUTPATIENT
Start: 2020-12-09 | End: 2020-12-08 | Stop reason: HOSPADM

## 2020-12-07 RX ORDER — GABAPENTIN 100 MG/1
100 CAPSULE ORAL 2 TIMES DAILY
Status: DISCONTINUED | OUTPATIENT
Start: 2020-12-07 | End: 2020-12-08 | Stop reason: HOSPADM

## 2020-12-07 RX ORDER — HEPARIN SODIUM 5000 [USP'U]/ML
5000 INJECTION, SOLUTION INTRAVENOUS; SUBCUTANEOUS EVERY 8 HOURS
Status: DISCONTINUED | OUTPATIENT
Start: 2020-12-07 | End: 2020-12-08 | Stop reason: HOSPADM

## 2020-12-07 RX ORDER — AMLODIPINE BESYLATE 5 MG/1
10 TABLET ORAL DAILY
Status: DISCONTINUED | OUTPATIENT
Start: 2020-12-08 | End: 2020-12-08 | Stop reason: HOSPADM

## 2020-12-07 RX ORDER — TERAZOSIN 5 MG/1
5 CAPSULE ORAL DAILY
Status: DISCONTINUED | OUTPATIENT
Start: 2020-12-08 | End: 2020-12-08 | Stop reason: HOSPADM

## 2020-12-07 RX ORDER — FAMOTIDINE 20 MG/1
20 TABLET, FILM COATED ORAL EVERY MORNING
Status: DISCONTINUED | OUTPATIENT
Start: 2020-12-08 | End: 2020-12-08 | Stop reason: HOSPADM

## 2020-12-07 RX ORDER — CARVEDILOL 12.5 MG/1
12.5 TABLET ORAL 2 TIMES DAILY WITH MEALS
Status: DISCONTINUED | OUTPATIENT
Start: 2020-12-07 | End: 2020-12-08 | Stop reason: HOSPADM

## 2020-12-07 RX ORDER — DILTIAZEM HYDROCHLORIDE 120 MG/1
120 CAPSULE, COATED, EXTENDED RELEASE ORAL 2 TIMES DAILY
COMMUNITY
End: 2021-01-19

## 2020-12-07 RX ORDER — POLYETHYLENE GLYCOL 3350 17 G/17G
1 POWDER, FOR SOLUTION ORAL
Status: DISCONTINUED | OUTPATIENT
Start: 2020-12-07 | End: 2020-12-08 | Stop reason: HOSPADM

## 2020-12-07 RX ADMIN — CARVEDILOL 12.5 MG: 12.5 TABLET, FILM COATED ORAL at 18:28

## 2020-12-07 RX ADMIN — INSULIN HUMAN 10 UNITS: 100 INJECTION, SOLUTION PARENTERAL at 18:04

## 2020-12-07 RX ADMIN — DILTIAZEM HYDROCHLORIDE 120 MG: 120 CAPSULE, COATED, EXTENDED RELEASE ORAL at 18:28

## 2020-12-07 RX ADMIN — HYDRALAZINE HYDROCHLORIDE 50 MG: 25 TABLET, FILM COATED ORAL at 18:28

## 2020-12-07 RX ADMIN — ACETAMINOPHEN 650 MG: 325 TABLET, FILM COATED ORAL at 23:47

## 2020-12-07 RX ADMIN — GABAPENTIN 100 MG: 100 CAPSULE ORAL at 18:28

## 2020-12-07 RX ADMIN — DEXTROSE MONOHYDRATE 50 ML: 25 INJECTION, SOLUTION INTRAVENOUS at 18:04

## 2020-12-07 ASSESSMENT — FIBROSIS 4 INDEX: FIB4 SCORE: 2.412090756622108981

## 2020-12-07 NOTE — DISCHARGE PLANNING
Outpatient Dialysis Note    Confirmed patient is active at:    [address] [place] [address]    Schedule: Monday, Wednesday, Friday   Time: 4:00pm    Patient is seen by Dr. Rodriguez in HD clinic.    Spoke with Abby at facility who confirmed.    Forwarded records for review.    Kiarra Kurtz- Dialysis Coordinator  [facility] # [phone]

## 2020-12-07 NOTE — TELEPHONE ENCOUNTER
"Pt thinks she has pnuemonia   Difficulty with sleeping due to cough and breathlessness. \"can't catch my breath\".  Wheezing and SOB.    Pt has ESRD and is on dialysis Monday Wednesday and Friday at 4PM.    Reason for Disposition  • MODERATE difficulty breathing (e.g., speaks in phrases, SOB even at rest, pulse 100-120) of new onset or worse than normal    Additional Information  • Negative: Breathing stopped and hasn't returned  • Negative: Choking on something  • Negative: SEVERE difficulty breathing (e.g., struggling for each breath, speaks in single words, pulse > 120)  • Negative: Bluish (or gray) lips or face  • Negative: Difficult to awaken or acting confused (e.g., disoriented, slurred speech)  • Negative: Passed out (i.e., fainted, collapsed and was not responding)  • Negative: Wheezing started suddenly after medicine, an allergic food, or bee sting  • Negative: Stridor  • Negative: Slow, shallow and weak breathing  • Negative: Sounds like a life-threatening emergency to the triager  • Negative: Chest pain  • Negative: Wheezing (high pitched whistling sound) and previous asthma attacks or use of asthma medicines  • Negative: Difficulty breathing and only present when coughing  • Negative: Difficulty breathing and only from stuffy or runny nose    Answer Assessment - Initial Assessment Questions  1. RESPIRATORY STATUS: \"Describe your breathing?\" (e.g., wheezing, shortness of breath, unable to speak, severe coughing)       Hurts chest when coughing, wheezing  2. ONSET: \"When did this breathing problem begin?\"       11/05 but not sure  3. PATTERN \"Does the difficult breathing come and go, or has it been constant since it started?\"    constant  4. SEVERITY: \"How bad is your breathing?\" (e.g., mild, moderate, severe)     - MILD: No SOB at rest, mild SOB with walking, speaks normally in sentences, can lay down, no retractions, pulse < 100.     - MODERATE: SOB at rest, SOB with minimal exertion and prefers to sit, " "cannot lie down flat, speaks in phrases, mild retractions, audible wheezing, pulse 100-120.     - SEVERE: Very SOB at rest, speaks in single words, struggling to breathe, sitting hunched forward, retractions, pulse > 120       Moderate to severe  5. RECURRENT SYMPTOM: \"Have you had difficulty breathing before?\" If so, ask: \"When was the last time?\" and \"What happened that time?\"       History of pnuemonia  6. CARDIAC HISTORY: \"Do you have any history of heart disease?\" (e.g., heart attack, angina, bypass surgery, angioplasty)       no  7. LUNG HISTORY: \"Do you have any history of lung disease?\"  (e.g., pulmonary embolus, asthma, emphysema)      no  8. CAUSE: \"What do you think is causing the breathing problem?\"       pnuemonia  9. OTHER SYMPTOMS: \"Do you have any other symptoms? (e.g., dizziness, runny nose, cough, chest pain, fever)      Cough, chest discomfort, chills, body aches, fatigue  10. PREGNANCY: \"Is there any chance you are pregnant?\" \"When was your last menstrual period?\"        no  11. TRAVEL: \"Have you traveled out of the country in the last month?\" (e.g., travel history, exposures)        no    Protocols used: BREATHING DIFFICULTY-A-OH      "

## 2020-12-07 NOTE — ED TRIAGE NOTES
.  Chief Complaint   Patient presents with   • Cough     x 2-3 days   • Shortness of Breath      Pt ambulate to triage with above complaints. Pt reports she does not wear oxygen at home, Pt hypoxic on RA and was placed on 3 liters in triage. Pt notes worsening SOB and cough last 3-4 days. Pt reports she is a dialysis Pt, MWF and has not had dialysis today.   Pt educated on triage process and place in senior lounge.

## 2020-12-07 NOTE — ED PROVIDER NOTES
ED Provider Note    Scribed for Bunny Miller M.D. by Giovanny Gray. 12/7/2020  2:02 PM    Primary care provider: None noted  Means of arrival: Walk-in  History obtained from: Patient  History limited by: None    CHIEF COMPLAINT  Chief Complaint   Patient presents with   • Cough     x 2-3 days   • Shortness of Breath       HPI  Isabelle Coyle is a 55 y.o. female with a history of ESRD and COPD who presents to the Emergency Department for a cough onset 3-4 days ago. Patient states that she has been experiencing shortness of breath and chest pain when she coughs. Patient states that she recently visited the medical center and was hospitalized for missing dialysis. She adds that she stayed 2 nights there. Patient states that her last dialysis was 3 days ago at [facility]. Patient notes that her current symptoms do not feel like her COPD. Patient states that she is seen by [facility]. Patient states that she does not use breathing treatments for her COPD. Patient states that she is still smoking tobacco. Patient denies any fevers, chills, or changes in smell or taste.    PPE Note: I personally donned full PPE for all patient encounters during this visit, including being clean-shaven with an N95 respirator mask and gloves.    Scribe remained outside the patient's room and did not have any contact with the patient for the duration of patient encounter.     REVIEW OF SYSTEMS  Pertinent positives include cough, shortness of breath, and chest pain. Pertinent negatives include no fevers, chills, or changes in smell or taste.  All other systems reviewed and negative.    PAST MEDICAL HISTORY   has a past medical history of Anemia, Anemia associated with chronic renal failure (11/5/2009), Anginal syndrome ([facility]), Arrhythmia, Asthma, Back pain, COPD (chronic obstructive pulmonary disease) ([facility]), Dental disorder (8-25-17), Dialysis patient ([facility]) (8-25-17), Dysrhythmia, ESRD (end stage renal disease)  ([facility]) (17), Fall, GERD (gastroesophageal reflux disease), Glomerulonephritis, chronic (2009), Head ache, Heart burn, Heart murmur, High cholesterol, HTN (hypertension) (2009), Hypertension, Indigestion, Infectious disease, NSTEMI (non-ST elevated myocardial infarction) ([facility]) (10/2/2020), Pneumonia, Port catheter in place (17), Psychiatric problem, SVT (supraventricular tachycardia) ([facility]), SVT (supraventricular tachycardia) ([facility]), and Urinary incontinence (10/2/2020).    SURGICAL HISTORY   has a past surgical history that includes amputation, below the knee; capitation payment (insurance); appendectomy laparoscopic (2009); enlarge breast with implant; av fistula creation (Left, 2017); other; femur orif (10/9/08); iliac bone graft (10/9/08); av fistula creation (Left, 2017); av fistula creation (Left, 2018); and yamileth by laparoscopy (2019).    SOCIAL HISTORY  Social History     Tobacco Use   • Smoking status: Current Some Day Smoker     Packs/day: 0.25     Years: 20.00     Pack years: 5.00     Types: Cigarettes     Last attempt to quit: 2019     Years since quittin.6   • Smokeless tobacco: Never Used   • Tobacco comment: 5 cigs/day   Substance Use Topics   • Alcohol use: No   • Drug use: No      Social History     Substance and Sexual Activity   Drug Use No       FAMILY HISTORY  Family History   Problem Relation Age of Onset   • Heart Disease Other        CURRENT MEDICATIONS  Home Medications     Reviewed by Rona Boone (Pharmacy Tech) on 20 at 1724  Med List Status: Complete   Medication Last Dose Status   amLODIPine (NORVASC) 10 MG Tab 2020 Active   carvedilol (COREG) 12.5 MG Tab 2020 Active   cloNIDine (CATAPRES) 0.2 MG/24HR PATCH WEEKLY patch 2020 Active   DILTIAZem CD (CARDIZEM CD) 120 MG CAPSULE SR 24 HR unknown Active   famotidine (PEPCID) 20 MG Tab 2020 Active   furosemide (LASIX) 40 MG Tab unknown Active   gabapentin  "(NEURONTIN) 100 MG Cap 12/7/2020 Active   hydrALAZINE (APRESOLINE) 50 MG Tab 12/7/2020 Active   terazosin (HYTRIN) 5 MG Cap unknown Active                ALLERGIES  Allergies   Allergen Reactions   • Sulfa Drugs Hives, Rash, Itching and Swelling     [id number]       PHYSICAL EXAM  VITAL SIGNS: /81   Pulse 78   Temp 36.6 °C (97.8 °F) (Temporal)   Resp 18   Ht 1.575 m (5' 2\")   Wt 66.7 kg (147 lb 0.8 oz)   LMP 06/25/2007   SpO2 94%   BMI 26.90 kg/m²   Constitutional: Well developed, Well nourished, no distress, Non-toxic appearance.   HENT: Normocephalic, Atraumatic, Bilateral external ears normal, Oropharynx moist, No oral exudates.   Eyes: PERRLA, EOMI, Conjunctiva normal, No discharge.   Neck: No tenderness, Supple, No stridor.   Lymphatic: No lymphadenopathy noted.   Cardiovascular: Left AV fistula with good thrill, Normal heart rate, Normal rhythm.   Thorax & Lungs: Moderate respiratory distress, crackles at bilateral bases, tachypnea, no wheezing.    Abdomen: Soft, No tenderness, No masses, No pulsatile masses.   Skin: Warm, Dry, No erythema, No rash.   Extremities: Right BKA. Left lower extremity with 1+ pitting edema. No cyanosis.   Musculoskeletal: No tenderness to palpation. Intact distal pulses  Neurologic: Awake, alert. Moves all extremities spontaneously.  Psychiatric: Affect normal, Judgment normal, Mood normal.     LABS  Results for orders placed or performed during the hospital encounter of 12/07/20   COVID/SARS CoV-2 PCR    Specimen: Nasopharyngeal; Respirate   Result Value Ref Range    COVID Order Status Received    CBC with Differential   Result Value Ref Range    WBC 5.3 4.8 - 10.8 K/uL    RBC 2.79 (L) 4.20 - 5.40 M/uL    Hemoglobin 8.4 (L) 12.0 - 16.0 g/dL    Hematocrit 28.1 (L) 37.0 - 47.0 %    .7 (H) 81.4 - 97.8 fL    MCH 30.1 27.0 - 33.0 pg    MCHC 29.9 (L) 33.6 - 35.0 g/dL    RDW 64.4 (H) 35.9 - 50.0 fL    Platelet Count 106 (L) 164 - 446 K/uL    MPV 10.6 9.0 - 12.9 fL    " Neutrophils-Polys 76.60 (H) 44.00 - 72.00 %    Lymphocytes 15.30 (L) 22.00 - 41.00 %    Monocytes 4.40 0.00 - 13.40 %    Eosinophils 2.50 0.00 - 6.90 %    Basophils 0.60 0.00 - 1.80 %    Immature Granulocytes 0.60 0.00 - 0.90 %    Nucleated RBC 0.00 /100 WBC    Neutrophils (Absolute) 4.05 2.00 - 7.15 K/uL    Lymphs (Absolute) 0.81 (L) 1.00 - 4.80 K/uL    Monos (Absolute) 0.23 0.00 - 0.85 K/uL    Eos (Absolute) 0.13 0.00 - 0.51 K/uL    Baso (Absolute) 0.03 0.00 - 0.12 K/uL    Immature Granulocytes (abs) 0.03 0.00 - 0.11 K/uL    NRBC (Absolute) 0.00 K/uL    Hypochromia 1+     Anisocytosis 1+     Macrocytosis 1+    Comp Metabolic Panel   Result Value Ref Range    Sodium 139 135 - 145 mmol/L    Potassium 6.2 (H) 3.6 - 5.5 mmol/L    Chloride 97 96 - 112 mmol/L    Co2 27 20 - 33 mmol/L    Anion Gap 15.0 7.0 - 16.0    Glucose 95 65 - 99 mg/dL    Bun 67 (H) 8 - 22 mg/dL    Creatinine 8.82 (HH) 0.50 - 1.40 mg/dL    Calcium 10.1 8.5 - 10.5 mg/dL    AST(SGOT) 11 (L) 12 - 45 U/L    ALT(SGPT) 10 2 - 50 U/L    Alkaline Phosphatase 66 30 - 99 U/L    Total Bilirubin 0.9 0.1 - 1.5 mg/dL    Albumin 4.1 3.2 - 4.9 g/dL    Total Protein 6.4 6.0 - 8.2 g/dL    Globulin 2.3 1.9 - 3.5 g/dL    A-G Ratio 1.8 g/dL   D-Dimer   Result Value Ref Range    D-Dimer Screen 2.11 (H) 0.00 - 0.50 ug/mL (FEU)   CRP Quantitative (Non-Cardiac)   Result Value Ref Range    Stat C-Reactive Protein 0.34 0.00 - 0.75 mg/dL   Procalcitonin   Result Value Ref Range    Procalcitonin 0.70 (H) <0.25 ng/mL   ESTIMATED GFR   Result Value Ref Range    GFR If  6 (A) >60 mL/min/1.73 m 2    GFR If Non African American 5 (A) >60 mL/min/1.73 m 2   PERIPHERAL SMEAR REVIEW   Result Value Ref Range    Peripheral Smear Review see below    PLATELET ESTIMATE   Result Value Ref Range    Plt Estimation Decreased    MORPHOLOGY   Result Value Ref Range    RBC Morphology Present     Poikilocytosis 1+     Ovalocytes 1+    DIFFERENTIAL COMMENT   Result Value Ref Range     Comments-Diff see below    Blood Culture,Hold   Result Value Ref Range    Blood Culture Hold Collected    CoV-2, Flu A/B, And RSV by PCR   Result Value Ref Range    Influenza virus A RNA Negative Negative    Influenza virus B, PCR Negative Negative    RSV, PCR Negative Negative    SARS-CoV-2 by PCR NotDetected     SARS-CoV-2 Source NP Swab    Blood Culture,Hold   Result Value Ref Range    Blood Culture Hold Collected    EKG   Result Value Ref Range    Report       [facility] Emergency Dept.    Test Date:  2020  Pt Name:    MELISSA BARDALES                 Department: ER  MRN:        [id number]                      Room:  Gender:     Female                       Technician: [id number]  :        [id number]                   Requested By:ALFIE GARCIA  Order #:    [id number]                    Reading MD: ALFIE GARCIA MD    Measurements  Intervals                                Axis  Rate:       68                           P:          51  IN:         168                          QRS:        15  QRSD:       92                           T:          39  QT:         444  QTc:        473    Interpretive Statements  SINUS RHYTHM  BORDERLINE R WAVE PROGRESSION, ANTERIOR LEADS  BASELINE WANDER IN LEAD(S) V2  Compared to ECG 10/02/2020 20:36:00  No significant changes  Electronically Signed On 2020 16:37:00 PST by ALFIE GARCIA MD          RADIOLOGY  DX-CHEST-PORTABLE (1 VIEW)   Final Result      Cardiomegaly with pulmonary interstitial edema.      Small bilateral pleural effusions.      Atherosclerotic plaque.           The radiologist's interpretation of all radiological studies have been reviewed by me.      COURSE & MEDICAL DECISION MAKING  Pertinent Labs & Imaging studies reviewed. (See chart for details)    I reviewed the patient's medical records which showed that the patient has a history of ESRD and COPD.     2:02 PM - Patient seen and examined at bedside. Ordered labs and  imaging to evaluate her symptoms. The differential diagnoses include but are not limited to: sepsis, COVID-19, noncompliance, and pulmonary edema.    4:04 PM Patient treated with D50W injection 50 mL and HumuLIN R, NovoLIN R injection.     4:42 PM Paged Nephrology.     4:42 PM I discussed the patient's case and the above findings [facility] who agreed to consult.     5:02 PM Paged Triage Officer.     5:11 PM I discussed the patient's case and the above findings with Dr. Crawford (Hospitalist) who agreed to hospitalize the patient.    Decision Making:  Patient with cough, shortness of breath, Covid is negative, chest x-ray with pulmonary edema, it appears the patient has been noncompliant, hyperkalemia, give the patient insulin and D50, discussed the case with nephrology who will dialyze the patient, discussed the case with the hospitalist for hospitalization.    The patient remains critically ill.  Critical care time = 35 minutes in directly providing and coordinating critical care and extensive data review.  No time overlap and excludes procedures.    DISPOSITION:  Patient will be hospitalized by Dr. Crawford in guarded condition.    FINAL IMPRESSION  1. Acute respiratory failure with hypoxia (HCC)    2. Acute pulmonary edema (HCC)    3. Hyperkalemia       Critical care time of 35 minutes.   Giovanny LANE (Carly), am scribing for, and in the presence of, Bunny Miller M.D..    Electronically signed by: Giovanny Gray (Carly), 12/7/2020    IBunny M.D. personally performed the services described in this documentation, as scribed by Giovanny Gray in my presence, and it is both accurate and complete. C    The note accurately reflects work and decisions made by me.  Bunny Miller M.D.  12/7/2020  7:16 PM

## 2020-12-08 VITALS
HEART RATE: 66 BPM | SYSTOLIC BLOOD PRESSURE: 182 MMHG | OXYGEN SATURATION: 93 % | RESPIRATION RATE: 18 BRPM | DIASTOLIC BLOOD PRESSURE: 82 MMHG | TEMPERATURE: 97.8 F | WEIGHT: 147.05 LBS | HEIGHT: 62 IN | BODY MASS INDEX: 27.06 KG/M2

## 2020-12-08 PROBLEM — E87.5 HYPERKALEMIA: Status: RESOLVED | Noted: 2017-12-28 | Resolved: 2020-12-08

## 2020-12-08 PROBLEM — J44.9 COPD (CHRONIC OBSTRUCTIVE PULMONARY DISEASE) (HCC): Status: ACTIVE | Noted: 2020-12-08

## 2020-12-08 LAB
ALBUMIN SERPL BCP-MCNC: 3.9 G/DL (ref 3.2–4.9)
ANION GAP SERPL CALC-SCNC: 9 MMOL/L (ref 7–16)
BUN SERPL-MCNC: 36 MG/DL (ref 8–22)
CALCIUM SERPL-MCNC: 9.6 MG/DL (ref 8.5–10.5)
CHLORIDE SERPL-SCNC: 98 MMOL/L (ref 96–112)
CO2 SERPL-SCNC: 29 MMOL/L (ref 20–33)
CREAT SERPL-MCNC: 5.52 MG/DL (ref 0.5–1.4)
ERYTHROCYTE [DISTWIDTH] IN BLOOD BY AUTOMATED COUNT: 63.8 FL (ref 35.9–50)
GLUCOSE SERPL-MCNC: 89 MG/DL (ref 65–99)
HCT VFR BLD AUTO: 27.1 % (ref 37–47)
HGB BLD-MCNC: 8.4 G/DL (ref 12–16)
MCH RBC QN AUTO: 31.3 PG (ref 27–33)
MCHC RBC AUTO-ENTMCNC: 31 G/DL (ref 33.6–35)
MCV RBC AUTO: 101.1 FL (ref 81.4–97.8)
PHOSPHATE SERPL-MCNC: 5 MG/DL (ref 2.5–4.5)
PLATELET # BLD AUTO: 96 K/UL (ref 164–446)
PMV BLD AUTO: 9.9 FL (ref 9–12.9)
POTASSIUM SERPL-SCNC: 5.1 MMOL/L (ref 3.6–5.5)
RBC # BLD AUTO: 2.68 M/UL (ref 4.2–5.4)
SODIUM SERPL-SCNC: 136 MMOL/L (ref 135–145)
WBC # BLD AUTO: 6.4 K/UL (ref 4.8–10.8)

## 2020-12-08 PROCEDURE — 80069 RENAL FUNCTION PANEL: CPT

## 2020-12-08 PROCEDURE — 99217 PR OBSERVATION CARE DISCHARGE: CPT | Performed by: HOSPITALIST

## 2020-12-08 PROCEDURE — 85027 COMPLETE CBC AUTOMATED: CPT

## 2020-12-08 PROCEDURE — 700111 HCHG RX REV CODE 636 W/ 250 OVERRIDE (IP): Performed by: HOSPITALIST

## 2020-12-08 PROCEDURE — A9270 NON-COVERED ITEM OR SERVICE: HCPCS | Performed by: INTERNAL MEDICINE

## 2020-12-08 PROCEDURE — G0378 HOSPITAL OBSERVATION PER HR: HCPCS | Mod: EDC

## 2020-12-08 PROCEDURE — A9270 NON-COVERED ITEM OR SERVICE: HCPCS | Performed by: HOSPITALIST

## 2020-12-08 PROCEDURE — 96372 THER/PROPH/DIAG INJ SC/IM: CPT | Mod: EDC

## 2020-12-08 PROCEDURE — 96375 TX/PRO/DX INJ NEW DRUG ADDON: CPT | Mod: EDC

## 2020-12-08 PROCEDURE — 700102 HCHG RX REV CODE 250 W/ 637 OVERRIDE(OP): Performed by: INTERNAL MEDICINE

## 2020-12-08 PROCEDURE — 700102 HCHG RX REV CODE 250 W/ 637 OVERRIDE(OP): Performed by: NURSE PRACTITIONER

## 2020-12-08 PROCEDURE — A9270 NON-COVERED ITEM OR SERVICE: HCPCS | Performed by: NURSE PRACTITIONER

## 2020-12-08 PROCEDURE — 700101 HCHG RX REV CODE 250

## 2020-12-08 PROCEDURE — 700102 HCHG RX REV CODE 250 W/ 637 OVERRIDE(OP): Performed by: HOSPITALIST

## 2020-12-08 RX ORDER — BUDESONIDE AND FORMOTEROL FUMARATE DIHYDRATE 80; 4.5 UG/1; UG/1
2 AEROSOL RESPIRATORY (INHALATION) 2 TIMES DAILY
Qty: 1 EACH | Refills: 1 | Status: SHIPPED | OUTPATIENT
Start: 2020-12-08

## 2020-12-08 RX ORDER — LABETALOL HYDROCHLORIDE 5 MG/ML
10 INJECTION, SOLUTION INTRAVENOUS EVERY 4 HOURS PRN
Status: DISCONTINUED | OUTPATIENT
Start: 2020-12-08 | End: 2020-12-08 | Stop reason: HOSPADM

## 2020-12-08 RX ORDER — ALBUTEROL SULFATE 90 UG/1
2 AEROSOL, METERED RESPIRATORY (INHALATION) EVERY 4 HOURS PRN
Status: DISCONTINUED | OUTPATIENT
Start: 2020-12-08 | End: 2020-12-08 | Stop reason: HOSPADM

## 2020-12-08 RX ORDER — ALBUTEROL SULFATE 90 UG/1
2 AEROSOL, METERED RESPIRATORY (INHALATION) EVERY 4 HOURS PRN
Qty: 8.5 G | Refills: 1 | Status: SHIPPED | OUTPATIENT
Start: 2020-12-08

## 2020-12-08 RX ORDER — BUDESONIDE AND FORMOTEROL FUMARATE DIHYDRATE 80; 4.5 UG/1; UG/1
2 AEROSOL RESPIRATORY (INHALATION)
Status: DISCONTINUED | OUTPATIENT
Start: 2020-12-08 | End: 2020-12-08 | Stop reason: HOSPADM

## 2020-12-08 RX ADMIN — ALBUTEROL SULFATE 2 PUFF: 90 AEROSOL, METERED RESPIRATORY (INHALATION) at 12:39

## 2020-12-08 RX ADMIN — FUROSEMIDE 40 MG: 40 TABLET ORAL at 09:31

## 2020-12-08 RX ADMIN — CARVEDILOL 12.5 MG: 12.5 TABLET, FILM COATED ORAL at 09:31

## 2020-12-08 RX ADMIN — HYDRALAZINE HYDROCHLORIDE 50 MG: 25 TABLET, FILM COATED ORAL at 06:56

## 2020-12-08 RX ADMIN — CARVEDILOL 12.5 MG: 12.5 TABLET, FILM COATED ORAL at 18:26

## 2020-12-08 RX ADMIN — GABAPENTIN 100 MG: 100 CAPSULE ORAL at 18:26

## 2020-12-08 RX ADMIN — HEPARIN SODIUM 5000 UNITS: 5000 INJECTION, SOLUTION INTRAVENOUS; SUBCUTANEOUS at 07:00

## 2020-12-08 RX ADMIN — HYDRALAZINE HYDROCHLORIDE 50 MG: 25 TABLET, FILM COATED ORAL at 18:26

## 2020-12-08 RX ADMIN — DILTIAZEM HYDROCHLORIDE 120 MG: 120 CAPSULE, COATED, EXTENDED RELEASE ORAL at 06:58

## 2020-12-08 RX ADMIN — LABETALOL HYDROCHLORIDE 10 MG: 5 INJECTION, SOLUTION INTRAVENOUS at 03:27

## 2020-12-08 RX ADMIN — TERAZOSIN HYDROCHLORIDE 5 MG: 5 CAPSULE ORAL at 06:58

## 2020-12-08 RX ADMIN — GABAPENTIN 100 MG: 100 CAPSULE ORAL at 06:57

## 2020-12-08 RX ADMIN — FAMOTIDINE 20 MG: 20 TABLET ORAL at 06:58

## 2020-12-08 RX ADMIN — AMLODIPINE BESYLATE 10 MG: 5 TABLET ORAL at 06:57

## 2020-12-08 RX ADMIN — PATIROMER 8.4 G: 8.4 POWDER, FOR SUSPENSION ORAL at 12:40

## 2020-12-08 RX ADMIN — BUDESONIDE AND FORMOTEROL FUMARATE DIHYDRATE 2 PUFF: 80; 4.5 AEROSOL RESPIRATORY (INHALATION) at 13:27

## 2020-12-08 ASSESSMENT — ENCOUNTER SYMPTOMS
PSYCHIATRIC NEGATIVE: 1
EYES NEGATIVE: 1
SHORTNESS OF BREATH: 1
MUSCULOSKELETAL NEGATIVE: 1
GASTROINTESTINAL NEGATIVE: 1
CARDIOVASCULAR NEGATIVE: 1
NEUROLOGICAL NEGATIVE: 1

## 2020-12-08 ASSESSMENT — PAIN DESCRIPTION - PAIN TYPE: TYPE: ACUTE PAIN

## 2020-12-08 NOTE — PROGRESS NOTES
[facility] Nephrology Daily Progress Note    Date of Service  12/8/2020    Chief Complaint  The patient is a 55-year-old female with end-stage renal disease secondary to chronic GN, COPD, hypertension, who presented with increasing shortness of breath and cough.  It was noted the   patient was hypoxic in the ER. She had an abnormal chest x-ray.  On routine lab screening, her potassium was 6.2, but fortunately she did not have EKG changes.     The patient has been noncompliant with outpatient dialysis.  She has had a difficult time getting rides despite significant efforts on behalf of the  to try and provide access to transportation.    Daily Nephrology Summary:   12/7: consult done, emergent HD   12/8: sitting up in bed eating, reports SOB improved, wants to go home, assures me she will go to HD tomorrow at outpt clinic    Review of Systems  Review of Systems   Constitutional: Positive for malaise/fatigue.   HENT: Negative.    Eyes: Negative.    Respiratory: Positive for shortness of breath.    Cardiovascular: Negative.    Gastrointestinal: Negative.    Genitourinary: Negative.    Musculoskeletal: Negative.    Skin: Negative.    Neurological: Negative.    Endo/Heme/Allergies: Negative.    Psychiatric/Behavioral: Negative.         Physical Exam  Temp:  [36.6 °C (97.8 °F)] 36.6 °C (97.8 °F)  Pulse:  [] 68  Resp:  [16-30] 16  BP: (147-203)/(68-97) 152/75  SpO2:  [86 %-100 %] 97 %    Physical Exam    Fluids    Intake/Output Summary (Last 24 hours) at 12/8/2020 1145  Last data filed at 12/7/2020 2204  Gross per 24 hour   Intake 550 ml   Output 3000 ml   Net -2450 ml       Laboratory  Recent Labs     12/07/20  1444 12/08/20  0745   WBC 5.3 6.4   RBC 2.79* 2.68*   HEMOGLOBIN 8.4* 8.4*   HEMATOCRIT 28.1* 27.1*   .7* 101.1*   MCH 30.1 31.3   MCHC 29.9* 31.0*   RDW 64.4* 63.8*   PLATELETCT 106* 96*   MPV 10.6 9.9     Recent Labs     12/07/20  1444 12/08/20  0745   SODIUM 139 136   POTASSIUM 6.2*  5.1   CHLORIDE 97 98   CO2 27 29   GLUCOSE 95 89   BUN 67* 36*   CREATININE 8.82* 5.52*   CALCIUM 10.1 9.6         No results for input(s): NTPROBNP in the last 72 hours.        Imaging      Assessment/Plan  ASSESSMENT:  In conclusion, the patient is a 55-year-old female with end-stage   renal disease who is noncompliant with outpatient treatment.    1.  Hyperkalemia without EKG changes, corrected   S/p medical management, HD   Discussed compliance with HD tx and low K+ diet   2.  Shortness of breath secondary to volume overload and chronic obstructive   pulmonary disease, improved   UF with HD   Volume restriction  3.  End-stage renal disease with noncompliance   HD MWF at [facility]   4.  Anemia secondary to end-stage renal disease.  5.  Tobacco abuse disorder.  6.  Hypertension, exacerbated by volume problem, improved   7.  Right below-knee amputation.     PLAN:    Okay to discharge today to follow up with HD tomorrow at HD clinic   Encouraged medical compliance with HD tx and low K+ diet  Dose adjust all meds for GFR  Per nursing, pt 02% dropping into 80s off 02, may need 02 for home  If not able to DC will get HD tomorrow inpt

## 2020-12-08 NOTE — ED NOTES
Assess pt RA sat after inhalers given.  Pt 82% on RA.  Pt placed back on NC 2 liters.  Admitting NP Grace informed.

## 2020-12-08 NOTE — DISCHARGE PLANNING
Care Transition Team Assessment          Attempted to see pt for CTT assessment.  Pt getting dialysis at this time.     Elopement Risk  Legal Hold: No    Domestic Abuse  Have you ever been the victim of abuse or violence?: No

## 2020-12-08 NOTE — H&P
Hospital Medicine History & Physical Note    Date of Service  12/7/2020    Primary Care Physician  Pcp Pt States None    Consultants  Appreciate the assistance of Dr. Jarvis, nephrology    Code Status  Prior    Chief Complaint  Chief Complaint   Patient presents with   • Cough     x 2-3 days   • Shortness of Breath       History of Presenting Illness  55 y.o. female who presented 12/7/2020 with pneumonialike symptoms of cough, fatigue, and dyspnea for the past several days.  She typically dialyzes every Monday Wednesday and Friday, but has a history of poor adherence having been recently hospitalized at different hospitals in the region for missed dialysis.  Her dialysis was scheduled for 4 PM today, she instead presented to our emergency department for evaluation.  She was found to have an elevated potassium.  She expresses some disbelief that there is no evidence of pneumonia, only fluid overload on her chest x-ray.  Acuity is acute, severity is severe, location is generalized and pulmonary, timing is irrelevant, course is progressive, no particular aggravating factors, alleviated with dialysis.      Review of Systems  All systems reviewed negative except as noted per HPI.    Past Medical History   has a past medical history of Anemia, Anemia associated with chronic renal failure (11/5/2009), Anginal syndrome ([facility]), Arrhythmia, Asthma, Back pain, COPD (chronic obstructive pulmonary disease) ([facility]), Dental disorder (8-25-17), Dialysis patient ([facility]) (8-25-17), Dysrhythmia, ESRD (end stage renal disease) ([facility]) (8-25-17), Fall, GERD (gastroesophageal reflux disease), Glomerulonephritis, chronic (11/5/2009), Head ache, Heart burn, Heart murmur, High cholesterol, HTN (hypertension) (11/5/2009), Hypertension, Indigestion, Infectious disease, NSTEMI (non-ST elevated myocardial infarction) ([facility]) (10/2/2020), Pneumonia, Port catheter in place (8-25-17), Psychiatric problem, SVT (supraventricular tachycardia) ([facility]), SVT  (supraventricular tachycardia) (HCC), and Urinary incontinence (10/2/2020).    Surgical History   has a past surgical history that includes amputation, below the knee; capitation payment (insurance); appendectomy laparoscopic (5/4/2009); pr enlarge breast with implant; av fistula creation (Left, 6/20/2017); other; femur orif (10/9/08); iliac bone graft (10/9/08); av fistula creation (Left, 8/28/2017); av fistula creation (Left, 4/27/2018); and yamileth by laparoscopy (6/22/2019).     Family History  family history includes Heart Disease in an other family member.     Social History   reports that she has been smoking cigarettes. She has a 5.00 pack-year smoking history. She has never used smokeless tobacco. She reports that she does not drink alcohol or use drugs.    Allergies  Allergies   Allergen Reactions   • Sulfa Drugs Hives, Rash, Itching and Swelling     JNU=[id number]       Medications  Prior to Admission Medications   Prescriptions Last Dose Informant Patient Reported? Taking?   amLODIPine (NORVASC) 10 MG Tab 12/6/2020 at Unknown time Patient's Home Pharmacy Yes No   Sig: Take 10 mg by mouth every day.   carvedilol (COREG) 12.5 MG Tab 12/6/2020 at Unknown time Patient's Home Pharmacy Yes No   Sig: Take 12.5 mg by mouth 2 times a day, with meals.   cloNIDine (CATAPRES) 0.1 MG/24HR PATCH WEEKLY patch 12/6/2020 at Unknown time  Yes No   Sig: Apply 1 Patch to skin as directed every 7 days.   famotidine (PEPCID) 20 MG Tab 12/6/2020 at Unknown time  Yes No   Sig: Take 20 mg by mouth every bedtime.   furosemide (LASIX) 40 MG Tab  Patient's Home Pharmacy Yes No   Sig: Take 40 mg by mouth in the morning every Tue, Thurs, Sat, and Sun.   gabapentin (NEURONTIN) 100 MG Cap 12/6/2020 at Unknown time Patient's Home Pharmacy Yes No   Sig: Take 100 mg by mouth 2 Times a Day.   hydrALAZINE (APRESOLINE) 50 MG Tab 12/6/2020 at Unknown time Patient's Home Pharmacy Yes No   Sig: Take 50 mg by mouth 2 Times a Day.   terazosin (HYTRIN) 5  MG Cap 12/6/2020 at Unknown time Patient's Home Pharmacy Yes No   Sig: Take 5 mg by mouth every evening.      Facility-Administered Medications: None       Physical Exam  Temp:  [36.6 °C (97.8 °F)] 36.6 °C (97.8 °F)  Pulse:  [67-78] 67  Resp:  [18-28] 24  BP: (147-178)/(81-87) 178/87  SpO2:  [86 %-100 %] 100 %    General: No acute distress  HEENT atraumatic, normocephalic, pupils equal round reactive to light  Neck: No JVD  Chest: Respirations are unlabored  Cardiac: Physiologic S1 and S2  Abdomen: Soft, nontender, nondistended  Extremities: Without clubbing, cyanosis or edema.  Fistula intact left upper extremity with a palpable thrill.  Neuro: Cranial nerves II through XII are grossly intact.  Psych: No anxiety, judgement intact.        Laboratory:  Recent Labs     12/07/20  1444   WBC 5.3   RBC 2.79*   HEMOGLOBIN 8.4*   HEMATOCRIT 28.1*   .7*   MCH 30.1   MCHC 29.9*   RDW 64.4*   PLATELETCT 106*   MPV 10.6     Recent Labs     12/07/20  1444   SODIUM 139   POTASSIUM 6.2*   CHLORIDE 97   CO2 27   GLUCOSE 95   BUN 67*   CREATININE 8.82*   CALCIUM 10.1     Recent Labs     12/07/20  1444   ALTSGPT 10   ASTSGOT 11*   ALKPHOSPHAT 66   TBILIRUBIN 0.9   GLUCOSE 95         No results for input(s): NTPROBNP in the last 72 hours.      No results for input(s): TROPONINT in the last 72 hours.    Imaging:  DX-CHEST-PORTABLE (1 VIEW)   Final Result      Cardiomegaly with pulmonary interstitial edema.      Small bilateral pleural effusions.      Atherosclerotic plaque.           Cardiology  1.  Twelve-lead EKG discloses normal sinus rhythm at 60 ventricular beats per minute  ms.  Axis is normal.  T waves minimally peaked, though upright throughout the precordial leads, inverted in V1 and V2, likely lead placement issue.  No ST segment elevations or deviations concerning for ischemia are seen.  Of note, read as per my interpretation of images pending the benefit of cardiology at the time of this  dictation.    Assessment/Plan:  I anticipate this patient is appropriate for observation status at this time.    Hyperkalemia- (present on admission)  Assessment & Plan  Secondary to missed dialysis, recheck in the morning following dialysis session currently being urgently arranged by Dr. Jarvis.    Acute pulmonary edema (HCC)- (present on admission)  Assessment & Plan  Secondary to missed dialysis.    ESRD (end stage renal disease) on dialysis (HCC)  Assessment & Plan  With hyperkalemia secondary to missed dialysis, this will be arranged, anticipate patient will be stable for discharge thereafter.    HTN (hypertension)  Assessment & Plan  Continue home medications with hold parameters.    Hx of right BKA (CMS-HCC)- (present on admission)  Assessment & Plan  No acute interventions at this juncture.

## 2020-12-08 NOTE — ASSESSMENT & PLAN NOTE
With hyperkalemia secondary to missed dialysis, this will be arranged, anticipate patient will be stable for discharge thereafter.

## 2020-12-08 NOTE — ED NOTES
While giving telephone report, monitor showed afib with rate 110s. Pt c/o palpitations and CP. Hospitalist paged, new order received, now in aflutter on monitor.

## 2020-12-08 NOTE — ED NOTES
Pt removed from O2 by admitting NP, pt 82% on RA - placed back on O2 at 2 liters.  Admitting NP informed and plan for inhalers and reassess oxygen needs.  Pt aware of POC and moved into hospital bed.

## 2020-12-08 NOTE — PROGRESS NOTES
[facility] Progress Note      STAT HD ordered by Dr. Jarvis. Treatment started at 1904 and ended at 2204.     Total Net UF 2500mL.    Pt tolerated treatment well, started cramping during last hour of HD. UF goal decreased with effective result. See paper flow sheet for details. Cannulation needles taken out, held pressure for 10 min and placed gauze dressing with no bleeding. LOR AVF + for bruit/thrill. Report given to primary RN.

## 2020-12-08 NOTE — DISCHARGE SUMMARY
Discharge Summary    CHIEF COMPLAINT ON ADMISSION  Chief Complaint   Patient presents with   • Cough     x 2-3 days   • Shortness of Breath       Reason for Admission  SOB/Cough     Admission Date  12/7/2020    CODE STATUS  Full Code    HPI & HOSPITAL COURSE  This is a 55 y.o. female with a past medical history of end-stage renal disease on hemodialysis, COPD, hypertension here with complaints of cough, fatigue, and dyspnea for several days.  She has a history of noncompliance with her dialysis.  On this admission the patient was found to have hyperkalemia with potassium of 6.2 and evidence of volume overload with pulmonary edema on x-ray and elevated blood pressure.  She was treated with insulin and D50.  She also underwent dialysis.  Her hyperkalemia resolved.  Her blood pressures improved.    The patient also was noted to require continuous supplemental oxygen.  She does have a history of COPD and is currently not on maintenance medications for this.  She was initiated on Symbicort and albuterol.  Her shortness of breath did improve, but she does continue to require small amount of supplemental oxygen.  She will be discharged home with home oxygen.  She will need to follow with pulmonary in the outpatient setting for formal PFTs and further maintenance of her COPD.    At this time the patient is medically stable.  She has no further need for acute intervention.  She is recommended to maintain compliance with her outpatient dialysis.  She is safe for discharge at this time.      Therefore, she is discharged in good and stable condition to home with close outpatient follow-up.    Discharge Date  12/8/2020    FOLLOW UP ITEMS POST DISCHARGE  -Maintain compliance with outpatient dialysis.  -Continue home oxygen.  Follow with pulmonary in the outpatient setting.    DISCHARGE DIAGNOSES  Active Problems:    ESRD (end stage renal disease) on dialysis (HCC) POA: Unknown    Acute pulmonary edema (HCC) POA: Yes    Hx of  right BKA (CMS-HCC) POA: Yes    HTN (hypertension) POA: Unknown  Resolved Problems:    Hyperkalemia POA: Yes      FOLLOW UP  Follow with PCP in 1 to 2 weeks.    MEDICATIONS ON DISCHARGE     Medication List      START taking these medications      Instructions   albuterol 108 (90 Base) MCG/ACT Aers inhalation aerosol   Inhale 2 Puffs every four hours as needed for Shortness of Breath.  Dose: 2 Puff     budesonide-formoterol 80-4.5 MCG/ACT Aero  Commonly known as: Symbicort   Inhale 2 Puffs 2 Times a Day.  Dose: 2 Puff        CONTINUE taking these medications      Instructions   amLODIPine 10 MG Tabs  Commonly known as: NORVASC   Take 10 mg by mouth every day.  Dose: 10 mg     carvedilol 12.5 MG Tabs  Commonly known as: COREG   Take 12.5 mg by mouth 2 times a day, with meals.  Dose: 12.5 mg     cloNIDine 0.2 MG/24HR Ptwk patch  Commonly known as: CATAPRES   Place 1 Patch on the skin every 7 days. Wednesdays.  Dose: 1 Patch     DILTIAZem  MG Cp24  Commonly known as: CARDIZEM CD   Take 120 mg by mouth 2 Times a Day.  Dose: 120 mg     famotidine 20 MG Tabs  Commonly known as: PEPCID   Take 20 mg by mouth every morning.  Dose: 20 mg     furosemide 40 MG Tabs  Commonly known as: LASIX   Take 40 mg by mouth in the morning every Tue, Thurs, Sat, and Sun.  Dose: 40 mg     gabapentin 100 MG Caps  Commonly known as: NEURONTIN   Take 100 mg by mouth 2 Times a Day.  Dose: 100 mg     hydrALAZINE 50 MG Tabs  Commonly known as: APRESOLINE   Take 50 mg by mouth 2 Times a Day.  Dose: 50 mg     terazosin 5 MG Caps  Commonly known as: HYTRIN   Take 5 mg by mouth every day.  Dose: 5 mg            Allergies  Allergies   Allergen Reactions   • Sulfa Drugs Hives, Rash, Itching and Swelling     NHC=[id number]       DIET  Orders Placed This Encounter   Procedures   • Diet Order Diet: Renal     Standing Status:   Standing     Number of Occurrences:   1     Order Specific Question:   Diet:     Answer:   Renal [8]       ACTIVITY  As  tolerated.  Weight bearing as tolerated    CONSULTATIONS  Nephrology    LABORATORY  Lab Results   Component Value Date    SODIUM 136 12/08/2020    POTASSIUM 5.1 12/08/2020    CHLORIDE 98 12/08/2020    CO2 29 12/08/2020    GLUCOSE 89 12/08/2020    BUN 36 (H) 12/08/2020    CREATININE 5.52 (HH) 12/08/2020    CREATININE 2.2 (H) 05/06/2009        Lab Results   Component Value Date    WBC 6.4 12/08/2020    HEMOGLOBIN 8.4 (L) 12/08/2020    HEMATOCRIT 27.1 (L) 12/08/2020    PLATELETCT 96 (L) 12/08/2020

## 2020-12-08 NOTE — ED NOTES
Pt converted back into SR but with inverted P waves, hospitalist updated, declined repeat EKG. Pt denies CP at this time, VSS.

## 2020-12-08 NOTE — ED NOTES
Assumed care of patient. Pt placed on continuous monitoring.   Pt appears restless, deep heavy breathing. Reassured patient.   Call bell within reach

## 2020-12-08 NOTE — DISCHARGE PLANNING
"Care Transition Team Assessment    Information Source  Orientation : Oriented x 4  Information Given By: Patient  Who is responsible for making decisions for patient? : Patient         Elopement Risk  Legal Hold: No    Interdisciplinary Discharge Planning  Primary Care Physician: BERTA  Lives with - Patient's Self Care Capacity: Significant Other  Support Systems: Spouse / Significant Other  Housing / Facility: 1 Story House  Able to Return to Previous ADL's: Yes  Mobility Issues: No  Prior Services: None  Patient Prefers to be Discharged to:: home  Assistance Needed: No  Durable Medical Equipment: Not Applicable    Discharge Preparedness  What is your plan after discharge?: Home with help  What are your discharge supports?: Spouse  Prior Functional Level: Ambulatory, Drives Self, Independent with Activities of Daily Living  Difficulity with ADLs: None  Difficulity with IADLs: None    Functional Assesment  Prior Functional Level: Ambulatory, Drives Self, Independent with Activities of Daily Living    Finances  Financial Barriers to Discharge: No  Prescription Coverage: Yes    Vision / Hearing Impairment  Vision Impairment : Yes  Right Eye Vision: Wears Glasses  Left Eye Vision: Wears Glasses         Advance Directive  Advance Directive?: None    Domestic Abuse  Have you ever been the victim of abuse or violence?: No    Psychological Assessment  History of Substance Abuse: Prescription opioids  Date Last Used - Prescription Opioids: (\"10 years ago\")  History of Psychiatric Problems: No  Non-compliant with Treatment: No    Discharge Risks or Barriers  Discharge risks or barriers?: No    Anticipated Discharge Information  Discharge Address: [address] [place], [place] [address] [id number]  Discharge Contact Phone Number: [phone]        "

## 2020-12-08 NOTE — ED NOTES
Pt sitting up in [facility] eating breakfast.  No needs voiced at this time.   Hospital bed ordered for pt.

## 2020-12-08 NOTE — ED NOTES
Awaiting other inhaler from pharmacy.  Pt resting in [facility].  Will continue to monitor.   Sonya Castellon

## 2020-12-08 NOTE — ED NOTES
Rounded on pt, updated on plan of care. Pt still appears tachypneic at rest. Oxygen at 4L NC. Pt has not complaints at this time. Call bell within reach

## 2020-12-08 NOTE — FACE TO FACE
"Face to Face Note  -  Durable Medical Equipment    PEDRO Buchanan - NPI: [id number]  I certify that this patient is under my care and that they had a durable medical equipment(DME)face to face encounter by myself that meets the physician DME face-to-face encounter requirements with this patient on:    Date of encounter:   Patient:                    MRN:                       YOB: 2022  Isabelle Coyle  [id number]  1965     The encounter with the patient was in whole, or in part, for the following medical condition, which is the primary reason for durable medical equipment:  COPD    I certify that, based on my findings, the following durable medical equipment is medically necessary:  Oxygen.    HOME O2 Saturation Measurements:(Values must be present for Home Oxygen orders)  Room air sat at rest: 82     With liters of O2: 2, O2 sat at rest with O2: 92   ,    Is the patient mobile?: Yes    My Clinical findings support the need for the above equipment due to:  Hypoxia    Supporting Symptoms: The patient requires supplemental oxygen, as the following interventions have been tried with limited or no improvement: \"Bronchodilators and/or steroid inhalers, \"Ambulation with oximetry and \"Incentive spirometry    If patient feels more short of breath, they can go up to 6 liters per minute and contact healthcare provider.  "

## 2020-12-08 NOTE — ED NOTES
Med rec completed per Pt at bedside and phone call to Pt's home pharmacy [facility] on [address] () to verify names and strengths of Pt's medications. Pt was not able to recall whether she takes furosemide. Pt reports taking terazosin 5 mg but can not state whether she takes in the morning or the evening. Pt's pharmacy reports dispensing diltiazem  mg one capsule twice daily but Pt did not mention this medication during interview; unknown whether Pt is taking this medication.    Allergies reviewed with Pt.    No oral antibiotics in last 14 days.

## 2020-12-08 NOTE — CONSULTS
DATE OF SERVICE:  12/07/2020     REASON FOR CONSULTATION:  End-stage renal disease, volume overload and   hyperkalemia.     HISTORY OF PRESENT ILLNESS:  The patient is a 55-year-old female with   end-stage renal disease secondary to chronic GN, COPD, hypertension, who   presented with increasing shortness of breath and cough.  It was noted the   patient was hypoxic in the ER. She had an abnormal chest x-ray.  On routine   lab screening, her potassium was 6.2, but fortunately she did not have EKG   changes.     The patient has been noncompliant with outpatient dialysis.  She has had a   difficult time getting rides despite significant efforts on behalf of the    to try and provide access to transportation.     PAST MEDICAL HISTORY:  Reviewed in the HPI.     SOCIAL HISTORY:  The patient continues to smoke.  She does not drink.     FAMILY HISTORY:  Reviewed and not applicable to this hospitalization.     MEDICATIONS:  Include amlodipine.  She is not aware of any other medications.     REVIEW OF SYSTEMS:  GENERAL:  The patient's weight fluctuates with dialysis.  HEENT:  She denies difficulty seeing, hearing or swallowing.  CARDIOVASCULAR:  She has had no chest pain.  PULMONARY:  She has had significant shortness of breath.  GASTROINTESTINAL:  She has had no diarrhea.  GENITOURINARY:  Almost anuric.  EXTREMITIES:  She has a fistula in her left upper extremity.  NEUROLOGIC:  She has no weakness.  MUSCULOSKELETAL:  She has a right BKA.     PHYSICAL EXAMINATION:    VITAL SIGNS:  Her blood pressure has ranged from 150s-170s/100s. She is   afebrile, heart rate 60s-70s, respirations are 18.  GENERAL:  She is chronically ill appearing, in moderate respiratory distress.  HEENT:  Extraocular muscles are intact.  Her sclerae are anicteric.  Her   oropharynx is clear.  NECK:  Supple.  HEART:  Regular rate with a II/VI systolic murmur.  She has diffuse wheezing,   crackles in her lung fields.  EXTREMITIES:  She has a  fistula in her left biceps with a good bruit and   thrill.  She has a BKA on the right.  No edema on the left.     LABORATORY DATA:  As follows:  Chest x-ray suggestive of volume overload.    Sodium 139, potassium 6.2, chloride 97, CO2 is 27, BUN is 67, glucose 95,   creatinine 8.82 calcium is 10.1, white blood cell count is 5.3, hemoglobin is   8.4, platelets are 106.     ASSESSMENT:  In conclusion, the patient is a 55-year-old female with end-stage   renal disease who is noncompliant with outpatient treatment.  1.  Hyperkalemia without EKG changes.  She was treated with insulin, D50.  She   was given a dose of patiromer and will be started on dialysis this evening.  2.  Shortness of breath secondary to volume overload and chronic obstructive   pulmonary disease.  3.  End-stage renal disease with noncompliance.  4.  Anemia secondary to end-stage renal disease.  5.  Tobacco abuse disorder.  6.  Hypertension, exacerbated by volume problem.  7.  Right below-knee amputation.     PLAN:  Insulin D50 for dialysis tonight, patiromer, dialysis tonight, Epogen   10,000 units.  Discussed the importance of compliance with dialysis treatment.        ______________________________________________  ADELA ROMERO MD    RFQ/SUM    DD:  12/07/2020 17:22  DT:  12/07/2020 18:53    Job#:  [id number]    CC:ALFIE GARCIA MD

## 2020-12-09 NOTE — DISCHARGE PLANNING
1601 - Text message received from bedside RN regarding need for home O2.    1607 - Call to pt for choice, pt requests blanket choice.    1615 - phonecall to [facility] for choice.    1619 - all requested documents sent via fax to [facility] at [phone].    1711 - RN notified pt accepted by preferred and O2 would arrive in approx 2 hours.

## 2020-12-09 NOTE — ED NOTES
Clarified with Hoang JEAN-BAPTISTE, patient is ok to discharge once home O2 is set up. Pt is requiring 1L NC.    SW notified pt needs home O2, SW stated that she was not aware.

## 2020-12-09 NOTE — DISCHARGE INSTRUCTIONS
Discharge Instructions per ORLANDO Buchanan.    Continue dialysis outpatient as prescribed.  Continue home oxygen.  Follow with pulmonary in the outpatient setting.    DIET: Renal diet.    ACTIVITY: As tolerated    DIAGNOSIS: Volume overload; shortness of breath    Return to ER if patient develops worsening shortness of breath, altered mental status, altered level of consciousness, fever, or chills.

## 2020-12-09 NOTE — ED NOTES
Report to Janey SANTOS. Pt moved to [facility] [address].  Pt was requesting to leave without oxygen, but it was explained to her concern for her safety without oxygen and she agreed to wait for delivery.

## 2020-12-10 LAB — IL6 SERPL-MCNC: <2 PG/ML

## 2020-12-17 ENCOUNTER — APPOINTMENT (RX ONLY)
Dept: URBAN - METROPOLITAN AREA CLINIC 125 | Facility: CLINIC | Age: 55
Setting detail: DERMATOLOGY
End: 2020-12-17

## 2020-12-17 DIAGNOSIS — L72.0 EPIDERMAL CYST: ICD-10-CM

## 2020-12-17 DIAGNOSIS — L20.89 OTHER ATOPIC DERMATITIS: ICD-10-CM

## 2020-12-17 DIAGNOSIS — L71.8 OTHER ROSACEA: ICD-10-CM

## 2020-12-17 PROBLEM — L20.84 INTRINSIC (ALLERGIC) ECZEMA: Status: ACTIVE | Noted: 2020-12-17

## 2020-12-17 PROCEDURE — ? TREATMENT REGIMEN

## 2020-12-17 PROCEDURE — ? PRESCRIPTION

## 2020-12-17 PROCEDURE — ? OTHER

## 2020-12-17 PROCEDURE — 99213 OFFICE O/P EST LOW 20 MIN: CPT

## 2020-12-17 PROCEDURE — ? COUNSELING

## 2020-12-17 RX ORDER — FLUOCINONIDE 0.5 MG/G
CREAM TOPICAL
Qty: 1 | Refills: 1 | Status: ERX | COMMUNITY
Start: 2020-12-17

## 2020-12-17 RX ORDER — CLINDAMYCIN PHOSPHATE AND BENZOYL PEROXIDE 10; 37.5 MG/G; MG/G
GEL TOPICAL
Qty: 1 | Refills: 0 | Status: ERX | COMMUNITY
Start: 2020-12-17

## 2020-12-17 RX ORDER — DOXYCYCLINE HYCLATE 20 MG/1
TABLET, FILM COATED ORAL BID
Qty: 180 | Refills: 4 | Status: ERX

## 2020-12-17 RX ORDER — IVERMECTIN 10 MG/G
CREAM TOPICAL
Qty: 1 | Refills: 2 | Status: ERX

## 2020-12-17 RX ADMIN — FLUOCINONIDE: 0.5 CREAM TOPICAL at 00:00

## 2020-12-17 RX ADMIN — CLINDAMYCIN PHOSPHATE AND BENZOYL PEROXIDE: 10; 37.5 GEL TOPICAL at 00:00

## 2020-12-17 ASSESSMENT — LOCATION DETAILED DESCRIPTION DERM
LOCATION DETAILED: RIGHT INFERIOR MEDIAL LOWER BACK
LOCATION DETAILED: RIGHT SUPERIOR MEDIAL MALAR CHEEK
LOCATION DETAILED: RIGHT CENTRAL MALAR CHEEK
LOCATION DETAILED: RIGHT ANTERIOR SHOULDER
LOCATION DETAILED: INFERIOR MID FOREHEAD
LOCATION DETAILED: LEFT ANTERIOR SHOULDER
LOCATION DETAILED: LEFT INFERIOR CENTRAL MALAR CHEEK

## 2020-12-17 ASSESSMENT — LOCATION SIMPLE DESCRIPTION DERM
LOCATION SIMPLE: RIGHT SHOULDER
LOCATION SIMPLE: RIGHT CHEEK
LOCATION SIMPLE: LEFT SHOULDER
LOCATION SIMPLE: INFERIOR FOREHEAD
LOCATION SIMPLE: LEFT CHEEK
LOCATION SIMPLE: RIGHT LOWER BACK

## 2020-12-17 ASSESSMENT — LOCATION ZONE DERM
LOCATION ZONE: FACE
LOCATION ZONE: TRUNK
LOCATION ZONE: ARM

## 2020-12-17 NOTE — PROCEDURE: TREATMENT REGIMEN
Detail Level: Detailed
Initiate Treatment: Onexton 1.2 % (1 % base)-3.75 % topical gel Alternate with Soolantra 1% cream qhs
Otc Regimen: Gentle cleanser \\nMoisturizer daily\\nRecommended after gentle cleanser and moisturizer apply pea size amount to face Qhs
Continue Regimen: Soolantra cream aaa face qhs alternating nights with OTC differin 0.1% gel \\nDoxycycline 2omg bid
Initiate Treatment: fluocinonide 0.05 % topical cream apply to affected areas  twice daily 2 weeks on 1 week off
Detail Level: Zone

## 2020-12-17 NOTE — PROCEDURE: OTHER
Detail Level: Simple
Other (Free Text): Electrodessication No charge w/ consent
Note Text (......Xxx Chief Complaint.): This diagnosis correlates with the

## 2021-01-16 ENCOUNTER — HOSPITAL ENCOUNTER (EMERGENCY)
Facility: MEDICAL CENTER | Age: 56
End: 2021-01-17
Attending: EMERGENCY MEDICINE
Payer: MEDICAID

## 2021-01-16 DIAGNOSIS — M79.2 NEUROPATHIC PAIN: ICD-10-CM

## 2021-01-16 DIAGNOSIS — B02.9 HERPES ZOSTER WITHOUT COMPLICATION: ICD-10-CM

## 2021-01-16 PROCEDURE — 96374 THER/PROPH/DIAG INJ IV PUSH: CPT

## 2021-01-16 PROCEDURE — 99284 EMERGENCY DEPT VISIT MOD MDM: CPT

## 2021-01-17 VITALS
RESPIRATION RATE: 18 BRPM | WEIGHT: 135 LBS | SYSTOLIC BLOOD PRESSURE: 194 MMHG | TEMPERATURE: 97 F | DIASTOLIC BLOOD PRESSURE: 83 MMHG | BODY MASS INDEX: 23.92 KG/M2 | HEIGHT: 63 IN | OXYGEN SATURATION: 95 % | HEART RATE: 67 BPM

## 2021-01-17 LAB
ANION GAP SERPL CALC-SCNC: 18 MMOL/L (ref 7–16)
BASOPHILS # BLD AUTO: 0.5 % (ref 0–1.8)
BASOPHILS # BLD: 0.02 K/UL (ref 0–0.12)
BUN SERPL-MCNC: 85 MG/DL (ref 8–22)
CALCIUM SERPL-MCNC: 10.3 MG/DL (ref 8.5–10.5)
CHLORIDE SERPL-SCNC: 94 MMOL/L (ref 96–112)
CO2 SERPL-SCNC: 27 MMOL/L (ref 20–33)
CREAT SERPL-MCNC: 11.79 MG/DL (ref 0.5–1.4)
EOSINOPHIL # BLD AUTO: 0.09 K/UL (ref 0–0.51)
EOSINOPHIL NFR BLD: 2.1 % (ref 0–6.9)
ERYTHROCYTE [DISTWIDTH] IN BLOOD BY AUTOMATED COUNT: 57.2 FL (ref 35.9–50)
GLUCOSE SERPL-MCNC: 92 MG/DL (ref 65–99)
HCT VFR BLD AUTO: 29.6 % (ref 37–47)
HGB BLD-MCNC: 9.1 G/DL (ref 12–16)
IMM GRANULOCYTES # BLD AUTO: 0.02 K/UL (ref 0–0.11)
IMM GRANULOCYTES NFR BLD AUTO: 0.5 % (ref 0–0.9)
LYMPHOCYTES # BLD AUTO: 0.74 K/UL (ref 1–4.8)
LYMPHOCYTES NFR BLD: 17.1 % (ref 22–41)
MCH RBC QN AUTO: 31.4 PG (ref 27–33)
MCHC RBC AUTO-ENTMCNC: 30.7 G/DL (ref 33.6–35)
MCV RBC AUTO: 102.1 FL (ref 81.4–97.8)
MONOCYTES # BLD AUTO: 0.21 K/UL (ref 0–0.85)
MONOCYTES NFR BLD AUTO: 4.8 % (ref 0–13.4)
NEUTROPHILS # BLD AUTO: 3.25 K/UL (ref 2–7.15)
NEUTROPHILS NFR BLD: 75 % (ref 44–72)
NRBC # BLD AUTO: 0 K/UL
NRBC BLD-RTO: 0 /100 WBC
PLATELET # BLD AUTO: 103 K/UL (ref 164–446)
PMV BLD AUTO: 10 FL (ref 9–12.9)
POTASSIUM SERPL-SCNC: 4.8 MMOL/L (ref 3.6–5.5)
RBC # BLD AUTO: 2.9 M/UL (ref 4.2–5.4)
SODIUM SERPL-SCNC: 139 MMOL/L (ref 135–145)
WBC # BLD AUTO: 4.3 K/UL (ref 4.8–10.8)

## 2021-01-17 PROCEDURE — 80048 BASIC METABOLIC PNL TOTAL CA: CPT

## 2021-01-17 PROCEDURE — 96374 THER/PROPH/DIAG INJ IV PUSH: CPT

## 2021-01-17 PROCEDURE — 700111 HCHG RX REV CODE 636 W/ 250 OVERRIDE (IP): Performed by: EMERGENCY MEDICINE

## 2021-01-17 PROCEDURE — 85025 COMPLETE CBC W/AUTO DIFF WBC: CPT

## 2021-01-17 PROCEDURE — 700111 HCHG RX REV CODE 636 W/ 250 OVERRIDE (IP)

## 2021-01-17 PROCEDURE — 700102 HCHG RX REV CODE 250 W/ 637 OVERRIDE(OP): Performed by: EMERGENCY MEDICINE

## 2021-01-17 PROCEDURE — A9270 NON-COVERED ITEM OR SERVICE: HCPCS | Performed by: EMERGENCY MEDICINE

## 2021-01-17 RX ORDER — VALACYCLOVIR HYDROCHLORIDE 500 MG/1
500 TABLET, FILM COATED ORAL ONCE
Status: DISCONTINUED | OUTPATIENT
Start: 2021-01-17 | End: 2021-01-17

## 2021-01-17 RX ORDER — PREDNISONE 20 MG/1
40 TABLET ORAL DAILY
Status: DISCONTINUED | OUTPATIENT
Start: 2021-01-17 | End: 2021-01-17 | Stop reason: HOSPADM

## 2021-01-17 RX ORDER — GABAPENTIN 100 MG/1
100 CAPSULE ORAL 2 TIMES DAILY
Status: DISCONTINUED | OUTPATIENT
Start: 2021-01-17 | End: 2021-01-17 | Stop reason: HOSPADM

## 2021-01-17 RX ORDER — VALACYCLOVIR HYDROCHLORIDE 500 MG/1
500 TABLET, FILM COATED ORAL ONCE
Status: COMPLETED | OUTPATIENT
Start: 2021-01-17 | End: 2021-01-17

## 2021-01-17 RX ORDER — VALACYCLOVIR HYDROCHLORIDE 500 MG/1
500 TABLET, FILM COATED ORAL DAILY
Qty: 7 TAB | Refills: 0 | Status: ON HOLD | OUTPATIENT
Start: 2021-01-17 | End: 2021-01-23

## 2021-01-17 RX ORDER — PREDNISONE 20 MG/1
40 TABLET ORAL DAILY
Qty: 10 TAB | Refills: 0 | Status: ON HOLD | OUTPATIENT
Start: 2021-01-17 | End: 2021-01-23

## 2021-01-17 RX ORDER — PREDNISONE 20 MG/1
40 TABLET ORAL DAILY
Status: DISCONTINUED | OUTPATIENT
Start: 2021-01-17 | End: 2021-01-17

## 2021-01-17 RX ORDER — GABAPENTIN 100 MG/1
100 CAPSULE ORAL
Qty: 30 CAP | Refills: 0 | Status: ON HOLD | OUTPATIENT
Start: 2021-01-17 | End: 2021-03-05 | Stop reason: SDUPTHER

## 2021-01-17 RX ADMIN — FENTANYL CITRATE 100 MCG: 50 INJECTION, SOLUTION INTRAMUSCULAR; INTRAVENOUS at 00:38

## 2021-01-17 RX ADMIN — PREDNISONE 40 MG: 20 TABLET ORAL at 00:38

## 2021-01-17 RX ADMIN — VALACYCLOVIR HYDROCHLORIDE 500 MG: 500 TABLET, FILM COATED ORAL at 00:38

## 2021-01-17 RX ADMIN — GABAPENTIN 100 MG: 100 CAPSULE ORAL at 00:38

## 2021-01-17 ASSESSMENT — ENCOUNTER SYMPTOMS
ABDOMINAL PAIN: 0
NAUSEA: 0
COUGH: 0
SHORTNESS OF BREATH: 0
FEVER: 0
VOMITING: 0

## 2021-01-17 ASSESSMENT — FIBROSIS 4 INDEX: FIB4 SCORE: 2.07

## 2021-01-17 NOTE — ED NOTES
LSW contacted about obtaining oxygen supplies for patient to get home with [facility] cab service.

## 2021-01-17 NOTE — ED PROVIDER NOTES
ED Provider Note    Scribed for Fidelina Tay M.D. by Rosy Neves. 1/17/2021, 12:01 AM.    Means of arrival: EMS  History obtained from: Patient  History limited by: None    CHIEF COMPLAINT  Chief Complaint   Patient presents with   • Herpes Zoster     Pt BIB EMS for R sided shingles rash from mid upper R back extending to R rib cage. Pt states she has missed 2 dialysis appointments due to pain       HPI  Isabelle Coyle is a 55 y.o. female who presents to the Emergency Department via EMS for evaluation of rash on her back onset four days prior to arrival. She missed her last two dialysis treatments due to pain and being told she could not come in until her rash was evaluated.  She is having sharp pain localized to the site of the rash that is moderate in severity.  She is a Monday, Wednesday, Friday dialysis patient.  She has not had any fever, cough, or shortness of breath. Dr. Junior is her nephrologist.     REVIEW OF SYSTEMS  Review of Systems   Constitutional: Negative for fever.   Respiratory: Negative for cough and shortness of breath.    Cardiovascular: Negative for chest pain.   Gastrointestinal: Negative for abdominal pain, nausea and vomiting.   Skin: Positive for rash.   All other systems reviewed and are negative.      PAST MEDICAL HISTORY   has a past medical history of Anemia, Anemia associated with chronic renal failure (11/5/2009), Anginal syndrome ([facility]), Arrhythmia, Asthma, Back pain, COPD (chronic obstructive pulmonary disease) ([facility]), Dental disorder (8-25-17), Dialysis patient ([facility]) (8-25-17), Dysrhythmia, ESRD (end stage renal disease) ([facility]) (8-25-17), Fall, GERD (gastroesophageal reflux disease), Glomerulonephritis, chronic (11/5/2009), Head ache, Heart burn, Heart murmur, High cholesterol, HTN (hypertension) (11/5/2009), Hypertension, Indigestion, Infectious disease, NSTEMI (non-ST elevated myocardial infarction) ([facility]) (10/2/2020), Pneumonia, Port catheter in place (8-25-17),  Psychiatric problem, SVT (supraventricular tachycardia) (HCC), SVT (supraventricular tachycardia) (HCC), and Urinary incontinence (10/2/2020).    SURGICAL HISTORY   has a past surgical history that includes amputation, below the knee; capitation payment (insurance); appendectomy laparoscopic (2009); breast augmentation with implant; av fistula creation (Left, 2017); other; femur orif (10/9/08); iliac bone graft (10/9/08); av fistula creation (Left, 2017); av fistula creation (Left, 2018); and yamileth by laparoscopy (2019).    SOCIAL HISTORY  Social History     Tobacco Use   • Smoking status: Current Some Day Smoker     Packs/day: 0.25     Years: 20.00     Pack years: 5.00     Types: Cigarettes     Last attempt to quit: 2019     Years since quittin.7   • Smokeless tobacco: Never Used   • Tobacco comment: 5 cigs/day   Substance Use Topics   • Alcohol use: No   • Drug use: No      Social History     Substance and Sexual Activity   Drug Use No       FAMILY HISTORY  Family History   Problem Relation Age of Onset   • Heart Disease Other        CURRENT MEDICATIONS  Current Outpatient Medications   Medication Instructions   • albuterol 108 (90 Base) MCG/ACT Aero Soln inhalation aerosol 2 Puffs, Inhalation, EVERY 4 HOURS PRN   • amLODIPine (NORVASC) 10 mg, Oral, DAILY   • budesonide-formoterol (SYMBICORT) 80-4.5 MCG/ACT Aerosol 2 Puffs, Inhalation, 2 TIMES DAILY   • carvedilol (COREG) 12.5 mg, Oral, 2 TIMES DAILY WITH MEALS   • cloNIDine (CATAPRES) 0.2 MG/24HR PATCH WEEKLY patch 1 Patch, Transdermal, EVERY 7 DAYS, .   • DILTIAZem CD (CARDIZEM CD) 120 mg, Oral, 2 TIMES DAILY   • famotidine (PEPCID) 20 mg, Oral, EVERY MORNING   • furosemide (LASIX) 40 mg, Oral, EVERY E,THU,SAT,SUN AM   • gabapentin (NEURONTIN) 100 mg, Oral, 2 TIMES DAILY   • hydrALAZINE (APRESOLINE) 50 mg, Oral, 2 TIMES DAILY   • terazosin (HYTRIN) 5 mg, Oral, DAILY       ALLERGIES  Allergies   Allergen Reactions   •  "Sulfa Drugs Hives, Rash, Itching and Swelling     QAP=[id number]       PHYSICAL EXAM  VITAL SIGNS: BP (!) 175/84   Pulse 63   Temp 36.1 °C (97 °F) (Temporal)   Resp 19   Ht 1.6 m (5' 3\")   Wt 61.2 kg (135 lb)   LMP 06/25/2007   SpO2 100%   BMI 23.91 kg/m²   Vitals reviewed by myself.  Nursing note and vitals reviewed.  Constitutional: Well-developed and well-nourished. Mild distress.   HENT: Head is normocephalic and atraumatic.  Eyes: extra-ocular movements intact  Cardiovascular: Regular rate and regular rhythm. No murmur heard.  Pulmonary/Chest: Breath sounds normal. No wheezes or rales.   Abdominal: Soft and non-tender. No distention.    Musculoskeletal: Extremities exhibit normal range of motion without edema or tenderness.   Neurological: Awake and alert  Skin: Vesicular blanchable rash in right T5 distribution. Skin is warm and dry.       DIAGNOSTIC STUDIES /  LABS  Labs Reviewed   CBC WITH DIFFERENTIAL - Abnormal; Notable for the following components:       Result Value    WBC 4.3 (*)     RBC 2.90 (*)     Hemoglobin 9.1 (*)     Hematocrit 29.6 (*)     .1 (*)     MCHC 30.7 (*)     RDW 57.2 (*)     Platelet Count 103 (*)     Neutrophils-Polys 75.00 (*)     Lymphocytes 17.10 (*)     Lymphs (Absolute) 0.74 (*)     All other components within normal limits   BASIC METABOLIC PANEL - Abnormal; Notable for the following components:    Chloride 94 (*)     Bun 85 (*)     Creatinine 11.79 (*)     Anion Gap 18.0 (*)     All other components within normal limits   ESTIMATED GFR - Abnormal; Notable for the following components:    GFR If  4 (*)     GFR If Non  3 (*)     All other components within normal limits     All labs reviewed by me.      REASSESSMENT    12:01 AM - Patient seen and examined at bedside. Discussed plan of care, including shingles treatment and labs to rule out need for dialysis. Patient agrees to the plan of care. The patient will be medicated with Deltasone, " Gabapentin, Valtrex, and Fentanyl. Ordered for labs to evaluate her symptoms.     1:09 AM - Patient was reevaluated at bedside. Discussed lab results with the patient. Discussed discharge instructions and return precautions with the patient and they were cleared for discharge. Patient was given the opportunity to ask any further questions. She is comfortable with discharge at this time.      COURSE & MEDICAL DECISION MAKING  Nursing notes, VS, PMSFHx reviewed in chart.    Patient is a 55-year-old female who comes in for evaluation of rash.  Differential diagnosis includes shingles, contact dermatitis, allergic reaction.  On physical exam patient is well-appearing, vitals notable for slight hypertension, exam is consistent with shingles.  Unfortunately patient has missed 2 dialysis sessions, therefore I will obtain baseline labs to see whether or not she is in need of emergent dialysis.    Patient's pain is managed with fentanyl and gabapentin and she is treated with valacyclovir and prednisone, all medications are appropriately renally dosed after discussing with pharmacy.  Patient feels improved after treatment.  Labs returned and are unremarkable except for elevated creatinine/BUN and anemia consistent with end-stage renal disease.  Potassium is within normal limits and there is no evidence of fluid overload clinically on exam, therefore no indication for emergent dialysis.  I advised patient that she will need to follow-up for her next dialysis as scheduled and she is amenable to this plan.  She is advised on symptomatic management of shingles and provided with prescriptions for gabapentin, valacyclovir and 5-day course of prednisone.  She is given strict return precautions and discharged in stable condition.      The patient will return for new or worsening symptoms and is stable at the time of discharge.    The patient is referred to a primary physician for blood pressure management, diabetic screening, and for  all other preventative health concerns.    DISPOSITION:  Patient will be discharged home in stable condition.    FOLLOW UP:  [facility]  [address] [facility] [address] [phone]          OUTPATIENT MEDICATIONS:  New Prescriptions    GABAPENTIN (NEURONTIN) 100 MG CAP    Take 1 Cap by mouth 2 (two) times a day.    PREDNISONE (DELTASONE) 20 MG TAB    Take 2 Tabs by mouth every day for 5 days.    VALACYCLOVIR (VALTREX) 500 MG TAB    Take 1 Tab by mouth every day for 7 days.       FINAL IMPRESSION  1. Herpes zoster without complication    2. Neuropathic pain          Rosy LANE (Carly), am scribing for, and in the presence of, Fidelina Tay M.D..    Electronically signed by: Rosy Neves (Carly), 1/17/2021    Fidelina LANE M.D. personally performed the services described in this documentation, as scribed by Rosy Neves in my presence, and it is both accurate and complete.    The note accurately reflects work and decisions made by me.  Fidelina Tay M.D.  1/17/2021  1:31 AM

## 2021-01-17 NOTE — ED NOTES
"Pt discharged home. IV discontinued and gauze placed, pt in possession of belongings. Pt provided O2 via social work to get home with [facility] cab. Pt provided discharge education and information pertaining to medications and follow up appointments. Pt received copy of discharge instructions and verbalized understanding. BP (!) 193/84   Pulse 72   Temp 36.1 °C (97 °F) (Temporal)   Resp 18   Ht 1.6 m (5' 3\")   Wt 61.2 kg (135 lb)   LMP 06/25/2007   SpO2 97%   BMI 23.91 kg/m²     "

## 2021-01-17 NOTE — ED NOTES
ALLENW informed this RN that she is working on obtaining oxygen supplies for pt and will update this RN when pt ready for discharge.

## 2021-01-17 NOTE — ED TRIAGE NOTES
Chief Complaint   Patient presents with   • Herpes Zoster     Pt BIB EMS for R sided shingles rash from mid upper R back extending to R rib cage. Pt states she has missed 2 dialysis appointments due to pain

## 2021-01-17 NOTE — DISCHARGE PLANNING
Medical Social Work     SW received a call from the RN requesting SW assistance obtaining a portable O2 tank to get home. The pt DME company is [employer]. SW called the Respiratory aide and asked them if the can provided a portable O2 tank to the pt room so she can discharge. The Respiratory aide advised SW that they would provided a portable tank to the pt. RN notified.     Plan: SW will remain available for pt support.

## 2021-01-17 NOTE — ED NOTES
Pt instructed to utilize [facility] cab support for transport home. Pt verbalized understanding. Provided box lunch per pt request.

## 2021-01-19 ENCOUNTER — APPOINTMENT (OUTPATIENT)
Dept: RADIOLOGY | Facility: MEDICAL CENTER | Age: 56
End: 2021-01-19
Attending: EMERGENCY MEDICINE
Payer: MEDICAID

## 2021-01-19 ENCOUNTER — HOSPITAL ENCOUNTER (OUTPATIENT)
Facility: MEDICAL CENTER | Age: 56
End: 2021-01-23
Attending: EMERGENCY MEDICINE | Admitting: HOSPITALIST
Payer: MEDICAID

## 2021-01-19 DIAGNOSIS — Z91.199 NONCOMPLIANCE WITH TREATMENT: ICD-10-CM

## 2021-01-19 DIAGNOSIS — I16.0 HYPERTENSIVE URGENCY: ICD-10-CM

## 2021-01-19 DIAGNOSIS — E87.5 HYPERKALEMIA: ICD-10-CM

## 2021-01-19 DIAGNOSIS — N19 RENAL FAILURE, UNSPECIFIED CHRONICITY: ICD-10-CM

## 2021-01-19 PROBLEM — B02.9 HERPES ZOSTER WITHOUT COMPLICATION: Status: ACTIVE | Noted: 2021-01-19

## 2021-01-19 LAB
ALBUMIN SERPL BCP-MCNC: 4.1 G/DL (ref 3.2–4.9)
ALBUMIN/GLOB SERPL: 2 G/DL
ALP SERPL-CCNC: 57 U/L (ref 30–99)
ALT SERPL-CCNC: 9 U/L (ref 2–50)
ANION GAP SERPL CALC-SCNC: 21 MMOL/L (ref 7–16)
AST SERPL-CCNC: 17 U/L (ref 12–45)
BASOPHILS # BLD AUTO: 0.6 % (ref 0–1.8)
BASOPHILS # BLD: 0.03 K/UL (ref 0–0.12)
BILIRUB SERPL-MCNC: 0.7 MG/DL (ref 0.1–1.5)
BUN SERPL-MCNC: 117 MG/DL (ref 8–22)
CALCIUM SERPL-MCNC: 9.7 MG/DL (ref 8.5–10.5)
CHLORIDE SERPL-SCNC: 92 MMOL/L (ref 96–112)
CO2 SERPL-SCNC: 24 MMOL/L (ref 20–33)
CREAT SERPL-MCNC: 14.21 MG/DL (ref 0.5–1.4)
EOSINOPHIL # BLD AUTO: 0.22 K/UL (ref 0–0.51)
EOSINOPHIL NFR BLD: 4.3 % (ref 0–6.9)
ERYTHROCYTE [DISTWIDTH] IN BLOOD BY AUTOMATED COUNT: 58.7 FL (ref 35.9–50)
FLUAV RNA SPEC QL NAA+PROBE: NEGATIVE
FLUBV RNA SPEC QL NAA+PROBE: NEGATIVE
GLOBULIN SER CALC-MCNC: 2.1 G/DL (ref 1.9–3.5)
GLUCOSE SERPL-MCNC: 87 MG/DL (ref 65–99)
HCT VFR BLD AUTO: 30.3 % (ref 37–47)
HGB BLD-MCNC: 9.5 G/DL (ref 12–16)
IMM GRANULOCYTES # BLD AUTO: 0.01 K/UL (ref 0–0.11)
IMM GRANULOCYTES NFR BLD AUTO: 0.2 % (ref 0–0.9)
LIPASE SERPL-CCNC: 34 U/L (ref 11–82)
LYMPHOCYTES # BLD AUTO: 1.11 K/UL (ref 1–4.8)
LYMPHOCYTES NFR BLD: 21.8 % (ref 22–41)
MCH RBC QN AUTO: 31.8 PG (ref 27–33)
MCHC RBC AUTO-ENTMCNC: 31.4 G/DL (ref 33.6–35)
MCV RBC AUTO: 101.3 FL (ref 81.4–97.8)
MONOCYTES # BLD AUTO: 0.3 K/UL (ref 0–0.85)
MONOCYTES NFR BLD AUTO: 5.9 % (ref 0–13.4)
NEUTROPHILS # BLD AUTO: 3.43 K/UL (ref 2–7.15)
NEUTROPHILS NFR BLD: 67.2 % (ref 44–72)
NRBC # BLD AUTO: 0 K/UL
NRBC BLD-RTO: 0 /100 WBC
PLATELET # BLD AUTO: 80 K/UL (ref 164–446)
PMV BLD AUTO: 10.5 FL (ref 9–12.9)
POTASSIUM SERPL-SCNC: 6.2 MMOL/L (ref 3.6–5.5)
PROT SERPL-MCNC: 6.2 G/DL (ref 6–8.2)
RBC # BLD AUTO: 2.99 M/UL (ref 4.2–5.4)
RSV RNA SPEC QL NAA+PROBE: NEGATIVE
SARS-COV-2 RNA RESP QL NAA+PROBE: NOTDETECTED
SODIUM SERPL-SCNC: 137 MMOL/L (ref 135–145)
SPECIMEN SOURCE: NORMAL
WBC # BLD AUTO: 5.1 K/UL (ref 4.8–10.8)

## 2021-01-19 PROCEDURE — 99220 PR INITIAL OBSERVATION CARE,LEVL III: CPT | Performed by: HOSPITALIST

## 2021-01-19 PROCEDURE — 700102 HCHG RX REV CODE 250 W/ 637 OVERRIDE(OP): Performed by: EMERGENCY MEDICINE

## 2021-01-19 PROCEDURE — 96374 THER/PROPH/DIAG INJ IV PUSH: CPT

## 2021-01-19 PROCEDURE — 71045 X-RAY EXAM CHEST 1 VIEW: CPT

## 2021-01-19 PROCEDURE — 0241U HCHG SARS-COV-2 COVID-19 NFCT DS RESP RNA 4 TRGT MIC: CPT

## 2021-01-19 PROCEDURE — G0378 HOSPITAL OBSERVATION PER HR: HCPCS

## 2021-01-19 PROCEDURE — 96375 TX/PRO/DX INJ NEW DRUG ADDON: CPT

## 2021-01-19 PROCEDURE — 90935 HEMODIALYSIS ONE EVALUATION: CPT

## 2021-01-19 PROCEDURE — 700101 HCHG RX REV CODE 250: Performed by: EMERGENCY MEDICINE

## 2021-01-19 PROCEDURE — 85025 COMPLETE CBC W/AUTO DIFF WBC: CPT

## 2021-01-19 PROCEDURE — 700111 HCHG RX REV CODE 636 W/ 250 OVERRIDE (IP): Performed by: HOSPITALIST

## 2021-01-19 PROCEDURE — 80053 COMPREHEN METABOLIC PANEL: CPT

## 2021-01-19 PROCEDURE — 99285 EMERGENCY DEPT VISIT HI MDM: CPT

## 2021-01-19 PROCEDURE — 700105 HCHG RX REV CODE 258: Performed by: HOSPITALIST

## 2021-01-19 PROCEDURE — 83690 ASSAY OF LIPASE: CPT

## 2021-01-19 PROCEDURE — 302240 PAPR UNIT: Performed by: HOSPITALIST

## 2021-01-19 RX ORDER — ACETAMINOPHEN 325 MG/1
650 TABLET ORAL EVERY 6 HOURS PRN
Status: DISCONTINUED | OUTPATIENT
Start: 2021-01-19 | End: 2021-01-23 | Stop reason: HOSPADM

## 2021-01-19 RX ORDER — CLONIDINE 0.2 MG/24H
1 PATCH, EXTENDED RELEASE TRANSDERMAL
Status: DISCONTINUED | OUTPATIENT
Start: 2021-01-20 | End: 2021-01-23 | Stop reason: HOSPADM

## 2021-01-19 RX ORDER — LABETALOL HYDROCHLORIDE 5 MG/ML
10-20 INJECTION, SOLUTION INTRAVENOUS EVERY 4 HOURS PRN
Status: DISCONTINUED | OUTPATIENT
Start: 2021-01-19 | End: 2021-01-23 | Stop reason: HOSPADM

## 2021-01-19 RX ORDER — CLONIDINE HYDROCHLORIDE 0.1 MG/1
0.1 TABLET ORAL 3 TIMES DAILY PRN
Status: DISCONTINUED | OUTPATIENT
Start: 2021-01-19 | End: 2021-01-23 | Stop reason: HOSPADM

## 2021-01-19 RX ORDER — PROCHLORPERAZINE EDISYLATE 5 MG/ML
5-10 INJECTION INTRAMUSCULAR; INTRAVENOUS EVERY 4 HOURS PRN
Status: DISCONTINUED | OUTPATIENT
Start: 2021-01-19 | End: 2021-01-23 | Stop reason: HOSPADM

## 2021-01-19 RX ORDER — VALACYCLOVIR HYDROCHLORIDE 500 MG/1
500 TABLET, FILM COATED ORAL DAILY
Status: DISCONTINUED | OUTPATIENT
Start: 2021-01-20 | End: 2021-01-19

## 2021-01-19 RX ORDER — BISACODYL 10 MG
10 SUPPOSITORY, RECTAL RECTAL
Status: DISCONTINUED | OUTPATIENT
Start: 2021-01-19 | End: 2021-01-23 | Stop reason: HOSPADM

## 2021-01-19 RX ORDER — ONDANSETRON 4 MG/1
4 TABLET, ORALLY DISINTEGRATING ORAL EVERY 4 HOURS PRN
Status: DISCONTINUED | OUTPATIENT
Start: 2021-01-19 | End: 2021-01-23 | Stop reason: HOSPADM

## 2021-01-19 RX ORDER — PROMETHAZINE HYDROCHLORIDE 25 MG/1
12.5-25 SUPPOSITORY RECTAL EVERY 4 HOURS PRN
Status: DISCONTINUED | OUTPATIENT
Start: 2021-01-19 | End: 2021-01-23 | Stop reason: HOSPADM

## 2021-01-19 RX ORDER — HYDRALAZINE HYDROCHLORIDE 50 MG/1
50 TABLET, FILM COATED ORAL 2 TIMES DAILY
Status: DISCONTINUED | OUTPATIENT
Start: 2021-01-19 | End: 2021-01-22

## 2021-01-19 RX ORDER — AMLODIPINE BESYLATE 10 MG/1
10 TABLET ORAL DAILY
Status: DISCONTINUED | OUTPATIENT
Start: 2021-01-20 | End: 2021-01-23

## 2021-01-19 RX ORDER — DEXTROSE MONOHYDRATE 25 G/50ML
50 INJECTION, SOLUTION INTRAVENOUS ONCE
Status: COMPLETED | OUTPATIENT
Start: 2021-01-19 | End: 2021-01-19

## 2021-01-19 RX ORDER — HEPARIN SODIUM 5000 [USP'U]/ML
5000 INJECTION, SOLUTION INTRAVENOUS; SUBCUTANEOUS EVERY 8 HOURS
Status: DISCONTINUED | OUTPATIENT
Start: 2021-01-20 | End: 2021-01-23 | Stop reason: HOSPADM

## 2021-01-19 RX ORDER — TERAZOSIN 5 MG/1
5 CAPSULE ORAL DAILY
Status: DISCONTINUED | OUTPATIENT
Start: 2021-01-20 | End: 2021-01-23 | Stop reason: HOSPADM

## 2021-01-19 RX ORDER — ONDANSETRON 2 MG/ML
4 INJECTION INTRAMUSCULAR; INTRAVENOUS EVERY 4 HOURS PRN
Status: DISCONTINUED | OUTPATIENT
Start: 2021-01-19 | End: 2021-01-23 | Stop reason: HOSPADM

## 2021-01-19 RX ORDER — GABAPENTIN 100 MG/1
100 CAPSULE ORAL
Status: DISCONTINUED | OUTPATIENT
Start: 2021-01-19 | End: 2021-01-23 | Stop reason: HOSPADM

## 2021-01-19 RX ORDER — AMOXICILLIN 250 MG
2 CAPSULE ORAL 2 TIMES DAILY
Status: DISCONTINUED | OUTPATIENT
Start: 2021-01-19 | End: 2021-01-23 | Stop reason: HOSPADM

## 2021-01-19 RX ORDER — ALBUTEROL SULFATE 90 UG/1
2 AEROSOL, METERED RESPIRATORY (INHALATION) EVERY 4 HOURS PRN
Status: DISCONTINUED | OUTPATIENT
Start: 2021-01-19 | End: 2021-01-23 | Stop reason: HOSPADM

## 2021-01-19 RX ORDER — POLYETHYLENE GLYCOL 3350 17 G/17G
1 POWDER, FOR SOLUTION ORAL
Status: DISCONTINUED | OUTPATIENT
Start: 2021-01-19 | End: 2021-01-23 | Stop reason: HOSPADM

## 2021-01-19 RX ORDER — PROMETHAZINE HYDROCHLORIDE 25 MG/1
12.5-25 TABLET ORAL EVERY 4 HOURS PRN
Status: DISCONTINUED | OUTPATIENT
Start: 2021-01-19 | End: 2021-01-23 | Stop reason: HOSPADM

## 2021-01-19 RX ORDER — CARVEDILOL 12.5 MG/1
12.5 TABLET ORAL 2 TIMES DAILY WITH MEALS
Status: DISCONTINUED | OUTPATIENT
Start: 2021-01-19 | End: 2021-01-21

## 2021-01-19 RX ADMIN — DEXTROSE MONOHYDRATE 50 ML: 25 INJECTION, SOLUTION INTRAVENOUS at 17:46

## 2021-01-19 RX ADMIN — CALCIUM GLUCONATE 1 G: 98 INJECTION, SOLUTION INTRAVENOUS at 20:09

## 2021-01-19 RX ADMIN — INSULIN HUMAN 10 UNITS: 100 INJECTION, SOLUTION PARENTERAL at 17:46

## 2021-01-19 ASSESSMENT — ENCOUNTER SYMPTOMS
COUGH: 1
SHORTNESS OF BREATH: 1

## 2021-01-19 ASSESSMENT — FIBROSIS 4 INDEX: FIB4 SCORE: 1.92

## 2021-01-19 NOTE — ED TRIAGE NOTES
Chief Complaint   Patient presents with   • N/V     since thursday. unable to tolerate po. missed 8 sessions of dialysis. gets dialyzed M-W-F. last dialyzed approximately 1/4th   • Abdominal Pain     in luq abdomen describes as sharp and stabbing     Educated on triage process. Instructed to notify staff for any worsening symptoms.

## 2021-01-20 ENCOUNTER — PATIENT OUTREACH (OUTPATIENT)
Dept: HEALTH INFORMATION MANAGEMENT | Facility: OTHER | Age: 56
End: 2021-01-20

## 2021-01-20 PROBLEM — I47.10 SVT (SUPRAVENTRICULAR TACHYCARDIA) (HCC): Status: RESOLVED | Noted: 2018-01-07 | Resolved: 2021-01-20

## 2021-01-20 PROBLEM — E46 PROTEIN CALORIE MALNUTRITION (HCC): Status: ACTIVE | Noted: 2021-01-20

## 2021-01-20 PROBLEM — Z91.148 H/O MEDICATION NONCOMPLIANCE: Chronic | Status: ACTIVE | Noted: 2021-01-20

## 2021-01-20 LAB
ALBUMIN SERPL BCP-MCNC: 3.8 G/DL (ref 3.2–4.9)
ALBUMIN/GLOB SERPL: 1.9 G/DL
ALP SERPL-CCNC: 60 U/L (ref 30–99)
ALT SERPL-CCNC: 7 U/L (ref 2–50)
ANION GAP SERPL CALC-SCNC: 15 MMOL/L (ref 7–16)
AST SERPL-CCNC: 12 U/L (ref 12–45)
BASOPHILS # BLD AUTO: 0.6 % (ref 0–1.8)
BASOPHILS # BLD: 0.03 K/UL (ref 0–0.12)
BILIRUB SERPL-MCNC: 0.7 MG/DL (ref 0.1–1.5)
BUN SERPL-MCNC: 70 MG/DL (ref 8–22)
CALCIUM SERPL-MCNC: 9.2 MG/DL (ref 8.5–10.5)
CHLORIDE SERPL-SCNC: 92 MMOL/L (ref 96–112)
CHOLEST SERPL-MCNC: 126 MG/DL (ref 100–199)
CO2 SERPL-SCNC: 28 MMOL/L (ref 20–33)
CREAT SERPL-MCNC: 9.9 MG/DL (ref 0.5–1.4)
EOSINOPHIL # BLD AUTO: 0.21 K/UL (ref 0–0.51)
EOSINOPHIL NFR BLD: 4.4 % (ref 0–6.9)
ERYTHROCYTE [DISTWIDTH] IN BLOOD BY AUTOMATED COUNT: 60.1 FL (ref 35.9–50)
GLOBULIN SER CALC-MCNC: 2 G/DL (ref 1.9–3.5)
GLUCOSE SERPL-MCNC: 118 MG/DL (ref 65–99)
HCT VFR BLD AUTO: 28.5 % (ref 37–47)
HDLC SERPL-MCNC: 33 MG/DL
HGB BLD-MCNC: 8.9 G/DL (ref 12–16)
IMM GRANULOCYTES # BLD AUTO: 0.02 K/UL (ref 0–0.11)
IMM GRANULOCYTES NFR BLD AUTO: 0.4 % (ref 0–0.9)
LDLC SERPL CALC-MCNC: 70 MG/DL
LYMPHOCYTES # BLD AUTO: 1.02 K/UL (ref 1–4.8)
LYMPHOCYTES NFR BLD: 21.2 % (ref 22–41)
MAGNESIUM SERPL-MCNC: 2.4 MG/DL (ref 1.5–2.5)
MCH RBC QN AUTO: 31.9 PG (ref 27–33)
MCHC RBC AUTO-ENTMCNC: 31.2 G/DL (ref 33.6–35)
MCV RBC AUTO: 102.2 FL (ref 81.4–97.8)
MONOCYTES # BLD AUTO: 0.3 K/UL (ref 0–0.85)
MONOCYTES NFR BLD AUTO: 6.2 % (ref 0–13.4)
NEUTROPHILS # BLD AUTO: 3.23 K/UL (ref 2–7.15)
NEUTROPHILS NFR BLD: 67.2 % (ref 44–72)
NRBC # BLD AUTO: 0.02 K/UL
NRBC BLD-RTO: 0.4 /100 WBC
PHOSPHATE SERPL-MCNC: 3.7 MG/DL (ref 2.5–4.5)
PLATELET # BLD AUTO: 78 K/UL (ref 164–446)
PMV BLD AUTO: 10.6 FL (ref 9–12.9)
POTASSIUM SERPL-SCNC: 4.8 MMOL/L (ref 3.6–5.5)
PREALB SERPL-MCNC: 26 MG/DL (ref 18–38)
PROCALCITONIN SERPL-MCNC: 0.87 NG/ML
PROT SERPL-MCNC: 5.8 G/DL (ref 6–8.2)
RBC # BLD AUTO: 2.79 M/UL (ref 4.2–5.4)
SODIUM SERPL-SCNC: 135 MMOL/L (ref 135–145)
TRIGL SERPL-MCNC: 115 MG/DL (ref 0–149)
WBC # BLD AUTO: 4.8 K/UL (ref 4.8–10.8)

## 2021-01-20 PROCEDURE — 83735 ASSAY OF MAGNESIUM: CPT

## 2021-01-20 PROCEDURE — 84134 ASSAY OF PREALBUMIN: CPT

## 2021-01-20 PROCEDURE — 85025 COMPLETE CBC W/AUTO DIFF WBC: CPT

## 2021-01-20 PROCEDURE — 94760 N-INVAS EAR/PLS OXIMETRY 1: CPT

## 2021-01-20 PROCEDURE — 700101 HCHG RX REV CODE 250: Performed by: HOSPITALIST

## 2021-01-20 PROCEDURE — 96375 TX/PRO/DX INJ NEW DRUG ADDON: CPT

## 2021-01-20 PROCEDURE — 80053 COMPREHEN METABOLIC PANEL: CPT

## 2021-01-20 PROCEDURE — 700111 HCHG RX REV CODE 636 W/ 250 OVERRIDE (IP): Performed by: INTERNAL MEDICINE

## 2021-01-20 PROCEDURE — 700102 HCHG RX REV CODE 250 W/ 637 OVERRIDE(OP): Performed by: STUDENT IN AN ORGANIZED HEALTH CARE EDUCATION/TRAINING PROGRAM

## 2021-01-20 PROCEDURE — 96376 TX/PRO/DX INJ SAME DRUG ADON: CPT

## 2021-01-20 PROCEDURE — 700102 HCHG RX REV CODE 250 W/ 637 OVERRIDE(OP): Performed by: HOSPITALIST

## 2021-01-20 PROCEDURE — 700111 HCHG RX REV CODE 636 W/ 250 OVERRIDE (IP): Performed by: HOSPITALIST

## 2021-01-20 PROCEDURE — 80061 LIPID PANEL: CPT

## 2021-01-20 PROCEDURE — A9270 NON-COVERED ITEM OR SERVICE: HCPCS | Performed by: STUDENT IN AN ORGANIZED HEALTH CARE EDUCATION/TRAINING PROGRAM

## 2021-01-20 PROCEDURE — 84100 ASSAY OF PHOSPHORUS: CPT

## 2021-01-20 PROCEDURE — 84145 PROCALCITONIN (PCT): CPT

## 2021-01-20 PROCEDURE — 36415 COLL VENOUS BLD VENIPUNCTURE: CPT

## 2021-01-20 PROCEDURE — G0378 HOSPITAL OBSERVATION PER HR: HCPCS

## 2021-01-20 PROCEDURE — 96372 THER/PROPH/DIAG INJ SC/IM: CPT

## 2021-01-20 PROCEDURE — 99225 PR SUBSEQUENT OBSERVATION CARE,LEVEL II: CPT | Mod: GC | Performed by: INTERNAL MEDICINE

## 2021-01-20 PROCEDURE — 90935 HEMODIALYSIS ONE EVALUATION: CPT

## 2021-01-20 PROCEDURE — A9270 NON-COVERED ITEM OR SERVICE: HCPCS | Performed by: HOSPITALIST

## 2021-01-20 RX ORDER — VALACYCLOVIR HYDROCHLORIDE 500 MG/1
500 TABLET, FILM COATED ORAL 2 TIMES DAILY
Status: DISCONTINUED | OUTPATIENT
Start: 2021-01-20 | End: 2021-01-20

## 2021-01-20 RX ORDER — CALCIUM CARBONATE 500 MG/1
500 TABLET, CHEWABLE ORAL ONCE
Status: COMPLETED | OUTPATIENT
Start: 2021-01-20 | End: 2021-01-20

## 2021-01-20 RX ORDER — CALCIUM CARBONATE 500 MG/1
500 TABLET, CHEWABLE ORAL
Status: DISCONTINUED | OUTPATIENT
Start: 2021-01-20 | End: 2021-01-23 | Stop reason: HOSPADM

## 2021-01-20 RX ORDER — GAUZE BANDAGE 2" X 2"
100 BANDAGE TOPICAL DAILY
Status: DISCONTINUED | OUTPATIENT
Start: 2021-01-20 | End: 2021-01-20

## 2021-01-20 RX ORDER — CALCIUM CARBONATE 500 MG/1
500 TABLET, CHEWABLE ORAL
Status: DISCONTINUED | OUTPATIENT
Start: 2021-01-20 | End: 2021-01-20

## 2021-01-20 RX ORDER — FAMCICLOVIR 500 MG/1
250 TABLET ORAL DAILY
Status: DISCONTINUED | OUTPATIENT
Start: 2021-01-20 | End: 2021-01-21

## 2021-01-20 RX ORDER — VALACYCLOVIR HYDROCHLORIDE 500 MG/1
500 TABLET, FILM COATED ORAL DAILY
Status: DISCONTINUED | OUTPATIENT
Start: 2021-01-20 | End: 2021-01-20

## 2021-01-20 RX ORDER — GAUZE BANDAGE 2" X 2"
100 BANDAGE TOPICAL 3 TIMES DAILY
Status: DISCONTINUED | OUTPATIENT
Start: 2021-01-20 | End: 2021-01-23 | Stop reason: HOSPADM

## 2021-01-20 RX ADMIN — EPOETIN ALFA-EPBX 6000 UNITS: 3000 INJECTION, SOLUTION INTRAVENOUS; SUBCUTANEOUS at 12:54

## 2021-01-20 RX ADMIN — GABAPENTIN 100 MG: 100 CAPSULE ORAL at 08:39

## 2021-01-20 RX ADMIN — CARVEDILOL 12.5 MG: 12.5 TABLET, FILM COATED ORAL at 08:39

## 2021-01-20 RX ADMIN — CLONIDINE HYDROCHLORIDE 0.1 MG: 0.1 TABLET ORAL at 22:53

## 2021-01-20 RX ADMIN — HEPARIN SODIUM 5000 UNITS: 5000 INJECTION, SOLUTION INTRAVENOUS; SUBCUTANEOUS at 21:07

## 2021-01-20 RX ADMIN — FAMCICLOVIR 250 MG: 500 TABLET, FILM COATED ORAL at 12:53

## 2021-01-20 RX ADMIN — ANTACID TABLETS 500 MG: 500 TABLET, CHEWABLE ORAL at 20:05

## 2021-01-20 RX ADMIN — TERAZOSIN HYDROCHLORIDE 5 MG: 5 CAPSULE ORAL at 05:15

## 2021-01-20 RX ADMIN — HEPARIN SODIUM 5000 UNITS: 5000 INJECTION, SOLUTION INTRAVENOUS; SUBCUTANEOUS at 12:53

## 2021-01-20 RX ADMIN — GABAPENTIN 100 MG: 100 CAPSULE ORAL at 00:41

## 2021-01-20 RX ADMIN — ACETAMINOPHEN 650 MG: 325 TABLET ORAL at 21:06

## 2021-01-20 RX ADMIN — HYDRALAZINE HYDROCHLORIDE 50 MG: 50 TABLET, FILM COATED ORAL at 17:57

## 2021-01-20 RX ADMIN — ANTACID TABLETS 500 MG: 500 TABLET, CHEWABLE ORAL at 14:39

## 2021-01-20 RX ADMIN — CLONIDINE HYDROCHLORIDE 0.1 MG: 0.1 TABLET ORAL at 00:41

## 2021-01-20 RX ADMIN — GABAPENTIN 100 MG: 100 CAPSULE ORAL at 21:06

## 2021-01-20 RX ADMIN — AMLODIPINE BESYLATE 10 MG: 10 TABLET ORAL at 05:15

## 2021-01-20 RX ADMIN — LABETALOL HYDROCHLORIDE 10 MG: 5 INJECTION, SOLUTION INTRAVENOUS at 04:00

## 2021-01-20 RX ADMIN — HEPARIN SODIUM 5000 UNITS: 5000 INJECTION, SOLUTION INTRAVENOUS; SUBCUTANEOUS at 05:17

## 2021-01-20 RX ADMIN — Medication 100 MG: at 12:53

## 2021-01-20 RX ADMIN — Medication 100 MG: at 17:59

## 2021-01-20 RX ADMIN — CLONIDINE 1 PATCH: 0.2 PATCH TRANSDERMAL at 06:20

## 2021-01-20 RX ADMIN — HYDRALAZINE HYDROCHLORIDE 50 MG: 50 TABLET, FILM COATED ORAL at 05:16

## 2021-01-20 RX ADMIN — LABETALOL HYDROCHLORIDE 10 MG: 5 INJECTION, SOLUTION INTRAVENOUS at 02:24

## 2021-01-20 RX ADMIN — ANTACID TABLETS 500 MG: 500 TABLET, CHEWABLE ORAL at 17:58

## 2021-01-20 RX ADMIN — CARVEDILOL 12.5 MG: 12.5 TABLET, FILM COATED ORAL at 17:59

## 2021-01-20 ASSESSMENT — ENCOUNTER SYMPTOMS
NAUSEA: 0
DOUBLE VISION: 0
FEVER: 0
LOSS OF CONSCIOUSNESS: 0
BLURRED VISION: 0
WHEEZING: 0
MYALGIAS: 0
SHORTNESS OF BREATH: 1
FALLS: 0
NERVOUS/ANXIOUS: 1
COUGH: 0
CHILLS: 0
ABDOMINAL PAIN: 0
HEADACHES: 0
HALLUCINATIONS: 1
DIAPHORESIS: 0
MEMORY LOSS: 1
SORE THROAT: 0
FOCAL WEAKNESS: 0
VOMITING: 0

## 2021-01-20 ASSESSMENT — COPD QUESTIONNAIRES
DO YOU EVER COUGH UP ANY MUCUS OR PHLEGM?: YES, A FEW DAYS A WEEK OR MONTH
COPD SCREENING SCORE: 5
DURING THE PAST 4 WEEKS HOW MUCH DID YOU FEEL SHORT OF BREATH: SOME OF THE TIME
HAVE YOU SMOKED AT LEAST 100 CIGARETTES IN YOUR ENTIRE LIFE: YES

## 2021-01-20 ASSESSMENT — PAIN DESCRIPTION - PAIN TYPE
TYPE: ACUTE PAIN

## 2021-01-20 ASSESSMENT — LIFESTYLE VARIABLES: EVER_SMOKED: YES

## 2021-01-20 NOTE — ASSESSMENT & PLAN NOTE
Patient is not an active COPD exacerbation at this time  -Continue to monitor  -Albuterol inhaler as needed  -Respiratory therapy consult in place

## 2021-01-20 NOTE — ASSESSMENT & PLAN NOTE
Hemodialysis Monday Wednesday Friday  Hyperkalemia treated in the ER with insulin dextrose  Underwent emergent dialysis on the night of 1/19 - 1/20, continued on regular Monday Wednesday Friday dialysis starting 1/20.  Plan is for HD session on 1/21 night.  -Will continue to monitor and trend BMP, as well as monitor other electrolytes, including potassium and phosphorus

## 2021-01-20 NOTE — DISCHARGE PLANNING
Outpatient Dialysis Note    Confirmed patient is active at:    [address] [place] [address]    Schedule: Monday, Wednesday, Friday   Time: 3:35pm    Patient is seen by Dr. Rodriguez in HD clinic.    Spoke with Abby at facility who confirmed.    Forwarded records for review.    Kiarra Kurtz- Dialysis Coordinator  [facility] # [phone]

## 2021-01-20 NOTE — ASSESSMENT & PLAN NOTE
Per patient, patient uses 3 L of oxygen at home.  On the a.m. of 1/20, patient was satting in low 90s on 1 L of oxygen.  -May need home oxygen evaluation prior to discharge

## 2021-01-20 NOTE — H&P
[facility] Medicine History & Physical Note    Date of Service  1/19/2021    Primary Care Physician  Pcp Pt States None    Consultants  Nephrology    Code Status  Full Code    Chief Complaint  Chief Complaint   Patient presents with   • N/V     since thursday. unable to tolerate po. missed 8 sessions of dialysis. gets dialyzed M-W-F. last dialyzed approximately 1/4th   • Abdominal Pain     in luq abdomen describes as sharp and stabbing   • Cough       History of Presenting Illness  55 y.o. female who presented 1/19/2021 with past medical history of end-stage renal disease hemodialysis Monday Wednesday Friday, status post right side BKA, hypertension, valvular heart disease, diastolic heart failure who comes into the emergency room for shortness of breath for the past week.  According to the patient she did not remember the events from the past week.  She does not remember the last time she had dialysis.  She states that she has been sick with fever, cough and shortness of breath.  She also developed a rash 4 days ago, weeping and extending to the front side of her chest.  Patient was diagnosed with shingles but did not take her valacyclovir.  She denies nausea, vomiting, abdominal pain, palpitations or chest pain.  Chest x-ray found bilateral infiltrates  Review of Systems  Review of Systems   Constitutional: Positive for malaise/fatigue.   Respiratory: Positive for cough and shortness of breath.        Past Medical History   has a past medical history of Anemia, Anemia associated with chronic renal failure (11/5/2009), Anginal syndrome ([facility]), Arrhythmia, Asthma, Back pain, COPD (chronic obstructive pulmonary disease) ([facility]), Dental disorder (8-25-17), Dialysis patient ([facility]) (8-25-17), Dysrhythmia, ESRD (end stage renal disease) ([facility]) (8-25-17), Fall, GERD (gastroesophageal reflux disease), Glomerulonephritis, chronic (11/5/2009), Head ache, Heart burn, Heart murmur, High cholesterol, HTN (hypertension) (11/5/2009),  Hypertension, Indigestion, Infectious disease, NSTEMI (non-ST elevated myocardial infarction) ([facility]) (10/2/2020), Pneumonia, Port catheter in place (8-25-17), Psychiatric problem, SVT (supraventricular tachycardia) ([facility]), SVT (supraventricular tachycardia) ([facility]), and Urinary incontinence (10/2/2020).    Surgical History   has a past surgical history that includes amputation, below the knee; capitation payment (insurance); appendectomy laparoscopic (5/4/2009); pr breast augmentation with implant; av fistula creation (Left, 6/20/2017); other; femur orif (10/9/08); iliac bone graft (10/9/08); av fistula creation (Left, 8/28/2017); av fistula creation (Left, 4/27/2018); and yamileth by laparoscopy (6/22/2019).     Family History  family history includes Heart Disease in an other family member.     Social History   reports that she has been smoking cigarettes. She has a 5.00 pack-year smoking history. She has never used smokeless tobacco. She reports that she does not drink alcohol or use drugs.    Allergies  Allergies   Allergen Reactions   • Sulfa Drugs Hives, Rash, Itching and Swelling     HVF=[id number]       Medications  Prior to Admission Medications   Prescriptions Last Dose Informant Patient Reported? Taking?   albuterol 108 (90 Base) MCG/ACT Aero Soln inhalation aerosol PRN at PRN Patient No No   Sig: Inhale 2 Puffs every four hours as needed for Shortness of Breath.   amLODIPine (NORVASC) 10 MG Tab Couple days ago at K Patient Yes No   Sig: Take 10 mg by mouth every day.   budesonide-formoterol (SYMBICORT) 80-4.5 MCG/ACT Aerosol Not Taking at Not Taking Patient No No   Sig: Inhale 2 Puffs 2 Times a Day.   Patient not taking: Reported on 1/19/2021   carvedilol (COREG) 12.5 MG Tab UNK at [facility] Patient Yes No   Sig: Take 12.5 mg by mouth 2 times a day, with meals.   cloNIDine (CATAPRES) 0.2 MG/24HR PATCH WEEKLY patch UNK at [facility] Patient Yes No   Sig: Place 1 Patch on the skin every 7 days. Wednesdays.   gabapentin  (NEURONTIN) 100 MG Cap UNK at UNK Patient No No   Sig: Take 1 Cap by mouth 2 (two) times a day.   hydrALAZINE (APRESOLINE) 50 MG Tab UNK at UNK Patient Yes No   Sig: Take 50 mg by mouth 2 Times a Day.   predniSONE (DELTASONE) 20 MG Tab Not Taking at Not Taking Patient No No   Sig: Take 2 Tabs by mouth every day for 5 days.   Patient not taking: Reported on 1/19/2021   terazosin (HYTRIN) 5 MG Cap Couple days ago at K Patient Yes No   Sig: Take 5 mg by mouth every day.   valacyclovir (VALTREX) 500 MG Tab Not Taking at Not taking Patient No No   Sig: Take 1 Tab by mouth every day for 7 days.   Patient not taking: Reported on 1/19/2021      Facility-Administered Medications: None       Physical Exam  Temp:  [36.1 °C (96.9 °F)] 36.1 °C (96.9 °F)  Pulse:  [64-78] 73  Resp:  [13] 13  BP: (176-223)/() 193/86  SpO2:  [88 %-100 %] 88 %    Physical Exam  Vitals signs and nursing note reviewed.   Constitutional:       General: She is not in acute distress.     Appearance: Normal appearance. She is ill-appearing. She is not toxic-appearing or diaphoretic.   HENT:      Head: Normocephalic and atraumatic.      Nose: No congestion or rhinorrhea.      Mouth/Throat:      Pharynx: No oropharyngeal exudate or posterior oropharyngeal erythema.   Eyes:      General: No scleral icterus.  Neck:      Musculoskeletal: No neck rigidity or muscular tenderness.      Vascular: No carotid bruit.   Cardiovascular:      Rate and Rhythm: Normal rate and regular rhythm.      Pulses: Normal pulses.      Heart sounds: Murmur present. No friction rub. No gallop.    Pulmonary:      Effort: Pulmonary effort is normal. No respiratory distress.      Breath sounds: No stridor. Rales present. No wheezing or rhonchi.   Abdominal:      General: Abdomen is flat. There is no distension.      Palpations: There is no mass.      Tenderness: There is no abdominal tenderness. There is no left CVA tenderness, guarding or rebound.      Hernia: No hernia is  present.   Musculoskeletal: Normal range of motion.         General: No swelling.      Right lower leg: No edema.      Left lower leg: No edema.   Lymphadenopathy:      Cervical: No cervical adenopathy.   Skin:     General: Skin is warm and dry.      Capillary Refill: Capillary refill takes 2 to 3 seconds.      Coloration: Skin is not jaundiced or pale.      Findings: Rash present. No bruising or erythema.      Comments: Active shingles lesions   Neurological:      Mental Status: She is alert.         Laboratory:  Recent Labs     01/17/21  0022 01/19/21  1559   WBC 4.3* 5.1   RBC 2.90* 2.99*   HEMOGLOBIN 9.1* 9.5*   HEMATOCRIT 29.6* 30.3*   .1* 101.3*   MCH 31.4 31.8   MCHC 30.7* 31.4*   RDW 57.2* 58.7*   PLATELETCT 103* 80*   MPV 10.0 10.5     Recent Labs     01/17/21  0022 01/19/21  1559   SODIUM 139 137   POTASSIUM 4.8 6.2*   CHLORIDE 94* 92*   CO2 27 24   GLUCOSE 92 87   BUN 85* 117*   CREATININE 11.79* 14.21*   CALCIUM 10.3 9.7     Recent Labs     01/17/21  0022 01/19/21  1559   ALTSGPT  --  9   ASTSGOT  --  17   ALKPHOSPHAT  --  57   TBILIRUBIN  --  0.7   LIPASE  --  34   GLUCOSE 92 87         No results for input(s): NTPROBNP in the last 72 hours.      No results for input(s): TROPONINT in the last 72 hours.    Imaging:  DX-CHEST-PORTABLE (1 VIEW)   Final Result      Moderate pulmonary edema and enlargement of cardiac silhouette            Assessment/Plan:  I anticipate this patient will require at least two midnights for appropriate medical management, necessitating inpatient admission.    * ESRD on hemodialysis (HCC)- (present on admission)  Assessment & Plan  Hemodialysis Monday Wednesday Friday  Hyperkalemia treated in the ER with insulin dextrose  Scheduled to undergo dialysis tonight per nephrology      Acute respiratory failure with hypoxia (HCC)- (present on admission)  Assessment & Plan  On 3 L of O2 above baseline    Hypertensive urgency- (present on admission)  Assessment &  Plan  Uncontrolled  Continue current home medications  IV as needed medications have been ordered  Clonidine as needed  Uses a clonidine patch last not on her    Herpes zoster without complication  Assessment & Plan  Multidermatome with active lesions  Valcyte for 7 days  Contact and droplet precautions    Thrombocytopenia (HCC)- (present on admission)  Assessment & Plan  Continue to trend  No evidence of bleeding

## 2021-01-20 NOTE — PROGRESS NOTES
"Daily Progress Note:     Date of Service: 1/20/2021  Primary Team: [facility] White Team   Attending: Dr Tse  Senior Resident: Dr. Fish  Intern: Dr. Barnhart  Contact:  [phone]    Chief Complaint:   Chief Complaint   Patient presents with   • N/V     since thursday. unable to tolerate po. missed 8 sessions of dialysis. gets dialyzed M-W-F. last dialyzed approximately 1/4th   • Abdominal Pain     in luq abdomen describes as sharp and stabbing   • Cough       ID:  Patient is a 55-year-old female with a past medical history significant for ESRD (Monday Wednesday Friday dialysis), supraventricular tachycardia, COPD (no PFTs available), NSTEMI? (10/2020), hypertension/hyperlipidemia, right BKA, and tobacco abuse who presents with B symptoms of cough, fever, shortness of breath, malaise, nausea, vomiting, after missing 8 sessions of dialysis.  Was also diagnosed with shingles her back rash 4 days ago and has not been taking valacyclovir.    Subjective:   No acute events overnight.  Today, patient states that she still has some shortness of breath and malaise, however, she has more energy than previously.  She states she felt better after dialysis last night and is ready for another session of dialysis today.  States that she has pain in her right back area where she has her shingles infection.  She has not taken her valacyclovir because it caused her to have \"psychedelic\" hallucinations after taking it.    Consultants/Specialty:  Nephrology    Review of Systems:   Review of Systems   Constitutional: Positive for malaise/fatigue. Negative for chills, diaphoresis and fever.   HENT: Negative for congestion and sore throat.    Eyes: Negative for blurred vision and double vision.   Respiratory: Positive for shortness of breath. Negative for cough and wheezing.    Cardiovascular: Negative for chest pain and leg swelling.   Gastrointestinal: Negative for abdominal pain, nausea and vomiting.   Genitourinary: Negative for " frequency and urgency.   Musculoskeletal: Negative for falls and myalgias.   Neurological: Negative for focal weakness, loss of consciousness and headaches.   Psychiatric/Behavioral: Positive for hallucinations and memory loss. Negative for suicidal ideas. The patient is nervous/anxious.         Is evasive upon questioning of substance abuse  Reports that she has visual hallucinations upon taking valacyclovir       Objective Data:   Physical Exam:   Vitals:   Temp:  [36.1 °C (96.9 °F)-36.3 °C (97.4 °F)] 36.2 °C (97.2 °F)  Pulse:  [64-81] 72  Resp:  [13-22] 16  BP: (156-223)/() 175/85  SpO2:  [88 %-100 %] 94 %     Physical Exam  Vitals signs and nursing note reviewed.   Constitutional:       General: She is in acute distress.      Appearance: She is ill-appearing. She is not toxic-appearing or diaphoretic.      Comments: Cachectic   HENT:      Head: Normocephalic and atraumatic.      Right Ear: External ear normal.      Left Ear: External ear normal.      Nose: Nose normal. No congestion or rhinorrhea.      Mouth/Throat:      Mouth: Mucous membranes are dry.      Pharynx: Oropharynx is clear. No oropharyngeal exudate or posterior oropharyngeal erythema.   Eyes:      General: No scleral icterus.        Right eye: No discharge.         Left eye: No discharge.      Extraocular Movements: Extraocular movements intact.      Conjunctiva/sclera: Conjunctivae normal.      Pupils: Pupils are equal, round, and reactive to light.   Neck:      Musculoskeletal: Normal range of motion and neck supple. No neck rigidity.   Cardiovascular:      Rate and Rhythm: Normal rate and regular rhythm.      Pulses: Normal pulses.   Pulmonary:      Effort: Pulmonary effort is normal. No respiratory distress.      Breath sounds: Normal breath sounds. No wheezing.   Abdominal:      General: Abdomen is flat. Bowel sounds are normal. There is no distension.      Palpations: Abdomen is soft.      Tenderness: There is no abdominal tenderness.  There is no guarding.   Musculoskeletal:         General: Signs of injury present. No swelling or tenderness.      Right lower leg: No edema.      Left lower leg: No edema.      Comments: Right below the knee amputation   Skin:     General: Skin is warm and dry.      Capillary Refill: Capillary refill takes less than 2 seconds.      Comments: Bandage on the midsection on the right side of back.  Upon removal of bandage there is a 7 inch x 4 inch wound, in T4-T5 dermatomal distribution, macerated wound, with some erythematous spots, tender to palpation   Neurological:      Mental Status: She is oriented to person, place, and time.      Sensory: No sensory deficit.           Labs:   Recent Labs     01/19/21  1559 01/20/21  0311   WBC 5.1 4.8   RBC 2.99* 2.79*   HEMOGLOBIN 9.5* 8.9*   HEMATOCRIT 30.3* 28.5*   .3* 102.2*   MCH 31.8 31.9   RDW 58.7* 60.1*   PLATELETCT 80* 78*   MPV 10.5 10.6   NEUTSPOLYS 67.20 67.20   LYMPHOCYTES 21.80* 21.20*   MONOCYTES 5.90 6.20   EOSINOPHILS 4.30 4.40   BASOPHILS 0.60 0.60     Recent Labs     01/19/21  1559 01/20/21  0311   SODIUM 137 135   POTASSIUM 6.2* 4.8   CHLORIDE 92* 92*   CO2 24 28   GLUCOSE 87 118*   * 70*        Imaging:   DX-CHEST-PORTABLE (1 VIEW)   Final Result      Moderate pulmonary edema and enlargement of cardiac silhouette          Problem Representation:   55-year-old female with a past medical history significant for ESRD (Monday Wednesday Friday dialysis), supraventricular tachycardia, COPD (no PFTs available), NSTEMI? (10/2020), hypertension/hyperlipidemia, right BKA, and tobacco abuse who presents with B symptoms of cough, fever, shortness of breath, malaise, nausea, vomiting, after missing 8 sessions of dialysis.  Was also diagnosed with shingles her back rash 4 days ago and has not been taking valacyclovir.      * ESRD on hemodialysis (HCC)- (present on admission)  Assessment & Plan  Hemodialysis Monday Wednesday Friday  Hyperkalemia treated in  the ER with insulin dextrose  Underwent emergent dialysis on the night of 1/19 - 1/20, continued on regular Monday Wednesday Friday dialysis starting 1/20  -Will continue to monitor and trend BMP, as well as monitor electrolytes, including potassium and phosphorus    Herpes zoster without complication  Assessment & Plan  Multidermatome with active lesions, around T4 to T5 area.  Patient states that she was previously prescribed valacyclovir, however, she had not taken it because it had given her psychedelic, visual hallucinations.  -Starting famciclovir as second line for shingles, to be renally dosed per pharmacy  -If famciclovir also causes issues, can consider acyclovir    -Contact and droplet precautions in place    Acute respiratory failure with hypoxia (HCC)- (present on admission)  Assessment & Plan  Per patient, patient uses 3 L of oxygen at home.  On the a.m. of 1/20, patient was satting in low 90s on 1 L of oxygen.  -May need home oxygen evaluation prior to discharge    Hypertensive urgency- (present on admission)  Assessment & Plan  IMPROVING  Uncontrolled.  Patient is a poor historian and cannot remember whether she has been taking her blood pressure medications regularly or not.  -Continue current home medications  -IV as needed medications have been ordered  -Clonidine as needed  -I have extensively counseled the patient on the necessity of medical compliance  -Patient may need optimization of blood pressure medications as outpatient      H/O medication noncompliance- (present on admission)  Assessment & Plan  This is a very important issue.  Patient also has a history of noncompliance with hemodialysis sessions, which likely contributed to her presentation to the emergency department resulting in this hospitalization.  Also, noncompliance with valacyclovir has hindered faster recovery from shingles  -I have counseled the patient extensively on the importance of medication  compliance    Thrombocytopenia (HCC)- (present on admission)  Assessment & Plan  Continue to trend  No evidence of bleeding    Possible Protein calorie malnutrition (HCC)- (present on admission)  Assessment & Plan  This is suspected especially in the setting of the patient's cachectic body habitus.  -Follow-up on prealbumin level  -Starting thiamine supplements p.o.  -Consider registered dietitian counseling if needed    COPD (chronic obstructive pulmonary disease) (HCC)- (present on admission)  Assessment & Plan  Patient is not an active COPD exacerbation at this time  -Continue to monitor  -Albuterol inhaler as needed  -Respiratory therapy consult in place    Tobacco abuse- (present on admission)  Assessment & Plan  Encourage tobacco cessation      Quality Measures:  Code: Full  VTE: Heparin  Diet: Renal  Disposition: Remain inpatient    Please note that this dictation was created using voice recognition software. I have worked with technical experts from [employer] to optimize the interface.  I have made every reasonable attempt to correct obvious errors, but there may be errors of grammar and possibly content that I did not discover before finalizing the note.

## 2021-01-20 NOTE — PROGRESS NOTES
HD treatment ordered by Dr Mari using LUAAVF from [id number].Treatment tolerated well.Net UF removed 1500 ml.Report given to ER primary Rn.

## 2021-01-20 NOTE — CARE PLAN
Problem: Communication  Goal: The ability to communicate needs accurately and effectively will improve  Outcome: PROGRESSING AS EXPECTED  Note: Discussed POC and medications with patient. Pt verbalized understanding.      Problem: Venous Thromboembolism (VTW)/Deep Vein Thrombosis (DVT) Prevention:  Goal: Patient will participate in Venous Thrombosis (VTE)/Deep Vein Thrombosis (DVT)Prevention Measures  Outcome: PROGRESSING AS EXPECTED  Note: Pt has heparin ordered for VTE.

## 2021-01-20 NOTE — ASSESSMENT & PLAN NOTE
IMPROVING  Uncontrolled.  Patient is a poor historian and cannot remember whether she has been taking her blood pressure medications regularly or not.  -Increasing hydralazine to 50 mg 4 times daily  -Continue carvedilol and amlodipine at current dose, as well as terazosin and clonidine patch  -Will NOT start Ace/ARB at this time as the patient has an extensive history of noncompliance with dialysis  -IV as needed medications have been ordered  -Clonidine as needed  -I have extensively counseled the patient on the necessity of medical compliance  -Patient may need optimization of blood pressure medications as outpatient

## 2021-01-20 NOTE — PROGRESS NOTES
Bedside report received. Patient A&O x4. 1L NC. SR on the monitor.  POC discussed with patient. Patient verbalized understanding. Call light and belongings within reach. Bed locked and in lowest position, alarm and fall precautions in place.

## 2021-01-20 NOTE — ED NOTES
"Med Rec completed per patient and patients home pharmacy  Allergies reviewed  No ORAL antibiotics in last 14 days    Patient reports that she's not sure what medications is taking. I asked her if I went through the list would she be able to identify the medications. As I went through the medications She was able to identify that she takes amlodipine, terazosin, hydralazine and gabapentin but reports she last took them \"couple days ago\" She reports that she is not taking Symbicort, furosemide, prednisone and valacyclovir.   Patient also reported that she was unsure if she was still taking clonidine patches, diltiazem, famotidine as they do not sounds familiar to her per pharmacy they were last filled in oct 2020 for 30 day supply. Carvedilol did not sound familiar to her but was filled for 90 days in oct 2020.   Patient reports she takes care of all her medications.         "

## 2021-01-20 NOTE — ASSESSMENT & PLAN NOTE
This is suspected especially in the setting of the patient's cachectic body habitus.  -Follow-up on prealbumin level  -Starting thiamine supplements p.o.  -Consider registered dietitian counseling if needed

## 2021-01-20 NOTE — DISCHARGE PLANNING
Anticipated Discharge Disposition: likely home    Action: Patient was discussed in IDT rounds. Patient needing dialysis and treatment for shingles.     Barriers to Discharge: TBD    Plan: Care coordination to continue to follow for dc needs.

## 2021-01-20 NOTE — PROGRESS NOTES
Monitor Summary:  Sinus rhythm  BBB  0.16 / 0.12 / 0.38   conducted a detailed discussion... I had a detailed discussion with the patient and/or guardian regarding the historical points, exam findings, and any diagnostic results supporting the discharge/admit diagnosis.

## 2021-01-20 NOTE — ED PROVIDER NOTES
"ED Provider Note    CHIEF COMPLAINT  Chief Complaint   Patient presents with   • N/V     since thursday. unable to tolerate po. missed 8 sessions of dialysis. gets dialyzed M-W-F. last dialyzed approximately 1/4th   • Abdominal Pain     in luq abdomen describes as sharp and stabbing   • Cough       HPI  Isabelle Coyle is a 55 y.o. female who presents per the chart with nausea vomiting and abdominal pain, she denies this to myself.  She states she has missed 8 sessions of dialysis, feels slightly short of breath and with general malaise.  She has an intermittent cough, denies known COVID-19 exposure.  No abdominal pain.  She receives her dialysis through shunt in her left arm.  Patient denies headache.  Patient requesting food.  Symptoms of gradually worsened at home over the past 8 days while missing her dialysis.  Activity worsens her shortness of breath.    REVIEW OF SYSTEMS    Constitutional: General malaise  Respiratory: Cough, shortness of breath  Cardiac: Denies chest pain or syncope  Gastrointestinal: Previously reported nausea and abdominal pain currently resolved.  Musculoskeletal: No acute neck or back pain.  Patient has history of chronic back pain  Neurologic: No headache  Nephrology: End-stage renal disease, dialysis patient       All other systems are negative.       PAST MEDICAL HISTORY  Past Medical History:   Diagnosis Date   • Anemia    • Anemia associated with chronic renal failure 11/5/2009   • Anginal syndrome ([facility])    • Arrhythmia    • Asthma    • Back pain    • COPD (chronic obstructive pulmonary disease) ([facility])    • Dental disorder 8-25-17    \"Full Upper & Partial Lower Dentures\"   • Dialysis patient ([facility]) 8-25-17    [facility] M,W,F   • Dysrhythmia     \"SVT\"   • ESRD (end stage renal disease) ([facility]) 8-25-17    Dialysis MWF@ [facility]   • Fall    • GERD (gastroesophageal reflux disease)    • Glomerulonephritis, chronic 11/5/2009   • Head ache    • Heart burn    • Heart murmur "    • High cholesterol    • HTN (hypertension) 2009    2017 states well controlled   • Hypertension    • Indigestion    • Infectious disease    • NSTEMI (non-ST elevated myocardial infarction) (HCC) 10/2/2020   • Pneumonia    • Port catheter in place 17    For Dialysis   • Psychiatric problem    • SVT (supraventricular tachycardia) ([facility])    • SVT (supraventricular tachycardia) ([facility])    • Urinary incontinence 10/2/2020       FAMILY HISTORY  Family History   Problem Relation Age of Onset   • Heart Disease Other        SOCIAL HISTORY  Social History     Socioeconomic History   • Marital status:      Spouse name: Not on file   • Number of children: Not on file   • Years of education: Not on file   • Highest education level: Not on file   Occupational History   • Not on file   Social Needs   • Financial resource strain: Not on file   • Food insecurity     Worry: Patient refused     Inability: Patient refused   • Transportation needs     Medical: Patient refused     Non-medical: Patient refused   Tobacco Use   • Smoking status: Current Some Day Smoker     Packs/day: 0.25     Years: 20.00     Pack years: 5.00     Types: Cigarettes     Last attempt to quit: 2019     Years since quittin.7   • Smokeless tobacco: Never Used   • Tobacco comment: 5 cigs/day   Substance and Sexual Activity   • Alcohol use: No   • Drug use: No   • Sexual activity: Not on file   Lifestyle   • Physical activity     Days per week: Not on file     Minutes per session: Not on file   • Stress: Not on file   Relationships   • Social connections     Talks on phone: Not on file     Gets together: Not on file     Attends [employer] service: Not on file     Active member of club or organization: Not on file     Attends meetings of clubs or organizations: Not on file     Relationship status: Not on file   • Intimate partner violence     Fear of current or ex partner: Not on file     Emotionally abused: Not on file     Physically  abused: Not on file     Forced sexual activity: Not on file   Other Topics Concern   • Not on file   Social History Narrative   • Not on file       SURGICAL HISTORY  Past Surgical History:   Procedure Laterality Date   • MEÑO BY LAPAROSCOPY  6/22/2019    Procedure: CHOLECYSTECTOMY, LAPAROSCOPIC With GRAMS;  Surgeon: Jimbo Davenport M.D.;  Location: SURGERY [facility];  Service: General   • AV FISTULA CREATION Left 4/27/2018    Procedure: Left Brachial artery Repair;  Surgeon: Jimbo Davenport M.D.;  Location: SURGERY [facility];  Service: Vascular   • AV FISTULA CREATION Left 8/28/2017    Procedure: AV FISTULA CREATION  UPPER EXTREMITY -BRACHIAL BASALIC;  Surgeon: Stella Rodriguez M.D.;  Location: SURGERY [facility];  Service: General   • AV FISTULA CREATION Left 6/20/2017    Procedure: AV FISTULA CREATION- LEFT;  Surgeon: Jimbo Davenport M.D.;  Location: SURGERY [facility];  Service:    • APPENDECTOMY LAPAROSCOPIC  5/4/2009    Performed by BANDAR TIMMONS at [facility]   • FEMUR ORIF  10/9/08    Performed by STELLA GATES at [facility]   • ILIAC BONE GRAFT  10/9/08    Performed by STELLA GATES at [facility]   • AMPUTATION, BELOW THE KNEE     • CAPITATION PAYMENT (INSURANCE)     • OTHER      BELOW KNEE AMPUTATION RIGHT   • ME ENLARGE BREAST WITH IMPLANT         CURRENT MEDICATIONS  Home Medications     Reviewed by Nehemiah Faust Out, PhT (Pharmacy Tech) on 01/19/21 at 1837  Med List Status: Complete   Medication Last Dose Status   albuterol 108 (90 Base) MCG/ACT Aero Soln inhalation aerosol PRN Active   amLODIPine (NORVASC) 10 MG Tab Couple days ago Active   budesonide-formoterol (SYMBICORT) 80-4.5 MCG/ACT Aerosol Not Taking Active   carvedilol (COREG) 12.5 MG Tab UNK Active   cloNIDine (CATAPRES) 0.2 MG/24HR PATCH WEEKLY patch UNK Active   gabapentin (NEURONTIN) 100 MG Cap UNK Active   hydrALAZINE (APRESOLINE) 50 MG Tab UNK  "Active   predniSONE (DELTASONE) 20 MG Tab Not Taking Active   terazosin (HYTRIN) 5 MG Cap Couple days ago Active   valacyclovir (VALTREX) 500 MG Tab Not Taking Active                ALLERGIES  Allergies   Allergen Reactions   • Sulfa Drugs Hives, Rash, Itching and Swelling     QMT=[id number]       PHYSICAL EXAM  VITAL SIGNS: BP (!) 193/86   Pulse 73   Temp 36.1 °C (96.9 °F) (Temporal)   Resp 20   Ht 1.6 m (5' 3\")   Wt 61.2 kg (135 lb)   LMP 06/25/2007   SpO2 88%   BMI 23.91 kg/m²   Constitutional:  Non-toxic appearance.   HENT: No facial swelling, no epistaxis  Eyes: Anicteric, no conjunctivitis.     Cardiovascular: Normal heart rate, Normal rhythm  Pulmonary:  No wheezing, No rales.  Marcie breath sounds both lung bases  Gastrointestinal: Soft, No tenderness, No masses  Skin: Warm, Dry, No cyanosis.   Neurologic: Speech is clear, follows commands, facial expression is symmetrical.  Psychiatric: Affect normal,  Mood normal.  Patient is calm and cooperative  Musculoskeletal: Chest wall nontender    EKG/Labs  Results for orders placed or performed during the hospital encounter of 01/19/21   CBC WITH DIFFERENTIAL   Result Value Ref Range    WBC 5.1 4.8 - 10.8 K/uL    RBC 2.99 (L) 4.20 - 5.40 M/uL    Hemoglobin 9.5 (L) 12.0 - 16.0 g/dL    Hematocrit 30.3 (L) 37.0 - 47.0 %    .3 (H) 81.4 - 97.8 fL    MCH 31.8 27.0 - 33.0 pg    MCHC 31.4 (L) 33.6 - 35.0 g/dL    RDW 58.7 (H) 35.9 - 50.0 fL    Platelet Count 80 (L) 164 - 446 K/uL    MPV 10.5 9.0 - 12.9 fL    Neutrophils-Polys 67.20 44.00 - 72.00 %    Lymphocytes 21.80 (L) 22.00 - 41.00 %    Monocytes 5.90 0.00 - 13.40 %    Eosinophils 4.30 0.00 - 6.90 %    Basophils 0.60 0.00 - 1.80 %    Immature Granulocytes 0.20 0.00 - 0.90 %    Nucleated RBC 0.00 /100 WBC    Neutrophils (Absolute) 3.43 2.00 - 7.15 K/uL    Lymphs (Absolute) 1.11 1.00 - 4.80 K/uL    Monos (Absolute) 0.30 0.00 - 0.85 K/uL    Eos (Absolute) 0.22 0.00 - 0.51 K/uL    Baso (Absolute) 0.03 0.00 - 0.12 " K/uL    Immature Granulocytes (abs) 0.01 0.00 - 0.11 K/uL    NRBC (Absolute) 0.00 K/uL   COMP METABOLIC PANEL   Result Value Ref Range    Sodium 137 135 - 145 mmol/L    Potassium 6.2 (H) 3.6 - 5.5 mmol/L    Chloride 92 (L) 96 - 112 mmol/L    Co2 24 20 - 33 mmol/L    Anion Gap 21.0 (H) 7.0 - 16.0    Glucose 87 65 - 99 mg/dL    Bun 117 (HH) 8 - 22 mg/dL    Creatinine 14.21 (HH) 0.50 - 1.40 mg/dL    Calcium 9.7 8.5 - 10.5 mg/dL    AST(SGOT) 17 12 - 45 U/L    ALT(SGPT) 9 2 - 50 U/L    Alkaline Phosphatase 57 30 - 99 U/L    Total Bilirubin 0.7 0.1 - 1.5 mg/dL    Albumin 4.1 3.2 - 4.9 g/dL    Total Protein 6.2 6.0 - 8.2 g/dL    Globulin 2.1 1.9 - 3.5 g/dL    A-G Ratio 2.0 g/dL   LIPASE   Result Value Ref Range    Lipase 34 11 - 82 U/L   COV-2, FLU A/B, AND RSV BY PCR (2-4 HOURS CEPHEID): Collect NP swab in VTM    Specimen: Respirate   Result Value Ref Range    Influenza virus A RNA Negative Negative    Influenza virus B, PCR Negative Negative    RSV, PCR Negative Negative    SARS-CoV-2 by PCR NotDetected     SARS-CoV-2 Source NP Swab    ESTIMATED GFR   Result Value Ref Range    GFR If  3 (A) >60 mL/min/1.73 m 2    GFR If Non African American 3 (A) >60 mL/min/1.73 m 2     EKG Interpretation    Interpreted by emergency department physician    Rhythm: normal sinus   Rate: normal  Ectopy: none  Conduction: normal  ST Segments: no acute change  T Waves: no acute change  Q Waves: nonspecific    Clinical Impression: no acute changes in comparison with previous EKG.  No findings consistent with hyperkalemia at this time        RADIOLOGY/PROCEDURES  DX-CHEST-PORTABLE (1 VIEW)   Final Result      Moderate pulmonary edema and enlargement of cardiac silhouette            COURSE & MEDICAL DECISION MAKING  Pertinent Labs & Imaging studies reviewed. (See chart for details)  Patient has not had dialysis in 8 days, found to have evidence of pulmonary edema, hypertensive urgency, hyperkalemia.  She was treated with glucose  and insulin.  I have spoken with Dr. Mari on-call for Dr. Junior for nephrology, she will be performing 2 hours of dialysis tonight then again normal treatment tomorrow for help with both pulmonary edema and hypertension.  Patient remained stable otherwise in the emergency department.  She is hospitalized for ongoing evaluation and treatment.    FINAL IMPRESSION     1. Renal failure, unspecified chronicity     2. Hyperkalemia     3. Noncompliance with treatment      No dialysis for 8 days   4. Hypertensive urgency            Critical care time 35 minutes         Electronically signed by: Michi Dawson M.D., 1/19/2021 8:35 PM

## 2021-01-20 NOTE — ED NOTES
from Lab called with critical result of , creatinine 14.21 at 1708. Critical lab result read back to .   Dr. Dawson notified of critical lab result at 1710.  Critical lab result read back by Dr. Dawson.

## 2021-01-20 NOTE — PROGRESS NOTES
[facility] Progress Note     HD treatment ordered by Dr Richardson x 3.5 hours.  Treatment Start time: 1157          End time: 1450       Net UF 1950 ml    Pt treatment terminated early due to pt wish as pt not feeling well, having generalized pain and severe cramps. 1 RN and MD aware.    All blood was returned. LUE AVF needle removed. Dry gauze dressing applied and changed without bleeding issue. + Bruit/Thrill pre-post Treatment. See paper flow sheet for details.       Report given to ITZ De Jesus RN.

## 2021-01-20 NOTE — CONSULTS
"[facility] -  CONSULTATION NOTE               Author: Elliot Richardson M.D. Date & Time: 1/20/2021  7:51 AM       REASON FOR CONSULTATION:   Inpatient hemodialysis management.    CHIEF COMPLAINT:   shortness of breath    HISTORY OF PRESENT ILLNESS:    55-year-old female with end-stage renal disease secondary to chronic GN on HD MWF at [facility], COPD, hypertension, HFpEF, and history of non-compliance with outpatient dialysis who presented yesterday with nausea and shortness of breath.  Patient had been feeling ill recently and has not attending dialysis since around 1/4, ongoing issues with transportation. In the ED was found to have pulm edema on cxr (reviewed personally) with hypoxia and K of 6.2. Received 2hrs emergent HD. This AM feels slightly improved. No chest pain. Ongoing SOB.     REVIEW OF SYSTEMS:    General: +malaise  CV: No chest pain  RESP: +shortness of breath  GI: +abdominal pain   : No dysuria or gross hematuria  MSK: No trauma  Skin: +rashes  Neuro: No tremors  Psych: No depression    PAST MEDICAL HISTORY:   Past Medical History:   Diagnosis Date   • Anemia    • Anemia associated with chronic renal failure 11/5/2009   • Anginal syndrome ([facility])    • Arrhythmia    • Asthma    • Back pain    • COPD (chronic obstructive pulmonary disease) ([facility])    • Dental disorder 8-25-17    \"Full Upper & Partial Lower Dentures\"   • Dialysis patient ([facility]) 8-25-17    [facility] M,W,F   • Dysrhythmia     \"SVT\"   • ESRD (end stage renal disease) ([facility]) 8-25-17    Dialysis MWF@ [facility]   • Fall    • GERD (gastroesophageal reflux disease)    • Glomerulonephritis, chronic 11/5/2009   • Head ache    • Heart burn    • Heart murmur    • High cholesterol    • HTN (hypertension) 11/5/2009    2017 states well controlled   • Hypertension    • Indigestion    • Infectious disease    • NSTEMI (non-ST elevated myocardial infarction) ([facility]) 10/2/2020   • Pneumonia    • Port catheter in place " "8-25-17    For Dialysis   • Psychiatric problem    • SVT (supraventricular tachycardia) (H[facility])    • SVT (supraventricular tachycardia) (HCC)    • Urinary incontinence 10/2/2020         PAST SURGICAL HISTORY:   Past Surgical History:   Procedure Laterality Date   • MEÑO BY LAPAROSCOPY  6/22/2019    Procedure: CHOLECYSTECTOMY, LAPAROSCOPIC With GRAMS;  Surgeon: Jimbo Davenport M.D.;  Location: SURGERY [facility];  Service: General   • AV FISTULA CREATION Left 4/27/2018    Procedure: Left Brachial artery Repair;  Surgeon: Jimbo Davenport M.D.;  Location: SURGERY [facility];  Service: Vascular   • AV FISTULA CREATION Left 8/28/2017    Procedure: AV FISTULA CREATION  UPPER EXTREMITY -BRACHIAL BASALIC;  Surgeon: Stella Rodriguez M.D.;  Location: SURGERY [facility];  Service: General   • AV FISTULA CREATION Left 6/20/2017    Procedure: AV FISTULA CREATION- LEFT;  Surgeon: Jimbo Davenport M.D.;  Location: SURGERY [facility];  Service:    • APPENDECTOMY LAPAROSCOPIC  5/4/2009    Performed by BANDAR TIMMONS at [facility]   • FEMUR ORIF  10/9/08    Performed by STELLA GATES at [facility]   • ILIAC BONE GRAFT  10/9/08    Performed by STELLA GATES at [facility]   • AMPUTATION, BELOW THE KNEE     • CAPITATION PAYMENT (INSURANCE)     • OTHER      BELOW KNEE AMPUTATION RIGHT   • UT ENLARGE BREAST WITH IMPLANT         FAMILY HISTORY:   No family history of kidney disease    SOCIAL HISTORY:   +Tobacco, no etoh or illicits     HOME MEDICATIONS:   Reviewed and documented in chart    ALLERGIES:  Sulfa drugs    PHYSICAL EXAM:  VS:  /87   Pulse 74   Temp 36.2 °C (97.1 °F) (Temporal)   Resp 18   Ht 1.6 m (5' 3\")   Wt 61.2 kg (135 lb)   LMP 06/25/2007   SpO2 96%   BMI 23.91 kg/m²   GENERAL: no acute distress  CV: +edema  RESP: Non-labored  GI: Soft  MSK: Full ROM  SKIN: +papular rash  NEURO: AOx3  PSYCH: Cooperative    LABS:  Recent Results (from " the past 24 hour(s))   CBC WITH DIFFERENTIAL    Collection Time: 01/19/21  3:59 PM   Result Value Ref Range    WBC 5.1 4.8 - 10.8 K/uL    RBC 2.99 (L) 4.20 - 5.40 M/uL    Hemoglobin 9.5 (L) 12.0 - 16.0 g/dL    Hematocrit 30.3 (L) 37.0 - 47.0 %    .3 (H) 81.4 - 97.8 fL    MCH 31.8 27.0 - 33.0 pg    MCHC 31.4 (L) 33.6 - 35.0 g/dL    RDW 58.7 (H) 35.9 - 50.0 fL    Platelet Count 80 (L) 164 - 446 K/uL    MPV 10.5 9.0 - 12.9 fL    Neutrophils-Polys 67.20 44.00 - 72.00 %    Lymphocytes 21.80 (L) 22.00 - 41.00 %    Monocytes 5.90 0.00 - 13.40 %    Eosinophils 4.30 0.00 - 6.90 %    Basophils 0.60 0.00 - 1.80 %    Immature Granulocytes 0.20 0.00 - 0.90 %    Nucleated RBC 0.00 /100 WBC    Neutrophils (Absolute) 3.43 2.00 - 7.15 K/uL    Lymphs (Absolute) 1.11 1.00 - 4.80 K/uL    Monos (Absolute) 0.30 0.00 - 0.85 K/uL    Eos (Absolute) 0.22 0.00 - 0.51 K/uL    Baso (Absolute) 0.03 0.00 - 0.12 K/uL    Immature Granulocytes (abs) 0.01 0.00 - 0.11 K/uL    NRBC (Absolute) 0.00 K/uL   COMP METABOLIC PANEL    Collection Time: 01/19/21  3:59 PM   Result Value Ref Range    Sodium 137 135 - 145 mmol/L    Potassium 6.2 (H) 3.6 - 5.5 mmol/L    Chloride 92 (L) 96 - 112 mmol/L    Co2 24 20 - 33 mmol/L    Anion Gap 21.0 (H) 7.0 - 16.0    Glucose 87 65 - 99 mg/dL    Bun 117 (HH) 8 - 22 mg/dL    Creatinine 14.21 (HH) 0.50 - 1.40 mg/dL    Calcium 9.7 8.5 - 10.5 mg/dL    AST(SGOT) 17 12 - 45 U/L    ALT(SGPT) 9 2 - 50 U/L    Alkaline Phosphatase 57 30 - 99 U/L    Total Bilirubin 0.7 0.1 - 1.5 mg/dL    Albumin 4.1 3.2 - 4.9 g/dL    Total Protein 6.2 6.0 - 8.2 g/dL    Globulin 2.1 1.9 - 3.5 g/dL    A-G Ratio 2.0 g/dL   LIPASE    Collection Time: 01/19/21  3:59 PM   Result Value Ref Range    Lipase 34 11 - 82 U/L   ESTIMATED GFR    Collection Time: 01/19/21  3:59 PM   Result Value Ref Range    GFR If  3 (A) >60 mL/min/1.73 m 2    GFR If Non African American 3 (A) >60 mL/min/1.73 m 2   COV-2, FLU A/B, AND RSV BY PCR (2-4 HOURS  yuilop SL): Collect NP swab in [facility]    Collection Time: 01/19/21  5:50 PM    Specimen: Respirate   Result Value Ref Range    Influenza virus A RNA Negative Negative    Influenza virus B, PCR Negative Negative    RSV, PCR Negative Negative    SARS-CoV-2 by PCR NotDetected     SARS-CoV-2 Source NP Swab    PROCALCITONIN    Collection Time: 01/20/21  3:11 AM   Result Value Ref Range    Procalcitonin 0.87 (H) <0.25 ng/mL   CBC with Differential    Collection Time: 01/20/21  3:11 AM   Result Value Ref Range    WBC 4.8 4.8 - 10.8 K/uL    RBC 2.79 (L) 4.20 - 5.40 M/uL    Hemoglobin 8.9 (L) 12.0 - 16.0 g/dL    Hematocrit 28.5 (L) 37.0 - 47.0 %    .2 (H) 81.4 - 97.8 fL    MCH 31.9 27.0 - 33.0 pg    MCHC 31.2 (L) 33.6 - 35.0 g/dL    RDW 60.1 (H) 35.9 - 50.0 fL    Platelet Count 78 (L) 164 - 446 K/uL    MPV 10.6 9.0 - 12.9 fL    Neutrophils-Polys 67.20 44.00 - 72.00 %    Lymphocytes 21.20 (L) 22.00 - 41.00 %    Monocytes 6.20 0.00 - 13.40 %    Eosinophils 4.40 0.00 - 6.90 %    Basophils 0.60 0.00 - 1.80 %    Immature Granulocytes 0.40 0.00 - 0.90 %    Nucleated RBC 0.40 /100 WBC    Neutrophils (Absolute) 3.23 2.00 - 7.15 K/uL    Lymphs (Absolute) 1.02 1.00 - 4.80 K/uL    Monos (Absolute) 0.30 0.00 - 0.85 K/uL    Eos (Absolute) 0.21 0.00 - 0.51 K/uL    Baso (Absolute) 0.03 0.00 - 0.12 K/uL    Immature Granulocytes (abs) 0.02 0.00 - 0.11 K/uL    NRBC (Absolute) 0.02 K/uL   Comp Metabolic Panel (CMP)    Collection Time: 01/20/21  3:11 AM   Result Value Ref Range    Sodium 135 135 - 145 mmol/L    Potassium 4.8 3.6 - 5.5 mmol/L    Chloride 92 (L) 96 - 112 mmol/L    Co2 28 20 - 33 mmol/L    Anion Gap 15.0 7.0 - 16.0    Glucose 118 (H) 65 - 99 mg/dL    Bun 70 (H) 8 - 22 mg/dL    Creatinine 9.90 (HH) 0.50 - 1.40 mg/dL    Calcium 9.2 8.5 - 10.5 mg/dL    AST(SGOT) 12 12 - 45 U/L    ALT(SGPT) 7 2 - 50 U/L    Alkaline Phosphatase 60 30 - 99 U/L    Total Bilirubin 0.7 0.1 - 1.5 mg/dL    Albumin 3.8 3.2 - 4.9 g/dL    Total Protein 5.8  (L) 6.0 - 8.2 g/dL    Globulin 2.0 1.9 - 3.5 g/dL    A-G Ratio 1.9 g/dL   Lipid Profile (Lipid Panel) Fasting    Collection Time: 01/20/21  3:11 AM   Result Value Ref Range    Cholesterol,Tot 126 100 - 199 mg/dL    Triglycerides 115 0 - 149 mg/dL    HDL 33 (A) >=40 mg/dL    LDL 70 <100 mg/dL   ESTIMATED GFR    Collection Time: 01/20/21  3:11 AM   Result Value Ref Range    GFR If African American 5 (A) >60 mL/min/1.73 m 2    GFR If Non African American 4 (A) >60 mL/min/1.73 m 2       (click the triangle to expand results)    IMAGING:  DX-CHEST-PORTABLE (1 VIEW)   Final Result      Moderate pulmonary edema and enlargement of cardiac silhouette          ASSESSMENT:  # ESRD: Hx non-compliance with outpatient MWF therapy  # Hyperkalemia: resolved  # Heart Failure exacerbation: 2/2 non-compliance with dialysis  # Hypoxic resp failure  # Anemia secondary to end-stage renal disease.  # Tobacco abuse disorder.  # Hypertension: volume driven  # Right below-knee amputation.     PLAN:    -HD again today and if symptoms improved ok for discharge from renal standpoint  -Dose all meds for ESRD  -Renal Diet  -Continue home blood pressure meds  -Continue home phos binders  -Epogen with dialysis  -Encouraged outpatient compliance    Elliot Richardson MD

## 2021-01-20 NOTE — ASSESSMENT & PLAN NOTE
Multidermatome with active lesions, around T4 to T5 area.  Patient states that she was previously prescribed valacyclovir, however, she had not taken it because it had given her psychedelic visual hallucinations.  -Starting famciclovir as second line for shingles, to be renally dosed per pharmacy  -If famciclovir also causes issues, can consider acyclovir    -Contact and droplet precautions in place

## 2021-01-20 NOTE — ASSESSMENT & PLAN NOTE
This is a very important issue.  Patient also has a history of noncompliance with hemodialysis sessions, which likely contributed to her presentation to the emergency department resulting in this hospitalization.  Also, noncompliance with valacyclovir has hindered faster recovery from shingles  -I have counseled the patient extensively on the importance of medication compliance

## 2021-01-21 ENCOUNTER — APPOINTMENT (OUTPATIENT)
Dept: RADIOLOGY | Facility: MEDICAL CENTER | Age: 56
End: 2021-01-21
Attending: STUDENT IN AN ORGANIZED HEALTH CARE EDUCATION/TRAINING PROGRAM
Payer: MEDICAID

## 2021-01-21 LAB
ALBUMIN SERPL BCP-MCNC: 3.9 G/DL (ref 3.2–4.9)
ALBUMIN/GLOB SERPL: 1.6 G/DL
ALP SERPL-CCNC: 60 U/L (ref 30–99)
ALT SERPL-CCNC: 11 U/L (ref 2–50)
ANION GAP SERPL CALC-SCNC: 13 MMOL/L (ref 7–16)
AST SERPL-CCNC: 16 U/L (ref 12–45)
BASOPHILS # BLD AUTO: 0.4 % (ref 0–1.8)
BASOPHILS # BLD: 0.02 K/UL (ref 0–0.12)
BILIRUB SERPL-MCNC: 0.9 MG/DL (ref 0.1–1.5)
BUN SERPL-MCNC: 39 MG/DL (ref 8–22)
CALCIUM SERPL-MCNC: 9.7 MG/DL (ref 8.5–10.5)
CHLORIDE SERPL-SCNC: 94 MMOL/L (ref 96–112)
CO2 SERPL-SCNC: 27 MMOL/L (ref 20–33)
CREAT SERPL-MCNC: 5.86 MG/DL (ref 0.5–1.4)
EKG IMPRESSION: NORMAL
EKG IMPRESSION: NORMAL
EOSINOPHIL # BLD AUTO: 0.15 K/UL (ref 0–0.51)
EOSINOPHIL NFR BLD: 3.4 % (ref 0–6.9)
ERYTHROCYTE [DISTWIDTH] IN BLOOD BY AUTOMATED COUNT: 58.7 FL (ref 35.9–50)
EST. AVERAGE GLUCOSE BLD GHB EST-MCNC: 80 MG/DL
GLOBULIN SER CALC-MCNC: 2.4 G/DL (ref 1.9–3.5)
GLUCOSE SERPL-MCNC: 126 MG/DL (ref 65–99)
HBA1C MFR BLD: 4.4 % (ref 0–5.6)
HCT VFR BLD AUTO: 28.8 % (ref 37–47)
HGB BLD-MCNC: 8.8 G/DL (ref 12–16)
IMM GRANULOCYTES # BLD AUTO: 0.02 K/UL (ref 0–0.11)
IMM GRANULOCYTES NFR BLD AUTO: 0.4 % (ref 0–0.9)
LYMPHOCYTES # BLD AUTO: 1.2 K/UL (ref 1–4.8)
LYMPHOCYTES NFR BLD: 26.8 % (ref 22–41)
MAGNESIUM SERPL-MCNC: 2 MG/DL (ref 1.5–2.5)
MCH RBC QN AUTO: 30.8 PG (ref 27–33)
MCHC RBC AUTO-ENTMCNC: 30.6 G/DL (ref 33.6–35)
MCV RBC AUTO: 100.7 FL (ref 81.4–97.8)
MONOCYTES # BLD AUTO: 0.24 K/UL (ref 0–0.85)
MONOCYTES NFR BLD AUTO: 5.4 % (ref 0–13.4)
NEUTROPHILS # BLD AUTO: 2.84 K/UL (ref 2–7.15)
NEUTROPHILS NFR BLD: 63.6 % (ref 44–72)
NRBC # BLD AUTO: 0 K/UL
NRBC BLD-RTO: 0 /100 WBC
PHOSPHATE SERPL-MCNC: 5 MG/DL (ref 2.5–4.5)
PLATELET # BLD AUTO: 68 K/UL (ref 164–446)
PMV BLD AUTO: 10.2 FL (ref 9–12.9)
POTASSIUM SERPL-SCNC: 4.4 MMOL/L (ref 3.6–5.5)
PROT SERPL-MCNC: 6.3 G/DL (ref 6–8.2)
RBC # BLD AUTO: 2.86 M/UL (ref 4.2–5.4)
SODIUM SERPL-SCNC: 134 MMOL/L (ref 135–145)
TROPONIN T SERPL-MCNC: 88 NG/L (ref 6–19)
WBC # BLD AUTO: 4.5 K/UL (ref 4.8–10.8)

## 2021-01-21 PROCEDURE — A9270 NON-COVERED ITEM OR SERVICE: HCPCS | Performed by: STUDENT IN AN ORGANIZED HEALTH CARE EDUCATION/TRAINING PROGRAM

## 2021-01-21 PROCEDURE — 93010 ELECTROCARDIOGRAM REPORT: CPT | Mod: 77 | Performed by: INTERNAL MEDICINE

## 2021-01-21 PROCEDURE — 96376 TX/PRO/DX INJ SAME DRUG ADON: CPT

## 2021-01-21 PROCEDURE — 80053 COMPREHEN METABOLIC PANEL: CPT

## 2021-01-21 PROCEDURE — 94760 N-INVAS EAR/PLS OXIMETRY 1: CPT

## 2021-01-21 PROCEDURE — 700102 HCHG RX REV CODE 250 W/ 637 OVERRIDE(OP): Performed by: STUDENT IN AN ORGANIZED HEALTH CARE EDUCATION/TRAINING PROGRAM

## 2021-01-21 PROCEDURE — 700111 HCHG RX REV CODE 636 W/ 250 OVERRIDE (IP): Performed by: HOSPITALIST

## 2021-01-21 PROCEDURE — 99225 PR SUBSEQUENT OBSERVATION CARE,LEVEL II: CPT | Mod: GC | Performed by: INTERNAL MEDICINE

## 2021-01-21 PROCEDURE — 83036 HEMOGLOBIN GLYCOSYLATED A1C: CPT

## 2021-01-21 PROCEDURE — A9270 NON-COVERED ITEM OR SERVICE: HCPCS | Performed by: HOSPITALIST

## 2021-01-21 PROCEDURE — 84484 ASSAY OF TROPONIN QUANT: CPT

## 2021-01-21 PROCEDURE — 700102 HCHG RX REV CODE 250 W/ 637 OVERRIDE(OP): Performed by: HOSPITALIST

## 2021-01-21 PROCEDURE — 93005 ELECTROCARDIOGRAM TRACING: CPT | Performed by: INTERNAL MEDICINE

## 2021-01-21 PROCEDURE — 83735 ASSAY OF MAGNESIUM: CPT

## 2021-01-21 PROCEDURE — 36415 COLL VENOUS BLD VENIPUNCTURE: CPT

## 2021-01-21 PROCEDURE — 93005 ELECTROCARDIOGRAM TRACING: CPT | Performed by: STUDENT IN AN ORGANIZED HEALTH CARE EDUCATION/TRAINING PROGRAM

## 2021-01-21 PROCEDURE — 85025 COMPLETE CBC W/AUTO DIFF WBC: CPT

## 2021-01-21 PROCEDURE — 96372 THER/PROPH/DIAG INJ SC/IM: CPT

## 2021-01-21 PROCEDURE — 93010 ELECTROCARDIOGRAM REPORT: CPT | Performed by: INTERNAL MEDICINE

## 2021-01-21 PROCEDURE — 84100 ASSAY OF PHOSPHORUS: CPT

## 2021-01-21 PROCEDURE — G0378 HOSPITAL OBSERVATION PER HR: HCPCS

## 2021-01-21 RX ORDER — FAMCICLOVIR 125 MG/1
250 TABLET ORAL
Status: DISCONTINUED | OUTPATIENT
Start: 2021-01-22 | End: 2021-01-23 | Stop reason: HOSPADM

## 2021-01-21 RX ORDER — NICOTINE 21 MG/24HR
21 PATCH, TRANSDERMAL 24 HOURS TRANSDERMAL
Status: DISCONTINUED | OUTPATIENT
Start: 2021-01-21 | End: 2021-01-23 | Stop reason: HOSPADM

## 2021-01-21 RX ORDER — CARVEDILOL 25 MG/1
25 TABLET ORAL 2 TIMES DAILY WITH MEALS
Status: DISCONTINUED | OUTPATIENT
Start: 2021-01-21 | End: 2021-01-23 | Stop reason: HOSPADM

## 2021-01-21 RX ORDER — NICOTINE 21 MG/24HR
21 PATCH, TRANSDERMAL 24 HOURS TRANSDERMAL
Status: DISCONTINUED | OUTPATIENT
Start: 2021-01-21 | End: 2021-01-21

## 2021-01-21 RX ADMIN — CARVEDILOL 25 MG: 25 TABLET, FILM COATED ORAL at 17:14

## 2021-01-21 RX ADMIN — HYDRALAZINE HYDROCHLORIDE 50 MG: 50 TABLET, FILM COATED ORAL at 17:13

## 2021-01-21 RX ADMIN — FAMCICLOVIR 250 MG: 500 TABLET, FILM COATED ORAL at 04:45

## 2021-01-21 RX ADMIN — HEPARIN SODIUM 5000 UNITS: 5000 INJECTION, SOLUTION INTRAVENOUS; SUBCUTANEOUS at 13:14

## 2021-01-21 RX ADMIN — Medication 100 MG: at 13:14

## 2021-01-21 RX ADMIN — ANTACID TABLETS 500 MG: 500 TABLET, CHEWABLE ORAL at 09:48

## 2021-01-21 RX ADMIN — GABAPENTIN 100 MG: 100 CAPSULE ORAL at 21:11

## 2021-01-21 RX ADMIN — Medication 100 MG: at 04:45

## 2021-01-21 RX ADMIN — Medication 100 MG: at 17:13

## 2021-01-21 RX ADMIN — ANTACID TABLETS 500 MG: 500 TABLET, CHEWABLE ORAL at 17:13

## 2021-01-21 RX ADMIN — LABETALOL HYDROCHLORIDE 10 MG: 5 INJECTION, SOLUTION INTRAVENOUS at 21:21

## 2021-01-21 RX ADMIN — HYDRALAZINE HYDROCHLORIDE 50 MG: 50 TABLET, FILM COATED ORAL at 04:44

## 2021-01-21 RX ADMIN — AMLODIPINE BESYLATE 10 MG: 10 TABLET ORAL at 04:43

## 2021-01-21 RX ADMIN — GABAPENTIN 100 MG: 100 CAPSULE ORAL at 09:48

## 2021-01-21 RX ADMIN — CLONIDINE HYDROCHLORIDE 0.1 MG: 0.1 TABLET ORAL at 13:14

## 2021-01-21 RX ADMIN — ANTACID TABLETS 500 MG: 500 TABLET, CHEWABLE ORAL at 13:14

## 2021-01-21 RX ADMIN — NICOTINE TRANSDERMAL SYSTEM 21 MG: 21 PATCH, EXTENDED RELEASE TRANSDERMAL at 00:46

## 2021-01-21 RX ADMIN — HEPARIN SODIUM 5000 UNITS: 5000 INJECTION, SOLUTION INTRAVENOUS; SUBCUTANEOUS at 21:11

## 2021-01-21 RX ADMIN — TERAZOSIN HYDROCHLORIDE 5 MG: 5 CAPSULE ORAL at 04:44

## 2021-01-21 RX ADMIN — HEPARIN SODIUM 5000 UNITS: 5000 INJECTION, SOLUTION INTRAVENOUS; SUBCUTANEOUS at 04:42

## 2021-01-21 ASSESSMENT — COGNITIVE AND FUNCTIONAL STATUS - GENERAL
SUGGESTED CMS G CODE MODIFIER DAILY ACTIVITY: CH
STANDING UP FROM CHAIR USING ARMS: A LITTLE
MOBILITY SCORE: 20
CLIMB 3 TO 5 STEPS WITH RAILING: A LOT
DAILY ACTIVITIY SCORE: 24
SUGGESTED CMS G CODE MODIFIER MOBILITY: CJ
WALKING IN HOSPITAL ROOM: A LITTLE

## 2021-01-21 ASSESSMENT — LIFESTYLE VARIABLES
HOW MANY TIMES IN THE PAST YEAR HAVE YOU HAD 5 OR MORE DRINKS IN A DAY: 0
HAVE YOU EVER FELT YOU SHOULD CUT DOWN ON YOUR DRINKING: NO
HAVE PEOPLE ANNOYED YOU BY CRITICIZING YOUR DRINKING: NO
HAVE YOU EVER FELT YOU SHOULD CUT DOWN ON YOUR DRINKING: NO
ALCOHOL_USE: NO
EVER FELT BAD OR GUILTY ABOUT YOUR DRINKING: NO
AVERAGE NUMBER OF DAYS PER WEEK YOU HAVE A DRINK CONTAINING ALCOHOL: 0
EVER FELT BAD OR GUILTY ABOUT YOUR DRINKING: NO
AVERAGE NUMBER OF DAYS PER WEEK YOU HAVE A DRINK CONTAINING ALCOHOL: 0
ALCOHOL_USE: NO
ON A TYPICAL DAY WHEN YOU DRINK ALCOHOL HOW MANY DRINKS DO YOU HAVE: 0
HAVE PEOPLE ANNOYED YOU BY CRITICIZING YOUR DRINKING: NO
CONSUMPTION TOTAL: INCOMPLETE
TOTAL SCORE: 0
TOTAL SCORE: 0
CONSUMPTION TOTAL: NEGATIVE
HOW MANY TIMES IN THE PAST YEAR HAVE YOU HAD 5 OR MORE DRINKS IN A DAY: 0
EVER HAD A DRINK FIRST THING IN THE MORNING TO STEADY YOUR NERVES TO GET RID OF A HANGOVER: NO
TOTAL SCORE: 0
ON A TYPICAL DAY WHEN YOU DRINK ALCOHOL HOW MANY DRINKS DO YOU HAVE: 0

## 2021-01-21 ASSESSMENT — ENCOUNTER SYMPTOMS
MEMORY LOSS: 0
SHORTNESS OF BREATH: 1
ABDOMINAL PAIN: 0
HALLUCINATIONS: 0
MYALGIAS: 0
SORE THROAT: 0
HEADACHES: 0
FEVER: 0
DIAPHORESIS: 0
NERVOUS/ANXIOUS: 1
DOUBLE VISION: 0
WHEEZING: 0
VOMITING: 0
NAUSEA: 0
COUGH: 0
LOSS OF CONSCIOUSNESS: 0
FOCAL WEAKNESS: 0
BLURRED VISION: 0
CHILLS: 0
FALLS: 0

## 2021-01-21 ASSESSMENT — PAIN DESCRIPTION - PAIN TYPE: TYPE: ACUTE PAIN

## 2021-01-21 ASSESSMENT — FIBROSIS 4 INDEX: FIB4 SCORE: 4.04

## 2021-01-21 NOTE — PROGRESS NOTES
NELSY Merida attempted to reach pt at bedside at [facility] to introduce CCM and to complete inpatient intake. Pt stated that she was not feeling well and would like me to return tomorrow to complete intake.

## 2021-01-21 NOTE — PROGRESS NOTES
Assumed care of patient at bedside report from NOC RN. Updated on POC. Patient currently A & O x 4; on 1 L O2 nasal canula; up x1 assist with walker and prothesis; without complaints of acute pain. Call light within reach. Whiteboard updated. Fall precautions in place. Bed locked and in lowest position. All questions answered. No other needs indicated at this time.

## 2021-01-21 NOTE — PROGRESS NOTES
"[facility] -  PROGRESS NOTE               Author: Elliot Richardson M.D. Date & Time: 1/21/2021  7:44 AM     HPI:  55-year-old female with end-stage renal disease secondary to chronic GN on HD MWF at [facility], COPD, hypertension, HFpEF, and history of non-compliance with outpatient dialysis who presented yesterday with nausea and shortness of breath.  Patient had been feeling ill recently and has not attending dialysis since around 1/4, ongoing issues with transportation. In the ED was found to have pulm edema on cxr (reviewed personally) with hypoxia and K of 6.2. Received 2hrs emergent HD. This AM feels slightly improved. No chest pain. Ongoing SOB    DAILY NEPHROLOGY SUMMARY:  1/20: Consult done  1/21: Tolerated HD-terminated treatment early, hypertensive, 94% on 1L, feels back to baseline    PAST FAMILY HISTORY: Reviewed and Unchanged  SOCIAL HISTORY: Reviewed and Unchanged  CURRENT MEDICATIONS: Reviewed  IMAGING STUDIES: Reviewed    ROS  General: no malaise  CV: No chest pain  RESP: no shortness of breath  GI: no abdominal pain   : No dysuria or gross hematuria  MSK: No trauma  Skin: +rashes  Neuro: No tremors  Psych: No depression    PHYSICAL EXAM  VS:  BP (!) 171/95   Pulse 90   Temp 36.4 °C (97.6 °F) (Temporal)   Resp 18   Ht 1.6 m (5' 3\")   Wt 61.2 kg (135 lb)   LMP 06/25/2007   SpO2 96%   BMI 23.91 kg/m²   GENERAL: no acute distress  CV: trace edema  RESP: Non-labored  GI: Soft  MSK: RLE BKA  SKIN: +papular rash  NEURO: AOx3  PSYCH: Cooperative    Fluids:  In: 980 [P.O.:480; Dialysis:500]  Out: 2450     LABS:  Recent Results (from the past 24 hour(s))   PHOSPHORUS    Collection Time: 01/20/21  3:37 PM   Result Value Ref Range    Phosphorus 3.7 2.5 - 4.5 mg/dL   PREALBUMIN    Collection Time: 01/20/21  3:37 PM   Result Value Ref Range    Pre-Albumin 26.0 18.0 - 38.0 mg/dL   MAGNESIUM    Collection Time: 01/21/21 12:29 AM   Result Value Ref Range    Magnesium 2.0 1.5 - 2.5 " mg/dL   PHOSPHORUS    Collection Time: 01/21/21 12:29 AM   Result Value Ref Range    Phosphorus 5.0 (H) 2.5 - 4.5 mg/dL   CBC WITH DIFFERENTIAL    Collection Time: 01/21/21 12:29 AM   Result Value Ref Range    WBC 4.5 (L) 4.8 - 10.8 K/uL    RBC 2.86 (L) 4.20 - 5.40 M/uL    Hemoglobin 8.8 (L) 12.0 - 16.0 g/dL    Hematocrit 28.8 (L) 37.0 - 47.0 %    .7 (H) 81.4 - 97.8 fL    MCH 30.8 27.0 - 33.0 pg    MCHC 30.6 (L) 33.6 - 35.0 g/dL    RDW 58.7 (H) 35.9 - 50.0 fL    Platelet Count 68 (L) 164 - 446 K/uL    MPV 10.2 9.0 - 12.9 fL    Neutrophils-Polys 63.60 44.00 - 72.00 %    Lymphocytes 26.80 22.00 - 41.00 %    Monocytes 5.40 0.00 - 13.40 %    Eosinophils 3.40 0.00 - 6.90 %    Basophils 0.40 0.00 - 1.80 %    Immature Granulocytes 0.40 0.00 - 0.90 %    Nucleated RBC 0.00 /100 WBC    Neutrophils (Absolute) 2.84 2.00 - 7.15 K/uL    Lymphs (Absolute) 1.20 1.00 - 4.80 K/uL    Monos (Absolute) 0.24 0.00 - 0.85 K/uL    Eos (Absolute) 0.15 0.00 - 0.51 K/uL    Baso (Absolute) 0.02 0.00 - 0.12 K/uL    Immature Granulocytes (abs) 0.02 0.00 - 0.11 K/uL    NRBC (Absolute) 0.00 K/uL   Comp Metabolic Panel    Collection Time: 01/21/21 12:29 AM   Result Value Ref Range    Sodium 134 (L) 135 - 145 mmol/L    Potassium 4.4 3.6 - 5.5 mmol/L    Chloride 94 (L) 96 - 112 mmol/L    Co2 27 20 - 33 mmol/L    Anion Gap 13.0 7.0 - 16.0    Glucose 126 (H) 65 - 99 mg/dL    Bun 39 (H) 8 - 22 mg/dL    Creatinine 5.86 (HH) 0.50 - 1.40 mg/dL    Calcium 9.7 8.5 - 10.5 mg/dL    AST(SGOT) 16 12 - 45 U/L    ALT(SGPT) 11 2 - 50 U/L    Alkaline Phosphatase 60 30 - 99 U/L    Total Bilirubin 0.9 0.1 - 1.5 mg/dL    Albumin 3.9 3.2 - 4.9 g/dL    Total Protein 6.3 6.0 - 8.2 g/dL    Globulin 2.4 1.9 - 3.5 g/dL    A-G Ratio 1.6 g/dL   HEMOGLOBIN A1C    Collection Time: 01/21/21 12:29 AM   Result Value Ref Range    Glycohemoglobin 4.4 0.0 - 5.6 %    Est Avg Glucose 80 mg/dL   ESTIMATED GFR    Collection Time: 01/21/21 12:29 AM   Result Value Ref Range    GFR If   9 (A) >60 mL/min/1.73 m 2    GFR If Non African American 7 (A) >60 mL/min/1.73 m 2   EKG    Collection Time: 21  5:28 AM   Result Value Ref Range    Report       [facility]    Test Date:  2021  Pt Name:    MELISSA BARDALES                 Department: 183  MRN:        [id number]                      Room:       [facility]  Gender:     Female                       Technician: PABLO  :        1965                   Requested By:SAIMA ROSAS  Order #:    [id number]                    Reading MD: Vaishali De Leon MD    Measurements  Intervals                                Axis  Rate:       98                           P:  NC:                                      QRS:        63  QRSD:       94                           T:          53  QT:         360  QTc:        460    Interpretive Statements  ATRIAL FIBRILLATION  BORDERLINE LOW VOLTAGE IN FRONTAL LEADS  PROBABLE LEFT VENTRICULAR HYPERTROPHY  Poor R wave progression  BASELINE WANDER IN LEAD(S) V6  Compared to ECG 2021 14:42:30  Sinus rhythm no longer present  Electronically Signed On 2021 6:42:30 PST by Vaishali De Leon MD         (click the triangle to expand results)      ASSESSMENT:  # ESRD: Hx non-compliance with outpatient MWF therapy  # Hyperkalemia: resolved  # Heart Failure exacerbation: 2/2 non-compliance with dialysis  # Hypoxic resp failure  # Anemia secondary to end-stage renal disease.  # Tobacco abuse disorder.  # Hypertension: volume driven  # Right below-knee amputation.  # Herpes Zoster     PLAN:    -No HD today, to continue MWF therapy. Challenging UF  -OK for discharge home from renal standpoint  -Dose all meds for ESRD  -Renal Diet  -home blood pressure meds for now, challenging UF  -Continue home phos binders  -Epogen with dialysis  -Encouraged outpatient compliance     Elliot Richardson MD

## 2021-01-21 NOTE — PROGRESS NOTES
Notified by monitor tech that patient converted in to aflutter rate 90-100s. Patient asymptomatic laying comfortably in bed. BP remains slightly hypertensive in 170s. No documented history of afib in H/P, patient states known history of SVT but unsure of afib. EKG obtained, confirmed afib. Upon reviewing chart, patient did have two EKG back in December showing aflutter. Page out to [facility] to update.

## 2021-01-21 NOTE — PROGRESS NOTES
Monitor Summary:    Sinus rhythm 72-80bpm  Ectopy: Rare PVCs     Converted to Atrial fibrillation/flutter around 0430  Rate controlled     0.18 / 0.08 / 0.36

## 2021-01-21 NOTE — CARE PLAN
Problem: Nutritional:  Goal: Achieve adequate nutritional intake  Description: Patient will consume 50% of meals  Outcome: PROGRESSING AS EXPECTED     See RD note

## 2021-01-21 NOTE — DIETARY
"Nutrition services: Day 2 of admit.  Isabelle Coyle is a 55 y.o. female with admitting DX of ESRD (end stage renal disease)    Consult received for possible protein calorie malnutrition (listed on hospital problems)     Phone call to significant other (Lalo) to obtain PO intake and weight history. Per Lalo, pt was eating 2 meals/day PTA at baseline. Usual weight ~130-135 lb (stable with current weight).     Assessment:  Height: 160 cm (5' 3\")  Weight: 61.2 kg (135 lb)  Body mass index is 23.91 kg/m²., BMI classification: normal  Diet/Intake: Renal, PO % x3 meals yesterday    Evaluation:   1. Weight trend: stable per significant other, new weight requested. Current weight may be lower post dialysis (pt missed several treatments PTA, was likely fluid overloaded on admit).  2. PMH: COPD, HLD, HTN, ESRD on HD  3. Labs: Na 134L, Phos 5.0 (acceptable range for ESRD), TUMS with meals as phos binder  4. Meds: Thiamine  5. Unable to perform nutrition focused physical exam d/t airborne isolation, RD not permitted to enter room per policy    Malnutrition Risk: Does not meet criteria at this time    Recommendations/Plan:  1. Continue Renal diet   2. Encourage intake of meals  3. Document intake of all meals as % taken in ADL's to provide interdisciplinary communication across all shifts.   4. Monitor weight.  5. Nutrition rep will continue to see patient for ongoing meal and snack preferences.     RD following        "

## 2021-01-22 ENCOUNTER — APPOINTMENT (OUTPATIENT)
Dept: CARDIOLOGY | Facility: MEDICAL CENTER | Age: 56
End: 2021-01-22
Attending: STUDENT IN AN ORGANIZED HEALTH CARE EDUCATION/TRAINING PROGRAM
Payer: MEDICAID

## 2021-01-22 LAB
ALBUMIN SERPL BCP-MCNC: 4 G/DL (ref 3.2–4.9)
ALBUMIN/GLOB SERPL: 1.7 G/DL
ALP SERPL-CCNC: 59 U/L (ref 30–99)
ALT SERPL-CCNC: 9 U/L (ref 2–50)
ANION GAP SERPL CALC-SCNC: 11 MMOL/L (ref 7–16)
AST SERPL-CCNC: 15 U/L (ref 12–45)
BASOPHILS # BLD AUTO: 0.2 % (ref 0–1.8)
BASOPHILS # BLD: 0.01 K/UL (ref 0–0.12)
BILIRUB SERPL-MCNC: 0.9 MG/DL (ref 0.1–1.5)
BUN SERPL-MCNC: 23 MG/DL (ref 8–22)
CALCIUM SERPL-MCNC: 9.5 MG/DL (ref 8.5–10.5)
CHLORIDE SERPL-SCNC: 94 MMOL/L (ref 96–112)
CO2 SERPL-SCNC: 32 MMOL/L (ref 20–33)
CREAT SERPL-MCNC: 4.18 MG/DL (ref 0.5–1.4)
EOSINOPHIL # BLD AUTO: 0.12 K/UL (ref 0–0.51)
EOSINOPHIL NFR BLD: 2.7 % (ref 0–6.9)
ERYTHROCYTE [DISTWIDTH] IN BLOOD BY AUTOMATED COUNT: 60.6 FL (ref 35.9–50)
GLOBULIN SER CALC-MCNC: 2.3 G/DL (ref 1.9–3.5)
GLUCOSE SERPL-MCNC: 97 MG/DL (ref 65–99)
HCT VFR BLD AUTO: 30.3 % (ref 37–47)
HGB BLD-MCNC: 9.4 G/DL (ref 12–16)
IMM GRANULOCYTES # BLD AUTO: 0.02 K/UL (ref 0–0.11)
IMM GRANULOCYTES NFR BLD AUTO: 0.5 % (ref 0–0.9)
LV EJECT FRACT  99904: 65
LV EJECT FRACT MOD 2C 99903: 58.99
LV EJECT FRACT MOD 4C 99902: 67.16
LV EJECT FRACT MOD BP 99901: 65.04
LYMPHOCYTES # BLD AUTO: 0.79 K/UL (ref 1–4.8)
LYMPHOCYTES NFR BLD: 17.8 % (ref 22–41)
MAGNESIUM SERPL-MCNC: 2 MG/DL (ref 1.5–2.5)
MCH RBC QN AUTO: 31.4 PG (ref 27–33)
MCHC RBC AUTO-ENTMCNC: 31 G/DL (ref 33.6–35)
MCV RBC AUTO: 101.3 FL (ref 81.4–97.8)
MONOCYTES # BLD AUTO: 0.22 K/UL (ref 0–0.85)
MONOCYTES NFR BLD AUTO: 5 % (ref 0–13.4)
NEUTROPHILS # BLD AUTO: 3.28 K/UL (ref 2–7.15)
NEUTROPHILS NFR BLD: 73.8 % (ref 44–72)
NRBC # BLD AUTO: 0 K/UL
NRBC BLD-RTO: 0 /100 WBC
PHOSPHATE SERPL-MCNC: 3.5 MG/DL (ref 2.5–4.5)
PLATELET # BLD AUTO: 65 K/UL (ref 164–446)
PMV BLD AUTO: 11.2 FL (ref 9–12.9)
POTASSIUM SERPL-SCNC: 3.8 MMOL/L (ref 3.6–5.5)
PROT SERPL-MCNC: 6.3 G/DL (ref 6–8.2)
RBC # BLD AUTO: 2.99 M/UL (ref 4.2–5.4)
SODIUM SERPL-SCNC: 137 MMOL/L (ref 135–145)
WBC # BLD AUTO: 4.4 K/UL (ref 4.8–10.8)

## 2021-01-22 PROCEDURE — 700111 HCHG RX REV CODE 636 W/ 250 OVERRIDE (IP): Performed by: INTERNAL MEDICINE

## 2021-01-22 PROCEDURE — A9270 NON-COVERED ITEM OR SERVICE: HCPCS | Performed by: STUDENT IN AN ORGANIZED HEALTH CARE EDUCATION/TRAINING PROGRAM

## 2021-01-22 PROCEDURE — 90935 HEMODIALYSIS ONE EVALUATION: CPT

## 2021-01-22 PROCEDURE — 700102 HCHG RX REV CODE 250 W/ 637 OVERRIDE(OP): Performed by: INTERNAL MEDICINE

## 2021-01-22 PROCEDURE — 96376 TX/PRO/DX INJ SAME DRUG ADON: CPT | Mod: XU

## 2021-01-22 PROCEDURE — A9270 NON-COVERED ITEM OR SERVICE: HCPCS | Performed by: HOSPITALIST

## 2021-01-22 PROCEDURE — 99225 PR SUBSEQUENT OBSERVATION CARE,LEVEL II: CPT | Mod: GC | Performed by: INTERNAL MEDICINE

## 2021-01-22 PROCEDURE — 85025 COMPLETE CBC W/AUTO DIFF WBC: CPT

## 2021-01-22 PROCEDURE — 700102 HCHG RX REV CODE 250 W/ 637 OVERRIDE(OP): Performed by: STUDENT IN AN ORGANIZED HEALTH CARE EDUCATION/TRAINING PROGRAM

## 2021-01-22 PROCEDURE — 36415 COLL VENOUS BLD VENIPUNCTURE: CPT

## 2021-01-22 PROCEDURE — 700111 HCHG RX REV CODE 636 W/ 250 OVERRIDE (IP): Performed by: HOSPITALIST

## 2021-01-22 PROCEDURE — 83735 ASSAY OF MAGNESIUM: CPT

## 2021-01-22 PROCEDURE — 94760 N-INVAS EAR/PLS OXIMETRY 1: CPT

## 2021-01-22 PROCEDURE — G0378 HOSPITAL OBSERVATION PER HR: HCPCS

## 2021-01-22 PROCEDURE — 96372 THER/PROPH/DIAG INJ SC/IM: CPT

## 2021-01-22 PROCEDURE — 93306 TTE W/DOPPLER COMPLETE: CPT | Mod: 26 | Performed by: INTERNAL MEDICINE

## 2021-01-22 PROCEDURE — 700101 HCHG RX REV CODE 250: Performed by: STUDENT IN AN ORGANIZED HEALTH CARE EDUCATION/TRAINING PROGRAM

## 2021-01-22 PROCEDURE — 84100 ASSAY OF PHOSPHORUS: CPT

## 2021-01-22 PROCEDURE — 80053 COMPREHEN METABOLIC PANEL: CPT

## 2021-01-22 PROCEDURE — 700102 HCHG RX REV CODE 250 W/ 637 OVERRIDE(OP): Performed by: HOSPITALIST

## 2021-01-22 PROCEDURE — A9270 NON-COVERED ITEM OR SERVICE: HCPCS | Performed by: INTERNAL MEDICINE

## 2021-01-22 PROCEDURE — 93306 TTE W/DOPPLER COMPLETE: CPT

## 2021-01-22 RX ORDER — HYDRALAZINE HYDROCHLORIDE 50 MG/1
50 TABLET, FILM COATED ORAL EVERY 6 HOURS
Status: DISCONTINUED | OUTPATIENT
Start: 2021-01-22 | End: 2021-01-23 | Stop reason: HOSPADM

## 2021-01-22 RX ORDER — LIDOCAINE 50 MG/G
2 PATCH TOPICAL EVERY 24 HOURS
Status: DISCONTINUED | OUTPATIENT
Start: 2021-01-22 | End: 2021-01-23 | Stop reason: HOSPADM

## 2021-01-22 RX ORDER — ASPIRIN 81 MG/1
81 TABLET, CHEWABLE ORAL DAILY
Status: DISCONTINUED | OUTPATIENT
Start: 2021-01-22 | End: 2021-01-23 | Stop reason: HOSPADM

## 2021-01-22 RX ORDER — HYDROCODONE BITARTRATE AND ACETAMINOPHEN 5; 325 MG/1; MG/1
1-2 TABLET ORAL EVERY 6 HOURS PRN
Status: DISCONTINUED | OUTPATIENT
Start: 2021-01-22 | End: 2021-01-23 | Stop reason: HOSPADM

## 2021-01-22 RX ORDER — OMEPRAZOLE 20 MG/1
20 CAPSULE, DELAYED RELEASE ORAL
Status: DISCONTINUED | OUTPATIENT
Start: 2021-01-22 | End: 2021-01-23 | Stop reason: HOSPADM

## 2021-01-22 RX ADMIN — HEPARIN SODIUM 5000 UNITS: 5000 INJECTION, SOLUTION INTRAVENOUS; SUBCUTANEOUS at 04:44

## 2021-01-22 RX ADMIN — ANTACID TABLETS 500 MG: 500 TABLET, CHEWABLE ORAL at 10:15

## 2021-01-22 RX ADMIN — ANTACID TABLETS 500 MG: 500 TABLET, CHEWABLE ORAL at 04:50

## 2021-01-22 RX ADMIN — GABAPENTIN 100 MG: 100 CAPSULE ORAL at 10:16

## 2021-01-22 RX ADMIN — ANTACID TABLETS 500 MG: 500 TABLET, CHEWABLE ORAL at 16:42

## 2021-01-22 RX ADMIN — FAMCICLOVIR 250 MG: 500 TABLET, FILM COATED ORAL at 16:41

## 2021-01-22 RX ADMIN — ASPIRIN 81 MG: 81 TABLET, CHEWABLE ORAL at 16:41

## 2021-01-22 RX ADMIN — Medication 100 MG: at 04:45

## 2021-01-22 RX ADMIN — CLONIDINE HYDROCHLORIDE 0.1 MG: 0.1 TABLET ORAL at 13:25

## 2021-01-22 RX ADMIN — ONDANSETRON 4 MG: 4 TABLET, ORALLY DISINTEGRATING ORAL at 06:00

## 2021-01-22 RX ADMIN — OMEPRAZOLE 20 MG: 20 CAPSULE, DELAYED RELEASE ORAL at 13:39

## 2021-01-22 RX ADMIN — Medication 100 MG: at 16:41

## 2021-01-22 RX ADMIN — ACETAMINOPHEN 650 MG: 325 TABLET ORAL at 20:49

## 2021-01-22 RX ADMIN — HYDRALAZINE HYDROCHLORIDE 50 MG: 50 TABLET, FILM COATED ORAL at 17:26

## 2021-01-22 RX ADMIN — CARVEDILOL 25 MG: 25 TABLET, FILM COATED ORAL at 16:42

## 2021-01-22 RX ADMIN — AMLODIPINE BESYLATE 10 MG: 10 TABLET ORAL at 04:46

## 2021-01-22 RX ADMIN — HYDROCODONE BITARTRATE AND ACETAMINOPHEN 2 TABLET: 5; 325 TABLET ORAL at 23:35

## 2021-01-22 RX ADMIN — HYDRALAZINE HYDROCHLORIDE 50 MG: 50 TABLET, FILM COATED ORAL at 04:46

## 2021-01-22 RX ADMIN — HYDROCODONE BITARTRATE AND ACETAMINOPHEN 1 TABLET: 5; 325 TABLET ORAL at 16:41

## 2021-01-22 RX ADMIN — LABETALOL HYDROCHLORIDE 10 MG: 5 INJECTION, SOLUTION INTRAVENOUS at 20:50

## 2021-01-22 RX ADMIN — HEPARIN SODIUM 5000 UNITS: 5000 INJECTION, SOLUTION INTRAVENOUS; SUBCUTANEOUS at 13:25

## 2021-01-22 RX ADMIN — TERAZOSIN HYDROCHLORIDE 5 MG: 5 CAPSULE ORAL at 04:45

## 2021-01-22 RX ADMIN — LIDOCAINE 2 PATCH: 50 PATCH TOPICAL at 16:41

## 2021-01-22 RX ADMIN — HEPARIN SODIUM 5000 UNITS: 5000 INJECTION, SOLUTION INTRAVENOUS; SUBCUTANEOUS at 20:49

## 2021-01-22 RX ADMIN — Medication 100 MG: at 13:25

## 2021-01-22 RX ADMIN — GABAPENTIN 100 MG: 100 CAPSULE ORAL at 20:49

## 2021-01-22 RX ADMIN — CARVEDILOL 25 MG: 25 TABLET, FILM COATED ORAL at 04:45

## 2021-01-22 RX ADMIN — DOCUSATE SODIUM 50 MG AND SENNOSIDES 8.6 MG 2 TABLET: 8.6; 5 TABLET, FILM COATED ORAL at 16:41

## 2021-01-22 RX ADMIN — NICOTINE TRANSDERMAL SYSTEM 21 MG: 21 PATCH, EXTENDED RELEASE TRANSDERMAL at 04:45

## 2021-01-22 RX ADMIN — EPOETIN ALFA-EPBX 6000 UNITS: 3000 INJECTION, SOLUTION INTRAVENOUS; SUBCUTANEOUS at 10:27

## 2021-01-22 ASSESSMENT — ENCOUNTER SYMPTOMS
SORE THROAT: 0
SPEECH CHANGE: 0
BACK PAIN: 1
MEMORY LOSS: 0
WHEEZING: 0
DOUBLE VISION: 0
ABDOMINAL PAIN: 0
FEVER: 0
COUGH: 0
FALLS: 0
VOMITING: 0
NAUSEA: 0
HEADACHES: 0
HALLUCINATIONS: 0
NERVOUS/ANXIOUS: 1
CHILLS: 0
LOSS OF CONSCIOUSNESS: 0
BLURRED VISION: 0
WEAKNESS: 1
DIAPHORESIS: 0
SENSORY CHANGE: 0
MYALGIAS: 0
FOCAL WEAKNESS: 0

## 2021-01-22 ASSESSMENT — PAIN DESCRIPTION - PAIN TYPE
TYPE: ACUTE PAIN
TYPE: ACUTE PAIN

## 2021-01-22 ASSESSMENT — FIBROSIS 4 INDEX
FIB4 SCORE: 4.38
FIB4 SCORE: 4.04

## 2021-01-22 NOTE — PROGRESS NOTES
"[facility] -  PROGRESS NOTE               Author: Elliot Richardson M.D. Date & Time: 1/22/2021  7:44 AM     HPI:  55-year-old female with end-stage renal disease secondary to chronic GN on HD MWF at [facility], COPD, hypertension, HFpEF, and history of non-compliance with outpatient dialysis who presented yesterday with nausea and shortness of breath.  Patient had been feeling ill recently and has not attending dialysis since around 1/4, ongoing issues with transportation. In the ED was found to have pulm edema on cxr (reviewed personally) with hypoxia and K of 6.2. Received 2hrs emergent HD. This AM feels slightly improved. No chest pain. Ongoing SOB    DAILY NEPHROLOGY SUMMARY:  1/20: Consult done  1/21: Tolerated HD-terminated treatment early, hypertensive, 94% on 1L, feels back to baseline  1/22: No acute events, hypertensive this AM, seen on HD    PAST FAMILY HISTORY: Reviewed and Unchanged  SOCIAL HISTORY: Reviewed and Unchanged  CURRENT MEDICATIONS: Reviewed  IMAGING STUDIES: Reviewed    ROS  General: no malaise  CV: No chest pain  RESP: no shortness of breath  GI: no abdominal pain   : No dysuria or gross hematuria  MSK: No trauma  Skin: +rashes/pain  Neuro: No tremors  Psych: No depression    PHYSICAL EXAM  VS:  BP (!) 172/93 Comment: Nurse notified  Pulse 66   Temp 36.3 °C (97.4 °F) (Temporal)   Resp 18   Ht 1.6 m (5' 3\")   Wt 61.6 kg (135 lb 12.9 oz)   LMP 06/25/2007   SpO2 94%   BMI 24.06 kg/m²   GENERAL: no acute distress  CV: trace edema  RESP: Non-labored  GI: Soft  MSK: RLE BKA  SKIN: +papular rash  NEURO: AOx3  PSYCH: Cooperative    Fluids:  In: 470 [P.O.:470]  Out: -     LABS:  Recent Results (from the past 24 hour(s))   TROPONIN    Collection Time: 01/21/21 11:54 AM   Result Value Ref Range    Troponin T 88 (H) 6 - 19 ng/L   EKG    Collection Time: 01/21/21  1:41 PM   Result Value Ref Range    Report       [facility]    Test Date:  2021-01-21  Pt Name:    " MELISSA BARDALES                 Department: 183  MRN:        [id number]                      Room:       [facility]  Gender:     Female                       Technician: [employer]  :        1965                   Requested By:ADOLPH JAY  Order #:    [id number]                    Reading MD: Gen Ulrich MD    Measurements  Intervals                                Axis  Rate:       68                           P:          48  NM:         172                          QRS:        49  QRSD:       96                           T:          31  QT:         424  QTc:        451    Interpretive Statements  SINUS RHYTHM  BORDERLINE LOW VOLTAGE IN FRONTAL LEADS  PROBABLE LEFT VENTRICULAR HYPERTROPHY  Compared to ECG 2021 05:28:27  Atrial fibrillation no longer present  Electronically Signed On 2021 14:09:36 PST by Gen Ulrich MD         (click the triangle to expand results)      ASSESSMENT:  # ESRD: Hx non-compliance with outpatient MWF therapy  # Hyperkalemia: resolved  # Heart Failure exacerbation: 2/2 non-compliance with dialysis  # Hypoxic resp failure  # Anemia secondary to end-stage renal disease.  # Tobacco abuse disorder.  # Afib  # Hypertension: volume driven  # Right below-knee amputation.  # Herpes Zoster     PLAN:    -HD today, to continue MWF therapy. Challenging UF  -OK for discharge home from renal standpoint  -Dose all meds for ESRD  -Renal Diet  -home blood pressure meds for now, challenging UF  -Continue home phos binders  -Epogen with dialysis  -Encouraged outpatient compliance     Elliot Richardson MD

## 2021-01-22 NOTE — PROGRESS NOTES
Notified by monitor tech patient had 6 bts VT. Patient nonsymptomatic. MD Fish notified. New orders received.

## 2021-01-22 NOTE — PROGRESS NOTES
"Daily Progress Note:     Date of Service: 1/21/2021  Primary Team: [facility] White Team   Attending: Dr Tse  Senior Resident: Dr. Fish  Intern: Dr. Barnhart  Contact:  [phone]    Chief Complaint:   Chief Complaint   Patient presents with   • N/V     since thursday. unable to tolerate po. missed 8 sessions of dialysis. gets dialyzed M-W-F. last dialyzed approximately 1/4th   • Abdominal Pain     in luq abdomen describes as sharp and stabbing   • Cough       ID:  Patient is a 55-year-old female with a past medical history significant for ESRD (Monday Wednesday Friday dialysis), supraventricular tachycardia, COPD (no PFTs available), NSTEMI? (10/2020), hypertension/hyperlipidemia, right BKA, and tobacco abuse who presents with B symptoms of cough, fever, shortness of breath, malaise, nausea, vomiting, after missing 8 sessions of dialysis.  Was also diagnosed with shingles her back rash 4 days ago and has not been taking valacyclovir.    Subjective:   - No acute events overnight.    - patient states she is feeling better this morning.   - Patient developed a new onset Afib captured on telemetry. afib was rate controlled.  - Patient states she is feeling better and almost back to base line.  - Patient also had a run of NSVT ( 6 beats) => Serum electrolytes/EKG/ECHO and troponin were ordered. All of which were unremarkable.   - Pending ECHO.  - Patient converted back to sinus rhythm after increasing her BB dose.  - Patient CHADVASc of 2-3 .risk and benefits of anticoagulation was discussed with patient. Patient states she is \"afraid to start blood thinner at this time\" and needs more time to think. Pharmacy was consulted and the recommended medication was ( Apixaban   2.5 mg BID if weght is <60 Kg , 5 mg if Wt. Is > 60 Kg).      Consultants/Specialty:  - Nephrology    Review of Systems:   Review of Systems   Constitutional: Positive for malaise/fatigue. Negative for chills, diaphoresis and fever.   HENT: Negative for " congestion and sore throat.    Eyes: Negative for blurred vision and double vision.   Respiratory: Positive for shortness of breath (improving). Negative for cough and wheezing.    Cardiovascular: Negative for chest pain and leg swelling.   Gastrointestinal: Negative for abdominal pain, nausea and vomiting.   Genitourinary: Negative for frequency and urgency.   Musculoskeletal: Negative for falls and myalgias.   Neurological: Negative for focal weakness, loss of consciousness and headaches.   Psychiatric/Behavioral: Negative for hallucinations, memory loss and suicidal ideas. The patient is nervous/anxious.         Is evasive upon questioning of substance abuse  Reports that she has visual hallucinations upon taking valacyclovir       Objective Data:   Physical Exam:   Vitals:   Temp:  [36 °C (96.8 °F)-36.5 °C (97.7 °F)] 36.1 °C (96.9 °F)  Pulse:  [] 69  Resp:  [14-20] 16  BP: (132-181)/(70-98) 169/91  SpO2:  [91 %-99 %] 91 %     Physical Exam  Vitals signs and nursing note reviewed.   Constitutional:       General: She is not in acute distress.     Appearance: She is not ill-appearing, toxic-appearing or diaphoretic.      Comments: Cachectic   HENT:      Head: Normocephalic and atraumatic.      Right Ear: External ear normal.      Left Ear: External ear normal.      Nose: Nose normal. No congestion or rhinorrhea.      Mouth/Throat:      Mouth: Mucous membranes are dry.      Pharynx: Oropharynx is clear. No oropharyngeal exudate or posterior oropharyngeal erythema.   Eyes:      General: No scleral icterus.        Right eye: No discharge.         Left eye: No discharge.      Extraocular Movements: Extraocular movements intact.      Conjunctiva/sclera: Conjunctivae normal.      Pupils: Pupils are equal, round, and reactive to light.   Neck:      Musculoskeletal: Normal range of motion and neck supple. No neck rigidity.   Cardiovascular:      Rate and Rhythm: Normal rate and regular rhythm.      Pulses: Normal  pulses.      Heart sounds: Murmur present.   Pulmonary:      Effort: Pulmonary effort is normal. No respiratory distress.      Breath sounds: Normal breath sounds. No wheezing.   Abdominal:      General: Abdomen is flat. Bowel sounds are normal. There is no distension.      Palpations: Abdomen is soft.      Tenderness: There is no abdominal tenderness. There is no guarding.   Musculoskeletal:         General: Signs of injury present. No swelling or tenderness.      Right lower leg: No edema.      Left lower leg: No edema.      Comments: Right below the knee amputation   Skin:     General: Skin is warm and dry.      Capillary Refill: Capillary refill takes less than 2 seconds.      Comments: Bandage on the midsection on the right side of back.  Upon removal of bandage there is a 7 inch x 4 inch wound, in T4-T5 dermatomal distribution, macerated wound, with some erythematous spots, tender to palpation   Neurological:      Mental Status: She is alert and oriented to person, place, and time.      Sensory: No sensory deficit.           Labs:   Recent Labs     01/19/21  1559 01/20/21  0311 01/21/21  0029   WBC 5.1 4.8 4.5*   RBC 2.99* 2.79* 2.86*   HEMOGLOBIN 9.5* 8.9* 8.8*   HEMATOCRIT 30.3* 28.5* 28.8*   .3* 102.2* 100.7*   MCH 31.8 31.9 30.8   RDW 58.7* 60.1* 58.7*   PLATELETCT 80* 78* 68*   MPV 10.5 10.6 10.2   NEUTSPOLYS 67.20 67.20 63.60   LYMPHOCYTES 21.80* 21.20* 26.80   MONOCYTES 5.90 6.20 5.40   EOSINOPHILS 4.30 4.40 3.40   BASOPHILS 0.60 0.60 0.40     Recent Labs     01/19/21  1559 01/20/21  0311 01/21/21  0029   SODIUM 137 135 134*   POTASSIUM 6.2* 4.8 4.4   CHLORIDE 92* 92* 94*   CO2 24 28 27   GLUCOSE 87 118* 126*   * 70* 39*        Imaging:   DX-CHEST-PORTABLE (1 VIEW)   Final Result      Moderate pulmonary edema and enlargement of cardiac silhouette      EC-ECHOCARDIOGRAM COMPLETE W/O CONT    (Results Pending)       Problem Representation:   55-year-old female with a past medical history  significant for ESRD (Monday Wednesday Friday dialysis), supraventricular tachycardia, COPD (no PFTs available), NSTEMI? (10/2020), hypertension/hyperlipidemia, right BKA, and tobacco abuse who presents with B symptoms of cough, fever, shortness of breath, malaise, nausea, vomiting, after missing 8 sessions of dialysis.  Was also diagnosed with shingles her back rash 4 days ago and has not been taking valacyclovir.      * ESRD on hemodialysis (HCC)- (present on admission)  Assessment & Plan  Hemodialysis Monday Wednesday Friday  Hyperkalemia treated in the ER with insulin dextrose  Underwent emergent dialysis on the night of 1/19 - 1/20, continued on regular Monday Wednesday Friday dialysis starting 1/20.  Plan is for HD session on 1/21 night.  -Will continue to monitor and trend BMP, as well as monitor electrolytes, including potassium and phosphorus    Herpes zoster without complication  Assessment & Plan  Multidermatome with active lesions, around T4 to T5 area.  Patient states that she was previously prescribed valacyclovir, however, she had not taken it because it had given her psychedelic, visual hallucinations.  -Starting famciclovir as second line for shingles, to be renally dosed per pharmacy  -If famciclovir also causes issues, can consider acyclovir    -Contact and droplet precautions in place    New onset atrial fibrillation (HCC)  Assessment & Plan  - Per patient she has history of SVT.  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  - Was Coumadin no tsure why she stopped taking it. Patient states she never used anticoagulation (blood thinners).  - CHADVASc score of 2 ( HTN , Sc=female). Anticoagulation discussed with patient but she is hesitant to start anticoagulation at this time.  @  - Continue Betablocker.  - Pending ECHO.  - CTM for reversible causes ( electrolytes , ischemia , hypoxia).      Acute respiratory failure with hypoxia (HCC)- (present on  admission)  Assessment & Plan  Per patient, patient uses 3 L of oxygen at home.  On the a.m. of 1/20, patient was satting in low 90s on 1 L of oxygen.  -May need home oxygen evaluation prior to discharge    Hypertensive urgency- (present on admission)  Assessment & Plan  IMPROVING  Uncontrolled.  Patient is a poor historian and cannot remember whether she has been taking her blood pressure medications regularly or not.  -Continue current home medications  -IV as needed medications have been ordered  -Clonidine as needed  -I have extensively counseled the patient on the necessity of medical compliance  -Patient may need optimization of blood pressure medications as outpatient      H/O medication noncompliance- (present on admission)  Assessment & Plan  This is a very important issue.  Patient also has a history of noncompliance with hemodialysis sessions, which likely contributed to her presentation to the emergency department resulting in this hospitalization.  Also, noncompliance with valacyclovir has hindered faster recovery from shingles  -I have counseled the patient extensively on the importance of medication compliance    Thrombocytopenia (HCC)- (present on admission)  Assessment & Plan  Continue to trend  No evidence of bleeding    Possible Protein calorie malnutrition (HCC)- (present on admission)  Assessment & Plan  This is suspected especially in the setting of the patient's cachectic body habitus.  -Follow-up on prealbumin level  -Starting thiamine supplements p.o.  -Consider registered dietitian counseling if needed    COPD (chronic obstructive pulmonary disease) (HCC)- (present on admission)  Assessment & Plan  Patient is not an active COPD exacerbation at this time  -Continue to monitor  -Albuterol inhaler as needed  -Respiratory therapy consult in place    Tobacco abuse- (present on admission)  Assessment & Plan  Encourage tobacco cessation      Quality Measures:  Code: Full  VTE: Heparin  Diet:  Renal  Disposition: Remain inpatient

## 2021-01-22 NOTE — CARE PLAN
Problem: Communication  Goal: The ability to communicate needs accurately and effectively will improve  Outcome: PROGRESSING AS EXPECTED     Problem: Safety  Goal: Will remain free from injury  Outcome: PROGRESSING AS EXPECTED  Goal: Will remain free from falls  Outcome: PROGRESSING AS EXPECTED     Problem: Bowel/Gastric:  Goal: Normal bowel function is maintained or improved  Outcome: PROGRESSING AS EXPECTED  Goal: Will not experience complications related to bowel motility  Outcome: PROGRESSING AS EXPECTED     Problem: Knowledge Deficit  Goal: Knowledge of disease process/condition, treatment plan, diagnostic tests, and medications will improve  Outcome: PROGRESSING AS EXPECTED  Goal: Knowledge of the prescribed therapeutic regimen will improve  Outcome: PROGRESSING AS EXPECTED     Problem: Fluid Volume:  Goal: Will maintain balanced intake and output  Outcome: PROGRESSING AS EXPECTED     Problem: Pain Management  Goal: Pain level will decrease to patient's comfort goal  Outcome: PROGRESSING AS EXPECTED     Problem: Psychosocial Needs:  Goal: Level of anxiety will decrease  Outcome: PROGRESSING AS EXPECTED     Problem: Urinary Elimination:  Goal: Ability to reestablish a normal urinary elimination pattern will improve  Outcome: PROGRESSING AS EXPECTED

## 2021-01-22 NOTE — ASSESSMENT & PLAN NOTE
- Per patient she has history of SVT.  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  - Was Coumadin notsure why she stopped taking it. Patient states she never used anticoagulation (blood thinners).  - CHADVASc score of 2 ( HTN , Sc=female). Anticoagulation discussed with patient, she is adamant about not starting them at this time, though is amenable to starting ASA 81.    - Continue ASA 81  - Continue Betablocker.  - Pending ECHO.  - CTM for reversible causes (electrolytes, ischemia, hypoxia).

## 2021-01-22 NOTE — PROGRESS NOTES
Monitor Summary  Rhythm: Aflutter/SR  Rate:   Ectopy: PVC, triplet, PAC, 6 bts VT. Patient asymptomatic, MD notified.   0 / 0.08 / 0

## 2021-01-22 NOTE — PROGRESS NOTES
HD treatment today per routine order using LUAAVF.Patient hypertensive throughout otherwise tolerated treatment well.Net UF removed 2500 ml.Report given to primary Rn.

## 2021-01-22 NOTE — PROGRESS NOTES
Assumed care at 1900, bedside report received from Nesha SANTOS. Pt. Is Aflutter/NSR on the monitor. Initial assessment completed, orders reviewed, call light within reach, bed alarm  in use, and hourly rounding in place. POC addressed with patient, no additional questions at this time.

## 2021-01-22 NOTE — PROGRESS NOTES
"Daily Progress Note:     Date of Service: 1/22/2021  Primary Team: [facility] White Team   Attending: Dr Tse  Senior Resident: Dr. Fish  Intern: Dr. Barnhart  Contact:  [phone]    Chief Complaint:   Chief Complaint   Patient presents with   • N/V     since thursday. unable to tolerate po. missed 8 sessions of dialysis. gets dialyzed M-W-F. last dialyzed approximately 1/4th   • Abdominal Pain     in luq abdomen describes as sharp and stabbing   • Cough       ID:  Patient is a 55-year-old female with a past medical history significant for ESRD (Monday Wednesday Friday dialysis), supraventricular tachycardia, COPD (no PFTs available), NSTEMI? (10/2020), hypertension/hyperlipidemia, right BKA, and tobacco abuse who presents with B symptoms of cough, fever, shortness of breath, malaise, nausea, vomiting, after missing 8 sessions of dialysis.  Was also diagnosed with shingles her back rash 4 days ago and has not been taking valacyclovir.    Subjective: No acute events overnight.  Today, patient states that she feels the same as yesterday.  Denies chest pain, shortness of breath, abdominal discomfort.  She still has back pain in the area of her shingles rash as well as internally.  Telemetry showed normal sinus rhythm, heart rate 65-70, with rare PVCs and PACs.  She adamantly declines anticoagulation despite extensive counseling from medical team, however, was amenable to starting aspirin 81.    Subjective:     - Patient CHADVASc of 2-3 .risk and benefits of anticoagulation was discussed with patient. Patient states she is \"afraid to start blood thinner at this time\" and needs more time to think. Pharmacy was consulted and the recommended medication was ( Apixaban   2.5 mg BID if weght is <60 Kg , 5 mg if Wt. Is > 60 Kg).      Consultants/Specialty:  - Nephrology    Review of Systems:   Review of Systems   Constitutional: Positive for malaise/fatigue. Negative for chills, diaphoresis and fever.   HENT: Negative for " congestion and sore throat.    Eyes: Negative for blurred vision and double vision.   Respiratory: Negative for cough and wheezing. Shortness of breath: improving.    Cardiovascular: Negative for chest pain and leg swelling.   Gastrointestinal: Negative for abdominal pain, nausea and vomiting.   Genitourinary: Negative for frequency and urgency.   Musculoskeletal: Positive for back pain. Negative for falls and myalgias.        Severe back pain in area of shingles   Neurological: Positive for weakness. Negative for sensory change, speech change, focal weakness, loss of consciousness and headaches.   Psychiatric/Behavioral: Negative for hallucinations, memory loss and suicidal ideas. The patient is nervous/anxious.         Is evasive upon questioning of substance abuse  Reports that she has visual hallucinations upon taking valacyclovir       Objective Data:   Physical Exam:   Vitals:   Temp:  [36.1 °C (97 °F)-36.6 °C (97.9 °F)] 36.1 °C (97 °F)  Pulse:  [65-70] 65  Resp:  [18] 18  BP: (160-181)/(31-96) 160/80  SpO2:  [94 %-99 %] 99 %     Physical Exam  Vitals signs and nursing note reviewed.   Constitutional:       General: She is not in acute distress.     Appearance: She is ill-appearing. She is not toxic-appearing or diaphoretic.      Comments: Looks older than stated age, laying in bed, undergoing hemodialysis   HENT:      Head: Normocephalic and atraumatic.      Right Ear: External ear normal.      Left Ear: External ear normal.      Nose: Nose normal. No congestion or rhinorrhea.      Mouth/Throat:      Mouth: Mucous membranes are dry.      Pharynx: Oropharynx is clear. No oropharyngeal exudate or posterior oropharyngeal erythema.   Eyes:      General: No scleral icterus.        Right eye: No discharge.         Left eye: No discharge.      Extraocular Movements: Extraocular movements intact.      Conjunctiva/sclera: Conjunctivae normal.      Pupils: Pupils are equal, round, and reactive to light.   Neck:       Musculoskeletal: Normal range of motion and neck supple. No neck rigidity.   Cardiovascular:      Rate and Rhythm: Normal rate and regular rhythm.      Pulses: Normal pulses.      Heart sounds: Murmur present.   Pulmonary:      Effort: Pulmonary effort is normal. No respiratory distress.      Breath sounds: Normal breath sounds. No wheezing.   Abdominal:      General: Abdomen is flat. Bowel sounds are normal. There is no distension.      Palpations: Abdomen is soft.      Tenderness: There is no abdominal tenderness. There is no guarding.   Musculoskeletal:         General: Signs of injury present. No swelling or tenderness.      Right lower leg: No edema.      Left lower leg: No edema.      Comments: Right below the knee amputation   Skin:     General: Skin is warm and dry.      Capillary Refill: Capillary refill takes less than 2 seconds.      Comments: Bandage on the midsection on the right side of back.  Upon removal of bandage there is a 7 inch x 4 inch wound, in T4-T5 dermatomal distribution, macerated wound, with some erythematous spots, tender to palpation   Neurological:      Mental Status: She is alert and oriented to person, place, and time.      Sensory: No sensory deficit.           Labs:   Recent Labs     01/20/21 0311 01/21/21  0029 01/22/21  1239   WBC 4.8 4.5* 4.4*   RBC 2.79* 2.86* 2.99*   HEMOGLOBIN 8.9* 8.8* 9.4*   HEMATOCRIT 28.5* 28.8* 30.3*   .2* 100.7* 101.3*   MCH 31.9 30.8 31.4   RDW 60.1* 58.7* 60.6*   PLATELETCT 78* 68* 65*   MPV 10.6 10.2 11.2   NEUTSPOLYS 67.20 63.60 73.80*   LYMPHOCYTES 21.20* 26.80 17.80*   MONOCYTES 6.20 5.40 5.00   EOSINOPHILS 4.40 3.40 2.70   BASOPHILS 0.60 0.40 0.20     Recent Labs     01/20/21 0311 01/21/21  0029 01/22/21  1239   SODIUM 135 134* 137   POTASSIUM 4.8 4.4 3.8   CHLORIDE 92* 94* 94*   CO2 28 27 32   GLUCOSE 118* 126* 97   BUN 70* 39* 23*        Imaging:   EC-ECHOCARDIOGRAM COMPLETE W/O CONT   Final Result      DX-CHEST-PORTABLE (1 VIEW)    Final Result      Moderate pulmonary edema and enlargement of cardiac silhouette          Problem Representation:   55-year-old female with a past medical history significant for ESRD (Monday Wednesday Friday dialysis), supraventricular tachycardia, COPD (no PFTs available), NSTEMI? (10/2020), hypertension/hyperlipidemia, right BKA, and tobacco abuse who presents with B symptoms of cough, fever, shortness of breath, malaise, nausea, vomiting, after missing 8 sessions of dialysis.  Was also diagnosed with shingles her back rash 4 days ago and has not been taking valacyclovir.      * ESRD on hemodialysis (HCC)- (present on admission)  Assessment & Plan  Hemodialysis Monday Wednesday Friday  Hyperkalemia treated in the ER with insulin dextrose  Underwent emergent dialysis on the night of 1/19 - 1/20, continued on regular Monday Wednesday Friday dialysis starting 1/20.  Plan is for HD session on 1/21 night.  -Will continue to monitor and trend BMP, as well as monitor other electrolytes, including potassium and phosphorus    Herpes zoster without complication- (present on admission)  Assessment & Plan  Multidermatome with active lesions, around T4 to T5 area.  Patient states that she was previously prescribed valacyclovir, however, she had not taken it because it had given her psychedelic visual hallucinations.  -Starting famciclovir as second line for shingles, to be renally dosed per pharmacy  -If famciclovir also causes issues, can consider acyclovir    -Contact and droplet precautions in place    New onset atrial fibrillation (HCC)  Assessment & Plan  - Per patient she has history of SVT.  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  - Was Coumadin notsure why she stopped taking it. Patient states she never used anticoagulation (blood thinners).  - CHADVASc score of 2 ( HTN , Sc=female). Anticoagulation discussed with patient, she is adamant about not starting them at this time,  though is amenable to starting ASA 81.    - Continue ASA 81  - Continue Betablocker.  - Pending ECHO.  - CTM for reversible causes (electrolytes, ischemia, hypoxia).      Acute respiratory failure with hypoxia (HCC)- (present on admission)  Assessment & Plan  Per patient, patient uses 3 L of oxygen at home.  On the a.m. of 1/20, patient was satting in low 90s on 1 L of oxygen.  -May need home oxygen evaluation prior to discharge    Hypertensive urgency- (present on admission)  Assessment & Plan  IMPROVING  Uncontrolled.  Patient is a poor historian and cannot remember whether she has been taking her blood pressure medications regularly or not.  -Increasing hydralazine to 50 mg 4 times daily  -Continue carvedilol and amlodipine at current dose, as well as terazosin and clonidine patch  -IV as needed medications have been ordered  -Clonidine as needed  -I have extensively counseled the patient on the necessity of medical compliance  -Patient may need optimization of blood pressure medications as outpatient      H/O medication noncompliance- (present on admission)  Assessment & Plan  This is a very important issue.  Patient also has a history of noncompliance with hemodialysis sessions, which likely contributed to her presentation to the emergency department resulting in this hospitalization.  Also, noncompliance with valacyclovir has hindered faster recovery from shingles  -I have counseled the patient extensively on the importance of medication compliance    Thrombocytopenia (HCC)- (present on admission)  Assessment & Plan  Continue to trend  No evidence of active bleeding    Possible Protein calorie malnutrition (HCC)- (present on admission)  Assessment & Plan  This is suspected especially in the setting of the patient's cachectic body habitus.  -Follow-up on prealbumin level  -Starting thiamine supplements p.o.  -Consider registered dietitian counseling if needed    COPD (chronic obstructive pulmonary disease)  ([facility])- (present on admission)  Assessment & Plan  Patient is not an active COPD exacerbation at this time  -Continue to monitor  -Albuterol inhaler as needed  -Respiratory therapy consult in place    GERD (gastroesophageal reflux disease)- (present on admission)  Assessment & Plan  Starting PPI (omeprazole) daily  Tums TID w/ meals    Tobacco abuse- (present on admission)  Assessment & Plan  Encourage tobacco cessation    Quality Measures:  Code: Full  VTE: Heparin  Diet: Renal  Disposition: Remain inpatient

## 2021-01-22 NOTE — PROGRESS NOTES
NELSY Merida attempted to reach pt at bedside. Pt was sleeping. I will try again at another time.

## 2021-01-22 NOTE — PROGRESS NOTES
Patient troponin elevated. Additionally patient continues to endorse heart burn despite tums TID. MD Fish notified.

## 2021-01-23 VITALS
OXYGEN SATURATION: 94 % | WEIGHT: 130.51 LBS | SYSTOLIC BLOOD PRESSURE: 139 MMHG | TEMPERATURE: 97.2 F | HEIGHT: 63 IN | BODY MASS INDEX: 23.12 KG/M2 | RESPIRATION RATE: 16 BRPM | DIASTOLIC BLOOD PRESSURE: 67 MMHG | HEART RATE: 67 BPM

## 2021-01-23 PROBLEM — J96.01 ACUTE RESPIRATORY FAILURE WITH HYPOXIA (HCC): Status: RESOLVED | Noted: 2018-07-30 | Resolved: 2021-01-23

## 2021-01-23 PROBLEM — I16.0 HYPERTENSIVE URGENCY: Status: RESOLVED | Noted: 2018-04-27 | Resolved: 2021-01-23

## 2021-01-23 LAB
ALBUMIN SERPL BCP-MCNC: 3.8 G/DL (ref 3.2–4.9)
ALBUMIN/GLOB SERPL: 1.6 G/DL
ALP SERPL-CCNC: 58 U/L (ref 30–99)
ALT SERPL-CCNC: 11 U/L (ref 2–50)
ANION GAP SERPL CALC-SCNC: 10 MMOL/L (ref 7–16)
AST SERPL-CCNC: 11 U/L (ref 12–45)
BASOPHILS # BLD AUTO: 0.5 % (ref 0–1.8)
BASOPHILS # BLD: 0.02 K/UL (ref 0–0.12)
BILIRUB SERPL-MCNC: 0.9 MG/DL (ref 0.1–1.5)
BUN SERPL-MCNC: 35 MG/DL (ref 8–22)
CALCIUM SERPL-MCNC: 9.6 MG/DL (ref 8.5–10.5)
CHLORIDE SERPL-SCNC: 92 MMOL/L (ref 96–112)
CO2 SERPL-SCNC: 28 MMOL/L (ref 20–33)
CREAT SERPL-MCNC: 6.16 MG/DL (ref 0.5–1.4)
EOSINOPHIL # BLD AUTO: 0.18 K/UL (ref 0–0.51)
EOSINOPHIL NFR BLD: 4.1 % (ref 0–6.9)
ERYTHROCYTE [DISTWIDTH] IN BLOOD BY AUTOMATED COUNT: 63.1 FL (ref 35.9–50)
GLOBULIN SER CALC-MCNC: 2.4 G/DL (ref 1.9–3.5)
GLUCOSE SERPL-MCNC: 98 MG/DL (ref 65–99)
HCT VFR BLD AUTO: 30.8 % (ref 37–47)
HGB BLD-MCNC: 9.3 G/DL (ref 12–16)
IMM GRANULOCYTES # BLD AUTO: 0.01 K/UL (ref 0–0.11)
IMM GRANULOCYTES NFR BLD AUTO: 0.2 % (ref 0–0.9)
LYMPHOCYTES # BLD AUTO: 1.07 K/UL (ref 1–4.8)
LYMPHOCYTES NFR BLD: 24.3 % (ref 22–41)
MAGNESIUM SERPL-MCNC: 2 MG/DL (ref 1.5–2.5)
MCH RBC QN AUTO: 30.9 PG (ref 27–33)
MCHC RBC AUTO-ENTMCNC: 30.2 G/DL (ref 33.6–35)
MCV RBC AUTO: 102.3 FL (ref 81.4–97.8)
MONOCYTES # BLD AUTO: 0.29 K/UL (ref 0–0.85)
MONOCYTES NFR BLD AUTO: 6.6 % (ref 0–13.4)
NEUTROPHILS # BLD AUTO: 2.84 K/UL (ref 2–7.15)
NEUTROPHILS NFR BLD: 64.3 % (ref 44–72)
NRBC # BLD AUTO: 0 K/UL
NRBC BLD-RTO: 0 /100 WBC
PHOSPHATE SERPL-MCNC: 4.4 MG/DL (ref 2.5–4.5)
PLATELET # BLD AUTO: 57 K/UL (ref 164–446)
PMV BLD AUTO: 11 FL (ref 9–12.9)
POTASSIUM SERPL-SCNC: 4.2 MMOL/L (ref 3.6–5.5)
PROT SERPL-MCNC: 6.2 G/DL (ref 6–8.2)
RBC # BLD AUTO: 3.01 M/UL (ref 4.2–5.4)
SODIUM SERPL-SCNC: 130 MMOL/L (ref 135–145)
WBC # BLD AUTO: 4.4 K/UL (ref 4.8–10.8)

## 2021-01-23 PROCEDURE — 700102 HCHG RX REV CODE 250 W/ 637 OVERRIDE(OP): Performed by: STUDENT IN AN ORGANIZED HEALTH CARE EDUCATION/TRAINING PROGRAM

## 2021-01-23 PROCEDURE — 99217 PR OBSERVATION CARE DISCHARGE: CPT | Mod: GC | Performed by: INTERNAL MEDICINE

## 2021-01-23 PROCEDURE — 85025 COMPLETE CBC W/AUTO DIFF WBC: CPT

## 2021-01-23 PROCEDURE — 96372 THER/PROPH/DIAG INJ SC/IM: CPT

## 2021-01-23 PROCEDURE — A9270 NON-COVERED ITEM OR SERVICE: HCPCS | Performed by: STUDENT IN AN ORGANIZED HEALTH CARE EDUCATION/TRAINING PROGRAM

## 2021-01-23 PROCEDURE — A9270 NON-COVERED ITEM OR SERVICE: HCPCS | Performed by: HOSPITALIST

## 2021-01-23 PROCEDURE — G0378 HOSPITAL OBSERVATION PER HR: HCPCS

## 2021-01-23 PROCEDURE — 80053 COMPREHEN METABOLIC PANEL: CPT

## 2021-01-23 PROCEDURE — 83735 ASSAY OF MAGNESIUM: CPT

## 2021-01-23 PROCEDURE — 700102 HCHG RX REV CODE 250 W/ 637 OVERRIDE(OP): Performed by: HOSPITALIST

## 2021-01-23 PROCEDURE — 36415 COLL VENOUS BLD VENIPUNCTURE: CPT

## 2021-01-23 PROCEDURE — 84100 ASSAY OF PHOSPHORUS: CPT

## 2021-01-23 PROCEDURE — 700111 HCHG RX REV CODE 636 W/ 250 OVERRIDE (IP): Performed by: HOSPITALIST

## 2021-01-23 RX ORDER — ONDANSETRON 4 MG/1
4 TABLET, ORALLY DISINTEGRATING ORAL EVERY 4 HOURS PRN
Qty: 10 TAB | Refills: 0 | Status: SHIPPED | OUTPATIENT
Start: 2021-01-23

## 2021-01-23 RX ORDER — OMEPRAZOLE 20 MG/1
20 CAPSULE, DELAYED RELEASE ORAL DAILY
Qty: 14 CAP | Refills: 0 | Status: SHIPPED | OUTPATIENT
Start: 2021-01-23

## 2021-01-23 RX ORDER — CARVEDILOL 25 MG/1
25 TABLET ORAL 2 TIMES DAILY WITH MEALS
Qty: 60 TAB | Refills: 0 | Status: SHIPPED | OUTPATIENT
Start: 2021-01-23

## 2021-01-23 RX ORDER — FAMCICLOVIR 250 MG/1
250 TABLET ORAL
Qty: 120 TAB | Refills: 0 | Status: SHIPPED | OUTPATIENT
Start: 2021-01-25

## 2021-01-23 RX ORDER — TERAZOSIN 5 MG/1
5 CAPSULE ORAL DAILY
Qty: 30 CAP | Refills: 3 | Status: SHIPPED | OUTPATIENT
Start: 2021-01-24

## 2021-01-23 RX ORDER — ATORVASTATIN CALCIUM 40 MG/1
40 TABLET, FILM COATED ORAL DAILY
Qty: 30 TAB | Refills: 0 | Status: SHIPPED | OUTPATIENT
Start: 2021-01-23

## 2021-01-23 RX ORDER — NIFEDIPINE 30 MG
30 TABLET, EXTENDED RELEASE ORAL 2 TIMES DAILY
Qty: 60 TAB | Refills: 0 | Status: SHIPPED | OUTPATIENT
Start: 2021-01-23 | End: 2021-02-22

## 2021-01-23 RX ORDER — CALCIUM CARBONATE 500 MG/1
500 TABLET, CHEWABLE ORAL
Qty: 30 TAB | Refills: 0 | Status: SHIPPED | OUTPATIENT
Start: 2021-01-23

## 2021-01-23 RX ORDER — ACETAMINOPHEN 325 MG/1
650 TABLET ORAL EVERY 6 HOURS PRN
Qty: 30 TAB | Refills: 0 | Status: ON HOLD | OUTPATIENT
Start: 2021-01-23 | End: 2021-03-05 | Stop reason: SDUPTHER

## 2021-01-23 RX ORDER — LIDOCAINE 50 MG/G
2 PATCH TOPICAL EVERY 24 HOURS
Qty: 10 PATCH | Refills: 0 | Status: SHIPPED | OUTPATIENT
Start: 2021-01-23

## 2021-01-23 RX ORDER — LANOLIN ALCOHOL/MO/W.PET/CERES
100 CREAM (GRAM) TOPICAL 3 TIMES DAILY
Qty: 30 TAB | Refills: 3 | Status: SHIPPED | OUTPATIENT
Start: 2021-01-23

## 2021-01-23 RX ORDER — CLONIDINE HYDROCHLORIDE 0.1 MG/1
0.1 TABLET ORAL 3 TIMES DAILY PRN
Qty: 60 TAB | Refills: 0 | Status: SHIPPED | OUTPATIENT
Start: 2021-01-23

## 2021-01-23 RX ORDER — ASPIRIN 81 MG/1
81 TABLET, CHEWABLE ORAL DAILY
Qty: 100 TAB | Refills: 0 | Status: SHIPPED | OUTPATIENT
Start: 2021-01-24

## 2021-01-23 RX ORDER — NICOTINE 21 MG/24HR
1 PATCH, TRANSDERMAL 24 HOURS TRANSDERMAL EVERY 24 HOURS
Qty: 14 PATCH | Refills: 0 | Status: SHIPPED | OUTPATIENT
Start: 2021-01-23

## 2021-01-23 RX ORDER — NIFEDIPINE 30 MG/1
30 TABLET, EXTENDED RELEASE ORAL 2 TIMES DAILY
Status: DISCONTINUED | OUTPATIENT
Start: 2021-01-23 | End: 2021-01-23 | Stop reason: HOSPADM

## 2021-01-23 RX ADMIN — OMEPRAZOLE 20 MG: 20 CAPSULE, DELAYED RELEASE ORAL at 04:49

## 2021-01-23 RX ADMIN — HYDRALAZINE HYDROCHLORIDE 50 MG: 50 TABLET, FILM COATED ORAL at 04:49

## 2021-01-23 RX ADMIN — CARVEDILOL 25 MG: 25 TABLET, FILM COATED ORAL at 08:20

## 2021-01-23 RX ADMIN — Medication 100 MG: at 04:49

## 2021-01-23 RX ADMIN — TERAZOSIN HYDROCHLORIDE 5 MG: 5 CAPSULE ORAL at 04:49

## 2021-01-23 RX ADMIN — AMLODIPINE BESYLATE 10 MG: 10 TABLET ORAL at 04:49

## 2021-01-23 RX ADMIN — GABAPENTIN 100 MG: 100 CAPSULE ORAL at 08:20

## 2021-01-23 RX ADMIN — ASPIRIN 81 MG: 81 TABLET, CHEWABLE ORAL at 04:49

## 2021-01-23 RX ADMIN — ANTACID TABLETS 500 MG: 500 TABLET, CHEWABLE ORAL at 08:20

## 2021-01-23 RX ADMIN — HYDROCODONE BITARTRATE AND ACETAMINOPHEN 1 TABLET: 5; 325 TABLET ORAL at 05:46

## 2021-01-23 RX ADMIN — DOCUSATE SODIUM 50 MG AND SENNOSIDES 8.6 MG 2 TABLET: 8.6; 5 TABLET, FILM COATED ORAL at 04:49

## 2021-01-23 RX ADMIN — HYDRALAZINE HYDROCHLORIDE 50 MG: 50 TABLET, FILM COATED ORAL at 00:45

## 2021-01-23 RX ADMIN — HYDROCODONE BITARTRATE AND ACETAMINOPHEN 1 TABLET: 5; 325 TABLET ORAL at 05:52

## 2021-01-23 RX ADMIN — NICOTINE TRANSDERMAL SYSTEM 21 MG: 21 PATCH, EXTENDED RELEASE TRANSDERMAL at 04:49

## 2021-01-23 RX ADMIN — HEPARIN SODIUM 5000 UNITS: 5000 INJECTION, SOLUTION INTRAVENOUS; SUBCUTANEOUS at 04:50

## 2021-01-23 ASSESSMENT — FIBROSIS 4 INDEX: FIB4 SCORE: 3.32

## 2021-01-23 NOTE — PROGRESS NOTES
Received report from Rahel SANTOS, at pt bedside. POC discussed. Call light and belongings within reach. Bed locked and in low position. Alarm on and fall precautions in place.

## 2021-01-23 NOTE — DISCHARGE SUMMARY
Discharge Summary    Date of Admission: 1/19/2021  Date of Discharge:01/23/2021  Discharging Attending: Loki Tse M.D.   Discharging Senior Resident: Dr.Moutaz Abe MD.  Discharging Intern: Dr.Lucy Bronwyn MD.    CHIEF COMPLAINT ON ADMISSION  Chief Complaint   Patient presents with   • N/V     since thursday. unable to tolerate po. missed 8 sessions of dialysis. gets dialyzed M-W-F. last dialyzed approximately 1/4th   • Abdominal Pain     in luq abdomen describes as sharp and stabbing   • Cough       Reason for Admission  -------------------------------  Hyperkalemia , acute hypoxic respiratory failure and uremic symptoms following missed dialysis sessions.    Admission Date  1/19/2021    CODE STATUS  Full Code    HPI & HOSPITAL COURSE  This is a 55 y.o. female here with PMHx significant for ESRD secondary to GN on HD every ( M/W/F) at [facility] , COPD , HTN , HFpEF , non-compliance who was admitted to the hospital for management of acute hypoxic respiratory failure secondary to HFpEF exacerbation , hyperkalemia , and uremic symptoms ( N/V ) secondary to missed dialysis sessions. Patient was admitted and received multiple HD sessions after which uremic symptoms , HF and hyperkalemia resolved.    Problems addressed during hospital stay:  1- Hyperkalemia and Uremia secondary to missed dialysis sessions.  Patient states she missed 8 HD sessions because she had issues with transportation.  patient received  A total of 3 HD sessions while in house.patient symptoms and hyperkalemia were resolved after HD sessions. Patient was counseled regarding compliance with HD sessions and will continue her scheduled outpatient HD sessions. Patient was started on phosphate binders and BP medication were adjusted based on nephrology recs.  ACE/ARBs were not started because of hyperkalemia and non-compliance with HD sessions.    2- Acute hypoxic respiratory failure secondary to HFpEF exacerbation due to missed dialysis  "sessions.  likely secondary to volume overload from missed HD sessions.patient oxygenation back to baseline.patient had an Echocardiography done which showed evidence of severely dilated left atrium, Severe pul HTN with RVSP of 65 mmHg, some valvular lesions( mild AS/AI,moderate MS),but no evidence of wall motion abnormalities.    3-Hypertensive urgency:  BP improved with HD and restarting home BP medications. Patient was switched from Amlodipine to PO nifedipine 30 mg BID per nephrology ( ) recs, nephrology is to titrate does of nimodipine. Patient       4-Hx of SVT ( AVNRT)/ New onset atrial fibrillation  Per patient she has history of SVT. per chart patient has had history of AVNRT s/p ablation and she was on Coumadin(stopped taking it for unknown reason/patient states she never used anticoagulation/blood thinners).  Patient CHADVASc score of 2 ( HTN , Sc=female). Anticoagulation was discussed with patient, she is adamant about not starting them at this time, though is amenable to starting ASA 81. Patient was discharged home on ASA 81 mg and Atorvastatin.    5- Herpes zoster infection.  Patient was supposed to take valacyclovir ,but she wasn't taking it because it caused her to have \"psychadelic\" hallucinations after taking it. Patient was started on famciclovir as a second line per pharmacy recs. Doses were adjusted and was given after HD sessions.      6-Medication non-complaince  7-Tobacco use.  Patient was counseled regarding smoking cessation and compliance with medical care.    Therefore, she is discharged in fair and stable condition to home with close outpatient follow-up.    The patient met 2-midnight criteria for an inpatient stay at the time of discharge.    PHYSICAL EXAM ON DISCHARGE  Temp:  [36.2 °C (97.1 °F)-37.1 °C (98.8 °F)] 36.2 °C (97.2 °F)  Pulse:  [64-96] 67  Resp:  [16-18] 16  BP: (139-176)/(67-95) 139/67  SpO2:  [93 %-97 %] 94 %    Physical Exam  Vitals signs and nursing note " reviewed.   Constitutional:       General: She is not in acute distress.     Appearance: She is not ill-appearing, toxic-appearing or diaphoretic.      Comments: Looks older than stated age, laying in bed, undergoing hemodialysis   HENT:      Head: Normocephalic and atraumatic.      Right Ear: External ear normal.      Left Ear: External ear normal.      Nose: Nose normal. No congestion or rhinorrhea.      Mouth/Throat:      Mouth: Mucous membranes are moist.      Pharynx: Oropharynx is clear. No oropharyngeal exudate or posterior oropharyngeal erythema.   Eyes:      General: No scleral icterus.        Right eye: No discharge.         Left eye: No discharge.      Extraocular Movements: Extraocular movements intact.      Conjunctiva/sclera: Conjunctivae normal.      Pupils: Pupils are equal, round, and reactive to light.   Neck:      Musculoskeletal: Normal range of motion and neck supple. No neck rigidity.   Cardiovascular:      Rate and Rhythm: Normal rate and regular rhythm.      Pulses: Normal pulses.      Heart sounds: Murmur present.   Pulmonary:      Effort: Pulmonary effort is normal. No respiratory distress.      Breath sounds: Normal breath sounds. No wheezing.   Abdominal:      General: Abdomen is flat. Bowel sounds are normal. There is no distension.      Palpations: Abdomen is soft.      Tenderness: There is no abdominal tenderness. There is no guarding.   Musculoskeletal:         General: No swelling, tenderness or signs of injury.      Right lower leg: No edema.      Left lower leg: No edema.      Comments: Right below the knee amputation   Skin:     General: Skin is warm and dry.      Capillary Refill: Capillary refill takes less than 2 seconds.      Comments: Bandage on the midsection on the right side of back.  Upon removal of bandage there is a 7 inch x 4 inch wound, in T4-T5 dermatomal distribution, macerated wound, with some erythematous spots, tender to palpation   Neurological:      Mental  Status: She is alert and oriented to person, place, and time.      Sensory: No sensory deficit.         Discharge Date  01/23/2021    FOLLOW UP ITEMS POST DISCHARGE  - Follow up with PCP in 1-2 weeks.  - Continue HD sessions as scheduled.  - Follow up with nephrology as scheduled.     DISCHARGE DIAGNOSES  Principal Problem:    ESRD on hemodialysis (HCC) POA: Yes  Active Problems:    New onset atrial fibrillation (HCC) POA: Clinically Undetermined    Herpes zoster without complication POA: Yes    Thrombocytopenia (HCC) POA: Yes    H/O medication noncompliance (Chronic) POA: Yes    Tobacco abuse POA: Yes    GERD (gastroesophageal reflux disease) POA: Yes    COPD (chronic obstructive pulmonary disease) (HCC) POA: Yes    Possible Protein calorie malnutrition (HCC) POA: Yes  Resolved Problems:    Hypertensive urgency POA: Yes    Acute respiratory failure with hypoxia (HCC) POA: Yes    SVT (supraventricular tachycardia) (HCC) POA: Yes      FOLLOW UP  No future appointments.  No follow-up provider specified.    MEDICATIONS ON DISCHARGE     Medication List      START taking these medications      Instructions   acetaminophen 325 MG Tabs  Commonly known as: Tylenol   Take 2 Tabs by mouth every 6 hours as needed (Mild Pain; (Pain scale 1-3); Temp greater than 100.5 F).  Dose: 650 mg     aspirin 81 MG Chew chewable tablet  Start taking on: January 24, 2021  Commonly known as: ASA   Chew 1 Tab every day.  Dose: 81 mg     atorvastatin 40 MG Tabs  Commonly known as: LIPITOR   Take 1 Tab by mouth every day.  Dose: 40 mg     calcium carbonate 500 MG Chew  Commonly known as: TUMS   Chew 1 Tab 3 times a day, with meals.  Dose: 500 mg     cloNIDine 0.1 MG Tabs  Commonly known as: CATAPRES   Take 1 Tab by mouth 3 times a day as needed (SBP > 160).  Dose: 0.1 mg     epoetin 3000 UNIT/ML Soln  Start taking on: January 25, 2021  Commonly known as: Retacrit   Inject 2 mL under the skin every Monday, Wednesday, and Friday.  Dose: 6,000  Units     famciclovir 250 MG Tabs  Start taking on: January 25, 2021  Commonly known as: FAMVIR   Take 1 Tab by mouth every Monday, Wednesday, and Friday.  Dose: 250 mg     lidocaine 5 % Ptch  Commonly known as: LIDODERM   Place 2 Patches on the skin every 24 hours.  Dose: 2 Patch     nicotine 21 MG/24HR Pt24  Commonly known as: NICODERM   Place 1 Patch on the skin every 24 hours.  Dose: 1 Patch     NIFEdipine 30 MG CR tablet  Commonly known as: ADALAT CC   Take 1 Tab by mouth 2 Times a Day for 30 days.  Dose: 30 mg     omeprazole 20 MG delayed-release capsule  Commonly known as: PRILOSEC   Take 1 Cap by mouth every day.  Dose: 20 mg     ondansetron 4 MG Tbdp  Commonly known as: ZOFRAN ODT   Take 1 Tab by mouth every four hours as needed for Nausea (give PO if no IV route available).  Dose: 4 mg     thiamine 100 MG tablet  Commonly known as: THIAMINE   Take 1 Tab by mouth 3 times a day.  Dose: 100 mg        CHANGE how you take these medications      Instructions   carvedilol 25 MG Tabs  What changed:   · medication strength  · how much to take  Commonly known as: COREG   Take 1 Tab by mouth 2 times a day, with meals.  Dose: 25 mg        CONTINUE taking these medications      Instructions   albuterol 108 (90 Base) MCG/ACT Aers inhalation aerosol   Inhale 2 Puffs every four hours as needed for Shortness of Breath.  Dose: 2 Puff     budesonide-formoterol 80-4.5 MCG/ACT Aero  Commonly known as: Symbicort   Inhale 2 Puffs 2 Times a Day.  Dose: 2 Puff     cloNIDine 0.2 MG/24HR Ptwk patch  Commonly known as: CATAPRES   Place 1 Patch on the skin every 7 days. Wednesdays.  Dose: 1 Patch     gabapentin 100 MG Caps  Commonly known as: NEURONTIN   Doctor's comments: Take for pain from shingles  Take 1 Cap by mouth 2 (two) times a day.  Dose: 100 mg     hydrALAZINE 50 MG Tabs  Commonly known as: APRESOLINE   Take 50 mg by mouth 2 Times a Day.  Dose: 50 mg     terazosin 5 MG Caps  Start taking on: January 24, 2021  Commonly known  as: HYTRIN   Take 1 Cap by mouth every day.  Dose: 5 mg        STOP taking these medications    amLODIPine 10 MG Tabs  Commonly known as: NORVASC     predniSONE 20 MG Tabs  Commonly known as: DELTASONE     valACYclovir 500 MG Tabs  Commonly known as: VALTREX            Allergies  Allergies   Allergen Reactions   • Sulfa Drugs Hives, Rash, Itching and Swelling     [id number]       DIET  Orders Placed This Encounter   Procedures   • Diet Order Diet: Renal     Standing Status:   Standing     Number of Occurrences:   1     Order Specific Question:   Diet:     Answer:   Renal [8]       ACTIVITY  As tolerated.  Weight bearing as tolerated    CONSULTATIONS  Dr. Richardson with Nephrology Service consulted.  Treatment options were discussed and plan of care agreed upon.    PROCEDURES  None.

## 2021-01-23 NOTE — PROGRESS NOTES
"[facility] -  PROGRESS NOTE               Author: Khushi Balderas M.D. Date & Time: 1/23/2021  9:04 AM     HPI:  55-year-old female with end-stage renal disease secondary to chronic GN on HD MWF at [facility], COPD, hypertension, HFpEF, and history of non-compliance with outpatient dialysis who presented yesterday with nausea and shortness of breath.  Patient had been feeling ill recently and has not attending dialysis since around 1/4, ongoing issues with transportation. In the ED was found to have pulm edema on cxr (reviewed personally) with hypoxia and K of 6.2. Received 2hrs emergent HD. This AM feels slightly improved. No chest pain. Ongoing SOB    DAILY NEPHROLOGY SUMMARY:  1/20: Consult done  1/21: Tolerated HD-terminated treatment early, hypertensive, 94% on 1L, feels back to baseline  1/23: NAEO, no comlplaints, feels better slowly    PAST FAMILY HISTORY: Reviewed and Unchanged  SOCIAL HISTORY: Reviewed and Unchanged  CURRENT MEDICATIONS: Reviewed  IMAGING STUDIES: Reviewed    ROS  10 point ROS performed and is as per HPI/daily summary or negative    PHYSICAL EXAM  VS:  /67   Pulse 67   Temp 36.2 °C (97.2 °F) (Temporal)   Resp 16   Ht 1.6 m (5' 3\")   Wt 59.6 kg (131 lb 6.3 oz)   LMP 06/25/2007   SpO2 94%   BMI 23.28 kg/m²   GENERAL: no acute distress  CV: trace edema; RRR  RESP: Non-labored  GI: Soft; +BS  MSK: RLE BKA; No tenderness  SKIN: +papular rash  NEURO: AOx3  PSYCH: Cooperative; Nml mood/affect    Fluids:  In: -   Out: 2500     LABS:  Recent Results (from the past 24 hour(s))   CBC WITH DIFFERENTIAL    Collection Time: 01/22/21 12:39 PM   Result Value Ref Range    WBC 4.4 (L) 4.8 - 10.8 K/uL    RBC 2.99 (L) 4.20 - 5.40 M/uL    Hemoglobin 9.4 (L) 12.0 - 16.0 g/dL    Hematocrit 30.3 (L) 37.0 - 47.0 %    .3 (H) 81.4 - 97.8 fL    MCH 31.4 27.0 - 33.0 pg    MCHC 31.0 (L) 33.6 - 35.0 g/dL    RDW 60.6 (H) 35.9 - 50.0 fL    Platelet Count 65 (L) 164 - 446 K/uL "    MPV 11.2 9.0 - 12.9 fL    Neutrophils-Polys 73.80 (H) 44.00 - 72.00 %    Lymphocytes 17.80 (L) 22.00 - 41.00 %    Monocytes 5.00 0.00 - 13.40 %    Eosinophils 2.70 0.00 - 6.90 %    Basophils 0.20 0.00 - 1.80 %    Immature Granulocytes 0.50 0.00 - 0.90 %    Nucleated RBC 0.00 /100 WBC    Neutrophils (Absolute) 3.28 2.00 - 7.15 K/uL    Lymphs (Absolute) 0.79 (L) 1.00 - 4.80 K/uL    Monos (Absolute) 0.22 0.00 - 0.85 K/uL    Eos (Absolute) 0.12 0.00 - 0.51 K/uL    Baso (Absolute) 0.01 0.00 - 0.12 K/uL    Immature Granulocytes (abs) 0.02 0.00 - 0.11 K/uL    NRBC (Absolute) 0.00 K/uL   Comp Metabolic Panel    Collection Time: 01/22/21 12:39 PM   Result Value Ref Range    Sodium 137 135 - 145 mmol/L    Potassium 3.8 3.6 - 5.5 mmol/L    Chloride 94 (L) 96 - 112 mmol/L    Co2 32 20 - 33 mmol/L    Anion Gap 11.0 7.0 - 16.0    Glucose 97 65 - 99 mg/dL    Bun 23 (H) 8 - 22 mg/dL    Creatinine 4.18 (H) 0.50 - 1.40 mg/dL    Calcium 9.5 8.5 - 10.5 mg/dL    AST(SGOT) 15 12 - 45 U/L    ALT(SGPT) 9 2 - 50 U/L    Alkaline Phosphatase 59 30 - 99 U/L    Total Bilirubin 0.9 0.1 - 1.5 mg/dL    Albumin 4.0 3.2 - 4.9 g/dL    Total Protein 6.3 6.0 - 8.2 g/dL    Globulin 2.3 1.9 - 3.5 g/dL    A-G Ratio 1.7 g/dL   MAGNESIUM    Collection Time: 01/22/21 12:39 PM   Result Value Ref Range    Magnesium 2.0 1.5 - 2.5 mg/dL   PHOSPHORUS    Collection Time: 01/22/21 12:39 PM   Result Value Ref Range    Phosphorus 3.5 2.5 - 4.5 mg/dL   ESTIMATED GFR    Collection Time: 01/22/21 12:39 PM   Result Value Ref Range    GFR If  13 (A) >60 mL/min/1.73 m 2    GFR If Non African American 11 (A) >60 mL/min/1.73 m 2   EC-ECHOCARDIOGRAM COMPLETE W/O CONT    Collection Time: 01/22/21  4:38 PM   Result Value Ref Range    Eject.Frac. MOD BP 65.04     Eject.Frac. MOD 4C 67.16     Eject.Frac. MOD 2C 58.99     Left Ventrical Ejection Fraction 65    Comp Metabolic Panel    Collection Time: 01/23/21  2:25 AM   Result Value Ref Range    Sodium 130 (L)  135 - 145 mmol/L    Potassium 4.2 3.6 - 5.5 mmol/L    Chloride 92 (L) 96 - 112 mmol/L    Co2 28 20 - 33 mmol/L    Anion Gap 10.0 7.0 - 16.0    Glucose 98 65 - 99 mg/dL    Bun 35 (H) 8 - 22 mg/dL    Creatinine 6.16 (HH) 0.50 - 1.40 mg/dL    Calcium 9.6 8.5 - 10.5 mg/dL    AST(SGOT) 11 (L) 12 - 45 U/L    ALT(SGPT) 11 2 - 50 U/L    Alkaline Phosphatase 58 30 - 99 U/L    Total Bilirubin 0.9 0.1 - 1.5 mg/dL    Albumin 3.8 3.2 - 4.9 g/dL    Total Protein 6.2 6.0 - 8.2 g/dL    Globulin 2.4 1.9 - 3.5 g/dL    A-G Ratio 1.6 g/dL   MAGNESIUM    Collection Time: 01/23/21  2:25 AM   Result Value Ref Range    Magnesium 2.0 1.5 - 2.5 mg/dL   CBC WITH DIFFERENTIAL    Collection Time: 01/23/21  2:25 AM   Result Value Ref Range    WBC 4.4 (L) 4.8 - 10.8 K/uL    RBC 3.01 (L) 4.20 - 5.40 M/uL    Hemoglobin 9.3 (L) 12.0 - 16.0 g/dL    Hematocrit 30.8 (L) 37.0 - 47.0 %    .3 (H) 81.4 - 97.8 fL    MCH 30.9 27.0 - 33.0 pg    MCHC 30.2 (L) 33.6 - 35.0 g/dL    RDW 63.1 (H) 35.9 - 50.0 fL    Platelet Count 57 (L) 164 - 446 K/uL    MPV 11.0 9.0 - 12.9 fL    Neutrophils-Polys 64.30 44.00 - 72.00 %    Lymphocytes 24.30 22.00 - 41.00 %    Monocytes 6.60 0.00 - 13.40 %    Eosinophils 4.10 0.00 - 6.90 %    Basophils 0.50 0.00 - 1.80 %    Immature Granulocytes 0.20 0.00 - 0.90 %    Nucleated RBC 0.00 /100 WBC    Neutrophils (Absolute) 2.84 2.00 - 7.15 K/uL    Lymphs (Absolute) 1.07 1.00 - 4.80 K/uL    Monos (Absolute) 0.29 0.00 - 0.85 K/uL    Eos (Absolute) 0.18 0.00 - 0.51 K/uL    Baso (Absolute) 0.02 0.00 - 0.12 K/uL    Immature Granulocytes (abs) 0.01 0.00 - 0.11 K/uL    NRBC (Absolute) 0.00 K/uL   PHOSPHORUS    Collection Time: 01/23/21  2:25 AM   Result Value Ref Range    Phosphorus 4.4 2.5 - 4.5 mg/dL   ESTIMATED GFR    Collection Time: 01/23/21  2:25 AM   Result Value Ref Range    GFR If  8 (A) >60 mL/min/1.73 m 2    GFR If Non African American 7 (A) >60 mL/min/1.73 m 2       (click the triangle to expand  results)    ASSESSMENT:  # ESRD: Hx non-compliance with outpatient MWF therapy  # Hyperkalemia: resolved with iHD  # Heart Failure exacerbation: 2/2 non-compliance with dialysis and volume  # Hypoxic resp failure due to volume  # Anemia secondary to end-stage renal disease. Goal Hgb 10-11; Not at goal  # Tobacco abuse disorder.  # Hypertension: volume driven  # Right below-knee amputation.  # Herpes Zoster     PLAN:    -MWF iHD  -UF as tolerated  -Dose all meds for ESRD  -Renal Diet  -Titrage BP meds as needed  -Continue home phos binders  -Epogen with dialysis  -Encouraged outpatient compliance  -Ok to transition to OP care when stable from other issues

## 2021-01-23 NOTE — PROGRESS NOTES
Bedside shift report received by nightshift RN. Safety check completed, all patient needs met at this time. Will continue to monitor.

## 2021-01-23 NOTE — DISCHARGE INSTRUCTIONS
Discharge Instructions    Discharged to home by car with relative. Discharged via wheelchair, hospital escort: Yes.  Special equipment needed: Not Applicable    Be sure to schedule a follow-up appointment with your primary care doctor or any specialists as instructed.     Discharge Plan:   Diet Plan: Discussed  Activity Level: Discussed  Smoking Cessation Offered: Patient Refused  Confirmed Follow up Appointment: Appointment Scheduled  Confirmed Symptoms Management: Discussed  Medication Reconciliation Updated: Yes  Influenza Vaccine Indication: Not indicated: Previously immunized this influenza season and > 8 years of age    I understand that a diet low in cholesterol, fat, and sodium is recommended for good health. Unless I have been given specific instructions below for another diet, I accept this instruction as my diet prescription.   Other diet: renal       Special Instructions: None    · Is patient discharged on Warfarin / Coumadin?   No     Depression / Suicide Risk    As you are discharged from this [facility] facility, it is important to learn how to keep safe from harming yourself.    Recognize the warning signs:  · Abrupt changes in personality, positive or negative- including increase in energy   · Giving away possessions  · Change in eating patterns- significant weight changes-  positive or negative  · Change in sleeping patterns- unable to sleep or sleeping all the time   · Unwillingness or inability to communicate  · Depression  · Unusual sadness, discouragement and loneliness  · Talk of wanting to die  · Neglect of personal appearance   · Rebelliousness- reckless behavior  · Withdrawal from people/activities they love  · Confusion- inability to concentrate     If you or a loved one observes any of these behaviors or has concerns about self-harm, here's what you can do:  · Talk about it- your feelings and reasons for harming yourself  · Remove any means that you might use to hurt yourself  (examples: pills, rope, extension cords, firearm)  · Get professional help from the community (Mental Health, Substance Abuse, psychological counseling)  · Do not be alone:Call your Safe Contact- someone whom you trust who will be there for you.  · Call your local CRISIS HOTLINE 658-2896 or 701-135-7534  · Call your local Children's Mobile Crisis Response Team [place] (948) 364-0578 or www.[employer]  · Call the toll free National Suicide Prevention Hotlines   · National Suicide Prevention Lifeline 369-508-SSRV (4093)  · Change.org Hope Line Network 439-SUICIDE (366-4552)    Hello! We have treated you for:  -Shingles  -Atrial fibrillation  -End-stage renal disease (with dialysis)  -Blood pressure (hypertensive urgency)  -Acid reflux    Please remember to take all of your medications as prescribed.  Please remember to go to your dialysis sessions Monday, Wednesday, Friday.  It is imperative, very very important that you go to the dialysis sessions in order to prevent further complications from your chronic medical conditions.    Please be sure to follow-up with an appointment with your primary care provider.  An appointment has been made for you at [facility], please see the directions for going to your appointment, including the appointment date and time.    Thank you for choosing [facility] for your care!        Dialysis  Dialysis is a procedure that is done when the kidneys have stopped working properly (kidney failure). It may also be done earlier if it may help improve symptoms. During dialysis, wastes, salt, and extra water are removed from the blood, and the levels of certain minerals in the blood are maintained.  Dialysis is done in sessions which are continued until the kidneys get better. If the kidneys cannot get better, such as in end-stage kidney disease, dialysis is continued for life or until you receive a new kidney from a donor (kidney transplant). There are two  types of dialysis: hemodialysis and peritoneal dialysis.  What is hemodialysis?              Hemodialysis is when a machine called a dialyzer is used to filter the blood. Before starting hemodialysis, you will have surgery to create a site where blood can be removed from the body and returned to the body (vascular access). There are three types of vascular accesses:  · Arteriovenous fistula. This type of access is created when an artery and a vein (usually in the arm) are connected during surgery. The arteriovenous fistula usually takes 1-6 months to develop after surgery. It may last longer than the other types of vascular accesses and is less likely to become infected or cause blood clots.  · Arteriovenous graft. This type of access is created when an artery and a vein in the arm are connected during surgery with a tube. An arteriovenous graft can usually be used within 2-3 weeks of surgery.  · A venous catheter. To create this type of access, a thin tube (catheter) is placed in a large vein in your neck, chest, or groin. A venous catheter can be used right away. It is usually used as a temporary access when dialysis needs to begin immediately.  During hemodialysis, blood leaves your body through your access site. It travels through a tube to the dialyzer, where it is filtered. The blood then returns to your body through another tube.  Hemodialysis is usually done at a hospital or dialysis center three times a week. Visits last about 3-5 hours. With special training, it may also be done at home with the help of another person.  What is peritoneal dialysis?  Peritoneal dialysis is when the thin lining of the abdomen (peritoneum) and a fluid called dialysate are used to filter the blood. Before starting peritoneal dialysis, you will have surgery to place a catheter in your abdomen. The catheter will be used to transfer dialysate to and from your abdomen.  At the start of a session, your abdomen is filled with  dialysate. During the session, wastes, salt, and extra water in the blood pass through the peritoneum and into the dialysate. The dialysate is drained from the body at the end of the session. The process of filling and draining the dialysate is called an exchange. Exchanges are repeated until you have used up all the dialysate for the day.  You may do peritoneal dialysis at home or at almost any other location. It is done every day. You may need up to five exchanges a day. Each exchange takes about 30-40 minutes. The amount of time the dialysate is in your body between exchanges is called a dwell. The dwell usually lasts 1.5-3 hours and can vary with each person. You may choose to do exchanges at night while you sleep, using a machine called a cycler.  Which type of dialysis should I choose?  Both types of dialysis have advantages and disadvantages. Talk with your health care provider about which type of dialysis is best for you. Your lifestyle, preferences, and medical condition should be considered. In some cases, only one type of dialysis can be chosen.  Advantages of hemodialysis  · It is done less often than peritoneal dialysis.  · Someone else can do the dialysis for you.  · If you go to a dialysis center:  ? Your health care provider can recognize any problems you may be having.  ? You can interact with others who are having dialysis. This can provide you with emotional support.  Disadvantages of hemodialysis  · Hemodialysis may cause cramps and low blood pressure. It may leave you feeling tired on the days you have the treatment.  · If you go to a dialysis center, you will need to make weekly appointments and work around the center’s schedule.  · You will need to take extra care when traveling. If you usually get treatment in a dialysis center, you will need to arrange to visit a dialysis center near your destination. If you are having treatments at home, you will need to take the dialyzer with you when  traveling.  · There are more eating restrictions than with peritoneal dialysis.  Advantages of peritoneal dialysis  · It is less likely than hemodialysis to cause cramps and low blood pressure.  · There are fewer eating restrictions than with hemodialysis.  · You may do exchanges on your own wherever you are, including when you travel.  Disadvantages of peritoneal dialysis  · It is done more often than hemodialysis.  · Doing peritoneal dialysis requires you to have a good use (dexterity) of your hands. You must also be able to lift bags.  · You must learn how to make your equipment free of germs (sterilization techniques). You will need to use these techniques every day to prevent infection.  What changes will I need to make to my diet during dialysis?  Both types of dialysis require you to make some changes to your diet. For example, you will need to limit your intake of foods that contain a lot of phosphorus and potassium. You will also need to limit your fluid intake. A diet and nutrition specialist (dietitian) can help you make a meal plan that can help improve your dialysis and your health.  What should I expect when starting dialysis?  Adjusting to the dialysis treatment, schedule, and diet can take some time. You may need to stop working and may not be able to do some of your normal activities. You may feel anxious or depressed when starting dialysis. Over time, many people feel better overall because of dialysis. You may be able to return to work after making some changes, such as reducing work intensity.  Where to find more information  · National Kidney Foundation: www.kidney.org  · American Association of Kidney Patients: www.aakp.org  · American Kidney Fund: www.kidneyfund.org  Summary  · During dialysis, wastes, salt, and extra water are removed from the blood, and the levels of certain minerals in the blood are maintained. There are two types of dialysis: hemodialysis and peritoneal  dialysis.  · Hemodialysis is when a machine called a dialyzer is used to filter the blood.  · Hemodialysis is usually done by a health care provider at a hospital or dialysis center three times a week.  · Peritoneal dialysis is when the peritoneum is used as a filter. You may do peritoneal dialysis at home or at almost any other location.  · Both types of dialysis have advantages and disadvantages. Talk with your health care provider about which type of dialysis is best for you.  This information is not intended to replace advice given to you by your health care provider. Make sure you discuss any questions you have with your health care provider.  Document Released: 03/09/2004 Document Revised: 04/08/2020 Document Reviewed: 02/13/2018  [employer] Patient Education © 2020 [employer]        Hypertension, Adult  Hypertension is another name for high blood pressure. High blood pressure forces your heart to work harder to pump blood. This can cause problems over time.  There are two numbers in a blood pressure reading. There is a top number (systolic) over a bottom number (diastolic). It is best to have a blood pressure that is below 120/80. Healthy choices can help lower your blood pressure, or you may need medicine to help lower it.  What are the causes?  The cause of this condition is not known. Some conditions may be related to high blood pressure.  What increases the risk?  · Smoking.  · Having type 2 diabetes mellitus, high cholesterol, or both.  · Not getting enough exercise or physical activity.  · Being overweight.  · Having too much fat, sugar, calories, or salt (sodium) in your diet.  · Drinking too much alcohol.  · Having long-term (chronic) kidney disease.  · Having a family history of high blood pressure.  · Age. Risk increases with age.  · Race. You may be at higher risk if you are .  · Gender. Men are at higher risk than women before age 45. After age 65, women are at higher risk than  men.  · Having obstructive sleep apnea.  · Stress.  What are the signs or symptoms?  · High blood pressure may not cause symptoms. Very high blood pressure (hypertensive crisis) may cause:  ? Headache.  ? Feelings of worry or nervousness (anxiety).  ? Shortness of breath.  ? Nosebleed.  ? A feeling of being sick to your stomach (nausea).  ? Throwing up (vomiting).  ? Changes in how you see.  ? Very bad chest pain.  ? Seizures.  How is this treated?  · This condition is treated by making healthy lifestyle changes, such as:  ? Eating healthy foods.  ? Exercising more.  ? Drinking less alcohol.  · Your health care provider may prescribe medicine if lifestyle changes are not enough to get your blood pressure under control, and if:  ? Your top number is above 130.  ? Your bottom number is above 80.  · Your personal target blood pressure may vary.  Follow these instructions at home:  Eating and drinking    · If told, follow the DASH eating plan. To follow this plan:  ? Fill one half of your plate at each meal with fruits and vegetables.  ? Fill one fourth of your plate at each meal with whole grains. Whole grains include whole-wheat pasta, brown rice, and whole-grain bread.  ? Eat or drink low-fat dairy products, such as skim milk or low-fat yogurt.  ? Fill one fourth of your plate at each meal with low-fat (lean) proteins. Low-fat proteins include fish, chicken without skin, eggs, beans, and tofu.  ? Avoid fatty meat, cured and processed meat, or chicken with skin.  ? Avoid pre-made or processed food.  · Eat less than 1,500 mg of salt each day.  · Do not drink alcohol if:  ? Your doctor tells you not to drink.  ? You are pregnant, may be pregnant, or are planning to become pregnant.  · If you drink alcohol:  ? Limit how much you use to:  § 0-1 drink a day for women.  § 0-2 drinks a day for men.  ? Be aware of how much alcohol is in your drink. In the [place] one drink equals one 12 oz bottle of beer (355 mL), one 5 oz  glass of wine (148 mL), or one 1½ oz glass of hard liquor (44 mL).  Lifestyle    · Work with your doctor to stay at a healthy weight or to lose weight. Ask your doctor what the best weight is for you.  · Get at least 30 minutes of exercise most days of the week. This may include walking, swimming, or biking.  · Get at least 30 minutes of exercise that strengthens your muscles (resistance exercise) at least 3 days a week. This may include lifting weights or doing Pilates.  · Do not use any products that contain nicotine or tobacco, such as cigarettes, e-cigarettes, and chewing tobacco. If you need help quitting, ask your doctor.  · Check your blood pressure at home as told by your doctor.  · Keep all follow-up visits as told by your doctor. This is important.  Medicines  · Take over-the-counter and prescription medicines only as told by your doctor. Follow directions carefully.  · Do not skip doses of blood pressure medicine. The medicine does not work as well if you skip doses. Skipping doses also puts you at risk for problems.  · Ask your doctor about side effects or reactions to medicines that you should watch for.  Contact a doctor if you:  · Think you are having a reaction to the medicine you are taking.  · Have headaches that keep coming back (recurring).  · Feel dizzy.  · Have swelling in your ankles.  · Have trouble with your vision.  Get help right away if you:  · Get a very bad headache.  · Start to feel mixed up (confused).  · Feel weak or numb.  · Feel faint.  · Have very bad pain in your:  ? Chest.  ? Belly (abdomen).  · Throw up more than once.  · Have trouble breathing.  Summary  · Hypertension is another name for high blood pressure.  · High blood pressure forces your heart to work harder to pump blood.  · For most people, a normal blood pressure is less than 120/80.  · Making healthy choices can help lower blood pressure. If your blood pressure does not get lower with healthy choices, you may need to  take medicine.  This information is not intended to replace advice given to you by your health care provider. Make sure you discuss any questions you have with your health care provider.  Document Released: 06/05/2009 Document Revised: 08/28/2019 Document Reviewed: 08/28/2019  Elsevier Patient Education © 2020 [employer]

## 2021-03-03 PROCEDURE — 99285 EMERGENCY DEPT VISIT HI MDM: CPT

## 2021-03-04 ENCOUNTER — HOSPITAL ENCOUNTER (INPATIENT)
Facility: MEDICAL CENTER | Age: 56
LOS: 1 days | DRG: 291 | End: 2021-03-05
Attending: EMERGENCY MEDICINE | Admitting: STUDENT IN AN ORGANIZED HEALTH CARE EDUCATION/TRAINING PROGRAM
Payer: MEDICAID

## 2021-03-04 ENCOUNTER — APPOINTMENT (OUTPATIENT)
Dept: RADIOLOGY | Facility: MEDICAL CENTER | Age: 56
DRG: 291 | End: 2021-03-04
Attending: EMERGENCY MEDICINE
Payer: MEDICAID

## 2021-03-04 DIAGNOSIS — B02.23 POST-HERPETIC POLYNEUROPATHY: ICD-10-CM

## 2021-03-04 DIAGNOSIS — N18.6 ESRD (END STAGE RENAL DISEASE) ON DIALYSIS (HCC): ICD-10-CM

## 2021-03-04 DIAGNOSIS — E87.79 OTHER HYPERVOLEMIA: ICD-10-CM

## 2021-03-04 DIAGNOSIS — Z99.2 ESRD (END STAGE RENAL DISEASE) ON DIALYSIS (HCC): ICD-10-CM

## 2021-03-04 DIAGNOSIS — E87.5 HYPERKALEMIA: ICD-10-CM

## 2021-03-04 LAB
ALBUMIN SERPL BCP-MCNC: 3.9 G/DL (ref 3.2–4.9)
ALBUMIN/GLOB SERPL: 1.5 G/DL
ALP SERPL-CCNC: 63 U/L (ref 30–99)
ALT SERPL-CCNC: 5 U/L (ref 2–50)
ANION GAP SERPL CALC-SCNC: 19 MMOL/L (ref 7–16)
AST SERPL-CCNC: 10 U/L (ref 12–45)
BASOPHILS # BLD AUTO: 0.5 % (ref 0–1.8)
BASOPHILS # BLD: 0.03 K/UL (ref 0–0.12)
BILIRUB SERPL-MCNC: 0.9 MG/DL (ref 0.1–1.5)
BUN SERPL-MCNC: 88 MG/DL (ref 8–22)
CALCIUM SERPL-MCNC: 10.1 MG/DL (ref 8.5–10.5)
CHLORIDE SERPL-SCNC: 96 MMOL/L (ref 96–112)
CO2 SERPL-SCNC: 25 MMOL/L (ref 20–33)
CREAT SERPL-MCNC: 13.2 MG/DL (ref 0.5–1.4)
EKG IMPRESSION: NORMAL
EOSINOPHIL # BLD AUTO: 0.11 K/UL (ref 0–0.51)
EOSINOPHIL NFR BLD: 1.7 % (ref 0–6.9)
ERYTHROCYTE [DISTWIDTH] IN BLOOD BY AUTOMATED COUNT: 59.3 FL (ref 35.9–50)
FLUAV RNA SPEC QL NAA+PROBE: NEGATIVE
FLUBV RNA SPEC QL NAA+PROBE: NEGATIVE
GLOBULIN SER CALC-MCNC: 2.6 G/DL (ref 1.9–3.5)
GLUCOSE BLD-MCNC: 114 MG/DL (ref 65–99)
GLUCOSE SERPL-MCNC: 151 MG/DL (ref 65–99)
HCT VFR BLD AUTO: 29.2 % (ref 37–47)
HGB BLD-MCNC: 8.9 G/DL (ref 12–16)
IMM GRANULOCYTES # BLD AUTO: 0.01 K/UL (ref 0–0.11)
IMM GRANULOCYTES NFR BLD AUTO: 0.2 % (ref 0–0.9)
LYMPHOCYTES # BLD AUTO: 0.82 K/UL (ref 1–4.8)
LYMPHOCYTES NFR BLD: 12.6 % (ref 22–41)
MCH RBC QN AUTO: 30.4 PG (ref 27–33)
MCHC RBC AUTO-ENTMCNC: 30.5 G/DL (ref 33.6–35)
MCV RBC AUTO: 99.7 FL (ref 81.4–97.8)
MONOCYTES # BLD AUTO: 0.35 K/UL (ref 0–0.85)
MONOCYTES NFR BLD AUTO: 5.4 % (ref 0–13.4)
NEUTROPHILS # BLD AUTO: 5.2 K/UL (ref 2–7.15)
NEUTROPHILS NFR BLD: 79.6 % (ref 44–72)
NRBC # BLD AUTO: 0 K/UL
NRBC BLD-RTO: 0 /100 WBC
NT-PROBNP SERPL IA-MCNC: ABNORMAL PG/ML (ref 0–125)
PLATELET # BLD AUTO: 68 K/UL (ref 164–446)
PMV BLD AUTO: 11 FL (ref 9–12.9)
POTASSIUM SERPL-SCNC: 6.1 MMOL/L (ref 3.6–5.5)
PROT SERPL-MCNC: 6.5 G/DL (ref 6–8.2)
RBC # BLD AUTO: 2.93 M/UL (ref 4.2–5.4)
RSV RNA SPEC QL NAA+PROBE: NEGATIVE
SARS-COV-2 RNA RESP QL NAA+PROBE: NOTDETECTED
SODIUM SERPL-SCNC: 140 MMOL/L (ref 135–145)
SPECIMEN SOURCE: NORMAL
WBC # BLD AUTO: 6.5 K/UL (ref 4.8–10.8)

## 2021-03-04 PROCEDURE — 700105 HCHG RX REV CODE 258: Performed by: EMERGENCY MEDICINE

## 2021-03-04 PROCEDURE — 700111 HCHG RX REV CODE 636 W/ 250 OVERRIDE (IP): Performed by: EMERGENCY MEDICINE

## 2021-03-04 PROCEDURE — 96365 THER/PROPH/DIAG IV INF INIT: CPT

## 2021-03-04 PROCEDURE — A9270 NON-COVERED ITEM OR SERVICE: HCPCS | Performed by: STUDENT IN AN ORGANIZED HEALTH CARE EDUCATION/TRAINING PROGRAM

## 2021-03-04 PROCEDURE — 770020 HCHG ROOM/CARE - TELE (206)

## 2021-03-04 PROCEDURE — 80053 COMPREHEN METABOLIC PANEL: CPT

## 2021-03-04 PROCEDURE — 700101 HCHG RX REV CODE 250: Performed by: STUDENT IN AN ORGANIZED HEALTH CARE EDUCATION/TRAINING PROGRAM

## 2021-03-04 PROCEDURE — 82962 GLUCOSE BLOOD TEST: CPT

## 2021-03-04 PROCEDURE — 71045 X-RAY EXAM CHEST 1 VIEW: CPT

## 2021-03-04 PROCEDURE — 83880 ASSAY OF NATRIURETIC PEPTIDE: CPT

## 2021-03-04 PROCEDURE — 90935 HEMODIALYSIS ONE EVALUATION: CPT

## 2021-03-04 PROCEDURE — 99223 1ST HOSP IP/OBS HIGH 75: CPT | Performed by: STUDENT IN AN ORGANIZED HEALTH CARE EDUCATION/TRAINING PROGRAM

## 2021-03-04 PROCEDURE — 700111 HCHG RX REV CODE 636 W/ 250 OVERRIDE (IP): Performed by: STUDENT IN AN ORGANIZED HEALTH CARE EDUCATION/TRAINING PROGRAM

## 2021-03-04 PROCEDURE — A9270 NON-COVERED ITEM OR SERVICE: HCPCS | Performed by: EMERGENCY MEDICINE

## 2021-03-04 PROCEDURE — A9270 NON-COVERED ITEM OR SERVICE: HCPCS | Performed by: INTERNAL MEDICINE

## 2021-03-04 PROCEDURE — 5A1D70Z PERFORMANCE OF URINARY FILTRATION, INTERMITTENT, LESS THAN 6 HOURS PER DAY: ICD-10-PCS | Performed by: INTERNAL MEDICINE

## 2021-03-04 PROCEDURE — 93005 ELECTROCARDIOGRAM TRACING: CPT | Performed by: EMERGENCY MEDICINE

## 2021-03-04 PROCEDURE — 85025 COMPLETE CBC W/AUTO DIFF WBC: CPT

## 2021-03-04 PROCEDURE — C9803 HOPD COVID-19 SPEC COLLECT: HCPCS | Performed by: EMERGENCY MEDICINE

## 2021-03-04 PROCEDURE — 700101 HCHG RX REV CODE 250: Performed by: EMERGENCY MEDICINE

## 2021-03-04 PROCEDURE — 700102 HCHG RX REV CODE 250 W/ 637 OVERRIDE(OP): Performed by: EMERGENCY MEDICINE

## 2021-03-04 PROCEDURE — 700102 HCHG RX REV CODE 250 W/ 637 OVERRIDE(OP): Performed by: STUDENT IN AN ORGANIZED HEALTH CARE EDUCATION/TRAINING PROGRAM

## 2021-03-04 PROCEDURE — 700102 HCHG RX REV CODE 250 W/ 637 OVERRIDE(OP): Performed by: INTERNAL MEDICINE

## 2021-03-04 PROCEDURE — 0241U HCHG SARS-COV-2 COVID-19 NFCT DS RESP RNA 4 TRGT MIC: CPT

## 2021-03-04 PROCEDURE — 96375 TX/PRO/DX INJ NEW DRUG ADDON: CPT

## 2021-03-04 RX ORDER — HYDROCODONE BITARTRATE AND ACETAMINOPHEN 5; 325 MG/1; MG/1
1 TABLET ORAL ONCE
Status: COMPLETED | OUTPATIENT
Start: 2021-03-04 | End: 2021-03-04

## 2021-03-04 RX ORDER — MORPHINE SULFATE 4 MG/ML
2 INJECTION, SOLUTION INTRAMUSCULAR; INTRAVENOUS
Status: DISCONTINUED | OUTPATIENT
Start: 2021-03-04 | End: 2021-03-05 | Stop reason: HOSPADM

## 2021-03-04 RX ORDER — LIDOCAINE 50 MG/G
1 PATCH TOPICAL EVERY 24 HOURS
Status: DISCONTINUED | OUTPATIENT
Start: 2021-03-04 | End: 2021-03-05 | Stop reason: HOSPADM

## 2021-03-04 RX ORDER — OMEPRAZOLE 20 MG/1
20 CAPSULE, DELAYED RELEASE ORAL DAILY
Status: DISCONTINUED | OUTPATIENT
Start: 2021-03-04 | End: 2021-03-05 | Stop reason: HOSPADM

## 2021-03-04 RX ORDER — ALBUTEROL SULFATE 90 UG/1
2 AEROSOL, METERED RESPIRATORY (INHALATION) EVERY 4 HOURS PRN
Status: DISCONTINUED | OUTPATIENT
Start: 2021-03-04 | End: 2021-03-05 | Stop reason: HOSPADM

## 2021-03-04 RX ORDER — HYDRALAZINE HYDROCHLORIDE 50 MG/1
50 TABLET, FILM COATED ORAL 2 TIMES DAILY
Status: DISCONTINUED | OUTPATIENT
Start: 2021-03-04 | End: 2021-03-05 | Stop reason: HOSPADM

## 2021-03-04 RX ORDER — CLONIDINE HYDROCHLORIDE 0.1 MG/1
0.1 TABLET ORAL 3 TIMES DAILY PRN
Status: DISCONTINUED | OUTPATIENT
Start: 2021-03-04 | End: 2021-03-05 | Stop reason: HOSPADM

## 2021-03-04 RX ORDER — AMLODIPINE BESYLATE 5 MG/1
5 TABLET ORAL
Status: DISCONTINUED | OUTPATIENT
Start: 2021-03-04 | End: 2021-03-05 | Stop reason: HOSPADM

## 2021-03-04 RX ORDER — CARVEDILOL 25 MG/1
25 TABLET ORAL 2 TIMES DAILY WITH MEALS
Status: DISCONTINUED | OUTPATIENT
Start: 2021-03-04 | End: 2021-03-05 | Stop reason: HOSPADM

## 2021-03-04 RX ORDER — TERAZOSIN 5 MG/1
5 CAPSULE ORAL DAILY
Status: DISCONTINUED | OUTPATIENT
Start: 2021-03-04 | End: 2021-03-05 | Stop reason: HOSPADM

## 2021-03-04 RX ORDER — GABAPENTIN 100 MG/1
100 CAPSULE ORAL
Status: DISCONTINUED | OUTPATIENT
Start: 2021-03-04 | End: 2021-03-04

## 2021-03-04 RX ORDER — HEPARIN SODIUM 5000 [USP'U]/ML
5000 INJECTION, SOLUTION INTRAVENOUS; SUBCUTANEOUS EVERY 8 HOURS
Status: DISCONTINUED | OUTPATIENT
Start: 2021-03-04 | End: 2021-03-04

## 2021-03-04 RX ORDER — ATORVASTATIN CALCIUM 40 MG/1
40 TABLET, FILM COATED ORAL DAILY
Status: DISCONTINUED | OUTPATIENT
Start: 2021-03-04 | End: 2021-03-05 | Stop reason: HOSPADM

## 2021-03-04 RX ORDER — ACETAMINOPHEN 500 MG
2000 TABLET ORAL 2 TIMES DAILY PRN
Status: ON HOLD | COMMUNITY
End: 2021-03-05

## 2021-03-04 RX ORDER — ACETAMINOPHEN 325 MG/1
650 TABLET ORAL EVERY 4 HOURS PRN
Status: DISCONTINUED | OUTPATIENT
Start: 2021-03-04 | End: 2021-03-05 | Stop reason: HOSPADM

## 2021-03-04 RX ORDER — DEXTROSE MONOHYDRATE 25 G/50ML
50 INJECTION, SOLUTION INTRAVENOUS ONCE
Status: COMPLETED | OUTPATIENT
Start: 2021-03-04 | End: 2021-03-04

## 2021-03-04 RX ORDER — UREA 10 %
1 LOTION (ML) TOPICAL
Status: DISCONTINUED | OUTPATIENT
Start: 2021-03-04 | End: 2021-03-05 | Stop reason: HOSPADM

## 2021-03-04 RX ORDER — BUDESONIDE AND FORMOTEROL FUMARATE DIHYDRATE 80; 4.5 UG/1; UG/1
2 AEROSOL RESPIRATORY (INHALATION) EVERY EVENING
Status: DISCONTINUED | OUTPATIENT
Start: 2021-03-04 | End: 2021-03-05 | Stop reason: HOSPADM

## 2021-03-04 RX ORDER — GABAPENTIN 100 MG/1
200 CAPSULE ORAL
Status: DISCONTINUED | OUTPATIENT
Start: 2021-03-04 | End: 2021-03-05 | Stop reason: HOSPADM

## 2021-03-04 RX ORDER — OXYCODONE HYDROCHLORIDE AND ACETAMINOPHEN 5; 325 MG/1; MG/1
1 TABLET ORAL ONCE
Status: COMPLETED | OUTPATIENT
Start: 2021-03-04 | End: 2021-03-04

## 2021-03-04 RX ADMIN — AMLODIPINE BESYLATE 5 MG: 5 TABLET ORAL at 15:37

## 2021-03-04 RX ADMIN — OXYCODONE HYDROCHLORIDE AND ACETAMINOPHEN 1 TABLET: 5; 325 TABLET ORAL at 04:08

## 2021-03-04 RX ADMIN — DEXTROSE MONOHYDRATE 50 ML: 25 INJECTION, SOLUTION INTRAVENOUS at 04:08

## 2021-03-04 RX ADMIN — HYDRALAZINE HYDROCHLORIDE 50 MG: 50 TABLET, FILM COATED ORAL at 05:18

## 2021-03-04 RX ADMIN — BUDESONIDE AND FORMOTEROL FUMARATE DIHYDRATE 2 PUFF: 80; 4.5 AEROSOL RESPIRATORY (INHALATION) at 17:24

## 2021-03-04 RX ADMIN — INSULIN HUMAN 10 UNITS: 100 INJECTION, SOLUTION PARENTERAL at 04:09

## 2021-03-04 RX ADMIN — MORPHINE SULFATE 2 MG: 4 INJECTION INTRAVENOUS at 20:54

## 2021-03-04 RX ADMIN — OMEPRAZOLE 20 MG: 20 CAPSULE, DELAYED RELEASE ORAL at 05:18

## 2021-03-04 RX ADMIN — HYDRALAZINE HYDROCHLORIDE 50 MG: 50 TABLET, FILM COATED ORAL at 16:48

## 2021-03-04 RX ADMIN — LIDOCAINE 1 PATCH: 50 PATCH CUTANEOUS at 05:15

## 2021-03-04 RX ADMIN — CLONIDINE HYDROCHLORIDE 0.1 MG: 0.1 TABLET ORAL at 12:21

## 2021-03-04 RX ADMIN — TERAZOSIN HYDROCHLORIDE 5 MG: 5 CAPSULE ORAL at 05:18

## 2021-03-04 RX ADMIN — ATORVASTATIN CALCIUM 40 MG: 40 TABLET, FILM COATED ORAL at 05:18

## 2021-03-04 RX ADMIN — CALCIUM GLUCONATE 1000 MG: 98 INJECTION, SOLUTION INTRAVENOUS at 04:13

## 2021-03-04 RX ADMIN — Medication 1 MG: at 20:53

## 2021-03-04 RX ADMIN — HYDROCODONE BITARTRATE AND ACETAMINOPHEN 1 TABLET: 5; 325 TABLET ORAL at 02:08

## 2021-03-04 RX ADMIN — GABAPENTIN 100 MG: 100 CAPSULE ORAL at 12:20

## 2021-03-04 RX ADMIN — CARVEDILOL 25 MG: 25 TABLET, FILM COATED ORAL at 16:49

## 2021-03-04 RX ADMIN — GABAPENTIN 200 MG: 100 CAPSULE ORAL at 20:53

## 2021-03-04 ASSESSMENT — ENCOUNTER SYMPTOMS
DIZZINESS: 0
MYALGIAS: 0
VOMITING: 0
PALPITATIONS: 0
HEMOPTYSIS: 0
EYE PAIN: 0
LOSS OF CONSCIOUSNESS: 0
SORE THROAT: 0
COUGH: 1
ABDOMINAL PAIN: 0
FEVER: 0
CONSTIPATION: 0
BACK PAIN: 1
NAUSEA: 0
WEAKNESS: 0
BLURRED VISION: 0
SHORTNESS OF BREATH: 1
CHILLS: 0
SPUTUM PRODUCTION: 0
ORTHOPNEA: 0
WEIGHT LOSS: 0
BLOOD IN STOOL: 0
WHEEZING: 0
DIARRHEA: 0

## 2021-03-04 ASSESSMENT — FIBROSIS 4 INDEX
FIB4 SCORE: 3.32
FIB4 SCORE: 3.75
FIB4 SCORE: 3.75

## 2021-03-04 ASSESSMENT — LIFESTYLE VARIABLES
EVER FELT BAD OR GUILTY ABOUT YOUR DRINKING: NO
HAVE YOU EVER FELT YOU SHOULD CUT DOWN ON YOUR DRINKING: NO
DOES PATIENT WANT TO STOP DRINKING: NO
HAVE PEOPLE ANNOYED YOU BY CRITICIZING YOUR DRINKING: NO
AVERAGE NUMBER OF DAYS PER WEEK YOU HAVE A DRINK CONTAINING ALCOHOL: 0
CONSUMPTION TOTAL: NEGATIVE
EVER HAD A DRINK FIRST THING IN THE MORNING TO STEADY YOUR NERVES TO GET RID OF A HANGOVER: NO
TOTAL SCORE: 0
HOW MANY TIMES IN THE PAST YEAR HAVE YOU HAD 5 OR MORE DRINKS IN A DAY: 0
TOTAL SCORE: 0
TOTAL SCORE: 0
ALCOHOL_USE: NO
ON A TYPICAL DAY WHEN YOU DRINK ALCOHOL HOW MANY DRINKS DO YOU HAVE: 0

## 2021-03-04 ASSESSMENT — PAIN DESCRIPTION - PAIN TYPE
TYPE: ACUTE PAIN
TYPE: ACUTE PAIN

## 2021-03-04 NOTE — RESPIRATORY CARE
COPD EDUCATION by COPD CLINICAL EDUCATOR  3/4/2021 at 1:17 PM by Katina Lo, RRT     Patient interviewed by COPD education team. Patient refused COPD program at this time.       COPD Assessment  COPD Clinical Specialists ONLY  COPD Education Initiated: No--Quick Screen, Refused, she has refused multiple times, does not want to quit smoking    Is this a COPD exacerbation patient?: No

## 2021-03-04 NOTE — CARE PLAN
Problem: Respiratory:  Goal: Respiratory status will improve  Outcome: PROGRESSING AS EXPECTED     Problem: Fluid Volume:  Goal: Will maintain balanced intake and output  Outcome: PROGRESSING AS EXPECTED     Problem: Communication  Goal: The ability to communicate needs accurately and effectively will improve  Outcome: PROGRESSING AS EXPECTED     Problem: Infection  Goal: Will remain free from infection  Outcome: PROGRESSING AS EXPECTED

## 2021-03-04 NOTE — ED TRIAGE NOTES
"Isabelle Coyle  55 y.o. F  Chief Complaint   Patient presents with   • Other     Missed 3 dialysis appointments. Patient last went to dialysis last Wednesday. Patient skipped dialysis due to shingles pain.    • Herpes Zoster     Patient has had an outbreak since January but the pain has been increasing over the last weak. Lesions are to the abdomen and back.      .Blood Pressure 134/74   Pulse 74   Temperature 36.7 °C (98.1 °F) (Temporal)   Respiration 18   Height 1.575 m (5' 2\")   Weight 73.4 kg (161 lb 13.1 oz)   Last Menstrual Period 06/25/2007   Oxygen Saturation 98%   Body Mass Index 29.60 kg/m²     Triage process explained to patient, apologized for wait time, and returned to lobby.  Pt informed to notify staff of any change in condition. NAD at this time.    "

## 2021-03-04 NOTE — ASSESSMENT & PLAN NOTE
ESRD on HD MWF  Not compliance and skipped more than 3 last sessions  She received dialysis on 3/4  Nephrology on board  Labs daily

## 2021-03-04 NOTE — ASSESSMENT & PLAN NOTE
Uncontrolled  Continue home medication Coreg 25 mg twice daily with clonidine and hydralazine with Terazosin   Add amlodipine 5 mg daily

## 2021-03-04 NOTE — THERAPY
Missed Therapy     Patient Name: Isabelle Coyle  Age:  55 y.o., Sex:  female  Medical Record #: [id number]  Today's Date: 3/4/2021       03/04/21 1043   Interdisciplinary Plan of Care Collaboration   IDT Collaboration with  Nursing   Collaboration Comments Attempted at dialysis, clarifying shingles isolation. WIll follow up 3/5.

## 2021-03-04 NOTE — ED PROVIDER NOTES
ED Provider Note    Chief Complaint:   Shortness of breath, abdominal swelling    HPI:  Isabelle Coyle is a 55 y.o. female who presents evaluation of shortness of breath, abdominal distention after missing dialysis.  She is typically dialyzed Monday, Wednesday, and Friday, states her most recent dialysis was last Wednesday which is exactly a week ago.  Since that time she has developed progressively worsening abdominal bloating, and associated shortness of breath.  She does report a history of resolving shingles localized to the right flank, states that she is taking her medications for this but her pain is not well controlled.  She does have some associated leg swelling.  She is not having any associated chest pain at this time, she has not had any vomiting, though has had some associated nausea.  She is also noticed a small amount of leg swelling, though she states typically when she is fluid overloaded the fluid seems to collect around her abdomen.  She has not had any recent fevers, no cough, no known contacts with COVID-19 positive individuals.  She denies any sensation of heart racing or palpitations.  She has no pain radiating to the thoracic back.  She does have some pain localized to the right flank in the area of her shingles as noted above.  She states the pain is burning, and persistent.  She was recently prescribed famciclovir for this, she states she is uncertain of all the names of her home medications, but that she has been taking them regularly.  She is unable to identify any alleviating factors, shortness of breath is exacerbated by activity and movement.    Review of Systems:  See HPI for pertinent positives and negatives. All other systems negative.    Past Medical History:   has a past medical history of Anemia, Anemia associated with chronic renal failure (11/5/2009), Anginal syndrome (HCC), Arrhythmia, Asthma, Back pain, COPD (chronic obstructive pulmonary disease) ([facility]), Dental disorder  (17), Dialysis patient (Formerly [facility]) (17), Dysrhythmia, ESRD (end stage renal disease) ([facility]) (17), Fall, GERD (gastroesophageal reflux disease), Glomerulonephritis, chronic (2009), Head ache, Heart burn, Heart murmur, High cholesterol, HTN (hypertension) (2009), Hypertension, Indigestion, Infectious disease, NSTEMI (non-ST elevated myocardial infarction) ([facility]) (10/2/2020), Pneumonia, Port catheter in place (17), Psychiatric problem, SVT (supraventricular tachycardia) ([facility]), SVT (supraventricular tachycardia) ([facility]ly [facility]), and Urinary incontinence (10/2/2020).    Social History:  Social History     Tobacco Use   • Smoking status: Current Some Day Smoker     Packs/day: 0.25     Years: 20.00     Pack years: 5.00     Types: Cigarettes     Last attempt to quit: 2019     Years since quittin.8   • Smokeless tobacco: Never Used   • Tobacco comment: 5 cigs/day   Substance and Sexual Activity   • Alcohol use: No   • Drug use: No   • Sexual activity: Not on file       Surgical History:   has a past surgical history that includes amputation, below the knee; capitation payment (insurance); appendectomy laparoscopic (2009); breast augmentation with implant; av fistula creation (Left, 2017); other; femur orif (10/9/08); iliac bone graft (10/9/08); av fistula creation (Left, 2017); av fistula creation (Left, 2018); and yamileth by laparoscopy (2019).    Current Medications:  Home Medications     Reviewed by Christi Hurt R.N. (Registered Nurse) on 21 at 0008  Med List Status: Not Addressed   Medication Last Dose Status   acetaminophen (TYLENOL) 325 MG Tab  Active   albuterol 108 (90 Base) MCG/ACT Aero Soln inhalation aerosol  Active   aspirin (ASA) 81 MG Chew Tab chewable tablet  Active   atorvastatin (LIPITOR) 40 MG Tab  Active   budesonide-formoterol (SYMBICORT) 80-4.5 MCG/ACT Aerosol  Active   calcium carbonate (TUMS) 500 MG Chew Tab  Active   carvedilol (COREG) 25 MG Tab   "Active   cloNIDine (CATAPRES) 0.1 MG Tab  Active   cloNIDine (CATAPRES) 0.2 MG/24HR PATCH WEEKLY patch  Active   epoetin (RETACRIT) 3000 UNIT/ML Solution  Active   famciclovir (FAMVIR) 250 MG Tab  Active   gabapentin (NEURONTIN) 100 MG Cap  Active   hydrALAZINE (APRESOLINE) 50 MG Tab  Active   lidocaine (LIDODERM) 5 % Patch  Active   nicotine (NICODERM) 21 MG/24HR PATCH 24 HR  Active   omeprazole (PRILOSEC) 20 MG delayed-release capsule  Active   ondansetron (ZOFRAN ODT) 4 MG TABLET DISPERSIBLE  Active   terazosin (HYTRIN) 5 MG Cap  Active   thiamine (THIAMINE) 100 MG tablet  Active                Allergies:  Allergies   Allergen Reactions   • Sulfa Drugs Hives, Rash, Itching and Swelling     [id number]       Physical Exam:  Vital Signs: BP (!) 167/87   Pulse 62   Temp 36.7 °C (98.1 °F) (Temporal)   Resp 20   Ht 1.575 m (5' 2\")   Wt 73.4 kg (161 lb 13.1 oz)   LMP 06/25/2007   SpO2 97%   BMI 29.60 kg/m²   Constitutional: Alert, no acute distress  HENT: Normocephalic, mask in place  Eyes: Pupils equal and reactive, normal conjunctiva  Neck: Supple, normal range of motion, no stridor  Cardiovascular: Extremities are warm and well perfused, no murmur appreciated, normal cardiac auscultation  Pulmonary: No respiratory distress, normal work of breathing, no accessory muscule usage, breath sounds quiet bilaterally, room air oxygen saturation is 97%  Abdomen: Soft, mildly distended, nontender to palpation, no rebound, no guarding, no peritoneal signs  Skin: Warm, dry, hyperpigmentation along the right flank in a dermatomal distribution consistent with resolving herpes zoster, no vesicular lesions are present, there are few excoriations due to patient scratching  Musculoskeletal: Normal range of motion in all extremities, no swelling or deformity noted, right BKA  Neurologic: Alert, oriented, normal speech, normal motor function  Psychiatric: Normal and appropriate mood and affect    Medical records reviewed for " continuity of care.  Discharge summary reviewed from 1/23/2021.  She was admitted to the hospital for management of acute hypoxic respiratory failure secondary to heart failure with preserved ejection fraction, hyperkalemia, and uremic symptoms due to missed dialysis sessions.  She was admitted and received multiple hemodialysis sessions after which all symptoms resolved.  She was seen by Dr. Richardson, nephrologist, during this hospitalization.  Noted that she does have a history of SVT as well as atrial fibrillation, anticoagulation was discussed and she declined that therapy.  Also noted to have a history of herpes zoster infection, she was supposed to take valacyclovir, however declined to take that medication reporting abnormal hallucinations as a side effect.  She was started on famciclovir as a second line per pharmacy recommendation.    EKG: Rate 62, sinus rhythm, no ST elevation or depression, no T wave inversions, AK interval is somewhat more prolonged than usual, as compared to prior this EKG is lower voltage which may be due to fluid overload    Labs:  Labs Reviewed   CBC WITH DIFFERENTIAL - Abnormal; Notable for the following components:       Result Value    RBC 2.93 (*)     Hemoglobin 8.9 (*)     Hematocrit 29.2 (*)     MCV 99.7 (*)     MCHC 30.5 (*)     RDW 59.3 (*)     Platelet Count 68 (*)     Neutrophils-Polys 79.60 (*)     Lymphocytes 12.60 (*)     Lymphs (Absolute) 0.82 (*)     All other components within normal limits   COMP METABOLIC PANEL - Abnormal; Notable for the following components:    Potassium 6.1 (*)     Anion Gap 19.0 (*)     Glucose 151 (*)     Bun 88 (*)     Creatinine 13.20 (*)     AST(SGOT) 10 (*)     All other components within normal limits   PROBRAIN NATRIURETIC PEPTIDE, NT - Abnormal; Notable for the following components:    NT-proBNP >30376 (*)     All other components within normal limits   ESTIMATED GFR - Abnormal; Notable for the following components:    GFR If   American 4 (*)     GFR If Non  3 (*)     All other components within normal limits   COV-2, FLU A/B, AND RSV BY PCR    Narrative:     Have you been in close contact with a person who is suspected  or known to be positive for COVID-19 within the last 30 days  (e.g. last seen that person < 30 days ago)->No       Radiology:  DX-CHEST-PORTABLE (1 VIEW)   Final Result         1.  Pulmonary edema and/or infiltrates are identified, which are stable since the prior exam.   2.  Cardiomegaly   3.  Atherosclerosis           ED Medications Administered:  Medications   calcium GLUConate 1,000 mg in  mL IVPB (has no administration in time range)   insulin regular (HumuLIN R,NovoLIN R) injection (has no administration in time range)   dextrose 50% (D50W) injection 50 mL (has no administration in time range)   HYDROcodone-acetaminophen (NORCO) 5-325 MG per tablet 1 tablet (1 tablet Oral Given 3/4/21 0208)       Differential diagnosis:  Hyperkalemia, fluid overload, dialysis nonadherence, less likely pneumonia, less likely acute coronary syndrome    MDM:  Patient presents for evaluation of shortness of breath, no abdominal distention after missing 3 dialysis sessions.  She has been seen at this facility multiple times for similar symptoms.  On arrival to the emergency department she does feel short of breath, but is in no acute respiratory distress.  She does have evidence of fluid overload on her physical examination.  EKG is lower voltage than usual, but she is not an unstable rhythm, heart rate is normal, and she is not hypotensive.    On laboratory evaluation calcium resulted at 6.1, this is consistent with some of her prior values when she is missed dialysis.  Creatinine is 13.2, as would be expected,.  proBNP is over 35,000, also consistent with fluid overload.  COVID-19 testing is negative.  She has a normal white blood count, less concerning for infectious etiology.  Hemoglobin is 8.9, this is  consistent with her baseline values on review of prior labs.    Chest x-ray demonstrates pulmonary edema and/or infiltrates.    I did discuss this patient with Dr. De Leon, nephrologist.  She will be able to have the patient dialyzed first thing this morning when dialysis team arrives.  Requests calcium gluconate, dextrose, and insulin for treatment of hypokalemia until that time.  Plan at this time is for admission to hospitalist service for urgent dialysis and electrolyte correction.    Personal protective equipment including N95 surgical respirator, goggles, and gloves were used during this encounter.       Disposition:  Admit to hospitalist in stable but guarded condition    Final Impression:  1. Other hypervolemia    2. Hyperkalemia    3. ESRD (end stage renal disease) on dialysis (HCC)        Electronically signed by: Brigitte Gupta MD, 3/4/2021 3:33 AM

## 2021-03-04 NOTE — PROGRESS NOTES
2 RN Skin Check    2 RN skin check complete.   Devices in place: Nasal Cannula.  Skin assessed under devices: yes.  Confirmed pressure ulcers found on: N/A.  New potential pressure ulcers noted on N/A. Wound consult placed N/A.  The following interventions in place Pillows.    Pt has shingles on right side of back that is covered with abdominal pad.    Generalized bruising.  Right BKA

## 2021-03-04 NOTE — PROGRESS NOTES
[facility] Progress Note      HD today x 3.5 hours per Dr. Balderas. Initiated at 0801 and ended at 1132.   Patient assessed prior tx.  Alert and oriented x 4. Denies pain, no SOB.        UF Net: 4000 mL    Hypertensive on the last hour of HD. Patient denies pain or headche. Asymptomatic. Holland SANTOS aware and will give BP meds once pt back to floors.  Patient went back to room in a stable condition.       Please see paper flowsheet for details.      Blood returned. Applied gauze and held LOR AVF site for 10 minutes. Verified no bleeding post treatment. Bruit and thrill present post dialysis.   Instructions given to Primary RN that if bleeding occurs on the AVF site, change dressing and held the site with pressure.       Report given to Primary BOGDAN Callejas RN.

## 2021-03-04 NOTE — ASSESSMENT & PLAN NOTE
Improved, due to kidney failure   no significant edema and few crackles  She is on 3 L nasal cannula, baseline  Dialysis per nephrology

## 2021-03-04 NOTE — PROGRESS NOTES
Pt was leaving unit to dialysis at shift change.  Per overnight RN pt has shingles covered as directed by night shift charge/unit supervisor.  Waiting for clarification of isolation requirements from MD

## 2021-03-04 NOTE — THERAPY
Missed Therapy     Patient Name: Isabelle Coyle  Age:  55 y.o., Sex:  female  Medical Record #: [id number]  Today's Date: 3/4/2021    Discussed missed therapy with RN       03/04/21 1041   Interdisciplinary Plan of Care Collaboration   IDT Collaboration with  Nursing   Collaboration Comments attempted, at dialysis and awaiting hospitalist to see pt to see if shingles ulcers are open and pt will need to be in isolation. Will attempt tomorrow.

## 2021-03-04 NOTE — ASSESSMENT & PLAN NOTE
Prior admission with noted SVT/AVNRT and afib  CHADsVasc 2  Chemical AC was previously discussed with patient, declines at this time  Continue monitoring  Continue Coreg 25 mg twice daily

## 2021-03-04 NOTE — PROGRESS NOTES
55-year-old female with history of end-stage kidney disease, COPD on oxygen presented 3/4 with worsening shortness of breath, the patient is noncompliant and missed 4 sessions of hemodialysis, came with shortness of breath and they found overall volume with pulmonary edema, she received dialysis on 3/4 with improving.    The patient had history of shingles and she received full treatment of thoracic liver however she still having some sharp pain, no new lesion however there is erosion, educated the patient to keep the area clean, increase gabapentin and continue lidocaine patch.    Patient has history of blood pressure and it is uncontrolled likely due to noncompliance, start with amlodipine and continue her home medication terazosin Coreg and hydralazine.    Evaluated examined the patient at bedside, denied any new symptoms, her blood pressure is uncontrolled started with amlodipine.  She is on 3 L nasal cannula, her baseline.  Nephrologist did not recommend extra dialysis, possible discharge tomorrow

## 2021-03-04 NOTE — CONSULTS
"[facility] -  CONSULTATION NOTE               Author: Lola Balderas M.D. Date & Time: 3/4/2021  2:33 PM       REASON FOR CONSULTATION:   Inpatient hemodialysis management.    CHIEF COMPLAINT:   Needs HD    HISTORY OF PRESENT ILLNESS:    55 y.o. female who presented 3/4/2021 with complaints of shortness of breath and abdominal distention after missing hemodialysis.Patient reports that she has been having issues with shingles since mid January, she was originally placed on acyclovir but was having \"hallucinations\" and was not taking her medications.  Patient was switched to famciclovir during her last visit on 1/19/2021. She missed HD due to severe pain at that affected area during HD. In ED she was found to be hypoxic, hyperkalemia, CXR with pulm infiltrates.   Nephrology was consulted.     REVIEW OF SYSTEMS:    10 point ROS reviewed and is as per HPI/daily summary or otherwise negative      PAST MEDICAL HISTORY:   Past Medical History:   Diagnosis Date   • Anemia    • Anemia associated with chronic renal failure 11/5/2009   • Anginal syndrome ([facility])    • Arrhythmia    • Asthma    • Back pain    • COPD (chronic obstructive pulmonary disease) (Formerly [facility])    • Dental disorder 8-25-17    \"Full Upper & Partial Lower Dentures\"   • Dialysis patient (Formerly [facility]) 8-25-17    [facility] M,W,F   • Dysrhythmia     \"SVT\"   • ESRD (end stage renal disease) (Formerly [facility]) 8-25-17    Dialysis MWF@ [facility]   • Fall    • GERD (gastroesophageal reflux disease)    • Glomerulonephritis, chronic 11/5/2009   • Head ache    • Heart burn    • Heart murmur    • High cholesterol    • HTN (hypertension) 11/5/2009    2017 states well controlled   • Hypertension    • Indigestion    • Infectious disease    • NSTEMI (non-ST elevated myocardial infarction) (Formerly [facility]) 10/2/2020   • Pneumonia    • Port catheter in place 8-25-17    For Dialysis   • Psychiatric problem    • SVT (supraventricular tachycardia) (Former[facility])    • SVT (supraventricular " "tachycardia) ([facility])    • Urinary incontinence 10/2/2020         PAST SURGICAL HISTORY:   Past Surgical History:   Procedure Laterality Date   • MEÑO BY LAPAROSCOPY  6/22/2019    Procedure: CHOLECYSTECTOMY, LAPAROSCOPIC With GRAMS;  Surgeon: Jimbo Davenport M.D.;  Location: SURGERY [facility];  Service: General   • AV FISTULA CREATION Left 4/27/2018    Procedure: Left Brachial artery Repair;  Surgeon: Jimbo Davenport M.D.;  Location: SURGERY [facility];  Service: Vascular   • AV FISTULA CREATION Left 8/28/2017    Procedure: AV FISTULA CREATION  UPPER EXTREMITY -BRACHIAL BASALIC;  Surgeon: Stella Rodriguez M.D.;  Location: SURGERY [facility];  Service: General   • AV FISTULA CREATION Left 6/20/2017    Procedure: AV FISTULA CREATION- LEFT;  Surgeon: Jimbo Davenport M.D.;  Location: SURGERY [facility];  Service:    • APPENDECTOMY LAPAROSCOPIC  5/4/2009    Performed by BANDAR TIMMONS at [facility]   • FEMUR ORIF  10/9/08    Performed by STELLA GATES at [facility]   • ILIAC BONE GRAFT  10/9/08    Performed by STELLA GATES at [facility]   • AMPUTATION, BELOW THE KNEE     • CAPITATION PAYMENT (INSURANCE)     • OTHER      BELOW KNEE AMPUTATION RIGHT   • NH BREAST AUGMENTATION WITH IMPLANT         FAMILY HISTORY:   No family history of kidney disease    SOCIAL HISTORY:   No smoking, no etoh, no illicit drugs.    HOME MEDICATIONS:   Reviewed and documented in chart    ALLERGIES:  Sulfa drugs    PHYSICAL EXAM:  VS:  BP (!) 200/95 Comment: RN notified   Pulse 72   Temp 36.4 °C (97.6 °F) (Temporal)   Resp (!) 22   Ht 1.575 m (5' 2\")   Wt 73.7 kg (162 lb 7.7 oz)   LMP 06/25/2007   SpO2 98%   BMI 29.72 kg/m²   Physical Exam   Constitutional: She is oriented to person, place, and time. She appears well-developed and well-nourished. No distress.   HENT:   Head: Normocephalic and atraumatic.   Nose: Nose normal.   Eyes: Pupils are equal, round, " and reactive to light. Conjunctivae and EOM are normal. Left eye exhibits no discharge.   Neck: No JVD present. No tracheal deviation present. No thyromegaly present.   Cardiovascular: Normal rate, regular rhythm, normal heart sounds and intact distal pulses.   No murmur heard.  Pulmonary/Chest: Effort normal. No respiratory distress. She has no wheezes. She has rales.   Abdominal: Soft. Bowel sounds are normal. She exhibits no distension. There is no abdominal tenderness.   Musculoskeletal:         General: No tenderness or edema.      Cervical back: Normal range of motion and neck supple.   Neurological: She is alert and oriented to person, place, and time.   Skin: Skin is warm and dry.   Psychiatric: She has a normal mood and affect. Her behavior is normal.       LABS:  Recent Results (from the past 24 hour(s))   CBC WITH DIFFERENTIAL    Collection Time: 03/04/21  1:01 AM   Result Value Ref Range    WBC 6.5 4.8 - 10.8 K/uL    RBC 2.93 (L) 4.20 - 5.40 M/uL    Hemoglobin 8.9 (L) 12.0 - 16.0 g/dL    Hematocrit 29.2 (L) 37.0 - 47.0 %    MCV 99.7 (H) 81.4 - 97.8 fL    MCH 30.4 27.0 - 33.0 pg    MCHC 30.5 (L) 33.6 - 35.0 g/dL    RDW 59.3 (H) 35.9 - 50.0 fL    Platelet Count 68 (L) 164 - 446 K/uL    MPV 11.0 9.0 - 12.9 fL    Neutrophils-Polys 79.60 (H) 44.00 - 72.00 %    Lymphocytes 12.60 (L) 22.00 - 41.00 %    Monocytes 5.40 0.00 - 13.40 %    Eosinophils 1.70 0.00 - 6.90 %    Basophils 0.50 0.00 - 1.80 %    Immature Granulocytes 0.20 0.00 - 0.90 %    Nucleated RBC 0.00 /100 WBC    Neutrophils (Absolute) 5.20 2.00 - 7.15 K/uL    Lymphs (Absolute) 0.82 (L) 1.00 - 4.80 K/uL    Monos (Absolute) 0.35 0.00 - 0.85 K/uL    Eos (Absolute) 0.11 0.00 - 0.51 K/uL    Baso (Absolute) 0.03 0.00 - 0.12 K/uL    Immature Granulocytes (abs) 0.01 0.00 - 0.11 K/uL    NRBC (Absolute) 0.00 K/uL   CMP    Collection Time: 03/04/21  1:01 AM   Result Value Ref Range    Sodium 140 135 - 145 mmol/L    Potassium 6.1 (H) 3.6 - 5.5 mmol/L    Chloride  96 96 - 112 mmol/L    Co2 25 20 - 33 mmol/L    Anion Gap 19.0 (H) 7.0 - 16.0    Glucose 151 (H) 65 - 99 mg/dL    Bun 88 (HH) 8 - 22 mg/dL    Creatinine 13.20 (HH) 0.50 - 1.40 mg/dL    Calcium 10.1 8.5 - 10.5 mg/dL    AST(SGOT) 10 (L) 12 - 45 U/L    ALT(SGPT) 5 2 - 50 U/L    Alkaline Phosphatase 63 30 - 99 U/L    Total Bilirubin 0.9 0.1 - 1.5 mg/dL    Albumin 3.9 3.2 - 4.9 g/dL    Total Protein 6.5 6.0 - 8.2 g/dL    Globulin 2.6 1.9 - 3.5 g/dL    A-G Ratio 1.5 g/dL   proBrain Natriuretic Peptide, NT    Collection Time: 21  1:01 AM   Result Value Ref Range    NT-proBNP >70990 (H) 0 - 125 pg/mL   ESTIMATED GFR    Collection Time: 21  1:01 AM   Result Value Ref Range    GFR If  4 (A) >60 mL/min/1.73 m 2    GFR If Non African American 3 (A) >60 mL/min/1.73 m 2   COV-2, FLU A/B, AND RSV BY PCR (2-4 HOURS CEPHEID): Collect NP swab in VTM    Collection Time: 21  1:36 AM    Specimen: Respirate   Result Value Ref Range    Influenza virus A RNA Negative Negative    Influenza virus B, PCR Negative Negative    RSV, PCR Negative Negative    SARS-CoV-2 by PCR NotDetected     SARS-CoV-2 Source NP Swab    EKG    Collection Time: 21  2:13 AM   Result Value Ref Range    Report       [facility] Emergency Dept.    Test Date:  2021  Pt Name:    MELISSA BARDALES                 Department: ER  MRN:        [id number]                      Room:       [facility]  Gender:     Female                       Technician: [id number]  :        1965                   Requested By:MELANIE GUTIERREZ  Order #:    [id number]                    Reading MD: MELANIE GUTIERREZ MD    Measurements  Intervals                                Axis  Rate:       62                           P:          64  IA:         192                          QRS:        64  QRSD:       96                           T:          62  QT:         464  QTc:        472    Interpretive Statements  SINUS RHYTHM  LOW VOLTAGE WITH RIGHT  AXIS DEVIATION  BORDERLINE R WAVE PROGRESSION, ANTERIOR LEADS  Compared to ECG 01/21/2021 13:41:16  Right-axis deviation now present  Electronically Signed On 3-4-2021 2:58:13 PST by MELANIE GUTIERREZ MD     ACCU-CHEK GLUCOSE    Collection Time: 03/04/21  5:02 AM   Result Value Ref Range    Glucose - Accu-Ck 114 (H) 65 - 99 mg/dL       (click the triangle to expand results)    IMAGING:  DX-CHEST-PORTABLE (1 VIEW)   Final Result         1.  Pulmonary edema and/or infiltrates are identified, which are stable since the prior exam.   2.  Cardiomegaly   3.  Atherosclerosis          PMH/FH/SH/meds/labs/images reviewed     ASSESSMENT:  # ESRD: Hx non-compliance with outpatient MWF therapy  # Hyperkalemia: resolved with iHD  # Heart Failure exacerbation: 2/2 non-compliance with dialysis and volume  # Hypoxic resp failure due to volume  # Anemia secondary to end-stage renal disease. Goal Hgb 10-11; Not at goal  # Tobacco abuse disorder.  # Hypertension:  - not at the goal  # Right below-knee amputation.  # Herpes Zoster     PLAN:    -HD today and then MWF iHD  -UF as tolerated  -Dose all meds for ESRD  -Renal Diet  - restart her home BP medications including clonidine, terazosin, coreg.   -Continue home phos binders  -Epogen with dialysis  -Encouraged outpatient compliance    Thank you for this consult, we will continue to follow.  Total time spent 60 mins which includes in-person and not in-person work.   Lola Balderas MD

## 2021-03-04 NOTE — H&P
"Hospital Medicine History & Physical Note    Date of Service  3/4/2021    Primary Care Physician  Pcp Pt States None    Consultants  Nephrology    Code Status  Full Code    Chief Complaint  Chief Complaint   Patient presents with   • Other     Missed 3 dialysis appointments. Patient last went to dialysis last Wednesday. Patient skipped dialysis due to shingles pain.    • Herpes Zoster     Patient has had an outbreak since January but the pain has been increasing over the last weak. Lesions are to the abdomen and back.        History of Presenting Illness  55 y.o. female who presented 3/4/2021 with complaints of shortness of breath and abdominal distention after missing hemodialysis.  Patient normally has HD on MWF, however her most recent HD was last Wednesday.  Patient reports that she has been having issues with shingles since mid January, she was originally placed on acyclovir but was having \"hallucinations\" and was not taking her medications.  Patient was switched to famciclovir during her last visit on 1/19/2021.  Patient reports that she has been compliant and has completed what she was prescribed.  Patient continues to have pain in her right lower back along the shingles region.  She reports because of this she has been in too much pain to have hemodialysis.  She states she has noticed that her LLE has been more swollen than normal, and her breathing has been getting worse.  She denies any associated fevers, chills, chest pain, productive cough, palpitations.  She does report some associated nausea without vomiting.  Patient is otherwise denying further complaints.    ED: RR 18->25->20, /74->167/87, 81% on RA -> 100% 4L NC. Hb 8.9, Plt 68, K 6.1, Bicarb 19, BUN/Cr 88/13.2, Glucose 151, BNP >35,000.  CXR with pulmonary edema/infiltrates.  EKG without signs of peaked T waves.  Patient was given calcium gluconate 1 g, insulin regular 10 units, D50, Norco and Percocet.    Review of Systems  Review of " Systems   Constitutional: Positive for malaise/fatigue. Negative for chills, fever and weight loss.   HENT: Negative for hearing loss and sore throat.    Eyes: Negative for blurred vision and pain.   Respiratory: Positive for cough and shortness of breath. Negative for hemoptysis, sputum production and wheezing.    Cardiovascular: Positive for leg swelling. Negative for chest pain, palpitations and orthopnea.   Gastrointestinal: Negative for abdominal pain, blood in stool, constipation, diarrhea, nausea and vomiting.   Genitourinary: Negative for dysuria and hematuria.        Minimal urine output   Musculoskeletal: Positive for back pain. Negative for joint pain and myalgias.   Skin: Positive for rash.   Neurological: Negative for dizziness, loss of consciousness and weakness.       Past Medical History   has a past medical history of Anemia, Anemia associated with chronic renal failure (11/5/2009), Anginal syndrome ([facility]), Arrhythmia, Asthma, Back pain, COPD (chronic obstructive pulmonary disease) ([facility]), Dental disorder (8-25-17), Dialysis patient ([facility]) (8-25-17), Dysrhythmia, ESRD (end stage renal disease) ([facility]) (8-25-17), Fall, GERD (gastroesophageal reflux disease), Glomerulonephritis, chronic (11/5/2009), Head ache, Heart burn, Heart murmur, High cholesterol, HTN (hypertension) (11/5/2009), Hypertension, Indigestion, Infectious disease, NSTEMI (non-ST elevated myocardial infarction) ([facility]) (10/2/2020), Pneumonia, Port catheter in place (8-25-17), Psychiatric problem, SVT (supraventricular tachycardia) ([facility]), SVT (supraventricular tachycardia) ([facility]), and Urinary incontinence (10/2/2020).    Surgical History   has a past surgical history that includes amputation, below the knee; capitation payment (insurance); appendectomy laparoscopic (5/4/2009); pr breast augmentation with implant; av fistula creation (Left, 6/20/2017); other; femur orif (10/9/08); iliac bone graft (10/9/08); av fistula creation (Left,  8/28/2017); av fistula creation (Left, 4/27/2018); and yamileth by laparoscopy (6/22/2019).     Family History  family history includes Heart Disease in an other family member.     Social History   reports that she has been smoking cigarettes. She has a 5.00 pack-year smoking history. She has never used smokeless tobacco. She reports that she does not drink alcohol and does not use drugs.    Allergies  Allergies   Allergen Reactions   • Sulfa Drugs Hives, Rash, Itching and Swelling     BMZ=[id number]       Medications  Prior to Admission Medications   Prescriptions Last Dose Informant Patient Reported? Taking?   MAGNESIUM PO 3/2/2021 at PM Patient Yes Yes   Sig: Take 1 capsule by mouth every bedtime.   MELATONIN PO 3/2/2021 at PM Patient Yes Yes   Sig: Take 1 tablet by mouth every bedtime.   acetaminophen (TYLENOL) 325 MG Tab Not Taking at Unknown time Patient No No   Sig: Take 2 Tabs by mouth every 6 hours as needed (Mild Pain; (Pain scale 1-3); Temp greater than 100.5 F).   Patient not taking: Reported on 3/4/2021   acetaminophen (TYLENOL) 500 MG Tab 3/3/2021 at PM Patient Yes Yes   Sig: Take 2,000 mg by mouth 2 times a day as needed.   albuterol 108 (90 Base) MCG/ACT Aero Soln inhalation aerosol >2 days ago at PM Patient No No   Sig: Inhale 2 Puffs every four hours as needed for Shortness of Breath.   aspirin (ASA) 81 MG Chew Tab chewable tablet Not Taking at Unknown time Patient No No   Sig: Chew 1 Tab every day.   Patient not taking: Reported on 3/4/2021   atorvastatin (LIPITOR) 40 MG Tab 3/3/2021 at AM Patient No No   Sig: Take 1 Tab by mouth every day.   budesonide-formoterol (SYMBICORT) 80-4.5 MCG/ACT Aerosol 3/3/2021 at PM Patient No No   Sig: Inhale 2 Puffs 2 Times a Day.   Patient taking differently: Inhale 2 Puffs every evening.   calcium carbonate (TUMS) 500 MG Chew Tab Not Taking at Unknown time Patient No No   Sig: Chew 1 Tab 3 times a day, with meals.   Patient not taking: Reported on 3/4/2021   carvedilol  (COREG) 25 MG Tab 3/3/2021 at AM Patient No No   Sig: Take 1 Tab by mouth 2 times a day, with meals.   cloNIDine (CATAPRES) 0.1 MG Tab 3/3/2021 at PM Patient No No   Sig: Take 1 Tab by mouth 3 times a day as needed (SBP > 160).   epoetin (RETACRIT) 3000 UNIT/ML Solution Not Taking at Unknown time Patient No No   Sig: Inject 2 mL under the skin every Monday, Wednesday, and Friday.   Patient not taking: Reported on 3/4/2021   famciclovir (FAMVIR) 250 MG Tab Not Taking at Unknown time Patient No No   Sig: Take 1 Tab by mouth every Monday, Wednesday, and Friday.   Patient not taking: Reported on 3/4/2021   gabapentin (NEURONTIN) 100 MG Cap 3/3/2021 at AM Patient No No   Sig: Take 1 Cap by mouth 2 (two) times a day.   hydrALAZINE (APRESOLINE) 50 MG Tab 3/3/2021 at PM Patient Yes No   Sig: Take 50 mg by mouth 2 Times a Day.   lidocaine (LIDODERM) 5 % Patch Not Taking at Unknown time Patient No No   Sig: Place 2 Patches on the skin every 24 hours.   Patient not taking: Reported on 3/4/2021   nicotine (NICODERM) 21 MG/24HR PATCH 24 HR Not Taking at Unknown time Patient No No   Sig: Place 1 Patch on the skin every 24 hours.   Patient not taking: Reported on 3/4/2021   omeprazole (PRILOSEC) 20 MG delayed-release capsule 3/3/2021 at AM Patient No No   Sig: Take 1 Cap by mouth every day.   ondansetron (ZOFRAN ODT) 4 MG TABLET DISPERSIBLE Not Taking at Unknown time Patient No No   Sig: Take 1 Tab by mouth every four hours as needed for Nausea (give PO if no IV route available).   Patient not taking: Reported on 3/4/2021   terazosin (HYTRIN) 5 MG Cap 3/3/2021 at AM Patient No No   Sig: Take 1 Cap by mouth every day.   thiamine (THIAMINE) 100 MG tablet Not Taking at Unknown time Patient No No   Sig: Take 1 Tab by mouth 3 times a day.   Patient not taking: Reported on 3/4/2021      Facility-Administered Medications: None       Physical Exam  Temp:  [36.7 °C (98.1 °F)] 36.7 °C (98.1 °F)  Pulse:  [61-74] 61  Resp:  [17-25] 17  BP:  (134-180)/(74-87) 180/84  SpO2:  [81 %-100 %] 99 %    Physical Exam  Vitals and nursing note reviewed.   Constitutional:       General: She is in acute distress.      Appearance: Normal appearance. She is not ill-appearing.   HENT:      Mouth/Throat:      Mouth: Mucous membranes are moist.   Eyes:      Extraocular Movements: Extraocular movements intact.   Cardiovascular:      Rate and Rhythm: Normal rate and regular rhythm.      Pulses: Normal pulses.      Heart sounds: Normal heart sounds. No murmur. No gallop.    Pulmonary:      Effort: Pulmonary effort is normal. No respiratory distress.      Breath sounds: No wheezing, rhonchi or rales.   Abdominal:      General: Abdomen is flat. Bowel sounds are normal.      Palpations: Abdomen is soft.      Tenderness: There is no abdominal tenderness.   Musculoskeletal:         General: Deformity (R BKA) present. Normal range of motion.      Right lower leg: No edema.      Left lower leg: No edema.   Skin:     General: Skin is warm and dry.      Coloration: Skin is not jaundiced.      Findings: Rash (hyperpigmentation along the right flank in a dermatomal distribution consistent with resolving herpes zoster, no vesicular lesions are present, there are few excoriations due to patient scratching) present.   Neurological:      General: No focal deficit present.      Mental Status: She is alert and oriented to person, place, and time.      Motor: No weakness.   Psychiatric:         Mood and Affect: Mood normal.         Laboratory:  Recent Labs     03/04/21  0101   WBC 6.5   RBC 2.93*   HEMOGLOBIN 8.9*   HEMATOCRIT 29.2*   MCV 99.7*   MCH 30.4   MCHC 30.5*   RDW 59.3*   PLATELETCT 68*   MPV 11.0     Recent Labs     03/04/21  0101   SODIUM 140   POTASSIUM 6.1*   CHLORIDE 96   CO2 25   GLUCOSE 151*   BUN 88*   CREATININE 13.20*   CALCIUM 10.1     Recent Labs     03/04/21  0101   ALTSGPT 5   ASTSGOT 10*   ALKPHOSPHAT 63   TBILIRUBIN 0.9   GLUCOSE 151*         Recent Labs      03/04/21  0101   NTPROBNP >47013*         No results for input(s): TROPONINT in the last 72 hours.    Imaging:  DX-CHEST-PORTABLE (1 VIEW)   Final Result         1.  Pulmonary edema and/or infiltrates are identified, which are stable since the prior exam.   2.  Cardiomegaly   3.  Atherosclerosis            Assessment/Plan:  I anticipate this patient will require at least two midnights for appropriate medical management, necessitating inpatient admission.    * ESRD (end stage renal disease) (HCC)- (present on admission)  Assessment & Plan  ESRD on HD MWF  Has not been compliant with HD, noted to have missed HD on prior visits as well  BNP >41087, CXR w/ pulm edema/infiltrates  - Nephrology (Dr. Alcantara) consulted - HD later this morning  - renal diet  - monitor respiratory status closely  - monitor electrolytes    Shingles- (present on admission)  Assessment & Plan  No new vesicular lesions noted  Reports completing Famicyclovir course already  - c/w home [place]  - will add additional analgesics as necessary    Volume overload- (present on admission)  Assessment & Plan  Complaints of SOB x1 week  Has had admissions for similar complaints in past  Missed last 2 HD sessions  BNP >88347  TTE (1/22/21) w/ evidence of severely dilated left atrium, Severe pul HTN with RVSP of 65 mmHg, some valvular lesions  - HD later today    Hyperkalemia- (present on admission)  Assessment & Plan  K 6.1  S/p Ca Gluconate 1g   - HD later today  - tele monitor    A-fib (HCC)- (present on admission)  Assessment & Plan  Prior admission with noted SVT/AVNRT and afib  CHADsVasc 2  Chemical AC was previously discussed with patient, declines at this time  - monitor  - electrolyte replacement/treatment as needed    Thrombocytopenia (HCC)- (present on admission)  Assessment & Plan  Plt 68  Appears similar from prior labs  No obvious signs of bleeding at this time  - hold chemical VTE  - monitor CBC      VTE: SCD

## 2021-03-04 NOTE — ED NOTES
Med rec complete per interview with pt at bedside and per historical med rec. Allergies reviewed. Pt denies abx use in the past 14 days.   Pt states that she has been taking 4 tylenol 500 mg tabs 2-3 times a day for the past several days.

## 2021-03-04 NOTE — PROGRESS NOTES
[facility] Services Progress Note:    This RN spoke to Holland SANTOS regarding pt infection status - Herpes Zoster in chart. Inquiry made if patient need to be on isolation. Per Holland SANTOS, based on NOC RN report to him, no need for isolation per Charge and Supervisor on duty last night.     Patient in HD room, placed in room 7. Awaiting for Holland SANTOS to call and orders from MD.

## 2021-03-04 NOTE — ED NOTES
Shingle lesions noted to R back/ribs with pain, pt on 4LNC per baseline, reports missing x3 dialysis Txs d/t pain from shingles, goes MWF

## 2021-03-04 NOTE — PROGRESS NOTES
Patient transported from ED. Patient Aox4. Plan of care discussed with patient. Patient oriented to floor and surroundings. Patient currently on 4L NC. VSS. Patient is complaining of pain 7/10. Tele box placed. Monitor room notified. 2 rn skin check performed. Patient educated to call for assistance before ambulating. Patient education regarding fall risk. Call light within reach. Fall precautions in place.

## 2021-03-04 NOTE — ASSESSMENT & PLAN NOTE
No new vesicular lesions noted,   Reports completing Famicyclovir course already  Increase gabapentin due to pain  Encouraged the patient to keep the area clean, there is erosion.

## 2021-03-05 VITALS
RESPIRATION RATE: 18 BRPM | WEIGHT: 155.65 LBS | DIASTOLIC BLOOD PRESSURE: 74 MMHG | TEMPERATURE: 97.5 F | BODY MASS INDEX: 28.64 KG/M2 | HEART RATE: 69 BPM | HEIGHT: 62 IN | OXYGEN SATURATION: 98 % | SYSTOLIC BLOOD PRESSURE: 173 MMHG

## 2021-03-05 PROBLEM — B02.9 SHINGLES: Status: RESOLVED | Noted: 2021-01-19 | Resolved: 2021-03-05

## 2021-03-05 PROBLEM — E87.5 HYPERKALEMIA: Status: RESOLVED | Noted: 2017-12-28 | Resolved: 2021-03-05

## 2021-03-05 PROBLEM — N18.6 ESRD (END STAGE RENAL DISEASE) (HCC): Status: RESOLVED | Noted: 2017-08-28 | Resolved: 2021-03-05

## 2021-03-05 PROBLEM — E87.70 VOLUME OVERLOAD: Status: RESOLVED | Noted: 2018-12-10 | Resolved: 2021-03-05

## 2021-03-05 LAB
ALBUMIN SERPL BCP-MCNC: 3.9 G/DL (ref 3.2–4.9)
ALBUMIN/GLOB SERPL: 1.7 G/DL
ALP SERPL-CCNC: 63 U/L (ref 30–99)
ALT SERPL-CCNC: 7 U/L (ref 2–50)
ANION GAP SERPL CALC-SCNC: 14 MMOL/L (ref 7–16)
AST SERPL-CCNC: 10 U/L (ref 12–45)
BASOPHILS # BLD AUTO: 0.5 % (ref 0–1.8)
BASOPHILS # BLD: 0.03 K/UL (ref 0–0.12)
BILIRUB SERPL-MCNC: 0.9 MG/DL (ref 0.1–1.5)
BUN SERPL-MCNC: 47 MG/DL (ref 8–22)
CALCIUM SERPL-MCNC: 9.5 MG/DL (ref 8.5–10.5)
CHLORIDE SERPL-SCNC: 97 MMOL/L (ref 96–112)
CO2 SERPL-SCNC: 26 MMOL/L (ref 20–33)
CREAT SERPL-MCNC: 7.98 MG/DL (ref 0.5–1.4)
EOSINOPHIL # BLD AUTO: 0.15 K/UL (ref 0–0.51)
EOSINOPHIL NFR BLD: 2.6 % (ref 0–6.9)
ERYTHROCYTE [DISTWIDTH] IN BLOOD BY AUTOMATED COUNT: 59.4 FL (ref 35.9–50)
GLOBULIN SER CALC-MCNC: 2.3 G/DL (ref 1.9–3.5)
GLUCOSE SERPL-MCNC: 106 MG/DL (ref 65–99)
HCT VFR BLD AUTO: 27.2 % (ref 37–47)
HGB BLD-MCNC: 8.2 G/DL (ref 12–16)
IMM GRANULOCYTES # BLD AUTO: 0.02 K/UL (ref 0–0.11)
IMM GRANULOCYTES NFR BLD AUTO: 0.3 % (ref 0–0.9)
LYMPHOCYTES # BLD AUTO: 0.91 K/UL (ref 1–4.8)
LYMPHOCYTES NFR BLD: 15.6 % (ref 22–41)
MAGNESIUM SERPL-MCNC: 2.4 MG/DL (ref 1.5–2.5)
MCH RBC QN AUTO: 30.3 PG (ref 27–33)
MCHC RBC AUTO-ENTMCNC: 30.1 G/DL (ref 33.6–35)
MCV RBC AUTO: 100.4 FL (ref 81.4–97.8)
MONOCYTES # BLD AUTO: 0.36 K/UL (ref 0–0.85)
MONOCYTES NFR BLD AUTO: 6.2 % (ref 0–13.4)
NEUTROPHILS # BLD AUTO: 4.36 K/UL (ref 2–7.15)
NEUTROPHILS NFR BLD: 74.8 % (ref 44–72)
NRBC # BLD AUTO: 0 K/UL
NRBC BLD-RTO: 0 /100 WBC
PLATELET # BLD AUTO: 66 K/UL (ref 164–446)
PMV BLD AUTO: 11.1 FL (ref 9–12.9)
POTASSIUM SERPL-SCNC: 5.3 MMOL/L (ref 3.6–5.5)
PROT SERPL-MCNC: 6.2 G/DL (ref 6–8.2)
RBC # BLD AUTO: 2.71 M/UL (ref 4.2–5.4)
SODIUM SERPL-SCNC: 137 MMOL/L (ref 135–145)
WBC # BLD AUTO: 5.8 K/UL (ref 4.8–10.8)

## 2021-03-05 PROCEDURE — 36415 COLL VENOUS BLD VENIPUNCTURE: CPT

## 2021-03-05 PROCEDURE — 700102 HCHG RX REV CODE 250 W/ 637 OVERRIDE(OP): Performed by: STUDENT IN AN ORGANIZED HEALTH CARE EDUCATION/TRAINING PROGRAM

## 2021-03-05 PROCEDURE — 90935 HEMODIALYSIS ONE EVALUATION: CPT

## 2021-03-05 PROCEDURE — 80053 COMPREHEN METABOLIC PANEL: CPT

## 2021-03-05 PROCEDURE — A9270 NON-COVERED ITEM OR SERVICE: HCPCS | Performed by: STUDENT IN AN ORGANIZED HEALTH CARE EDUCATION/TRAINING PROGRAM

## 2021-03-05 PROCEDURE — 85025 COMPLETE CBC W/AUTO DIFF WBC: CPT

## 2021-03-05 PROCEDURE — 700102 HCHG RX REV CODE 250 W/ 637 OVERRIDE(OP): Performed by: INTERNAL MEDICINE

## 2021-03-05 PROCEDURE — 97161 PT EVAL LOW COMPLEX 20 MIN: CPT

## 2021-03-05 PROCEDURE — 99239 HOSP IP/OBS DSCHRG MGMT >30: CPT | Performed by: INTERNAL MEDICINE

## 2021-03-05 PROCEDURE — A9270 NON-COVERED ITEM OR SERVICE: HCPCS | Performed by: INTERNAL MEDICINE

## 2021-03-05 PROCEDURE — 700101 HCHG RX REV CODE 250: Performed by: STUDENT IN AN ORGANIZED HEALTH CARE EDUCATION/TRAINING PROGRAM

## 2021-03-05 PROCEDURE — 83735 ASSAY OF MAGNESIUM: CPT

## 2021-03-05 PROCEDURE — 5A1D70Z PERFORMANCE OF URINARY FILTRATION, INTERMITTENT, LESS THAN 6 HOURS PER DAY: ICD-10-PCS | Performed by: STUDENT IN AN ORGANIZED HEALTH CARE EDUCATION/TRAINING PROGRAM

## 2021-03-05 PROCEDURE — 97165 OT EVAL LOW COMPLEX 30 MIN: CPT

## 2021-03-05 PROCEDURE — 700111 HCHG RX REV CODE 636 W/ 250 OVERRIDE (IP)

## 2021-03-05 RX ORDER — ACETAMINOPHEN 325 MG/1
325 TABLET ORAL EVERY 6 HOURS PRN
Qty: 30 TABLET | Refills: 0 | Status: SHIPPED | OUTPATIENT
Start: 2021-03-05

## 2021-03-05 RX ORDER — OXYCODONE HYDROCHLORIDE 5 MG/1
5 TABLET ORAL EVERY 8 HOURS PRN
Qty: 5 TABLET | Refills: 0 | Status: SHIPPED | OUTPATIENT
Start: 2021-03-05 | End: 2021-03-07

## 2021-03-05 RX ORDER — GABAPENTIN 100 MG/1
300 CAPSULE ORAL
Qty: 30 CAPSULE | Refills: 0 | Status: SHIPPED | OUTPATIENT
Start: 2021-03-05

## 2021-03-05 RX ORDER — AMLODIPINE BESYLATE 5 MG/1
5 TABLET ORAL DAILY
Qty: 30 TABLET | Refills: 1 | Status: SHIPPED | OUTPATIENT
Start: 2021-03-06

## 2021-03-05 RX ADMIN — EPOETIN ALFA-EPBX 2000 UNITS: 2000 INJECTION, SOLUTION INTRAVENOUS; SUBCUTANEOUS at 12:34

## 2021-03-05 RX ADMIN — AMLODIPINE BESYLATE 5 MG: 5 TABLET ORAL at 05:03

## 2021-03-05 RX ADMIN — OMEPRAZOLE 20 MG: 20 CAPSULE, DELAYED RELEASE ORAL at 05:03

## 2021-03-05 RX ADMIN — CLONIDINE HYDROCHLORIDE 0.1 MG: 0.1 TABLET ORAL at 15:39

## 2021-03-05 RX ADMIN — EPOETIN ALFA-EPBX 3000 UNITS: 3000 INJECTION, SOLUTION INTRAVENOUS; SUBCUTANEOUS at 12:34

## 2021-03-05 RX ADMIN — ATORVASTATIN CALCIUM 40 MG: 40 TABLET, FILM COATED ORAL at 05:03

## 2021-03-05 RX ADMIN — HYDRALAZINE HYDROCHLORIDE 50 MG: 50 TABLET, FILM COATED ORAL at 05:03

## 2021-03-05 RX ADMIN — LIDOCAINE 1 PATCH: 50 PATCH CUTANEOUS at 05:03

## 2021-03-05 RX ADMIN — CLONIDINE HYDROCHLORIDE 0.1 MG: 0.1 TABLET ORAL at 03:39

## 2021-03-05 RX ADMIN — GABAPENTIN 200 MG: 100 CAPSULE ORAL at 08:03

## 2021-03-05 RX ADMIN — CARVEDILOL 25 MG: 25 TABLET, FILM COATED ORAL at 08:02

## 2021-03-05 RX ADMIN — TERAZOSIN HYDROCHLORIDE 5 MG: 5 CAPSULE ORAL at 05:03

## 2021-03-05 ASSESSMENT — ENCOUNTER SYMPTOMS
MUSCULOSKELETAL NEGATIVE: 1
PSYCHIATRIC NEGATIVE: 1
EYES NEGATIVE: 1
CARDIOVASCULAR NEGATIVE: 1
SHORTNESS OF BREATH: 1
NEUROLOGICAL NEGATIVE: 1
ABDOMINAL PAIN: 1
WHEEZING: 1

## 2021-03-05 ASSESSMENT — GAIT ASSESSMENTS
DISTANCE (FEET): 200
ASSISTIVE DEVICE: FRONT WHEEL WALKER
GAIT LEVEL OF ASSIST: SUPERVISED

## 2021-03-05 ASSESSMENT — COGNITIVE AND FUNCTIONAL STATUS - GENERAL
MOBILITY SCORE: 24
SUGGESTED CMS G CODE MODIFIER DAILY ACTIVITY: CH
SUGGESTED CMS G CODE MODIFIER MOBILITY: CH
DAILY ACTIVITIY SCORE: 24

## 2021-03-05 ASSESSMENT — FIBROSIS 4 INDEX: FIB4 SCORE: 3.26

## 2021-03-05 ASSESSMENT — PAIN DESCRIPTION - PAIN TYPE
TYPE: ACUTE PAIN
TYPE: ACUTE PAIN

## 2021-03-05 ASSESSMENT — ACTIVITIES OF DAILY LIVING (ADL): TOILETING: INDEPENDENT

## 2021-03-05 NOTE — THERAPY
"Physical Therapy   Initial Evaluation     Patient Name: Isabelle Coyle  Age:  55 y.o., Sex:  female  Medical Record #: [id number]  Today's Date: 3/5/2021          Assessment  Patient is 55 y.o. female with a diagnosis of Hyperkalemia.  Additional factors influencing patient status / progress : pt moves well today, supervised for transfers, ambulation using FWW and stairs. Pt lives with her SO who assists as needed. Pt reports falling lately, little insight into cause, agreeable to using FWW more around the house to prevent falls. .      Plan    Recommend Physical Therapy for Evaluation only  DC Equipment Recommendations: None  Discharge Recommendations: Anticipate that the patient will have no further physical therapy needs after discharge from the hospital        Objective       03/05/21 0859   Prior Living Situation   Prior Services None   Housing / Facility 1 Story House   Steps Into Home 6   Steps In Home 0   Rail Both Rail (Steps into Home)   Elevator No   Equipment Owned Front-Wheel Walker;Wheelchair   Lives with - Patient's Self Care Capacity Significant Other  (came to help. )   Comments drives herself to/from dialysis on M, w, f.    Prior Level of Functional Mobility   Bed Mobility Independent   Transfer Status Independent   Ambulation Independent   Distance Ambulation (Feet)   (community)   Assistive Devices Used Wheelchair   Wheelchair Independent   Stairs Independent   Comments uses w/c at night after she has taken off her prosthesis, \"when I'm in the kitchen eating sugary things I shouldn't eat\"   History of Falls   History of Falls Yes   Date of Last Fall   (reports falls with no insight re cause, discussed using FWW)   Gait Analysis   Gait Level Of Assist Supervised   Assistive Device Front Wheel Walker   Distance (Feet) 200   # of Stairs Climbed 3   Level of Assist with Stairs Supervised   Bed Mobility    Supine to Sit Supervised   Sit to Supine Supervised   Scooting Supervised   Rolling " Supervised   Functional Mobility   Sit to Stand Supervised   Bed, Chair, Wheelchair Transfer Supervised   Transfer Method Stand Pivot   Edema / Skin Assessment   Comments shingles wounds are dressed with gauze.    Education Group   Education Provided Use of Assistive Device   Use of Assistive Device Patient Response Patient;Acceptance;Explanation;Verbal Demonstration   Additional Comments pt agrees that she will use the FWW more to try to avoid falls.   Anticipated Discharge Equipment and Recommendations   DC Equipment Recommendations None   Discharge Recommendations Anticipate that the patient will have no further physical therapy needs after discharge from the hospital

## 2021-03-05 NOTE — DISCHARGE INSTRUCTIONS
Discharge Instructions    Discharged to home by car with self. Discharged via wheelchair, hospital escort: Refused.  Special equipment needed: Not Applicable    Be sure to schedule a follow-up appointment with your primary care doctor or any specialists as instructed.     Discharge Plan:   Influenza Vaccine Indication: Indicated: 9 to 64 years of age    I understand that a diet low in cholesterol, fat, and sodium is recommended for good health. Unless I have been given specific instructions below for another diet, I accept this instruction as my diet prescription.   Other diet: renal    Special Instructions: None    · Is patient discharged on Warfarin / Coumadin?   No     Depression / Suicide Risk    As you are discharged from this [facility] facility, it is important to learn how to keep safe from harming yourself.    Recognize the warning signs:  · Abrupt changes in personality, positive or negative- including increase in energy   · Giving away possessions  · Change in eating patterns- significant weight changes-  positive or negative  · Change in sleeping patterns- unable to sleep or sleeping all the time   · Unwillingness or inability to communicate  · Depression  · Unusual sadness, discouragement and loneliness  · Talk of wanting to die  · Neglect of personal appearance   · Rebelliousness- reckless behavior  · Withdrawal from people/activities they love  · Confusion- inability to concentrate     If you or a loved one observes any of these behaviors or has concerns about self-harm, here's what you can do:  · Talk about it- your feelings and reasons for harming yourself  · Remove any means that you might use to hurt yourself (examples: pills, rope, extension cords, firearm)  · Get professional help from the community (Mental Health, Substance Abuse, psychological counseling)  · Do not be alone:Call your Safe Contact- someone whom you trust who will be there for you.  · Call your local CRISIS HOTLINE 790-2915  or 560-100-7343  · Call your local Children's Mobile Crisis Response Team [place] (611) 116-2251 or www.[place].CIDCO  · Call the toll free National Suicide Prevention Hotlines   · National Suicide Prevention Lifeline 807-662-IGTS (4330)  · National [employer] Line Network 800-SUICIDE (492-4144)

## 2021-03-05 NOTE — PROGRESS NOTES
[facility] Services Progress Note     Hemodialysis treatment ordered today per Dr. JOHN PAUL Balderas x 3.5 hours.   Treatment initiated at 1114 ended at 1445.      Patient tolerated tx; see paper flow sheet for details.      Net UF 3000 mL.      Needles removed from access site. Dressings applied and sites held x 10 minutes; verified no bleeding. Positive bruit/thrill post tx. Staff RN to monitor AVF for breakthrough bleeding. Should breakthrough bleeding occur, staff RN to apply pressure to access sites until bleeding resolved. Notify Dialysis and Nephrologist for follow-up.     Report given to Primary RN.

## 2021-03-05 NOTE — DISCHARGE SUMMARY
Discharge Summary    CHIEF COMPLAINT ON ADMISSION  Chief Complaint   Patient presents with   • Other     Missed 3 dialysis appointments. Patient last went to dialysis last Wednesday. Patient skipped dialysis due to shingles pain.    • Herpes Zoster     Patient has had an outbreak since January but the pain has been increasing over the last weak. Lesions are to the abdomen and back.        Reason for Admission  (R) Flank Pain/postherpetic neuralgia  Missing dialysis    Admission Date  3/4/2021    CODE STATUS  Full Code    HPI & HOSPITAL COURSE      55-year-old female with history of end-stage kidney disease, COPD on oxygen presented 3/4 with worsening shortness of breath, the patient is noncompliant and missed 4 sessions of hemodialysis, came with shortness of breath and they found overall volume with pulmonary edema, she received dialysis on 3/4 and 3/5 with improving, back to her baseline 3 L nasal cannula, patient was followed by nephrologist.     The patient had history of shingles around a month ago and she received full treatment of acyclovir however she still having some sharp pain, there is some erosion on her skin however no significant cellulitis or infection, no new lesion however there is erosion, educated the patient to keep the area clean, increase gabapentin 300 mg twice daily, discussed the risk of some illness and is drowsy from gabapentin since she has chronic kidney disease, and continue lidocaine patch, to follow-up closely with primary care doctor, patient was given 5 pills of oxycodone 5 mg daily, risk of drowsiness was discussed with the patient also encouraged her to avoid driving when she is on this medication..     Patient has history of blood pressure and it is uncontrolled likely due to noncompliance, start with amlodipine and continue her home medication terazosin Coreg and hydralazine.    The patient has chronic respiratory failure on 3 L nasal cannula.    Patient has history of  paroxysmal atrial fibrillation, the patient was not on anticoagulation, discussed the importance of starting anticoagulation warfarin or Eliquis, patient refused to start with any blood thinner due to possible bleeding, patient understand the risk of stroke.     Plan of care was discussed with the patient, answered all her question, discussed the importance of close monitoring with nephrology clinic, dialysis 3 times a week and PCP for postherpetic neuralgia, she understood and agreed.  The patient is on high risk for readmission due to noncompliance and comorbidities.    Therefore, she is discharged in good and stable condition to home with close outpatient follow-up.    The patient met 2-midnight criteria for an inpatient stay at the time of discharge.    Discharge Date  03/05/21      FOLLOW UP ITEMS POST DISCHARGE  Follow-up with nephrology clinic for chronic kidney disease and dialysis  Follow-up with PCP for postherpetic pain and increase gabapentin if needed    DISCHARGE DIAGNOSES  Principal Problem:    ESRD (end stage renal disease) ([facility]) POA: Unknown  Active Problems:    HTN (hypertension) POA: Yes    Anemia due to chronic kidney disease POA: Yes    A-fib (HCC) POA: Yes    Thrombocytopenia (HCC) POA: Yes  Resolved Problems:    Hyperkalemia POA: Yes    Volume overload POA: Yes    Shingles POA: Yes      FOLLOW UP  Khushi Balderas M.D.  [address] [place] [address] [address] [place] [id number] [phone]    In 1 week        MEDICATIONS ON DISCHARGE     Medication List      START taking these medications      Instructions   amLODIPine 5 MG Tabs  Start taking on: March 6, 2021  Commonly known as: NORVASC   Take 1 tablet by mouth every day.  Dose: 5 mg     oxyCODONE immediate-release 5 MG Tabs  Commonly known as: ROXICODONE   Take 1 tablet by mouth every 8 hours as needed for Severe Pain for up to 2 days.  Dose: 5 mg        CHANGE how you take these medications      Instructions   acetaminophen 325 MG Tabs  What changed:   · how much  to take  · Another medication with the same name was removed. Continue taking this medication, and follow the directions you see here.  Commonly known as: Tylenol   Take 1 tablet by mouth every 6 hours as needed (Mild Pain; (Pain scale 1-3); Temp greater than 100.5 F).  Dose: 325 mg     budesonide-formoterol 80-4.5 MCG/ACT Aero  What changed: when to take this  Commonly known as: Symbicort   Inhale 2 Puffs 2 Times a Day.  Dose: 2 Puff     gabapentin 100 MG Caps  What changed: how much to take  Commonly known as: NEURONTIN   Doctor's comments: Take for pain from shingles  Take 3 Capsules by mouth 2 (two) times a day.  Dose: 300 mg        CONTINUE taking these medications      Instructions   albuterol 108 (90 Base) MCG/ACT Aers inhalation aerosol   Inhale 2 Puffs every four hours as needed for Shortness of Breath.  Dose: 2 Puff     aspirin 81 MG Chew chewable tablet  Commonly known as: ASA   Chew 1 Tab every day.  Dose: 81 mg     atorvastatin 40 MG Tabs  Commonly known as: LIPITOR   Take 1 Tab by mouth every day.  Dose: 40 mg     calcium carbonate 500 MG Chew  Commonly known as: TUMS   Chew 1 Tab 3 times a day, with meals.  Dose: 500 mg     carvedilol 25 MG Tabs  Commonly known as: COREG   Take 1 Tab by mouth 2 times a day, with meals.  Dose: 25 mg     cloNIDine 0.1 MG Tabs  Commonly known as: CATAPRES   Take 1 Tab by mouth 3 times a day as needed (SBP > 160).  Dose: 0.1 mg     epoetin 3000 UNIT/ML Soln  Commonly known as: Retacrit   Inject 2 mL under the skin every Monday, Wednesday, and Friday.  Dose: 6,000 Units     famciclovir 250 MG Tabs  Commonly known as: FAMVIR   Take 1 Tab by mouth every Monday, Wednesday, and Friday.  Dose: 250 mg     hydrALAZINE 50 MG Tabs  Commonly known as: APRESOLINE   Take 50 mg by mouth 2 Times a Day.  Dose: 50 mg     lidocaine 5 % Ptch  Commonly known as: LIDODERM   Place 2 Patches on the skin every 24 hours.  Dose: 2 Patch     MAGNESIUM PO   Take 1 capsule by mouth every  bedtime.  Dose: 1 capsule     MELATONIN PO   Take 1 tablet by mouth every bedtime.  Dose: 1 tablet     nicotine 21 MG/24HR Pt24  Commonly known as: NICODERM   Place 1 Patch on the skin every 24 hours.  Dose: 1 Patch     omeprazole 20 MG delayed-release capsule  Commonly known as: PRILOSEC   Take 1 Cap by mouth every day.  Dose: 20 mg     ondansetron 4 MG Tbdp  Commonly known as: ZOFRAN ODT   Take 1 Tab by mouth every four hours as needed for Nausea (give PO if no IV route available).  Dose: 4 mg     terazosin 5 MG Caps  Commonly known as: HYTRIN   Take 1 Cap by mouth every day.  Dose: 5 mg     thiamine 100 MG tablet  Commonly known as: THIAMINE   Take 1 Tab by mouth 3 times a day.  Dose: 100 mg            Allergies  Allergies   Allergen Reactions   • Sulfa Drugs Hives, Rash, Itching and Swelling     [facility]=2015       DIET  Orders Placed This Encounter   Procedures   • Diet Order Diet: Renal     Standing Status:   Standing     Number of Occurrences:   1     Order Specific Question:   Diet:     Answer:   Renal [8]       ACTIVITY  As tolerated.  Weight bearing as tolerated    CONSULTATIONS  nephro     PROCEDURES  Dialysis     LABORATORY  Lab Results   Component Value Date    SODIUM 137 03/05/2021    POTASSIUM 5.3 03/05/2021    CHLORIDE 97 03/05/2021    CO2 26 03/05/2021    GLUCOSE 106 (H) 03/05/2021    BUN 47 (H) 03/05/2021    CREATININE 7.98 (HH) 03/05/2021    CREATININE 2.2 (H) 05/06/2009        Lab Results   Component Value Date    WBC 5.8 03/05/2021    HEMOGLOBIN 8.2 (L) 03/05/2021    HEMATOCRIT 27.2 (L) 03/05/2021    PLATELETCT 66 (L) 03/05/2021        Total time of the discharge process exceeds 34 minutes.

## 2021-03-05 NOTE — CARE PLAN
Problem: Fluid Volume:  Goal: Will maintain balanced intake and output  Outcome: PROGRESSING AS EXPECTED     Problem: Communication  Goal: The ability to communicate needs accurately and effectively will improve  Outcome: PROGRESSING AS EXPECTED     Problem: Safety  Goal: Will remain free from injury  Outcome: PROGRESSING AS EXPECTED  Goal: Will remain free from falls  Outcome: PROGRESSING AS EXPECTED     Problem: Bowel/Gastric:  Goal: Normal bowel function is maintained or improved  Outcome: PROGRESSING AS EXPECTED  Goal: Will not experience complications related to bowel motility  Outcome: PROGRESSING AS EXPECTED     Problem: Knowledge Deficit  Goal: Knowledge of disease process/condition, treatment plan, diagnostic tests, and medications will improve  Outcome: PROGRESSING AS EXPECTED  Goal: Knowledge of the prescribed therapeutic regimen will improve  Outcome: PROGRESSING AS EXPECTED

## 2021-03-05 NOTE — THERAPY
"Occupational Therapy   Initial Evaluation     Patient Name: Isabelle Coyle  Age:  55 y.o., Sex:  female  Medical Record #: [id number]  Today's Date: 3/5/2021     Precautions  Comments: no issues with balance or strength observed today    Assessment  Patient is 55 y.o. female admitted for SOB, hemodialysis non-compliance, pulmonary edema. Pt lives in a [facility] usually alone but has had SO assisting as needed. Pt normally independent with all ADLs and functional mobility with a FWW or wheelchair at baseline. Pt able to complete all ADLs and mobility with supervision, no physical assistance needed throughout session, pt at her functional baseline. No further needs identified, will complete order at this time. Patient will not be actively followed for occupational therapy services at this time, however may be seen if requested by physician for 1 more visit within 30 days to address any discharge or equipment needs.     Plan    Recommend Occupational Therapy for Evaluation only.    DC Equipment Recommendations: (P) None  Discharge Recommendations: (P) Anticipate that the patient will have no further occupational therapy needs after discharge from the hospital     Subjective    \"The shingles really hurt\"     Objective       03/05/21 0905   Prior Living Situation   Prior Services None   Housing / Facility 1 [facility]   Steps Into Home 6   Steps In Home 0   Rail Both Rail (Steps into Home)   Elevator No   Bathroom Set up Walk In Shower;Shower Chair;Grab Bars   Equipment Owned Front-Wheel Walker;Wheelchair   Lives with - Patient's Self Care Capacity Significant Other   Comments Drives self to dialysis   Prior Level of ADL Function   Self Feeding Independent   Grooming / Hygiene Independent   Bathing Independent   Dressing Independent   Toileting Independent   Prior Level of IADL Function   Medication Management Independent   Laundry Independent   Kitchen Mobility Independent   Finances Independent   Home Management " Independent   Shopping Independent   Prior Level Of Mobility Independent With Device in Community   Driving / Transportation Driving Independent   Occupation (Pre-Hospital Vocational) Not Employed   History of Falls   History of Falls Yes   Date of Last Fall   (multiple falls)   Pain 0 - 10 Group   Therapist Pain Assessment 0;Post Activity Pain Same as Prior to Activity;Nurse Notified   Non Verbal Descriptors   Non Verbal Scale  Calm   Cognition    Cognition / Consciousness WDL   Level of Consciousness Alert   Active ROM Upper Body   Active ROM Upper Body  WDL   Dominant Hand Right   Strength Upper Body   Upper Body Strength  WDL   Sensation Upper Body   Upper Extremity Sensation  WDL   Upper Body Muscle Tone   Upper Body Muscle Tone  WDL   Neurological Concerns   Neurological Concerns No   Coordination Upper Body   Coordination WDL   Balance Assessment   Sitting Balance (Static) Good   Sitting Balance (Dynamic) Good   Standing Balance (Static) Fair   Standing Balance (Dynamic) Fair   Weight Shift Sitting Good   Weight Shift Standing Good   Comments w/ FWW   Bed Mobility    Supine to Sit Supervised   Sit to Supine Supervised   Scooting Supervised   Rolling Supervised   ADL Assessment   Grooming Supervision   Upper Body Dressing Supervision   Lower Body Dressing Supervision   Toileting Supervision   How much help from another person does the patient currently need...   Putting on and taking off regular lower body clothing? 4   Bathing (including washing, rinsing, and drying)? 4   Toileting, which includes using a toilet, bedpan, or urinal? 4   Putting on and taking off regular upper body clothing? 4   Taking care of personal grooming such as brushing teeth? 4   Eating meals? 4   6 Clicks Daily Activity Score 24   Functional Mobility   Sit to Stand Supervised   Bed, Chair, Wheelchair Transfer Supervised   Toilet Transfers Supervised   Transfer Method Stand Step   Mobility bed mobility, hallway mobility, bathroom  mobility, BTB   Comments w/ FWW   Visual Perception   Visual Perception  WDL   Activity Tolerance   Sitting Edge of Bed 5   Standing 10   Education Group   Education Provided Role of Occupational Therapist   Role of Occupational Therapist Patient Response Patient;Acceptance;Explanation   Problem List   Problem List None   Interdisciplinary Plan of Care Collaboration   IDT Collaboration with  Nursing   Patient Position at End of Therapy In Bed;Bed Alarm On;Call Light within Reach;Tray Table within Reach;Phone within Reach   Collaboration Comments RN updated

## 2021-03-05 NOTE — PROGRESS NOTES
"[facility] Nephrology Daily Progress Note    Date of Service  3/5/2021     Author: Fariha JEAN-BAPTISTE, CNN-NP  Collaborating Physician: Dr. Lola Balderas     Chief Complaint  55 y.o. female who presented 3/4/2021 with complaints of shortness of breath and abdominal distention after missing hemodialysis.Patient reports that she has been having issues with shingles since mid January, she was originally placed on acyclovir but was having \"hallucinations\" and was not taking her medications.  Patient was switched to famciclovir during her last visit on 1/19/2021. She missed HD due to severe pain at that affected area during HD. In ED she was found to be hypoxic, hyperkalemia, CXR with pulm infiltrates.   Nephrology was consulted.     Daily Nephrology Summary:   3/4: consult done, HD performed  3/5: laying in bed, sleepy, cranky, tolerated HD yesterday with UF: 4.0L, continues with HTN     Review of Systems  Review of Systems   Constitutional: Positive for malaise/fatigue.   HENT: Negative.    Eyes: Negative.    Respiratory: Positive for shortness of breath and wheezing.    Cardiovascular: Negative.    Gastrointestinal: Positive for abdominal pain.   Genitourinary: Negative.    Musculoskeletal: Negative.    Skin: Positive for itching.   Neurological: Negative.    Endo/Heme/Allergies: Negative.    Psychiatric/Behavioral: Negative.         Physical Exam  Temp:  [36.1 °C (97 °F)-37 °C (98.6 °F)] 36.4 °C (97.5 °F)  Pulse:  [66-72] 69  Resp:  [18-22] 18  BP: (166-205)/(74-95) 173/74  SpO2:  [94 %-98 %] 98 %    Physical Exam  Constitutional:       Appearance: She is normal weight. She is ill-appearing.   HENT:      Head: Normocephalic and atraumatic.      Nose: Nose normal.      Mouth/Throat:      Mouth: Mucous membranes are moist.      Pharynx: Oropharynx is clear.   Eyes:      Extraocular Movements: Extraocular movements intact.      Conjunctiva/sclera: Conjunctivae normal.      Pupils: Pupils are equal, round, and reactive to " light.   Cardiovascular:      Rate and Rhythm: Normal rate and regular rhythm.      Heart sounds: No murmur. No gallop.    Pulmonary:      Breath sounds: Wheezing and rhonchi present.   Abdominal:      General: Abdomen is flat. There is distension.      Palpations: Abdomen is soft.   Musculoskeletal:         General: Normal range of motion.      Cervical back: Normal range of motion and neck supple.      Comments: LUE AVF + B&T present    Skin:     General: Skin is warm and dry.   Neurological:      General: No focal deficit present.      Mental Status: She is alert and oriented to person, place, and time. Mental status is at baseline.   Psychiatric:         Mood and Affect: Mood normal.         Behavior: Behavior normal.         Thought Content: Thought content normal.         Judgment: Judgment normal.         Fluids    Intake/Output Summary (Last 24 hours) at 3/5/2021 1019  Last data filed at 3/4/2021 1137  Gross per 24 hour   Intake 500 ml   Output 4500 ml   Net -4000 ml       Laboratory  Recent Labs     03/04/21  0101 03/05/21  0330   WBC 6.5 5.8   RBC 2.93* 2.71*   HEMOGLOBIN 8.9* 8.2*   HEMATOCRIT 29.2* 27.2*   MCV 99.7* 100.4*   MCH 30.4 30.3   MCHC 30.5* 30.1*   RDW 59.3* 59.4*   PLATELETCT 68* 66*   MPV 11.0 11.1     Recent Labs     03/04/21  0101 03/05/21  0330   SODIUM 140 137   POTASSIUM 6.1* 5.3   CHLORIDE 96 97   CO2 25 26   GLUCOSE 151* 106*   BUN 88* 47*   CREATININE 13.20* 7.98*   CALCIUM 10.1 9.5         Recent Labs     03/04/21  0101   NTPROBNP >01207*           Imaging      Assessment/Plan  ASSESSMENT:  # ESRD: Hx non-compliance with outpatient MWF therapy  # Hyperkalemia, mild   Manage with HD   # Heart Failure exacerbation    2/2 non-compliance with dialysis and volume  # Hypoxic resp failure due to volume  # Anemia secondary to end-stage renal disease    Goal Hgb 10-11; Not at goal  # Tobacco abuse disorder.  # Hypertension, uncontrolled    Volume driven   UF with HD as tolerated   # Right  below-knee amputation.  # Herpes Zoster     PLAN:    -HD today and then MWF iHD  -UF as tolerated  -Dose all meds for ESRD  -Renal Diet  - restart her home BP medications including clonidine, terazosin, coreg.   -Continue home phos binders  -Epogen with dialysis  -Encouraged outpatient compliance   - Okay to transition to outpt hemodialysis after treatment today

## 2021-03-07 NOTE — DISCHARGE PLANNING
note:  Pt in ER jairo presented tech with the wrong prescription for Roxicodone. Pt [facility]through[facility] tech [facility]    Dr. Vega aware.

## 2021-03-08 ENCOUNTER — PATIENT OUTREACH (OUTPATIENT)
Dept: HEALTH INFORMATION MANAGEMENT | Facility: OTHER | Age: 56
End: 2021-03-08

## 2021-03-15 DIAGNOSIS — Z23 NEED FOR VACCINATION: ICD-10-CM

## 2021-06-03 ENCOUNTER — APPOINTMENT (RX ONLY)
Dept: URBAN - METROPOLITAN AREA CLINIC 125 | Facility: CLINIC | Age: 56
Setting detail: DERMATOLOGY
End: 2021-06-03

## 2021-06-03 DIAGNOSIS — L30.9 DERMATITIS, UNSPECIFIED: ICD-10-CM

## 2021-06-03 PROCEDURE — 99213 OFFICE O/P EST LOW 20 MIN: CPT

## 2021-06-03 PROCEDURE — ? TREATMENT REGIMEN

## 2021-06-03 PROCEDURE — ? PRESCRIPTION

## 2021-06-03 PROCEDURE — ? COUNSELING

## 2021-06-03 RX ORDER — CLOBETASOL PROPIONATE 0.5 MG/G
CREAM TOPICAL
Qty: 1 | Refills: 1 | Status: ERX | COMMUNITY
Start: 2021-06-03

## 2021-06-03 RX ADMIN — CLOBETASOL PROPIONATE: 0.5 CREAM TOPICAL at 00:00

## 2021-06-03 ASSESSMENT — LOCATION SIMPLE DESCRIPTION DERM
LOCATION SIMPLE: LEFT THIGH
LOCATION SIMPLE: RIGHT THIGH

## 2021-06-03 ASSESSMENT — LOCATION DETAILED DESCRIPTION DERM
LOCATION DETAILED: RIGHT ANTERIOR DISTAL THIGH
LOCATION DETAILED: LEFT ANTERIOR PROXIMAL THIGH

## 2021-06-03 ASSESSMENT — LOCATION ZONE DERM: LOCATION ZONE: LEG

## 2021-06-03 NOTE — HPI: RASH
What Type Of Note Output Would You Prefer (Optional)?: Standard Output
How Severe Is Your Rash?: moderate
Is This A New Presentation, Or A Follow-Up?: Rash
Additional History: Patient stated that she has had rash previously and it has came back. Wonders if it may be related to her autoimmune disease

## 2021-06-03 NOTE — PROCEDURE: TREATMENT REGIMEN
Initiate Treatment: clobetasol 0.05 % topical cream - Apply a thin film to the affected areas BID for up to two weeks on then one week off. Repeat as needed for flares. Avoid underarms, face and groin.
Otc Regimen: Claritin or Allegra- to be taken PO QAM\\nBenadryl- to be taken two hours before bed. \\nPepcid AC- Take one tab PO BID per bottle directions.
Detail Level: Zone
Plan: -Recommended cool compresses, and not itching as much as possible.\\n- I prefer to absolutely not do prednisone since she is on remicade but if it is absolutely necessary I advised the patient to reach out to her Rheumatologist Dr. Ortega and get clearance from him before I send a prednisone taper into her pharmacy. She will call the office once she hears from him. \\n-Follow up in office with [facility] in 1-2 weeks for further evaluation and treatment.\\n- Consider a punch biopsy at follow up if no improvement/resolution

## 2021-11-19 NOTE — PROGRESS NOTES
Received report from day shift RN.  Assumed care at 1900. Pt denies any discomfort at this time.  Call light within reach. Bed locked and in lowest position.  Non skid socks on, Belongings within  Reach.  Will continue to monitor hourly.     stable

## 2022-01-19 NOTE — ED NOTES
Report to Josephine SANTOS.   Path Notes (To The Dermatopathologist): Please check margins. Tagged at 12 [employer]\\nAccession: # [id number] Path Notes (To The Dermatopathologist): Please check margins. Tagged at 12 O'Clock [employer]\\nAccession: # [id number]

## 2022-06-17 NOTE — PROGRESS NOTES
Med rec complete per patient.  Allergies reviewed.  No antibiotics in last 30 days.   17-Jun-2022 17:51

## 2023-01-13 NOTE — PROGRESS NOTES
Established Patient    Patient Care Team:  Kamala Pierre M.D. as PCP - General (Internal Medicine)  Duc Castro M.D. (Neurology)  Julian Dixon M.D. (Neurology)  Liam Galicia M.D. (Cardiovascular Disease (Cardiology))  [facility] as Home Health Provider  ALY Flores (Inactive) as     Rigoberto Adames is a 54 y.o. female who presents today with the following Chief Complaint(s): Follow up for Diagnoses of Type 2 diabetes mellitus with diabetic polyneuropathy, without long-term current use of insulin (HCC), Weakness of both lower extremities, Hypertension, unspecified type, Stage 4 chronic kidney disease (HCC), and Microalbuminuria were pertinent to this visit.    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    Patient's hemoglobin A1c 6.8.   HDL 53 LDL 60. Urine creatinine 115.37 Urine microalbumin >440 microalbumin to creatinine ratio unable to calculate. CMP showed BUN 33 creatinine 2.56 eGFR 21. Patient currently on metformin  mg and takes atorvastatin 20 mg. Feels she hasn't made any changes to her diet. Does not exercise. Denies any polyuria, polydipsia and polyphagia. Does report having numbness in both feet that feel like pins and needles and sometimes burns.     BP in clinic 176/81. Repeat 171/74. Reports she did not take her medications this morning. Currently on Cardizem 360 mg daily and metoprolol 100 mg BID. Patient denies any blurred vision, dizziness, headaches or chest pain.    ROS:     General: No fevers, chills, night sweats, weight loss or gain  HEENT: No hearing changes, vision changes, eye pain, ear pain, nasal discharge, sore throat  Neck: No swelling in neck  Pulmonary: No shortness of breath, cough, sputum, or  Received report from Maryann SANTOS, POC discussed. Pt sitting up in bed, alert with family at bedside. Pt is denying pain, pt and family have no questions or concerns at this time. Safety precautions in place.    hemoptysis  Cardiovascular: No chest pain, palpitations, or LE swelling  GI: No nausea, vomiting, diarrhea, constipation, abdominal pain, hematochezia or melena  : No dysuria or frequency  Neuro: Lower extremity weakness. No general weakness, no headaches, no lightheadedness, no dizziness  Psych: No anxiety or depression    Past Medical History:   Diagnosis Date    Acute kidney injury (HCC)     Acute necrotizing pyelonephritis     Alcohol withdrawal (HCC)     Arthritis     Bilateral legs; no formal diagnosis.    Cataract     Bilateral IOL    Cirrhosis of liver with ascites (HCC) 3/6/2017    Dental disorder     Upper and lower dentures    Diabetes mellitus, type 2 (HCC)     Oral medications    Diabetic ulcer of toe (HCC) 2020    diarrhea 2019    been going on for about a year    Foot pain, bilateral     Heart murmur     High cholesterol     HOCM (hypertrophic obstructive cardiomyopathy) (HCC)     Hypertension     STEMI (ST elevation myocardial infarction) (HCC)     Thrombocytopenia (HCC)      Social History     Tobacco Use    Smoking status: Former     Packs/day: 1.00     Years: 10.00     Pack years: 10.00     Types: Cigarettes     Start date: 2015     Quit date: 2020     Years since quittin.6    Smokeless tobacco: Never   Vaping Use    Vaping Use: Never used   Substance Use Topics    Alcohol use: Not Currently     Alcohol/week: 0.6 oz     Types: 1 Glasses of wine per week    Drug use: No     Current Outpatient Medications   Medication Sig Dispense Refill    gabapentin (NEURONTIN) 100 MG Cap Take 1 Capsule by mouth 3 times a day. 90 Capsule 1    diltiazem CD (CARDIZEM CD) 360 MG ER capsule Take 1 Capsule by mouth every day. 90 Capsule 4    metFORMIN ER (GLUCOPHAGE XR) 500 MG TABLET SR 24 HR Take 1 tablet by mouth once daily 90 Tablet 11    Ascorbic Acid (VITAMIN C PO) Take 1 Tablet by mouth every day.      hydrocortisone 1 % Cream Apply 1 Application topically 2 times a day. 1 Each 3    cetirizine  "(ZYRTEC) 10 MG Tab Take 1 Tablet by mouth every day. 30 Tablet 6    metoprolol tartrate (LOPRESSOR) 100 MG Tab Take 1 Tablet by mouth 2 times a day. 180 Tablet 4    Ursodiol 500 MG Tab Take  by mouth every day.      atorvastatin (LIPITOR) 20 MG Tab TAKE 1 TABLET BY MOUTH AT BEDTIME 90 Tab 0    OXYBUTYNIN CHLORIDE ER PO Take 25 mg by mouth every day. (Patient not taking: Reported on 1/13/2023)       No current facility-administered medications for this visit.       Physical Exam:  BP (!) 171/74 (BP Location: Left arm, Patient Position: Sitting, BP Cuff Size: Adult)   Pulse (!) 58   Temp 36.4 °C (97.6 °F) (Temporal)   Ht 1.702 m (5' 7\")   Wt 60.1 kg (132 lb 6.4 oz)   SpO2 99%   BMI 20.74 kg/m²   General: Well developed, well nourished female, in no distress.  HEENT: NC/AT, PERRL, EOMI, no scleral icterus or conjunctival pallor  CV:RRR, no murmurs gallops or Rubs, no JVD  Pulm: LCAB, no crackles, rales, rhonchi, or wheezing  GI: Normal bowel sounds, abdomen soft, nontender, nondistended to deep or light palpation in all 4 quadrants, no HSM.  MSK: Radial and dorsalis pedis pulses 2+ and equal bilaterally, respectively.  Strength 5 out of 5 in upper and lower extremities.  No lower extremity edema  Skin: Jaundice  Neuro: Patient is alert and oriented x3, no focal deficits  Psych: Appropriate mood and affect       Assessment and Plan:   1. Type 2 diabetes mellitus with diabetic polyneuropathy, without long-term current use of insulin ([facility])  A1c 6.8    HDL 53 LDL 60  On metformin  mg.   On atorvastatin 20 mg  No polyuria, polydipsia or polyphagia.  Has numbness of both feet.   -Discussed diet and exercise with patient in controlling blood sugar.   -Continue medications  -Will recheck A1c in 3 months  -Follow-up in 3 months     2. Weakness of both lower extremities  Likely neuropathy secondary to Type 2 DM  -Will trial gabapentin 100 mg TID for possible diabetic neuropathy  -Will check labs.   - " VITAMIN B12; Future  - TSH+FREE T4  - FOLATE  - VITAMIN B1  - VITAMIN B6; Future  -Follow-up in 6 weeks.    3. Hypertension, unspecified type  Bp 176/81, repeat 171/74  Patient reports not taking her BP medications  On cardizem 360 mg daily and metoprolol 100 mg BID  -Discussed with patient importance of medication adherence.  -Instructed patient to buy blood pressure cuff and monitor BP 2 times a day at home and bring log with her next appointment. Handout provided.   -Discussed lifestyle modifications which include low sodium diet and exercise. Handout provided.   -Follow-up in 6 weeks    4. Stage 4 chronic kidney disease (HCC)  BUN 33 Cr 2.56 eGFR 21  Has hypertension and Type 2 DM  -Discussed with patient adherence to blood pressure medications  -Renally dose medications  -Referral to nephrology    5. Microalbuminuria  Urine creatinine 115.37   Urine microalbumin >440   Microalbumin to creatinine ratio unable to calculate  -Will recheck labs  - Microalbumin Creat Ratio Urine - Lab Collect; Future  -See plan above       Return in about 6 weeks (around 2/24/2023).    Patient Instructions   -Schedule appointment for health maintenance  -Schedule pap smear in 6 weeks  -Get hemoglobin a1c (blood sugar test March 9, 2023)  -Buy blood pressure cuff and record blood pressures 2 times a day and bring blood pressure log next appointment.  -Continue medications as directed  -It was nice visiting with you today!      Kamala Pierre M.D. PGY-2  [employer]    This note was created using voice recognition software.  While every attempt is made to ensure accuracy of transcription, occasionally errors occur.

## 2023-05-04 NOTE — PROGRESS NOTES
[facility] Progress Note    Author: Mile Spears APRN  Supervising Physician: Bill Martinez MD     Date of Service  4/14/2019    History of Present Illness  53 y.o. female admitted 4/12/2019 with end-stage renal disease, on chronic hemodialysis   Monday, Wednesday, and Fridays at [facility].  The patient's last hemodialysis   was on 04/09, was a complete treatment and she missed dialysis on 04/10.  She presented   to the emergency room this morning on 04/12 complaining of severe dyspnea, cough with   productive of sputum.  Evaluation in the emergency room revealed hyperkalemia   with a potassium initially of 6.8.  We have been asked to assist in her   management.    Daily Nephrology Summary  4/12--Consult. HD.  4/13--HD again today. Feels better.  4/14--Feels OK.     Review of Systems  Review of Systems   Constitutional: Negative for chills, fever and malaise/fatigue.   HENT: Negative.    Eyes: Negative.    Respiratory: Positive for cough. Negative for sputum production and shortness of breath.    Cardiovascular: Negative.    Gastrointestinal: Negative.    Genitourinary: Negative.    Musculoskeletal: Positive for myalgias. Negative for back pain and neck pain.   Skin: Negative.    Neurological: Negative.    Endo/Heme/Allergies: Negative.         Physical Exam  Temp:  [36.6 °C (97.9 °F)-36.9 °C (98.5 °F)] 36.7 °C (98 °F)  Pulse:  [74-83] 74  Resp:  [16-18] 18  BP: (137-165)/(75-94) 159/94  SpO2:  [91 %-100 %] 98 %    Physical Exam   Constitutional: She is oriented to person, place, and time. She appears well-developed and well-nourished.   HENT:   Head: Normocephalic and atraumatic.   Eyes: Pupils are equal, round, and reactive to light. Conjunctivae and EOM are normal.   Neck: Normal range of motion.   Cardiovascular: Normal rate, regular rhythm and normal heart sounds.    Pulmonary/Chest: Effort normal. No respiratory distress. She has wheezes. She has rales.   Abdominal: Soft.  St. Elizabeths Dominik Bower, DO  Hospitalist Service  [facility]  Securely message with [email or web address] (more info)  Text page via [employer] Paging/Directory     ______________________________________________________________________    Chief Complaint   Back pain    History is obtained from the patient, electronic health record and emergency department physician    History of Present Illness   Patient is a 89-year-old female with history of atrial fibrillation on warfarin who presents  Bowel sounds are normal.   Musculoskeletal: Normal range of motion. She exhibits no edema.   S/P R BKA   Neurological: She is alert and oriented to person, place, and time.   Skin: Skin is warm and dry.       Fluids    Intake/Output Summary (Last 24 hours) at 04/14/19 0659  Last data filed at 04/13/19 1600   Gross per 24 hour   Intake                0 ml   Output             2500 ml   Net            -2500 ml       Laboratory  Recent Labs      04/12/19   0330  04/13/19   0213  04/14/19   0256   WBC  4.6*  5.1  7.0   RBC  2.94*  2.89*  3.04*   HEMOGLOBIN  9.6*  9.2*  9.5*   HEMATOCRIT  29.6*  29.2*  31.5*   MCV  100.7*  101.0*  103.6*   MCH  32.7  31.8  31.3   MCHC  32.4*  31.5*  30.2*   RDW  57.7*  56.4*  57.9*   PLATELETCT  112*  117*  121*   MPV  9.9  10.5  10.2     Recent Labs      04/12/19   1401  04/13/19   0213  04/14/19   0256   SODIUM  151*  138  140   POTASSIUM  4.6  5.2  4.6   CHLORIDE  105  97  100   CO2  29  28  28   GLUCOSE  138*  110*  93   BUN  30*  49*  33*   CREATININE  5.06*  6.97*  4.91*   CALCIUM  8.8  8.3*  8.3*         Recent Labs      04/12/19   0330   BNPBTYPENAT  4936*           Imaging      Assessment/Plan  IMPRESSION:    1.  End-stage renal disease, on chronic hemodialysis Monday, Wednesday, and   Friday.  Patient missed dialysis Wed.  2.  Hyperkalemia, secondary to noncompliance.  3.  Acute respiratory failure.  Chest x-ray revealed no infiltrates.  She   possible has bronchitis.  She may have had chronic obstructive pulmonary   disease exacerbation, although she does not carry a diagnosis of chronic   obstructive pulmonary disease.  She has a longstanding history of tobacco use.    There is no evidence of congestive heart failure on chest x-ray, no obvious   fluid overload, but she may have some subclinical fluid overload.    4.  Anemia of chronic kidney disease, target.  5.  Hypertension, not controlled acutely but improving.  6.  Mild thrombocytopenia.  7.  History of supraventricular  tachycardia, status post ablation.  8.  Chronic kidney disease -- metabolic bone disorder.  She is not on   phosphorus binders apparently.  9.  Dyslipidemia, not on statin.  10.  SULFA ALLERGY.  11.  Left upper extremity arteriovenous fistula.  12.  Status post right below-the-knee amputation.  13.  Ongoing tobacco use.  14.  Past history of alcohol and drug use.  15.  History of several surgeries.     PLAN:  1. HD q Mon,Wed,Fri and PRN. No dialysis today.  2. Epogen q MWF with dialysis.  3. IV iron load   4. Continue her home medications.   5. Dose adjust all medications for GFR less than 10.  6. No dietary protein restrictions.  7. Renal diet-low potassium, low phosphorus, low sodium, high protein   8. Encourage her to take phosphorus binders with meals.                  

## 2024-12-06 NOTE — ED NOTES
Med rec complete per pt at bedside   Allergies reviewed  No oral ABX within last 30 days    Include Validation In Note: Yes What Is The Patient's Gender: Female Hypercholesterolemia Monitoring: I explained this is common when taking isotretinoin. We will monitor closely. Xerosis Aggressive Treatment: I recommended application of Cetaphil or CeraVe numerous times a day and before going to bed to all dry areas. I also prescribed a topical steroid for twice daily use. Any Myalgia?: No Ipledge Number (Optional): [id number] Dosing Month 5 (Required For Cumulative Dosing): 30mg Daily Headache Monitoring: I recommended monitoring the headaches for now. There is no evidence of increased intracranial pressure. They were instructed to call if the headaches are worsening. Patient Weight (Optional But Required For Cumulative Dose-Numbers And Decimals Only): 120 Weight Units: pounds Retinoid Dermatitis Normal Treatment: I recommended more frequent application of Cetaphil or CeraVe to the areas of dermatitis. Counseling Text: I reviewed the side effect in detail. Patient should get monthly blood tests, not donate blood, not drive at night if vision affected, and not share medication. Cheilitis Normal Treatment: I recommended application of Vaseline or Aquaphor numerous times a day (as often as every hour) and before going to bed. Myalgia Monitoring: I explained this is common when taking isotretinoin. If this worsens they will contact us. Dosing Month 6 (Required For Cumulative Dosing): 30mg BID Cheilitis Aggressive Treatment: I recommended application of Vaseline or Aquaphor numerous times a day (as often as every hour) and before going to bed. I also prescribed a topical steroid for twice daily use. Months Of Therapy Completed: 15 Retinoid Dermatitis Aggressive Treatment: I recommended more frequent application of Cetaphil or CeraVe to the areas of dermatitis. I also prescribed a topical steroid for twice daily use until the dermatitis resolves. Female Pregnancy Counseling Text: Female patients should also be on two forms of birth control while taking this medication and for one month after their last dose. Myalgia Treatment: I explained this is common when taking isotretinoin. If this worsens they will contact us. They may try OTC ibuprofen. Xerosis Normal Treatment: I recommended application of Cetaphil or CeraVe numerous times a day going to bed to all dry areas. Nosebleeds Normal Treatment: I explained this is common when taking isotretinoin. I recommended saline mist in each nostril multiple times a day. If this worsens they will contact us. Use Therapeutic Ranged Or Therapeutic Target: please select Range or Target Pounds Preamble Statement (Weight Entered In Details Tab): Reported Weight in pounds: Next Month's Dosage: Discontinue Kilograms Preamble Statement (Weight Entered In Details Tab): Reported Weight in kilograms: Xerosis Aggressive Treatment: I recommended application of Cetaphil or CeraVe numerous times a day going to bed to all dry areas. I also prescribed a topical steroid for twice daily use. Female Completion Statement: After discussing her treatment course we decided to discontinue isotretinoin therapy at this time. I explained that she would need to continue her birth control methods for at least one month after the last dosage. She should also get a pregnancy test one month after the last dose. She shouldn't donate blood for one month after the last dose. She should call with any new symptoms of depression. Male Completion Statement: After discussing his treatment course we decided to discontinue isotretinoin therapy at this time. He shouldn't donate blood for one month after the last dose. He should call with any new symptoms of depression. Upper Range (In Mg/Kg): 150 Target Cumulative Dosage (In Mg/Kg): 135 Detail Level: Zone Dosing Month 4 (Required For Cumulative Dosing): 20mg Daily Xerosis Normal Treatment: I recommended application of Cetaphil or CeraVe numerous times a day and before going to bed to all dry areas. Are Labs Available For Review?: No- Labs Deferred This Month
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
## (undated) DEVICE — GLOVE BIOGEL SZ 7 SURGICAL PF LTX - (50PR/BX 4BX/CA)

## (undated) DEVICE — DRAPE LARGE 3 QUARTER - (20/CA)

## (undated) DEVICE — NEEDLE INSFL 120MM 14GA VRRS - (20/BX)

## (undated) DEVICE — SODIUM CHL IRRIGATION 0.9% 1000ML (12EA/CA)

## (undated) DEVICE — GLOVE BIOGEL SZ 8 SURGICAL PF LTX - (50PR/BX 4BX/CA)

## (undated) DEVICE — SPONGE GAUZESTER 4 X 4 4PLY - (128PK/CA)

## (undated) DEVICE — LACTATED RINGERS INJ 1000 ML - (14EA/CA 60CA/PF)

## (undated) DEVICE — SUTURE CV

## (undated) DEVICE — SOD. CHL. INJ. 0.9% 250 ML - (36/CA 50CA/PF)

## (undated) DEVICE — SET EXTENSION WITH 2 PORTS (48EA/CA) ***PART #2C8610 IS A SUBSTITUTE*****

## (undated) DEVICE — GLOVE BIOGEL INDICATOR SZ 6.5 SURGICAL PF LTX - (50PR/BX 4BX/CA)

## (undated) DEVICE — CANISTER SUCTION 3000ML MECHANICAL FILTER AUTO SHUTOFF MEDI-VAC NONSTERILE LF DISP  (40EA/CA)

## (undated) DEVICE — GOWN SURGEONS LARGE - (32/CA)

## (undated) DEVICE — SPONGE XRAY 8X4 STERL. 12PL - (10EA/TY 80TY/CA)

## (undated) DEVICE — ELECTRODE DUAL RETURN W/ CORD - (50/PK)

## (undated) DEVICE — GOWN WARMING STANDARD FLEX - (30/CA)

## (undated) DEVICE — STERI STRIP COMPOUND BENZOIN - TINCTURE 0.6ML WITH APPLICATOR (40EA/BX)

## (undated) DEVICE — PACK LAP CHOLE OR - (2EA/CA)

## (undated) DEVICE — SUTURE 0 VICRYL PLUS UR-6 - 27 INCH (36/BX)

## (undated) DEVICE — CLOSURE SKIN STRIP 1/2 X 4 IN - (STERI STRIP) (50/BX 4BX/CA)

## (undated) DEVICE — SYRINGE 20 ML LL (50EA/BX 4BX/CA)

## (undated) DEVICE — CLIP SM INTNL HRZN TI ESCP LGT - (24EA/PK 25PK/BX)

## (undated) DEVICE — BANDAGE ELASTIC 4 HONEYCOMB - 4"X5YD LF (20/CA)"

## (undated) DEVICE — SUTURE GENERAL

## (undated) DEVICE — WATER IRRIG. STER. 1500 ML - (9/CA)

## (undated) DEVICE — COVER PROBE STERILE CONE (12EA/CA)

## (undated) DEVICE — PAD PREP 24 X 48 CUFFED - (100/CA)

## (undated) DEVICE — ELECTRODE 850 FOAM ADHESIVE - HYDROGEL RADIOTRNSPRNT (50/PK)

## (undated) DEVICE — PROTECTOR ULNA NERVE - (36PR/CA)

## (undated) DEVICE — TUBE CONNECT SUCTION CLEAR 120 X 1/4" (50EA/CA)"

## (undated) DEVICE — BLADE SURGICAL #11 - (50/BX)

## (undated) DEVICE — SYRINGE SAFETY TB 1 ML 27 GA X 1/2 IN (100/BX 4BX/CA)

## (undated) DEVICE — SUTURE 4-0 SILK 12 X 18 INCH - (36/BX)

## (undated) DEVICE — CLIP MED INTNL HRZN TI ESCP - (25/BX)

## (undated) DEVICE — TROCAR Z THREAD 11 X 100 - BLADED (6/BX)

## (undated) DEVICE — SUTURE 3-0 VICRYL PLUS SH - 8X 18 INCH (12/BX)

## (undated) DEVICE — MASK, LARYNGEAL AIRWAY #4

## (undated) DEVICE — CANNULA W/SEAL 5X100 Z-THRE - ADED KII (12/BX)

## (undated) DEVICE — TUBING CLEARLINK DUO-VENT - C-FLO (48EA/CA)

## (undated) DEVICE — GLOVE BIOGEL PI ORTHO SZ 7 PF LF (40PR/BX)

## (undated) DEVICE — GUIDE TRACHE TUBE INTUBATING STYLET 5.0-10.0MM 14FR (20EA/PK)

## (undated) DEVICE — SUTURE 4-0 VICRYL PLUSFS-1 - 27 INCH (36/BX)

## (undated) DEVICE — TUBE E-T HI-LO CUFF 6.5MM (10EA/BX)

## (undated) DEVICE — SYRINGE SAFETY 5 ML 18 GA X 1-1/2 BLUNT LL (100/BX 4BX/CA)

## (undated) DEVICE — SET SUCTION/IRRIGATION WITH DISPOSABLE TIP (6/CA )PART #0250-070-520 IS A SUB

## (undated) DEVICE — SUTURE 0 VICRYL PLUS CT-2 - 8 X 18 INCH (12/BX)

## (undated) DEVICE — CHLORAPREP 26 ML APPLICATOR - ORANGE TINT(25/CA)

## (undated) DEVICE — SUTURE 6-0 PROLENE BV-1 D/A 24 (36PK/BX)"

## (undated) DEVICE — TOWELS CLOTH SURGICAL - (4/PK 20PK/CA)

## (undated) DEVICE — HEAD HOLDER JUNIOR/ADULT

## (undated) DEVICE — NEEDLE W/FACET TIP DULL VERSION W/STIMULATION CABLE SONOPLEX 21G X 4 (10/EA)"

## (undated) DEVICE — GLOVE BIOGEL SZ 6 PF LATEX - (50EA/BX 4BX/CA)

## (undated) DEVICE — KIT ROOM DECONTAMINATION

## (undated) DEVICE — DRESSING TRANSPARENT FILM TEGADERM 4 X 4.75" (50EA/BX)"

## (undated) DEVICE — VESSELOOP MINI BLUE STERILE - SURG-I-LOOP (10EA/BX)

## (undated) DEVICE — SUCTION INSTRUMENT YANKAUER BULBOUS TIP W/O VENT (50EA/CA)

## (undated) DEVICE — NEPTUNE 4 PORT MANIFOLD - (20/PK)

## (undated) DEVICE — GOWN SURGEONS X-LARGE - DISP. (30/CA)

## (undated) DEVICE — GLOVE BIOGEL ECLIPSE  PF LATEX SIZE 6.5 (50PR/BX)

## (undated) DEVICE — DRAPE LOWER EXTREMETY - (6/CA)

## (undated) DEVICE — GELAQUASONIC 100 ULTRASOUND - 48/BX 20GM STERILE FOIL POUCH

## (undated) DEVICE — SENSOR SPO2 NEO LNCS ADHESIVE (20/BX) SEE USER NOTES

## (undated) DEVICE — MASK ANESTHESIA ADULT  - (100/CA)

## (undated) DEVICE — PACK MINOR BASIN - (2EA/CA)

## (undated) DEVICE — SUTURE 5-0 PROLENE C-1 -(36PK/BX)

## (undated) DEVICE — DETERGENT RENUZYME PLUS 10 OZ PACKET (50/BX)

## (undated) DEVICE — BLOCK

## (undated) DEVICE — GLOVE SZ 7 BIOGEL PI MICRO - PF LF (50PR/BX 4BX/CA)

## (undated) DEVICE — SLEEVE, VASO, THIGH, MED

## (undated) DEVICE — SET LEADWIRE 5 LEAD BEDSIDE DISPOSABLE ECG (1SET OF 5/EA)

## (undated) DEVICE — TOURNIQUET, STERILE 18 (RED)

## (undated) DEVICE — DRAPESURG STERI-DRAPE LONG - (10/BX 4BX/CA)

## (undated) DEVICE — KIT ANESTHESIA W/CIRCUIT & 3/LT BAG W/FILTER (20EA/CA)

## (undated) DEVICE — BAG RETRIEVAL 10ML (10EA/BX)

## (undated) DEVICE — LEAD SET 6 DISP. EKG NIHON KOHDEN (100EA/CA) [9859].

## (undated) DEVICE — SUTURE 4-0 30CM STRATAFIX SPIRAL PS-2 (12EA/BX)

## (undated) DEVICE — CLIP MED LG INTNL HRZN TI ESCP - (20/BX)

## (undated) DEVICE — GLOVE SZ 7.5 BIOGEL PI MICRO - PF LF (50PR/BX)

## (undated) DEVICE — GLOVE BIOGEL PI ORTHO SZ 6 SURGICAL PF LF (40PR/BX)

## (undated) DEVICE — SYRINGE SAFETY 10 ML 18 GA X 1 1/2 BLUNT LL (100/BX 4BX/CA)

## (undated) DEVICE — VESSELOOP MAXI BLUE STERILE- SURG-I-LOOP (10EA/BX)

## (undated) DEVICE — GLOVE BIOGEL SZ 6.5 SURGICAL PF LTX (50PR/BX 4BX/CA)

## (undated) DEVICE — GLOVE SZ 6.5 BIOGEL PI MICRO - PF LF (50PR/BX)

## (undated) DEVICE — BAG DECANTER (50EA/CS)

## (undated) DEVICE — DERMABOND ADVANCED - (12EA/BX)

## (undated) DEVICE — SPONGE PEANUT - (5/PK 50PK/CA)

## (undated) DEVICE — HEMOSTAT ARISTA PWD 3 GRAM - (5/CA)

## (undated) DEVICE — KIT SURGIFLO W/OUT THROMBIN - (6EA/CA)

## (undated) DEVICE — SPONGE DRAIN 4 X 4IN 6-PLY - (2/PK25PK/BX12BX/CS)

## (undated) DEVICE — STAPLER SKIN DISP - (6/BX 10BX/CA) VISISTAT

## (undated) DEVICE — TROCAR 5X100 NON BLADED Z-TH - READ KII (6/BX)